# Patient Record
Sex: FEMALE | Race: BLACK OR AFRICAN AMERICAN | NOT HISPANIC OR LATINO
[De-identification: names, ages, dates, MRNs, and addresses within clinical notes are randomized per-mention and may not be internally consistent; named-entity substitution may affect disease eponyms.]

---

## 2014-12-29 RX ORDER — FUROSEMIDE 40 MG
1 TABLET ORAL
Qty: 0 | Refills: 0 | DISCHARGE
Start: 2014-12-29

## 2014-12-29 RX ORDER — FUROSEMIDE 40 MG
1 TABLET ORAL
Qty: 0 | Refills: 0 | COMMUNITY
Start: 2014-12-29

## 2017-06-09 ENCOUNTER — APPOINTMENT (OUTPATIENT)
Dept: GERIATRICS | Facility: CLINIC | Age: 82
End: 2017-06-09

## 2017-06-09 VITALS
WEIGHT: 157.13 LBS | DIASTOLIC BLOOD PRESSURE: 80 MMHG | BODY MASS INDEX: 26.18 KG/M2 | HEART RATE: 85 BPM | TEMPERATURE: 97.8 F | RESPIRATION RATE: 15 BRPM | SYSTOLIC BLOOD PRESSURE: 150 MMHG | HEIGHT: 65 IN | OXYGEN SATURATION: 98 %

## 2017-06-09 DIAGNOSIS — K08.109 PRESENCE OF DENTAL PROSTHETIC DEVICE (COMPLETE) (PARTIAL): ICD-10-CM

## 2017-06-09 DIAGNOSIS — Z63.4 DISAPPEARANCE AND DEATH OF FAMILY MEMBER: ICD-10-CM

## 2017-06-09 DIAGNOSIS — Z97.2 PRESENCE OF DENTAL PROSTHETIC DEVICE (COMPLETE) (PARTIAL): ICD-10-CM

## 2017-06-09 SDOH — SOCIAL STABILITY - SOCIAL INSECURITY: DISSAPEARANCE AND DEATH OF FAMILY MEMBER: Z63.4

## 2017-06-12 LAB
24R-OH-CALCIDIOL SERPL-MCNC: 104 PG/ML
ALBUMIN SERPL ELPH-MCNC: 3.9 G/DL
ALP BLD-CCNC: 91 U/L
ALT SERPL-CCNC: 34 U/L
ANION GAP SERPL CALC-SCNC: 18 MMOL/L
AST SERPL-CCNC: 43 U/L
BASOPHILS # BLD AUTO: 0.06 K/UL
BASOPHILS NFR BLD AUTO: 0.9 %
BILIRUB SERPL-MCNC: 0.2 MG/DL
BUN SERPL-MCNC: 16 MG/DL
CALCIUM SERPL-MCNC: 9.8 MG/DL
CHLORIDE SERPL-SCNC: 96 MMOL/L
CHOLEST SERPL-MCNC: 246 MG/DL
CHOLEST/HDLC SERPL: 2.4 RATIO
CO2 SERPL-SCNC: 23 MMOL/L
CREAT SERPL-MCNC: 0.77 MG/DL
EOSINOPHIL # BLD AUTO: 0.2 K/UL
EOSINOPHIL NFR BLD AUTO: 3.1 %
FOLATE SERPL-MCNC: 13.5 NG/ML
GLUCOSE SERPL-MCNC: 95 MG/DL
HBA1C MFR BLD HPLC: 5.6 %
HCT VFR BLD CALC: 40.2 %
HDLC SERPL-MCNC: 103 MG/DL
HGB BLD-MCNC: 13.4 G/DL
IMM GRANULOCYTES NFR BLD AUTO: 0.3 %
LDLC SERPL CALC-MCNC: 129 MG/DL
LYMPHOCYTES # BLD AUTO: 1.7 K/UL
LYMPHOCYTES NFR BLD AUTO: 26.6 %
MAN DIFF?: NORMAL
MCHC RBC-ENTMCNC: 30.9 PG
MCHC RBC-ENTMCNC: 33.3 GM/DL
MCV RBC AUTO: 92.6 FL
MONOCYTES # BLD AUTO: 0.77 K/UL
MONOCYTES NFR BLD AUTO: 12 %
NEUTROPHILS # BLD AUTO: 3.65 K/UL
NEUTROPHILS NFR BLD AUTO: 57.1 %
PLATELET # BLD AUTO: 238 K/UL
POTASSIUM SERPL-SCNC: 4.9 MMOL/L
PROT SERPL-MCNC: 6.8 G/DL
RBC # BLD: 4.34 M/UL
RBC # FLD: 14.6 %
RPR SER-TITR: NORMAL
SODIUM SERPL-SCNC: 137 MMOL/L
TRIGL SERPL-MCNC: 68 MG/DL
TSH SERPL-ACNC: 0.86 UIU/ML
VIT B12 SERPL-MCNC: 681 PG/ML
WBC # FLD AUTO: 6.4 K/UL

## 2017-07-21 ENCOUNTER — APPOINTMENT (OUTPATIENT)
Dept: CARDIOLOGY | Facility: CLINIC | Age: 82
End: 2017-07-21

## 2017-07-21 ENCOUNTER — NON-APPOINTMENT (OUTPATIENT)
Age: 82
End: 2017-07-21

## 2017-07-21 VITALS
HEART RATE: 74 BPM | HEIGHT: 65 IN | DIASTOLIC BLOOD PRESSURE: 68 MMHG | BODY MASS INDEX: 25.99 KG/M2 | WEIGHT: 156 LBS | SYSTOLIC BLOOD PRESSURE: 148 MMHG

## 2017-07-21 VITALS — DIASTOLIC BLOOD PRESSURE: 65 MMHG | SYSTOLIC BLOOD PRESSURE: 128 MMHG

## 2017-07-21 DIAGNOSIS — Z78.9 OTHER SPECIFIED HEALTH STATUS: ICD-10-CM

## 2017-08-03 ENCOUNTER — APPOINTMENT (OUTPATIENT)
Dept: CARDIOLOGY | Facility: CLINIC | Age: 82
End: 2017-08-03
Payer: MEDICARE

## 2017-08-03 PROCEDURE — 93306 TTE W/DOPPLER COMPLETE: CPT

## 2017-09-15 ENCOUNTER — APPOINTMENT (OUTPATIENT)
Dept: GERIATRICS | Facility: CLINIC | Age: 82
End: 2017-09-15
Payer: MEDICARE

## 2017-09-15 VITALS
OXYGEN SATURATION: 96 % | RESPIRATION RATE: 15 BRPM | HEIGHT: 65 IN | BODY MASS INDEX: 25.83 KG/M2 | DIASTOLIC BLOOD PRESSURE: 88 MMHG | TEMPERATURE: 97.6 F | HEART RATE: 69 BPM | SYSTOLIC BLOOD PRESSURE: 150 MMHG | WEIGHT: 155.03 LBS

## 2017-09-15 PROCEDURE — 99214 OFFICE O/P EST MOD 30 MIN: CPT | Mod: GC

## 2017-09-15 RX ORDER — HYDROCORTISONE AND IODOCHLORHYDROXYQUIN 5; 30 MG/G; MG/G
3-0.5 CREAM TOPICAL
Qty: 80 | Refills: 0 | Status: DISCONTINUED | COMMUNITY
Start: 2017-05-02 | End: 2017-09-15

## 2017-09-22 ENCOUNTER — NON-APPOINTMENT (OUTPATIENT)
Age: 82
End: 2017-09-22

## 2017-09-22 ENCOUNTER — APPOINTMENT (OUTPATIENT)
Dept: CARDIOLOGY | Facility: CLINIC | Age: 82
End: 2017-09-22
Payer: MEDICARE

## 2017-09-22 VITALS
SYSTOLIC BLOOD PRESSURE: 184 MMHG | TEMPERATURE: 97.7 F | BODY MASS INDEX: 25.66 KG/M2 | HEIGHT: 65 IN | WEIGHT: 154 LBS | HEART RATE: 70 BPM | DIASTOLIC BLOOD PRESSURE: 99 MMHG | OXYGEN SATURATION: 99 %

## 2017-09-22 PROCEDURE — 93000 ELECTROCARDIOGRAM COMPLETE: CPT

## 2017-09-22 PROCEDURE — 99214 OFFICE O/P EST MOD 30 MIN: CPT

## 2017-09-26 ENCOUNTER — MESSAGE (OUTPATIENT)
Age: 82
End: 2017-09-26

## 2017-09-28 ENCOUNTER — INPATIENT (INPATIENT)
Facility: HOSPITAL | Age: 82
LOS: 3 days | Discharge: HOME | DRG: 305 | End: 2017-10-02
Attending: HOSPITALIST | Admitting: HOSPITALIST
Payer: MEDICARE

## 2017-09-28 VITALS
RESPIRATION RATE: 18 BRPM | TEMPERATURE: 97 F | SYSTOLIC BLOOD PRESSURE: 214 MMHG | HEART RATE: 78 BPM | DIASTOLIC BLOOD PRESSURE: 109 MMHG | OXYGEN SATURATION: 98 %

## 2017-09-28 DIAGNOSIS — M25.512 PAIN IN LEFT SHOULDER: ICD-10-CM

## 2017-09-28 DIAGNOSIS — I16.0 HYPERTENSIVE URGENCY: ICD-10-CM

## 2017-09-28 DIAGNOSIS — F03.90 UNSPECIFIED DEMENTIA, UNSPECIFIED SEVERITY, WITHOUT BEHAVIORAL DISTURBANCE, PSYCHOTIC DISTURBANCE, MOOD DISTURBANCE, AND ANXIETY: ICD-10-CM

## 2017-09-28 DIAGNOSIS — E03.9 HYPOTHYROIDISM, UNSPECIFIED: ICD-10-CM

## 2017-09-28 DIAGNOSIS — M19.90 UNSPECIFIED OSTEOARTHRITIS, UNSPECIFIED SITE: ICD-10-CM

## 2017-09-28 LAB
ALBUMIN SERPL ELPH-MCNC: 4.5 G/DL — SIGNIFICANT CHANGE UP (ref 3.3–5)
ALP SERPL-CCNC: 98 U/L — SIGNIFICANT CHANGE UP (ref 40–120)
ALT FLD-CCNC: 46 U/L RC — HIGH (ref 10–45)
ANION GAP SERPL CALC-SCNC: 20 MMOL/L — HIGH (ref 5–17)
AST SERPL-CCNC: 61 U/L — HIGH (ref 10–40)
BASE EXCESS BLDV CALC-SCNC: 1 MMOL/L — SIGNIFICANT CHANGE UP (ref -2–2)
BASOPHILS # BLD AUTO: 0 K/UL — SIGNIFICANT CHANGE UP (ref 0–0.2)
BASOPHILS NFR BLD AUTO: 0.1 % — SIGNIFICANT CHANGE UP (ref 0–2)
BILIRUB SERPL-MCNC: 0.4 MG/DL — SIGNIFICANT CHANGE UP (ref 0.2–1.2)
BUN SERPL-MCNC: 15 MG/DL — SIGNIFICANT CHANGE UP (ref 7–23)
CA-I SERPL-SCNC: 1.18 MMOL/L — SIGNIFICANT CHANGE UP (ref 1.12–1.3)
CALCIUM SERPL-MCNC: 10.1 MG/DL — SIGNIFICANT CHANGE UP (ref 8.4–10.5)
CHLORIDE BLDV-SCNC: 97 MMOL/L — SIGNIFICANT CHANGE UP (ref 96–108)
CHLORIDE SERPL-SCNC: 94 MMOL/L — LOW (ref 96–108)
CO2 BLDV-SCNC: 27 MMOL/L — SIGNIFICANT CHANGE UP (ref 22–30)
CO2 SERPL-SCNC: 23 MMOL/L — SIGNIFICANT CHANGE UP (ref 22–31)
CREAT SERPL-MCNC: 0.7 MG/DL — SIGNIFICANT CHANGE UP (ref 0.5–1.3)
EOSINOPHIL # BLD AUTO: 0 K/UL — SIGNIFICANT CHANGE UP (ref 0–0.5)
EOSINOPHIL NFR BLD AUTO: 0.4 % — SIGNIFICANT CHANGE UP (ref 0–6)
GAS PNL BLDV: 134 MMOL/L — LOW (ref 136–145)
GAS PNL BLDV: SIGNIFICANT CHANGE UP
GAS PNL BLDV: SIGNIFICANT CHANGE UP
GLUCOSE BLDV-MCNC: 102 MG/DL — HIGH (ref 70–99)
GLUCOSE SERPL-MCNC: 103 MG/DL — HIGH (ref 70–99)
HCO3 BLDV-SCNC: 26 MMOL/L — SIGNIFICANT CHANGE UP (ref 21–29)
HCT VFR BLD CALC: 45 % — SIGNIFICANT CHANGE UP (ref 34.5–45)
HCT VFR BLDA CALC: 47 % — SIGNIFICANT CHANGE UP (ref 39–50)
HGB BLD CALC-MCNC: 15.2 G/DL — SIGNIFICANT CHANGE UP (ref 11.5–15.5)
HGB BLD-MCNC: 15.3 G/DL — SIGNIFICANT CHANGE UP (ref 11.5–15.5)
LACTATE BLDV-MCNC: 3.5 MMOL/L — HIGH (ref 0.7–2)
LYMPHOCYTES # BLD AUTO: 1.5 K/UL — SIGNIFICANT CHANGE UP (ref 1–3.3)
LYMPHOCYTES # BLD AUTO: 19.6 % — SIGNIFICANT CHANGE UP (ref 13–44)
MCHC RBC-ENTMCNC: 32.9 PG — SIGNIFICANT CHANGE UP (ref 27–34)
MCHC RBC-ENTMCNC: 33.9 GM/DL — SIGNIFICANT CHANGE UP (ref 32–36)
MCV RBC AUTO: 97.1 FL — SIGNIFICANT CHANGE UP (ref 80–100)
MONOCYTES # BLD AUTO: 0.8 K/UL — SIGNIFICANT CHANGE UP (ref 0–0.9)
MONOCYTES NFR BLD AUTO: 11.1 % — SIGNIFICANT CHANGE UP (ref 2–14)
NEUTROPHILS # BLD AUTO: 5.1 K/UL — SIGNIFICANT CHANGE UP (ref 1.8–7.4)
NEUTROPHILS NFR BLD AUTO: 68.7 % — SIGNIFICANT CHANGE UP (ref 43–77)
PCO2 BLDV: 44 MMHG — SIGNIFICANT CHANGE UP (ref 35–50)
PH BLDV: 7.38 — SIGNIFICANT CHANGE UP (ref 7.35–7.45)
PLATELET # BLD AUTO: 181 K/UL — SIGNIFICANT CHANGE UP (ref 150–400)
PO2 BLDV: 25 MMHG — SIGNIFICANT CHANGE UP (ref 25–45)
POTASSIUM BLDV-SCNC: 5.2 MMOL/L — HIGH (ref 3.5–5)
POTASSIUM SERPL-MCNC: 4.1 MMOL/L — SIGNIFICANT CHANGE UP (ref 3.5–5.3)
POTASSIUM SERPL-SCNC: 4.1 MMOL/L — SIGNIFICANT CHANGE UP (ref 3.5–5.3)
PROT SERPL-MCNC: 8.4 G/DL — HIGH (ref 6–8.3)
RBC # BLD: 4.64 M/UL — SIGNIFICANT CHANGE UP (ref 3.8–5.2)
RBC # FLD: 13.3 % — SIGNIFICANT CHANGE UP (ref 10.3–14.5)
SAO2 % BLDV: 38 % — LOW (ref 67–88)
SODIUM SERPL-SCNC: 137 MMOL/L — SIGNIFICANT CHANGE UP (ref 135–145)
WBC # BLD: 7.4 K/UL — SIGNIFICANT CHANGE UP (ref 3.8–10.5)
WBC # FLD AUTO: 7.4 K/UL — SIGNIFICANT CHANGE UP (ref 3.8–10.5)

## 2017-09-28 PROCEDURE — 93010 ELECTROCARDIOGRAM REPORT: CPT

## 2017-09-28 PROCEDURE — 71010: CPT | Mod: 26

## 2017-09-28 PROCEDURE — 99223 1ST HOSP IP/OBS HIGH 75: CPT | Mod: AI

## 2017-09-28 PROCEDURE — 73030 X-RAY EXAM OF SHOULDER: CPT | Mod: 26,LT

## 2017-09-28 PROCEDURE — 99285 EMERGENCY DEPT VISIT HI MDM: CPT | Mod: 25,GC

## 2017-09-28 RX ORDER — ACETAMINOPHEN 500 MG
1000 TABLET ORAL ONCE
Qty: 0 | Refills: 0 | Status: COMPLETED | OUTPATIENT
Start: 2017-09-28 | End: 2017-09-28

## 2017-09-28 RX ORDER — ACETAMINOPHEN 500 MG
650 TABLET ORAL EVERY 6 HOURS
Qty: 0 | Refills: 0 | Status: DISCONTINUED | OUTPATIENT
Start: 2017-09-28 | End: 2017-10-02

## 2017-09-28 RX ORDER — LABETALOL HCL 100 MG
10 TABLET ORAL ONCE
Qty: 0 | Refills: 0 | Status: COMPLETED | OUTPATIENT
Start: 2017-09-28 | End: 2017-09-28

## 2017-09-28 RX ORDER — AMLODIPINE BESYLATE 2.5 MG/1
5 TABLET ORAL DAILY
Qty: 0 | Refills: 0 | Status: DISCONTINUED | OUTPATIENT
Start: 2017-09-28 | End: 2017-09-29

## 2017-09-28 RX ORDER — HYDRALAZINE HCL 50 MG
10 TABLET ORAL ONCE
Qty: 0 | Refills: 0 | Status: COMPLETED | OUTPATIENT
Start: 2017-09-28 | End: 2017-09-28

## 2017-09-28 RX ORDER — SODIUM CHLORIDE 9 MG/ML
1000 INJECTION INTRAMUSCULAR; INTRAVENOUS; SUBCUTANEOUS ONCE
Qty: 0 | Refills: 0 | Status: COMPLETED | OUTPATIENT
Start: 2017-09-28 | End: 2017-09-28

## 2017-09-28 RX ORDER — HEPARIN SODIUM 5000 [USP'U]/ML
5000 INJECTION INTRAVENOUS; SUBCUTANEOUS EVERY 12 HOURS
Qty: 0 | Refills: 0 | Status: DISCONTINUED | OUTPATIENT
Start: 2017-09-28 | End: 2017-10-02

## 2017-09-28 RX ORDER — FUROSEMIDE 40 MG
20 TABLET ORAL DAILY
Qty: 0 | Refills: 0 | Status: DISCONTINUED | OUTPATIENT
Start: 2017-09-28 | End: 2017-09-29

## 2017-09-28 RX ORDER — METOPROLOL TARTRATE 50 MG
25 TABLET ORAL ONCE
Qty: 0 | Refills: 0 | Status: COMPLETED | OUTPATIENT
Start: 2017-09-28 | End: 2017-09-28

## 2017-09-28 RX ORDER — LIDOCAINE 4 G/100G
1 CREAM TOPICAL DAILY
Qty: 0 | Refills: 0 | Status: DISCONTINUED | OUTPATIENT
Start: 2017-09-28 | End: 2017-10-02

## 2017-09-28 RX ORDER — METOPROLOL TARTRATE 50 MG
25 TABLET ORAL
Qty: 0 | Refills: 0 | Status: DISCONTINUED | OUTPATIENT
Start: 2017-09-28 | End: 2017-10-02

## 2017-09-28 RX ORDER — LIOTHYRONINE SODIUM 25 UG/1
5 TABLET ORAL DAILY
Qty: 0 | Refills: 0 | Status: DISCONTINUED | OUTPATIENT
Start: 2017-09-28 | End: 2017-10-02

## 2017-09-28 RX ADMIN — Medication 400 MILLIGRAM(S): at 18:10

## 2017-09-28 RX ADMIN — SODIUM CHLORIDE 1000 MILLILITER(S): 9 INJECTION INTRAMUSCULAR; INTRAVENOUS; SUBCUTANEOUS at 20:26

## 2017-09-28 RX ADMIN — Medication 10 MILLIGRAM(S): at 18:11

## 2017-09-28 RX ADMIN — Medication 10 MILLIGRAM(S): at 22:33

## 2017-09-28 RX ADMIN — AMLODIPINE BESYLATE 5 MILLIGRAM(S): 2.5 TABLET ORAL at 22:33

## 2017-09-28 RX ADMIN — Medication 25 MILLIGRAM(S): at 18:12

## 2017-09-28 RX ADMIN — Medication 10 MILLIGRAM(S): at 20:56

## 2017-09-28 RX ADMIN — Medication 1000 MILLIGRAM(S): at 20:26

## 2017-09-28 NOTE — ED PROVIDER NOTE - MEDICAL DECISION MAKING DETAILS
89F with pmh HTN presenting with cc of SOB a/w L shoulder pain, on arrival pt in hypertensive urgency will BP control, r/o L shoulder dislocation fx, ACS, CXR for PTX, PNA,, ekg, labs, trops, cxr, xr L shoulder, probable admit

## 2017-09-28 NOTE — CHART NOTE - NSCHARTNOTEFT_GEN_A_CORE
RRT called for hypotension and shoulder pain. Pt found to have BP of 120/80. She is admitted fro Hypertensive urgency with a goal SBP of 170-180. She received oral antihypertensives in the ED. EKG was performed which showed no change from previous EKG. Pt did not have any ectopy on monitor. lidocaine patch was placed on shoulder for pain as the pain was thought to be MSK related. Labs ordered to check cardiac enzymes and electrolytes. Will also repeat lactate. BP was repeated and SBP was in 190s which is closer to pts goal.     Pts primary team at bedside.      Plan:  - cardiac enzymes  - CXR  - pain control    Nishant LINARES  [id number]

## 2017-09-28 NOTE — H&P ADULT - PROBLEM SELECTOR PLAN 4
conducted a detailed discussion... I had a detailed discussion with the patient and/or guardian regarding the historical points, exam findings, and any diagnostic results supporting the discharge/admit diagnosis. ck TSH  cont liothyronine

## 2017-09-28 NOTE — H&P ADULT - PROBLEM SELECTOR PLAN 3
monitor mental status closely  daughter to stay at bedside  doubt any risk of withdrawal but pt daughter and RN educated about s/s withdrawal and must use caution as pt with dementia and may have hospital induced delirium

## 2017-09-28 NOTE — ED PROVIDER NOTE - ATTENDING CONTRIBUTION TO CARE
Patient with left chest pain/shoulder pain. Moderate. Persistent. Not better with time.  ncat, non-tachycardic, non-tachypneic, cooperative, mild distress secondary to pain  concern for anginal equivalent vs hypertensive urgency, will get iv, labs, CE, ekg, cxr and likely admit

## 2017-09-28 NOTE — H&P ADULT - PROBLEM SELECTOR PLAN 1
pt with chronically elevated bp and likely vasovagal episode (pt did not eat/drink all day)  restarted metoprolol at 25mg po bid and added back norvasc 5mg daily  cont lasix  will ck Franny x 3, monitor on tele  use extreme caution as pt with labile bp  repeat lactate on next labs, ? reason for elevation  ck UA to r/o uti pt with chronically elevated bp and likely vasovagal episode (pt did not eat/drink all day)  restarted metoprolol at 25mg po bid and added back norvasc 5mg daily  cont lasix  will ck Franny x 3, monitor on tele  use extreme caution as pt with labile bp  repeat lactate on next labs, ? reason for elevation  ck UA to r/o uti  obtain results of TTE from August

## 2017-09-28 NOTE — ED ADULT NURSE REASSESSMENT NOTE - NS ED NURSE REASSESS COMMENT FT1
pt still hypertensive, 205/104. pt denies dizziness, HA, numbness, tingling, blurred vision. pt baseline is 180/90 as per family. MD Joya made aware. MD ordered an additional 10mg of labetalol IV push. pt admitted, awaiting bed. plan of care discussed. safety and comfort measures maintained.

## 2017-09-28 NOTE — H&P ADULT - PROBLEM SELECTOR PLAN 5
pt with baseline OA  hold meloxicam which can increase BP in elderly  for now lidoderm patch and cold compresses to left shoulder and avoid systemic medications

## 2017-09-28 NOTE — H&P ADULT - PMH
Arthritis    Dementia without behavioral disturbance, unspecified dementia type    Hepatitis C    Hypertension    Hypothyroidism

## 2017-09-28 NOTE — ED PROVIDER NOTE - OBJECTIVE STATEMENT
Pt is a 89F with a PMH of HTN, dementia presenting with a cc of L shoulder pain and SOB beginning at approx. 1200 today. Per pts daughter, discovered pt c/o L shoulder pain to point of inability to move, unclear if pt fell, pt also c/o SOB a/w deep inspiration, denies cough. Per daughter pt at normal mental status. Denies n/v/f/c/cp Denies headache, syncope, lightheadedness, dizziness. Denies chest palpitations, abdominal pain. Denies dysuria, hematuria, hematochezia, BRBPR, tarry stools, diarrhea, constipation.

## 2017-09-28 NOTE — H&P ADULT - HISTORY OF PRESENT ILLNESS
89 f htn, dementia, hcv (s/p rx in remission), hypothyroid a/w left shoulder pain.  Daughter states she left for an apointment at 11am and came back at 2pm with pt c/o severe left shoulder pain.  Pt has chronic sob.  No cp/n/v.  Pt saw cardiology 1 wk ago and was told to increase toprol from 25 to 50.  Pt had a TTE in August, results unknown.  Of note is pt drinks 1/2-1 cup of gin a day, no h/o withdrawal.    In /109, hr 78  Given labetolol 10mg iv x 2 and metoprolol 25mg po x 1.  Initially seen on floor with sbp 215/98, hr 69.  Given hydralazine 10mg iv x 1 and resumed norvasc 5mg (was on in past and daughter does not remember why it was stopped).  Pt went to bathroom twice and on returning the second time she was sitting on end of bed and felt dizzy, lay down and initial bp 120/80.  After laying for a few minutes sbp back to 190s.  Pt with increasing shoulder pain and given lidoderm patch and cold compress with good effect

## 2017-09-28 NOTE — ED ADULT NURSE NOTE - OBJECTIVE STATEMENT
89 yo presents to the ED from home by EMS. A&Ox4 c/o left shoulder pain. pt denies trauma to site. pt shoulder is slightly swollen, tender to palpation. pt reports severe pain upon movement of extremity. pt has history of dementia, pt was alone at home, daughter reports when she got home at 1400, she was complaining of pain. pt denies CP, SOB, radiating to back pain, dizziness. pt is ambulating with assistance of cane. pt BP is elevated, pt is normally 180/90. according to daughter, pt was advised to increase dose of metoprolol on Friday but did not follow orders. EKG done at bedside. pt placed on cardiac monitor. NSR 74. daughter at bedside. plan of care discussed. safety and comfort measures maintained.

## 2017-09-28 NOTE — H&P ADULT - RS GEN PE MLT RESP DETAILS PC
respirations non-labored/good air movement/breath sounds equal/airway patent/clear to auscultation bilaterally

## 2017-09-28 NOTE — PATIENT PROFILE ADULT. - NS TRANSFER PATIENT BELONGINGS
Jewelry only: 3 bracelets, earrings, necklace./Cell Phone/PDA (specify)/Clothing/Jewelry/Money (specify)

## 2017-09-28 NOTE — ED ADULT NURSE NOTE - CHPI ED SYMPTOMS NEG
no chills/no vomiting/no decreased eating/drinking/no nausea/no numbness/no weakness/no fever/no tingling/no dizziness

## 2017-09-29 DIAGNOSIS — M75.82 OTHER SHOULDER LESIONS, LEFT SHOULDER: ICD-10-CM

## 2017-09-29 DIAGNOSIS — E87.1 HYPO-OSMOLALITY AND HYPONATREMIA: ICD-10-CM

## 2017-09-29 DIAGNOSIS — I50.32 CHRONIC DIASTOLIC (CONGESTIVE) HEART FAILURE: ICD-10-CM

## 2017-09-29 DIAGNOSIS — I16.9 HYPERTENSIVE CRISIS, UNSPECIFIED: ICD-10-CM

## 2017-09-29 DIAGNOSIS — Z29.9 ENCOUNTER FOR PROPHYLACTIC MEASURES, UNSPECIFIED: ICD-10-CM

## 2017-09-29 DIAGNOSIS — B18.2 CHRONIC VIRAL HEPATITIS C: ICD-10-CM

## 2017-09-29 LAB
ANION GAP SERPL CALC-SCNC: 18 MMOL/L — HIGH (ref 5–17)
ANION GAP SERPL CALC-SCNC: 22 MMOL/L — HIGH (ref 5–17)
APPEARANCE UR: CLEAR — SIGNIFICANT CHANGE UP
APPEARANCE UR: CLEAR — SIGNIFICANT CHANGE UP
BASE EXCESS BLDV CALC-SCNC: 2.1 MMOL/L — HIGH (ref -2–2)
BILIRUB UR-MCNC: NEGATIVE — SIGNIFICANT CHANGE UP
BILIRUB UR-MCNC: NEGATIVE — SIGNIFICANT CHANGE UP
BUN SERPL-MCNC: 11 MG/DL — SIGNIFICANT CHANGE UP (ref 7–23)
BUN SERPL-MCNC: 8 MG/DL — SIGNIFICANT CHANGE UP (ref 7–23)
CA-I SERPL-SCNC: 1.15 MMOL/L — SIGNIFICANT CHANGE UP (ref 1.12–1.3)
CALCIUM SERPL-MCNC: 9.7 MG/DL — SIGNIFICANT CHANGE UP (ref 8.4–10.5)
CALCIUM SERPL-MCNC: 9.8 MG/DL — SIGNIFICANT CHANGE UP (ref 8.4–10.5)
CHLORIDE BLDV-SCNC: 96 MMOL/L — SIGNIFICANT CHANGE UP (ref 96–108)
CHLORIDE SERPL-SCNC: 92 MMOL/L — LOW (ref 96–108)
CHLORIDE SERPL-SCNC: 94 MMOL/L — LOW (ref 96–108)
CK MB BLD-MCNC: 2 % — SIGNIFICANT CHANGE UP (ref 0–3.5)
CK MB BLD-MCNC: 2.4 % — SIGNIFICANT CHANGE UP (ref 0–3.5)
CK MB CFR SERPL CALC: 4.9 NG/ML — HIGH (ref 0–3.8)
CK MB CFR SERPL CALC: 5.8 NG/ML — HIGH (ref 0–3.8)
CK SERPL-CCNC: 208 U/L — HIGH (ref 25–170)
CK SERPL-CCNC: 283 U/L — HIGH (ref 25–170)
CO2 BLDV-SCNC: 25 MMOL/L — SIGNIFICANT CHANGE UP (ref 22–30)
CO2 SERPL-SCNC: 19 MMOL/L — LOW (ref 22–31)
CO2 SERPL-SCNC: 22 MMOL/L — SIGNIFICANT CHANGE UP (ref 22–31)
COLOR SPEC: COLORLESS — SIGNIFICANT CHANGE UP
COLOR SPEC: COLORLESS — SIGNIFICANT CHANGE UP
CREAT SERPL-MCNC: 0.6 MG/DL — SIGNIFICANT CHANGE UP (ref 0.5–1.3)
CREAT SERPL-MCNC: 0.66 MG/DL — SIGNIFICANT CHANGE UP (ref 0.5–1.3)
DIFF PNL FLD: NEGATIVE — SIGNIFICANT CHANGE UP
DIFF PNL FLD: NEGATIVE — SIGNIFICANT CHANGE UP
EPI CELLS # UR: SIGNIFICANT CHANGE UP /HPF
EPI CELLS # UR: SIGNIFICANT CHANGE UP /HPF
GAS PNL BLDV: 134 MMOL/L — LOW (ref 136–145)
GAS PNL BLDV: SIGNIFICANT CHANGE UP
GLUCOSE BLDV-MCNC: 135 MG/DL — HIGH (ref 70–99)
GLUCOSE SERPL-MCNC: 117 MG/DL — HIGH (ref 70–99)
GLUCOSE SERPL-MCNC: 146 MG/DL — HIGH (ref 70–99)
GLUCOSE UR QL: NEGATIVE — SIGNIFICANT CHANGE UP
GLUCOSE UR QL: NEGATIVE — SIGNIFICANT CHANGE UP
HCO3 BLDV-SCNC: 24 MMOL/L — SIGNIFICANT CHANGE UP (ref 21–29)
HCT VFR BLD CALC: 44.9 % — SIGNIFICANT CHANGE UP (ref 34.5–45)
HCT VFR BLDA CALC: 48 % — SIGNIFICANT CHANGE UP (ref 39–50)
HGB BLD CALC-MCNC: 15.6 G/DL — HIGH (ref 11.5–15.5)
HGB BLD-MCNC: 15.3 G/DL — SIGNIFICANT CHANGE UP (ref 11.5–15.5)
KETONES UR-MCNC: ABNORMAL
KETONES UR-MCNC: ABNORMAL
LACTATE BLDV-MCNC: 3.3 MMOL/L — HIGH (ref 0.7–2)
LACTATE SERPL-SCNC: 2.2 MMOL/L — HIGH (ref 0.7–2)
LACTATE SERPL-SCNC: 3.3 MMOL/L — HIGH (ref 0.7–2)
LEUKOCYTE ESTERASE UR-ACNC: ABNORMAL
LEUKOCYTE ESTERASE UR-ACNC: NEGATIVE — SIGNIFICANT CHANGE UP
MAGNESIUM SERPL-MCNC: 1.8 MG/DL — SIGNIFICANT CHANGE UP (ref 1.6–2.6)
MAGNESIUM SERPL-MCNC: 2.2 MG/DL — SIGNIFICANT CHANGE UP (ref 1.6–2.6)
MCHC RBC-ENTMCNC: 32.7 PG — SIGNIFICANT CHANGE UP (ref 27–34)
MCHC RBC-ENTMCNC: 34.1 GM/DL — SIGNIFICANT CHANGE UP (ref 32–36)
MCV RBC AUTO: 95.9 FL — SIGNIFICANT CHANGE UP (ref 80–100)
NITRITE UR-MCNC: NEGATIVE — SIGNIFICANT CHANGE UP
NITRITE UR-MCNC: NEGATIVE — SIGNIFICANT CHANGE UP
OSMOLALITY SERPL: 278 MOS/KG — SIGNIFICANT CHANGE UP (ref 275–300)
OSMOLALITY UR: 332 MOS/KG — SIGNIFICANT CHANGE UP (ref 300–900)
OTHER CELLS CSF MANUAL: 10 ML/DL — LOW (ref 18–22)
PCO2 BLDV: 32 MMHG — LOW (ref 35–50)
PH BLDV: 7.49 — HIGH (ref 7.35–7.45)
PH UR: 7 — SIGNIFICANT CHANGE UP (ref 5–8)
PH UR: 7.5 — SIGNIFICANT CHANGE UP (ref 5–8)
PHOSPHATE SERPL-MCNC: 1.9 MG/DL — LOW (ref 2.5–4.5)
PHOSPHATE SERPL-MCNC: 3.1 MG/DL — SIGNIFICANT CHANGE UP (ref 2.5–4.5)
PLATELET # BLD AUTO: 188 K/UL — SIGNIFICANT CHANGE UP (ref 150–400)
PO2 BLDV: 25 MMHG — SIGNIFICANT CHANGE UP (ref 25–45)
POTASSIUM BLDV-SCNC: 3.2 MMOL/L — LOW (ref 3.5–5)
POTASSIUM SERPL-MCNC: 3.5 MMOL/L — SIGNIFICANT CHANGE UP (ref 3.5–5.3)
POTASSIUM SERPL-MCNC: 3.5 MMOL/L — SIGNIFICANT CHANGE UP (ref 3.5–5.3)
POTASSIUM SERPL-SCNC: 3.5 MMOL/L — SIGNIFICANT CHANGE UP (ref 3.5–5.3)
POTASSIUM SERPL-SCNC: 3.5 MMOL/L — SIGNIFICANT CHANGE UP (ref 3.5–5.3)
PROT UR-MCNC: NEGATIVE — SIGNIFICANT CHANGE UP
PROT UR-MCNC: SIGNIFICANT CHANGE UP
RBC # BLD: 4.68 M/UL — SIGNIFICANT CHANGE UP (ref 3.8–5.2)
RBC # FLD: 13.2 % — SIGNIFICANT CHANGE UP (ref 10.3–14.5)
RBC CASTS # UR COMP ASSIST: SIGNIFICANT CHANGE UP /HPF (ref 0–2)
RBC CASTS # UR COMP ASSIST: SIGNIFICANT CHANGE UP /HPF (ref 0–2)
SAO2 % BLDV: 46 % — LOW (ref 67–88)
SODIUM SERPL-SCNC: 133 MMOL/L — LOW (ref 135–145)
SODIUM SERPL-SCNC: 134 MMOL/L — LOW (ref 135–145)
SP GR SPEC: 1.01 — LOW (ref 1.01–1.02)
SP GR SPEC: 1.01 — LOW (ref 1.01–1.02)
TROPONIN T SERPL-MCNC: <0.01 NG/ML — SIGNIFICANT CHANGE UP (ref 0–0.06)
TROPONIN T SERPL-MCNC: <0.01 NG/ML — SIGNIFICANT CHANGE UP (ref 0–0.06)
TSH SERPL-MCNC: 3.03 UIU/ML — SIGNIFICANT CHANGE UP (ref 0.27–4.2)
UROBILINOGEN FLD QL: NEGATIVE — SIGNIFICANT CHANGE UP
UROBILINOGEN FLD QL: NEGATIVE — SIGNIFICANT CHANGE UP
WBC # BLD: 7.5 K/UL — SIGNIFICANT CHANGE UP (ref 3.8–10.5)
WBC # FLD AUTO: 7.5 K/UL — SIGNIFICANT CHANGE UP (ref 3.8–10.5)
WBC UR QL: ABNORMAL /HPF (ref 0–5)
WBC UR QL: SIGNIFICANT CHANGE UP /HPF (ref 0–5)

## 2017-09-29 PROCEDURE — 99233 SBSQ HOSP IP/OBS HIGH 50: CPT

## 2017-09-29 RX ORDER — HYDRALAZINE HCL 50 MG
10 TABLET ORAL EVERY 8 HOURS
Qty: 0 | Refills: 0 | Status: DISCONTINUED | OUTPATIENT
Start: 2017-09-29 | End: 2017-10-01

## 2017-09-29 RX ORDER — AMLODIPINE BESYLATE 2.5 MG/1
10 TABLET ORAL DAILY
Qty: 0 | Refills: 0 | Status: DISCONTINUED | OUTPATIENT
Start: 2017-09-29 | End: 2017-10-02

## 2017-09-29 RX ORDER — LISINOPRIL 2.5 MG/1
10 TABLET ORAL DAILY
Qty: 0 | Refills: 0 | Status: DISCONTINUED | OUTPATIENT
Start: 2017-09-29 | End: 2017-10-02

## 2017-09-29 RX ORDER — MAGNESIUM SULFATE 500 MG/ML
1 VIAL (ML) INJECTION ONCE
Qty: 0 | Refills: 0 | Status: COMPLETED | OUTPATIENT
Start: 2017-09-29 | End: 2017-09-29

## 2017-09-29 RX ORDER — POTASSIUM PHOSPHATE, MONOBASIC POTASSIUM PHOSPHATE, DIBASIC 236; 224 MG/ML; MG/ML
15 INJECTION, SOLUTION INTRAVENOUS ONCE
Qty: 0 | Refills: 0 | Status: COMPLETED | OUTPATIENT
Start: 2017-09-29 | End: 2017-09-29

## 2017-09-29 RX ORDER — FUROSEMIDE 40 MG
20 TABLET ORAL DAILY
Qty: 0 | Refills: 0 | Status: DISCONTINUED | OUTPATIENT
Start: 2017-09-29 | End: 2017-10-02

## 2017-09-29 RX ORDER — POTASSIUM CHLORIDE 20 MEQ
20 PACKET (EA) ORAL ONCE
Qty: 0 | Refills: 0 | Status: COMPLETED | OUTPATIENT
Start: 2017-09-29 | End: 2017-09-29

## 2017-09-29 RX ADMIN — POTASSIUM PHOSPHATE, MONOBASIC POTASSIUM PHOSPHATE, DIBASIC 62.5 MILLIMOLE(S): 236; 224 INJECTION, SOLUTION INTRAVENOUS at 02:32

## 2017-09-29 RX ADMIN — Medication 20 MILLIEQUIVALENT(S): at 01:16

## 2017-09-29 RX ADMIN — AMLODIPINE BESYLATE 10 MILLIGRAM(S): 2.5 TABLET ORAL at 12:39

## 2017-09-29 RX ADMIN — Medication 650 MILLIGRAM(S): at 01:15

## 2017-09-29 RX ADMIN — Medication 25 MILLIGRAM(S): at 05:50

## 2017-09-29 RX ADMIN — HEPARIN SODIUM 5000 UNIT(S): 5000 INJECTION INTRAVENOUS; SUBCUTANEOUS at 05:50

## 2017-09-29 RX ADMIN — LIDOCAINE 1 PATCH: 4 CREAM TOPICAL at 09:33

## 2017-09-29 RX ADMIN — LISINOPRIL 10 MILLIGRAM(S): 2.5 TABLET ORAL at 14:54

## 2017-09-29 RX ADMIN — Medication 20 MILLIGRAM(S): at 13:53

## 2017-09-29 RX ADMIN — HEPARIN SODIUM 5000 UNIT(S): 5000 INJECTION INTRAVENOUS; SUBCUTANEOUS at 17:20

## 2017-09-29 RX ADMIN — Medication 650 MILLIGRAM(S): at 00:19

## 2017-09-29 RX ADMIN — LIOTHYRONINE SODIUM 5 MICROGRAM(S): 25 TABLET ORAL at 05:50

## 2017-09-29 RX ADMIN — Medication 0.25 MILLIGRAM(S): at 23:49

## 2017-09-29 RX ADMIN — Medication 25 MILLIGRAM(S): at 17:20

## 2017-09-29 RX ADMIN — Medication 0.25 MILLIGRAM(S): at 16:54

## 2017-09-29 RX ADMIN — Medication 100 GRAM(S): at 01:16

## 2017-09-29 RX ADMIN — LIDOCAINE 1 PATCH: 4 CREAM TOPICAL at 21:32

## 2017-09-29 NOTE — PROGRESS NOTE ADULT - SUBJECTIVE AND OBJECTIVE BOX
Notify by RN patient complaint of     Patient is a 89y old  Female who presents with a chief complaint of shoulder pain (28 Sep 2017 23:38)      SUBJECTIVE / OVERNIGHT EVENTS:    MEDICATIONS  (STANDING):  heparin  Injectable 5000 Unit(s) SubCutaneous every 12 hours  metoprolol 25 milliGRAM(s) Oral two times a day  liothyronine 5 MICROGram(s) Oral daily  lidocaine   Patch 1 Patch Transdermal daily  amLODIPine   Tablet 10 milliGRAM(s) Oral daily  furosemide    Tablet 20 milliGRAM(s) Oral daily  lisinopril 10 milliGRAM(s) Oral daily    MEDICATIONS  (PRN):  acetaminophen   Tablet. 650 milliGRAM(s) Oral every 6 hours PRN Moderate Pain (4 - 6)  hydrALAZINE Injectable 10 milliGRAM(s) IV Push every 8 hours PRN SBP>190        CAPILLARY BLOOD GLUCOSE        I&O's Summary    28 Sep 2017 07:  -  29 Sep 2017 07:00  --------------------------------------------------------  IN: 590 mL / OUT: 0 mL / NET: 590 mL    29 Sep 2017 07:  -  29 Sep 2017 17:48  --------------------------------------------------------  IN: 240 mL / OUT: 0 mL / NET: 240 mL        LABS:                        15.3   7.5   )-----------( 188      ( 29 Sep 2017 06:35 )             44.9         134<L>  |  94<L>  |  8   ----------------------------<  117<H>  3.5   |  22  |  0.60    Ca    9.8      29 Sep 2017 06:35  Phos  3.1       Mg     2.2         TPro  8.4<H>  /  Alb  4.5  /  TBili  0.4  /  DBili  x   /  AST  61<H>  /  ALT  46<H>  /  AlkPhos  98  09-28      CARDIAC MARKERS ( 29 Sep 2017 06:34 )  x     / <0.01 ng/mL / 283 U/L / x     / 5.8 ng/mL  CARDIAC MARKERS ( 29 Sep 2017 00:15 )  x     / <0.01 ng/mL / 208 U/L / x     / 4.9 ng/mL  CARDIAC MARKERS ( 28 Sep 2017 18:06 )  x     / <0.01 ng/mL / 120 U/L / x     / 3.6 ng/mL      Urinalysis Basic - ( 29 Sep 2017 08:19 )    Color: x / Appearance: Clear / S.009 / pH: x  Gluc: x / Ketone: Trace  / Bili: Negative / Urobili: Negative   Blood: x / Protein: Trace / Nitrite: Negative   Leuk Esterase: Negative / RBC: 3-5 /HPF / WBC 0-2 /HPF   Sq Epi: x / Non Sq Epi: OCC /HPF / Bacteria: x          RADIOLOGY & ADDITIONAL TESTS:    PHYSICAL EXAM:  GENERAL: NAD, well-developed  HEAD:  Atraumatic, Normocephalic  EYES: EOMI, PERRLA, conjunctiva and sclera clear  NECK: Supple, No JVD  CHEST/LUNG: Clear to auscultation bilaterally; No wheeze  HEART: Regular rate and rhythm; No murmurs, rubs, or gallops  ABDOMEN: Soft, Nontender, Nondistended; Bowel sounds present  EXTREMITIES:  2+ Peripheral Pulses, No clubbing, cyanosis, or edema  PSYCH: AAOx3  NEUROLOGY: non-focal  SKIN: No rashes or lesions        A/P:  PAST MEDICAL & SURGICAL HISTORY:  Dementia without behavioral disturbance, unspecified dementia type  Hepatitis C  Hypertension  Arthritis  Hypothyroidism  No significant past surgical history Notify by RN patient  with systolic blood pressure 200's electronic  Manual  b/p 190's seen and examined patient asymptomatic   Denies  cp, sob. headaches, lightheadedness dizziness, n/v/D      Patient is a 89y old  Female who presents with a chief complaint of shoulder pain (28 Sep 2017 23:38)      SUBJECTIVE / OVERNIGHT EVENTS:    MEDICATIONS  (STANDING):  heparin  Injectable 5000 Unit(s) SubCutaneous every 12 hours  metoprolol 25 milliGRAM(s) Oral two times a day  liothyronine 5 MICROGram(s) Oral daily  lidocaine   Patch 1 Patch Transdermal daily  amLODIPine   Tablet 10 milliGRAM(s) Oral daily  furosemide    Tablet 20 milliGRAM(s) Oral daily  lisinopril 10 milliGRAM(s) Oral daily    MEDICATIONS  (PRN):  acetaminophen   Tablet. 650 milliGRAM(s) Oral every 6 hours PRN Moderate Pain (4 - 6)  hydrALAZINE Injectable 10 milliGRAM(s) IV Push every 8 hours PRN SBP>190        CAPILLARY BLOOD GLUCOSE        I&O's Summary    28 Sep 2017 07:  -  29 Sep 2017 07:00  --------------------------------------------------------  IN: 590 mL / OUT: 0 mL / NET: 590 mL    29 Sep 2017 07:01  -  29 Sep 2017 17:48  --------------------------------------------------------  IN: 240 mL / OUT: 0 mL / NET: 240 mL        LABS:                        15.3   7.5   )-----------( 188      ( 29 Sep 2017 06:35 )             44.9         134<L>  |  94<L>  |  8   ----------------------------<  117<H>  3.5   |  22  |  0.60    Ca    9.8      29 Sep 2017 06:35  Phos  3.1       Mg     2.2         TPro  8.4<H>  /  Alb  4.5  /  TBili  0.4  /  DBili  x   /  AST  61<H>  /  ALT  46<H>  /  AlkPhos  98  09-28      CARDIAC MARKERS ( 29 Sep 2017 06:34 )  x     / <0.01 ng/mL / 283 U/L / x     / 5.8 ng/mL  CARDIAC MARKERS ( 29 Sep 2017 00:15 )  x     / <0.01 ng/mL / 208 U/L / x     / 4.9 ng/mL  CARDIAC MARKERS ( 28 Sep 2017 18:06 )  x     / <0.01 ng/mL / 120 U/L / x     / 3.6 ng/mL      Urinalysis Basic - ( 29 Sep 2017 08:19 )    Color: x / Appearance: Clear / S.009 / pH: x  Gluc: x / Ketone: Trace  / Bili: Negative / Urobili: Negative   Blood: x / Protein: Trace / Nitrite: Negative   Leuk Esterase: Negative / RBC: 3-5 /HPF / WBC 0-2 /HPF   Sq Epi: x / Non Sq Epi: OCC /HPF / Bacteria: x    RADIOLOGY & ADDITIONAL TESTS:    PHYSICAL EXAM:  GENERAL: NAD, well-developed  HEAD:  Atraumatic, Normocephalic  EYES: EOMI, PERRLA, conjunctiva and sclera clear  NECK: Supple, No JVD  CHEST/LUNG: Clear to auscultation bilaterally; No wheeze  HEART: Regular rate and rhythm; No murmurs, rubs, or gallops  ABDOMEN: Soft, Nontender, Nondistended; Bowel sounds present  EXTREMITIES:  2+ Peripheral Pulses, No clubbing, cyanosis, or edema  PSYCH: AAOx3  NEUROLOGY: non-focal  SKIN: No rashes or lesions    PAST MEDICAL & SURGICAL HISTORY:  Dementia without behavioral disturbance, unspecified dementia type  Hepatitis C  Hypertension  Arthritis  Hypothyroidism  No significant past surgical history

## 2017-09-29 NOTE — PROGRESS NOTE ADULT - SUBJECTIVE AND OBJECTIVE BOX
Patient is a 89y old  Female who presents with a chief complaint of shoulder pain (28 Sep 2017 23:38)        SUBJECTIVE / OVERNIGHT EVENTS:  Overnight, pt noted to have hypertension - requiring IV labetalol and hydralazine. Pt seen at bedside with daughter. Pt reports some left shoulder pain, but much improved from yesterday. At baseline she has limited mobility of left shoulder, and follow with an orthopedist outside for "tendonitis." She denies any headache, visual change, chest pain, abd pain, new sob (has baseline SOB), diarrhea, constipation.   Daughter at bedside feel pt is at ba    MEDICATIONS  (STANDING):  heparin  Injectable 5000 Unit(s) SubCutaneous every 12 hours  metoprolol 25 milliGRAM(s) Oral two times a day  liothyronine 5 MICROGram(s) Oral daily  lidocaine   Patch 1 Patch Transdermal daily  amLODIPine   Tablet 10 milliGRAM(s) Oral daily    MEDICATIONS  (PRN):  acetaminophen   Tablet. 650 milliGRAM(s) Oral every 6 hours PRN Moderate Pain (4 - 6)      Vital Signs Last 24 Hrs  T(C): 36.5 (29 Sep 2017 04:23), Max: 36.6 (28 Sep 2017 22:10)  T(F): 97.7 (29 Sep 2017 04:23), Max: 97.9 (28 Sep 2017 22:10)  HR: 88 (29 Sep 2017 04:23) (68 - 88)  BP: 177/99 (29 Sep 2017 04:23) (177/99 - 215/105)  BP(mean): 137 (28 Sep 2017 23:38) (137 - 137)  RR: 18 (29 Sep 2017 04:23) (16 - 18)  SpO2: 100% (29 Sep 2017 04:23) (98% - 100%)  CAPILLARY BLOOD GLUCOSE        I&O's Summary    28 Sep 2017 07:01  -  29 Sep 2017 07:00  --------------------------------------------------------  IN: 590 mL / OUT: 0 mL / NET: 590 mL          PHYSICAL EXAM  GENERAL: NAD, well-developed  HEAD:  Atraumatic, Normocephalic  EYES: EOMI, PERRLA, conjunctiva and sclera clear  NECK: Supple, No JVD  CHEST/LUNG: Clear to auscultation bilaterally; No wheeze  HEART: Regular rate and rhythm; No murmurs, rubs, or gallops  ABDOMEN: Soft, Nontender, Nondistended; Bowel sounds present  EXTREMITIES:  2+ Peripheral Pulses, No clubbing, cyanosis, or edema  PSYCH: AAOx3  SKIN: No rashes or lesions    LABS:                        15.3   7.5   )-----------( 188      ( 29 Sep 2017 06:35 )             44.9         134<L>  |  94<L>  |  8   ----------------------------<  117<H>  3.5   |  22  |  0.60    Ca    9.8      29 Sep 2017 06:35  Phos  3.1       Mg     2.2         TPro  8.4<H>  /  Alb  4.5  /  TBili  0.4  /  DBili  x   /  AST  61<H>  /  ALT  46<H>  /  AlkPhos  98        CARDIAC MARKERS ( 29 Sep 2017 06:34 )  x     / <0.01 ng/mL / 283 U/L / x     / 5.8 ng/mL  CARDIAC MARKERS ( 29 Sep 2017 00:15 )  x     / <0.01 ng/mL / 208 U/L / x     / 4.9 ng/mL  CARDIAC MARKERS ( 28 Sep 2017 18:06 )  x     / <0.01 ng/mL / 120 U/L / x     / 3.6 ng/mL      Urinalysis Basic - ( 29 Sep 2017 08:19 )    Color: x / Appearance: Clear / S.009 / pH: x  Gluc: x / Ketone: Trace  / Bili: Negative / Urobili: Negative   Blood: x / Protein: Trace / Nitrite: Negative   Leuk Esterase: Negative / RBC: 3-5 /HPF / WBC 0-2 /HPF   Sq Epi: x / Non Sq Epi: OCC /HPF / Bacteria: x        RADIOLOGY & ADDITIONAL TESTS:    Imaging Personally Reviewed:  Consultant(s) Notes Reviewed:    Care Discussed with Consultants/Other Providers: Patient is a 89y old  Female who presents with a chief complaint of shoulder pain (28 Sep 2017 23:38)        SUBJECTIVE / OVERNIGHT EVENTS:  Overnight, pt noted to have hypertension - requiring IV labetalol and hydralazine. Pt seen at bedside with daughter. Pt reports some left shoulder pain, but much improved from yesterday. Her shoulder pain is back to her baseline. At baseline she has limited mobility of left shoulder, and follow with an orthopedist outside for "tendonitis." She denies any headache, visual change, chest pain, abd pain, new sob (has baseline SOB), diarrhea, constipation.     TELE- sinus 70s-90s, 1st deg AV block    MEDICATIONS  (STANDING):  heparin  Injectable 5000 Unit(s) SubCutaneous every 12 hours  metoprolol 25 milliGRAM(s) Oral two times a day  liothyronine 5 MICROGram(s) Oral daily  lidocaine   Patch 1 Patch Transdermal daily  amLODIPine   Tablet 10 milliGRAM(s) Oral daily    MEDICATIONS  (PRN):  acetaminophen   Tablet. 650 milliGRAM(s) Oral every 6 hours PRN Moderate Pain (4 - 6)      Vital Signs Last 24 Hrs  T(C): 36.5 (29 Sep 2017 04:23), Max: 36.6 (28 Sep 2017 22:10)  T(F): 97.7 (29 Sep 2017 04:23), Max: 97.9 (28 Sep 2017 22:10)  HR: 88 (29 Sep 2017 04:23) (68 - 88)  BP: 177/99 (29 Sep 2017 04:23) (177/99 - 215/105)  BP(mean): 137 (28 Sep 2017 23:38) (137 - 137)  RR: 18 (29 Sep 2017 04:23) (16 - 18)  SpO2: 100% (29 Sep 2017 04:23) (98% - 100%)  CAPILLARY BLOOD GLUCOSE        I&O's Summary    28 Sep 2017 07:01  -  29 Sep 2017 07:00  --------------------------------------------------------  IN: 590 mL / OUT: 0 mL / NET: 590 mL      PHYSICAL EXAM  GENERAL: NAD, well-developed, elderly F, appears younger than stated age  HEAD:  Atraumatic, Normocephalic  EYES: PERRLA, conjunctiva and sclera clear  NECK: Supple, No JVD  CHEST/LUNG: Clear to auscultation bilaterally; No wheeze  HEART: Regular rate and rhythm; No murmurs, rubs, or gallops  ABDOMEN: Soft, Nontender, Nondistended; Bowel sounds present  EXTREMITIES: Trace b/l LE edema. 2+ Peripheral Pulses, No clubbing, cyanosis.  PSYCH: AAOx1 (self) at baseline per daughter  SKIN: No rashes or lesions  NEURO: CN III-XII grossly intact, strength RUE and b/l LE 4/5.   MSK: LUE with mild anterior TTP of glenohumeral jt, no significant crepitus or edema. ROM decr 2/2 pain. Lidocaine patch in place.     LABS:                        15.3   7.5   )-----------( 188      ( 29 Sep 2017 06:35 )             44.9     -    134<L>  |  94<L>  |  8   ----------------------------<  117<H>  3.5   |  22  |  0.60    Ca    9.8      29 Sep 2017 06:35  Phos  3.1       Mg     2.2         TPro  8.4<H>  /  Alb  4.5  /  TBili  0.4  /  DBili  x   /  AST  61<H>  /  ALT  46<H>  /  AlkPhos  98  -      CARDIAC MARKERS ( 29 Sep 2017 06:34 )  x     / <0.01 ng/mL / 283 U/L / x     / 5.8 ng/mL  CARDIAC MARKERS ( 29 Sep 2017 00:15 )  x     / <0.01 ng/mL / 208 U/L / x     / 4.9 ng/mL  CARDIAC MARKERS ( 28 Sep 2017 18:06 )  x     / <0.01 ng/mL / 120 U/L / x     / 3.6 ng/mL      Urinalysis Basic - ( 29 Sep 2017 08:19 )    Color: x / Appearance: Clear / S.009 / pH: x  Gluc: x / Ketone: Trace  / Bili: Negative / Urobili: Negative   Blood: x / Protein: Trace / Nitrite: Negative   Leuk Esterase: Negative / RBC: 3-5 /HPF / WBC 0-2 /HPF   Sq Epi: x / Non Sq Epi: OCC /HPF / Bacteria: x    LABS REVIEWED - Na low 134 from 133. Lactate trending down. AST and ALT mildly elevated. UA negative. Trops neg x 3.     RADIOLOGY & ADDITIONAL TESTS:  Imaging Reviewed: XRAY of right shoulder negative fracture, +severe OA.  Care Discussed with Consultants/Other Providers: floor NP Patient is a 89y old  Female who presents with a chief complaint of shoulder pain (28 Sep 2017 23:38)        SUBJECTIVE / OVERNIGHT EVENTS:  Overnight, pt noted to have hypertension - requiring IV labetalol and hydralazine. Pt seen at bedside with daughter. Pt reports some left shoulder pain, but much improved from yesterday. Her shoulder pain is back to her baseline. At baseline she has limited mobility of left shoulder, and follow with an orthopedist outside for "tendonitis." She denies any headache, visual change, chest pain, abd pain, new sob (has baseline SOB), diarrhea, constipation.     TELE- sinus 70s-90s, 1st deg AV block    MEDICATIONS  (STANDING):  heparin  Injectable 5000 Unit(s) SubCutaneous every 12 hours  metoprolol 25 milliGRAM(s) Oral two times a day  liothyronine 5 MICROGram(s) Oral daily  lidocaine   Patch 1 Patch Transdermal daily  amLODIPine   Tablet 10 milliGRAM(s) Oral daily    MEDICATIONS  (PRN):  acetaminophen   Tablet. 650 milliGRAM(s) Oral every 6 hours PRN Moderate Pain (4 - 6)      Vital Signs Last 24 Hrs  T(C): 36.5 (29 Sep 2017 04:23), Max: 36.6 (28 Sep 2017 22:10)  T(F): 97.7 (29 Sep 2017 04:23), Max: 97.9 (28 Sep 2017 22:10)  HR: 88 (29 Sep 2017 04:23) (68 - 88)  BP: 177/99 (29 Sep 2017 04:23) (177/99 - 215/105)  BP(mean): 137 (28 Sep 2017 23:38) (137 - 137)  RR: 18 (29 Sep 2017 04:23) (16 - 18)  SpO2: 100% (29 Sep 2017 04:23) (98% - 100%)  CAPILLARY BLOOD GLUCOSE        I&O's Summary    28 Sep 2017 07:01  -  29 Sep 2017 07:00  --------------------------------------------------------  IN: 590 mL / OUT: 0 mL / NET: 590 mL      PHYSICAL EXAM  GENERAL: NAD, well-developed, elderly F, appears younger than stated age  HEAD:  Atraumatic, Normocephalic  EYES: PERRLA, conjunctiva and sclera clear  NECK: Supple, No JVD  CHEST/LUNG: Clear to auscultation bilaterally; No wheeze  HEART: Regular rate and rhythm; No murmurs, rubs, or gallops  ABDOMEN: Soft, Nontender, Nondistended; Bowel sounds present  EXTREMITIES: Trace b/l LE edema. 2+ Peripheral Pulses, No clubbing, cyanosis.  PSYCH: AAOx1 (self) at baseline per daughter  SKIN: No rashes or lesions  NEURO: CN III-XII grossly intact, strength RUE and b/l LE 4/5.   MSK: LUE with mild anterior TTP of glenohumeral jt, no significant crepitus or edema. ROM decr 2/2 pain. Lidocaine patch in place.     LABS:                        15.3   7.5   )-----------( 188      ( 29 Sep 2017 06:35 )             44.9     -    134<L>  |  94<L>  |  8   ----------------------------<  117<H>  3.5   |  22  |  0.60    Ca    9.8      29 Sep 2017 06:35  Phos  3.1       Mg     2.2         TPro  8.4<H>  /  Alb  4.5  /  TBili  0.4  /  DBili  x   /  AST  61<H>  /  ALT  46<H>  /  AlkPhos  98  -      CARDIAC MARKERS ( 29 Sep 2017 06:34 )  x     / <0.01 ng/mL / 283 U/L / x     / 5.8 ng/mL  CARDIAC MARKERS ( 29 Sep 2017 00:15 )  x     / <0.01 ng/mL / 208 U/L / x     / 4.9 ng/mL  CARDIAC MARKERS ( 28 Sep 2017 18:06 )  x     / <0.01 ng/mL / 120 U/L / x     / 3.6 ng/mL      Urinalysis Basic - ( 29 Sep 2017 08:19 )    Color: x / Appearance: Clear / S.009 / pH: x  Gluc: x / Ketone: Trace  / Bili: Negative / Urobili: Negative   Blood: x / Protein: Trace / Nitrite: Negative   Leuk Esterase: Negative / RBC: 3-5 /HPF / WBC 0-2 /HPF   Sq Epi: x / Non Sq Epi: OCC /HPF / Bacteria: x    LABS REVIEWED - Na low 134 from 133. Lactate trending down. AST and ALT mildly elevated. UA negative. Trops neg x 3.     RADIOLOGY & ADDITIONAL TESTS:  Imaging Reviewed: XRAY of shoulder negative fracture, +severe OA.  Care Discussed with Consultants/Other Providers: floor NP

## 2017-09-29 NOTE — PROVIDER CONTACT NOTE (CHANGE IN STATUS NOTIFICATION) - ASSESSMENT
Patient Alert and Oriented times Four. Patient denies chest pain. Patient states that she feels like she will faint. Patient complaining of Left Shoulder Pain rating the pain a 10/10. Patient's Heart Rhythm is Normal Sinus Rhythm and First Degree AV Block on the cardiac monitor. Patient's Vital Signs: Temperature 98.1 F Oral, Heart Rate 77, Blood Pressure 117/68, Respiratory Rate 32 and O2 Saturation 95% Room Air. Patient's Blood Glucose Fingertip results is 131.

## 2017-09-29 NOTE — PROVIDER CONTACT NOTE (OTHER) - ACTION/TREATMENT ORDERED:
PA aware and reviewed all of patient's lab results and orders. PA ordered Magnesium Sulfate IVPB, Potassium Phosphate IVPB and Potassium Chloride Tablet ER.

## 2017-09-29 NOTE — CHART NOTE - NSCHARTNOTEFT_GEN_A_CORE
Pt seen and examined with daughter at bedside, post RRT for near syncopal episode after receiving blood pressure medications this evening. Pt in no acute distress still c/o left shoulder pain from admission. Labs reviewed and Mg / K / Phosphorus supplementation ordered at this time. Lactate trending down, will send repeat with AM labs. Trops negative x 2, final set ordered for 0600. c/w tylenol / lidocaine patch for pain management. Will discuss with AM primary team.     Addy Timmons PA-C  Department of Medicine  [id number]

## 2017-09-29 NOTE — PROVIDER CONTACT NOTE (OTHER) - ACTION/TREATMENT ORDERED:
PA aware and reviewed patient's urinalysis lab result, all other lab results and orders. Lab Urine Culture ordered.

## 2017-09-29 NOTE — PROVIDER CONTACT NOTE (CHANGE IN STATUS NOTIFICATION) - ACTION/TREATMENT ORDERED:
Rapid Responds Team & PA aware & at patient's bedside. Labs, 12-Lead ECG, O2 2 Liters Nasal Cannula and Lidocaine patch ordered.

## 2017-09-29 NOTE — PROVIDER CONTACT NOTE (OTHER) - ACTION/TREATMENT ORDERED:
MD aware & at patient's bedside assessing patient. MD conversing with patient, patient's daughter and son. MD ordered Amlodipine PO and Hydralazine IV Push. MD aware & at patient's bedside assessing patient. MD ordered Amlodipine PO & Hydralazine IV Push. MD ordered okay to maintain a Systolic Blood Pressure of 170's - 190's. MD conversing with family.

## 2017-09-29 NOTE — PROVIDER CONTACT NOTE (CHANGE IN STATUS NOTIFICATION) - SITUATION
Near Syncope. Decrease in Blood Pressure. Increase in Respiratory Rate. Patient sitting in bed at this time and patient post utilizing the bathroom with assistance.

## 2017-09-29 NOTE — PROVIDER CONTACT NOTE (CHANGE IN STATUS NOTIFICATION) - BACKGROUND
Patient admitted for Hypertensive Urgency, Arthritis, Hypothyroidism, Dementia, Left Shoulder Pain. History of Hepatitis C.

## 2017-09-29 NOTE — PROVIDER CONTACT NOTE (OTHER) - ACTION/TREATMENT ORDERED:
PA aware and reviewed all of patient's orders and lab results. PA states no new orders at this time.

## 2017-09-30 LAB
ANION GAP SERPL CALC-SCNC: 18 MMOL/L — HIGH (ref 5–17)
BUN SERPL-MCNC: 10 MG/DL — SIGNIFICANT CHANGE UP (ref 7–23)
CALCIUM SERPL-MCNC: 10.1 MG/DL — SIGNIFICANT CHANGE UP (ref 8.4–10.5)
CHLORIDE SERPL-SCNC: 91 MMOL/L — LOW (ref 96–108)
CO2 SERPL-SCNC: 23 MMOL/L — SIGNIFICANT CHANGE UP (ref 22–31)
CREAT SERPL-MCNC: 0.62 MG/DL — SIGNIFICANT CHANGE UP (ref 0.5–1.3)
GLUCOSE SERPL-MCNC: 106 MG/DL — HIGH (ref 70–99)
LACTATE SERPL-SCNC: 2 MMOL/L — SIGNIFICANT CHANGE UP (ref 0.7–2)
POTASSIUM SERPL-MCNC: 3.3 MMOL/L — LOW (ref 3.5–5.3)
POTASSIUM SERPL-SCNC: 3.3 MMOL/L — LOW (ref 3.5–5.3)
SODIUM SERPL-SCNC: 132 MMOL/L — LOW (ref 135–145)

## 2017-09-30 PROCEDURE — 99233 SBSQ HOSP IP/OBS HIGH 50: CPT

## 2017-09-30 RX ORDER — POTASSIUM CHLORIDE 20 MEQ
20 PACKET (EA) ORAL
Qty: 0 | Refills: 0 | Status: COMPLETED | OUTPATIENT
Start: 2017-09-30 | End: 2017-09-30

## 2017-09-30 RX ADMIN — Medication 20 MILLIEQUIVALENT(S): at 11:52

## 2017-09-30 RX ADMIN — AMLODIPINE BESYLATE 10 MILLIGRAM(S): 2.5 TABLET ORAL at 11:52

## 2017-09-30 RX ADMIN — Medication 650 MILLIGRAM(S): at 22:30

## 2017-09-30 RX ADMIN — Medication 650 MILLIGRAM(S): at 23:00

## 2017-09-30 RX ADMIN — HEPARIN SODIUM 5000 UNIT(S): 5000 INJECTION INTRAVENOUS; SUBCUTANEOUS at 16:54

## 2017-09-30 RX ADMIN — HEPARIN SODIUM 5000 UNIT(S): 5000 INJECTION INTRAVENOUS; SUBCUTANEOUS at 05:37

## 2017-09-30 RX ADMIN — LIDOCAINE 1 PATCH: 4 CREAM TOPICAL at 23:45

## 2017-09-30 RX ADMIN — Medication 20 MILLIEQUIVALENT(S): at 13:37

## 2017-09-30 RX ADMIN — LISINOPRIL 10 MILLIGRAM(S): 2.5 TABLET ORAL at 13:37

## 2017-09-30 RX ADMIN — Medication 650 MILLIGRAM(S): at 16:53

## 2017-09-30 RX ADMIN — Medication 650 MILLIGRAM(S): at 18:21

## 2017-09-30 RX ADMIN — Medication 20 MILLIGRAM(S): at 05:37

## 2017-09-30 RX ADMIN — LIOTHYRONINE SODIUM 5 MICROGRAM(S): 25 TABLET ORAL at 05:37

## 2017-09-30 RX ADMIN — Medication 25 MILLIGRAM(S): at 16:53

## 2017-09-30 RX ADMIN — LIDOCAINE 1 PATCH: 4 CREAM TOPICAL at 11:52

## 2017-09-30 RX ADMIN — Medication 25 MILLIGRAM(S): at 05:37

## 2017-09-30 NOTE — DIETITIAN INITIAL EVALUATION ADULT. - PT NOT SOURCE
Patient with PMH of dementia; per medical record, patient is confused at times. Patient seen sleeping at time of visit. Daughter at bedside able to provide subjective information

## 2017-09-30 NOTE — DIETITIAN INITIAL EVALUATION ADULT. - NS AS NUTRI INTERV ED CONTENT
As requested by daughter, educated on dietary recommendations for low sodium diet including food sources high in Na, label reading for sodium content, monitoring serving sizes on food labels, and alternatives seasoning methods (spices, lemon, herbs, etc.)

## 2017-09-30 NOTE — DIETITIAN INITIAL EVALUATION ADULT. - ORAL INTAKE PTA
Per discussion with daughter, patient had a good appetite & PO intake PTA. Patient lives with daughter who does the cooking/shopping. States patient consumes 3 meals daily at home/good

## 2017-09-30 NOTE — DIETITIAN INITIAL EVALUATION ADULT. - PROBLEM SELECTOR PLAN 1
pt with chronically elevated bp and likely vasovagal episode (pt did not eat/drink all day)  restarted metoprolol at 25mg po bid and added back norvasc 5mg daily  cont lasix  will ck Franny x 3, monitor on tele  use extreme caution as pt with labile bp  repeat lactate on next labs, ? reason for elevation  ck UA to r/o uti  obtain results of TTE from August

## 2017-09-30 NOTE — PROGRESS NOTE ADULT - SUBJECTIVE AND OBJECTIVE BOX
Patient is a 89y old  Female who presents with a chief complaint of shoulder pain (28 Sep 2017 23:38)      SUBJECTIVE / OVERNIGHT EVENTS:  Slept all night after receiving Ativan.  Awake now.  Still with shoulder pain.     MEDICATIONS  (STANDING):  heparin  Injectable 5000 Unit(s) SubCutaneous every 12 hours  metoprolol 25 milliGRAM(s) Oral two times a day  liothyronine 5 MICROGram(s) Oral daily  lidocaine   Patch 1 Patch Transdermal daily  amLODIPine   Tablet 10 milliGRAM(s) Oral daily  furosemide    Tablet 20 milliGRAM(s) Oral daily  lisinopril 10 milliGRAM(s) Oral daily  potassium chloride    Tablet ER 20 milliEquivalent(s) Oral every 2 hours    MEDICATIONS  (PRN):  acetaminophen   Tablet. 650 milliGRAM(s) Oral every 6 hours PRN Moderate Pain (4 - 6)  hydrALAZINE Injectable 10 milliGRAM(s) IV Push every 8 hours PRN SBP>190        CAPILLARY BLOOD GLUCOSE        I&O's Summary    29 Sep 2017 07:01  -  30 Sep 2017 07:00  --------------------------------------------------------  IN: 240 mL / OUT: 350 mL / NET: -110 mL        PHYSICAL EXAM:  GENERAL: NAD, well-developed  HEAD:  Atraumatic, Normocephalic  EYES: EOMI, PERRLA, conjunctiva and sclera clear  NECK: Supple, No JVD  CHEST/LUNG: Clear to auscultation bilaterally; No wheeze  HEART: Regular rate and rhythm; No murmurs, rubs, or gallops  ABDOMEN: Soft, Nontender, Nondistended; Bowel sounds present  EXTREMITIES:  2+ Peripheral Pulses, No clubbing, cyanosis, or edema  PSYCH: Confused. Not agitated  NEUROLOGY: non-focal  SKIN: No rashes or lesions    LABS:                        15.3   7.5   )-----------( 188      ( 29 Sep 2017 06:35 )             44.9     -    132<L>  |  91<L>  |  10  ----------------------------<  106<H>  3.3<L>   |  23  |  0.62    Ca    10.1      30 Sep 2017 07:09  Phos  3.1       Mg     2.2         TPro  8.4<H>  /  Alb  4.5  /  TBili  0.4  /  DBili  x   /  AST  61<H>  /  ALT  46<H>  /  AlkPhos  98        CARDIAC MARKERS ( 29 Sep 2017 06:34 )  x     / <0.01 ng/mL / 283 U/L / x     / 5.8 ng/mL  CARDIAC MARKERS ( 29 Sep 2017 00:15 )  x     / <0.01 ng/mL / 208 U/L / x     / 4.9 ng/mL  CARDIAC MARKERS ( 28 Sep 2017 18:06 )  x     / <0.01 ng/mL / 120 U/L / x     / 3.6 ng/mL      Urinalysis Basic - ( 29 Sep 2017 08:19 )    Color: x / Appearance: Clear / S.009 / pH: x  Gluc: x / Ketone: Trace  / Bili: Negative / Urobili: Negative   Blood: x / Protein: Trace / Nitrite: Negative   Leuk Esterase: Negative / RBC: 3-5 /HPF / WBC 0-2 /HPF   Sq Epi: x / Non Sq Epi: OCC /HPF / Bacteria: x        RADIOLOGY & ADDITIONAL TESTS:    Imaging Personally Reviewed:    Consultant(s) Notes Reviewed:      Care Discussed with Consultants/Other Providers:

## 2017-09-30 NOTE — DIETITIAN INITIAL EVALUATION ADULT. - ENERGY NEEDS
Ht 65 inches Wt 145.5 pounds BMI 24.2 Kg/m^2   pounds +/- 10%; 116% IBW  Edema: 1+ left shoulder edema Skin: no pressure ulcers  Other pertinent information: 89 yo female with PMH of HTN, dementia, hepatitis C, hypothyroidism admitted with left shoulder pain and hypertensive emergency. Post RRT for near syncopal episode

## 2017-10-01 DIAGNOSIS — N30.00 ACUTE CYSTITIS WITHOUT HEMATURIA: ICD-10-CM

## 2017-10-01 LAB
-  AMIKACIN: SIGNIFICANT CHANGE UP
-  AMPICILLIN/SULBACTAM: SIGNIFICANT CHANGE UP
-  AMPICILLIN: SIGNIFICANT CHANGE UP
-  AZTREONAM: SIGNIFICANT CHANGE UP
-  CEFAZOLIN: SIGNIFICANT CHANGE UP
-  CEFEPIME: SIGNIFICANT CHANGE UP
-  CEFOXITIN: SIGNIFICANT CHANGE UP
-  CEFTAZIDIME: SIGNIFICANT CHANGE UP
-  CEFTRIAXONE: SIGNIFICANT CHANGE UP
-  CIPROFLOXACIN: SIGNIFICANT CHANGE UP
-  ERTAPENEM: SIGNIFICANT CHANGE UP
-  GENTAMICIN: SIGNIFICANT CHANGE UP
-  IMIPENEM: SIGNIFICANT CHANGE UP
-  LEVOFLOXACIN: SIGNIFICANT CHANGE UP
-  MEROPENEM: SIGNIFICANT CHANGE UP
-  NITROFURANTOIN: SIGNIFICANT CHANGE UP
-  PIPERACILLIN/TAZOBACTAM: SIGNIFICANT CHANGE UP
-  TOBRAMYCIN: SIGNIFICANT CHANGE UP
-  TRIMETHOPRIM/SULFAMETHOXAZOLE: SIGNIFICANT CHANGE UP
ANION GAP SERPL CALC-SCNC: 17 MMOL/L — SIGNIFICANT CHANGE UP (ref 5–17)
BUN SERPL-MCNC: 16 MG/DL — SIGNIFICANT CHANGE UP (ref 7–23)
CALCIUM SERPL-MCNC: 9.8 MG/DL — SIGNIFICANT CHANGE UP (ref 8.4–10.5)
CHLORIDE SERPL-SCNC: 93 MMOL/L — LOW (ref 96–108)
CO2 SERPL-SCNC: 23 MMOL/L — SIGNIFICANT CHANGE UP (ref 22–31)
CREAT SERPL-MCNC: 0.76 MG/DL — SIGNIFICANT CHANGE UP (ref 0.5–1.3)
CULTURE RESULTS: SIGNIFICANT CHANGE UP
GLUCOSE SERPL-MCNC: 115 MG/DL — HIGH (ref 70–99)
HCT VFR BLD CALC: 46.5 % — HIGH (ref 34.5–45)
HGB BLD-MCNC: 15.5 G/DL — SIGNIFICANT CHANGE UP (ref 11.5–15.5)
MCHC RBC-ENTMCNC: 32.4 PG — SIGNIFICANT CHANGE UP (ref 27–34)
MCHC RBC-ENTMCNC: 33.2 GM/DL — SIGNIFICANT CHANGE UP (ref 32–36)
MCV RBC AUTO: 97.6 FL — SIGNIFICANT CHANGE UP (ref 80–100)
METHOD TYPE: SIGNIFICANT CHANGE UP
ORGANISM # SPEC MICROSCOPIC CNT: SIGNIFICANT CHANGE UP
ORGANISM # SPEC MICROSCOPIC CNT: SIGNIFICANT CHANGE UP
PLATELET # BLD AUTO: 196 K/UL — SIGNIFICANT CHANGE UP (ref 150–400)
POTASSIUM SERPL-MCNC: 4.3 MMOL/L — SIGNIFICANT CHANGE UP (ref 3.5–5.3)
POTASSIUM SERPL-SCNC: 4.3 MMOL/L — SIGNIFICANT CHANGE UP (ref 3.5–5.3)
RBC # BLD: 4.77 M/UL — SIGNIFICANT CHANGE UP (ref 3.8–5.2)
RBC # FLD: 13.5 % — SIGNIFICANT CHANGE UP (ref 10.3–14.5)
SODIUM SERPL-SCNC: 133 MMOL/L — LOW (ref 135–145)
SPECIMEN SOURCE: SIGNIFICANT CHANGE UP
WBC # BLD: 9.2 K/UL — SIGNIFICANT CHANGE UP (ref 3.8–10.5)
WBC # FLD AUTO: 9.2 K/UL — SIGNIFICANT CHANGE UP (ref 3.8–10.5)

## 2017-10-01 PROCEDURE — 99233 SBSQ HOSP IP/OBS HIGH 50: CPT

## 2017-10-01 RX ORDER — CEFTRIAXONE 500 MG/1
INJECTION, POWDER, FOR SOLUTION INTRAMUSCULAR; INTRAVENOUS
Qty: 0 | Refills: 0 | Status: DISCONTINUED | OUTPATIENT
Start: 2017-10-01 | End: 2017-10-02

## 2017-10-01 RX ORDER — CEFTRIAXONE 500 MG/1
1 INJECTION, POWDER, FOR SOLUTION INTRAMUSCULAR; INTRAVENOUS EVERY 24 HOURS
Qty: 0 | Refills: 0 | Status: DISCONTINUED | OUTPATIENT
Start: 2017-10-02 | End: 2017-10-02

## 2017-10-01 RX ORDER — CEFTRIAXONE 500 MG/1
1 INJECTION, POWDER, FOR SOLUTION INTRAMUSCULAR; INTRAVENOUS ONCE
Qty: 0 | Refills: 0 | Status: COMPLETED | OUTPATIENT
Start: 2017-10-01 | End: 2017-10-01

## 2017-10-01 RX ADMIN — AMLODIPINE BESYLATE 10 MILLIGRAM(S): 2.5 TABLET ORAL at 11:46

## 2017-10-01 RX ADMIN — LIDOCAINE 1 PATCH: 4 CREAM TOPICAL at 23:07

## 2017-10-01 RX ADMIN — HEPARIN SODIUM 5000 UNIT(S): 5000 INJECTION INTRAVENOUS; SUBCUTANEOUS at 17:13

## 2017-10-01 RX ADMIN — Medication 25 MILLIGRAM(S): at 05:22

## 2017-10-01 RX ADMIN — HEPARIN SODIUM 5000 UNIT(S): 5000 INJECTION INTRAVENOUS; SUBCUTANEOUS at 05:23

## 2017-10-01 RX ADMIN — Medication 650 MILLIGRAM(S): at 11:45

## 2017-10-01 RX ADMIN — CEFTRIAXONE 100 GRAM(S): 500 INJECTION, POWDER, FOR SOLUTION INTRAMUSCULAR; INTRAVENOUS at 11:47

## 2017-10-01 RX ADMIN — LISINOPRIL 10 MILLIGRAM(S): 2.5 TABLET ORAL at 13:42

## 2017-10-01 RX ADMIN — Medication 650 MILLIGRAM(S): at 06:00

## 2017-10-01 RX ADMIN — Medication 650 MILLIGRAM(S): at 12:15

## 2017-10-01 RX ADMIN — Medication 25 MILLIGRAM(S): at 17:14

## 2017-10-01 RX ADMIN — Medication 20 MILLIGRAM(S): at 05:22

## 2017-10-01 RX ADMIN — LIDOCAINE 1 PATCH: 4 CREAM TOPICAL at 11:46

## 2017-10-01 RX ADMIN — LIOTHYRONINE SODIUM 5 MICROGRAM(S): 25 TABLET ORAL at 05:22

## 2017-10-01 RX ADMIN — Medication 650 MILLIGRAM(S): at 05:22

## 2017-10-01 NOTE — PHYSICAL THERAPY INITIAL EVALUATION ADULT - PERTINENT HX OF CURRENT PROBLEM, REHAB EVAL
89 y.o. F h/o HTN, Chronic diastolic CHF, dementia who p/w shoulder pain and SOB. Daughter states she left for appointment at 11am and came back at 2pm with pt c/o severe left shoulder pain, SOB. Of note, pt told to increase toprol from 25 to 50 by cardiologist 1 week ago. In ED, /109, hr 78. Pt went to bathroom 2x and on returning the 2nd time she felt dizzy, lied down, initial bp 120/80. After laying for a few minutes sbp up to 190s. XRay L shoulder: neg acute fx/ doslocation. +arthritis

## 2017-10-01 NOTE — PHYSICAL THERAPY INITIAL EVALUATION ADULT - DISCHARGE DISPOSITION, PT EVAL
home w/ assist/home w/ home PT/Discussed d/c options with pt's daughter. Due to pt's dementia, pt's daughter agreeable to have pt own with assistance for all out of bed mobility

## 2017-10-01 NOTE — PHYSICAL THERAPY INITIAL EVALUATION ADULT - ACTIVE RANGE OF MOTION EXAMINATION, REHAB EVAL
Right UE Active ROM was WNL (within normal limits)/RLE Active ROM was WNL (within normal limits)/LLE Active ROM was WNL (within normal limits)

## 2017-10-01 NOTE — PROGRESS NOTE ADULT - SUBJECTIVE AND OBJECTIVE BOX
Patient is a 89y old  Female who presents with a chief complaint of shoulder pain (28 Sep 2017 23:38)      SUBJECTIVE / OVERNIGHT EVENTS:  Patient had a good night.  Now resting in the Krystle Chair.  Less confused. Shoulder pain improved.  Urine Culture from 9/29 shows E Coli    MEDICATIONS  (STANDING):  heparin  Injectable 5000 Unit(s) SubCutaneous every 12 hours  metoprolol 25 milliGRAM(s) Oral two times a day  liothyronine 5 MICROGram(s) Oral daily  lidocaine   Patch 1 Patch Transdermal daily  amLODIPine   Tablet 10 milliGRAM(s) Oral daily  furosemide    Tablet 20 milliGRAM(s) Oral daily  lisinopril 10 milliGRAM(s) Oral daily  cefTRIAXone   IVPB      cefTRIAXone   IVPB 1 Gram(s) IV Intermittent once    MEDICATIONS  (PRN):  acetaminophen   Tablet. 650 milliGRAM(s) Oral every 6 hours PRN Moderate Pain (4 - 6)  hydrALAZINE Injectable 10 milliGRAM(s) IV Push every 8 hours PRN SBP>190        CAPILLARY BLOOD GLUCOSE        I&O's Summary    30 Sep 2017 07:01  -  01 Oct 2017 07:00  --------------------------------------------------------  IN: 1485 mL / OUT: 900 mL / NET: 585 mL    01 Oct 2017 07:01  -  01 Oct 2017 10:56  --------------------------------------------------------  IN: 240 mL / OUT: 200 mL / NET: 40 mL        PHYSICAL EXAM:  GENERAL: NAD, well-developed  HEAD:  Atraumatic, Normocephalic  EYES: EOMI, PERRLA, conjunctiva and sclera clear  NECK: Supple, No JVD  CHEST/LUNG: Clear to auscultation bilaterally; No wheeze  HEART: Regular rate and rhythm; No murmurs, rubs, or gallops  ABDOMEN: Soft, Nontender, Nondistended; Bowel sounds present  EXTREMITIES:  2+ Peripheral Pulses, No clubbing, cyanosis, or edema  PSYCH: AAOx1-2, mildly confused  NEUROLOGY: non-focal  SKIN: No rashes or lesions    LABS:                        15.5   9.2   )-----------( 196      ( 01 Oct 2017 07:38 )             46.5     10-01    133<L>  |  93<L>  |  16  ----------------------------<  115<H>  4.3   |  23  |  0.76    Ca    9.8      01 Oct 2017 07:38                RADIOLOGY & ADDITIONAL TESTS:    Imaging Personally Reviewed:    Consultant(s) Notes Reviewed:      Care Discussed with Consultants/Other Providers:

## 2017-10-01 NOTE — PHYSICAL THERAPY INITIAL EVALUATION ADULT - ADDITIONAL COMMENTS
Pt lives with daughter in 4th floor elevated apartment, no steps to enter building. Pt uses a SAC to ambulate, just purchased a rollator, also owns a rolling walker. Pt's daughter states she has been planning to hire a HHA although has not done so yet. Pt requires assist with ADLs, pt able to perform bed mobility/ ambulate independently, pt's daughter states she leaves her home at times alone.

## 2017-10-02 ENCOUNTER — TRANSCRIPTION ENCOUNTER (OUTPATIENT)
Age: 82
End: 2017-10-02

## 2017-10-02 VITALS
HEART RATE: 74 BPM | OXYGEN SATURATION: 96 % | RESPIRATION RATE: 18 BRPM | SYSTOLIC BLOOD PRESSURE: 138 MMHG | DIASTOLIC BLOOD PRESSURE: 84 MMHG | TEMPERATURE: 98 F

## 2017-10-02 DIAGNOSIS — R41.0 DISORIENTATION, UNSPECIFIED: ICD-10-CM

## 2017-10-02 PROCEDURE — 84484 ASSAY OF TROPONIN QUANT: CPT

## 2017-10-02 PROCEDURE — 87086 URINE CULTURE/COLONY COUNT: CPT

## 2017-10-02 PROCEDURE — 84100 ASSAY OF PHOSPHORUS: CPT

## 2017-10-02 PROCEDURE — 99239 HOSP IP/OBS DSCHRG MGMT >30: CPT

## 2017-10-02 PROCEDURE — 82550 ASSAY OF CK (CPK): CPT

## 2017-10-02 PROCEDURE — 80053 COMPREHEN METABOLIC PANEL: CPT

## 2017-10-02 PROCEDURE — 83930 ASSAY OF BLOOD OSMOLALITY: CPT

## 2017-10-02 PROCEDURE — 71045 X-RAY EXAM CHEST 1 VIEW: CPT

## 2017-10-02 PROCEDURE — 87186 SC STD MICRODIL/AGAR DIL: CPT

## 2017-10-02 PROCEDURE — 82435 ASSAY OF BLOOD CHLORIDE: CPT

## 2017-10-02 PROCEDURE — 83935 ASSAY OF URINE OSMOLALITY: CPT

## 2017-10-02 PROCEDURE — 80048 BASIC METABOLIC PNL TOTAL CA: CPT

## 2017-10-02 PROCEDURE — 82803 BLOOD GASES ANY COMBINATION: CPT

## 2017-10-02 PROCEDURE — 83605 ASSAY OF LACTIC ACID: CPT

## 2017-10-02 PROCEDURE — 84132 ASSAY OF SERUM POTASSIUM: CPT

## 2017-10-02 PROCEDURE — 84295 ASSAY OF SERUM SODIUM: CPT

## 2017-10-02 PROCEDURE — 96374 THER/PROPH/DIAG INJ IV PUSH: CPT

## 2017-10-02 PROCEDURE — 83735 ASSAY OF MAGNESIUM: CPT

## 2017-10-02 PROCEDURE — 73030 X-RAY EXAM OF SHOULDER: CPT

## 2017-10-02 PROCEDURE — 99285 EMERGENCY DEPT VISIT HI MDM: CPT | Mod: 25

## 2017-10-02 PROCEDURE — 82553 CREATINE MB FRACTION: CPT

## 2017-10-02 PROCEDURE — 73020 X-RAY EXAM OF SHOULDER: CPT

## 2017-10-02 PROCEDURE — 81001 URINALYSIS AUTO W/SCOPE: CPT

## 2017-10-02 PROCEDURE — 93005 ELECTROCARDIOGRAM TRACING: CPT

## 2017-10-02 PROCEDURE — 84443 ASSAY THYROID STIM HORMONE: CPT

## 2017-10-02 PROCEDURE — 97162 PT EVAL MOD COMPLEX 30 MIN: CPT

## 2017-10-02 PROCEDURE — 85027 COMPLETE CBC AUTOMATED: CPT

## 2017-10-02 PROCEDURE — 82565 ASSAY OF CREATININE: CPT

## 2017-10-02 PROCEDURE — 82330 ASSAY OF CALCIUM: CPT

## 2017-10-02 PROCEDURE — 85014 HEMATOCRIT: CPT

## 2017-10-02 PROCEDURE — 82947 ASSAY GLUCOSE BLOOD QUANT: CPT

## 2017-10-02 PROCEDURE — 96375 TX/PRO/DX INJ NEW DRUG ADDON: CPT

## 2017-10-02 RX ORDER — NITROFURANTOIN MACROCRYSTAL 50 MG
1 CAPSULE ORAL
Qty: 6 | Refills: 0 | OUTPATIENT
Start: 2017-10-02 | End: 2017-10-05

## 2017-10-02 RX ORDER — METOPROLOL TARTRATE 50 MG
1 TABLET ORAL
Qty: 0 | Refills: 0 | COMMUNITY

## 2017-10-02 RX ORDER — LISINOPRIL 2.5 MG/1
1 TABLET ORAL
Qty: 30 | Refills: 0 | OUTPATIENT
Start: 2017-10-02 | End: 2017-11-01

## 2017-10-02 RX ORDER — AMLODIPINE BESYLATE 2.5 MG/1
1 TABLET ORAL
Qty: 30 | Refills: 0 | OUTPATIENT
Start: 2017-10-02 | End: 2017-11-01

## 2017-10-02 RX ORDER — METOPROLOL TARTRATE 50 MG
1 TABLET ORAL
Qty: 60 | Refills: 0 | OUTPATIENT
Start: 2017-10-02 | End: 2017-11-01

## 2017-10-02 RX ORDER — MELOXICAM 15 MG/1
1 TABLET ORAL
Qty: 0 | Refills: 0 | COMMUNITY

## 2017-10-02 RX ADMIN — HEPARIN SODIUM 5000 UNIT(S): 5000 INJECTION INTRAVENOUS; SUBCUTANEOUS at 05:25

## 2017-10-02 RX ADMIN — Medication 650 MILLIGRAM(S): at 06:25

## 2017-10-02 RX ADMIN — LIOTHYRONINE SODIUM 5 MICROGRAM(S): 25 TABLET ORAL at 05:25

## 2017-10-02 RX ADMIN — CEFTRIAXONE 100 GRAM(S): 500 INJECTION, POWDER, FOR SOLUTION INTRAMUSCULAR; INTRAVENOUS at 10:56

## 2017-10-02 RX ADMIN — AMLODIPINE BESYLATE 10 MILLIGRAM(S): 2.5 TABLET ORAL at 11:01

## 2017-10-02 RX ADMIN — LIDOCAINE 1 PATCH: 4 CREAM TOPICAL at 11:02

## 2017-10-02 RX ADMIN — Medication 20 MILLIGRAM(S): at 05:25

## 2017-10-02 RX ADMIN — Medication 25 MILLIGRAM(S): at 05:28

## 2017-10-02 RX ADMIN — LISINOPRIL 10 MILLIGRAM(S): 2.5 TABLET ORAL at 13:22

## 2017-10-02 RX ADMIN — Medication 650 MILLIGRAM(S): at 05:25

## 2017-10-02 NOTE — PROGRESS NOTE ADULT - PROBLEM SELECTOR PLAN 4
Likely in setting of dementia, chronic medical illnesses, infection (UTI). Neuro exam nonfocal.  - 1:1  - freq reorientation  - avoid QTC prolonging agents. Prefer frequent redirection.
Hx of dementia, baseline AAOx1 (self) and very forgetful. Not on medications, but followed by neurologist.  - continue monitoring  - fall precautions  - frequent re-orientations to decr delirium risk  - Patient is s/p Ativan yesterday for agitation.
Hx of dementia, baseline AAOx1 (self) and very forgetful. Not on medications, but followed by neurologist.  - continue monitoring  - fall precautions  - frequent re-orientations to decr delirium risk  - Patient is s/p Ativan yesterday for agitation.
Hx of dementia, baseline AAOx1 (self) and very forgetful. Not on medications, but followed by neurologist.  - continue monitoring  - fall precautions  - frequent re-orientations to decr delirium risk

## 2017-10-02 NOTE — PROGRESS NOTE ADULT - PROBLEM SELECTOR PLAN 3
Likely multifactorial - due to diastolic HF with mild overload and also possibly SIADH due to pain. Stable.  - monitor BMP
Likely multifactorial - due to diastolic HF with mild overload and also possibly SIADH due to pain  - Sodium is stable
Likely multifactorial - due to diastolic HF with mild overload and also possibly SIADH due to pain  - Sodium is stable
Likely multifactorial - due to diastolic HF with mild overload and also possibly SIADH due to pain  - monitor Na

## 2017-10-02 NOTE — DISCHARGE NOTE ADULT - CARE PLAN
Principal Discharge DX:	Hypertensive urgency  Goal:	Blood pressure will be well controlled.  Instructions for follow-up, activity and diet:	Low salt diet  Activity as tolerated.  Take all medication as prescribed.  Follow up with your medical doctor for routine blood pressure monitoring at your next visit.  Notify your doctor if you have any of the following symptoms:   Dizziness, Lightheadedness, Blurry vision, Headache, Chest pain, Shortness of breath  Secondary Diagnosis:	Hyponatremia  Goal:	Hyponatremia has resolved.  Instructions for follow-up, activity and diet:	Continue present medication regimen.   Follow up with Dr. Moralez next week.  Secondary Diagnosis:	Dementia without behavioral disturbance, unspecified dementia type  Goal:	Dementia will be stable.  Instructions for follow-up, activity and diet:	Continue present medication regimen.   Follow up PMD, in 1 week. Principal Discharge DX:	Hypertensive urgency  Goal:	Blood pressure will be well controlled.  Instructions for follow-up, activity and diet:	Low salt diet  Activity as tolerated.  Take all medication as prescribed.  Follow up with your medical doctor for routine blood pressure monitoring at your next visit.  Notify your doctor if you have any of the following symptoms:   Dizziness, Lightheadedness, Blurry vision, Headache, Chest pain, Shortness of breath  Secondary Diagnosis:	Hyponatremia  Goal:	Hyponatremia has resolved.  Instructions for follow-up, activity and diet:	Continue present medication regimen.   Follow up with Dr. Moralez next week.  Secondary Diagnosis:	Dementia without behavioral disturbance, unspecified dementia type  Goal:	Dementia will be stable.  Instructions for follow-up, activity and diet:	Continue present medication regimen.   Follow up PMD, in 1 week.  Secondary Diagnosis:	Urinary tract infection  Goal:	UTI will continue to resolve.  Instructions for follow-up, activity and diet:	HOME CARE INSTRUCTIONS  f you were prescribed antibiotics, take them exactly as your caregiver instructs you. Finish the medication even if you feel better after you have only taken some of the medication.  Drink enough water and fluids to keep your urine clear or pale yellow.  Avoid caffeine, tea, and carbonated beverages. They tend to irritate your bladder.  Empty your bladder often. Avoid holding urine for long periods of time.  Empty your bladder before and after sexual intercourse.  After a bowel movement, women should cleanse from front to back. Use each tissue only once.  SEEK MEDICAL CARE IF:  You have back pain.  You develop a fever.  Your symptoms do not begin to resolve within 3 days.  SEEK IMMEDIATE MEDICAL CARE IF:  You have severe back pain or lower abdominal pain.  You develop chills.  You have nausea or vomiting.  You have continued burning or discomfort with urination. Principal Discharge DX:	Hypertensive urgency  Goal:	Blood pressure will be well controlled.  Instructions for follow-up, activity and diet:	Low salt diet  Activity as tolerated.  Take all medication as prescribed.  Follow up with your medical doctor for routine blood pressure monitoring at your next visit.  Notify your doctor if you have any of the following symptoms:   Dizziness, Lightheadedness, Blurry vision, Headache, Chest pain, Shortness of breath  Secondary Diagnosis:	Hyponatremia  Goal:	Hyponatremia has resolved.  Instructions for follow-up, activity and diet:	Continue present medication regimen.   Follow up with Dr. Moralez next week.  Secondary Diagnosis:	Dementia without behavioral disturbance, unspecified dementia type  Goal:	Dementia will be stable.  Instructions for follow-up, activity and diet:	Continue present medication regimen.   Follow up PMD, in 1 week.  Secondary Diagnosis:	Urinary tract infection  Goal:	UTI will continue to resolve.  Instructions for follow-up, activity and diet:	HOME CARE INSTRUCTIONS  f you were prescribed antibiotics, take them exactly as your caregiver instructs you. Finish the medication even if you feel better after you have only taken some of the medication.  Drink enough water and fluids to keep your urine clear or pale yellow.  Avoid caffeine, tea, and carbonated beverages. They tend to irritate your bladder.  Empty your bladder often. Avoid holding urine for long periods of time.  Empty your bladder before and after sexual intercourse.  After a bowel movement, women should cleanse from front to back. Use each tissue only once.  SEEK MEDICAL CARE IF:  You have back pain.  You develop a fever.  Your symptoms do not begin to resolve within 3 days.  SEEK IMMEDIATE MEDICAL CARE IF:  You have severe back pain or lower abdominal pain.  You develop chills.  You have nausea or vomiting.  You have continued burning or discomfort with urination.  Secondary Diagnosis:	Chronic diastolic congestive heart failure  Goal:	Heart failure will continue to be stable.  Instructions for follow-up, activity and diet:	Weigh yourself daily.  If you gain 3lbs in 3 days, or 5lbs in a week call your Health Care Provider.  Do not eat or drink foods containing more than 2000mg of salt (sodium) in your diet every day.  Call your Health Care Provider if you have any swelling or increased swelling in your feet, ankles, and/or stomach.  Take all of your medication as directed.  If you become dizzy call your Health Care Provider. Principal Discharge DX:	Hypertensive urgency  Goal:	Blood pressure will be well controlled.  Instructions for follow-up, activity and diet:	You were noted to have very high blood pressure and was started on multiple medications with improvement. Make sure you eat a Low salt diet  Take all medication as prescribed.  Follow up with your medical doctor for routine blood pressure monitoring at your next visit.  Notify your doctor if you have any of the following symptoms:   Dizziness, Lightheadedness, Blurry vision, Headache, Chest pain, Shortness of breath  Secondary Diagnosis:	Hyponatremia  Goal:	Hyponatremia has resolved.  Instructions for follow-up, activity and diet:	Continue present medication regimen.   Follow up with Dr. Moralez next week and have sodium level repeated  Secondary Diagnosis:	Dementia without behavioral disturbance, unspecified dementia type  Goal:	Dementia will be stable.  Instructions for follow-up, activity and diet:	You have dementia and were confused during the hospital stay,  likely due to UTI and pain.   Continue present medication regimen.   Follow up PMD and neurologist, in 1 week.  Secondary Diagnosis:	Urinary tract infection  Goal:	UTI will continue to resolve.  Instructions for follow-up, activity and diet:	HOME CARE INSTRUCTIONS  f you were prescribed antibiotics, take them exactly as your caregiver instructs you. Finish the medication even if you feel better after you have only taken some of the medication.  Drink enough water and fluids to keep your urine clear or pale yellow.  Avoid caffeine, tea, and carbonated beverages. They tend to irritate your bladder.  Empty your bladder often. Avoid holding urine for long periods of time.  After a bowel movement, women should cleanse from front to back. Use each tissue only once.  SEEK MEDICAL CARE IF:  You have back pain.  You develop a fever.  Your symptoms do not begin to resolve within 3 days.  SEEK IMMEDIATE MEDICAL CARE IF:  You have severe back pain or lower abdominal pain.  You develop chills.  You have nausea or vomiting.  You have continued burning or discomfort with urination.  Secondary Diagnosis:	Chronic diastolic congestive heart failure  Goal:	Heart failure will continue to be stable.  Instructions for follow-up, activity and diet:	Weigh yourself daily. Continue to take your lasix.   If you gain 3lbs in 3 days, or 5lbs in a week call your Health Care Provider.  Do not eat or drink foods containing more than 2000mg of salt (sodium) in your diet every day.  Call your Health Care Provider if you have any swelling or increased swelling in your feet, ankles, and/or stomach.  Take all of your medication as directed.  If you become dizzy call your Health Care Provider.  Secondary Diagnosis:	Tendonitis of shoulder, left  Goal:	resolution of pain  Instructions for follow-up, activity and diet:	You had pain due to tendonitis. You had imaging of your shoulder which did not show any fracture.  - follow up with your orthopedist Dr Alex Sheriff  - get physical therapy for your shoulder  - place hot packs in the area for pain as needed

## 2017-10-02 NOTE — DISCHARGE NOTE ADULT - SECONDARY DIAGNOSIS.
Hyponatremia Dementia without behavioral disturbance, unspecified dementia type Urinary tract infection Chronic diastolic congestive heart failure Tendonitis of shoulder, left

## 2017-10-02 NOTE — DISCHARGE NOTE ADULT - MEDICATION SUMMARY - MEDICATIONS TO TAKE
I will START or STAY ON the medications listed below when I get home from the hospital:    lisinopril 10 mg oral tablet  -- 1 tab(s) by mouth once a day MDD:1  -- Indication: For Hypertensive crisis    metoprolol tartrate 25 mg oral tablet  -- 1 tab(s) by mouth 2 times a day MDD:2  -- Indication: For Hypertensive crisis    amLODIPine 10 mg oral tablet  -- 1 tab(s) by mouth once a day MDD:1  -- Indication: For Hypertensive crisis    furosemide 20 mg oral tablet  -- 1 tab(s) by mouth once a day  -- Indication: For Water pill     potassium chloride 20 mEq oral tablet, extended release  -- 1 tab(s) by mouth once a day  -- Indication: For Supplement    liothyronine 5 mcg oral tablet  -- 1 tab(s) by mouth once a day  -- Indication: For Acquired hypothyroidism    Macrobid 100 mg oral capsule  -- 1 cap(s) by mouth 2 times a day MDD:2  -- Finish all this medication unless otherwise directed by prescriber.  May discolor urine or feces.  Take with food or milk.    -- Indication: For Urinary Tract infection

## 2017-10-02 NOTE — DISCHARGE NOTE ADULT - CARE PROVIDERS DIRECT ADDRESSES
[email or web address]

## 2017-10-02 NOTE — PROGRESS NOTE ADULT - PROBLEM SELECTOR PROBLEM 5
Dementia without behavioral disturbance, unspecified dementia type
Acquired hypothyroidism

## 2017-10-02 NOTE — PROVIDER CONTACT NOTE (OTHER) - RECOMMENDATIONS
Assess patient. Review patient's orders and lab results.
Assess patient. Review patient's Lab Urinalysis result, all other lab results and orders.
Assess patient. Review patient's orders and lab results.
Assess patient. Review patient's orders and lab results.
meds?
will reattempt later in AM when patient is more calmer

## 2017-10-02 NOTE — PROVIDER CONTACT NOTE (OTHER) - SITUATION
Patient's Blood Pressure 215/98 electronic. Patient received from Emergency Department.
Patient's Lab Urinalysis resulted.
Patient's Labs resulted.
pt. extremely agitated and increasingly confused and uncooperative, getting OOB frequently.
pt. removed IVL, attempted multiple times to replace IVL, pt. getting agitated.
Patient's Blood Pressure 177/99 electronic.

## 2017-10-02 NOTE — PROGRESS NOTE ADULT - PROBLEM SELECTOR PROBLEM 1
Hypertensive crisis
Hypertensive urgency
Hypertensive crisis

## 2017-10-02 NOTE — PROGRESS NOTE ADULT - PROBLEM SELECTOR PROBLEM 2
Tendonitis of shoulder, left

## 2017-10-02 NOTE — PROGRESS NOTE ADULT - PROBLEM SELECTOR PLAN 6
- TSH WNL  - cont home liothyronine
Recent TTE 8/2017 w/ mild diastolic HF. On exam, pt euvolemic.  - monitor wts daily, i/o's  - c/w home dose lasix  - monitor electrolytes - keep K>4, mg>2.  Potassium repleted today.
Recent TTE 8/2017 w/ mild diastolic HF. On exam, pt euvolemic.  - monitor wts daily, i/o's  - c/w home dose lasix  - monitor electrolytes - keep K>4, mg>2.  Potassium repleted today.
Recent TTE 8/2017 w/ mild diastolic HF. On exam, pt euvolemic to slightly hypervolemic.  - monitor wts daily, i/o's  - c/w home dose lasix  - monitor electrolytes - keep K>4, mg>2

## 2017-10-02 NOTE — PROGRESS NOTE ADULT - PROBLEM SELECTOR PROBLEM 7
Chronic diastolic congestive heart failure
Chronic hepatitis C without hepatic coma

## 2017-10-02 NOTE — PROVIDER CONTACT NOTE (OTHER) - BACKGROUND
Patient admitted for Hypertensive Urgency, Arthritis, Hypothyroidism, Dementia, Left Shoulder Pain. History of Hepatitis C.
admitted with SOB and hypertensive urgency
admitted with hypertensive urgency, shoulder pain, UTI
Patient admitted for Hypertensive Urgency, Arthritis, Hypothyroidism, Dementia, Left Shoulder Pain. History of Hepatitis C.

## 2017-10-02 NOTE — PROGRESS NOTE ADULT - SUBJECTIVE AND OBJECTIVE BOX
Patient is a 89y old  Female who presents with a chief complaint of shoulder pain (02 Oct 2017 12:27)        SUBJECTIVE / OVERNIGHT EVENTS:  Pt seen at bedside with daughter. Pt reports feeling well - shoulder pain is now improved with lidocaine patch. Denies any headache, visual change, cp, sob, n/v, abd. pain, diarrhea/constipation, dizziness. Discussed with family Bp well controlled now.    MEDICATIONS  (STANDING):  heparin  Injectable 5000 Unit(s) SubCutaneous every 12 hours  metoprolol 25 milliGRAM(s) Oral two times a day  liothyronine 5 MICROGram(s) Oral daily  lidocaine   Patch 1 Patch Transdermal daily  amLODIPine   Tablet 10 milliGRAM(s) Oral daily  furosemide    Tablet 20 milliGRAM(s) Oral daily  lisinopril 10 milliGRAM(s) Oral daily  cefTRIAXone   IVPB      cefTRIAXone   IVPB 1 Gram(s) IV Intermittent every 24 hours    MEDICATIONS  (PRN):  acetaminophen   Tablet. 650 milliGRAM(s) Oral every 6 hours PRN Moderate Pain (4 - 6)      Vital Signs Last 24 Hrs  T(C): 36.4 (02 Oct 2017 11:04), Max: 36.6 (02 Oct 2017 04:20)  T(F): 97.6 (02 Oct 2017 11:04), Max: 97.8 (02 Oct 2017 04:20)  HR: 74 (02 Oct 2017 11:04) (74 - 94)  BP: 138/84 (02 Oct 2017 11:04) (118/75 - 157/90)  BP(mean): --  RR: 18 (02 Oct 2017 11:04) (18 - 19)  SpO2: 96% (02 Oct 2017 11:04) (96% - 98%)  CAPILLARY BLOOD GLUCOSE    PHYSICAL EXAM  GENERAL: NAD, well-developed, elderly F, appears younger than stated age  HEAD:  Atraumatic, Normocephalic  NECK: Supple, No JVD  CHEST/LUNG: Clear to auscultation bilaterally; No wheeze  HEART: Regular rate and rhythm; No murmurs, rubs, or gallops  ABDOMEN: Soft, Nontender, Nondistended; Bowel sounds present  EXTREMITIES: Trace b/l LE edema. 2+ Peripheral Pulses, No clubbing, cyanosis.  PSYCH: AAOx1-2 (self and hospital) at baseline per daughter  SKIN: No rashes or lesions  MSK: LUE with mild anterior TTP of glenohumeral jt, no significant crepitus or edema. ROM decr 2/2 pain.      I&O's Summary    01 Oct 2017 07:01  -  02 Oct 2017 07:00  --------------------------------------------------------  IN: 960 mL / OUT: 600 mL / NET: 360 mL    02 Oct 2017 07:01  -  02 Oct 2017 13:29  --------------------------------------------------------  IN: 120 mL / OUT: 250 mL / NET: -130 mL        LABS:                        15.5   9.2   )-----------( 196      ( 01 Oct 2017 07:38 )             46.5     10-01    133<L>  |  93<L>  |  16  ----------------------------<  115<H>  4.3   |  23  |  0.76    Ca    9.8      01 Oct 2017 07:38      LABS REVIEWED - hyponatremia.     RADIOLOGY & ADDITIONAL TESTS:    Care Discussed with Consultants/Other Providers: NP

## 2017-10-02 NOTE — PROGRESS NOTE ADULT - PROBLEM SELECTOR PLAN 5
Hx of dementia, baseline AAOx1 (self) and very forgetful. Not on medications, but followed by neurologist.  - continue monitoring  - fall precautions
- TSH WNL  - cont home liothyronine
- check TSH  - cont home liothyronine
- check TSH  - cont home liothyronine

## 2017-10-02 NOTE — PROGRESS NOTE ADULT - ASSESSMENT
89 F PMH of dementia (AAOx1), HTN, HCV s/p tx, hx of ETOH (0.5 pt gin daily) w/o hx of withdrawal, left shoulder tendonitis, HFpEF p/w severe left shoulder pain adm for hypertensive urgency. Hosp course c/b delerium.
89 F PMH of dementia (AAOx1), HTN, HCV s/p tx, hx of ETOH (0.5 pt gin daily) w/o hx of withdrawal, left shoulder tendonitis, HFpEF p/w severe left shoulder pain adm for hypertensive urgency.
89 F PMH of dementia (AAOx1), HTN, HCV s/p tx, hx of ETOH (0.5 pt gin daily) w/o hx of withdrawal, left shoulder tendonitis, HFpEF p/w severe left shoulder pain adm for hypertensive urgency.
HPI:  89 f htn, dementia, hcv (s/p rx in remission), hypothyroid a/w left shoulder pain.  Daughter states she left for an apointment at 11am and came back at 2pm with pt c/o severe left shoulder pain.  Pt has chronic sob.  No cp/n/v.  Pt saw cardiology 1 wk ago and was told to increase toprol from 25 to 50.  Pt had a TTE in August, results unknown.  Of note is pt drinks 1/2-1 cup of gin a day, no h/o withdrawal.  In /109, hr 78  Given labetolol 10mg iv x 2 and metoprolol 25mg po x 1.  Initially seen on floor with sbp 215/98, hr 69.  Given hydralazine 10mg iv x 1 and resumed norvasc 5mg (was on in past and daughter does not remember why it was stopped).  Pt went to bathroom twice and on returning the second time she was sitting on end of bed and felt dizzy, lay down and initial bp 120/80.  After laying for a few minutes sbp back to 190s.  Pt with increasing shoulder pain and given lidoderm patch and cold compress with good effect (28 Sep 2017 23:38)
89 F PMH of dementia (AAOx1), HTN, HCV s/p tx, hx of ETOH (0.5 pt gin daily) w/o hx of withdrawal, left shoulder tendonitis, HFpEF p/w severe left shoulder pain adm for hypertensive urgency.

## 2017-10-02 NOTE — DISCHARGE NOTE ADULT - MEDICATION SUMMARY - MEDICATIONS TO STOP TAKING
I will STOP taking the medications listed below when I get home from the hospital:    Toprol-XL 25 mg oral tablet, extended release  -- 1 tab(s) by mouth once a day    meloxicam 7.5 mg oral tablet  -- 1 tab(s) by mouth once a day

## 2017-10-02 NOTE — DISCHARGE NOTE ADULT - HOSPITAL COURSE
89 F PMH of dementia (AAOx1), HTN, HCV s/p tx, hx of ETOH (0.5 pint gin daily) w/o history  of withdrawal, left shoulder tendonitis, HFpEF p/w severe left shoulder pain and  hypertensive urgency.  BP in the ER was 214/109 mmhg.  Her medications were adjusted.  Pt was started and will continue on Norvasc 10 mg daily; Lisinopril 10 mg daily; and Metoprolol 25 mg bid.    At discharge patient’s blood is more controlled.  Serum sodium during admission was decreased now of which has corrected.  She will also continue on Lasix 20 mg daily for her heart failure.    Patient’s shoulder pain has been contributed to her tendonitis.  X-ray of her left shoulder was negative.   She will follow up outpatient with Orthopedics, Dr. Alex Charles.    Pt will continue to follow up with her PMD regarding her Dementia management.  She is presently not taking any medications.  Her family is very supportive and re-orients her frequently.  Pt is hemodynamically stable for discharge to home today.  She will continue on Macrobid x 3 days to treat her UTI.  Pt will also be referred for home care services.  She will follow up with her PMD, Dr. Wheeler and Dr. Johnny Samuel next week 89 F PMH of dementia (AAOx1), HTN, HCV s/p tx, hx of ETOH (0.5 pint gin daily) w/o history  of withdrawal, left shoulder tendonitis, HFpEF p/w severe left shoulder pain and  hypertensive urgency.  BP in the ER was 214/109 mmhg requiring IV labetalol and hydralazine. Pt was started and will continue on Norvasc 10 mg daily; Lisinopril 10 mg daily; and Metoprolol tartrate 25 mg bid. She was recommended to continue current regimen and to have a low salt DASH diet.      Patient’s shoulder pain improved with lidocaine patch on admission, thought likely due to history of tendonitis.  X-ray of her left shoulder was negative.   She will follow up outpatient with Orthopedics, Dr. Alex Charles to discuss physical therapy of the shoulder. Her hospital course was complicated by delerium, likely in setting of dementia, UTI (which she was started on ceftriaxone), and pain.   Pt will continue to follow up with her PMD/neurologist regarding her Dementia management. Her family is very supportive and re-orients her frequently.  Pt is hemodynamically stable for discharge to home today.  She will continue on Macrobid x 3 days to treat her UTI.  Pt will also be referred for home care services.  She will follow up with her PMD, Dr. Wheeler and Dr. Johnny Samuel, and orthopedic surgery next week

## 2017-10-02 NOTE — DISCHARGE NOTE ADULT - ADDITIONAL INSTRUCTIONS
Follow up with Dr. Johnny Leija, Cardiology in 1 week to follow up blood pressure.   Follow up with Dr. Beverly Wheeler in 1 week to check blood pressure. Follow up with Dr. Johnny Leija, Cardiology in 1 week to follow up blood pressure.   Follow up with Dr. Beverly Wheeler in 1 week to check blood pressure.  Follow up with your orthopedist Dr Alex Sheriff regarding your shoulder.

## 2017-10-02 NOTE — PROVIDER CONTACT NOTE (OTHER) - REASON
Blood Pressure.
Lab Results.
Labs.
pt. extremely agitated and increasingly confused and uncooperative, getting OOB frequently.
pt. removed IVL, attempted multiple times to replace IVL, pt. getting agitated.
Blood Pressure.

## 2017-10-02 NOTE — PROGRESS NOTE ADULT - PROBLEM SELECTOR PLAN 1
Hypertensive urgency with SBP 200s, now improved on PO regimen. No signs of EOD, no CP, new SOB, n/v/abd pain. Trops neg x3. At home on metoprolol and amlodipine.  - Continue with Metoprolol 25mg BID.  - Continue with incr dose of Norvasc 10mg daily.  - Continue with Lisinopril 10mg daily (added inpatient)  - F/U BP with PMD as outpt
Hypertensive urgency with SBP 200s, now improved with IV labetalol and hydralazine. No signs of EOD, no CP, new SOB, n/v/abd pain. Trops neg x3. At home on metoprolol and amlodipine.  - c/w metoprolol at 25mg po bid  - incr norvasc to 10 mg daily  - continue with Lisinopril 10mg daily  - If SBP >180 and PO medications not effective, can give hydralazine 10 mg IV, but goal is to titrate up PO medications.
Markedly improved.  Continue with Metorolol 25mg BID.  Continue with Norvasc 10mg daily.  Continue with Lisinopril 10mg daily.  Hydralazine IV prn discontinued.
Hypertensive urgency with SBP 200s, now improved with IV labetalol and hydralazine. No signs of EOD, no CP, new SOB, n/v/abd pain. Trops neg x3. At home on metoprolol and amlodipine.  - c/w metoprolol at 25mg po bid  - incr norvasc to 10 mg daily  - D/C telemetry - no events  - monitor BP and titrate BP meds accordingly. If BP remains elevated, can add ace-inh.   - If SBP >180 and PO medications not effective, can give hydralazine 10 mg IV, but goal is to titrate up PO medications.
continue with current medication   Norvasc 10 mg oral daily given   Lasix   Add Lisinopril  monitor Vital sign   Discussed with Dr Barron above plan agreed

## 2017-10-02 NOTE — DISCHARGE NOTE ADULT - HOME CARE AGENCY
[facility] for visiting nurse and physical therapy [phone], tomorrow if you do not hear from the agency/RN by 12 pm. Call them to find out when the nurse will be coming.

## 2017-10-02 NOTE — PROGRESS NOTE ADULT - PROBLEM SELECTOR PLAN 8
s/p tx as outpt. LFTs mildly elevated, near baseline.  - monitor for symptoms
Urine Culture with 100k E Coli.  Ceftriaxone started.
dvt ppx: HSQ  PT/OT - pending  DISPO: Pending improvement in BP
dvt ppx: HSQ  PT/OT - pending  DISPO: Pending improvement in BP

## 2017-10-02 NOTE — DISCHARGE NOTE ADULT - PLAN OF CARE
Low salt diet  Activity as tolerated.  Take all medication as prescribed.  Follow up with your medical doctor for routine blood pressure monitoring at your next visit.  Notify your doctor if you have any of the following symptoms:   Dizziness, Lightheadedness, Blurry vision, Headache, Chest pain, Shortness of breath Hyponatremia has resolved. Continue present medication regimen.   Follow up with Dr. Moralez next week. Dementia will be stable. Continue present medication regimen.   Follow up PMD, in 1 week. Blood pressure will be well controlled. UTI will continue to resolve. HOME CARE INSTRUCTIONS  f you were prescribed antibiotics, take them exactly as your caregiver instructs you. Finish the medication even if you feel better after you have only taken some of the medication.  Drink enough water and fluids to keep your urine clear or pale yellow.  Avoid caffeine, tea, and carbonated beverages. They tend to irritate your bladder.  Empty your bladder often. Avoid holding urine for long periods of time.  Empty your bladder before and after sexual intercourse.  After a bowel movement, women should cleanse from front to back. Use each tissue only once.  SEEK MEDICAL CARE IF:  You have back pain.  You develop a fever.  Your symptoms do not begin to resolve within 3 days.  SEEK IMMEDIATE MEDICAL CARE IF:  You have severe back pain or lower abdominal pain.  You develop chills.  You have nausea or vomiting.  You have continued burning or discomfort with urination. Heart failure will continue to be stable. Weigh yourself daily.  If you gain 3lbs in 3 days, or 5lbs in a week call your Health Care Provider.  Do not eat or drink foods containing more than 2000mg of salt (sodium) in your diet every day.  Call your Health Care Provider if you have any swelling or increased swelling in your feet, ankles, and/or stomach.  Take all of your medication as directed.  If you become dizzy call your Health Care Provider. You were noted to have very high blood pressure and was started on multiple medications with improvement. Make sure you eat a Low salt diet  Take all medication as prescribed.  Follow up with your medical doctor for routine blood pressure monitoring at your next visit.  Notify your doctor if you have any of the following symptoms:   Dizziness, Lightheadedness, Blurry vision, Headache, Chest pain, Shortness of breath Continue present medication regimen.   Follow up with Dr. Moralez next week and have sodium level repeated You have dementia and were confused during the hospital stay,  likely due to UTI and pain.   Continue present medication regimen.   Follow up PMD and neurologist, in 1 week. HOME CARE INSTRUCTIONS  f you were prescribed antibiotics, take them exactly as your caregiver instructs you. Finish the medication even if you feel better after you have only taken some of the medication.  Drink enough water and fluids to keep your urine clear or pale yellow.  Avoid caffeine, tea, and carbonated beverages. They tend to irritate your bladder.  Empty your bladder often. Avoid holding urine for long periods of time.  After a bowel movement, women should cleanse from front to back. Use each tissue only once.  SEEK MEDICAL CARE IF:  You have back pain.  You develop a fever.  Your symptoms do not begin to resolve within 3 days.  SEEK IMMEDIATE MEDICAL CARE IF:  You have severe back pain or lower abdominal pain.  You develop chills.  You have nausea or vomiting.  You have continued burning or discomfort with urination. Weigh yourself daily. Continue to take your lasix.   If you gain 3lbs in 3 days, or 5lbs in a week call your Health Care Provider.  Do not eat or drink foods containing more than 2000mg of salt (sodium) in your diet every day.  Call your Health Care Provider if you have any swelling or increased swelling in your feet, ankles, and/or stomach.  Take all of your medication as directed.  If you become dizzy call your Health Care Provider. resolution of pain You had pain due to tendonitis. You had imaging of your shoulder which did not show any fracture.  - follow up with your orthopedist Dr Alex Sheriff  - get physical therapy for your shoulder  - place hot packs in the area for pain as needed

## 2017-10-02 NOTE — PROGRESS NOTE ADULT - PROBLEM SELECTOR PROBLEM 4
Delirium
Dementia without behavioral disturbance, unspecified dementia type

## 2017-10-02 NOTE — CHART NOTE - NSCHARTNOTEFT_GEN_A_CORE
Patient is a 89y old  Female who presents with a chief complaint of shoulder pain (02 Oct 2017 12:27)  Seen by Attending and .  Pt is hemodynamically stable.  No complaints at present.     Vital Signs Last 24 Hrs  T(C): 36.4 (02 Oct 2017 11:04), Max: 36.6 (02 Oct 2017 04:20)  T(F): 97.6 (02 Oct 2017 11:04), Max: 97.8 (02 Oct 2017 04:20)  HR: 74 (02 Oct 2017 11:04) (74 - 86)  BP: 138/84 (02 Oct 2017 11:04) (128/77 - 157/90)  BP(mean): --  RR: 18 (02 Oct 2017 11:04) (18 - 19)  SpO2: 96% (02 Oct 2017 11:04) (96% - 98%)      Labs:                          15.5   9.2   )-----------( 196      ( 01 Oct 2017 07:38 )             46.5     10-01    133<L>  |  93<L>  |  16  ----------------------------<  115<H>  4.3   |  23  |  0.76    Ca    9.8      01 Oct 2017 07:38      Physical Exam:  General: WN/WD NAD  Neurology: A&Ox2, nonfocal, JUAREZ x 4  Head:  Normocephalic, atraumatic  Respiratory: CTA B/L  CV: RRR, S1S2, no murmur  Abdominal: Soft, NT, ND no palpable mass  MSK: No edema, + peripheral pulses, FROM all 4 extremity    Discharge Note and Plan:  >Follow up with PMD, Dr. Wheeler in 1 week to check blood pressure.   > Follow up with Cardiology, Dr. Johnny Samuel, next week.   > Home care to follow up with pt and her daughter re: home care services to assist with safety at home. Pt's daughter also plan      to private hire.   >Pt is hemodynamically stable for discharge to home today.  Discharge plan and medication reconcilliation discussed with pt's daughter of which is her caregiver.     Cami Rose [facility]  Spectralink #[id number]

## 2017-10-02 NOTE — PROGRESS NOTE ADULT - PROBLEM SELECTOR PLAN 7
Recent TTE 8/2017 w/ mild diastolic HF. On exam, pt euvolemic.  - monitor wts daily, i/o's  - c/w home dose lasix  - monitor electrolytes - keep K>4, mg>2.
s/p tx as outpt. LFTs mildly elevated.  - monitor for symptoms

## 2017-10-02 NOTE — PROGRESS NOTE ADULT - PROBLEM SELECTOR PLAN 2
Hx of left shoulder tendonitis, XRAYs negative for fracture, +severe OA. Exam benign without crepitus/masses. ROM is limited 2/2 pain, however now pain is at pt's baseline.  - can f/u outpt with her orthopedist Dr Alex Sheriff  - recommend PT/OT for ROM exercises of shoulder  - c/w lidocaine patch
Hx of left shoulder tendonitis, XRAYs negative for fracture, +severe OA. Exam benign without crepitus/masses. ROM is limited 2/2 pain, however now pain is at pt's baseline.  - can f/u outpt with her orthopedist Dr Alex Sheriff  - recommend PT/OT for ROM exercises of shoulder  - c/w lidocaine patch  - c/w tylenol prn - monitor LFTs

## 2017-10-02 NOTE — DISCHARGE NOTE ADULT - CARE PROVIDER_API CALL
Johnny Leija), Cardiology; Internal Medicine  [address]  [address]  [place] [address]  Phone: [phone]  Fax: [phone]    Beverly Moralez), Geriatric Medicine; Internal Medicine  [address]  [address]  [place] [address]  Phone: [phone]  Fax: [phone]

## 2017-10-02 NOTE — PROGRESS NOTE ADULT - PROBLEM SELECTOR PROBLEM 6
Acquired hypothyroidism
Chronic diastolic congestive heart failure

## 2017-10-02 NOTE — DISCHARGE NOTE ADULT - PATIENT PORTAL LINK FT
“You can access the Follow[facility] Patient Portal, offered by [facility], by registering with the following website: [email or web address]”

## 2017-10-02 NOTE — PROVIDER CONTACT NOTE (OTHER) - DATE AND TIME:
02-Oct-2017 06:35
28-Sep-2017 22:11
29-Sep-2017 00:44
29-Sep-2017 01:01
29-Sep-2017 23:35
29-Sep-2017 04:23

## 2017-10-02 NOTE — PROVIDER CONTACT NOTE (OTHER) - ASSESSMENT
Patient asymptomatic. Patient Alert and Oriented times Four. Patient denies chest pain, shortness of breath, headache, dizziness and lightheadedness. Patient's Vital Signs: Temperature 97.7 F Oral, Heart Rate 88, Blood Pressure 177/99 electronic, Respiratory Rate 18 and O2 Saturation 100% Room Air.
Patient Alert and Oriented times Four but Forgetful at times. Patient asymptomatic. Patient denies chest pain, shortness of breath, headache, dizziness and lightheadedness. Patient's Heart Rhythm is Normal Sinus Rhythm and First Degree AV Block on the cardiac monitor. Patient's Vital Signs: Temperature 97.9 F Oral, Heart Rate 69, Blood Pressure 215/98 electronic, Blood Pressure 198/86 manual, Respiratory Rate 18 and O2 Saturation 100% Room Air.
Patient's Lab Results: Lactate, Blood 3.3. Sodium, Serum 133. Potassium, Serum 3.5 (Mild hemolysis. Results may be falsely elevated). Chloride, Serum 92. Carbon Dioxide, Serum 19. Anion Gap, Serum 22. Phosphorus Level, Serum 1.9. Creatine Kinase, Serum 208. CKMB Units 4.9. pH, Venous 7.49. Base Excess, Venous 2.1. Blood Gas Venous - Potassium 3.2. Blood Gas Venous - Sodium 134. Blood Gas Venous - Lactate 3.3. Total Hemoglobin, Calculated 15.6. Oxygen Content - Blood Gas Venous 10. pCO2, Venous 32. Oxygen Saturation, Venous 46. Magnesium, Serum 1.8 (Mild hemolysis. Results may be falsely elevated.)
Patient's Lab Urinalysis Result: Ketone - Urine Trace. Specific Gravity 1.006. Leukocyte Esterase Concentration Small. White Blood Cell - Urine 10-25.
pt. stable.
uncooperative, getting OOB frequently., family at bedside

## 2017-10-02 NOTE — PROGRESS NOTE ADULT - PROBLEM SELECTOR PLAN 10
dvt ppx: HSQ  PT/OT - [facility] recommended. However, patients daughter is requesting HHA and home PT instead. For D/C home today.    D/C TIME 60 minutes.

## 2017-10-18 ENCOUNTER — APPOINTMENT (OUTPATIENT)
Dept: GERIATRICS | Facility: CLINIC | Age: 82
End: 2017-10-18
Payer: MEDICARE

## 2017-10-18 VITALS
HEART RATE: 90 BPM | BODY MASS INDEX: 24.5 KG/M2 | TEMPERATURE: 98.3 F | WEIGHT: 147.04 LBS | OXYGEN SATURATION: 96 % | DIASTOLIC BLOOD PRESSURE: 78 MMHG | RESPIRATION RATE: 15 BRPM | HEIGHT: 65 IN | SYSTOLIC BLOOD PRESSURE: 122 MMHG

## 2017-10-18 PROCEDURE — 99214 OFFICE O/P EST MOD 30 MIN: CPT | Mod: GC

## 2017-10-18 RX ORDER — DICLOFENAC SODIUM 10 MG/G
1 GEL TOPICAL
Qty: 1 | Refills: 3 | Status: DISCONTINUED | COMMUNITY
Start: 2016-12-16 | End: 2017-10-18

## 2017-10-18 RX ORDER — METOPROLOL SUCCINATE 25 MG/1
25 TABLET, EXTENDED RELEASE ORAL TWICE DAILY
Qty: 60 | Refills: 0 | Status: COMPLETED | COMMUNITY
Start: 2016-11-01 | End: 2017-10-18

## 2017-10-18 RX ORDER — MELOXICAM 7.5 MG/1
7.5 TABLET ORAL
Refills: 0 | Status: COMPLETED | COMMUNITY
Start: 2017-06-15 | End: 2017-10-18

## 2017-10-18 RX ORDER — METHYL SALICYLATE/MENTH/CAMPH 30%-10%-4%
CREAM (GRAM) TOPICAL
Refills: 0 | Status: COMPLETED | COMMUNITY
Start: 2017-09-15 | End: 2017-10-18

## 2017-10-23 ENCOUNTER — INPATIENT (INPATIENT)
Facility: HOSPITAL | Age: 82
LOS: 1 days | Discharge: HOME | DRG: 261 | End: 2017-10-25
Attending: HOSPITALIST | Admitting: HOSPITALIST
Payer: MEDICARE

## 2017-10-23 VITALS — HEART RATE: 70 BPM | SYSTOLIC BLOOD PRESSURE: 132 MMHG | DIASTOLIC BLOOD PRESSURE: 85 MMHG | RESPIRATION RATE: 18 BRPM

## 2017-10-23 DIAGNOSIS — B19.20 UNSPECIFIED VIRAL HEPATITIS C WITHOUT HEPATIC COMA: ICD-10-CM

## 2017-10-23 DIAGNOSIS — E03.9 HYPOTHYROIDISM, UNSPECIFIED: ICD-10-CM

## 2017-10-23 DIAGNOSIS — R55 SYNCOPE AND COLLAPSE: ICD-10-CM

## 2017-10-23 DIAGNOSIS — M15.9 POLYOSTEOARTHRITIS, UNSPECIFIED: ICD-10-CM

## 2017-10-23 DIAGNOSIS — I10 ESSENTIAL (PRIMARY) HYPERTENSION: ICD-10-CM

## 2017-10-23 DIAGNOSIS — F03.90 UNSPECIFIED DEMENTIA, UNSPECIFIED SEVERITY, WITHOUT BEHAVIORAL DISTURBANCE, PSYCHOTIC DISTURBANCE, MOOD DISTURBANCE, AND ANXIETY: ICD-10-CM

## 2017-10-23 DIAGNOSIS — Z29.9 ENCOUNTER FOR PROPHYLACTIC MEASURES, UNSPECIFIED: ICD-10-CM

## 2017-10-23 LAB
ALBUMIN SERPL ELPH-MCNC: 4.1 G/DL — SIGNIFICANT CHANGE UP (ref 3.3–5)
ALP SERPL-CCNC: 84 U/L — SIGNIFICANT CHANGE UP (ref 40–120)
ALT FLD-CCNC: 22 U/L RC — SIGNIFICANT CHANGE UP (ref 10–45)
ANION GAP SERPL CALC-SCNC: 15 MMOL/L — SIGNIFICANT CHANGE UP (ref 5–17)
APPEARANCE UR: CLEAR — SIGNIFICANT CHANGE UP
AST SERPL-CCNC: 26 U/L — SIGNIFICANT CHANGE UP (ref 10–40)
BASOPHILS # BLD AUTO: 0 K/UL — SIGNIFICANT CHANGE UP (ref 0–0.2)
BASOPHILS NFR BLD AUTO: 0.4 % — SIGNIFICANT CHANGE UP (ref 0–2)
BILIRUB SERPL-MCNC: 0.3 MG/DL — SIGNIFICANT CHANGE UP (ref 0.2–1.2)
BILIRUB UR-MCNC: NEGATIVE — SIGNIFICANT CHANGE UP
BUN SERPL-MCNC: 20 MG/DL — SIGNIFICANT CHANGE UP (ref 7–23)
CALCIUM SERPL-MCNC: 10.1 MG/DL — SIGNIFICANT CHANGE UP (ref 8.4–10.5)
CHLORIDE SERPL-SCNC: 94 MMOL/L — LOW (ref 96–108)
CK MB BLD-MCNC: 3.8 % — HIGH (ref 0–3.5)
CK MB BLD-MCNC: 4.4 % — HIGH (ref 0–3.5)
CK MB CFR SERPL CALC: 2.4 NG/ML — SIGNIFICANT CHANGE UP (ref 0–3.8)
CK MB CFR SERPL CALC: 2.7 NG/ML — SIGNIFICANT CHANGE UP (ref 0–3.8)
CK SERPL-CCNC: 62 U/L — SIGNIFICANT CHANGE UP (ref 25–170)
CK SERPL-CCNC: 63 U/L — SIGNIFICANT CHANGE UP (ref 25–170)
CO2 SERPL-SCNC: 23 MMOL/L — SIGNIFICANT CHANGE UP (ref 22–31)
COLOR SPEC: COLORLESS — SIGNIFICANT CHANGE UP
CREAT SERPL-MCNC: 0.8 MG/DL — SIGNIFICANT CHANGE UP (ref 0.5–1.3)
D DIMER BLD IA.RAPID-MCNC: 713 NG/ML DDU — HIGH
DIFF PNL FLD: NEGATIVE — SIGNIFICANT CHANGE UP
EOSINOPHIL # BLD AUTO: 0.1 K/UL — SIGNIFICANT CHANGE UP (ref 0–0.5)
EOSINOPHIL NFR BLD AUTO: 2.2 % — SIGNIFICANT CHANGE UP (ref 0–6)
GLUCOSE SERPL-MCNC: 103 MG/DL — HIGH (ref 70–99)
GLUCOSE UR QL: NEGATIVE — SIGNIFICANT CHANGE UP
HCT VFR BLD CALC: 41.9 % — SIGNIFICANT CHANGE UP (ref 34.5–45)
HGB BLD-MCNC: 14.5 G/DL — SIGNIFICANT CHANGE UP (ref 11.5–15.5)
KETONES UR-MCNC: NEGATIVE — SIGNIFICANT CHANGE UP
LEUKOCYTE ESTERASE UR-ACNC: NEGATIVE — SIGNIFICANT CHANGE UP
LYMPHOCYTES # BLD AUTO: 1.3 K/UL — SIGNIFICANT CHANGE UP (ref 1–3.3)
LYMPHOCYTES # BLD AUTO: 23.2 % — SIGNIFICANT CHANGE UP (ref 13–44)
MAGNESIUM SERPL-MCNC: 2.2 MG/DL — SIGNIFICANT CHANGE UP (ref 1.6–2.6)
MCHC RBC-ENTMCNC: 33.5 PG — SIGNIFICANT CHANGE UP (ref 27–34)
MCHC RBC-ENTMCNC: 34.6 GM/DL — SIGNIFICANT CHANGE UP (ref 32–36)
MCV RBC AUTO: 96.9 FL — SIGNIFICANT CHANGE UP (ref 80–100)
MONOCYTES # BLD AUTO: 0.8 K/UL — SIGNIFICANT CHANGE UP (ref 0–0.9)
MONOCYTES NFR BLD AUTO: 13.3 % — SIGNIFICANT CHANGE UP (ref 2–14)
NEUTROPHILS # BLD AUTO: 3.5 K/UL — SIGNIFICANT CHANGE UP (ref 1.8–7.4)
NEUTROPHILS NFR BLD AUTO: 61 % — SIGNIFICANT CHANGE UP (ref 43–77)
NITRITE UR-MCNC: NEGATIVE — SIGNIFICANT CHANGE UP
NT-PROBNP SERPL-SCNC: 74 PG/ML — SIGNIFICANT CHANGE UP (ref 0–300)
PH UR: 7.5 — SIGNIFICANT CHANGE UP (ref 5–8)
PHOSPHATE SERPL-MCNC: 2.9 MG/DL — SIGNIFICANT CHANGE UP (ref 2.5–4.5)
PLATELET # BLD AUTO: 292 K/UL — SIGNIFICANT CHANGE UP (ref 150–400)
POTASSIUM SERPL-MCNC: 4.5 MMOL/L — SIGNIFICANT CHANGE UP (ref 3.5–5.3)
POTASSIUM SERPL-SCNC: 4.5 MMOL/L — SIGNIFICANT CHANGE UP (ref 3.5–5.3)
PROT SERPL-MCNC: 7.5 G/DL — SIGNIFICANT CHANGE UP (ref 6–8.3)
PROT UR-MCNC: NEGATIVE — SIGNIFICANT CHANGE UP
RBC # BLD: 4.32 M/UL — SIGNIFICANT CHANGE UP (ref 3.8–5.2)
RBC # FLD: 12.9 % — SIGNIFICANT CHANGE UP (ref 10.3–14.5)
RBC CASTS # UR COMP ASSIST: SIGNIFICANT CHANGE UP /HPF (ref 0–2)
SODIUM SERPL-SCNC: 132 MMOL/L — LOW (ref 135–145)
SP GR SPEC: 1.02 — SIGNIFICANT CHANGE UP (ref 1.01–1.02)
TROPONIN T SERPL-MCNC: <0.01 NG/ML — SIGNIFICANT CHANGE UP (ref 0–0.06)
TROPONIN T SERPL-MCNC: <0.01 NG/ML — SIGNIFICANT CHANGE UP (ref 0–0.06)
UROBILINOGEN FLD QL: NEGATIVE — SIGNIFICANT CHANGE UP
WBC # BLD: 5.8 K/UL — SIGNIFICANT CHANGE UP (ref 3.8–10.5)
WBC # FLD AUTO: 5.8 K/UL — SIGNIFICANT CHANGE UP (ref 3.8–10.5)
WBC UR QL: SIGNIFICANT CHANGE UP /HPF (ref 0–5)

## 2017-10-23 PROCEDURE — 99223 1ST HOSP IP/OBS HIGH 75: CPT | Mod: GC

## 2017-10-23 PROCEDURE — 70450 CT HEAD/BRAIN W/O DYE: CPT | Mod: 26

## 2017-10-23 PROCEDURE — 99497 ADVNCD CARE PLAN 30 MIN: CPT | Mod: 25

## 2017-10-23 PROCEDURE — 71275 CT ANGIOGRAPHY CHEST: CPT | Mod: 26

## 2017-10-23 PROCEDURE — 99285 EMERGENCY DEPT VISIT HI MDM: CPT | Mod: 25,GC

## 2017-10-23 PROCEDURE — 93010 ELECTROCARDIOGRAM REPORT: CPT | Mod: GC

## 2017-10-23 PROCEDURE — 71010: CPT | Mod: 26

## 2017-10-23 RX ORDER — AMLODIPINE BESYLATE 2.5 MG/1
10 TABLET ORAL DAILY
Qty: 0 | Refills: 0 | Status: DISCONTINUED | OUTPATIENT
Start: 2017-10-23 | End: 2017-10-25

## 2017-10-23 RX ORDER — HEPARIN SODIUM 5000 [USP'U]/ML
5000 INJECTION INTRAVENOUS; SUBCUTANEOUS EVERY 12 HOURS
Qty: 0 | Refills: 0 | Status: DISCONTINUED | OUTPATIENT
Start: 2017-10-23 | End: 2017-10-25

## 2017-10-23 RX ORDER — FUROSEMIDE 40 MG
20 TABLET ORAL DAILY
Qty: 0 | Refills: 0 | Status: DISCONTINUED | OUTPATIENT
Start: 2017-10-23 | End: 2017-10-23

## 2017-10-23 RX ORDER — LISINOPRIL 2.5 MG/1
10 TABLET ORAL DAILY
Qty: 0 | Refills: 0 | Status: DISCONTINUED | OUTPATIENT
Start: 2017-10-23 | End: 2017-10-25

## 2017-10-23 RX ORDER — SODIUM CHLORIDE 9 MG/ML
1000 INJECTION INTRAMUSCULAR; INTRAVENOUS; SUBCUTANEOUS
Qty: 0 | Refills: 0 | Status: DISCONTINUED | OUTPATIENT
Start: 2017-10-23 | End: 2017-10-24

## 2017-10-23 RX ORDER — FUROSEMIDE 40 MG
20 TABLET ORAL DAILY
Qty: 0 | Refills: 0 | Status: DISCONTINUED | OUTPATIENT
Start: 2017-10-24 | End: 2017-10-25

## 2017-10-23 RX ORDER — METOPROLOL TARTRATE 50 MG
50 TABLET ORAL
Qty: 0 | Refills: 0 | Status: DISCONTINUED | OUTPATIENT
Start: 2017-10-23 | End: 2017-10-24

## 2017-10-23 RX ORDER — METOPROLOL TARTRATE 50 MG
50 TABLET ORAL
Qty: 0 | Refills: 0 | Status: DISCONTINUED | OUTPATIENT
Start: 2017-10-23 | End: 2017-10-23

## 2017-10-23 RX ORDER — LIOTHYRONINE SODIUM 25 UG/1
5 TABLET ORAL DAILY
Qty: 0 | Refills: 0 | Status: DISCONTINUED | OUTPATIENT
Start: 2017-10-23 | End: 2017-10-25

## 2017-10-23 RX ADMIN — SODIUM CHLORIDE 60 MILLILITER(S): 9 INJECTION INTRAMUSCULAR; INTRAVENOUS; SUBCUTANEOUS at 22:15

## 2017-10-23 NOTE — ED PROVIDER NOTE - PHYSICAL EXAMINATION
On exam, NAD, on O2 via EMS, removed while in room, maintaned sat 100%, head NCAT, CN 2-12 grossly intact, PERRL, FROM at neck, no tenderness to palpation or stepoffs along length of spine, lungs CTAB with good inspiratory effort, +S1S2, no m/r/g, abdomen soft with +BS, NT, ND, no CVAT, moving all extremities with 5/5 strength bilateral upper and lower extremities, good and equal  strength bilaterally, trace pitting edema bilateral lower extremities, limited ROM at LUE, baseline

## 2017-10-23 NOTE — H&P ADULT - NSHPREVIEWOFSYSTEMS_GEN_ALL_CORE
REVIEW OF SYSTEMS:    CONSTITUTIONAL: No weakness, fevers or chills  EYES/ENT: No visual changes;  No vertigo or throat pain   NECK: No pain or stiffness  RESPIRATORY: No cough, wheezing, hemoptysis; No shortness of breath  CARDIOVASCULAR: No chest pain or palpitations  GASTROINTESTINAL: No abdominal or epigastric pain. No nausea, vomiting, or hematemesis; No diarrhea or constipation. No melena or hematochezia.  GENITOURINARY: No dysuria, frequency or hematuria  NEUROLOGICAL: No numbness or weakness  SKIN: No itching, burning, rashes, or lesions   All other review of systems is negative unless indicated above. REVIEW OF SYSTEMS:    CONSTITUTIONAL: No weakness, fevers or chills  EYES:  No visual changes; no lacrimation  ENT: No vertigo or throat pain   NECK: No pain or stiffness  RESPIRATORY: No cough, wheezing, hemoptysis; No shortness of breath  CARDIOVASCULAR: No chest pain or palpitations  GASTROINTESTINAL: No abdominal or epigastric pain. No nausea, vomiting, or hematemesis; No diarrhea or constipation. No melena or hematochezia.  GENITOURINARY: No dysuria, frequency or hematuria  NEUROLOGICAL: No numbness or weakness  SKIN: No itching, burning, rashes, or lesions   ALLERGIC: no rash or hives  All other review of systems is negative unless indicated above.

## 2017-10-23 NOTE — ED PROVIDER NOTE - OBJECTIVE STATEMENT
89F with PMH including arthritis, dementia, Hep C, HTN, hypothyroidism presenting for syncope. Reports sitting at table eating breakfast, suddenly felt dizzy and went limp at 1045am, witnessed by HHA. Daughter, Sondra, bedside states her mother was doing arm exercise and stated she felt dizzy, when she arrived in the room where her mother was, she was being held by the HHA and limp.  Reports mother was flaccid and had lost bowel continence. Patient and daughter report chronic SOB for which she has been worked up, but reports given Sat >90%, no home O2, no intervention.  Not acutely worse this morning.   Dr. Johnny Leija Cards

## 2017-10-23 NOTE — ED PROVIDER NOTE - PROGRESS NOTE DETAILS
D dimer elevated to 713. Given recent syncopal episode and worsening SOB, will order CTA chest. Renal function in acceptable range. Attending MD Cedillo: CTA results reviewed.  No PE.  Called Dr. Beverly Wheeler's office, being covered by Dr. Soria (?spelling), since needs Telemetry requested admission to Hospitalist.  Discussed with Hospitalist will admit to his service.  Repeat enzymes now, hospitalist aware.

## 2017-10-23 NOTE — ED ADULT NURSE REASSESSMENT NOTE - NS ED NURSE REASSESS COMMENT FT1
received report from Abbie PRATHER. Patient resting in bed with no complaints at this time. Repeat troponin to be drawn. Safety and comfort maintained.

## 2017-10-23 NOTE — H&P ADULT - NSHPPHYSICALEXAM_GEN_ALL_CORE
PHYSICAL EXAM:  GENERAL: NAD, well-groomed, well-developed  HEAD:  Atraumatic, Normocephalic  EYES: EOMI, PERRLA, conjunctiva and sclera clear  ENMT: No tonsillar erythema, exudates, or enlargement; Moist mucous membranes.  NECK: Supple.  CHEST/LUNG: Clear to auscultation bilaterally. No crackles or wheezes.   HEART: Regular rate and rhythm; No murmurs, rubs, or gallops  ABDOMEN: Soft, Nontender, Nondistended; Bowel sounds present  VASCULAR:  2+ Peripheral Pulses, trace pitting edema bilaterally   LYMPH: No lymphadenopathy noted  SKIN: No rashes or lesions  NERVOUS SYSTEM: Alert and oriented to person and place. Limited short term memory. Motor Strength 5/5 B/L upper and lower extremities. Sensation intact.

## 2017-10-23 NOTE — H&P ADULT - PROBLEM SELECTOR PLAN 4
Pt's daughter states patient was treated in the past, unknown when pt was diagnosed. Currently without detectable viral load.

## 2017-10-23 NOTE — H&P ADULT - ATTENDING COMMENTS
Pt seen and examined at bedside.  I have precepted this case with house staff and agree with the excellent resident note above with changes made where appropriate.

## 2017-10-23 NOTE — ED ADULT NURSE NOTE - OBJECTIVE STATEMENT
Female 89 years old with history of HTN and Hypothyroidism was brought in by EMS for Syncope. As per daughter pt was sitting in the chair doing some arm exercise and suddenly saying she's dizzy and passed out for couple of minutes. Denies falling. Denies chest pain and stated she's intermittently short of breathe and got worse for the past days. Not on home oxygen. O2 sat %. Denies fever, chills, cough, nausea, vomiting and urinary symptoms. EKG completed. Labs obtained. Will monitor. Made comfortable. Daughter at bedside.

## 2017-10-23 NOTE — H&P ADULT - PROBLEM SELECTOR PLAN 6
Patient follows with geriatrics clinic, unspecified type of dementia. Daughter planning to stay at bedside. Pt has history of delirium and disorientation in hospital environment. Will monitor mental status closely.

## 2017-10-23 NOTE — H&P ADULT - PROBLEM SELECTOR PLAN 1
Patient s/p 1 episode of witnessed syncope at home with aura of dizziness a/w fecal incontinence. Differential diagnosis includes cardiogenic (valvular vs. congestive heart failure vs. MI) vs. dysrhythmias (afib vs SVT) and bradydysrhythmia (AV block, sick sinus syndrome), vs. vasovagal vs orthostatic hypotension 2/2 dehydration vs. Seizure vs metabolic causes.   - patient asymptomatic at this time. Last echo in 8/2017 showing normal LV function with mild diastolic dysfunction. EKG unchanged from prior EKG in September 2017.   - Will admit to telemetry and observe on monitor for arrhythmias.   - Patient was in sitting position during collapse, less likely orthostatic hypotension, however, can check orthostatics.   - unlikely CHF given low pro-BNP, no orthopnea or PND  - Less likely seizure given lack of history, can consider EEG   - less likely metabolic in nature given electrolytes WNL   - Fall precautions Patient s/p 1 episode of witnessed syncope at home with aura of dizziness a/w fecal incontinence. Differential diagnosis includes cardiogenic (valvular vs. congestive heart failure vs. MI) vs. dysrhythmias (afib vs SVT) and bradydysrhythmia (AV block, sick sinus syndrome), vs. vasovagal vs orthostatic hypotension 2/2 dehydration vs. Seizure vs metabolic causes.   - patient asymptomatic at this time. Last echo in 8/2017 showing normal LV function with mild diastolic dysfunction. EKG unchanged from prior EKG in September 2017.  - Two sets of cardiac enzymes negative.   - Will admit to telemetry and observe on monitor for arrhythmias.   - Patient was in sitting position during collapse, less likely orthostatic hypotension, however, can check orthostatics.   - unlikely CHF given low pro-BNP, no orthopnea or PND  - CTA of chest negative for pulmonary embolism.   - Less likely seizure given lack of history, can consider EEG   - less likely metabolic in nature given electrolytes WNL   - Fall precautions Patient s/p 1 episode of witnessed syncope at home with aura of dizziness a/w fecal incontinence. Differential diagnosis includes cardiogenic (valvular vs. congestive heart failure vs. MI) vs. dysrhythmias (afib vs SVT) and bradydysrhythmia (AV block, sick sinus syndrome), vs. vasovagal vs orthostatic hypotension 2/2 dehydration vs. Seizure vs metabolic causes.   - patient asymptomatic at this time. Last echo in 8/2017 showing normal LV function with mild diastolic dysfunction. EKG unchanged from prior EKG in September 2017.  - Two sets of cardiac enzymes negative.   - Will admit to telemetry and observe on monitor for arrhythmias.   - Patient was in sitting position during collapse, less likely orthostatic hypotension, however, can check orthostatics.   - unlikely CHF given low pro-BNP, no orthopnea or PND  - CTA of chest negative for pulmonary embolism.   - Less likely seizure given lack of history, can consider EEG   - less likely metabolic in nature given electrolytes WNL   - Unlikely due to infection, UA negative, no fevers, chills, no leukocytosis   - Fall precautions Patient s/p 1 episode of witnessed syncope at home with aura of dizziness a/w fecal incontinence. Differential diagnosis includes cardiogenic (valvular vs. congestive heart failure vs. MI) vs. dysrhythmias (afib vs SVT) and bradydysrhythmia (AV block, sick sinus syndrome), vs. vasovagal vs orthostatic hypotension vs. Seizure vs metabolic causes.   - patient asymptomatic at this time. Last echo in 8/2017 showing normal LV function with mild diastolic dysfunction. EKG unchanged from prior EKG in September 2017.  - Two sets of cardiac enzymes negative.   - Will admit to telemetry and observe on monitor for arrhythmias.   - Patient was in sitting position during collapse, less likely orthostatic hypotension, however, can check orthostatics.   - unlikely CHF given low pro-BNP, no orthopnea or PND  - CTA of chest negative for pulmonary embolism.   - Less likely seizure given lack of history, can consider EEG   - less likely metabolic in nature given electrolytes WNL   - Unlikely due to infection, UA negative, no fevers, chills, no leukocytosis   - Fall precautions Patient s/p 1 episode of witnessed syncope at home with aura of dizziness a/w fecal incontinence. Differential diagnosis includes cardiogenic (valvular vs. CHF vs. MI) vs. dysrhythmias (afib vs SVT) and bradydysrhythmia (AV block, sick sinus syndrome), vs. vasovagal vs orthostatic hypotension vs. Seizure vs metabolic causes.   - patient asymptomatic at this time. Last echo in 8/2017 showing normal LV function with mild diastolic dysfunction. EKG unchanged from prior EKG in September 2017.  - Two sets of cardiac enzymes negative.   - Will admit to telemetry and observe on monitor for arrhythmias.   - Patient was in sitting position during collapse, less likely orthostatic hypotension, however, can check orthostatics.   - unlikely CHF exacerbation given low pro-BNP, not in fluid overloaded state   - CTA of chest negative for pulmonary embolism.   - Less likely seizure given lack of history, can consider EEG   - less likely metabolic in nature given electrolytes WNL   - Unlikely due to infection, UA negative, no fevers, chills, no leukocytosis   - Fall precautions Patient s/p 1 episode of witnessed syncope at home with aura of dizziness a/w fecal incontinence. Differential diagnosis includes subclavian steal, cardiogenic (valvular vs. CHF vs. MI) vs. dysrhythmias (afib vs SVT) and bradydysrhythmia (AV block, sick sinus syndrome), vs. vasovagal vs orthostatic hypotension vs. Seizure vs metabolic causes.   - patient asymptomatic at this time. Last echo in 8/2017 showing normal LV function with mild diastolic dysfunction. EKG unchanged from prior EKG in September 2017.  - Two sets of cardiac enzymes negative.   - Will admit to telemetry and observe on monitor for arrhythmias.   - Will consult neurology for possible subclavian steal, and recs regarding further carotid imaging given patient was performing arm exercises during episode.   - Patient was in sitting position during collapse, less likely orthostatic hypotension, however, can check orthostatics.   - unlikely CHF exacerbation given low pro-BNP, not in fluid overloaded state   - CTA of chest negative for pulmonary embolism.   - Less likely seizure given lack of history, however will order spot EEG  - less likely metabolic in nature given electrolytes WNL   - Unlikely due to infection, UA negative, no fevers, chills, no leukocytosis   - Will order for fluids at 60cc/hour x 12 hours   - Fall precautions Patient s/p 1 episode of witnessed syncope at home with aura of dizziness a/w fecal incontinence. Differential diagnosis includes subclavian steal, cardiogenic (valvular vs. CHF vs. MI) vs. dysrhythmias (afib vs SVT) and bradydysrhythmia (AV block, sick sinus syndrome), vs. vasovagal vs orthostatic hypotension vs. Seizure vs metabolic causes.   - patient asymptomatic at this time. Last echo in 8/2017 showing normal LV function with mild diastolic dysfunction. 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   - Will admit to telemetry and observe on monitor for arrhythmias.   - Will consult neurology for possible subclavian steal, and recs regarding further carotid imaging given patient was performing arm exercises during episode.   - Patient was in sitting position during collapse, less likely orthostatic hypotension, however, can check orthostatics.   - unlikely CHF exacerbation given low pro-BNP, not in fluid overloaded state   - CTA of chest negative for pulmonary embolism.   - Less likely seizure given lack of history, however will order spot EEG  - less likely metabolic in nature given electrolytes WNL   - Unlikely due to infection, UA negative, no fevers, chills, no leukocytosis   - Will order for fluids at 60cc/hour x 12 hours   - Fall precautions Patient s/p 1 episode of witnessed syncope at home with aura of dizziness a/w fecal incontinence. Differential diagnosis includes subclavian steal, cardiogenic (valvular vs. CHF vs. MI) vs. dysrhythmias (afib vs SVT) and bradydysrhythmia (AV block, sick sinus syndrome), vs. vasovagal vs orthostatic hypotension vs. Seizure vs metabolic causes.   - patient asymptomatic at this time. Last echo in 8/2017 showing normal LV function with mild diastolic dysfunction. 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 Johnny Leija in AM)  - Two sets of cardiac enzymes negative.   - Will admit to telemetry and observe on monitor for arrhythmias.   - Will consult neurology for possible subclavian steal, and recs regarding further carotid imaging given patient was performing arm exercises during episode.   - Patient was in sitting position during collapse, less likely orthostatic hypotension, however, can check orthostatics.   - unlikely CHF exacerbation given low pro-BNP, not in fluid overloaded state   - CTA of chest negative for pulmonary embolism.   - Less likely seizure given lack of history, however will order spot EEG  - less likely metabolic in nature given electrolytes WNL   - Unlikely due to infection, UA negative, no fevers, chills, no leukocytosis   - Will order for fluids at 60cc/hour x 12 hours   - Fall precautions Patient s/p 1 episode of witnessed syncope at home with aura of dizziness a/w fecal incontinence. Differential diagnosis includes subclavian steal, cardiogenic (valvular vs. CHF vs. MI) vs. dysrhythmias (afib vs SVT) and bradydysrhythmia (AV block, sick sinus syndrome), vs. vasovagal vs orthostatic hypotension vs. Seizure vs metabolic causes.   - patient asymptomatic at this time. However, given evidence of trifascicular block on EKG and unexplained syncope, pt has Class 2 indication for PPM.  As asymptomatic without findings on tele, will defer to am team to call patient's cardiologist for c/s (Dr. Leija) and will likely need EP eval in am.   - Last echo in 8/2017 showing normal LV function with mild diastolic dysfunction.   - Two sets of cardiac enzymes negative.   - Will admit to telemetry and observe on monitor for arrhythmias.   - Will consult neurology for possible subclavian steal, and recs regarding further carotid imaging given patient was performing arm exercises during episode.   - Patient was in sitting position during collapse, less likely orthostatic hypotension, however, can check orthostatics.   - unlikely CHF exacerbation given low pro-BNP, not in fluid overloaded state   - CTA of chest negative for pulmonary embolism.   - Less likely seizure given lack of history, however will order spot EEG  - less likely metabolic in nature given electrolytes WNL   - Unlikely due to infection, UA negative, no fevers, chills, no leukocytosis   - Will order for fluids at 60cc/hour x 12 hours   - Fall precautions

## 2017-10-23 NOTE — H&P ADULT - NSHPLABSRESULTS_GEN_ALL_CORE
Labs and imaging personally reviewed. CBC WNL, mildly hyponatremic to 132, LFTs WNL, Mg and Phos WNL, BNP 74, First set cardiac enzymes negative, D dimer elevated at 713. CTA negative for PE, CT head negative for acute pathology, with extensive white matter microvascular disease, CXR showing clear lungs. Echo from 8/3/2017 in [facility] showing thickened mitral valve, minimal mitral regurg, and nl LV function. EKG unchanged from prior EKG in the system. NSR at 74bpm, 1st degree AV block, right bundle branch block prominently seen in V2 and V3, bifascicular block.      CARDIAC MARKERS ( 23 Oct 2017 12:47 )  x     / <0.01 ng/mL / 62 U/L / x     / 2.7 ng/mL                       14.5   5.8   )-----------( 292      ( 23 Oct 2017 12:47 )             41.9     10-23    132<L>  |  94<L>  |  20  ----------------------------<  103<H>  4.5   |  23  |  0.80    Ca    10.1      23 Oct 2017 12:47  Phos  2.9     10-  Mg     2.2     10-    TPro  7.5  /  Alb  4.1  /  TBili  0.3  /  DBili  x   /  AST  26  /  ALT  22  /  AlkPhos  84  10-23    CAPILLARY BLOOD GLUCOSE      POCT Blood Glucose.: 105 mg/dL (23 Oct 2017 11:52)      Urinalysis Basic - ( 23 Oct 2017 19:26 )    Color: Colorless / Appearance: Clear / S.024 / pH: x  Gluc: x / Ketone: Negative  / Bili: Negative / Urobili: Negative   Blood: x / Protein: Negative / Nitrite: Negative   Leuk Esterase: Negative / RBC: 0-2 /HPF / WBC 0-2 /HPF   Sq Epi: x / Non Sq Epi: x / Bacteria: x    < from: Xray Chest 1 View AP- PORTABLE-Urgent (10.23.17 @ 13:29) >    EXAM:  CHEST-PORTABLE URGENT                            PROCEDURE DATE:  10/23/2017      INTERPRETATION:  A single chest x-ray was obtained on 2017.    Indication: Shortness of breath.    Impression:    The heart is slightly enlarged. The lungs are clear. The aorta is   uncoiled. Degenerative changes of the thoracic spine and both shoulders.    < end of copied text >    < from: CT Angio Chest w/ IV Cont (10.23.17 @ 17:18) >    EXAM:  CT ANGIO CHEST (W)AW IC                          PROCEDURE DATE:  10/23/2017      INTERPRETATION:  CLINICAL INFORMATION: Shortness of breath    PROCEDURE:   CT Pulmonary Angiogram was performed with intravenous contrast.     Intravenous contrast: 90 ml Omnipaque 350. 10 ml discarded.    Reconstructions were performed in axial thin, axial maximum intensity   projection, sagittal and coronal planes.    COMPARISON: CT chest dated 2014.    IMPRESSION:    No pulmonary artery embolism.    Stable hepatic and left renal hypodensities in the visualized upper   abdomen.    < end of copied text >    < from: CT Head No Cont (10.23.17 @ 13:10) >    EXAM:  CT BRAIN                          INTERPRETATION:  Noncontrast CT of the brain.    CLINICAL INDICATION:  Syncopal episode    TECHNIQUE : Axial CT scanning of the brain was obtained from the skull   base to the vertex without the administration of intravenous contrast.      COMPARISON: CT brain 2/15/2016      IMPRESSION: No acute intracranial hemorrhage, mass effect, or evidence of   acute territorial infarct.    Extensive white matter microvascular ischemic disease. Labs and imaging personally reviewed. CBC WNL, mildly hyponatremic to 132, LFTs WNL, Mg and Phos WNL, BNP 74, First set cardiac enzymes negative, D dimer elevated at 713. CTA negative for PE, CT head negative for acute pathology, with extensive white matter microvascular disease, CXR showing clear lungs. Echo from 8/3/2017 in [facility] showing thickened mitral valve, minimal mitral regurg, and nl LV function. EKG unchanged from prior EKG in the system. NSR at 74bpm, 1st degree AV block, right bundle branch block prominently seen in V2 and V3, bifascicular block.  TX interval 242ms, QRS duration 148ms, QT//472    CARDIAC MARKERS ( 23 Oct 2017 12:47 )  x     / <0.01 ng/mL / 62 U/L / x     / 2.7 ng/mL                       14.5   5.8   )-----------( 292      ( 23 Oct 2017 12:47 )             41.9     10-    132<L>  |  94<L>  |  20  ----------------------------<  103<H>  4.5   |  23  |  0.80    Ca    10.1      23 Oct 2017 12:47  Phos  2.9     10-  Mg     2.2     10-    TPro  7.5  /  Alb  4.1  /  TBili  0.3  /  DBili  x   /  AST  26  /  ALT  22  /  AlkPhos  84  10-    CAPILLARY BLOOD GLUCOSE      POCT Blood Glucose.: 105 mg/dL (23 Oct 2017 11:52)      Urinalysis Basic - ( 23 Oct 2017 19:26 )    Color: Colorless / Appearance: Clear / S.024 / pH: x  Gluc: x / Ketone: Negative  / Bili: Negative / Urobili: Negative   Blood: x / Protein: Negative / Nitrite: Negative   Leuk Esterase: Negative / RBC: 0-2 /HPF / WBC 0-2 /HPF   Sq Epi: x / Non Sq Epi: x / Bacteria: x    < from: Xray Chest 1 View AP- PORTABLE-Urgent (10.23.17 @ 13:29) >    EXAM:  CHEST-PORTABLE URGENT                            PROCEDURE DATE:  10/23/2017      INTERPRETATION:  A single chest x-ray was obtained on 2017.    Indication: Shortness of breath.    Impression:    The heart is slightly enlarged. The lungs are clear. The aorta is   uncoiled. Degenerative changes of the thoracic spine and both shoulders.    < end of copied text >    < from: CT Angio Chest w/ IV Cont (10.23.17 @ 17:18) >    EXAM:  CT ANGIO CHEST (W)AW IC                          PROCEDURE DATE:  10/23/2017      INTERPRETATION:  CLINICAL INFORMATION: Shortness of breath    PROCEDURE:   CT Pulmonary Angiogram was performed with intravenous contrast.     Intravenous contrast: 90 ml Omnipaque 350. 10 ml discarded.    Reconstructions were performed in axial thin, axial maximum intensity   projection, sagittal and coronal planes.    COMPARISON: CT chest dated 2014.    IMPRESSION:    No pulmonary artery embolism.    Stable hepatic and left renal hypodensities in the visualized upper   abdomen.    < end of copied text >    < from: CT Head No Cont (10.23.17 @ 13:10) >    EXAM:  CT BRAIN                          INTERPRETATION:  Noncontrast CT of the brain.    CLINICAL INDICATION:  Syncopal episode    TECHNIQUE : Axial CT scanning of the brain was obtained from the skull   base to the vertex without the administration of intravenous contrast.      COMPARISON: CT brain 2/15/2016      IMPRESSION: No acute intracranial hemorrhage, mass effect, or evidence of   acute territorial infarct.    Extensive white matter microvascular ischemic disease. Labs and imaging personally reviewed. CBC WNL, mildly hyponatremic to 132, LFTs WNL, Mg and Phos WNL, BNP 74, First set cardiac enzymes negative, D dimer elevated at 713. UA negative. CTA negative for PE, CT head negative for acute pathology, with extensive white matter microvascular disease, CXR showing clear lungs. Echo from 8/3/2017 in [facility] showing thickened mitral valve, minimal mitral regurg, and nl LV function. EKG unchanged from prior EKG in the system. NSR at 74bpm, 1st degree AV block, right bundle branch block prominently seen in V2 and V3, bifascicular block.  [place] interval 242ms, QRS duration 148ms, QT//472    CARDIAC MARKERS ( 23 Oct 2017 12:47 )  x     / <0.01 ng/mL / 62 U/L / x     / 2.7 ng/mL                       14.5   5.8   )-----------( 292      ( 23 Oct 2017 12:47 )             41.9     10-    132<L>  |  94<L>  |  20  ----------------------------<  103<H>  4.5   |  23  |  0.80    Ca    10.1      23 Oct 2017 12:47  Phos  2.9     10-23  Mg     2.2     10-23    TPro  7.5  /  Alb  4.1  /  TBili  0.3  /  DBili  x   /  AST  26  /  ALT  22  /  AlkPhos  84  10-    CAPILLARY BLOOD GLUCOSE      POCT Blood Glucose.: 105 mg/dL (23 Oct 2017 11:52)      Urinalysis Basic - ( 23 Oct 2017 19:26 )    Color: Colorless / Appearance: Clear / S.024 / pH: x  Gluc: x / Ketone: Negative  / Bili: Negative / Urobili: Negative   Blood: x / Protein: Negative / Nitrite: Negative   Leuk Esterase: Negative / RBC: 0-2 /HPF / WBC 0-2 /HPF   Sq Epi: x / Non Sq Epi: x / Bacteria: x    < from: Xray Chest 1 View AP- PORTABLE-Urgent (10.23.17 @ 13:29) >    EXAM:  CHEST-PORTABLE URGENT                            PROCEDURE DATE:  10/23/2017      INTERPRETATION:  A single chest x-ray was obtained on 2017.    Indication: Shortness of breath.    Impression:    The heart is slightly enlarged. The lungs are clear. The aorta is   uncoiled. Degenerative changes of the thoracic spine and both shoulders.    < end of copied text >    < from: CT Angio Chest w/ IV Cont (10.23.17 @ 17:18) >    EXAM:  CT ANGIO CHEST (W)AW IC                          PROCEDURE DATE:  10/23/2017      INTERPRETATION:  CLINICAL INFORMATION: Shortness of breath    PROCEDURE:   CT Pulmonary Angiogram was performed with intravenous contrast.     Intravenous contrast: 90 ml Omnipaque 350. 10 ml discarded.    Reconstructions were performed in axial thin, axial maximum intensity   projection, sagittal and coronal planes.    COMPARISON: CT chest dated 2014.    IMPRESSION:    No pulmonary artery embolism.    Stable hepatic and left renal hypodensities in the visualized upper   abdomen.    < end of copied text >    < from: CT Head No Cont (10.23.17 @ 13:10) >    EXAM:  CT BRAIN                          INTERPRETATION:  Noncontrast CT of the brain.    CLINICAL INDICATION:  Syncopal episode    TECHNIQUE : Axial CT scanning of the brain was obtained from the skull   base to the vertex without the administration of intravenous contrast.      COMPARISON: CT brain 2/15/2016      IMPRESSION: No acute intracranial hemorrhage, mass effect, or evidence of   acute territorial infarct.    Extensive white matter microvascular ischemic disease. Labs and imaging personally reviewed. CBC WNL, mildly hyponatremic to 132, LFTs WNL, Mg and Phos WNL, BNP 74, Two sets of cardiac enzymes negative, D dimer elevated at 713. UA negative. CTA negative for PE, CT head negative for acute pathology, with extensive white matter microvascular disease, CXR showing clear lungs. Echo from 8/3/2017 in [facility] showing thickened mitral valve, minimal mitral regurg, and nl LV function. EKG unchanged from prior EKG in the system. NSR at 74bpm, 1st degree AV block, right bundle branch block prominently seen in V2 and V3, bifascicular block.  SD interval 242ms, QRS duration 148ms, QT//472    CARDIAC MARKERS ( 23 Oct 2017 12:47 )  x     / <0.01 ng/mL / 62 U/L / x     / 2.7 ng/mL                       14.5   5.8   )-----------( 292      ( 23 Oct 2017 12:47 )             41.9     10-    132<L>  |  94<L>  |  20  ----------------------------<  103<H>  4.5   |  23  |  0.80    Ca    10.1      23 Oct 2017 12:47  Phos  2.9     10-23  Mg     2.2     10-23    TPro  7.5  /  Alb  4.1  /  TBili  0.3  /  DBili  x   /  AST  26  /  ALT  22  /  AlkPhos  84  10-    CAPILLARY BLOOD GLUCOSE      POCT Blood Glucose.: 105 mg/dL (23 Oct 2017 11:52)      Urinalysis Basic - ( 23 Oct 2017 19:26 )    Color: Colorless / Appearance: Clear / S.024 / pH: x  Gluc: x / Ketone: Negative  / Bili: Negative / Urobili: Negative   Blood: x / Protein: Negative / Nitrite: Negative   Leuk Esterase: Negative / RBC: 0-2 /HPF / WBC 0-2 /HPF   Sq Epi: x / Non Sq Epi: x / Bacteria: x    < from: Xray Chest 1 View AP- PORTABLE-Urgent (10.23.17 @ 13:29) >    EXAM:  CHEST-PORTABLE URGENT                            PROCEDURE DATE:  10/23/2017      INTERPRETATION:  A single chest x-ray was obtained on 2017.    Indication: Shortness of breath.    Impression:    The heart is slightly enlarged. The lungs are clear. The aorta is   uncoiled. Degenerative changes of the thoracic spine and both shoulders.    < end of copied text >    < from: CT Angio Chest w/ IV Cont (10.23.17 @ 17:18) >    EXAM:  CT ANGIO CHEST (W)AW IC                          PROCEDURE DATE:  10/23/2017      INTERPRETATION:  CLINICAL INFORMATION: Shortness of breath    PROCEDURE:   CT Pulmonary Angiogram was performed with intravenous contrast.     Intravenous contrast: 90 ml Omnipaque 350. 10 ml discarded.    Reconstructions were performed in axial thin, axial maximum intensity   projection, sagittal and coronal planes.    COMPARISON: CT chest dated 2014.    IMPRESSION:    No pulmonary artery embolism.    Stable hepatic and left renal hypodensities in the visualized upper   abdomen.    < end of copied text >    < from: CT Head No Cont (10.23.17 @ 13:10) >    EXAM:  CT BRAIN                          INTERPRETATION:  Noncontrast CT of the brain.    CLINICAL INDICATION:  Syncopal episode    TECHNIQUE : Axial CT scanning of the brain was obtained from the skull   base to the vertex without the administration of intravenous contrast.      COMPARISON: CT brain 2/15/2016      IMPRESSION: No acute intracranial hemorrhage, mass effect, or evidence of   acute territorial infarct.    Extensive white matter microvascular ischemic disease.

## 2017-10-23 NOTE — ED PROVIDER NOTE - NS ED ROS FT
Attending MD Cedillo: Denies chest pain, palpitations. Denies abdominal pain, nausea, vomiting, diarrhea, blood in stools. Denies dysuria, hematuria, change in urinary habits including frequency, urgency. Denies fevers, chills, change in vision.

## 2017-10-23 NOTE — ED PROVIDER NOTE - ATTENDING CONTRIBUTION TO CARE
Attending MD Cedillo: I personally have seen and examined this patient.  Resident note reviewed and agree on plan of care and except where noted.  See HPI for details.

## 2017-10-23 NOTE — H&P ADULT - HISTORY OF PRESENT ILLNESS
89F with PMH including arthritis, dementia, Hep C, HTN, hypothyroidism presenting for syncope. Reports sitting at table eating breakfast, suddenly felt dizzy and went limp at 1045am, witnessed by HHA. Daughter, Sondra, bedside states her mother was doing arm exercise and stated she felt dizzy, when she arrived in the room where her mother was, she was being held by the HHA and limp.  Reports mother was flaccid and had lost bowel continence. Patient and daughter report chronic SOB for which she has been worked up, but reports given Sat >90%, no home O2, no intervention.  Not acutely worse this morning. 89 y/o F with PMH including arthritis, dementia (unknown type), Hep C (s/p treatment in remission), HTN, hypothyroidism presenting for witnessed collapse at home today. Patient's daughter is at bedside. The daughter states the patient was eating breakfast around 10:45AM and was doing arm exercises with her right arm, when she started complaining of dizziness. Pt was at a sitting position, with her HHA sitting next to her, when pt suddenly slumped over and appeared to lose consciousness for about 2 minutes. Pt did not fall to the ground or sustain any head trauma. Pt was unresponsive and had lost bowel incontinence, however, did not have any diaphoresis, tonic clonic movements, chest pain, headache, or aura prior to the collapse. Pt states that she remembers very little of the episode. Pt is also complaining of shortness of breath for the past year, however was told by her cardiologist that she only had diastolic dysfunction and did not qualify for home O2 as her saturation has always remained above 90%. Pt states that she usually takes her time rising from sitting position to standing due to her arthritis. This type of episode has happened once before about 3-4 years ago, where patient was hospitalized, however nothing significant was found on labs or imaging.  Patient states she gets short of breath just from walking around the kitchen and complains of a "tired heart." Pt's daughter states that pt usually eats around 3 meals a day, however does not drink as much fluids as she should. Patient otherwise denies any recent fevers, chills, nausea, vomiting, palpitations, chest pain, abdominal pain, dysuria, or leg swelling. Patient denies any sick contacts or recent travel.     In the ER, patient's vitals were temp 97.7, HR 70-75, 132-149/66-85, RR 18, satting 100% on room air  Patient did not receive any medications or fluids in the ER. 89 y/o F with PMH including arthritis, dementia (unknown type), Hep C (s/p treatment in remission), HTN, hypothyroidism presenting for witnessed collapse at home today. Patient's daughter is at bedside. The daughter states the patient was eating breakfast around 10:45AM and was doing arm exercises with her right arm, when she started complaining of dizziness. Pt was at a sitting position, with her HHA sitting next to her, when pt suddenly slumped over and appeared to lose consciousness for about 2 minutes. Pt did not fall to the ground or sustain any head trauma. Pt was unresponsive and had lost bowel incontinence, however, did not have any diaphoresis, tonic clonic movements, chest pain, headache, or aura prior to the collapse. Pt states that she remembers very little of the episode. Pt is also complaining of shortness of breath for the past year, however was told by her cardiologist that she only had diastolic dysfunction and did not qualify for home O2 as her saturation has always remained above 90%. Pt states that she usually takes her time rising from sitting position to standing due to her arthritis. This type of episode has happened once before about 3-4 years ago, where patient was hospitalized, however nothing significant was found on labs or imaging.  Patient states she gets short of breath just from walking around the kitchen and complains of a "tired heart." Pt's daughter states that pt usually eats around 3 meals a day, however does not drink as much fluids as she should. Patient otherwise denies any recent fevers, chills, nausea, vomiting, palpitations, chest pain, abdominal pain, dysuria, or leg swelling. Patient denies any sick contacts or recent travel.     Briefly discussed patient's code status with patient's daughter. Patient remains full code and patient's son is health care proxy.     In the ER, patient's vitals were temp 97.7, HR 70-75, 132-149/66-85, RR 18, satting 100% on room air  Patient did not receive any medications or fluids in the ER. 89 y/o F with PMH including arthritis, dementia (unknown type), Hep C (s/p treatment in remission), HTN, HFpEF, hypothyroidism presenting for witnessed collapse at home today. Patient's daughter is at bedside. The daughter states the patient was eating breakfast around 10:45AM and was doing arm exercises with her right arm, when she started complaining of dizziness. Pt was at a sitting position, with her HHA sitting next to her, when pt suddenly slumped over and appeared to lose consciousness for about 2 minutes. Pt did not fall to the ground or sustain any head trauma. Pt was unresponsive and had lost bowel incontinence, however, did not have any diaphoresis, tonic clonic movements, chest pain, headache, or aura prior to the collapse. Pt states that she remembers very little of the episode. Pt is also complaining of shortness of breath for the past year, however was told by her cardiologist that she only had diastolic dysfunction and did not qualify for home O2 as her saturation has always remained above 90%. Pt states that she usually takes her time rising from sitting position to standing due to her arthritis. This type of episode has happened once before about 3-4 years ago, where patient was hospitalized, however nothing significant was found on labs or imaging.  Patient states she gets short of breath just from walking around the kitchen and complains of a "tired heart." Pt's daughter states that pt usually eats around 3 meals a day, however does not drink as much fluids as she should. Patient otherwise denies any recent fevers, chills, nausea, vomiting, palpitations, chest pain, abdominal pain, dysuria, or leg swelling. Patient denies any sick contacts or recent travel.     Briefly discussed patient's code status with patient's daughter. Patient remains full code and patient's son is health care proxy.     In the ER, patient's vitals were temp 97.7, HR 70-75, 132-149/66-85, RR 18, satting 100% on room air  Patient did not receive any medications or fluids in the ER.

## 2017-10-23 NOTE — H&P ADULT - ASSESSMENT
89 y/o F with PMH including arthritis, dementia (unknown type), Hep C (s/p treatment in remission), HTN, hypothyroidism presenting for one episode of witnessed syncope today found to have no obvious metabolic derangements, negative CT head, negative CT angiogram of the chest, and negative CXR.

## 2017-10-23 NOTE — H&P ADULT - PROBLEM SELECTOR PLAN 8
-20 min spent at bedside clarifying advanced directives.  Daughter at bedside; she is not the proxy; she states that discussion between her brother (proxy) in past has illustrated that patient is FULL CODE.  Daughter to confer with her brother again.

## 2017-10-23 NOTE — H&P ADULT - PROBLEM SELECTOR PLAN 2
Patient's BP currently at goal for patient's age. Currently takes Metoprolol, Lisinopril , and Lasix at home. Will continue with metoprolol and lasix for now. Can restart home meds if patient's BP rises. Patient's BP currently at goal for patient's age. Currently takes Metoprolol, Lisinopril, Amlodipine and Lasix at home. Will continue with metoprolol and Lasix for now. Can restart home meds if patient's BP rises. Patient's BP currently at goal for patient's age. Currently takes Metoprolol, Lisinopril, Amlodipine and Lasix at home. Will continue with metoprolol, Amlodipine and lisinopril and hold Lasix for now.   - will restart Lasix tomorrow

## 2017-10-23 NOTE — H&P ADULT - NSHPSOCIALHISTORY_GEN_ALL_CORE
Patient lives at home with her daughter and a HHA. She denies any history of cigarette smoking, and drinks "a sip" of gin every night. Her last drink was last night. No illicit drug use. She walks with a cane and a walker at home.

## 2017-10-24 ENCOUNTER — TRANSCRIPTION ENCOUNTER (OUTPATIENT)
Age: 82
End: 2017-10-24

## 2017-10-24 DIAGNOSIS — I50.32 CHRONIC DIASTOLIC (CONGESTIVE) HEART FAILURE: ICD-10-CM

## 2017-10-24 DIAGNOSIS — Z71.89 OTHER SPECIFIED COUNSELING: ICD-10-CM

## 2017-10-24 LAB
ALBUMIN SERPL ELPH-MCNC: 3.9 G/DL — SIGNIFICANT CHANGE UP (ref 3.3–5)
ALP SERPL-CCNC: 83 U/L — SIGNIFICANT CHANGE UP (ref 40–120)
ALT FLD-CCNC: 19 U/L RC — SIGNIFICANT CHANGE UP (ref 10–45)
ANION GAP SERPL CALC-SCNC: 15 MMOL/L — SIGNIFICANT CHANGE UP (ref 5–17)
AST SERPL-CCNC: 27 U/L — SIGNIFICANT CHANGE UP (ref 10–40)
BASOPHILS # BLD AUTO: 0 K/UL — SIGNIFICANT CHANGE UP (ref 0–0.2)
BASOPHILS NFR BLD AUTO: 0.4 % — SIGNIFICANT CHANGE UP (ref 0–2)
BILIRUB SERPL-MCNC: 0.4 MG/DL — SIGNIFICANT CHANGE UP (ref 0.2–1.2)
BUN SERPL-MCNC: 14 MG/DL — SIGNIFICANT CHANGE UP (ref 7–23)
CALCIUM SERPL-MCNC: 10.3 MG/DL — SIGNIFICANT CHANGE UP (ref 8.4–10.5)
CHLORIDE SERPL-SCNC: 96 MMOL/L — SIGNIFICANT CHANGE UP (ref 96–108)
CO2 SERPL-SCNC: 21 MMOL/L — LOW (ref 22–31)
CREAT SERPL-MCNC: 0.6 MG/DL — SIGNIFICANT CHANGE UP (ref 0.5–1.3)
EOSINOPHIL # BLD AUTO: 0.2 K/UL — SIGNIFICANT CHANGE UP (ref 0–0.5)
EOSINOPHIL NFR BLD AUTO: 2.2 % — SIGNIFICANT CHANGE UP (ref 0–6)
GLUCOSE SERPL-MCNC: 93 MG/DL — SIGNIFICANT CHANGE UP (ref 70–99)
HCT VFR BLD CALC: 43.4 % — SIGNIFICANT CHANGE UP (ref 34.5–45)
HGB BLD-MCNC: 15 G/DL — SIGNIFICANT CHANGE UP (ref 11.5–15.5)
LYMPHOCYTES # BLD AUTO: 1.1 K/UL — SIGNIFICANT CHANGE UP (ref 1–3.3)
LYMPHOCYTES # BLD AUTO: 15 % — SIGNIFICANT CHANGE UP (ref 13–44)
MAGNESIUM SERPL-MCNC: 2.1 MG/DL — SIGNIFICANT CHANGE UP (ref 1.6–2.6)
MCHC RBC-ENTMCNC: 33.5 PG — SIGNIFICANT CHANGE UP (ref 27–34)
MCHC RBC-ENTMCNC: 34.5 GM/DL — SIGNIFICANT CHANGE UP (ref 32–36)
MCV RBC AUTO: 97 FL — SIGNIFICANT CHANGE UP (ref 80–100)
MONOCYTES # BLD AUTO: 0.8 K/UL — SIGNIFICANT CHANGE UP (ref 0–0.9)
MONOCYTES NFR BLD AUTO: 11.7 % — SIGNIFICANT CHANGE UP (ref 2–14)
NEUTROPHILS # BLD AUTO: 5 K/UL — SIGNIFICANT CHANGE UP (ref 1.8–7.4)
NEUTROPHILS NFR BLD AUTO: 70.7 % — SIGNIFICANT CHANGE UP (ref 43–77)
PHOSPHATE SERPL-MCNC: 4.1 MG/DL — SIGNIFICANT CHANGE UP (ref 2.5–4.5)
PLATELET # BLD AUTO: 267 K/UL — SIGNIFICANT CHANGE UP (ref 150–400)
POTASSIUM SERPL-MCNC: 4.5 MMOL/L — SIGNIFICANT CHANGE UP (ref 3.5–5.3)
POTASSIUM SERPL-SCNC: 4.5 MMOL/L — SIGNIFICANT CHANGE UP (ref 3.5–5.3)
PROT SERPL-MCNC: 7.2 G/DL — SIGNIFICANT CHANGE UP (ref 6–8.3)
RBC # BLD: 4.47 M/UL — SIGNIFICANT CHANGE UP (ref 3.8–5.2)
RBC # FLD: 12.9 % — SIGNIFICANT CHANGE UP (ref 10.3–14.5)
SODIUM SERPL-SCNC: 132 MMOL/L — LOW (ref 135–145)
TSH SERPL-MCNC: 1.12 UIU/ML — SIGNIFICANT CHANGE UP (ref 0.27–4.2)
TSH SERPL-MCNC: 1.51 UIU/ML — SIGNIFICANT CHANGE UP (ref 0.27–4.2)
WBC # BLD: 7.1 K/UL — SIGNIFICANT CHANGE UP (ref 3.8–10.5)
WBC # FLD AUTO: 7.1 K/UL — SIGNIFICANT CHANGE UP (ref 3.8–10.5)

## 2017-10-24 PROCEDURE — 99223 1ST HOSP IP/OBS HIGH 75: CPT | Mod: GC

## 2017-10-24 PROCEDURE — 95957 EEG DIGITAL ANALYSIS: CPT | Mod: 26

## 2017-10-24 PROCEDURE — 70544 MR ANGIOGRAPHY HEAD W/O DYE: CPT | Mod: 26

## 2017-10-24 PROCEDURE — 99223 1ST HOSP IP/OBS HIGH 75: CPT

## 2017-10-24 PROCEDURE — 99233 SBSQ HOSP IP/OBS HIGH 50: CPT

## 2017-10-24 PROCEDURE — 95819 EEG AWAKE AND ASLEEP: CPT | Mod: 26

## 2017-10-24 RX ADMIN — LISINOPRIL 10 MILLIGRAM(S): 2.5 TABLET ORAL at 05:10

## 2017-10-24 RX ADMIN — Medication 20 MILLIGRAM(S): at 05:10

## 2017-10-24 RX ADMIN — LIOTHYRONINE SODIUM 5 MICROGRAM(S): 25 TABLET ORAL at 05:10

## 2017-10-24 RX ADMIN — Medication 50 MILLIGRAM(S): at 05:10

## 2017-10-24 RX ADMIN — HEPARIN SODIUM 5000 UNIT(S): 5000 INJECTION INTRAVENOUS; SUBCUTANEOUS at 05:10

## 2017-10-24 RX ADMIN — HEPARIN SODIUM 5000 UNIT(S): 5000 INJECTION INTRAVENOUS; SUBCUTANEOUS at 17:45

## 2017-10-24 RX ADMIN — AMLODIPINE BESYLATE 10 MILLIGRAM(S): 2.5 TABLET ORAL at 05:10

## 2017-10-24 NOTE — DISCHARGE NOTE ADULT - HOSPITAL COURSE
89 y/o F with PMH including arthritis, dementia (unknown type), Hep C (s/p treatment in remission), HTN, hypothyroidism presenting for one episode of witnessed syncope today found to have no obvious metabolic derangements, negative CT head, negative CT angiogram of the chest, and negative CXR.      Problem/Plan - 1:  ·  Problem: Syncope and collapse.  Plan: -probably due to trifasicular block, spoke with EP, considering ILR vs PPM   - Last echo in 8/2017 showing normal LV function with mild diastolic dysfunction  - cardiac enzymes negative   - telemetry monitoring   - CT head negative, did not tolerate MRA head/neck   - orthostatics negative    - Loop recorder inserted 10/25 to monitor for any heart block requiring pacemaker insertion  - CTA of chest negative for pulmonary embolism  - EEG negative.      Problem/Plan - 2:  ·  Problem: Essential hypertension.  Plan: Patient's BP currently at goal for patient's age.   Continue home meds, hold Metoprolol d/t trifasicular block.      Problem/Plan - 3:  ·  Problem: Acquired hypothyroidism.  Plan: Will continue with liothyronine.      Problem/Plan - 4:  ·  Problem: Chronic diastolic heart failure.  Plan: Currently euvolemic, continue home Lasix.      Problem/Plan - 5:  ·  Problem: Hepatitis C virus infection, unspecified chronicity.  Plan: Pt's daughter states patient was treated in the past, unknown when pt was diagnosed. Currently without detectable viral load.      Problem/Plan - 6:  Problem: Osteoarthritis involving multiple joints on both sides of body. Plan: Patient has OA at baseline affecting the knees, currently without complaints.     Problem/Plan - 7:  ·  Problem: Dementia without behavioral disturbance, unspecified dementia type.  Plan: Patient follows with geriatrics clinic, unspecified type of dementia. 89 y/o F with PMH including arthritis, dementia (unknown type), Hep C (s/p treatment in remission), HTN, hypothyroidism, presenting for one episode of witnessed syncope. No obvious metabolic derangements, negative CT head, negative CT angiogram of the chest, and negative CXR. Pt had no significant event son tele, cardiac enzymes were negative. Pt did not tolerate MRA head and neck, can be repeated as outpatient. EEG was negative for seizures. EKG with trifascicular block, syncope likely d/t conduction abnormality. EP was consulted, metoprolol was discontinued. Pt and daughter were not interested on a PPM at this time. Pt had internal loop recorder placed and will f/up w/ EP as outpatient.

## 2017-10-24 NOTE — DISCHARGE NOTE ADULT - CARE PLAN
Principal Discharge DX:	Syncope and collapse  Goal:	no reoccurrence of syncope  Instructions for follow-up, activity and diet:	Fainting usually is caused by fear, stress, pain, standing too long, over tired, overheated, going to bathroom, or coughing  Blood pressure can drop if you do not drink enough, blood pressure medication, alcohol, bleeding,  Consult with your doctor about driving  Avoid activity or condition that causes your syncope  Lay down with your feet up when you feel like you might faint  follow up with EP Dr. Vito Millan with loop recorder status and reading  Secondary Diagnosis:	Acquired hypothyroidism  Instructions for follow-up, activity and diet:	you do not make enough thyroid hormone  signs & symptoms of low levels of thyroid hormone - tired, getting cold easily, coarse or thin hair, constipation, shortness of breath, swelling, irregular periods  your doctor will do thyroid hormone blood tests at least once a year to monitor if medication dose is adequate  take your thyroid medicine as directed by your doctor & on empty stomach  Your TSH level is 1.12 this admission  Secondary Diagnosis:	Chronic diastolic heart failure  Instructions for follow-up, activity and diet:	Weigh yourself daily.  If you gain 3lbs in 3 days, or 5lbs in a week call your Health Care Provider.  Do not eat or drink foods containing more than 2000mg of salt (sodium) in your diet every day.  Call your Health Care Provider if you have any swelling or increased swelling in your feet, ankles, and/or stomach.  Take all of your medication as directed.  If you become dizzy call your Health Care Provider.

## 2017-10-24 NOTE — DISCHARGE NOTE ADULT - MEDICATION SUMMARY - MEDICATIONS TO STOP TAKING
I will STOP taking the medications listed below when I get home from the hospital:    potassium chloride 10 mEq oral tablet, extended release  -- 2 tab(s) by mouth once a day    metoprolol tartrate 25 mg oral tablet  -- 2 tab(s) by mouth 2 times a day

## 2017-10-24 NOTE — CONSULT NOTE ADULT - SUBJECTIVE AND OBJECTIVE BOX
Patient seen and evaluated @ 12pm on 2 DSU  Chief Complaint: Syncope    HPI:  89 y/o F with PMH including arthritis, dementia (unknown type), Hep C (s/p treatment in remission), HTN, HFpEF, hypothyroidism presenting for witnessed collapse at home today. Patient's daughter is at bedside. The daughter states the patient was eating breakfast around 10:45AM and was doing arm exercises with her right arm, when she started complaining of dizziness. Pt was at a sitting position, with her HHA sitting next to her, when pt suddenly slumped over and appeared to lose consciousness for about 2 minutes. Pt did not fall to the ground or sustain any head trauma. Pt was unresponsive and had lost bowel incontinence, however, did not have any diaphoresis, tonic clonic movements, chest pain, headache, or aura prior to the collapse. Pt states that she remembers very little of the episode. Pt is also complaining of shortness of breath for the past year, however was told by her cardiologist that she only had diastolic dysfunction and did not qualify for home O2 as her saturation has always remained above 90%. Pt states that she usually takes her time rising from sitting position to standing due to her arthritis. This type of episode has happened once before about 3-4 years ago, where patient was hospitalized, however nothing significant was found on labs or imaging.  Patient states she gets short of breath just from walking around the kitchen and complains of a "tired heart." Pt's daughter states that pt usually eats around 3 meals a day, however does not drink as much fluids as she should. Patient otherwise denies any recent fevers, chills, nausea, vomiting, palpitations, chest pain, abdominal pain, dysuria, or leg swelling. Patient denies any sick contacts or recent travel.     Briefly discussed patient's code status with patient's daughter. Patient remains full code and patient's son is health care proxy.     In the ER, patient's vitals were temp 97.7, HR 70-75, 132-149/66-85, RR 18, satting 100% on room air  Patient did not receive any medications or fluids in the ER. (23 Oct 2017 19:12)    PMH:   Dementia without behavioral disturbance, unspecified dementia type  Hepatitis C  Hypertension  Arthritis  Hypothyroidism  Hepatitis C  Hypertension    PSH:   No significant past surgical history    Medications:   amLODIPine   Tablet 10 milliGRAM(s) Oral daily  furosemide    Tablet 20 milliGRAM(s) Oral daily  heparin  Injectable 5000 Unit(s) SubCutaneous every 12 hours  liothyronine 5 MICROGram(s) Oral daily  lisinopril 10 milliGRAM(s) Oral daily    Allergies:  No Known Allergies    FAMILY HISTORY:  No pertinent family history in first degree relatives    Social History:    Smoking: never a smoker  Alcohol: drinks hard liquor    Review of Systems:    Constitutional: No fever, chills, fatigue, or changes in weight  HEENT: No blurry vision, eye pain, headache, runny nose, or sore throat  Respiratory: No shortness of breath, cough, or wheezing  Cardiovascular: No chest pain or palpitations  Gastrointestinal: No nausea, vomiting, diarrhea, or abdominal pain  Genitourinary: No dysuria or incontinence  Extremities: No lower extremity swelling  Neurologic: No focal findings  Lymphatic: No lymphadenopathy   Skin: No rash  Psychiatry: No anxiety or depression  10 point review of systems is otherwise negative except as mentioned above            Physical Exam:  T(C): 36.9 (10-24-17 @ 04:50), Max: 36.9 (10-24-17 @ 04:50)  HR: 78 (10-24-17 @ 04:50) (71 - 78)  BP: 146/84 (10-24-17 @ 04:50) (137/83 - 151/89)  RR: 18 (10-24-17 @ 04:50) (18 - 18)  SpO2: 97% (10-24-17 @ 04:50) (97% - 99%)  Wt(kg): --    10-23 @ 07:01  -  10-24 @ 07:00  --------------------------------------------------------  IN: 1120 mL / OUT: 0 mL / NET: 1120 mL    10-24 @ 07:01  -  10-24 @ 12:46  --------------------------------------------------------  IN: 360 mL / OUT: 200 mL / NET: 160 mL      Daily Height in cm: 165.1 (23 Oct 2017 21:01)    Daily Weight in k.9 (24 Oct 2017 09:33)    Appearance: Normal, well groomed, NAD  Eyes: PERRLA, EOMI, pink conjunctiva, no scleral icterus   HENT: Normal oral mucosa  Cardiovascular: RRR, S1, S2, no murmur, rub, or gallop; no edema; no JVD  Respiratory: Clear to auscultation bilaterally  Gastrointestinal: Soft, non-tender, non-distended, BS+  Musculoskeletal: No clubbing or joint deformity   Neurologic: No focal weakness  Lymphatic: No lymphadenopathy  Psychiatry: AAOx3 with appropriate mood and affect  Skin: No rashes, ecchymoses, or cyanosis    Cardiovascular Diagnostic Testing:    ECG: sinus with trifascicular block    Echo: 8/3/2017, normal LV function, no pulmonary hypertension, normal LA size, mild mitral regurgitation LVEF 68%. NywopGgvzssc90Lxc YjqilYxxgazt75Xqpfo XylkbXckzfcr15Ufq LljggUtvjdrv98Wjyjd WolwkThhuuli22Tdp BrfwkVknofuf05Zzkxv TspxxSllcvqg85Pvj UwfyfItmsnhv17Oaeou LbpheXguticq77Oxm BaxgjNyronib9Sbjbo TcgosOkdqwvz0Bvr SxvslJyjqvhg09Vakhn XfjadMgmrdkv03Cim VovugFbwwprk49Fukcx RvkhrQjqkurn97Gia YwkeyMytzyyq30Yswmd MavugOybyzll85Gls VaajlEytxcbl87Bnfoi YhuwlOtjscix17Yqp FcskkCvvusoe86Rilcp TwkatYsknhfm97Fsu WkaseHsblwba82Qyzqb CfsneCshlfrl61Kvp FryxtEjkrczs37Arkne HcwbySaxzevr48Erq AuwadUbfhvpv26Lyawd FgywpJjvhueb00Inc ObwdeFfswbes30Edyhf LgqdtDrtgwtc41Eop ZyrdoJdnadrg00Zenxv VivypUxeebtd44Lpm DhnttIhckyoh85Ycryl MapasRbylspa94Hoh WjtmbvwlefVeqxsfzt966g0nr-ck91-638y-w848-l5k693s64373ZokhFuh      Interpretation of Telemetry: sinus with trifascicular block    Labs:                        15.0   7.1   )-----------( 267      ( 24 Oct 2017 10:39 )             43.4     10-24    132<L>  |  96  |  14  ----------------------------<  93  4.5   |  21<L>  |  0.60    Ca    10.3      24 Oct 2017 10:39  Phos  4.1     10-24  Mg     2.1     10-24    TPro  7.2  /  Alb  3.9  /  TBili  0.4  /  DBili  x   /  AST  27  /  ALT  19  /  AlkPhos  83  10-24      CARDIAC MARKERS ( 23 Oct 2017 20:14 )  x     / <0.01 ng/mL / 63 U/L / x     / 2.4 ng/mL  CARDIAC MARKERS ( 23 Oct 2017 12:47 )  x     / <0.01 ng/mL / 62 U/L / x     / 2.7 ng/mL      Serum Pro-Brain Natriuretic Peptide: 74 pg/mL (10-23 @ 12:47)        Thyroid Stimulating Hormone, Serum: 1.51 uIU/mL (10-24 @ 07:32)

## 2017-10-24 NOTE — DISCHARGE NOTE ADULT - CARE PROVIDERS DIRECT ADDRESSES
,[email or web address]

## 2017-10-24 NOTE — CONSULT NOTE ADULT - ASSESSMENT
89F with PMH of arthritis, dementia, Hep C (s/p treatment in remission), HTN, HFpEF (EF 65%), hypothyroidism presenting for syncope at home on 10/23 with trifascicular block.     -- will discuss PPM with EP  -- continue tele  -- replete K>4, Mg>2    Kana Puga MD 89F with PMH of arthritis, dementia, Hep C (s/p treatment in remission), HTN, HFpEF (EF 65%), hypothyroidism presenting for syncope at home on 10/23 with trifascicular block.     -- will assess for carotid hypersensitivty  -- continue tele  -- replete K>4, Mg>2  -- will consider ILR vs EP study vs PPM    Kana Puga MD

## 2017-10-24 NOTE — CONSULT NOTE ADULT - ASSESSMENT
89 year old female with PMH of arthritis, dementia, Hep C (s/p treatment in remission), HTN, HFpEF, hypothyroidism currently admitted for syncope. As per daughter at bedside, patient was eating breakfast around 10:45 am and started doing exercises with her left arm when all of a sudden patient stated she felt dizzy and slumped over onto the HHA. Patient lost consciousness for about 2 minutes. Denies head trauma, seizure like activity, urinary incontinence. Patient has baseline dementia and does not recall what happened. At baseline, she ambulates with walker and gets assistance with activities of daily living.  Neuro exam nonfocal and CT head negative for acute pathology.    Rule out subclavian steal as cause of syncope    Recommend:  MRA head without contrast  MRA neck with contrast  Continue medical work up for syncope

## 2017-10-24 NOTE — EEG REPORT - NS EEG TEXT BOX
History:  h/o dementia, Hep C,  HTN    p/w dizziness LOC    Medication	  No Data.	    Study Interpretation:    FINDINGS:  The background was continuous, spontaneously variable and reactive.  During wakefulness, the posteriorly dominant rhythm consisted of 10 Hz activity, with an amplitude to 30 uV, that attenuated to eye opening.  Low amplitude central beta was noted in wakefulness.    Background Slowing:  Generalized slowing: none was present.  Focal slowing: none was present.    Sleep Background:  Drowsiness was characterized by fragmentation, attenuation, and slowing of the background activity.    Sleep was characterized by the presence of vertex waves, symmetric spindles, and K-complexes.    Epileptiform Activity:   No epileptiform discharges were present.    Events:  No clinical events were recorded.  No seizures were recorded.    Activation Procedures:   -Hyperventilation was not performed.    -Photic stimulation was not performed.    Artifacts:  Intermittent myogenic and movement artifacts were noted.    ECG:  The heart rate on single channel ECG was predominantly between 75-85 BPM.      EEG Classification / Summary:  Normal EEG study     Clinical Impression:  There were no epileptiform abnormalities recorded.      Barbara Alarcon MD    ________________________________________  Fellow in Neurophysiology/Epilepsy, [facility]      Endy Awan M.D.			    Attending Physician, [facility] [place]

## 2017-10-24 NOTE — PROGRESS NOTE ADULT - PROBLEM SELECTOR PLAN 1
-spoke w/ Dr Leija regarding trifasicular block, will call EP as patient might benefit from a PPM   - Last echo in 8/2017 showing normal LV function with mild diastolic dysfunction  - cardiac enzymes negative   - telemetry and monitor for arrhythmias  - CT head negative, ordered MRA head and neck   - orthostatics negative  - CTA of chest negative for pulmonary embolism  - outpatient EEG given episode of fecal incontinence during syncope -spoke w/ Dr Leija regarding trifasicular block, will call EP as patient might benefit from a PPM, hold beta-blocker  - Last echo in 8/2017 showing normal LV function with mild diastolic dysfunction  - cardiac enzymes negative   - telemetry and monitor for arrhythmias  - CT head negative, ordered MRA head and neck   - orthostatics negative  - CTA of chest negative for pulmonary embolism  - outpatient EEG given episode of fecal incontinence during syncope

## 2017-10-24 NOTE — DISCHARGE NOTE ADULT - REASON FOR ADMISSION
Syncope Syncope - diagnosed trifascicular block requiring loop recorder - no pacemaker at this time as per daughter's decision

## 2017-10-24 NOTE — PROGRESS NOTE ADULT - ASSESSMENT
89 y/o F with PMH including arthritis, dementia (unknown type), Hep C (s/p treatment in remission), HTN, hypothyroidism presenting for one episode of witnessed syncope today found to have no obvious metabolic derangements, negative CT head, negative CT angiogram of the chest, and negative CXR.

## 2017-10-24 NOTE — PROGRESS NOTE ADULT - PROBLEM SELECTOR PLAN 2
Patient's BP currently at goal for patient's age.   Continue home meds, restart Lasix Patient's BP currently at goal for patient's age.   Continue home meds, hold Metoprolol

## 2017-10-24 NOTE — DISCHARGE NOTE ADULT - PLAN OF CARE
no reoccurrence of syncope Fainting usually is caused by fear, stress, pain, standing too long, over tired, overheated, going to bathroom, or coughing  Blood pressure can drop if you do not drink enough, blood pressure medication, alcohol, bleeding,  Consult with your doctor about driving  Avoid activity or condition that causes your syncope  Lay down with your feet up when you feel like you might faint  follow up with EP Dr. Vito Millan with loop recorder status and reading you do not make enough thyroid hormone  signs & symptoms of low levels of thyroid hormone - tired, getting cold easily, coarse or thin hair, constipation, shortness of breath, swelling, irregular periods  your doctor will do thyroid hormone blood tests at least once a year to monitor if medication dose is adequate  take your thyroid medicine as directed by your doctor & on empty stomach  Your TSH level is 1.12 this admission Weigh yourself daily.  If you gain 3lbs in 3 days, or 5lbs in a week call your Health Care Provider.  Do not eat or drink foods containing more than 2000mg of salt (sodium) in your diet every day.  Call your Health Care Provider if you have any swelling or increased swelling in your feet, ankles, and/or stomach.  Take all of your medication as directed.  If you become dizzy call your Health Care Provider.

## 2017-10-24 NOTE — CONSULT NOTE ADULT - SUBJECTIVE AND OBJECTIVE BOX
Patient seen and evaluated @ 10:40 A  Chief Complaint: syncope    HPI:  89F with PMH of arthritis, dementia, Hep C (s/p treatment in remission), HTN, HFpEF (EF 65%), hypothyroidism presenting for syncope at home on 10/23. Patient does not remember the event. The daughter states the patient was eating breakfast around 10:45AM and was doing arm exercises with her right arm, when she started complaining of dizziness. Pt was at a sitting position, with her HHA sitting next to her, when pt suddenly slumped over and appeared to lose consciousness for about 2 minutes. Pt did not fall to the ground or sustain any head trauma. Pt was unresponsive for 2 minutes and had bowel incontinence, however, did not have any diaphoresis, tonic clonic movements, chest pain, headache, or aura prior to the collapse. Pt states that she remembers very little of the episode. Pt is also complaining of shortness of breath for the past year, however was told by her cardiologist that she only had diastolic dysfunction and did not qualify for home O2 as her saturation has always remained above 90%. Pt states that she usually takes her time rising from sitting position to standing due to her arthritis. This type of episode has happened once before about 3-4 years ago, where patient was hospitalized, however nothing significant was found on labs or imaging.  Patient states she gets short of breath just from walking around the kitchen and complains of a "tired heart." Pt's daughter states that pt usually eats around 3 meals a day, however does not drink as much fluids as she should. Patient otherwise denies any recent fevers, chills, nausea, vomiting, palpitations, chest pain, abdominal pain, dysuria, or leg swelling. Patient denies any sick contacts or recent travel.     Briefly discussed patient's code status with patient's daughter. Patient remains full code and patient's son is health care proxy.     Upon my evaluation, patient ambulating slowly. Does not recall events.    PMH:   Dementia without behavioral disturbance, unspecified dementia type  Hepatitis C  Hypertension  Arthritis  Hypothyroidism  Hepatitis C  Hypertension    PSH:   No significant past surgical history    Medications:   amLODIPine   Tablet 10 milliGRAM(s) Oral daily  furosemide    Tablet 20 milliGRAM(s) Oral daily  heparin  Injectable 5000 Unit(s) SubCutaneous every 12 hours  liothyronine 5 MICROGram(s) Oral daily  lisinopril 10 milliGRAM(s) Oral daily  metoprolol 50 milliGRAM(s) Oral two times a day    Allergies:  No Known Allergies    FAMILY HISTORY:  No pertinent family history in first degree relatives    Social History:  No tobacco use, rarely EtOH    Review of Systems:  Constitutional: [ ] Fever [ ] Chills [ ] Fatigue [ ] Weight change   HEENT: [ ] Blurred vision [ ] Eye Pain [ ] Headache [ ] Runny nose [ ] Sore Throat   Respiratory: [ ] Cough [ ] Wheezing [ ] Shortness of breath  Cardiovascular: [ ] Chest Pain [ ] Palpitations [ ] GARRIDO [ ] PND [ ] Orthopnea  Gastrointestinal: [ ] Abdominal Pain [ ] Diarrhea [ ] Constipation [ ] Hemorrhoids [ ] Nausea [ ] Vomiting  Genitourinary: [ ] Nocturia [ ] Dysuria [ ] Incontinence  Extremities: [ ] Swelling [ ] Joint Pain  Neurologic: [ ] Focal deficit [ ] Paresthesias [ ] Syncope  Lymphatic: [ ] Swelling [ ] Lymphadenopathy   Skin: [ ] Rash [ ] Ecchymoses [ ] Wounds [ ] Lesions  Psychiatry: [ ] Depression [ ] Suicidal/Homicidal Ideation [ ] Anxiety [ ] Sleep Disturbances  [ ] 10 point review of systems is otherwise negative except as mentioned above            [ ]Unable to obtain    Physical Exam:  T(C): 36.9 (10-24-17 @ 04:50), Max: 36.9 (10-24-17 @ 04:50)  HR: 78 (10-24-17 @ 04:50) (70 - 78)  BP: 146/84 (10-24-17 @ 04:50) (132/85 - 151/89)  RR: 18 (10-24-17 @ 04:50) (18 - 18)  SpO2: 97% (10-24-17 @ 04:50) (97% - 100%)  Wt(kg): --    10-23 @ 07:01  -  10-24 @ 07:00  --------------------------------------------------------  IN: 1120 mL / OUT: 0 mL / NET: 1120 mL      Daily Height in cm: 165.1 (23 Oct 2017 21:01)    Daily Weight in k.9 (24 Oct 2017 09:33)    Appearance: NAD  Eyes: PERRL, EOMI  HENT: Normal oral [place], NC/AT  Cardiovascular: normal S1 and S2, RRR, no m/r/g, no edema, normal JVP  Procedural Access Site:  No hematoma, non-tender to palpation, 2+ pulses distally, no bruit, no ecchymosis  Respiratory: Clear to auscultation bilaterally  Gastrointestinal: Soft, non-tender, non-distended, BS+  Musculoskeletal: No clubbing, no joint deformity   Neurologic: Non-focal  Lymphatic: No lymphadenopathy  Psychiatry: AAOx3, mood & affect appropriate  Skin: No rashes, no ecchymoses, no cyanosis    Cardiovascular Diagnostic Testing:  ECG: sinus 74 bpm, 1st degree AVB, RBBB, LAFB    Echo 2014  EF 65%  Conclusions:  1. Normal mitral valve. Mild mitral regurgitation.  2. Normal trileaflet aortic valve. Mild aortic  regurgitation.  3. Mildly dilated left atrium.  LA volume index = 29 cc/m2.  4. Normal left ventricular systolic function. No segmental  wall motion abnormalities. Mild diastolic dysfunction  (Stage I).  5. Estimated right ventricular systolic pressure equals 28  mm Hg, assuming right atrial pressure equals 10 mm Hg,  consistent with normal pulmonary pressures.  6. Normal tricuspid valve. Minimal tricuspid regurgitation.  7. Echo-free spaces are noted in the liver consisitent with  possible cysts, however, dedicated imaging recommended for  further evaluation if clinically indicated.    Stress Testing:  IMPRESSIONS:Normal Study  * Chest Pain: No chest pain with administration of  Regadenoson.  * Symptom: dizziness, palpitations.  * HR Response: Appropriate.  * BP Response: Hypotensive Response, excessive decrease in  systolic/diastolic BP.  * Heart Rhythm: Sinus Rhythm - 61 BPM.  * Conduction defects: RBBB + LAFB, first degree AV block.  * Baseline ECG: Nonspecific ST-T wave abnormality.  * ECG Changes: No ischemic ST segment changes.  * Arrhythmia: Patient developed bradycardia and  high-degree AV block following regadenoson infusion.  Conduction returned to baseline following therapy with  aminiphylline and atropine 1 mg IV.  * Review of raw data shows: The study is of good technical  quality.  * The left ventricle was normal in size. There  meddium-sized, moderate defect in the periapical wall that  is fixed, corrects with prone imaging and demonstrates  normal wall motion suggestive of breast/soft tissue  attenuation artifact.  * Post-stress gated wall motion analysis was performed  (LVEF > 70%;LVEDV = 52 ml.), revealing normal LV function.    Cath:  none    Interpretation of Telemetry:  sinus    Imaging:  CT Chest no PE    CT Head with microvascular disease    Labs:                        14.5   5.8   )-----------( 292      ( 23 Oct 2017 12:47 )             41.9     10-23    132<L>  |  94<L>  |  20  ----------------------------<  103<H>  4.5   |  23  |  0.80    Ca    10.1      23 Oct 2017 12:47  Phos  2.9     10-23  Mg     2.2     10-23    TPro  7.5  /  Alb  4.1  /  TBili  0.3  /  DBili  x   /  AST  26  /  ALT  22  /  AlkPhos  84  10-23      CARDIAC MARKERS ( 23 Oct 2017 20:14 )  x     / <0.01 ng/mL / 63 U/L / x     / 2.4 ng/mL  CARDIAC MARKERS ( 23 Oct 2017 12:47 )  x     / <0.01 ng/mL / 62 U/L / x     / 2.7 ng/mL      Serum Pro-Brain Natriuretic Peptide: 74 pg/mL (10-23 @ 12:47)        Thyroid Stimulating Hormone, Serum: 1.51 uIU/mL (10-24 @ 07:32)

## 2017-10-24 NOTE — CONSULT NOTE ADULT - SUBJECTIVE AND OBJECTIVE BOX
Neurology Consult    Reason for consult: Syncope, rule out subclavian steal    HPI: Patient is a 89 year old female currently admitted for syncope. Patient has PMH of arthritis, dementia, Hep C (s/p treatment in remission), HTN, HFpEF, hypothyroidism. As per daughter at bedside, patient was eating breakfast around 10:45 am and started doing exercises with her left arm when all of a sudden patient stated she felt dizzy and slumped over onto the HHA. Patient lost consciousness for about 2 minutes. Denies head trauma, seizure like activity, urinary incontinence. Patient has baseline dementia and does not recall what happened. At baseline, she ambulates with walker and gets assistance with activities of daily living.    REVIEW OF SYSTEMS:  Constitutional: No fever, chills, fatigue, weakness.  Eyes: No eye pain, visual disturbances, or discharge.  ENT:  No difficulty hearing, tinnitus, vertigo. No sinus or throat pain.  Neck: No pain or stiffness.  Respiratory: No cough, dyspnea, wheezing.  Cardiovascular: No chest pain, palpitations.  Gastrointestinal: No abdominal pain. No nausea, vomiting, diarrhea, or constipation.   Genitourinary: No dysuria, frequency, hematuria or incontinence.  Neurological: No headaches, lightheadedness, vertigo, numbness or tremors.  Psychiatric: No depression, anxiety, mood swings, or difficulty sleeping.  Musculoskeletal: No joint pain or swelling. No muscle, back, or extremity pain.  Skin: No itching, burning, rashes or lesions.   Lymph Nodes: No enlarged glands  Endocrine: No heat or cold intolerance, no hair loss.  Allergy and Immunologic: No hives or eczema.    MEDICATIONS  amLODIPine   Tablet 10 milliGRAM(s) Oral daily  furosemide    Tablet 20 milliGRAM(s) Oral daily  heparin  Injectable 5000 Unit(s) SubCutaneous every 12 hours  liothyronine 5 MICROGram(s) Oral daily  lisinopril 10 milliGRAM(s) Oral daily  metoprolol 50 milliGRAM(s) Oral two times a day  sodium chloride 0.9%. 1000 milliLiter(s) IV Continuous <Continuous>    PMH: Dementia without behavioral disturbance, unspecified dementia type  Hepatitis C  Hypertension  Arthritis  Hypothyroidism     PSH: No significant past surgical history    FAMILY HISTORY:  No pertinent family history in first degree relatives  No history of dementia, strokes, or seizures     SOCIAL HISTORY:  No history of tobacco or alcohol use     Allergies  No Known Allergies    Vital Signs Last 24 Hrs  T(C): 36.4 (23 Oct 2017 21:01), Max: 36.5 (23 Oct 2017 12:21)  T(F): 97.5 (23 Oct 2017 21:01), Max: 97.7 (23 Oct 2017 12:21)  HR: 75 (23 Oct 2017 21:01) (70 - 76)  BP: 151/89 (23 Oct 2017 21:01) (132/85 - 151/89)  RR: 18 (23 Oct 2017 21:01) (18 - 18)  SpO2: 99% (23 Oct 2017 21:01) (97% - 100%)    Neurological Examination:    Mental Status: Patient is alert, awake, oriented only to self. Does not know the year or where she is at. Knows date of birth. Patient is fluent, no dysarthria, no aphasia. Follows commands well and able to answer questions appropriately. Mood and affect normal. Able to name objects.    Cranial Nerves: PERRL, EOMI, visual field intact, V1-V3 intact, no gross facial asymmetry, tongue/uvula midline    Motor Exam: No drift  Right upper extremity: 5/5  Left upper extremity: 5/5  Right lower extremity: 5/5  Left lower extremity: 5/5    Normal bulk/tone    Sensory: Intact to light touch bilaterally    Coordination: Finger to nose intact bilaterally     Reflexes: 1+ Biceps, 1+ Brachial, 1+ Patellar, 0 Ankle    GENERAL: No acute distress  HEENT:  Normocephalic, atraumatic  EXTREMITIES: No edema, clubbing, cyanosis  MUSCULOSKELETAL: Normal range of motion  SKIN: No rashes    LABS:  CBC Full  -  ( 23 Oct 2017 12:47 )  WBC Count : 5.8 K/uL  Hemoglobin : 14.5 g/dL  Hematocrit : 41.9 %  Platelet Count - Automated : 292 K/uL  Mean Cell Volume : 96.9 fl  Mean Cell Hemoglobin : 33.5 pg  Mean Cell Hemoglobin Concentration : 34.6 gm/dL  Auto Neutrophil # : 3.5 K/uL  Auto Lymphocyte # : 1.3 K/uL  Auto Monocyte # : 0.8 K/uL  Auto Eosinophil # : 0.1 K/uL  Auto Basophil # : 0.0 K/uL  Auto Neutrophil % : 61.0 %  Auto Lymphocyte % : 23.2 %  Auto Monocyte % : 13.3 %  Auto Eosinophil % : 2.2 %  Auto Basophil % : 0.4 %    Urinalysis Basic - ( 23 Oct 2017 19:26 )    Color: Colorless / Appearance: Clear / S.024 / pH: x  Gluc: x / Ketone: Negative  / Bili: Negative / Urobili: Negative   Blood: x / Protein: Negative / Nitrite: Negative   Leuk Esterase: Negative / RBC: 0-2 /HPF / WBC 0-2 /HPF   Sq Epi: x / Non Sq Epi: x / Bacteria: x    10-23    132<L>  |  94<L>  |  20  ----------------------------<  103<H>  4.5   |  23  |  0.80    Ca    10.1      23 Oct 2017 12:47  Phos  2.9     10-23  Mg     2.2     10-23    TPro  7.5  /  Alb  4.1  /  TBili  0.3  /  DBili  x   /  AST  26  /  ALT  22  /  AlkPhos  84  10    LIVER FUNCTIONS - ( 23 Oct 2017 12:47 )  Alb: 4.1 g/dL / Pro: 7.5 g/dL / ALK PHOS: 84 U/L / ALT: 22 U/L RC / AST: 26 U/L / GGT: x           RADIOLOGY:  CT head: No acute intracranial hemorrhage, mass effect, or evidence of acute territorial infarct. Extensive white matter microvascular ischemic disease.

## 2017-10-24 NOTE — DISCHARGE NOTE ADULT - HOME CARE AGENCY
[facility]- visiting nurse, PT, A. [phone]. They will call you to resume services, requested for 10/26.

## 2017-10-24 NOTE — DISCHARGE NOTE ADULT - ADDITIONAL INSTRUCTIONS
follow up with primary care physician within one week after discharge  follow up with EP Dr. Vito Millan as directed  follow up with neurology dr. Nissenbaum as directed

## 2017-10-24 NOTE — DISCHARGE NOTE ADULT - MEDICATION SUMMARY - MEDICATIONS TO TAKE
I will START or STAY ON the medications listed below when I get home from the hospital:    Tylenol Arthritis Caplet 650 mg oral tablet, extended release  -- 2 tab(s) by mouth , As Needed pain  -- Indication: For pain management    lisinopril 10 mg oral tablet  -- 1 tab(s) by mouth once a day  -- Indication: For Hypertension    amLODIPine 10 mg oral tablet  -- 1 tab(s) by mouth once a day  -- Indication: For Hypertension    furosemide 20 mg oral tablet  -- 1 tab(s) by mouth once a day  -- Indication: For Diuretic    liothyronine 5 mcg oral tablet  -- 1 tab(s) by mouth once a day  -- Indication: For Hypothyroid

## 2017-10-24 NOTE — DISCHARGE NOTE ADULT - CARE PROVIDER_API CALL
Nissenbaum, Michael A (MD), Neurology  [address] [facility] [address]  [place] [address]  Phone: [phone]  Fax: [phone]    Vito Millan), Cardiac Electrophysiology; Cardiovascular Disease  [address], [place] [id number]  Phone: [phone]  Fax: [phone]

## 2017-10-24 NOTE — CONSULT NOTE ADULT - ATTENDING COMMENTS
Seen and examined on rounds.  She suffered from an episode of syncope while cleaning a table in her kitchen without a clear aura, no convulsions.  This was associated with fecal incontinence and possible staring after the episode.  Exam is at baseline at this time.  In the setting of an underlying dementia, will check an outpatient EEG to rule out a focal seizure.    Can f/u [phone].

## 2017-10-24 NOTE — CONSULT NOTE ADULT - ASSESSMENT
89 year-old woman with mild cognitive impairment presents with witness syncopal episode. Patient felt prodrome of dizziness prior to syncope.  She takes a beta-blocker as outpatient and has preexisting trifascicular block.    Follow-up EP evaluation for ILR vs. EPS vs. ppm.    Recommend holding beta-blocker.    Case discussed with Hospitalist.  Case discussed with daughter at bedside.

## 2017-10-25 VITALS
TEMPERATURE: 98 F | OXYGEN SATURATION: 96 % | RESPIRATION RATE: 18 BRPM | SYSTOLIC BLOOD PRESSURE: 151 MMHG | HEART RATE: 69 BPM | DIASTOLIC BLOOD PRESSURE: 79 MMHG

## 2017-10-25 LAB
ANION GAP SERPL CALC-SCNC: 14 MMOL/L — SIGNIFICANT CHANGE UP (ref 5–17)
BUN SERPL-MCNC: 19 MG/DL — SIGNIFICANT CHANGE UP (ref 7–23)
CALCIUM SERPL-MCNC: 9.4 MG/DL — SIGNIFICANT CHANGE UP (ref 8.4–10.5)
CHLORIDE SERPL-SCNC: 95 MMOL/L — LOW (ref 96–108)
CO2 SERPL-SCNC: 23 MMOL/L — SIGNIFICANT CHANGE UP (ref 22–31)
CREAT SERPL-MCNC: 0.64 MG/DL — SIGNIFICANT CHANGE UP (ref 0.5–1.3)
GLUCOSE SERPL-MCNC: 94 MG/DL — SIGNIFICANT CHANGE UP (ref 70–99)
HCT VFR BLD CALC: 38.3 % — SIGNIFICANT CHANGE UP (ref 34.5–45)
HGB BLD-MCNC: 13.1 G/DL — SIGNIFICANT CHANGE UP (ref 11.5–15.5)
MCHC RBC-ENTMCNC: 31.3 PG — SIGNIFICANT CHANGE UP (ref 27–34)
MCHC RBC-ENTMCNC: 34.2 GM/DL — SIGNIFICANT CHANGE UP (ref 32–36)
MCV RBC AUTO: 91.4 FL — SIGNIFICANT CHANGE UP (ref 80–100)
PLATELET # BLD AUTO: 265 K/UL — SIGNIFICANT CHANGE UP (ref 150–400)
POTASSIUM SERPL-MCNC: 4.1 MMOL/L — SIGNIFICANT CHANGE UP (ref 3.5–5.3)
POTASSIUM SERPL-SCNC: 4.1 MMOL/L — SIGNIFICANT CHANGE UP (ref 3.5–5.3)
RBC # BLD: 4.19 M/UL — SIGNIFICANT CHANGE UP (ref 3.8–5.2)
RBC # FLD: 14.4 % — SIGNIFICANT CHANGE UP (ref 10.3–14.5)
SODIUM SERPL-SCNC: 132 MMOL/L — LOW (ref 135–145)
WBC # BLD: 4.8 K/UL — SIGNIFICANT CHANGE UP (ref 3.8–10.5)
WBC # FLD AUTO: 4.8 K/UL — SIGNIFICANT CHANGE UP (ref 3.8–10.5)

## 2017-10-25 PROCEDURE — 33282: CPT

## 2017-10-25 PROCEDURE — 99239 HOSP IP/OBS DSCHRG MGMT >30: CPT

## 2017-10-25 PROCEDURE — 99232 SBSQ HOSP IP/OBS MODERATE 35: CPT | Mod: GC

## 2017-10-25 RX ORDER — ACETAMINOPHEN 500 MG
2 TABLET ORAL
Qty: 0 | Refills: 0 | COMMUNITY

## 2017-10-25 RX ORDER — METOPROLOL TARTRATE 50 MG
2 TABLET ORAL
Qty: 0 | Refills: 0 | COMMUNITY

## 2017-10-25 RX ORDER — POTASSIUM CHLORIDE 20 MEQ
2 PACKET (EA) ORAL
Qty: 0 | Refills: 0 | COMMUNITY

## 2017-10-25 RX ADMIN — HEPARIN SODIUM 5000 UNIT(S): 5000 INJECTION INTRAVENOUS; SUBCUTANEOUS at 06:13

## 2017-10-25 RX ADMIN — HEPARIN SODIUM 5000 UNIT(S): 5000 INJECTION INTRAVENOUS; SUBCUTANEOUS at 17:11

## 2017-10-25 RX ADMIN — LIOTHYRONINE SODIUM 5 MICROGRAM(S): 25 TABLET ORAL at 06:13

## 2017-10-25 RX ADMIN — LISINOPRIL 10 MILLIGRAM(S): 2.5 TABLET ORAL at 06:13

## 2017-10-25 RX ADMIN — Medication 20 MILLIGRAM(S): at 06:13

## 2017-10-25 RX ADMIN — AMLODIPINE BESYLATE 10 MILLIGRAM(S): 2.5 TABLET ORAL at 06:13

## 2017-10-25 NOTE — PROGRESS NOTE ADULT - PROBLEM SELECTOR PLAN 2
Patient's BP currently at goal for patient's age.   Continue home meds, hold Metoprolol d/t trifasicular block

## 2017-10-25 NOTE — PROGRESS NOTE ADULT - ASSESSMENT
89F with PMH of arthritis, dementia, Hep C (s/p treatment in remission), HTN, HFpEF (EF 65%), hypothyroidism presenting for syncope at home on 10/23 with trifascicular block.     -- continue tele  -- replete K>4, Mg>2  -- will consider ILR vs EP study vs PPM    Kana Puga MD 89F with PMH of arthritis, dementia, Hep C (s/p treatment in remission), HTN, HFpEF (EF 65%), hypothyroidism presenting for syncope at home on 10/23 with trifascicular block.     -- continue tele  -- replete K>4, Mg>2  -- For ILR today    Kana Puga MD

## 2017-10-25 NOTE — PROGRESS NOTE ADULT - PROBLEM SELECTOR PLAN 1
-probably due to trifasicular block, spoke with EP, considering ILR vs PPM   - Last echo in 8/2017 showing normal LV function with mild diastolic dysfunction  - cardiac enzymes negative   - telemetry monitoring   - CT head negative, did not tolerate MRA head/neck   - orthostatics negative  - CTA of chest negative for pulmonary embolism  - EEG negative

## 2017-10-25 NOTE — PROGRESS NOTE ADULT - PROBLEM SELECTOR PLAN 6
Patient has OA at baseline affecting the knees, currently without complaints.
Patient has OA at baseline affecting the knees, currently without complaints.

## 2017-10-25 NOTE — PROGRESS NOTE ADULT - PROBLEM SELECTOR PLAN 5
Pt's daughter states patient was treated in the past, unknown when pt was diagnosed. Currently without detectable viral load.
Pt's daughter states patient was treated in the past, unknown when pt was diagnosed. Currently without detectable viral load.

## 2017-10-25 NOTE — PROGRESS NOTE ADULT - PROBLEM SELECTOR PLAN 7
Patient follows with geriatrics clinic, unspecified type of dementia. Will monitor mental status closely.
Patient follows with geriatrics clinic, unspecified type of dementia. Will monitor mental status closely.

## 2017-10-25 NOTE — PROGRESS NOTE ADULT - SUBJECTIVE AND OBJECTIVE BOX
Interval events:  Limited MRI due to patient unable to tolerate.  No clear diagnosis for syncope.    Review Of Systems:  Constitutional: [ ] Fever [ ] Chills [ ] Fatigue [ ] Weight change   HEENT: [ ] Blurred vision [ ] Eye Pain [ ] Headache [ ] Runny nose [ ] Sore Throat   Respiratory: [ ] Cough [ ] Wheezing [ ] Shortness of breath  Cardiovascular: [ ] Chest Pain [ ] Palpitations [ ] GARRIDO [ ] PND [ ] Orthopnea  Gastrointestinal: [ ] Abdominal Pain [ ] Diarrhea [ ] Constipation [ ] Hemorrhoids [ ] Nausea [ ] Vomiting  Genitourinary: [ ] Nocturia [ ] Dysuria [ ] Incontinence  Extremities: [ ] Swelling [ ] Joint Pain  Neurologic: [ ] Focal deficit [ ] Paresthesias [ ] Syncope  Lymphatic: [ ] Swelling [ ] Lymphadenopathy   Skin: [ ] Rash [ ] Ecchymoses [ ] Wounds [ ] Lesions  Psychiatry: [ ] Depression [ ] Suicidal/Homicidal Ideation [ ] Anxiety [ ] Sleep Disturbances  [x] 10 point review of systems is otherwise negative except as mentioned above            [ ]Unable to obtain    Medications:  amLODIPine   Tablet 10 milliGRAM(s) Oral daily  furosemide    Tablet 20 milliGRAM(s) Oral daily  heparin  Injectable 5000 Unit(s) SubCutaneous every 12 hours  liothyronine 5 MICROGram(s) Oral daily  lisinopril 10 milliGRAM(s) Oral daily    PMH/PSH/FH/SH: [ ] Unchanged  Vitals:  T(C): 36.8 (10-25-17 @ 04:34), Max: 36.9 (10-24-17 @ 13:18)  HR: 70 (10-25-17 @ 04:34) (70 - 724)  BP: 144/85 (10-25-17 @ 04:34) (123/77 - 144/85)  BP(mean): --  RR: 18 (10-25-17 @ 04:34) (18 - 18)  SpO2: 98% (10-25-17 @ 04:34) (94% - 99%)  Wt(kg): --  Daily     Daily Weight in k (25 Oct 2017 08:34)  I&O's Summary    24 Oct 2017 07:01  -  25 Oct 2017 07:00  --------------------------------------------------------  IN: 960 mL / OUT: 400 mL / NET: 560 mL    Physical Exam:  Appearance:  Normal, NAD  Eyes: PERRL, EOMI  HENT: Normal oral [place] NC/AT  Cardiovascular: S1, S2, RRR, No m/r/g appreciated, No edema, no elevation in JVP  Procedural Access Site: No hematoma, Non-tender to palpation, 2+ pulse , No bruit, No Ecchymosis  Respiratory: Clear to auscultation bilaterally  Gastrointestinal: Soft, Non-tender, Non-distended, BS+  Musculoskeletal:  No clubbing, No joint deformity   Neurologic: Non-focal  Lymphatic: No lymphadenopathy  Psychiatry: AAOx3, Mood & affect appropriate  Skin: No rashes, No ecchymoses, No cyanosis    Labs:                        13.1   4.80  )-----------( 265      ( 25 Oct 2017 07:52 )             38.3     10-    132<L>  |  95<L>  |  19  ----------------------------<  94  4.1   |  23  |  0.64    Ca    9.4      25 Oct 2017 07:26  Phos  4.1     10-  Mg     2.1     10-    TPro  7.2  /  Alb  3.9  /  TBili  0.4  /  DBili  x   /  AST  27  /  ALT  19  /  AlkPhos  83  10-    CARDIAC MARKERS ( 23 Oct 2017 20:14 )  x     / <0.01 ng/mL / 63 U/L / x     / 2.4 ng/mL  CARDIAC MARKERS ( 23 Oct 2017 12:47 )  x     / <0.01 ng/mL / 62 U/L / x     / 2.7 ng/mL    Serum Pro-Brain Natriuretic Peptide: 74 pg/mL (10-23 @ 12:47)    Cardiovascular Diagnostic Testing:  ECG: sinus 74 bpm, 1st degree AVB, RBBB, LAFB    Echo   EF 65%  Conclusions:  1. Normal mitral valve. Mild mitral regurgitation.  2. Normal trileaflet aortic valve. Mild aortic  regurgitation.  3. Mildly dilated left atrium.  LA volume index = 29 cc/m2.  4. Normal left ventricular systolic function. No segmental  wall motion abnormalities. Mild diastolic dysfunction  (Stage I).  5. Estimated right ventricular systolic pressure equals 28  mm Hg, assuming right atrial pressure equals 10 mm Hg,  consistent with normal pulmonary pressures.  6. Normal tricuspid valve. Minimal tricuspid regurgitation.  7. Echo-free spaces are noted in the liver consisitent with  possible cysts, however, dedicated imaging recommended for  further evaluation if clinically indicated.    Stress Testing:  IMPRESSIONS:Normal Study  * Chest Pain: No chest pain with administration of  Regadenoson.  * Symptom: dizziness, palpitations.  * HR Response: Appropriate.  * BP Response: Hypotensive Response, excessive decrease in  systolic/diastolic BP.  * Heart Rhythm: Sinus Rhythm - 61 BPM.  * Conduction defects: RBBB + LAFB, first degree AV block.  * Baseline ECG: Nonspecific ST-T wave abnormality.  * ECG Changes: No ischemic ST segment changes.  * Arrhythmia: Patient developed bradycardia and  high-degree AV block following regadenoson infusion.  Conduction returned to baseline following therapy with  aminiphylline and atropine 1 mg IV.  * Review of raw data shows: The study is of good technical  quality.  * The left ventricle was normal in size. There  meddium-sized, moderate defect in the periapical wall that  is fixed, corrects with prone imaging and demonstrates  normal wall motion suggestive of breast/soft tissue  attenuation artifact.  * Post-stress gated wall motion analysis was performed  (LVEF > 70%;LVEDV = 52 ml.), revealing normal LV function.    Cath:  none    Interpretation of Telemetry:  sinus    Imaging:  CT Chest no PE    CT Head with microvascular disease
Patient is a 89y old  Female who presents with a chief complaint of Syncope (24 Oct 2017 09:33)      SUBJECTIVE / OVERNIGHT EVENTS: No new complaints.     MEDICATIONS  (STANDING):  amLODIPine   Tablet 10 milliGRAM(s) Oral daily  furosemide    Tablet 20 milliGRAM(s) Oral daily  heparin  Injectable 5000 Unit(s) SubCutaneous every 12 hours  liothyronine 5 MICROGram(s) Oral daily  lisinopril 10 milliGRAM(s) Oral daily    MEDICATIONS  (PRN):      Vital Signs Last 24 Hrs  T(C): 36.8 (25 Oct 2017 04:34), Max: 36.9 (24 Oct 2017 13:18)  T(F): 98.3 (25 Oct 2017 04:34), Max: 98.5 (24 Oct 2017 13:18)  HR: 70 (25 Oct 2017 04:34) (70 - 724)  BP: 144/85 (25 Oct 2017 04:34) (123/77 - 144/85)  BP(mean): --  RR: 18 (25 Oct 2017 04:34) (18 - 18)  SpO2: 98% (25 Oct 2017 04:34) (94% - 99%)  CAPILLARY BLOOD GLUCOSE        I&O's Summary    24 Oct 2017 07:01  -  25 Oct 2017 07:00  --------------------------------------------------------  IN: 960 mL / OUT: 400 mL / NET: 560 mL    25 Oct 2017 07:01  -  25 Oct 2017 12:27  --------------------------------------------------------  IN: 240 mL / OUT: 0 mL / NET: 240 mL        PHYSICAL EXAM:  GENERAL: NAD, well-developed  HEAD:  Atraumatic, Normocephalic  EYES: EOMI, PERRLA, conjunctiva and sclera clear  NECK: Supple, No JVD  CHEST/LUNG: Clear to auscultation bilaterally; No wheeze  HEART: Regular rate and rhythm; No murmurs, rubs, or gallops  ABDOMEN: Soft, Nontender, Nondistended; Bowel sounds present  EXTREMITIES:  2+ Peripheral Pulses, No clubbing, cyanosis, or edema  PSYCH: AAOx2-3  NEUROLOGY: non-focal  SKIN: No rashes or lesions    LABS:                        13.1   4.80  )-----------( 265      ( 25 Oct 2017 07:52 )             38.3     10-25    132<L>  |  95<L>  |  19  ----------------------------<  94  4.1   |  23  |  0.64    Ca    9.4      25 Oct 2017 07:26  Phos  4.1     10-24  Mg     2.1     10-24    TPro  7.2  /  Alb  3.9  /  TBili  0.4  /  DBili  x   /  AST  27  /  ALT  19  /  AlkPhos  83  10-24      CARDIAC MARKERS ( 23 Oct 2017 20:14 )  x     / <0.01 ng/mL / 63 U/L / x     / 2.4 ng/mL  CARDIAC MARKERS ( 23 Oct 2017 12:47 )  x     / <0.01 ng/mL / 62 U/L / x     / 2.7 ng/mL      Urinalysis Basic - ( 23 Oct 2017 19:26 )    Color: Colorless / Appearance: Clear / S.024 / pH: x  Gluc: x / Ketone: Negative  / Bili: Negative / Urobili: Negative   Blood: x / Protein: Negative / Nitrite: Negative   Leuk Esterase: Negative / RBC: 0-2 /HPF / WBC 0-2 /HPF   Sq Epi: x / Non Sq Epi: x / Bacteria: x        RADIOLOGY & ADDITIONAL TESTS:    Imaging Personally Reviewed:  Tele reviewed:  1st degree AVB     Consultant(s) Notes Reviewed:      Care Discussed with Consultants/Other Providers: Spoke with EP, consider ILR
Patient is a 89y old  Female who presents with a chief complaint of Syncope (24 Oct 2017 09:33)      SUBJECTIVE / OVERNIGHT EVENTS: No new complaints.     MEDICATIONS  (STANDING):  amLODIPine   Tablet 10 milliGRAM(s) Oral daily  furosemide    Tablet 20 milliGRAM(s) Oral daily  heparin  Injectable 5000 Unit(s) SubCutaneous every 12 hours  liothyronine 5 MICROGram(s) Oral daily  lisinopril 10 milliGRAM(s) Oral daily  metoprolol 50 milliGRAM(s) Oral two times a day  sodium chloride 0.9%. 1000 milliLiter(s) (60 mL/Hr) IV Continuous <Continuous>    MEDICATIONS  (PRN):      Vital Signs Last 24 Hrs  T(C): 36.9 (24 Oct 2017 04:50), Max: 36.9 (24 Oct 2017 04:50)  T(F): 98.4 (24 Oct 2017 04:50), Max: 98.4 (24 Oct 2017 04:50)  HR: 78 (24 Oct 2017 04:50) (70 - 78)  BP: 146/84 (24 Oct 2017 04:50) (132/85 - 151/89)  BP(mean): --  RR: 18 (24 Oct 2017 04:50) (18 - 18)  SpO2: 97% (24 Oct 2017 04:50) (97% - 100%)  CAPILLARY BLOOD GLUCOSE      POCT Blood Glucose.: 105 mg/dL (23 Oct 2017 11:52)    I&O's Summary    23 Oct 2017 07:01  -  24 Oct 2017 07:00  --------------------------------------------------------  IN: 1120 mL / OUT: 0 mL / NET: 1120 mL        PHYSICAL EXAM:  GENERAL: NAD, well-developed  HEAD:  Atraumatic, Normocephalic  EYES: EOMI, PERRLA, conjunctiva and sclera clear  NECK: Supple, No JVD  CHEST/LUNG: Clear to auscultation bilaterally; No wheeze  HEART: Regular rate and rhythm; No murmurs, rubs, or gallops  ABDOMEN: Soft, Nontender, Nondistended; Bowel sounds present  EXTREMITIES:  2+ Peripheral Pulses, No clubbing, cyanosis. Trace edema  PSYCH: AAOx2-3   NEUROLOGY: non-focal  SKIN: No rashes or lesions    LABS:                        14.5   5.8   )-----------( 292      ( 23 Oct 2017 12:47 )             41.9     10-23    132<L>  |  94<L>  |  20  ----------------------------<  103<H>  4.5   |  23  |  0.80    Ca    10.1      23 Oct 2017 12:47  Phos  2.9     10-23  Mg     2.2     10-23    TPro  7.5  /  Alb  4.1  /  TBili  0.3  /  DBili  x   /  AST  26  /  ALT  22  /  AlkPhos  84  10-23      CARDIAC MARKERS ( 23 Oct 2017 20:14 )  x     / <0.01 ng/mL / 63 U/L / x     / 2.4 ng/mL  CARDIAC MARKERS ( 23 Oct 2017 12:47 )  x     / <0.01 ng/mL / 62 U/L / x     / 2.7 ng/mL      Urinalysis Basic - ( 23 Oct 2017 19:26 )    Color: Colorless / Appearance: Clear / S.024 / pH: x  Gluc: x / Ketone: Negative  / Bili: Negative / Urobili: Negative   Blood: x / Protein: Negative / Nitrite: Negative   Leuk Esterase: Negative / RBC: 0-2 /HPF / WBC 0-2 /HPF   Sq Epi: x / Non Sq Epi: x / Bacteria: x        RADIOLOGY & ADDITIONAL TESTS:    Imaging Personally Reviewed:  Tele reviewed: NSR    Consultant(s) Notes Reviewed:      Care Discussed with Consultants/Other Providers: Spoke with Dr Leija, agrees with QUITA greene

## 2017-10-25 NOTE — PROGRESS NOTE ADULT - PROBLEM SELECTOR PROBLEM 5
Hepatitis C virus infection, unspecified chronicity
Hepatitis C virus infection, unspecified chronicity

## 2017-11-02 ENCOUNTER — APPOINTMENT (OUTPATIENT)
Dept: ELECTROPHYSIOLOGY | Facility: CLINIC | Age: 82
End: 2017-11-02
Payer: MEDICARE

## 2017-11-02 VITALS
OXYGEN SATURATION: 97 % | DIASTOLIC BLOOD PRESSURE: 80 MMHG | WEIGHT: 146.6 LBS | HEIGHT: 65 IN | BODY MASS INDEX: 24.43 KG/M2 | SYSTOLIC BLOOD PRESSURE: 128 MMHG | HEART RATE: 76 BPM

## 2017-11-02 PROCEDURE — 99024 POSTOP FOLLOW-UP VISIT: CPT

## 2017-11-03 RX ORDER — LISINOPRIL 2.5 MG/1
1 TABLET ORAL
Qty: 0 | Refills: 0 | COMMUNITY

## 2017-11-03 RX ORDER — AMLODIPINE BESYLATE 2.5 MG/1
1 TABLET ORAL
Qty: 0 | Refills: 0 | COMMUNITY

## 2017-11-27 ENCOUNTER — MEDICATION RENEWAL (OUTPATIENT)
Age: 82
End: 2017-11-27

## 2017-12-04 ENCOUNTER — APPOINTMENT (OUTPATIENT)
Dept: ELECTROPHYSIOLOGY | Facility: CLINIC | Age: 82
End: 2017-12-04
Payer: MEDICARE

## 2017-12-04 PROCEDURE — 93298 REM INTERROG DEV EVAL SCRMS: CPT

## 2017-12-04 PROCEDURE — 93299: CPT

## 2017-12-19 PROCEDURE — 93005 ELECTROCARDIOGRAM TRACING: CPT

## 2017-12-19 PROCEDURE — 80048 BASIC METABOLIC PNL TOTAL CA: CPT

## 2017-12-19 PROCEDURE — 85379 FIBRIN DEGRADATION QUANT: CPT

## 2017-12-19 PROCEDURE — 95957 EEG DIGITAL ANALYSIS: CPT

## 2017-12-19 PROCEDURE — 95819 EEG AWAKE AND ASLEEP: CPT

## 2017-12-19 PROCEDURE — 85027 COMPLETE CBC AUTOMATED: CPT

## 2017-12-19 PROCEDURE — 84100 ASSAY OF PHOSPHORUS: CPT

## 2017-12-19 PROCEDURE — C1764: CPT

## 2017-12-19 PROCEDURE — 82550 ASSAY OF CK (CPK): CPT

## 2017-12-19 PROCEDURE — 83880 ASSAY OF NATRIURETIC PEPTIDE: CPT

## 2017-12-19 PROCEDURE — 70544 MR ANGIOGRAPHY HEAD W/O DYE: CPT

## 2017-12-19 PROCEDURE — 84484 ASSAY OF TROPONIN QUANT: CPT

## 2017-12-19 PROCEDURE — 84443 ASSAY THYROID STIM HORMONE: CPT

## 2017-12-19 PROCEDURE — 82553 CREATINE MB FRACTION: CPT

## 2017-12-19 PROCEDURE — 71275 CT ANGIOGRAPHY CHEST: CPT

## 2017-12-19 PROCEDURE — 71045 X-RAY EXAM CHEST 1 VIEW: CPT

## 2017-12-19 PROCEDURE — 80053 COMPREHEN METABOLIC PANEL: CPT

## 2017-12-19 PROCEDURE — 81001 URINALYSIS AUTO W/SCOPE: CPT

## 2017-12-19 PROCEDURE — 33282: CPT

## 2017-12-19 PROCEDURE — 99285 EMERGENCY DEPT VISIT HI MDM: CPT | Mod: 25

## 2017-12-19 PROCEDURE — 83735 ASSAY OF MAGNESIUM: CPT

## 2017-12-19 PROCEDURE — 82962 GLUCOSE BLOOD TEST: CPT

## 2017-12-19 PROCEDURE — 70450 CT HEAD/BRAIN W/O DYE: CPT

## 2018-01-08 ENCOUNTER — APPOINTMENT (OUTPATIENT)
Dept: ELECTROPHYSIOLOGY | Facility: CLINIC | Age: 83
End: 2018-01-08
Payer: MEDICARE

## 2018-01-08 PROCEDURE — 93299: CPT

## 2018-01-08 PROCEDURE — 93298 REM INTERROG DEV EVAL SCRMS: CPT

## 2018-01-11 ENCOUNTER — NON-APPOINTMENT (OUTPATIENT)
Age: 83
End: 2018-01-11

## 2018-01-11 ENCOUNTER — APPOINTMENT (OUTPATIENT)
Dept: CARDIOLOGY | Facility: CLINIC | Age: 83
End: 2018-01-11
Payer: MEDICARE

## 2018-01-11 VITALS
HEIGHT: 65 IN | SYSTOLIC BLOOD PRESSURE: 138 MMHG | BODY MASS INDEX: 24.99 KG/M2 | DIASTOLIC BLOOD PRESSURE: 80 MMHG | WEIGHT: 150 LBS | HEART RATE: 82 BPM

## 2018-01-11 PROCEDURE — 99214 OFFICE O/P EST MOD 30 MIN: CPT

## 2018-01-11 PROCEDURE — 93000 ELECTROCARDIOGRAM COMPLETE: CPT

## 2018-02-08 ENCOUNTER — APPOINTMENT (OUTPATIENT)
Dept: PULMONOLOGY | Facility: CLINIC | Age: 83
End: 2018-02-08
Payer: MEDICARE

## 2018-02-08 VITALS
BODY MASS INDEX: 24.99 KG/M2 | SYSTOLIC BLOOD PRESSURE: 120 MMHG | WEIGHT: 150 LBS | HEART RATE: 82 BPM | DIASTOLIC BLOOD PRESSURE: 70 MMHG | HEIGHT: 65 IN | OXYGEN SATURATION: 97 %

## 2018-02-08 DIAGNOSIS — Z81.8 FAMILY HISTORY OF OTHER MENTAL AND BEHAVIORAL DISORDERS: ICD-10-CM

## 2018-02-08 DIAGNOSIS — E66.3 OVERWEIGHT: ICD-10-CM

## 2018-02-08 DIAGNOSIS — R26.89 OTHER ABNORMALITIES OF GAIT AND MOBILITY: ICD-10-CM

## 2018-02-08 DIAGNOSIS — Z72.820 SLEEP DEPRIVATION: ICD-10-CM

## 2018-02-08 PROCEDURE — 71046 X-RAY EXAM CHEST 2 VIEWS: CPT

## 2018-02-08 PROCEDURE — 99204 OFFICE O/P NEW MOD 45 MIN: CPT | Mod: 25

## 2018-02-08 PROCEDURE — 94727 GAS DIL/WSHOT DETER LNG VOL: CPT

## 2018-02-08 PROCEDURE — 94010 BREATHING CAPACITY TEST: CPT | Mod: 59

## 2018-02-08 PROCEDURE — 94729 DIFFUSING CAPACITY: CPT

## 2018-02-08 PROCEDURE — 94618 PULMONARY STRESS TESTING: CPT

## 2018-02-08 RX ORDER — METOPROLOL TARTRATE 25 MG/1
25 TABLET, FILM COATED ORAL
Refills: 0 | Status: DISCONTINUED | COMMUNITY
Start: 2017-10-18 | End: 2018-02-08

## 2018-02-08 RX ORDER — POTASSIUM CHLORIDE 750 MG/1
10 TABLET, FILM COATED, EXTENDED RELEASE ORAL
Qty: 30 | Refills: 0 | Status: DISCONTINUED | COMMUNITY
Start: 2016-10-17 | End: 2018-02-08

## 2018-02-15 ENCOUNTER — APPOINTMENT (OUTPATIENT)
Dept: ELECTROPHYSIOLOGY | Facility: CLINIC | Age: 83
End: 2018-02-15
Payer: MEDICARE

## 2018-02-15 VITALS
DIASTOLIC BLOOD PRESSURE: 87 MMHG | HEART RATE: 67 BPM | WEIGHT: 151 LBS | OXYGEN SATURATION: 98 % | SYSTOLIC BLOOD PRESSURE: 138 MMHG | BODY MASS INDEX: 25.13 KG/M2

## 2018-02-15 PROCEDURE — 93291 INTERROG DEV EVAL SCRMS IP: CPT

## 2018-02-23 ENCOUNTER — APPOINTMENT (OUTPATIENT)
Dept: GERIATRICS | Facility: CLINIC | Age: 83
End: 2018-02-23
Payer: MEDICARE

## 2018-02-23 VITALS
OXYGEN SATURATION: 99 % | WEIGHT: 154.31 LBS | RESPIRATION RATE: 15 BRPM | TEMPERATURE: 97.4 F | HEIGHT: 65 IN | BODY MASS INDEX: 25.71 KG/M2 | HEART RATE: 72 BPM | DIASTOLIC BLOOD PRESSURE: 70 MMHG | SYSTOLIC BLOOD PRESSURE: 130 MMHG

## 2018-02-23 PROCEDURE — 99214 OFFICE O/P EST MOD 30 MIN: CPT | Mod: GC

## 2018-02-25 ENCOUNTER — INPATIENT (INPATIENT)
Facility: HOSPITAL | Age: 83
LOS: 1 days | Discharge: HOME | DRG: 641 | End: 2018-02-27
Attending: INTERNAL MEDICINE | Admitting: HOSPITALIST
Payer: MEDICARE

## 2018-02-25 VITALS — SYSTOLIC BLOOD PRESSURE: 148 MMHG | HEART RATE: 60 BPM | DIASTOLIC BLOOD PRESSURE: 68 MMHG | RESPIRATION RATE: 20 BRPM

## 2018-02-25 DIAGNOSIS — R55 SYNCOPE AND COLLAPSE: ICD-10-CM

## 2018-02-25 LAB
ALBUMIN SERPL ELPH-MCNC: 4.1 G/DL — SIGNIFICANT CHANGE UP (ref 3.3–5)
ALBUMIN SERPL ELPH-MCNC: 4.3 G/DL — SIGNIFICANT CHANGE UP (ref 3.3–5)
ALP SERPL-CCNC: 88 U/L — SIGNIFICANT CHANGE UP (ref 40–120)
ALP SERPL-CCNC: 94 U/L — SIGNIFICANT CHANGE UP (ref 40–120)
ALT FLD-CCNC: 37 U/L RC — SIGNIFICANT CHANGE UP (ref 10–45)
ALT FLD-CCNC: 40 U/L RC — SIGNIFICANT CHANGE UP (ref 10–45)
ANION GAP SERPL CALC-SCNC: 14 MMOL/L — SIGNIFICANT CHANGE UP (ref 5–17)
ANION GAP SERPL CALC-SCNC: 15 MMOL/L — SIGNIFICANT CHANGE UP (ref 5–17)
APPEARANCE UR: CLEAR — SIGNIFICANT CHANGE UP
APTT BLD: 33 SEC — SIGNIFICANT CHANGE UP (ref 27.5–37.4)
AST SERPL-CCNC: 47 U/L — HIGH (ref 10–40)
AST SERPL-CCNC: 67 U/L — HIGH (ref 10–40)
BASE EXCESS BLDV CALC-SCNC: 2 MMOL/L — SIGNIFICANT CHANGE UP (ref -2–2)
BASOPHILS # BLD AUTO: 0 K/UL — SIGNIFICANT CHANGE UP (ref 0–0.2)
BASOPHILS NFR BLD AUTO: 0.4 % — SIGNIFICANT CHANGE UP (ref 0–2)
BILIRUB SERPL-MCNC: 0.3 MG/DL — SIGNIFICANT CHANGE UP (ref 0.2–1.2)
BILIRUB SERPL-MCNC: 0.4 MG/DL — SIGNIFICANT CHANGE UP (ref 0.2–1.2)
BILIRUB UR-MCNC: NEGATIVE — SIGNIFICANT CHANGE UP
BUN SERPL-MCNC: 18 MG/DL — SIGNIFICANT CHANGE UP (ref 7–23)
BUN SERPL-MCNC: 18 MG/DL — SIGNIFICANT CHANGE UP (ref 7–23)
CA-I SERPL-SCNC: 1.18 MMOL/L — SIGNIFICANT CHANGE UP (ref 1.12–1.3)
CALCIUM SERPL-MCNC: 9.4 MG/DL — SIGNIFICANT CHANGE UP (ref 8.4–10.5)
CALCIUM SERPL-MCNC: 9.6 MG/DL — SIGNIFICANT CHANGE UP (ref 8.4–10.5)
CHLORIDE BLDV-SCNC: 98 MMOL/L — SIGNIFICANT CHANGE UP (ref 96–108)
CHLORIDE SERPL-SCNC: 93 MMOL/L — LOW (ref 96–108)
CHLORIDE SERPL-SCNC: 95 MMOL/L — LOW (ref 96–108)
CK MB BLD-MCNC: 2.1 % — SIGNIFICANT CHANGE UP (ref 0–3.5)
CK MB BLD-MCNC: 3.4 % — SIGNIFICANT CHANGE UP (ref 0–3.5)
CK MB CFR SERPL CALC: 4.3 NG/ML — HIGH (ref 0–3.8)
CK MB CFR SERPL CALC: 4.4 NG/ML — HIGH (ref 0–3.8)
CK SERPL-CCNC: 129 U/L — SIGNIFICANT CHANGE UP (ref 25–170)
CK SERPL-CCNC: 206 U/L — HIGH (ref 25–170)
CO2 BLDV-SCNC: 29 MMOL/L — SIGNIFICANT CHANGE UP (ref 22–30)
CO2 SERPL-SCNC: 23 MMOL/L — SIGNIFICANT CHANGE UP (ref 22–31)
CO2 SERPL-SCNC: 25 MMOL/L — SIGNIFICANT CHANGE UP (ref 22–31)
COLOR SPEC: YELLOW — SIGNIFICANT CHANGE UP
CREAT SERPL-MCNC: 0.77 MG/DL — SIGNIFICANT CHANGE UP (ref 0.5–1.3)
CREAT SERPL-MCNC: 0.78 MG/DL — SIGNIFICANT CHANGE UP (ref 0.5–1.3)
DIFF PNL FLD: NEGATIVE — SIGNIFICANT CHANGE UP
EOSINOPHIL # BLD AUTO: 0.1 K/UL — SIGNIFICANT CHANGE UP (ref 0–0.5)
EOSINOPHIL NFR BLD AUTO: 1.5 % — SIGNIFICANT CHANGE UP (ref 0–6)
EPI CELLS # UR: SIGNIFICANT CHANGE UP /HPF
GAS PNL BLDV: 131 MMOL/L — LOW (ref 136–145)
GAS PNL BLDV: SIGNIFICANT CHANGE UP
GLUCOSE BLDV-MCNC: 92 MG/DL — SIGNIFICANT CHANGE UP (ref 70–99)
GLUCOSE SERPL-MCNC: 91 MG/DL — SIGNIFICANT CHANGE UP (ref 70–99)
GLUCOSE SERPL-MCNC: 93 MG/DL — SIGNIFICANT CHANGE UP (ref 70–99)
GLUCOSE UR QL: NEGATIVE — SIGNIFICANT CHANGE UP
HCO3 BLDV-SCNC: 27 MMOL/L — SIGNIFICANT CHANGE UP (ref 21–29)
HCT VFR BLD CALC: 45 % — SIGNIFICANT CHANGE UP (ref 34.5–45)
HCT VFR BLDA CALC: 48 % — SIGNIFICANT CHANGE UP (ref 39–50)
HGB BLD CALC-MCNC: 15.8 G/DL — HIGH (ref 11.5–15.5)
HGB BLD-MCNC: 15.2 G/DL — SIGNIFICANT CHANGE UP (ref 11.5–15.5)
INR BLD: 0.95 RATIO — SIGNIFICANT CHANGE UP (ref 0.88–1.16)
KETONES UR-MCNC: ABNORMAL
LACTATE BLDV-MCNC: 2 MMOL/L — SIGNIFICANT CHANGE UP (ref 0.7–2)
LEUKOCYTE ESTERASE UR-ACNC: NEGATIVE — SIGNIFICANT CHANGE UP
LYMPHOCYTES # BLD AUTO: 1.1 K/UL — SIGNIFICANT CHANGE UP (ref 1–3.3)
LYMPHOCYTES # BLD AUTO: 21 % — SIGNIFICANT CHANGE UP (ref 13–44)
MCHC RBC-ENTMCNC: 32.2 PG — SIGNIFICANT CHANGE UP (ref 27–34)
MCHC RBC-ENTMCNC: 33.7 GM/DL — SIGNIFICANT CHANGE UP (ref 32–36)
MCV RBC AUTO: 95.6 FL — SIGNIFICANT CHANGE UP (ref 80–100)
MONOCYTES # BLD AUTO: 0.7 K/UL — SIGNIFICANT CHANGE UP (ref 0–0.9)
MONOCYTES NFR BLD AUTO: 12.6 % — SIGNIFICANT CHANGE UP (ref 2–14)
NEUTROPHILS # BLD AUTO: 3.5 K/UL — SIGNIFICANT CHANGE UP (ref 1.8–7.4)
NEUTROPHILS NFR BLD AUTO: 64.4 % — SIGNIFICANT CHANGE UP (ref 43–77)
NITRITE UR-MCNC: NEGATIVE — SIGNIFICANT CHANGE UP
NT-PROBNP SERPL-SCNC: 59 PG/ML — SIGNIFICANT CHANGE UP (ref 0–300)
PCO2 BLDV: 47 MMHG — SIGNIFICANT CHANGE UP (ref 35–50)
PH BLDV: 7.38 — SIGNIFICANT CHANGE UP (ref 7.35–7.45)
PH UR: 6.5 — SIGNIFICANT CHANGE UP (ref 5–8)
PLATELET # BLD AUTO: 201 K/UL — SIGNIFICANT CHANGE UP (ref 150–400)
PO2 BLDV: 30 MMHG — SIGNIFICANT CHANGE UP (ref 25–45)
POTASSIUM BLDV-SCNC: 5.2 MMOL/L — HIGH (ref 3.5–5)
POTASSIUM SERPL-MCNC: 4.1 MMOL/L — SIGNIFICANT CHANGE UP (ref 3.5–5.3)
POTASSIUM SERPL-MCNC: 7 MMOL/L — CRITICAL HIGH (ref 3.5–5.3)
POTASSIUM SERPL-SCNC: 4.1 MMOL/L — SIGNIFICANT CHANGE UP (ref 3.5–5.3)
POTASSIUM SERPL-SCNC: 7 MMOL/L — CRITICAL HIGH (ref 3.5–5.3)
PROT SERPL-MCNC: 7.4 G/DL — SIGNIFICANT CHANGE UP (ref 6–8.3)
PROT SERPL-MCNC: 8.4 G/DL — HIGH (ref 6–8.3)
PROT UR-MCNC: NEGATIVE — SIGNIFICANT CHANGE UP
PROTHROM AB SERPL-ACNC: 10.3 SEC — SIGNIFICANT CHANGE UP (ref 9.8–12.7)
RBC # BLD: 4.71 M/UL — SIGNIFICANT CHANGE UP (ref 3.8–5.2)
RBC # FLD: 13 % — SIGNIFICANT CHANGE UP (ref 10.3–14.5)
RBC CASTS # UR COMP ASSIST: SIGNIFICANT CHANGE UP /HPF (ref 0–2)
SAO2 % BLDV: 53 % — LOW (ref 67–88)
SODIUM SERPL-SCNC: 131 MMOL/L — LOW (ref 135–145)
SODIUM SERPL-SCNC: 134 MMOL/L — LOW (ref 135–145)
SP GR SPEC: 1.01 — SIGNIFICANT CHANGE UP (ref 1.01–1.02)
TROPONIN T SERPL-MCNC: <0.01 NG/ML — SIGNIFICANT CHANGE UP (ref 0–0.06)
TROPONIN T SERPL-MCNC: <0.01 NG/ML — SIGNIFICANT CHANGE UP (ref 0–0.06)
UROBILINOGEN FLD QL: NEGATIVE — SIGNIFICANT CHANGE UP
WBC # BLD: 5.4 K/UL — SIGNIFICANT CHANGE UP (ref 3.8–10.5)
WBC # FLD AUTO: 5.4 K/UL — SIGNIFICANT CHANGE UP (ref 3.8–10.5)
WBC UR QL: SIGNIFICANT CHANGE UP /HPF (ref 0–5)

## 2018-02-25 PROCEDURE — 71275 CT ANGIOGRAPHY CHEST: CPT | Mod: 26

## 2018-02-25 PROCEDURE — 70450 CT HEAD/BRAIN W/O DYE: CPT | Mod: 26

## 2018-02-25 PROCEDURE — 71045 X-RAY EXAM CHEST 1 VIEW: CPT | Mod: 26

## 2018-02-25 PROCEDURE — 93010 ELECTROCARDIOGRAM REPORT: CPT

## 2018-02-25 PROCEDURE — 99284 EMERGENCY DEPT VISIT MOD MDM: CPT | Mod: 25

## 2018-02-25 RX ORDER — FAMOTIDINE 10 MG/ML
20 INJECTION INTRAVENOUS ONCE
Qty: 0 | Refills: 0 | Status: COMPLETED | OUTPATIENT
Start: 2018-02-25 | End: 2018-02-25

## 2018-02-25 RX ORDER — ONDANSETRON 8 MG/1
4 TABLET, FILM COATED ORAL ONCE
Qty: 0 | Refills: 0 | Status: COMPLETED | OUTPATIENT
Start: 2018-02-25 | End: 2018-02-25

## 2018-02-25 RX ORDER — SODIUM CHLORIDE 9 MG/ML
500 INJECTION INTRAMUSCULAR; INTRAVENOUS; SUBCUTANEOUS ONCE
Qty: 0 | Refills: 0 | Status: COMPLETED | OUTPATIENT
Start: 2018-02-25 | End: 2018-02-25

## 2018-02-25 RX ADMIN — SODIUM CHLORIDE 500 MILLILITER(S): 9 INJECTION INTRAMUSCULAR; INTRAVENOUS; SUBCUTANEOUS at 22:54

## 2018-02-25 RX ADMIN — ONDANSETRON 4 MILLIGRAM(S): 8 TABLET, FILM COATED ORAL at 21:52

## 2018-02-25 RX ADMIN — FAMOTIDINE 20 MILLIGRAM(S): 10 INJECTION INTRAVENOUS at 22:53

## 2018-02-25 NOTE — ED PROVIDER NOTE - ATTENDING CONTRIBUTION TO CARE
89 yo F brought in by family for what sounds like a near syncopal episode. she was sitting at the kitchen table and said to her daughter that she felt dizzy and then put her head down on the kitchen table, no loc. she subsequently had an episode of fecal incontinence. she then walked to the bathroom and was observed staring into space, which lasted few seconds. never full loc. no weakness/numbness/gait abnormalities. patient had exact same episode in october, thought to be a syncopal episode, with a negative workup. family reports same symptoms today, including the incontinence. at this time patient has no complaints. intermittently patient has sob when he chf flares up but she denies any sob at this time. she denies HA, visual symptoms, cp, sob, abd pain, N/V/D, urinary complaints. 89 yo F brought in by family for what sounds like a near syncopal episode. she was sitting at the kitchen table and said to her daughter that she felt dizzy and then put her head down on the kitchen table, she denies full loc. she subsequently had an episode of fecal incontinence. she then walked to the bathroom and was observed staring into space, which lasted few seconds. never full loc. no weakness/numbness/gait abnormalities. patient had exact same episode in october, thought to be a syncopal episode, with a negative workup. family reports same symptoms today, including the incontinence. at this time patient has no complaints. intermittently patient has sob when her chf flares up but she denies any sob at this time. she denies HA, visual symptoms, cp, sob, abd pain, N/V/D, urinary complaints.  PE Baseline mental status, lungs clear to auscultation, abdomen soft and nontender, neuro- intact   ED workup shows   chest x-ray with no acute disease.  EKG with no ST elevations. Troponin negative. Labs are unremarkable. Urinalysis with no  infection.    patient signed out to Dr. Sue at this time CTA chest and CTH and final  disposition is pending at time of signout

## 2018-02-25 NOTE — ED PROVIDER NOTE - PLAN OF CARE
stay hydrated  Follow up with your Primary Care Physician within the next 2-3 days  Bring a copy of your test results with you to your appointment  Continue your current medication regimen  Return to the Emergency Room if you experience new or worsening symptoms  IF YOU HAVE ANOTER EVENT PRIOR TO SEEING YOUR DOCTOR IN THE NEXT 48 HOURS YOU NEED TO RETURN TO THE ED

## 2018-02-25 NOTE — ED PROVIDER NOTE - CONSTITUTIONAL, MLM
normal... Well appearing, well nourished, awake, alert, oriented to person and place but not time (baseline per family) and in no apparent distress.

## 2018-02-25 NOTE — ED PROVIDER NOTE - OBJECTIVE STATEMENT
89 y/o F with PMH including arthritis, dementia (unknown type), Hep C (s/p treatment in remission), HTN, HFpEF, hypothyroidism presenting for 89 y/o F with PMH including arthritis, dementia (unknown type), Hep C (s/p treatment in remission), HTN, HFpEF, hypothyroidism presenting for weakness this morning. was eating breakfast with duaghter and stated she 89 y/o F with PMH including arthritis, dementia (unknown type), Hep C (s/p treatment in remission), HTN, HFpEF, hypothyroidism presenting for weakness this morning. was eating breakfast with daughter and stated she "felt dizzy and had to lay down" daughter attempted to help her and she  put her head on the table. patient then stated she needed to get to the bathroom and had a BM on the way to the bathroom. when seated daughter describes she was staring off into space. she is unable to state if she was unresponsive at this time, did not ask her questions. they called ems and by the time they arrived she was feeling improved. no time where she seems more confused than usual. patient endorses some worsening of her SOB  over 2 days, worse in supine position. no weight gain or LE edema. jim chest pain or sob at rest. no fever/chills or recent illness

## 2018-02-25 NOTE — ED PROVIDER NOTE - CARE PLAN
Principal Discharge DX:	Near syncope  Assessment and plan of treatment:	stay hydrated  Follow up with your Primary Care Physician within the next 2-3 days  Bring a copy of your test results with you to your appointment  Continue your current medication regimen  Return to the Emergency Room if you experience new or worsening symptoms  IF YOU HAVE ANOTER EVENT PRIOR TO SEEING YOUR DOCTOR IN THE NEXT 48 HOURS YOU NEED TO RETURN TO THE ED

## 2018-02-25 NOTE — ED PROVIDER NOTE - PROGRESS NOTE DETAILS
spoke with Dr. Sanches who statesunable to say whether patient should stay as she does not follow her if decide to dc will follow closely. extensive discussion with family regarding admission vs discharge. family prefers to take patient home. agree with delta trop and cta to rule out PE. patient with loop and apt within the last week that had no events. echo reportedly in the last few months by cardiology as well. family understands close return precautions and would prefer to follow up outpatient for syncope workup. - Rowena Cardenas PA-C CTs within normal limits. patient still feeling well and wishing to go home, family wishing to take patient home at this time. will d/c. -Joslyn Kuhn PA-C while getting patient prepared for discharge, upon standing patient becomes lightheaded feeling nauseated and has an episode of vomiting. Reports that she feels uncomfortable going home now as she feels very weak when she gets up. will provide patient with ODT zofran, replace IV line and obtain orthostatic vital signs. if patient is not orthostatic will admit to medicine for continued work-up, if orthostatic will provide fluid hydration and observe for improvement. -Joslyn Kuhn PA-C

## 2018-02-25 NOTE — ED ADULT NURSE REASSESSMENT NOTE - NS ED NURSE REASSESS COMMENT FT1
pt was ready to go iv dcd then stated not feeling well and vomited, ekg being done along with orthostatics

## 2018-02-25 NOTE — ED PROVIDER NOTE - CONDUCTED A DETAILED DISCUSSION WITH PATIENT AND/OR GUARDIAN REGARDING, MDM
radiology results/return to ED if symptoms worsen, persist or questions arise/lab results/need for outpatient follow-up lab results/radiology results/need to admit

## 2018-02-25 NOTE — ED ADULT NURSE NOTE - OBJECTIVE STATEMENT
pt to room 22 via ambulance stretcher from home s/p episode of weakness  starring. pt had been sitting at table at epiode of incontinence and was starring  syptoms lasted under 2 minutes. pt now awake alert color good skin warm dry lungs cl denies cp or sob  cm nsr abd soft nt  no edema noted

## 2018-02-26 DIAGNOSIS — I10 ESSENTIAL (PRIMARY) HYPERTENSION: ICD-10-CM

## 2018-02-26 DIAGNOSIS — F03.90 UNSPECIFIED DEMENTIA WITHOUT BEHAVIORAL DISTURBANCE: ICD-10-CM

## 2018-02-26 DIAGNOSIS — R55 SYNCOPE AND COLLAPSE: ICD-10-CM

## 2018-02-26 DIAGNOSIS — Z29.9 ENCOUNTER FOR PROPHYLACTIC MEASURES, UNSPECIFIED: ICD-10-CM

## 2018-02-26 DIAGNOSIS — E03.9 HYPOTHYROIDISM, UNSPECIFIED: ICD-10-CM

## 2018-02-26 DIAGNOSIS — E87.1 HYPO-OSMOLALITY AND HYPONATREMIA: ICD-10-CM

## 2018-02-26 LAB
ANION GAP SERPL CALC-SCNC: 14 MMOL/L — SIGNIFICANT CHANGE UP (ref 5–17)
BASOPHILS # BLD AUTO: 0 K/UL — SIGNIFICANT CHANGE UP (ref 0–0.2)
BASOPHILS NFR BLD AUTO: 0.4 % — SIGNIFICANT CHANGE UP (ref 0–2)
BUN SERPL-MCNC: 10 MG/DL — SIGNIFICANT CHANGE UP (ref 7–23)
CALCIUM SERPL-MCNC: 9.6 MG/DL — SIGNIFICANT CHANGE UP (ref 8.4–10.5)
CHLORIDE SERPL-SCNC: 94 MMOL/L — LOW (ref 96–108)
CK MB BLD-MCNC: 3.3 % — SIGNIFICANT CHANGE UP (ref 0–3.5)
CK MB CFR SERPL CALC: 5.4 NG/ML — HIGH (ref 0–3.8)
CK SERPL-CCNC: 162 U/L — SIGNIFICANT CHANGE UP (ref 25–170)
CO2 SERPL-SCNC: 24 MMOL/L — SIGNIFICANT CHANGE UP (ref 22–31)
CREAT SERPL-MCNC: 0.61 MG/DL — SIGNIFICANT CHANGE UP (ref 0.5–1.3)
EOSINOPHIL # BLD AUTO: 0 K/UL — SIGNIFICANT CHANGE UP (ref 0–0.5)
EOSINOPHIL NFR BLD AUTO: 0.5 % — SIGNIFICANT CHANGE UP (ref 0–6)
GLUCOSE SERPL-MCNC: 113 MG/DL — HIGH (ref 70–99)
HCT VFR BLD CALC: 45.1 % — HIGH (ref 34.5–45)
HGB BLD-MCNC: 15.4 G/DL — SIGNIFICANT CHANGE UP (ref 11.5–15.5)
LACTATE SERPL-SCNC: 1 MMOL/L — SIGNIFICANT CHANGE UP (ref 0.7–2)
LYMPHOCYTES # BLD AUTO: 1 K/UL — SIGNIFICANT CHANGE UP (ref 1–3.3)
LYMPHOCYTES # BLD AUTO: 19.1 % — SIGNIFICANT CHANGE UP (ref 13–44)
MCHC RBC-ENTMCNC: 32.4 PG — SIGNIFICANT CHANGE UP (ref 27–34)
MCHC RBC-ENTMCNC: 34.2 GM/DL — SIGNIFICANT CHANGE UP (ref 32–36)
MCV RBC AUTO: 94.8 FL — SIGNIFICANT CHANGE UP (ref 80–100)
MONOCYTES # BLD AUTO: 0.5 K/UL — SIGNIFICANT CHANGE UP (ref 0–0.9)
MONOCYTES NFR BLD AUTO: 9.6 % — SIGNIFICANT CHANGE UP (ref 2–14)
NEUTROPHILS # BLD AUTO: 3.7 K/UL — SIGNIFICANT CHANGE UP (ref 1.8–7.4)
NEUTROPHILS NFR BLD AUTO: 70.4 % — SIGNIFICANT CHANGE UP (ref 43–77)
PLATELET # BLD AUTO: 206 K/UL — SIGNIFICANT CHANGE UP (ref 150–400)
POTASSIUM SERPL-MCNC: 3.9 MMOL/L — SIGNIFICANT CHANGE UP (ref 3.5–5.3)
POTASSIUM SERPL-SCNC: 3.9 MMOL/L — SIGNIFICANT CHANGE UP (ref 3.5–5.3)
RBC # BLD: 4.76 M/UL — SIGNIFICANT CHANGE UP (ref 3.8–5.2)
RBC # FLD: 12.7 % — SIGNIFICANT CHANGE UP (ref 10.3–14.5)
SODIUM SERPL-SCNC: 132 MMOL/L — LOW (ref 135–145)
TROPONIN T SERPL-MCNC: <0.01 NG/ML — SIGNIFICANT CHANGE UP (ref 0–0.06)
TSH SERPL-MCNC: 0.39 UIU/ML — SIGNIFICANT CHANGE UP (ref 0.27–4.2)
WBC # BLD: 5.2 K/UL — SIGNIFICANT CHANGE UP (ref 3.8–10.5)
WBC # FLD AUTO: 5.2 K/UL — SIGNIFICANT CHANGE UP (ref 3.8–10.5)

## 2018-02-26 PROCEDURE — 99223 1ST HOSP IP/OBS HIGH 75: CPT

## 2018-02-26 PROCEDURE — 99233 SBSQ HOSP IP/OBS HIGH 50: CPT

## 2018-02-26 RX ORDER — LISINOPRIL 2.5 MG/1
10 TABLET ORAL DAILY
Qty: 0 | Refills: 0 | Status: DISCONTINUED | OUTPATIENT
Start: 2018-02-26 | End: 2018-02-27

## 2018-02-26 RX ORDER — LIOTHYRONINE SODIUM 25 UG/1
5 TABLET ORAL DAILY
Qty: 0 | Refills: 0 | Status: DISCONTINUED | OUTPATIENT
Start: 2018-02-26 | End: 2018-02-27

## 2018-02-26 RX ORDER — ONDANSETRON 8 MG/1
4 TABLET, FILM COATED ORAL ONCE
Qty: 0 | Refills: 0 | Status: COMPLETED | OUTPATIENT
Start: 2018-02-25 | End: 2018-02-26

## 2018-02-26 RX ORDER — LANOLIN ALCOHOL/MO/W.PET/CERES
3 CREAM (GRAM) TOPICAL AT BEDTIME
Qty: 0 | Refills: 0 | Status: COMPLETED | OUTPATIENT
Start: 2018-02-26 | End: 2018-02-26

## 2018-02-26 RX ORDER — ONDANSETRON 8 MG/1
4 TABLET, FILM COATED ORAL EVERY 6 HOURS
Qty: 0 | Refills: 0 | Status: DISCONTINUED | OUTPATIENT
Start: 2018-02-26 | End: 2018-02-27

## 2018-02-26 RX ORDER — ACETAMINOPHEN 500 MG
2 TABLET ORAL
Qty: 0 | Refills: 0 | COMMUNITY

## 2018-02-26 RX ORDER — HEPARIN SODIUM 5000 [USP'U]/ML
5000 INJECTION INTRAVENOUS; SUBCUTANEOUS EVERY 12 HOURS
Qty: 0 | Refills: 0 | Status: DISCONTINUED | OUTPATIENT
Start: 2018-02-26 | End: 2018-02-27

## 2018-02-26 RX ORDER — FUROSEMIDE 40 MG
20 TABLET ORAL DAILY
Qty: 0 | Refills: 0 | Status: DISCONTINUED | OUTPATIENT
Start: 2018-02-26 | End: 2018-02-27

## 2018-02-26 RX ADMIN — Medication 20 MILLIGRAM(S): at 06:07

## 2018-02-26 RX ADMIN — LIOTHYRONINE SODIUM 5 MICROGRAM(S): 25 TABLET ORAL at 06:07

## 2018-02-26 RX ADMIN — ONDANSETRON 4 MILLIGRAM(S): 8 TABLET, FILM COATED ORAL at 00:02

## 2018-02-26 RX ADMIN — HEPARIN SODIUM 5000 UNIT(S): 5000 INJECTION INTRAVENOUS; SUBCUTANEOUS at 06:07

## 2018-02-26 RX ADMIN — HEPARIN SODIUM 5000 UNIT(S): 5000 INJECTION INTRAVENOUS; SUBCUTANEOUS at 17:17

## 2018-02-26 RX ADMIN — LISINOPRIL 10 MILLIGRAM(S): 2.5 TABLET ORAL at 06:07

## 2018-02-26 NOTE — H&P ADULT - ASSESSMENT
89 y/o F with PMH including arthritis, dementia (unknown type), Hep C (s/p treatment in remission), HTN, HFpEF, hypothyroidism, admission here in october 2017 for syncopal episode and s/p ILR now presenting for similar presyncopal episode

## 2018-02-26 NOTE — H&P ADULT - NSHPPHYSICALEXAM_GEN_ALL_CORE
Vital Signs Last 24 Hrs  T(C): 36.4 (26 Feb 2018 00:50), Max: 36.5 (25 Feb 2018 23:18)  T(F): 97.6 (26 Feb 2018 00:50), Max: 97.7 (25 Feb 2018 23:18)  HR: 76 (26 Feb 2018 00:50) (60 - 87)  BP: 159/93 (26 Feb 2018 00:50) (138/87 - 159/93)  BP(mean): --  RR: 18 (26 Feb 2018 00:50) (16 - 20)  SpO2: 99% (26 Feb 2018 00:50) (96% - 100%)    GENERAL: No acute distress, well-developed  HEAD:  Atraumatic, Normocephalic  EYES: EOMI, PERRLA, conjunctiva and sclera clear  ENT: Oral mucosa moist  NECK: Neck supple  CHEST/LUNG: Clear to auscultation bilaterally; No wheeze, no rales, no rhonchi.    HEART: Regular rate and rhythm; No murmurs, rubs, or gallops  ABDOMEN: Soft, Nontender, Nondistended; Bowel sounds present  EXTREMITIES:  No clubbing, cyanosis. 1+ pitting edema on RLE, trace on LLE  VASCULAR: Posterior tibialis pulses intact bilaterally  PSYCH: Normal behavior, normal affect  NEUROLOGY: AAOx2 (baseline)  SKIN: grossly warm and dry

## 2018-02-26 NOTE — PROGRESS NOTE ADULT - SUBJECTIVE AND OBJECTIVE BOX
Patient is a 89y old  Female who presents with a chief complaint of Near syncope/nausea/vomiting/diarrhea (2018 04:55)        SUBJECTIVE / OVERNIGHT EVENTS:  Overnight, no acute events. Pt seen at bedside with daughter, HCP Sondra.  Pt denies any complaints. Reports feeling improved - no n/v and was able to eat breakfast this AM.  Denies dizziness, abd pain, cp, sob, f/chills, sore throat, dysuria, diarrhea.  Reports on day of admission ate clam chowder felt some stomach upset, n/v and one ep of loose stool - now resolved.    CAPILLARY BLOOD GLUCOSE    POCT Blood Glucose.: 100 mg/dL (2018 14:21)    I&O's Summary    Vital Signs Last 24 Hrs  T(C): 36.7 (2018 07:28), Max: 36.7 (2018 07:28)  T(F): 98 (2018 07:28), Max: 98 (2018 07:28)  HR: 81 (2018 07:28) (60 - 87)  BP: 135/82 (2018 07:28) (135/82 - 162/82)  BP(mean): --  RR: 18 (2018 07:28) (16 - 20)  SpO2: 99% (2018 07:28) (96% - 100%)    PHYSICAL EXAM:  GENERAL:  Well appearing, elderly F, appears younger than stated age, in NAD  HEAD:  NCAT  EYES: PERRLA, conjunctiva clear  NECK: Supple, No JVD  CHEST/LUNG: CTA B/L. No w/r/r.  HEART: RRR. Normal S1, S2. No m/r/g.   ABDOMEN: SNTND. Bowel sounds present  EXTREMITIES:  2+ Peripheral Pulses, No clubbing, cyanosis, edema.  PSYCH: appropriate affect, pleasant  SKIN: No rashes or lesions    LABS:                        15.4   5.2   )-----------( 206      ( 2018 05:45 )             45.1         132<L>  |  94<L>  |  10  ----------------------------<  113<H>  3.9   |  24  |  0.61    Ca    9.6      2018 05:45    TPro  7.4  /  Alb  4.1  /  TBili  0.3  /  DBili  x   /  AST  47<H>  /  ALT  37  /  AlkPhos  94  02-25    PT/INR - ( 2018 14:56 )   PT: 10.3 sec;   INR: 0.95 ratio         PTT - ( 2018 14:56 )  PTT:33.0 sec  CARDIAC MARKERS ( 2018 05:45 )  x     / <0.01 ng/mL / 162 U/L / x     / 5.4 ng/mL  CARDIAC MARKERS ( 2018 18:35 )  x     / <0.01 ng/mL / 129 U/L / x     / 4.4 ng/mL  CARDIAC MARKERS ( 2018 14:56 )  x     / <0.01 ng/mL / 206 U/L / x     / 4.3 ng/mL    Urinalysis Basic - ( 2018 17:50 )    Color: Yellow / Appearance: Clear / S.012 / pH: x  Gluc: x / Ketone: Trace  / Bili: Negative / Urobili: Negative   Blood: x / Protein: Negative / Nitrite: Negative   Leuk Esterase: Negative / RBC: 0-2 /HPF / WBC 0-2 /HPF   Sq Epi: x / Non Sq Epi: OCC /HPF / Bacteria: x    RADIOLOGY & ADDITIONAL TESTS:  Telemetry reviewed: SINUS HR 70S  Consultant(s) Notes Reviewed:  EP  Care Discussed with Consultants/Other Providers: Floor NP, EP NP, Dr Romero office    MEDICATIONS  (STANDING):  furosemide    Tablet 20 milliGRAM(s) Oral daily  heparin  Injectable 5000 Unit(s) SubCutaneous every 12 hours  liothyronine 5 MICROGram(s) Oral daily  lisinopril 10 milliGRAM(s) Oral daily    MEDICATIONS  (PRN):  ondansetron Injectable 4 milliGRAM(s) IV Push every 6 hours PRN Nausea

## 2018-02-26 NOTE — H&P ADULT - HISTORY OF PRESENT ILLNESS
89 y/o F with PMH including arthritis, dementia (unknown type), Hep C (s/p treatment in remission), HTN, HFpEF, hypothyroidism, admission here in october 2017 for syncopal episode and s/p ILR now presenting for similar presyncopal episode at home. Daughter reports that today patient had been relaxing in her chair after eating when she attempted to get up. After attempting to get up she became lightheaded and family held onto the patient. She then suddenly felt an urge to have a bowel movement but due to lightheadedness needed multiple family members to get her onto the toilet. There she had a somewhat loose and watery bowel movement. EMS was then called by her family. In October she had syncopized at home and per records during that episode she had just eaten breakfast and was doing arm exercises during that episode and had an episode of bowel incontinence during that episode. No fevers or chills, no recent illnesses. No chest pain. Patient has chronically some intermittent shortness of breath felt 2/2 to her diastolic CHF, she does also report chronic mild LE swelling.

## 2018-02-26 NOTE — CONSULT NOTE ADULT - SUBJECTIVE AND OBJECTIVE BOX
[facility] - Division of Pulmonary, Critical Care and Sleep Medicine   Please call [phone] between 8am-pm weekdays, [phone] after hours and weekends      CHIEF COMPLAINT: Near syncope, weakness    HPI:    PAST MEDICAL & SURGICAL HISTORY:  Dementia without behavioral disturbance, unspecified dementia type  Hepatitis C  Hypertension  Arthritis  Hypothyroidism  No significant past surgical history      FAMILY HISTORY:  No pertinent family history in first degree relatives      SOCIAL HISTORY:  Smoking: [x ] Never Smoked [ ] Former Smoker (__ packs x ___ years) [ ] Current Smoker  (__ packs x ___ years)  Substance Use: [x ] Never Used [ ] Used ____  EtOH Use: denies  Marital Status: [ ] Single [ ]  [ ]  [ x]   Occupation:  Recent Travel:  Country of Birth:  Advance Directives:    Allergies    No Known Allergies    Intolerances        HOME MEDICATIONS: reviewed in H&P    REVIEW OF SYSTEMS:  Constitutional: [ ] fevers [ ] chills [ ] weight loss [ ] weight gain  HEENT: [ ] dry eyes [ ] eye irritation [ ] postnasal drip [ ] nasal congestion  CV: [ ] chest pain [ ] orthopnea [ ] palpitations [ ] murmur  Resp: [ ] cough [ ] shortness of breath [ ] wheezing [ ] sputum [ ] hemoptysis  GI: [ ] nausea [ ] vomiting [ ] diarrhea [ ] constipation [ ] abd pain [ ] dysphagia   : [ ] dysuria [ ] nocturia [ ] hematuria [ ] increased urinary frequency  Musculoskeletal: [ ] myalgias [ ] arthralgias   Skin: [ ] rash [ ] itch  Neurological: [ ] headache [ ] dizziness [ ] syncope [ ] weakness [ ] numbness  Psychiatric: [ ] anxiety [ ] depression  Endocrine: [ ] diabetes [ ] thyroid problem  Hematologic/Lymphatic: [ ] anemia [ ] bleeding problem  Allergic/Immunologic: [ ] itchy eyes [ ] nasal discharge [ ] hives [ ] angioedema  [ ] All other systems negative  [ ] Unable to assess ROS because ________    OBJECTIVE:  ICU Vital Signs Last 24 Hrs  T(C): 36.7 (2018 07:28), Max: 36.7 (2018 07:28)  T(F): 98 (2018 07:28), Max: 98 (2018 07:28)  HR: 81 (2018 07:28) (60 - 87)  BP: 135/82 (2018 07:28) (135/82 - 162/82)  BP(mean): --  ABP: --  ABP(mean): --  RR: 18 (2018 07:28) (16 - 20)  SpO2: 99% (2018 07:28) (96% - 100%)        CAPILLARY BLOOD GLUCOSE      POCT Blood Glucose.: 100 mg/dL (2018 14:21)      PHYSICAL EXAM:  General:  NAD  HEENT:  NC/AT  Lymph Nodes: No cervical or supraclavicular lymphadenopathy   Neck: Supple  Respiratory:  CTA B/L, no wheezes, crackles or rhonchi  Cardiovascular:  RRR, no m/r/g  Abdomen: soft, NT/ND, +BS  Extremities: no clubbing, cyanosis or edema, warm  Skin: no rash  Neurological: AAOx3, no focal deficits  Psychiatry: not anxious, normal affect    HOSPITAL MEDICATIONS:  MEDICATIONS  (STANDING):  furosemide    Tablet 20 milliGRAM(s) Oral daily  heparin  Injectable 5000 Unit(s) SubCutaneous every 12 hours  liothyronine 5 MICROGram(s) Oral daily  lisinopril 10 milliGRAM(s) Oral daily    MEDICATIONS  (PRN):  ondansetron Injectable 4 milliGRAM(s) IV Push every 6 hours PRN Nausea      LABS:                        15.4   5.2   )-----------( 206      ( 2018 05:45 )             45.1     Hgb Trend: 15.4<--, 15.2<--  02-26    132<L>  |  94<L>  |  10  ----------------------------<  113<H>  3.9   |  24  |  0.61    Ca    9.6      2018 05:45    TPro  7.4  /  Alb  4.1  /  TBili  0.3  /  DBili  x   /  AST  47<H>  /  ALT  37  /  AlkPhos  94      Creatinine Trend: 0.61<--, 0.78<--, 0.77<--  PT/INR - ( 2018 14:56 )   PT: 10.3 sec;   INR: 0.95 ratio         PTT - ( 2018 14:56 )  PTT:33.0 sec  Urinalysis Basic - ( 2018 17:50 )    Color: Yellow / Appearance: Clear / S.012 / pH: x  Gluc: x / Ketone: Trace  / Bili: Negative / Urobili: Negative   Blood: x / Protein: Negative / Nitrite: Negative   Leuk Esterase: Negative / RBC: 0-2 /HPF / WBC 0-2 /HPF   Sq Epi: x / Non Sq Epi: OCC /HPF / Bacteria: x        Venous Blood Gas:   @ 14:56  7.38/47/30/27/53  VBG Lactate: 2.0      MICROBIOLOGY:     RADIOLOGY:  [x ] Reviewed and interpreted by me  < from: CT Angio Chest w/ IV Cont (18 @ 19:29) >  EXAM:  CT ANGIO CHEST (W)AW IC                            PROCEDURE DATE:  2018            INTERPRETATION:  CLINICAL INFORMATION: Syncope, shortness of breath.    COMPARISON: CTA of the chest from 10/23/2017    PROCEDURE:   CTA of the Chest was performed with intravenous contrast.  90 ml of Omnipaque 350 was injected intravenously. 10 ml were discarded.  Sagittal and coronal reformats were performed as well as MIP   reconstructions.      FINDINGS:    LUNGS, PLEURA, AND LARGE AIRWAYS: No consolidation, effusion, or   pneumothorax. Dependent atelectasis. Patent central airways.  VESSELS: Normal size thoracic aorta. Main pulmonary artery measures up to   3.6 cm, slightly enlarged, suggestive of pulmonary hypertension, without   interval change. No pulmonary embolism.  HEART: Cardiomegaly, predominately right-sided. No pericardial effusion.  MEDIASTINUM AND KLAUDIA: No lymphadenopathy.  CHEST WALL AND LOWER NECK: Within normal limits.  VISUALIZED UPPER ABDOMEN: Hepatic cysts. Left renal cysts, partially   imaged, including a septated cyst with thin septal calcifications.   Subcentimeter hypodense lesions which are too small to characterize.   Subcutaneous cardiac monitoring device.  BONES: Degenerative changes and thoracic dextrokyphoscoliosis.   Generalized osteopenia. Old left rib fracture.    IMPRESSION:   No pulmonary embolism. Cardiomegaly and slight enlargement of the main   pulmonary artery, which may suggest pulmonary hypertension.    < end of copied text >    TTE 8/3/2017: minimal MR, normal LV systolic function, diastolic dysfunction. Est PASP of 31 mmhHg.     PULMONARY FUNCTION TESTS: 18 normal spirometery, lung volumes and DLCO     EKG: [facility] - Division of Pulmonary, Critical Care and Sleep Medicine   Please call [phone] between 8am-pm weekdays, [phone] after hours and weekends    History obtained from daughter as patient with dementia    CHIEF COMPLAINT: Near syncope, weakness    HPI: 89 F with h/o dementia, Hep C s/p treatment, HTN, HFpEF, hypothyroidism, recent admission 2017 for syncopal episode and s/p ILR presented on  for presyncopal episode at home. Daughter reports that patient stood up after eating, complained of lightheadedness and suddenly urge to have a bowel movement.  In October she had syncopized at home and per records similarly occurred post prandially.   Denies SOB (other than baseline on exertion attributed to her CHF), chest pain, fevers or chills, no recent illnesses. Currently sitting up on the stretcher, NAD.  Was seen by Dr. Pérez 2 weeks ago for symptoms of SOB - PFTs were WNL as well as 6min walk test.     PAST MEDICAL & SURGICAL HISTORY:  Dementia without behavioral disturbance, unspecified dementia type  Hepatitis C  Hypertension  Arthritis  Hypothyroidism  No significant past surgical history      FAMILY HISTORY:  No pertinent family history in first degree relatives      SOCIAL HISTORY:  Smoking: [x ] Never Smoked [ ] Former Smoker (__ packs x ___ years) [ ] Current Smoker  (__ packs x ___ years)  Substance Use: [x ] Never Used [ ] Used ____  EtOH Use: denies  Marital Status: [ ] Single [ ]  [ ]  [ x]   Occupation:  Recent Travel:  Country of Birth:  Advance Directives:    Allergies    No Known Allergies    Intolerances        HOME MEDICATIONS: reviewed in H&P    REVIEW OF SYSTEMS:  Constitutional: [ ] fevers [ ] chills [ ] weight loss [ ] weight gain  HEENT: [ ] dry eyes [ ] eye irritation [ ] postnasal drip [ ] nasal congestion  CV: [ ] chest pain [ ] orthopnea [ ] palpitations [ ] murmur  Resp: [ ] cough [ ] shortness of breath [ ] wheezing [ ] sputum [ ] hemoptysis  GI: [ ] nausea [ ] vomiting [ ] diarrhea [ ] constipation [ ] abd pain [ ] dysphagia   : [ ] dysuria [ ] nocturia [ ] hematuria [ ] increased urinary frequency  Musculoskeletal: [ ] myalgias [ ] arthralgias   Skin: [ ] rash [ ] itch  Neurological: [ ] headache [ ] dizziness [ ] syncope [ ] weakness [ ] numbness  Psychiatric: [ ] anxiety [ ] depression  Endocrine: [ ] diabetes [ ] thyroid problem  Hematologic/Lymphatic: [ ] anemia [ ] bleeding problem  Allergic/Immunologic: [ ] itchy eyes [ ] nasal discharge [ ] hives [ ] angioedema  [ ] All other systems negative  [ ] Unable to assess ROS because ________    OBJECTIVE:  ICU Vital Signs Last 24 Hrs  T(C): 36.7 (2018 07:28), Max: 36.7 (2018 07:28)  T(F): 98 (2018 07:28), Max: 98 (2018 07:28)  HR: 81 (:) (60 - 87)  BP: 135/82 (2018 07:28) (135/82 - 162/82)  BP(mean): --  ABP: --  ABP(mean): --  RR: 18 (2018 07:28) (16 - 20)  SpO2: 99% (2018 07:28) (96% - 100%)        CAPILLARY BLOOD GLUCOSE      POCT Blood Glucose.: 100 mg/dL (2018 14:21)      PHYSICAL EXAM:  General:  NAD  HEENT:  NC/AT  Lymph Nodes: No cervical or supraclavicular lymphadenopathy   Neck: Supple  Respiratory:  CTA B/L, no wheezes, crackles or rhonchi  Cardiovascular:  RRR, no m/r/g  Abdomen: soft, NT/ND, +BS  Extremities: no clubbing, cyanosis or edema, warm  Skin: no rash  Neurological: AAOx3, no focal deficits  Psychiatry: not anxious, normal affect    HOSPITAL MEDICATIONS:  MEDICATIONS  (STANDING):  furosemide    Tablet 20 milliGRAM(s) Oral daily  heparin  Injectable 5000 Unit(s) SubCutaneous every 12 hours  liothyronine 5 MICROGram(s) Oral daily  lisinopril 10 milliGRAM(s) Oral daily    MEDICATIONS  (PRN):  ondansetron Injectable 4 milliGRAM(s) IV Push every 6 hours PRN Nausea      LABS:                        15.4   5.2   )-----------( 206      ( 2018 05:45 )             45.1     Hgb Trend: 15.4<--, 15.2<--      132<L>  |  94<L>  |  10  ----------------------------<  113<H>  3.9   |  24  |  0.61    Ca    9.6      2018 05:45    TPro  7.4  /  Alb  4.1  /  TBili  0.3  /  DBili  x   /  AST  47<H>  /  ALT  37  /  AlkPhos  94      Creatinine Trend: 0.61<--, 0.78<--, 0.77<--  PT/INR - ( 2018 14:56 )   PT: 10.3 sec;   INR: 0.95 ratio         PTT - ( 2018 14:56 )  PTT:33.0 sec  Urinalysis Basic - ( 2018 17:50 )    Color: Yellow / Appearance: Clear / S.012 / pH: x  Gluc: x / Ketone: Trace  / Bili: Negative / Urobili: Negative   Blood: x / Protein: Negative / Nitrite: Negative   Leuk Esterase: Negative / RBC: 0-2 /HPF / WBC 0-2 /HPF   Sq Epi: x / Non Sq Epi: OCC /HPF / Bacteria: x        Venous Blood Gas:   @ 14:56  7.38/47/30/27/53  VBG Lactate: 2.0      MICROBIOLOGY:     RADIOLOGY:  [x ] Reviewed and interpreted by me  < from: CT Angio Chest w/ IV Cont (18 @ 19:29) >  EXAM:  CT ANGIO CHEST (W)AW IC                            PROCEDURE DATE:  2018            INTERPRETATION:  CLINICAL INFORMATION: Syncope, shortness of breath.    COMPARISON: CTA of the chest from 10/23/2017    PROCEDURE:   CTA of the Chest was performed with intravenous contrast.  90 ml of Omnipaque 350 was injected intravenously. 10 ml were discarded.  Sagittal and coronal reformats were performed as well as MIP   reconstructions.      FINDINGS:    LUNGS, PLEURA, AND LARGE AIRWAYS: No consolidation, effusion, or   pneumothorax. Dependent atelectasis. Patent central airways.  VESSELS: Normal size thoracic aorta. Main pulmonary artery measures up to   3.6 cm, slightly enlarged, suggestive of pulmonary hypertension, without   interval change. No pulmonary embolism.  HEART: Cardiomegaly, predominately right-sided. No pericardial effusion.  MEDIASTINUM AND KLAUDIA: No lymphadenopathy.  CHEST WALL AND LOWER NECK: Within normal limits.  VISUALIZED UPPER ABDOMEN: Hepatic cysts. Left renal cysts, partially   imaged, including a septated cyst with thin septal calcifications.   Subcentimeter hypodense lesions which are too small to characterize.   Subcutaneous cardiac monitoring device.  BONES: Degenerative changes and thoracic dextrokyphoscoliosis.   Generalized osteopenia. Old left rib fracture.    IMPRESSION:   No pulmonary embolism. Cardiomegaly and slight enlargement of the main   pulmonary artery, which may suggest pulmonary hypertension.    < end of copied text >    TTE 8/3/2017: minimal MR, normal LV systolic function, diastolic dysfunction. Est PASP of 31 mmhHg.     PULMONARY FUNCTION TESTS: 18 normal spirometry, lung volumes and DLCO     EKG: NSR at 86, RBBB

## 2018-02-26 NOTE — PROGRESS NOTE ADULT - ASSESSMENT
89 y/o F with PMH OA, dementia, Hep C (s/p treatment in remission), HTN w/ hx of HTN urgency, HFpEF, hypothyroidism, hx of syncope 10/2017 d/t conduction abn/trifascicular block (not on BB) s/p ILR p/w lightheadedness/presyncope/N x 1 d, found +orthostasis.

## 2018-02-26 NOTE — PROGRESS NOTE ADULT - SUBJECTIVE AND OBJECTIVE BOX
1) Indication for loop recorder interrogation: pre-syncopal episode on 2/25/18 at 12:45 pm   2) Presenting rhythm: Sinus Rhythm at rate 64 bpm   3) Stored data revealed no events for review since last interrogation on 2/15/2018  4) AT/AF burden: 0%  5) Battery status: Good   6) Changes made: none    # [id number]

## 2018-02-26 NOTE — CONSULT NOTE ADULT - ASSESSMENT
Patient is a 89 year-old woman who presents with orthostatic presyncope at home.  ILR interrogated and there was no evidence of dysrhythmia.    Continue current rx including loop diuretic and ACEi for blood pressure control.    Encourage adequate PO intake of food and water.    Symptoms have resolved.  If sodium is acceptable on AM labs, no objection to discharge tomorrow.    Discharge planning per primary team.  Outpatient follow-up with geriatric medicine.
89 F with h/o dementia, Hep C s/p treatment, HTN, HFpEF, hypothyroidism, recent admission October 2017 for syncopal episode and s/p ILR presented on 2/25 for presyncopal episode at home. Daughter reports that patient stood up after eating, complained of lightheadedness and suddenly urge to have a bowel movement.  In October she had syncopized at home and per records similarly occurred post prandially.   Denies SOB (other than baseline on exertion attributed to her CHF), chest pain, fevers or chills, no recent illnesses. Currently sitting up on the stretcher, NAD.  Was seen by Dr. Pérez 2 weeks ago for symptoms of SOB - PFTs were WNL as well as 6min walk test.     A/P:  Presyncopal episode - postprandial. Labs only significant for mild hyponatremia/hypochloridemia - likely mild dehydration. Would encourage PO intake.  Currently without complaints, vitals stable. Not hypoxemic and Chest radiology clear for consolidations or effusions.  Cardiac work up negative thusfar with CE-x3, BNP WNL.0 Seen by EP - no events recorded since last interrogation 10 days ago, NSR  No indication for bronchodilators at this time.    Thank you for the consult  Dr. Pérez to follow up 2/27/17  D/w daughter, patient and Dr. Barron.

## 2018-02-26 NOTE — CONSULT NOTE ADULT - SUBJECTIVE AND OBJECTIVE BOX
Patient seen and evaluated @   Chief Complaint:     HPI:  89 y/o F with PMH including arthritis, dementia (unknown type), Hep C (s/p treatment in remission), HTN, HFpEF, hypothyroidism, admission here in october 2017 for syncopal episode and s/p ILR now presenting for similar presyncopal episode at home. Daughter reports that today patient had been relaxing in her chair after eating when she attempted to get up. After attempting to get up she became lightheaded and family held onto the patient. She then suddenly felt an urge to have a bowel movement but due to lightheadedness needed multiple family members to get her onto the toilet. There she had a somewhat loose and watery bowel movement. EMS was then called by her family. In October she had syncopized at home and per records during that episode she had just eaten breakfast and was doing arm exercises during that episode and had an episode of bowel incontinence during that episode. No fevers or chills, no recent illnesses. No chest pain. Patient has chronically some intermittent shortness of breath felt 2/2 to her diastolic CHF, she does also report chronic mild LE swelling. (26 Feb 2018 04:55)    PMH:   Dementia without behavioral disturbance, unspecified dementia type  Hepatitis C  Hypertension  Arthritis  Hypothyroidism  Hepatitis C  Hypertension    PSH:   No significant past surgical history    Medications:   furosemide    Tablet 20 milliGRAM(s) Oral daily  heparin  Injectable 5000 Unit(s) SubCutaneous every 12 hours  liothyronine 5 MICROGram(s) Oral daily  lisinopril 10 milliGRAM(s) Oral daily  ondansetron Injectable 4 milliGRAM(s) IV Push every 6 hours PRN    Allergies:  No Known Allergies    FAMILY HISTORY:  No pertinent family history in first degree relatives    Social History:   Smoking: nonsmoker  Alcohol: drinks hard liquor    Review of Systems:  [ ]Unable to obtain  Constitutional: No fever, chills, fatigue, or changes in weight  HEENT: No blurry vision, eye pain, headache, runny nose, or sore throat  Respiratory: No shortness of breath, cough, or wheezing  Cardiovascular: No chest pain or palpitations  Gastrointestinal: No nausea, vomiting, diarrhea, or abdominal pain  Genitourinary: No dysuria or incontinence  Extremities: No lower extremity swelling  Neurologic: No focal findings  Lymphatic: No lymphadenopathy   Skin: No rash  Psychiatry: No anxiety or depression  10 point review of systems is otherwise negative except as mentioned above            Physical Exam:  T(C): 36.8 (02-26-18 @ 15:26), Max: 36.8 (02-26-18 @ 15:26)  HR: 70 (02-26-18 @ 15:26) (69 - 84)  BP: 150/83 (02-26-18 @ 15:26) (135/82 - 162/82)  RR: 18 (02-26-18 @ 15:26) (16 - 18)  SpO2: 98% (02-26-18 @ 15:26) (96% - 100%)  Wt(kg): --    Daily Height in cm: 165.1 (26 Feb 2018 15:26)    Daily     Appearance: Normal, well groomed, NAD  Eyes: PERRLA, EOMI, pink conjunctiva, no scleral icterus   HENT: Normal oral mucosa  Cardiovascular: RRR, S1, S2, no murmur, rub, or gallop; no edema; no JVD  Respiratory: Clear to auscultation bilaterally  Gastrointestinal: Soft, non-tender, non-distended, BS+  Musculoskeletal: No clubbing or joint deformity   Neurologic: No focal weakness  Lymphatic: No lymphadenopathy  Psychiatry: awake and alert with appropriate mood and affect  Skin: No rashes, ecchymoses, or cyanosis    Cardiovascular Diagnostic Testing:    ECG: sinus 78 bpm with first degree AV delay and bifascicular block (RBBB, LAFB)    Echo: outpatient echo 8/3/2017 with normal LV systolic function, mild diastolic dysfunction, normal LA size, and normal pulmonary pressures    Labs:                        15.4   5.2   )-----------( 206      ( 26 Feb 2018 05:45 )             45.1     02-26    132<L>  |  94<L>  |  10  ----------------------------<  113<H>  3.9   |  24  |  0.61    Ca    9.6      26 Feb 2018 05:45    TPro  7.4  /  Alb  4.1  /  TBili  0.3  /  DBili  x   /  AST  47<H>  /  ALT  37  /  AlkPhos  94  02-25    PT/INR - ( 25 Feb 2018 14:56 )   PT: 10.3 sec;   INR: 0.95 ratio         PTT - ( 25 Feb 2018 14:56 )  PTT:33.0 sec  CARDIAC MARKERS ( 26 Feb 2018 05:45 )  x     / <0.01 ng/mL / 162 U/L / x     / 5.4 ng/mL  CARDIAC MARKERS ( 25 Feb 2018 18:35 )  x     / <0.01 ng/mL / 129 U/L / x     / 4.4 ng/mL  CARDIAC MARKERS ( 25 Feb 2018 14:56 )  x     / <0.01 ng/mL / 206 U/L / x     / 4.3 ng/mL      Serum Pro-Brain Natriuretic Peptide: 59 pg/mL (02-25 @ 14:56)        Thyroid Stimulating Hormone, Serum: 0.39 uIU/mL (02-26 @ 07:30)

## 2018-02-26 NOTE — H&P ADULT - PROBLEM SELECTOR PLAN 1
near syncopal episode, but very similar with similar associated symptoms as prior syncopal episodes  has ILR-will need EP eval in AM   cardiology (dr tanner) consult in AM  telemetry monitoring  recheck cardiac enzymes  fall precautions  Given that both episodes appear associated with meals, ? if this may be postprandial hypotension?  Had one episode of loose/watery bowel movement associated with near syncope but no further diarrhea at this time, monitor BMs  Had nausea/vomiting of food in ED, will monitor and give Zofran PRN. ? if patient may have some gastroenteritis but no leukocytosis or fevers or chills.

## 2018-02-26 NOTE — H&P ADULT - NSHPLABSRESULTS_GEN_ALL_CORE
Labs personally reviewed:                        15.2   5.4   )-----------( 201      ( 2018 14:56 )             45.0           134<L>  |  95<L>  |  18  ----------------------------<  91  4.1   |  25  |  0.78    Ca    9.4      2018 16:28    TPro  7.4  /  Alb  4.1  /  TBili  0.3  /  DBili  x   /  AST  47<H>  /  ALT  37  /  AlkPhos  94        CARDIAC MARKERS ( 2018 18:35 )  x     / <0.01 ng/mL / 129 U/L / x     / 4.4 ng/mL  CARDIAC MARKERS ( 2018 14:56 )  x     / <0.01 ng/mL / 206 U/L / x     / 4.3 ng/mL        LIVER FUNCTIONS - ( 2018 16:28 )  Alb: 4.1 g/dL / Pro: 7.4 g/dL / ALK PHOS: 94 U/L / ALT: 37 U/L RC / AST: 47 U/L / GGT: x             PT/INR - ( 2018 14:56 )   PT: 10.3 sec;   INR: 0.95 ratio         PTT - ( 2018 14:56 )  PTT:33.0 sec    Urinalysis Basic - ( 2018 17:50 )    Color: Yellow / Appearance: Clear / S.012 / pH: x  Gluc: x / Ketone: Trace  / Bili: Negative / Urobili: Negative   Blood: x / Protein: Negative / Nitrite: Negative   Leuk Esterase: Negative / RBC: 0-2 /HPF / WBC 0-2 /HPF   Sq Epi: x / Non Sq Epi: OCC /HPF / Bacteria: x      Imaging personally reviewed    EKG personally reviewed-trifascicular block

## 2018-02-27 ENCOUNTER — TRANSCRIPTION ENCOUNTER (OUTPATIENT)
Age: 83
End: 2018-02-27

## 2018-02-27 VITALS — WEIGHT: 149.47 LBS

## 2018-02-27 DIAGNOSIS — E03.9 HYPOTHYROIDISM, UNSPECIFIED: ICD-10-CM

## 2018-02-27 DIAGNOSIS — R06.02 SHORTNESS OF BREATH: ICD-10-CM

## 2018-02-27 DIAGNOSIS — G47.33 OBSTRUCTIVE SLEEP APNEA (ADULT) (PEDIATRIC): ICD-10-CM

## 2018-02-27 PROCEDURE — 99232 SBSQ HOSP IP/OBS MODERATE 35: CPT

## 2018-02-27 PROCEDURE — 84443 ASSAY THYROID STIM HORMONE: CPT

## 2018-02-27 PROCEDURE — 84484 ASSAY OF TROPONIN QUANT: CPT

## 2018-02-27 PROCEDURE — 85014 HEMATOCRIT: CPT

## 2018-02-27 PROCEDURE — 82550 ASSAY OF CK (CPK): CPT

## 2018-02-27 PROCEDURE — 85610 PROTHROMBIN TIME: CPT

## 2018-02-27 PROCEDURE — 70450 CT HEAD/BRAIN W/O DYE: CPT

## 2018-02-27 PROCEDURE — 80053 COMPREHEN METABOLIC PANEL: CPT

## 2018-02-27 PROCEDURE — 82330 ASSAY OF CALCIUM: CPT

## 2018-02-27 PROCEDURE — 99239 HOSP IP/OBS DSCHRG MGMT >30: CPT

## 2018-02-27 PROCEDURE — 82962 GLUCOSE BLOOD TEST: CPT

## 2018-02-27 PROCEDURE — 82435 ASSAY OF BLOOD CHLORIDE: CPT

## 2018-02-27 PROCEDURE — 80048 BASIC METABOLIC PNL TOTAL CA: CPT

## 2018-02-27 PROCEDURE — 71275 CT ANGIOGRAPHY CHEST: CPT

## 2018-02-27 PROCEDURE — 82553 CREATINE MB FRACTION: CPT

## 2018-02-27 PROCEDURE — 71045 X-RAY EXAM CHEST 1 VIEW: CPT

## 2018-02-27 PROCEDURE — 83605 ASSAY OF LACTIC ACID: CPT

## 2018-02-27 PROCEDURE — 84295 ASSAY OF SERUM SODIUM: CPT

## 2018-02-27 PROCEDURE — 83880 ASSAY OF NATRIURETIC PEPTIDE: CPT

## 2018-02-27 PROCEDURE — 85027 COMPLETE CBC AUTOMATED: CPT

## 2018-02-27 PROCEDURE — 85730 THROMBOPLASTIN TIME PARTIAL: CPT

## 2018-02-27 PROCEDURE — 99285 EMERGENCY DEPT VISIT HI MDM: CPT | Mod: 25

## 2018-02-27 PROCEDURE — 93005 ELECTROCARDIOGRAM TRACING: CPT

## 2018-02-27 PROCEDURE — 81001 URINALYSIS AUTO W/SCOPE: CPT

## 2018-02-27 PROCEDURE — 82803 BLOOD GASES ANY COMBINATION: CPT

## 2018-02-27 PROCEDURE — 82947 ASSAY GLUCOSE BLOOD QUANT: CPT

## 2018-02-27 PROCEDURE — 84132 ASSAY OF SERUM POTASSIUM: CPT

## 2018-02-27 RX ORDER — AMLODIPINE BESYLATE 2.5 MG/1
1 TABLET ORAL
Qty: 30 | Refills: 0 | OUTPATIENT

## 2018-02-27 RX ORDER — LISINOPRIL 2.5 MG/1
1 TABLET ORAL
Qty: 30 | Refills: 0 | OUTPATIENT
Start: 2018-02-27 | End: 2018-03-28

## 2018-02-27 RX ADMIN — Medication 20 MILLIGRAM(S): at 06:07

## 2018-02-27 RX ADMIN — LIOTHYRONINE SODIUM 5 MICROGRAM(S): 25 TABLET ORAL at 06:07

## 2018-02-27 RX ADMIN — LISINOPRIL 10 MILLIGRAM(S): 2.5 TABLET ORAL at 06:07

## 2018-02-27 RX ADMIN — HEPARIN SODIUM 5000 UNIT(S): 5000 INJECTION INTRAVENOUS; SUBCUTANEOUS at 06:07

## 2018-02-27 NOTE — DISCHARGE NOTE ADULT - PLAN OF CARE
resolution HOME CARE INSTRUCTIONS  Have someone stay with you until you feel stable.  Do not drive, operate machinery, or play sports until your caregiver says it is okay.  Keep all follow-up appointments as directed by your caregiver.   Lie down right away if you start feeling like you might faint. Breathe deeply and steadily. Wait until all the symptoms have passed.Drink enough fluids to keep your urine clear or pale yellow.  If you are taking blood pressure or heart medicine, get up slowly, taking several minutes to sit and then stand. This can reduce dizziness.  SEEK IMMEDIATE MEDICAL CARE IF:  You have a severe headache.  You have unusual pain in the chest, abdomen, or back.  You are bleeding from the mouth or rectum, or you have black or tarry stool.  You have an irregular or very fast heartbeat.  You have pain with breathing.  You have repeated fainting or seizure-like jerking during an episode.  You faint when sitting or lying down.  You have confusion.  You have difficulty walking.  You have severe weakness.  You have vision problems.  If you fainted, call your local emergency services (_____________________). Do not drive yourself to the hospital Low salt diet  Activity as tolerated.  Take all medication as prescribed.  Follow up with your medical doctor for routine blood pressure monitoring at your next visit.  Notify your doctor if you have any of the following symptoms:   Dizziness, Lightheadedness, Blurry vision, Headache, Chest pain, Shortness of breath Encourage PO intake   Follow-up with your primary care doctor The following suggestions are intended for the person living with, or caring for, the person with dementia. Create a safe environment. Use a monitoring device to alert you to falls or other needs for help. Reduce confusion. SEEK MEDICAL CARE IF:  New behavioral problems start such as moodiness, aggressiveness, or seeing things that are not there (hallucinations).Any new problem with brain function happens. This includes problems with balance, speech, or falling a lot. Problems with swallowing develop.  Any symptoms of other illness happen.  Small changes or worsening in any aspect of brain function can be a sign that the illness is getting worse. It can also be a sign of another medical illness such as infection. Seeing a caregiver right away is important.  SEEK IMMEDIATE MEDICAL CARE IF:  A fever develops.  New or worsened confusion develops. Take medication as directed  Follow-up with your primary care doctor or Endocrinologist Follow-up with your Pulmonologist

## 2018-02-27 NOTE — DISCHARGE NOTE ADULT - SECONDARY DIAGNOSIS.
Essential hypertension Hyponatremia Dementia without behavioral disturbance, unspecified dementia type Hypothyroidism МАРИНА (obstructive sleep apnea)

## 2018-02-27 NOTE — CHART NOTE - NSCHARTNOTEFT_GEN_A_CORE
Pt's daughter Sondra Garcia (HCP), refused PT evaluation for home physical therapy, stating patient has outpatient physical therapy in place. Pt ambulating without difficulty.     Urszula Jenkins  [employer] [id number]

## 2018-02-27 NOTE — PROGRESS NOTE ADULT - PROBLEM SELECTOR PLAN 6
DVT PPX: HSQ  PT/OT consult  d/w Daughter Sondra HCP at bedside. All ques answered.  DISPO: likely d/c tomorrow pending clinicaly improvement
as per cards

## 2018-02-27 NOTE — DISCHARGE NOTE ADULT - CARE PROVIDER_API CALL
Johnny Leija), Cardiology; Internal Medicine  [address]  [address]  [place] [address]  Phone: [phone]  Fax: [phone]    Stalin Pérez), Internal Medicine; Pulmonary Disease  [address]  [place] [address]  Phone: [phone]  Fax: [phone]

## 2018-02-27 NOTE — DISCHARGE NOTE ADULT - MEDICATION SUMMARY - MEDICATIONS TO TAKE
I will START or STAY ON the medications listed below when I get home from the hospital:    lisinopril 10 mg oral tablet  -- 1 tab(s) by mouth once a day  -- Indication: For High blood pressure    furosemide 20 mg oral tablet  -- 1 tab(s) by mouth once a day  -- Indication: For Diuretic    liothyronine 5 mcg oral tablet  -- 1 tab(s) by mouth once a day  -- Indication: For Hypothyroidism

## 2018-02-27 NOTE — DISCHARGE NOTE ADULT - PATIENT PORTAL LINK FT
You can access the [facility] Portal, offered by [facility], by registering with the following website: [email or web address]

## 2018-02-27 NOTE — PROGRESS NOTE ADULT - PROBLEM SELECTOR PLAN 7
GI prophylaxis:  PPI pre breakfast  aspiration precautions-all meals are to OOB as able  DVT prophlaxis:  subcutaneous lovenox or heparin as per primary team  sequential teds stockings  early ambulation

## 2018-02-27 NOTE — PROGRESS NOTE ADULT - ASSESSMENT
89 F with h/o dementia, Hep C s/p treatment, HTN, HFpEF, hypothyroidism, recent admission October 2017 for syncopal episode and s/p ILR presented on 2/25 for presyncopal episode at home. Daughter reports that patient stood up after eating, complained of lightheadedness and suddenly urge to have a bowel movement.  In October she had syncopized at home and per records similarly occurred post prandially.   Denies SOB (other than baseline on exertion attributed to her CHF), chest pain, fevers or chills, no recent illnesses. Currently sitting up on the stretcher, NAD.  Was seen by Dr. Pérez 2 weeks ago for symptoms of SOB - PFTs were WNL as well as 6min walk test.     A/P:  Presyncopal episode - postprandial. Labs only significant for mild hyponatremia/hypochloridemia - likely mild dehydration. Would encourage PO intake.  Currently without complaints, vitals stable. Not hypoxemic and Chest radiology clear for consolidations or effusions.  Cardiac work up negative thusfar with CE-x3, BNP WNL.0 Seen by EP - no events recorded since last interrogation 10 days ago, NSR  No indication for bronchodilators at this time.    Thank you for the consult  Dr. Pérez to follow up 2/27/17  D/w daughter, patient and Dr. Barron. 89 F with h/o dementia, Hep C s/p treatment, HTN, HFpEF, hypothyroidism, recent admission October 2017 for syncopal episode and s/p ILR presented on 2/25 for presyncopal episode at home. Daughter reports that patient stood up after eating, complained of lightheadedness and suddenly urge to have a bowel movement.  In October she had syncopized at home and per records similarly occurred post prandially.   Denies SOB (other than baseline on exertion attributed to her CHF), chest pain, fevers or chills, no recent illnesses. Currently sitting up on the stretcher, NAD.  Was seen by Dr. Pérez 2 weeks ago for symptoms of SOB - PFTs were WNL as well as 6min walk test.     A/P:  Presyncopal episode - postprandial. Labs only significant for mild hyponatremia/hypochloridemia - likely mild dehydration. Would encourage PO intake.  Currently without complaints, vitals stable. Not hypoxemic and Chest radiology clear for consolidations or effusions.  Cardiac work up negative thusfar with CE-x3, BNP WNL.0 Seen by EP - no events recorded since last interrogation 10 days ago, NSR  No indication for bronchodilators at this time.

## 2018-02-27 NOTE — DISCHARGE NOTE ADULT - HOSPITAL COURSE
89 y/o F with PMH OA, dementia, Hep C (s/p treatment in remission), HTN w/ hx of HTN urgency, HFpEF, hypothyroidism, hx of syncope 10/2017 d/t conduction abn/trifascicular block (not on BB) s/p ILR p/w lightheadedness/presyncope/N x 1 d, found +orthostasis.     Problem/Plan - 1:  ·  Problem: Near syncope.  Plan: near syncopal episode, w/ hx of syncope 10/2017 w/ ILR placed. Likely due to orthostasis from dehydration in setting of food poisoning/viral gastroenteritis. ACS r/o'ed neg tropx3. CTH neg. CTA neg for PE.   - consulted cardiology Dr Leija - f/u: encourage PO intake, c/w medication, f/u as outpt  - d/w EP NP - ILR interrogated no events  - telemetry monitoring - NSR since overnight  - fall precautions  - S/P IVF in ED. encourage PO intake  - monitor symp - c/w zofran iv prn  - compression stockings for orthostasis likely due to deconditioning/dehydration.      Problem/Plan - 2:  ·  Problem: Essential hypertension.  Plan: BP controlled. Would be cautious to not drop BP too suddenly given orthostasis. hx of HTN urgency in past.  - c/w home lisinopril 10 mg qd  - holding amlodipine - restart if SBP >160 consistently  - monitor BP  - c/w lasix home dose w/ hold parameters.      Problem/Plan - 3:  ·  Problem: Hypothyroidism, unspecified type.  Plan: TSH wnl.  - Continue liothyronine.      Problem/Plan - 4:  ·  Problem: Hyponatremia.  Plan: asymptomatic, mild. Na 130s  - monitor  - if worsens, will check urine lytes, etc.      Problem/Plan - 5:  ·  Problem: Dementia without behavioral disturbance, unspecified dementia type.  Plan: aaox1 baseline, at baseline.  - monitor.     F/u as outpatient, Daughter refusing PT for home pt eval at this time as pt as outpatient PT scheduled 89 y/o F with PMH OA, dementia, Hep C (s/p treatment in remission), HTN w/ hx of HTN urgency, HFpEF, hypothyroidism, hx of syncope 10/2017 d/t conduction abn/trifascicular block (not on BB) s/p ILR p/w lightheadedness/presyncope/N x 1 d, found +orthostasis.     Admitted patient for presyncope likely from dehydration, received fluid bolus in the ED. Patient was seen by EP, ILR interrogated no events, no tele events.   Patient ambulating with assistance. Continue with home medications, follow up with cards Dr. Leija out patient. Problem/Plan - 5:  F/u as outpatient, Daughter refusing PT for home pt eval at this time as pt has outpatient PT scheduled     Discharge Diagnosis  Near Syncope with orthostatic Hypotension  Deconditioning  Hep C   HTN   OA   Hypothyroidism

## 2018-02-27 NOTE — DISCHARGE NOTE ADULT - CARE PLAN
Principal Discharge DX:	Near syncope  Goal:	resolution  Assessment and plan of treatment:	HOME CARE INSTRUCTIONS  Have someone stay with you until you feel stable.  Do not drive, operate machinery, or play sports until your caregiver says it is okay.  Keep all follow-up appointments as directed by your caregiver.   Lie down right away if you start feeling like you might faint. Breathe deeply and steadily. Wait until all the symptoms have passed.Drink enough fluids to keep your urine clear or pale yellow.  If you are taking blood pressure or heart medicine, get up slowly, taking several minutes to sit and then stand. This can reduce dizziness.  SEEK IMMEDIATE MEDICAL CARE IF:  You have a severe headache.  You have unusual pain in the chest, abdomen, or back.  You are bleeding from the mouth or rectum, or you have black or tarry stool.  You have an irregular or very fast heartbeat.  You have pain with breathing.  You have repeated fainting or seizure-like jerking during an episode.  You faint when sitting or lying down.  You have confusion.  You have difficulty walking.  You have severe weakness.  You have vision problems.  If you fainted, call your local emergency services (_____________________). Do not drive yourself to the hospital  Secondary Diagnosis:	Essential hypertension  Assessment and plan of treatment:	Low salt diet  Activity as tolerated.  Take all medication as prescribed.  Follow up with your medical doctor for routine blood pressure monitoring at your next visit.  Notify your doctor if you have any of the following symptoms:   Dizziness, Lightheadedness, Blurry vision, Headache, Chest pain, Shortness of breath  Secondary Diagnosis:	Hyponatremia  Assessment and plan of treatment:	Encourage PO intake   Follow-up with your primary care doctor  Secondary Diagnosis:	Dementia without behavioral disturbance, unspecified dementia type  Assessment and plan of treatment:	The following suggestions are intended for the person living with, or caring for, the person with dementia. Create a safe environment. Use a monitoring device to alert you to falls or other needs for help. Reduce confusion. SEEK MEDICAL CARE IF:  New behavioral problems start such as moodiness, aggressiveness, or seeing things that are not there (hallucinations).Any new problem with brain function happens. This includes problems with balance, speech, or falling a lot. Problems with swallowing develop.  Any symptoms of other illness happen.  Small changes or worsening in any aspect of brain function can be a sign that the illness is getting worse. It can also be a sign of another medical illness such as infection. Seeing a caregiver right away is important.  SEEK IMMEDIATE MEDICAL CARE IF:  A fever develops.  New or worsened confusion develops.  Secondary Diagnosis:	Hypothyroidism  Assessment and plan of treatment:	Take medication as directed  Follow-up with your primary care doctor or Endocrinologist  Secondary Diagnosis:	МАРИНА (obstructive sleep apnea)  Assessment and plan of treatment:	Follow-up with your Pulmonologist

## 2018-02-27 NOTE — PROGRESS NOTE ADULT - SUBJECTIVE AND OBJECTIVE BOX
CHIEF COMPLAINT:    Interval Events:    REVIEW OF SYSTEMS:  Constitutional: No fevers or chills. No weight loss. No fatigue or generalized malaise.  Eyes: No itching or discharge from the eyes  ENT: No ear pain. No ear discharge. No nasal congestion. No post nasal drip. No epistaxis. No throat pain. No sore throat. No difficulty swallowing.   CV: No chest pain. No palpitations. No lightheadedness or dizziness.   Resp: No dyspnea at rest. No dyspnea on exertion. No orthopnea. No wheezing. No cough. No stridor. No sputum production. No chest pain with respiration.  GI: No nausea. No vomiting. No diarrhea.  MSK: No joint pain or pain in any extremities  Integumentary: No skin lesions. No pedal edema.  Neurological: No gross motor weakness. No sensory changes.  [ ] All other systems negative  [ ] Unable to assess ROS because ________    OBJECTIVE:  ICU Vital Signs Last 24 Hrs  T(C): 36.4 (2018 05:35), Max: 36.8 (2018 15:26)  T(F): 97.5 (2018 05:35), Max: 98.2 (2018 15:26)  HR: 66 (2018 05:35) (65 - 81)  BP: 168/91 (2018 05:35) (135/82 - 168/91)  BP(mean): --  ABP: --  ABP(mean): --  RR: 18 (2018 05:35) (18 - 18)  SpO2: 97% (2018 05:35) (97% - 99%)         @ 07:01  -   @ 06:12  --------------------------------------------------------  IN: 480 mL / OUT: 0 mL / NET: 480 mL      CAPILLARY BLOOD GLUCOSE      POCT Blood Glucose.: 100 mg/dL (2018 14:21)      PHYSICAL EXAM:  General: Awake, alert, oriented X 3.   HEENT: Atraumatic, normocephalic.                 Mallampatti Grade                 No nasal congestion.                No tonsillar or pharyngeal exudates.  Lymph Nodes: No palpable lymphadenopathy  Neck: No JVD. No carotid bruit.   Respiratory: Normal chest expansion                         Normal percussion                         Normal and equal air entry                         No wheeze, rhonchi or rales.  Cardiovascular: S1 S2 normal. No murmurs, rubs or gallops.   Abdomen: Soft, non-tender, non-distended. No organomegaly.  Extremities: Warm to touch. Peripheral pulse palpable. No pedal edema.   Skin: No rashes or skin lesions  Neurological: Motor and sensory examination equal and normal in all four extremities.  Psychiatry: Appropriate mood and affect.    HOSPITAL MEDICATIONS:  MEDICATIONS  (STANDING):  furosemide    Tablet 20 milliGRAM(s) Oral daily  heparin  Injectable 5000 Unit(s) SubCutaneous every 12 hours  liothyronine 5 MICROGram(s) Oral daily  lisinopril 10 milliGRAM(s) Oral daily    MEDICATIONS  (PRN):      LABS:                        15.4   5.2   )-----------( 206      ( 2018 05:45 )             45.1         132<L>  |  94<L>  |  10  ----------------------------<  113<H>  3.9   |  24  |  0.61    Ca    9.6      2018 05:45    TPro  7.4  /  Alb  4.1  /  TBili  0.3  /  DBili  x   /  AST  47<H>  /  ALT  37  /  AlkPhos  94      PT/INR - ( 2018 14:56 )   PT: 10.3 sec;   INR: 0.95 ratio         PTT - ( 2018 14:56 )  PTT:33.0 sec  Urinalysis Basic - ( 2018 17:50 )    Color: Yellow / Appearance: Clear / S.012 / pH: x  Gluc: x / Ketone: Trace  / Bili: Negative / Urobili: Negative   Blood: x / Protein: Negative / Nitrite: Negative   Leuk Esterase: Negative / RBC: 0-2 /HPF / WBC 0-2 /HPF   Sq Epi: x / Non Sq Epi: OCC /HPF / Bacteria: x        Venous Blood Gas:   @ 14:56  7.38/47/30/27/53  VBG Lactate: 2.0      MICROBIOLOGY:     RADIOLOGY:  [ ] Reviewed and interpreted by me    Point of Care Ultrasound Findings:    PFT:    EKG: CHIEF COMPLAINT: better over all-orientation off--some sob--no cough or wheeze--good sleep    Interval Events: cards f/up    REVIEW OF SYSTEMS:  Constitutional: No fevers or chills. No weight loss. No fatigue or generalized malaise.  Eyes: No itching or discharge from the eyes  ENT: No ear pain. No ear discharge. No nasal congestion. No post nasal drip. No epistaxis. No throat pain. No sore throat. No difficulty swallowing.   CV: No chest pain. No palpitations. No lightheadedness or dizziness.   Resp: No dyspnea at rest. No dyspnea on exertion. No orthopnea. No wheezing. No cough. No stridor. No sputum production. No chest pain with respiration.  GI: No nausea. No vomiting. No diarrhea.  MSK: No joint pain or pain in any extremities  Integumentary: No skin lesions. No pedal edema.  Neurological: No gross motor weakness. No sensory changes.  [+ ] All other systems negative  [ ] Unable to assess ROS because ________    OBJECTIVE:  ICU Vital Signs Last 24 Hrs  T(C): 36.4 (2018 05:35), Max: 36.8 (2018 15:26)  T(F): 97.5 (2018 05:35), Max: 98.2 (2018 15:26)  HR: 66 (2018 05:35) (65 - 81)  BP: 168/91 (2018 05:35) (135/82 - 168/91)  BP(mean): --  ABP: --  ABP(mean): --  RR: 18 (2018 05:35) (18 - 18)  SpO2: 97% (2018 05:35) (97% - 99%)         @ 07:01  -   @ 06:12  --------------------------------------------------------  IN: 480 mL / OUT: 0 mL / NET: 480 mL      CAPILLARY BLOOD GLUCOSE      POCT Blood Glucose.: 100 mg/dL (2018 14:21)      PHYSICAL EXAM: NAD in bed  General: Awake, alert, oriented X 3.   HEENT: Atraumatic, normocephalic.                 Mallampatti Grade 2                No nasal congestion.                No tonsillar or pharyngeal exudates.  Lymph Nodes: No palpable lymphadenopathy  Neck: No JVD. No carotid bruit.   Respiratory: Normal chest expansion                         Normal percussion                         Normal and equal air entry                         No wheeze, rhonchi or rales.  Cardiovascular: S1 S2 normal. No murmurs, rubs or gallops.   Abdomen: Soft, non-tender, non-distended. No organomegaly.  Extremities: Warm to touch. Peripheral pulse palpable. No pedal edema.   Skin: No rashes or skin lesions  Neurological: Motor and sensory examination equal and normal in all four extremities.  Psychiatry: Appropriate mood and affect.    HOSPITAL MEDICATIONS:  MEDICATIONS  (STANDING):  furosemide    Tablet 20 milliGRAM(s) Oral daily  heparin  Injectable 5000 Unit(s) SubCutaneous every 12 hours  liothyronine 5 MICROGram(s) Oral daily  lisinopril 10 milliGRAM(s) Oral daily    MEDICATIONS  (PRN):      LABS:                        15.4   5.2   )-----------( 206      ( 2018 05:45 )             45.1     02-26    132<L>  |  94<L>  |  10  ----------------------------<  113<H>  3.9   |  24  |  0.61    Ca    9.6      2018 05:45    TPro  7.4  /  Alb  4.1  /  TBili  0.3  /  DBili  x   /  AST  47<H>  /  ALT  37  /  AlkPhos  94  02-25    PT/INR - ( 2018 14:56 )   PT: 10.3 sec;   INR: 0.95 ratio         PTT - ( 2018 14:56 )  PTT:33.0 sec  Urinalysis Basic - ( 2018 17:50 )    Color: Yellow / Appearance: Clear / S.012 / pH: x  Gluc: x / Ketone: Trace  / Bili: Negative / Urobili: Negative   Blood: x / Protein: Negative / Nitrite: Negative   Leuk Esterase: Negative / RBC: 0-2 /HPF / WBC 0-2 /HPF   Sq Epi: x / Non Sq Epi: OCC /HPF / Bacteria: x        Venous Blood Gas:  02-25 @ 14:56  7.38/47/30/27/53  VBG Lactate: 2.0      MICROBIOLOGY:     RADIOLOGY:  [ ] Reviewed and interpreted by me    Point of Care Ultrasound Findings:    PFT:    EKG:

## 2018-02-27 NOTE — CHART NOTE - NSCHARTNOTEFT_GEN_A_CORE
Patient seen and examined. Patient was admitted with near syncope likely from orthostatic hypotension, received fluid bolus. Patient was seen by EP, interrogated ILR, no events.   Reviewed labs, patient walking with assistance. Patient stable for discharge.   Patient agreed to have follow up with clyde Leija as out patient. Continue with home medications.

## 2018-02-27 NOTE — DISCHARGE NOTE ADULT - ADDITIONAL INSTRUCTIONS
Follow-up with your Primary Care Doctor in one week  Follow-up with your Pulmonologist in one to two weeks  Follow-up with your Cardiologist in one to two weeks

## 2018-02-27 NOTE — DISCHARGE NOTE ADULT - INSTRUCTIONS
Lactose restricted soft diet Follow discharge instructions as written by the doctor. Take all medications as prescribed by the doctor.

## 2018-02-27 NOTE — PROGRESS NOTE ADULT - PROBLEM SELECTOR PLAN 1
near syncopal episode, w/ hx of syncope 10/2017 w/ ILR placed. Likely due to orthostasis from dehydration in setting of food poisoning/viral gastroenteritis. ACS r/o'ed neg tropx3. CTH neg. CTA neg for PE.   - consulted cardiology Dr Leija - f/u  - d/w EP NP - ILR interrogated no events  - telemetry monitoring - NSR since overnight  - fall precautions  - S/P IVF in ED. encourage PO intake  - monitor symp - c/w zofran iv prn  - compression stockings for orthostasis likely due to deconditioning/dehydration
?etiology--electrolytes etc; ? dehydration--?cards

## 2018-02-27 NOTE — PROGRESS NOTE ADULT - ATTENDING COMMENTS
Mariella Barron MD  HOSPITALIST  PAGER #[phone]
as above  near syncope work up in progress-cards/neuro, electrolyte adjustments  No additional BD rx at present    Stalin Pérez MD-Pulmonary   [phone]

## 2018-02-27 NOTE — PROGRESS NOTE ADULT - PROBLEM SELECTOR PLAN 2
BP controlled. Would be cautious to not drop BP too suddenly given orthostasis. hx of HTN urgency in past.  - c/w home lisinopril 10 mg qd  - holding amlodipine - restart if SBP >160 consistently  - monitor BP  - c/w lasix home dose w/ hold parameters
adjusting meds

## 2018-02-27 NOTE — DISCHARGE NOTE ADULT - CARE PROVIDERS DIRECT ADDRESSES
,[email or web address]

## 2018-03-01 ENCOUNTER — MEDICATION RENEWAL (OUTPATIENT)
Age: 83
End: 2018-03-01

## 2018-03-13 ENCOUNTER — APPOINTMENT (OUTPATIENT)
Dept: ELECTROPHYSIOLOGY | Facility: CLINIC | Age: 83
End: 2018-03-13

## 2018-03-20 ENCOUNTER — APPOINTMENT (OUTPATIENT)
Dept: ELECTROPHYSIOLOGY | Facility: CLINIC | Age: 83
End: 2018-03-20

## 2018-03-20 ENCOUNTER — APPOINTMENT (OUTPATIENT)
Dept: PULMONOLOGY | Facility: CLINIC | Age: 83
End: 2018-03-20

## 2018-04-12 ENCOUNTER — APPOINTMENT (OUTPATIENT)
Dept: CARDIOLOGY | Facility: CLINIC | Age: 83
End: 2018-04-12
Payer: MEDICARE

## 2018-04-12 ENCOUNTER — NON-APPOINTMENT (OUTPATIENT)
Age: 83
End: 2018-04-12

## 2018-04-12 VITALS
HEART RATE: 76 BPM | OXYGEN SATURATION: 98 % | HEIGHT: 65 IN | DIASTOLIC BLOOD PRESSURE: 80 MMHG | SYSTOLIC BLOOD PRESSURE: 146 MMHG

## 2018-04-12 PROCEDURE — 93000 ELECTROCARDIOGRAM COMPLETE: CPT

## 2018-04-12 PROCEDURE — 99214 OFFICE O/P EST MOD 30 MIN: CPT

## 2018-04-12 RX ORDER — NITROFURANTOIN (MONOHYDRATE/MACROCRYSTALS) 25; 75 MG/1; MG/1
100 CAPSULE ORAL
Qty: 6 | Refills: 0 | Status: DISCONTINUED | COMMUNITY
Start: 2017-10-02 | End: 2018-04-12

## 2018-04-19 ENCOUNTER — APPOINTMENT (OUTPATIENT)
Dept: PULMONOLOGY | Facility: CLINIC | Age: 83
End: 2018-04-19

## 2018-04-24 ENCOUNTER — APPOINTMENT (OUTPATIENT)
Dept: ELECTROPHYSIOLOGY | Facility: CLINIC | Age: 83
End: 2018-04-24

## 2018-05-01 ENCOUNTER — APPOINTMENT (OUTPATIENT)
Dept: ELECTROPHYSIOLOGY | Facility: CLINIC | Age: 83
End: 2018-05-01
Payer: MEDICARE

## 2018-05-01 PROCEDURE — 93298 REM INTERROG DEV EVAL SCRMS: CPT

## 2018-05-01 PROCEDURE — 93299: CPT

## 2018-05-24 ENCOUNTER — APPOINTMENT (OUTPATIENT)
Dept: ELECTROPHYSIOLOGY | Facility: CLINIC | Age: 83
End: 2018-05-24
Payer: MEDICARE

## 2018-05-24 VITALS — SYSTOLIC BLOOD PRESSURE: 136 MMHG | HEART RATE: 67 BPM | OXYGEN SATURATION: 98 % | DIASTOLIC BLOOD PRESSURE: 81 MMHG

## 2018-05-24 PROCEDURE — 93285 PRGRMG DEV EVAL SCRMS IP: CPT

## 2018-06-01 ENCOUNTER — RX RENEWAL (OUTPATIENT)
Age: 83
End: 2018-06-01

## 2018-06-06 ENCOUNTER — RX RENEWAL (OUTPATIENT)
Age: 83
End: 2018-06-06

## 2018-06-12 ENCOUNTER — APPOINTMENT (OUTPATIENT)
Dept: ELECTROPHYSIOLOGY | Facility: CLINIC | Age: 83
End: 2018-06-12
Payer: MEDICARE

## 2018-06-12 PROCEDURE — 93299: CPT

## 2018-06-12 PROCEDURE — 93298 REM INTERROG DEV EVAL SCRMS: CPT

## 2018-06-28 NOTE — H&P ADULT - PROBLEM SELECTOR PLAN 2
none Holding amlodipine given syncopal episodes and question of orthostasis  Recheck orthostatic vs in AM

## 2018-07-02 ENCOUNTER — RX RENEWAL (OUTPATIENT)
Age: 83
End: 2018-07-02

## 2018-07-20 ENCOUNTER — APPOINTMENT (OUTPATIENT)
Dept: CARDIOLOGY | Facility: CLINIC | Age: 83
End: 2018-07-20
Payer: MEDICARE

## 2018-07-20 ENCOUNTER — MEDICATION RENEWAL (OUTPATIENT)
Age: 83
End: 2018-07-20

## 2018-07-20 PROBLEM — F03.90 UNSPECIFIED DEMENTIA WITHOUT BEHAVIORAL DISTURBANCE: Chronic | Status: ACTIVE | Noted: 2017-09-28

## 2018-07-20 PROBLEM — F03.90 UNSPECIFIED DEMENTIA, UNSPECIFIED SEVERITY, WITHOUT BEHAVIORAL DISTURBANCE, PSYCHOTIC DISTURBANCE, MOOD DISTURBANCE, AND ANXIETY: Chronic | Status: ACTIVE | Noted: 2017-09-28

## 2018-07-20 PROCEDURE — 36415 COLL VENOUS BLD VENIPUNCTURE: CPT

## 2018-07-23 ENCOUNTER — APPOINTMENT (OUTPATIENT)
Dept: CARDIOLOGY | Facility: CLINIC | Age: 83
End: 2018-07-23
Payer: MEDICARE

## 2018-07-23 ENCOUNTER — NON-APPOINTMENT (OUTPATIENT)
Age: 83
End: 2018-07-23

## 2018-07-23 VITALS
HEART RATE: 74 BPM | OXYGEN SATURATION: 96 % | WEIGHT: 158 LBS | DIASTOLIC BLOOD PRESSURE: 79 MMHG | SYSTOLIC BLOOD PRESSURE: 138 MMHG | BODY MASS INDEX: 26.29 KG/M2

## 2018-07-23 LAB
ANION GAP SERPL CALC-SCNC: 13 MMOL/L
BUN SERPL-MCNC: 15 MG/DL
CALCIUM SERPL-MCNC: 9.4 MG/DL
CHLORIDE SERPL-SCNC: 97 MMOL/L
CO2 SERPL-SCNC: 26 MMOL/L
CREAT SERPL-MCNC: 0.65 MG/DL
GLUCOSE SERPL-MCNC: 94 MG/DL
POTASSIUM SERPL-SCNC: 4.2 MMOL/L
SODIUM SERPL-SCNC: 136 MMOL/L

## 2018-07-23 PROCEDURE — 99215 OFFICE O/P EST HI 40 MIN: CPT

## 2018-07-23 PROCEDURE — 93000 ELECTROCARDIOGRAM COMPLETE: CPT

## 2018-08-01 ENCOUNTER — RX RENEWAL (OUTPATIENT)
Age: 83
End: 2018-08-01

## 2018-08-09 ENCOUNTER — APPOINTMENT (OUTPATIENT)
Dept: ELECTROPHYSIOLOGY | Facility: CLINIC | Age: 83
End: 2018-08-09
Payer: MEDICARE

## 2018-08-09 PROCEDURE — 93298 REM INTERROG DEV EVAL SCRMS: CPT

## 2018-08-14 ENCOUNTER — NON-APPOINTMENT (OUTPATIENT)
Age: 83
End: 2018-08-14

## 2018-08-14 ENCOUNTER — APPOINTMENT (OUTPATIENT)
Dept: CARDIOLOGY | Facility: CLINIC | Age: 83
End: 2018-08-14
Payer: MEDICARE

## 2018-08-14 VITALS
BODY MASS INDEX: 25.99 KG/M2 | WEIGHT: 156 LBS | OXYGEN SATURATION: 98 % | SYSTOLIC BLOOD PRESSURE: 132 MMHG | HEART RATE: 85 BPM | HEIGHT: 65 IN | DIASTOLIC BLOOD PRESSURE: 78 MMHG

## 2018-08-14 PROCEDURE — 93000 ELECTROCARDIOGRAM COMPLETE: CPT

## 2018-08-14 PROCEDURE — 99215 OFFICE O/P EST HI 40 MIN: CPT

## 2018-08-14 RX ORDER — AMOXICILLIN AND CLAVULANATE POTASSIUM 875; 125 MG/1; MG/1
875-125 TABLET, COATED ORAL
Qty: 20 | Refills: 1 | Status: DISCONTINUED | COMMUNITY
Start: 2018-07-23 | End: 2018-08-14

## 2018-08-14 RX ORDER — RIVAROXABAN 15 MG/1
15 TABLET, FILM COATED ORAL TWICE DAILY
Qty: 42 | Refills: 0 | Status: DISCONTINUED | COMMUNITY
Start: 2018-07-23 | End: 2018-08-14

## 2018-08-23 ENCOUNTER — FORM ENCOUNTER (OUTPATIENT)
Age: 83
End: 2018-08-23

## 2018-08-24 ENCOUNTER — OUTPATIENT (OUTPATIENT)
Dept: OUTPATIENT SERVICES | Facility: HOSPITAL | Age: 83
LOS: 1 days | End: 2018-08-24
Payer: MEDICARE

## 2018-08-24 ENCOUNTER — APPOINTMENT (OUTPATIENT)
Dept: GERIATRICS | Facility: CLINIC | Age: 83
End: 2018-08-24
Payer: MEDICARE

## 2018-08-24 ENCOUNTER — APPOINTMENT (OUTPATIENT)
Dept: RADIOLOGY | Facility: CLINIC | Age: 83
End: 2018-08-24
Payer: MEDICARE

## 2018-08-24 VITALS
BODY MASS INDEX: 25.63 KG/M2 | WEIGHT: 154 LBS | OXYGEN SATURATION: 98 % | HEART RATE: 78 BPM | TEMPERATURE: 97.5 F | SYSTOLIC BLOOD PRESSURE: 120 MMHG | DIASTOLIC BLOOD PRESSURE: 62 MMHG

## 2018-08-24 DIAGNOSIS — L03.113 CELLULITIS OF RIGHT UPPER LIMB: ICD-10-CM

## 2018-08-24 PROCEDURE — 73140 X-RAY EXAM OF FINGER(S): CPT

## 2018-08-24 PROCEDURE — 73140 X-RAY EXAM OF FINGER(S): CPT | Mod: 26,RT

## 2018-08-24 PROCEDURE — 99214 OFFICE O/P EST MOD 30 MIN: CPT | Mod: GC

## 2018-09-04 ENCOUNTER — RX RENEWAL (OUTPATIENT)
Age: 83
End: 2018-09-04

## 2018-09-14 ENCOUNTER — MEDICATION RENEWAL (OUTPATIENT)
Age: 83
End: 2018-09-14

## 2018-09-25 ENCOUNTER — APPOINTMENT (OUTPATIENT)
Dept: ORTHOPEDIC SURGERY | Facility: CLINIC | Age: 83
End: 2018-09-25
Payer: MEDICARE

## 2018-09-25 VITALS
WEIGHT: 152 LBS | BODY MASS INDEX: 25.95 KG/M2 | SYSTOLIC BLOOD PRESSURE: 134 MMHG | HEART RATE: 82 BPM | DIASTOLIC BLOOD PRESSURE: 77 MMHG | HEIGHT: 64 IN

## 2018-09-25 DIAGNOSIS — Z78.9 OTHER SPECIFIED HEALTH STATUS: ICD-10-CM

## 2018-09-25 DIAGNOSIS — Z87.39 PERSONAL HISTORY OF OTHER DISEASES OF THE MUSCULOSKELETAL SYSTEM AND CONNECTIVE TISSUE: ICD-10-CM

## 2018-09-25 DIAGNOSIS — Z80.3 FAMILY HISTORY OF MALIGNANT NEOPLASM OF BREAST: ICD-10-CM

## 2018-09-25 DIAGNOSIS — Z86.79 PERSONAL HISTORY OF OTHER DISEASES OF THE CIRCULATORY SYSTEM: ICD-10-CM

## 2018-09-25 DIAGNOSIS — Z82.62 FAMILY HISTORY OF OSTEOPOROSIS: ICD-10-CM

## 2018-09-25 PROCEDURE — 99203 OFFICE O/P NEW LOW 30 MIN: CPT

## 2018-10-09 ENCOUNTER — APPOINTMENT (OUTPATIENT)
Dept: ELECTROPHYSIOLOGY | Facility: CLINIC | Age: 83
End: 2018-10-09
Payer: MEDICARE

## 2018-10-09 PROCEDURE — 93299: CPT

## 2018-10-09 PROCEDURE — 93298 REM INTERROG DEV EVAL SCRMS: CPT

## 2018-10-11 ENCOUNTER — MEDICATION RENEWAL (OUTPATIENT)
Age: 83
End: 2018-10-11

## 2018-11-21 ENCOUNTER — MEDICATION RENEWAL (OUTPATIENT)
Age: 83
End: 2018-11-21

## 2018-12-04 ENCOUNTER — NON-APPOINTMENT (OUTPATIENT)
Age: 83
End: 2018-12-04

## 2018-12-04 ENCOUNTER — APPOINTMENT (OUTPATIENT)
Dept: CARDIOLOGY | Facility: CLINIC | Age: 83
End: 2018-12-04
Payer: MEDICARE

## 2018-12-04 VITALS
DIASTOLIC BLOOD PRESSURE: 68 MMHG | BODY MASS INDEX: 27.46 KG/M2 | SYSTOLIC BLOOD PRESSURE: 147 MMHG | WEIGHT: 160 LBS | HEART RATE: 83 BPM | OXYGEN SATURATION: 98 %

## 2018-12-04 PROCEDURE — 93000 ELECTROCARDIOGRAM COMPLETE: CPT

## 2018-12-04 PROCEDURE — 99214 OFFICE O/P EST MOD 30 MIN: CPT

## 2018-12-04 NOTE — DISCUSSION/SUMMARY
[FreeTextEntry1] : The patient is a very pleasant 89 year-old woman who presents today for pedal edema,and  follow-up of her poor exercise capacity and shortness of breath with even minimal exertion. Patient has clear lungs, mild lower extremity edema, and a loud S2 which raises the possibility of pulmonary hypertension, although none was seen on a recent echocardiogram. Suspect symptom of dyspnea on minimal exertion is related to labile blood pressure.  Given normal oxygen saturation on room and and unchanged ECG, no evidence of significant PE at this time. Will defer CTPA in 89 year-old with DVT and baseline dyspnea on exertion.  \par \par She has completed 21 days of Xarelto 15mg BID, and will now transition to 20mg daily.  \par She has completed antibiotics for cellulitis as prescribed by orthopedic surgeon (Augmentin x10 days) and cellulitis appears to be improving. It is followed by a plastic surgeon. \par \par  Because of the pedal edema we will cut the amlodipine down from 10 mg daily to 5 mg daily. Because of the hypertension and pedal edema we will add spironolactone  25 mg daily.  Continue all other current medication regimen for now, including diuretic to address lower extremity edema, ACEi and beta-blocker.  \par \par Follow-up in office in 3 weeks or earlier as needed.

## 2018-12-04 NOTE — REVIEW OF SYSTEMS
[Negative] : Heme/Lymph [Lower Ext Edema] : lower extremity edema [see HPI] : see HPI [Joint Pain] : joint pain

## 2018-12-04 NOTE — HISTORY OF PRESENT ILLNESS
[FreeTextEntry1] : 89 year-old woman with history of hypertension, HCV, hypothyroidism comes today with her daughter for follow-up. 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 In July 2018, patient noted to have right leg swelling. Evaluation by orthopedic surgeon with Doppler revealed DVT and cellulitis. Patient was started on Augmentin and Xarelto. Patient has no pleuritic chest pain. \par \par Patient has also been sent to pulmonary who also check overnight saturation studies and have r/o hypoxia as a source of fatigue and SOB. \par \par OA of knees have been chronic and also promotes to patients sedentary lifestyle. She uses a cane on occasion. No recent falls. On NSAIDS PRN for pain.\par \par In February 2018, patient was admitted with presyncope. Her ILR was interrogated and was without corresponding event. She was discharged without changes in her medications.\par \par PMD/Geriatrics: Beverly Wheeler MD [phone]\par Orthopedist: Alex Hoff MD [phone]\par Plastic Surgeon: Miguel Soto MD [phone]\par Former Cardiologist: Jose Santoyo MD [phone]\par GI: Johnny Dickson MD [phone]

## 2018-12-04 NOTE — REASON FOR VISIT
[Follow-Up - Clinic] : a clinic follow-up of [Abnormal ECG] : an abnormal ECG [Anticoagulation] : anticoagulation [A-V Block] : A-V block [Syncope] : syncope [Other: _____] : [unfilled] [FreeTextEntry2] : DVT, cellulitis [FreeTextEntry1] : Patient presents today for follow-up. She used to follow-up with Jose Santoyo MD for many years.

## 2018-12-04 NOTE — PHYSICAL EXAM
[General Appearance - Well Developed] : well developed [Normal Appearance] : normal appearance [Well Groomed] : well groomed [General Appearance - Well Nourished] : well nourished [No Deformities] : no deformities [General Appearance - In No Acute Distress] : no acute distress [Normal Oral Mucosa] : normal oral mucosa [No Oral Pallor] : no oral pallor [No Oral Cyanosis] : no oral cyanosis [Normal Oropharynx] : normal oropharynx [Normal Jugular Venous A Waves Present] : normal jugular venous A waves present [Normal Jugular Venous V Waves Present] : normal jugular venous V waves present [No Jugular Venous Díaz A Waves] : no jugular venous díaz A waves [] : no respiratory distress [Respiration, Rhythm And Depth] : normal respiratory rhythm and effort [Exaggerated Use Of Accessory Muscles For Inspiration] : no accessory muscle use [Auscultation Breath Sounds / Voice Sounds] : lungs were clear to auscultation bilaterally [Bowel Sounds] : normal bowel sounds [Abdomen Soft] : soft [Abdomen Tenderness] : non-tender [Nail Clubbing] : no clubbing of the fingernails [Cyanosis, Localized] : no localized cyanosis [Skin Color & Pigmentation] : normal skin color and pigmentation [No Xanthoma] : no  xanthoma was observed [Oriented To Time, Place, And Person] : oriented to person, place, and time [Impaired Insight] : insight and judgment were intact [Affect] : the affect was normal [Mood] : the mood was normal [No Anxiety] : not feeling anxious [Conjunctiva] : the conjunctiva were normal in both eyes [PERRL] : pupils were equal in size, round, and reactive to light [EOM Intact] : extraocular movements were intact [5th Left ICS - MCL] : palpated at the 5th LICS in the midclavicular line [Normal] : normal [No Precordial Heave] : no precordial heave was noted [Normal Rate] : normal [Rhythm Regular] : regular [Normal S1] : normal S1 [Normal S2] : normal S2 [S2 Accentuated] : was accentuated [No Gallop] : no gallop heard [No Murmur] : no murmurs heard [2+] : left 2+ [No Pitting Edema] : no pitting edema present [Normal Conjunctiva] : the conjunctiva exhibited no abnormalities [Eyelids - No Xanthelasma] : the eyelids demonstrated no xanthelasmas [Heart Rate And Rhythm] : heart rate and rhythm were normal [Heart Sounds] : normal S1 and S2 [Murmurs] : no murmurs present [FreeTextEntry1] : evidence of chronic venous stasis [Yellow Sclera (Icteric)] : no scleral icterus was seen [Right Carotid Bruit] : no bruit heard over the right carotid [Left Carotid Bruit] : no bruit heard over the left carotid

## 2018-12-05 ENCOUNTER — MEDICATION RENEWAL (OUTPATIENT)
Age: 83
End: 2018-12-05

## 2018-12-19 ENCOUNTER — APPOINTMENT (OUTPATIENT)
Dept: CARDIOLOGY | Facility: CLINIC | Age: 83
End: 2018-12-19
Payer: MEDICARE

## 2018-12-19 ENCOUNTER — NON-APPOINTMENT (OUTPATIENT)
Age: 83
End: 2018-12-19

## 2018-12-19 VITALS
OXYGEN SATURATION: 100 % | SYSTOLIC BLOOD PRESSURE: 151 MMHG | HEART RATE: 74 BPM | WEIGHT: 152 LBS | DIASTOLIC BLOOD PRESSURE: 81 MMHG | BODY MASS INDEX: 25.95 KG/M2 | HEIGHT: 64 IN

## 2018-12-19 PROCEDURE — 93000 ELECTROCARDIOGRAM COMPLETE: CPT

## 2018-12-19 PROCEDURE — 99215 OFFICE O/P EST HI 40 MIN: CPT

## 2018-12-19 NOTE — HISTORY OF PRESENT ILLNESS
[FreeTextEntry1] : 89 year-old woman with history of hypertension, HCV, hypothyroidism comes today with her daughter for follow-up. 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 In July 2018, patient noted to have right leg swelling. Evaluation by orthopedic surgeon with Doppler revealed DVT and cellulitis. Patient was started on Augmentin and Xarelto. Patient has no pleuritic chest pain. \par \par Patient has also been sent to pulmonary who also check overnight saturation studies and have r/o hypoxia as a source of fatigue and SOB. \par \par OA of knees have been chronic and also promotes to patients sedentary lifestyle. She uses a cane on occasion. No recent falls. On NSAIDS PRN for pain.\par \par In February 2018, patient was admitted with presyncope. Her ILR was interrogated and was without corresponding event. She was discharged without changes in her medications.\par \par December 2018 - Patient's chief complaint today is right hip pain. She has been started on Vicodin for it. She is taking it once per day. She also got a cortisone injection last week that provided mild relief.\par Labs drawn earlier today by Dr. Dickson.\par \par PMD/Geriatrics: Beverly Wheeler MD [phone]\par Orthopedist: Alex Hoff MD [phone]\par Plastic Surgeon: Miguel Soto MD [phone]\par Former Cardiologist: Jose Santoyo MD [phone]\par GI: Johnny Dickson MD [phone]

## 2018-12-19 NOTE — DISCUSSION/SUMMARY
[FreeTextEntry1] : Patient is a very pleasant 89 year-old woman who presents today for follow-up of her poor exercise capacity, shortness of breath with even minimal exertion, and worsening of her right hip pain.  Patient has clear lungs, mild lower extremity edema, and a loud S2 which raises the possibility of pulmonary hypertension, although none was seen on a recent echocardiogram. Suspect symptom of dyspnea on minimal exertion is related to labile blood pressure.  Given normal oxygen saturation on room and and unchanged ECG, no evidence of significant PE at this time. \par \par In July 2018, she had a DVT in RLE. She completed 21 days of Xarelto 15mg BID, and is now tolerating 20mg daily.  \par \par   Continue all other current medication regimen for now, including loop diuretic to address lower extremity edema and ACEi. If renal function and potassium permits, would consider aldosterone antagonist as prescribed (for LE edema). Elevate the legs as tolerated. \par \par Cortisone shot provided only mild relief. She has been started on Vicodin and stool softener. There is concern for opioid induced constipation. Patient referred for pain management. Case discussed with Gricelda Edwards MD. If renal function permits, could try NSAID.\par \par Follow-up in office in 3 months or earlier as needed.

## 2018-12-19 NOTE — PHYSICAL EXAM
[General Appearance - Well Developed] : well developed [Normal Appearance] : normal appearance [Well Groomed] : well groomed [General Appearance - Well Nourished] : well nourished [No Deformities] : no deformities [General Appearance - In No Acute Distress] : no acute distress [Normal Oral Mucosa] : normal oral mucosa [No Oral Pallor] : no oral pallor [No Oral Cyanosis] : no oral cyanosis [Normal Oropharynx] : normal oropharynx [Normal Jugular Venous V Waves Present] : normal jugular venous V waves present [No Jugular Venous Díaz A Waves] : no jugular venous díaz A waves [] : no respiratory distress [Respiration, Rhythm And Depth] : normal respiratory rhythm and effort [Exaggerated Use Of Accessory Muscles For Inspiration] : no accessory muscle use [Auscultation Breath Sounds / Voice Sounds] : lungs were clear to auscultation bilaterally [Bowel Sounds] : normal bowel sounds [Abdomen Soft] : soft [Abdomen Tenderness] : non-tender [Nail Clubbing] : no clubbing of the fingernails [Cyanosis, Localized] : no localized cyanosis [Skin Color & Pigmentation] : normal skin color and pigmentation [No Xanthoma] : no  xanthoma was observed [FreeTextEntry1] : evidence of chronic venous stasis [Oriented To Time, Place, And Person] : oriented to person, place, and time [Impaired Insight] : insight and judgment were intact [Affect] : the affect was normal [Mood] : the mood was normal [No Anxiety] : not feeling anxious [Conjunctiva] : the conjunctiva were normal in both eyes [PERRL] : pupils were equal in size, round, and reactive to light [EOM Intact] : extraocular movements were intact [Yellow Sclera (Icteric)] : no scleral icterus was seen [5th Left ICS - MCL] : palpated at the 5th LICS in the midclavicular line [Normal] : normal [No Precordial Heave] : no precordial heave was noted [Normal Rate] : normal [Rhythm Regular] : regular [Normal S1] : normal S1 [Normal S2] : normal S2 [S2 Accentuated] : was accentuated [No Gallop] : no gallop heard [No Murmur] : no murmurs heard [Right Carotid Bruit] : no bruit heard over the right carotid [Left Carotid Bruit] : no bruit heard over the left carotid [2+] : left 2+ [___ +] : bilateral [unfilled]U+ pretibial pitting edema

## 2018-12-19 NOTE — REASON FOR VISIT
[Follow-Up - Clinic] : a clinic follow-up of [Abnormal ECG] : an abnormal ECG [Anticoagulation] : anticoagulation [A-V Block] : A-V block [Syncope] : syncope [FreeTextEntry1] : Patient presents today for follow-up. She used to follow-up with Jose Santoyo MD for many years. [Other: _____] : [unfilled]

## 2018-12-21 ENCOUNTER — INBOUND DOCUMENT (OUTPATIENT)
Age: 83
End: 2018-12-21

## 2018-12-25 ENCOUNTER — INPATIENT (INPATIENT)
Facility: HOSPITAL | Age: 83
LOS: 5 days | Discharge: NURSING, REHAB OR LONG-TERM CARE | DRG: 553 | End: 2018-12-31
Attending: INTERNAL MEDICINE | Admitting: STUDENT IN AN ORGANIZED HEALTH CARE EDUCATION/TRAINING PROGRAM
Payer: MEDICARE

## 2018-12-25 VITALS
OXYGEN SATURATION: 100 % | DIASTOLIC BLOOD PRESSURE: 87 MMHG | RESPIRATION RATE: 16 BRPM | TEMPERATURE: 100 F | SYSTOLIC BLOOD PRESSURE: 140 MMHG | HEART RATE: 100 BPM

## 2018-12-25 LAB
ALBUMIN SERPL ELPH-MCNC: 4.2 G/DL — SIGNIFICANT CHANGE UP (ref 3.3–5)
ALP SERPL-CCNC: 85 U/L — SIGNIFICANT CHANGE UP (ref 40–120)
ALT FLD-CCNC: 16 U/L — SIGNIFICANT CHANGE UP (ref 10–45)
ANION GAP SERPL CALC-SCNC: 16 MMOL/L — SIGNIFICANT CHANGE UP (ref 5–17)
APPEARANCE UR: CLEAR — SIGNIFICANT CHANGE UP
APTT BLD: 31 SEC — SIGNIFICANT CHANGE UP (ref 27.5–36.3)
AST SERPL-CCNC: 25 U/L — SIGNIFICANT CHANGE UP (ref 10–40)
BACTERIA # UR AUTO: NEGATIVE — SIGNIFICANT CHANGE UP
BASOPHILS # BLD AUTO: 0.1 K/UL — SIGNIFICANT CHANGE UP (ref 0–0.2)
BASOPHILS NFR BLD AUTO: 0.7 % — SIGNIFICANT CHANGE UP (ref 0–2)
BILIRUB SERPL-MCNC: 0.5 MG/DL — SIGNIFICANT CHANGE UP (ref 0.2–1.2)
BILIRUB UR-MCNC: NEGATIVE — SIGNIFICANT CHANGE UP
BUN SERPL-MCNC: 16 MG/DL — SIGNIFICANT CHANGE UP (ref 7–23)
CALCIUM SERPL-MCNC: 9.9 MG/DL — SIGNIFICANT CHANGE UP (ref 8.4–10.5)
CHLORIDE SERPL-SCNC: 88 MMOL/L — LOW (ref 96–108)
CO2 SERPL-SCNC: 24 MMOL/L — SIGNIFICANT CHANGE UP (ref 22–31)
COLOR SPEC: SIGNIFICANT CHANGE UP
CREAT SERPL-MCNC: 0.69 MG/DL — SIGNIFICANT CHANGE UP (ref 0.5–1.3)
DIFF PNL FLD: NEGATIVE — SIGNIFICANT CHANGE UP
EOSINOPHIL # BLD AUTO: 0 K/UL — SIGNIFICANT CHANGE UP (ref 0–0.5)
EOSINOPHIL NFR BLD AUTO: 0.3 % — SIGNIFICANT CHANGE UP (ref 0–6)
EPI CELLS # UR: 0 /HPF — SIGNIFICANT CHANGE UP
GLUCOSE SERPL-MCNC: 135 MG/DL — HIGH (ref 70–99)
GLUCOSE UR QL: NEGATIVE — SIGNIFICANT CHANGE UP
HCT VFR BLD CALC: 40.2 % — SIGNIFICANT CHANGE UP (ref 34.5–45)
HGB BLD-MCNC: 13.8 G/DL — SIGNIFICANT CHANGE UP (ref 11.5–15.5)
HYALINE CASTS # UR AUTO: 2 /LPF — SIGNIFICANT CHANGE UP (ref 0–2)
INR BLD: 1.33 RATIO — HIGH (ref 0.88–1.16)
KETONES UR-MCNC: SIGNIFICANT CHANGE UP
LEUKOCYTE ESTERASE UR-ACNC: NEGATIVE — SIGNIFICANT CHANGE UP
LYMPHOCYTES # BLD AUTO: 1 K/UL — SIGNIFICANT CHANGE UP (ref 1–3.3)
LYMPHOCYTES # BLD AUTO: 8.4 % — LOW (ref 13–44)
MCHC RBC-ENTMCNC: 31.2 PG — SIGNIFICANT CHANGE UP (ref 27–34)
MCHC RBC-ENTMCNC: 34.3 GM/DL — SIGNIFICANT CHANGE UP (ref 32–36)
MCV RBC AUTO: 90.9 FL — SIGNIFICANT CHANGE UP (ref 80–100)
MONOCYTES # BLD AUTO: 1.3 K/UL — HIGH (ref 0–0.9)
MONOCYTES NFR BLD AUTO: 10.2 % — SIGNIFICANT CHANGE UP (ref 2–14)
NEUTROPHILS # BLD AUTO: 9.9 K/UL — HIGH (ref 1.8–7.4)
NEUTROPHILS NFR BLD AUTO: 80.3 % — HIGH (ref 43–77)
NITRITE UR-MCNC: NEGATIVE — SIGNIFICANT CHANGE UP
PH UR: 6.5 — SIGNIFICANT CHANGE UP (ref 5–8)
PLATELET # BLD AUTO: 279 K/UL — SIGNIFICANT CHANGE UP (ref 150–400)
POTASSIUM SERPL-MCNC: 4.2 MMOL/L — SIGNIFICANT CHANGE UP (ref 3.5–5.3)
POTASSIUM SERPL-SCNC: 4.2 MMOL/L — SIGNIFICANT CHANGE UP (ref 3.5–5.3)
PROT SERPL-MCNC: 7.8 G/DL — SIGNIFICANT CHANGE UP (ref 6–8.3)
PROT UR-MCNC: SIGNIFICANT CHANGE UP
PROTHROM AB SERPL-ACNC: 15.3 SEC — HIGH (ref 10–12.9)
RAPID RVP RESULT: SIGNIFICANT CHANGE UP
RBC # BLD: 4.43 M/UL — SIGNIFICANT CHANGE UP (ref 3.8–5.2)
RBC # FLD: 13.1 % — SIGNIFICANT CHANGE UP (ref 10.3–14.5)
RBC CASTS # UR COMP ASSIST: 1 /HPF — SIGNIFICANT CHANGE UP (ref 0–4)
SODIUM SERPL-SCNC: 128 MMOL/L — LOW (ref 135–145)
SP GR SPEC: 1.01 — SIGNIFICANT CHANGE UP (ref 1.01–1.02)
UROBILINOGEN FLD QL: NEGATIVE — SIGNIFICANT CHANGE UP
WBC # BLD: 12.3 K/UL — HIGH (ref 3.8–10.5)
WBC # FLD AUTO: 12.3 K/UL — HIGH (ref 3.8–10.5)
WBC UR QL: 0 /HPF — SIGNIFICANT CHANGE UP (ref 0–5)

## 2018-12-25 PROCEDURE — 93010 ELECTROCARDIOGRAM REPORT: CPT

## 2018-12-25 PROCEDURE — 99285 EMERGENCY DEPT VISIT HI MDM: CPT | Mod: GC,25

## 2018-12-25 PROCEDURE — 71045 X-RAY EXAM CHEST 1 VIEW: CPT | Mod: 26

## 2018-12-25 PROCEDURE — 73502 X-RAY EXAM HIP UNI 2-3 VIEWS: CPT | Mod: 26,RT

## 2018-12-25 RX ORDER — PIPERACILLIN AND TAZOBACTAM 4; .5 G/20ML; G/20ML
3.38 INJECTION, POWDER, LYOPHILIZED, FOR SOLUTION INTRAVENOUS ONCE
Qty: 0 | Refills: 0 | Status: COMPLETED | OUTPATIENT
Start: 2018-12-25 | End: 2018-12-25

## 2018-12-25 RX ORDER — MORPHINE SULFATE 50 MG/1
4 CAPSULE, EXTENDED RELEASE ORAL ONCE
Qty: 0 | Refills: 0 | Status: DISCONTINUED | OUTPATIENT
Start: 2018-12-25 | End: 2018-12-25

## 2018-12-25 RX ORDER — ACETAMINOPHEN 500 MG
1000 TABLET ORAL ONCE
Qty: 0 | Refills: 0 | Status: COMPLETED | OUTPATIENT
Start: 2018-12-25 | End: 2018-12-25

## 2018-12-25 RX ORDER — VANCOMYCIN HCL 1 G
1000 VIAL (EA) INTRAVENOUS ONCE
Qty: 0 | Refills: 0 | Status: COMPLETED | OUTPATIENT
Start: 2018-12-25 | End: 2018-12-25

## 2018-12-25 RX ORDER — SODIUM CHLORIDE 9 MG/ML
1000 INJECTION INTRAMUSCULAR; INTRAVENOUS; SUBCUTANEOUS ONCE
Qty: 0 | Refills: 0 | Status: COMPLETED | OUTPATIENT
Start: 2018-12-25 | End: 2018-12-25

## 2018-12-25 RX ORDER — MORPHINE SULFATE 50 MG/1
2 CAPSULE, EXTENDED RELEASE ORAL ONCE
Qty: 0 | Refills: 0 | Status: DISCONTINUED | OUTPATIENT
Start: 2018-12-25 | End: 2018-12-25

## 2018-12-25 RX ADMIN — Medication 250 MILLIGRAM(S): at 23:00

## 2018-12-25 RX ADMIN — MORPHINE SULFATE 2 MILLIGRAM(S): 50 CAPSULE, EXTENDED RELEASE ORAL at 22:44

## 2018-12-25 RX ADMIN — PIPERACILLIN AND TAZOBACTAM 200 GRAM(S): 4; .5 INJECTION, POWDER, LYOPHILIZED, FOR SOLUTION INTRAVENOUS at 21:10

## 2018-12-25 RX ADMIN — SODIUM CHLORIDE 1000 MILLILITER(S): 9 INJECTION INTRAMUSCULAR; INTRAVENOUS; SUBCUTANEOUS at 23:19

## 2018-12-25 RX ADMIN — PIPERACILLIN AND TAZOBACTAM 3.38 GRAM(S): 4; .5 INJECTION, POWDER, LYOPHILIZED, FOR SOLUTION INTRAVENOUS at 22:44

## 2018-12-25 RX ADMIN — Medication 400 MILLIGRAM(S): at 20:46

## 2018-12-25 RX ADMIN — Medication 1000 MILLIGRAM(S): at 22:44

## 2018-12-25 RX ADMIN — MORPHINE SULFATE 2 MILLIGRAM(S): 50 CAPSULE, EXTENDED RELEASE ORAL at 21:42

## 2018-12-25 RX ADMIN — MORPHINE SULFATE 4 MILLIGRAM(S): 50 CAPSULE, EXTENDED RELEASE ORAL at 20:39

## 2018-12-25 RX ADMIN — MORPHINE SULFATE 4 MILLIGRAM(S): 50 CAPSULE, EXTENDED RELEASE ORAL at 22:44

## 2018-12-25 NOTE — ED PROVIDER NOTE - OBJECTIVE STATEMENT
89F with pmh of HTN, CHF, hepC, OA, hypothyroidism presenting with diffuse joint pain for several days. Patient states chronic pain due to OA, seeing pain management Dr. Conley who gave patient a cortisone shot to right hip 12/13. 89F with pmh of HTN, CHF, hepC, OA, hypothyroidism presenting with diffuse joint pain for several days. Patient states chronic pain due to OA, seeing pain management Dr. Conley who gave patient a cortisone shot to right hip 12/13. Patient has been taking vicodin thus far for pain. No chills, cp, sob, n/v/d, dizziness, numbness, falls, trauma

## 2018-12-25 NOTE — ED ADULT NURSE NOTE - OBJECTIVE STATEMENT
89 year old female presents to the ED via EMS with c/o fever and generalized pain all over. Patient states she has hx of osteoarthritis, takes vicodin 5/325 at home regularly and has had no relief of pain x 2 days. Pain is mostly in right hip and groin, but pt states she "feels it all over." Fever at home was 99F orally x 1 day. Denies c/o CP, SOB, N/V/D. Denies abdominal pain/tenderness or difficulty urinating. Pt has had increased difficulty walking over the last few days. Daughter at bedside for comfort, safety measures maintained.

## 2018-12-25 NOTE — ED PROVIDER NOTE - MEDICAL DECISION MAKING DETAILS
89F presenting with diffuse joint pain found to be septic at triage. Unclear etiology. Possible septic joint or OA exacerbation however, no other obvious source of fever. Will perform full sepsis workup, XR

## 2018-12-25 NOTE — ED ADULT NURSE NOTE - INTERVENTIONS DEFINITIONS
[place] to call system/Physically safe environment: no spills, clutter or unnecessary equipment/Provide visual cue, wrist band, yellow gown, etc./Call bell, personal items and telephone within reach/Room bathroom lighting operational

## 2018-12-25 NOTE — ED PROVIDER NOTE - MUSCULOSKELETAL MINIMAL EXAM
diffuse joint tenderness of all joints including b/l knees, ankle, wrists, hands. Greatest in right hip and knee

## 2018-12-25 NOTE — ED ADULT NURSE REASSESSMENT NOTE - NS ED NURSE REASSESS COMMENT FT1
Pt straight cathed via sterile technique with 2 RN at bedside. 500cc clear yellow urine drained. Comfort and safety measures maintained.

## 2018-12-25 NOTE — ED PROVIDER NOTE - ATTENDING CONTRIBUTION TO CARE
attending Chantal: 89yF h/o HTN, CHF, hepC, OA, hypothyroidism p/w worsening diffuse joint pain x several days. Follows with pain management for chronic pain 2/2 OA. S/p cortisone injection to R hip 12/13. No other focal symptoms. Initial vitals febrile, meeting sepsis criteria. Will obtain labs including vbg with lactate and blood cultures, UA, CXR, RVP, gentle IVF given h/o CHF, empiric broad spectrum abx and admit.

## 2018-12-26 DIAGNOSIS — I45.2 BIFASCICULAR BLOCK: ICD-10-CM

## 2018-12-26 DIAGNOSIS — M25.50 PAIN IN UNSPECIFIED JOINT: ICD-10-CM

## 2018-12-26 DIAGNOSIS — A41.9 SEPSIS, UNSPECIFIED ORGANISM: ICD-10-CM

## 2018-12-26 DIAGNOSIS — E87.1 HYPO-OSMOLALITY AND HYPONATREMIA: ICD-10-CM

## 2018-12-26 DIAGNOSIS — Z95.818 PRESENCE OF OTHER CARDIAC IMPLANTS AND GRAFTS: Chronic | ICD-10-CM

## 2018-12-26 DIAGNOSIS — M79.89 OTHER SPECIFIED SOFT TISSUE DISORDERS: ICD-10-CM

## 2018-12-26 DIAGNOSIS — Z29.9 ENCOUNTER FOR PROPHYLACTIC MEASURES, UNSPECIFIED: ICD-10-CM

## 2018-12-26 DIAGNOSIS — I82.409 ACUTE EMBOLISM AND THROMBOSIS OF UNSPECIFIED DEEP VEINS OF UNSPECIFIED LOWER EXTREMITY: ICD-10-CM

## 2018-12-26 DIAGNOSIS — R65.10 SYSTEMIC INFLAMMATORY RESPONSE SYNDROME (SIRS) OF NON-INFECTIOUS ORIGIN WITHOUT ACUTE ORGAN DYSFUNCTION: ICD-10-CM

## 2018-12-26 DIAGNOSIS — I10 ESSENTIAL (PRIMARY) HYPERTENSION: ICD-10-CM

## 2018-12-26 DIAGNOSIS — E03.9 HYPOTHYROIDISM, UNSPECIFIED: ICD-10-CM

## 2018-12-26 LAB
ANION GAP SERPL CALC-SCNC: 15 MMOL/L — SIGNIFICANT CHANGE UP (ref 5–17)
BUN SERPL-MCNC: 15 MG/DL — SIGNIFICANT CHANGE UP (ref 7–23)
CALCIUM SERPL-MCNC: 9.6 MG/DL — SIGNIFICANT CHANGE UP (ref 8.4–10.5)
CHLORIDE SERPL-SCNC: 90 MMOL/L — LOW (ref 96–108)
CO2 SERPL-SCNC: 24 MMOL/L — SIGNIFICANT CHANGE UP (ref 22–31)
CREAT SERPL-MCNC: 0.59 MG/DL — SIGNIFICANT CHANGE UP (ref 0.5–1.3)
CRP SERPL-MCNC: 5.91 MG/DL — HIGH (ref 0–0.4)
CULTURE RESULTS: NO GROWTH — SIGNIFICANT CHANGE UP
ERYTHROCYTE [SEDIMENTATION RATE] IN BLOOD: 61 MM/HR — HIGH (ref 0–20)
GLUCOSE SERPL-MCNC: 116 MG/DL — HIGH (ref 70–99)
HCT VFR BLD CALC: 36.3 % — SIGNIFICANT CHANGE UP (ref 34.5–45)
HGB BLD-MCNC: 12.3 G/DL — SIGNIFICANT CHANGE UP (ref 11.5–15.5)
INR BLD: 1.21 RATIO — HIGH (ref 0.88–1.16)
MAGNESIUM SERPL-MCNC: 2.1 MG/DL — SIGNIFICANT CHANGE UP (ref 1.6–2.6)
MCHC RBC-ENTMCNC: 29.7 PG — SIGNIFICANT CHANGE UP (ref 27–34)
MCHC RBC-ENTMCNC: 33.9 GM/DL — SIGNIFICANT CHANGE UP (ref 32–36)
MCV RBC AUTO: 87.7 FL — SIGNIFICANT CHANGE UP (ref 80–100)
PHOSPHATE SERPL-MCNC: 2.9 MG/DL — SIGNIFICANT CHANGE UP (ref 2.5–4.5)
PLATELET # BLD AUTO: 283 K/UL — SIGNIFICANT CHANGE UP (ref 150–400)
POTASSIUM SERPL-MCNC: 3.9 MMOL/L — SIGNIFICANT CHANGE UP (ref 3.5–5.3)
POTASSIUM SERPL-SCNC: 3.9 MMOL/L — SIGNIFICANT CHANGE UP (ref 3.5–5.3)
PROTHROM AB SERPL-ACNC: 13.9 SEC — HIGH (ref 10–13.1)
RBC # BLD: 4.14 M/UL — SIGNIFICANT CHANGE UP (ref 3.8–5.2)
RBC # FLD: 15 % — HIGH (ref 10.3–14.5)
SODIUM SERPL-SCNC: 129 MMOL/L — LOW (ref 135–145)
SPECIMEN SOURCE: SIGNIFICANT CHANGE UP
WBC # BLD: 11.3 K/UL — HIGH (ref 3.8–10.5)
WBC # FLD AUTO: 11.3 K/UL — HIGH (ref 3.8–10.5)

## 2018-12-26 PROCEDURE — 73560 X-RAY EXAM OF KNEE 1 OR 2: CPT | Mod: 26,50

## 2018-12-26 PROCEDURE — 99231 SBSQ HOSP IP/OBS SF/LOW 25: CPT

## 2018-12-26 PROCEDURE — 99223 1ST HOSP IP/OBS HIGH 75: CPT | Mod: GC

## 2018-12-26 PROCEDURE — 93970 EXTREMITY STUDY: CPT | Mod: 26

## 2018-12-26 PROCEDURE — 73110 X-RAY EXAM OF WRIST: CPT | Mod: 26,RT

## 2018-12-26 PROCEDURE — 72193 CT PELVIS W/DYE: CPT | Mod: 26

## 2018-12-26 PROCEDURE — 99223 1ST HOSP IP/OBS HIGH 75: CPT

## 2018-12-26 RX ORDER — RIVAROXABAN 15 MG-20MG
20 KIT ORAL EVERY 24 HOURS
Qty: 0 | Refills: 0 | Status: DISCONTINUED | OUTPATIENT
Start: 2018-12-26 | End: 2018-12-27

## 2018-12-26 RX ORDER — FUROSEMIDE 40 MG
20 TABLET ORAL ONCE
Qty: 0 | Refills: 0 | Status: DISCONTINUED | OUTPATIENT
Start: 2018-12-26 | End: 2018-12-26

## 2018-12-26 RX ORDER — PIPERACILLIN AND TAZOBACTAM 4; .5 G/20ML; G/20ML
3.38 INJECTION, POWDER, LYOPHILIZED, FOR SOLUTION INTRAVENOUS EVERY 8 HOURS
Qty: 0 | Refills: 0 | Status: DISCONTINUED | OUTPATIENT
Start: 2018-12-26 | End: 2018-12-28

## 2018-12-26 RX ORDER — VANCOMYCIN HCL 1 G
1000 VIAL (EA) INTRAVENOUS EVERY 24 HOURS
Qty: 0 | Refills: 0 | Status: DISCONTINUED | OUTPATIENT
Start: 2018-12-26 | End: 2018-12-27

## 2018-12-26 RX ORDER — LIDOCAINE 4 G/100G
1 CREAM TOPICAL DAILY
Qty: 0 | Refills: 0 | Status: DISCONTINUED | OUTPATIENT
Start: 2018-12-26 | End: 2018-12-31

## 2018-12-26 RX ORDER — FUROSEMIDE 40 MG
40 TABLET ORAL ONCE
Qty: 0 | Refills: 0 | Status: COMPLETED | OUTPATIENT
Start: 2018-12-26 | End: 2018-12-26

## 2018-12-26 RX ORDER — LISINOPRIL 2.5 MG/1
10 TABLET ORAL DAILY
Qty: 0 | Refills: 0 | Status: DISCONTINUED | OUTPATIENT
Start: 2018-12-26 | End: 2018-12-31

## 2018-12-26 RX ORDER — HYDROMORPHONE HYDROCHLORIDE 2 MG/ML
0.5 INJECTION INTRAMUSCULAR; INTRAVENOUS; SUBCUTANEOUS ONCE
Qty: 0 | Refills: 0 | Status: DISCONTINUED | OUTPATIENT
Start: 2018-12-26 | End: 2018-12-26

## 2018-12-26 RX ORDER — AMLODIPINE BESYLATE 2.5 MG/1
10 TABLET ORAL DAILY
Qty: 0 | Refills: 0 | Status: DISCONTINUED | OUTPATIENT
Start: 2018-12-26 | End: 2018-12-26

## 2018-12-26 RX ORDER — LIOTHYRONINE SODIUM 25 UG/1
5 TABLET ORAL DAILY
Qty: 0 | Refills: 0 | Status: DISCONTINUED | OUTPATIENT
Start: 2018-12-26 | End: 2018-12-31

## 2018-12-26 RX ORDER — FUROSEMIDE 40 MG
40 TABLET ORAL DAILY
Qty: 0 | Refills: 0 | Status: DISCONTINUED | OUTPATIENT
Start: 2018-12-26 | End: 2018-12-31

## 2018-12-26 RX ORDER — SENNA PLUS 8.6 MG/1
1 TABLET ORAL DAILY
Qty: 0 | Refills: 0 | Status: DISCONTINUED | OUTPATIENT
Start: 2018-12-26 | End: 2018-12-31

## 2018-12-26 RX ADMIN — LIDOCAINE 1 PATCH: 4 CREAM TOPICAL at 18:39

## 2018-12-26 RX ADMIN — AMLODIPINE BESYLATE 10 MILLIGRAM(S): 2.5 TABLET ORAL at 06:03

## 2018-12-26 RX ADMIN — RIVAROXABAN 20 MILLIGRAM(S): KIT at 22:09

## 2018-12-26 RX ADMIN — LIDOCAINE 1 PATCH: 4 CREAM TOPICAL at 20:06

## 2018-12-26 RX ADMIN — Medication 40 MILLIGRAM(S): at 12:22

## 2018-12-26 RX ADMIN — LIDOCAINE 1 PATCH: 4 CREAM TOPICAL at 18:38

## 2018-12-26 RX ADMIN — LIOTHYRONINE SODIUM 5 MICROGRAM(S): 25 TABLET ORAL at 12:22

## 2018-12-26 RX ADMIN — Medication 40 MILLIGRAM(S): at 06:03

## 2018-12-26 RX ADMIN — HYDROMORPHONE HYDROCHLORIDE 0.5 MILLIGRAM(S): 2 INJECTION INTRAMUSCULAR; INTRAVENOUS; SUBCUTANEOUS at 18:55

## 2018-12-26 RX ADMIN — Medication 250 MILLIGRAM(S): at 22:12

## 2018-12-26 RX ADMIN — HYDROMORPHONE HYDROCHLORIDE 0.5 MILLIGRAM(S): 2 INJECTION INTRAMUSCULAR; INTRAVENOUS; SUBCUTANEOUS at 18:38

## 2018-12-26 RX ADMIN — LISINOPRIL 10 MILLIGRAM(S): 2.5 TABLET ORAL at 06:03

## 2018-12-26 NOTE — CONSULT NOTE ADULT - ASSESSMENT
89F PMH dementia, HCV (in remission), HTN, CHF, hypothyroidism, Osteoarthritis, presenting with cc difficulty ambulating 2/2 pain. Vascular surgery consulted for coolness of LLE in comparison to RLE.    - DP pulses palpable bilaterally  - Compression stockings for BL pitting edema, which is likely 2/2 CHF.   - No indication for Vascular surgery intervention at this time  - Discussed with fellow Dr. Medardo Castaneda PGY2  Vascular Surgery  [id number]

## 2018-12-26 NOTE — CONSULT NOTE ADULT - ASSESSMENT
89F with dCHF, HTN, Hypothyroidism, OA, DVT on Xarelto, HCV with SVR?   pain for several months. Received a cortisone shot in R-hip on 12/13 with some improvement. vicodin as well however noticed the pain get significantly worse for the past several days 89F with long standing Osteoarthritis admitted 12/25/18 for worsening pain to various joints since early November.   Right hip steroid injection 12/13. Right knee large effusion, chronic, recurring. Right wrist XR suggesting pseudogout. Febrile to 102.5F but nontoxic and without systemic symptoms.   Differential includes septic joint, localized infection related to steroid injection, pseudogout can cause fevers itself but usually low grade. Had a DVT of the right leg earlier this year, none on dopplers here. Could be bacteremic but no other clear infection - UA, CXR, RVP negative.     Recommend   -continue Vancomycin 1GM IV q24h   -check trough   -continue Zosyn 3.375GM IV q8h   -f/u blood cultures   -XR knees   -CT right hip to rule out an abscess? is loop recorder MRI compatible?   -Orthopedics evaluation for joint aspiration 89F with long standing Osteoarthritis admitted 12/25/18 for worsening pain to various joints since early November.   Right hip steroid injection 12/13. Right knee large effusion, chronic, recurring. Right wrist XR suggesting pseudogout. Febrile to 102.5F but nontoxic and without systemic symptoms.   Differential includes septic joint, localized infection related to steroid injection, pseudogout can cause fevers itself but usually low grade. Had a DVT of the right leg earlier this year, none on dopplers here. Could be bacteremic but no other clear infection - UA, CXR, RVP negative.     Recommend   -continue Vancomycin 1GM IV q24h   -check trough   -continue Zosyn 3.375GM IV q8h   -f/u blood cultures   -XR knees   -CT IV contrast right hip to assess for abscess etc; not sure if her loop recorder is compatible with MRI   -Orthopedics evaluation for joint aspiration     Discussed with primary team

## 2018-12-26 NOTE — CONSULT NOTE ADULT - SUBJECTIVE AND OBJECTIVE BOX
JAQUAN EDDY  [id number]    HISTORY OF PRESENT ILLNESS:    89 yo F w/ hx of HTN, OA, CHF, DVT presenting for R hip and knee pain.    Patient stated that for several months she has been having worsening joint pains, particularly her hip and knees, along with her shoulders. She sees an orthopedic regularly and had her knees drained. She underwent R hip MRI which showed severe OA. She underwent R hip joint steroid injection. Since then, she has been having worsening R hip and knee pain along with knee swelling and pain, shoulder pain. She presented to the ER after her pain continued to worsen and she developed a fever.    PAST MEDICAL & SURGICAL HISTORY:  Bifascicular block  Dementia without behavioral disturbance, unspecified dementia type  Hepatitis C  Hypertension  Arthritis  Hypothyroidism  Status post placement of implantable loop recorder      Review of Systems:  Gen:  +fatigue  HEENT: No blurry vision, no difficulty swallowing, no oral or nasal ulcers  CVS: No chest pain/palpitations  Resp: No SOB/wheezing  GI: No N/V/C/D/abdominal pain  MSK: +R hip, b/l knee, R shoulder pain  Skin: No new rashes  Neuro: No headaches    MEDICATIONS  (STANDING):  furosemide    Tablet 40 milliGRAM(s) Oral daily  HYDROmorphone  Injectable 0.5 milliGRAM(s) IV Push once  lidocaine   Patch 1 Patch Transdermal daily  lidocaine   Patch 1 Patch Transdermal daily  liothyronine 5 MICROGram(s) Oral daily  lisinopril 10 milliGRAM(s) Oral daily  piperacillin/tazobactam IVPB. 3.375 Gram(s) IV Intermittent every 8 hours  rivaroxaban 20 milliGRAM(s) Oral every 24 hours  vancomycin  IVPB 1000 milliGRAM(s) IV Intermittent every 24 hours    MEDICATIONS  (PRN):      Allergies    No Known Allergies    Intolerances        PERTINENT MEDICATION HISTORY:    SOCIAL HISTORY:  OCCUPATION:  TRAVEL HISTORY:    FAMILY HISTORY:  No pertinent family history in first degree relatives      Vital Signs Last 24 Hrs  T(C): 36.7 (26 Dec 2018 17:27), Max: 39.2 (25 Dec 2018 21:08)  T(F): 98 (26 Dec 2018 17:27), Max: 102.5 (25 Dec 2018 21:08)  HR: 87 (26 Dec 2018 17:27) (80 - 102)  BP: 135/76 (26 Dec 2018 17:27) (120/71 - 151/86)  BP(mean): --  RR: 18 (26 Dec 2018 17:27) (16 - 18)  SpO2: 96% (26 Dec 2018 15:55) (96% - 100%)    Physical Exam:  General: No apparent distress  HEENT: EOMI, MMM  CVS: +S1/S2, RRR, no murmurs/rubs/gallops  Resp: CTA b/l. No crackles/wheezing  GI: Soft, NT/ND +BS  MSK: b/l knee effusions, knees are warm, limited ROM, pain on R hip rotation  Neuro: AAOx3  Skin: no visible rashes    LABS:                        12.3   11.30 )-----------( 283      ( 26 Dec 2018 08:11 )             36.3         129<L>  |  90<L>  |  15  ----------------------------<  116<H>  3.9   |  24  |  0.59    Ca    9.6      26 Dec 2018 06:50  Phos  2.9       Mg     2.1         TPro  7.8  /  Alb  4.2  /  TBili  0.5  /  DBili  x   /  AST  25  /  ALT  16  /  AlkPhos  85  12    PT/INR - ( 26 Dec 2018 08:15 )   PT: 13.9 sec;   INR: 1.21 ratio         PTT - ( 25 Dec 2018 20:55 )  PTT:31.0 sec  Urinalysis Basic - ( 25 Dec 2018 22:47 )    Color: Light Yellow / Appearance: Clear / S.014 / pH: x  Gluc: x / Ketone: Trace  / Bili: Negative / Urobili: Negative   Blood: x / Protein: Trace / Nitrite: Negative   Leuk Esterase: Negative / RBC: 1 /hpf / WBC 0 /hpf   Sq Epi: x / Non Sq Epi: 0 /hpf / Bacteria: Negative    Sedimentation Rate, Erythrocyte: 61 mm/hr (18 @ 08:11)    C-Reactive Protein, Serum: 5.91 mg/dL (18 @ 00:32)      RADIOLOGY & ADDITIONAL STUDIES:    < from: Xray Wrist 3 Views, Right (18 @ 00:35) >  EXAM:  WRIST COMPLETE RIGHT-MIN 3 VIEWS                            PROCEDURE DATE:  2018            INTERPRETATION:  CLINICAL INFORMATION: Wrist pain.     TECHNIQUE: 3 views of the right wrist.    COMPARISON: None.    FINDINGS:   Generalized bone demineralization limits osseous evaluation. There is   chondrocalcinosis at the triangular fibrocartilage. There is severe   radiocarpal joint space narrowing with remodeling of the distal radius   consistent with CPPD arthropathy. There is widening at the scapholunate   interval consistent with scapholunate ligament tearing. There is minimal   migration of the capitate proximally consistent with scapholunate   advanced collapse. There is mild first carpometacarpal osteoarthrosis.    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                KATERIN QUINN M.D., RADIOLOGY RESIDENT  This document has been electronically signed.  IRAJ MENDIOLA M.D., ATTENDING RADIOLOGIST  This document has been electronically signed. Dec 26 2018  9:58AM    < end of copied text >    < from: Xray Hip w/ Pelvis 2 or 3 Views, Right (12.25.18 @ 22:30) >  EXAM:  XR HIP WITH PELV 2-3V RT                            PROCEDURE DATE:  2018            INTERPRETATION:  CLINICAL INFORMATION: Right hip pain.     TECHNIQUE: Single frontal pelvic radiograph and 2 views of the right hip.    COMPARISON: None.    FINDINGS:   No acute fracture or dislocation. Mild spurring and severe axial joint   space narrowing at the right hip consistent with arthrosis. Calcified   fibroid. Lower lumbar spondylosis.    IMPRESSION:  No acute fracture or dislocation. Moderate to severe right hip arthrosis.                KATERIN QUINN M.D., RADIOLOGY RESIDENT  This document has been electronically signed.  IRAJ MENDIOLA M.D., ATTENDING RADIOLOGIST  This document has been electronically signed. Dec 26 2018 10:22AM    < end of copied text > JAQUAN EDDY  [id number]    HISTORY OF PRESENT ILLNESS:    89 yo F w/ hx of HTN, OA, CHF, DVT presenting for R hip and knee pain.    Patient stated that for several months she has been having worsening joint pains, particularly her hip and knees, along with her shoulders. She sees an orthopedic regularly and had her knees drained. She underwent R hip MRI which showed severe OA. She underwent R hip joint steroid injection. Since then, she has been having worsening R hip and knee pain along with knee swelling and pain, shoulder pain. She presented to the ER after her pain continued to worsen and she developed a fever.    PAST MEDICAL & SURGICAL HISTORY:  Bifascicular block  Dementia without behavioral disturbance, unspecified dementia type  Hepatitis C  Hypertension  Arthritis  Hypothyroidism  Status post placement of implantable loop recorder      Review of Systems:  Gen:  +fatigue  HEENT: No blurry vision, no difficulty swallowing, no oral or nasal ulcers  CVS: No chest pain/palpitations  Resp: No SOB/wheezing  GI: No N/V/C/D/abdominal pain  MSK: +R hip, b/l knee, R shoulder pain  Skin: No new rashes  Neuro: No headaches    MEDICATIONS  (STANDING):  furosemide    Tablet 40 milliGRAM(s) Oral daily  HYDROmorphone  Injectable 0.5 milliGRAM(s) IV Push once  lidocaine   Patch 1 Patch Transdermal daily  lidocaine   Patch 1 Patch Transdermal daily  liothyronine 5 MICROGram(s) Oral daily  lisinopril 10 milliGRAM(s) Oral daily  piperacillin/tazobactam IVPB. 3.375 Gram(s) IV Intermittent every 8 hours  rivaroxaban 20 milliGRAM(s) Oral every 24 hours  vancomycin  IVPB 1000 milliGRAM(s) IV Intermittent every 24 hours    MEDICATIONS  (PRN):      Allergies    No Known Allergies    Intolerances        PERTINENT MEDICATION HISTORY:    SOCIAL HISTORY: not currently working.   FAMILY HISTORY:  No pertinent family history in first degree relatives      Vital Signs Last 24 Hrs  T(C): 36.7 (26 Dec 2018 17:27), Max: 39.2 (25 Dec 2018 21:08)  T(F): 98 (26 Dec 2018 17:27), Max: 102.5 (25 Dec 2018 21:08)  HR: 87 (26 Dec 2018 17:27) (80 - 102)  BP: 135/76 (26 Dec 2018 17:27) (120/71 - 151/86)  BP(mean): --  RR: 18 (26 Dec 2018 17:27) (16 - 18)  SpO2: 96% (26 Dec 2018 15:55) (96% - 100%)    Physical Exam:  General: No apparent distress  HEENT: EOMI, MMM  CVS: +S1/S2, RRR, no murmurs/rubs/gallops  Resp: CTA b/l. No crackles/wheezing  GI: Soft, NT/ND +BS  MSK: b/l knee effusions, knees are warm, limited ROM, pain on R hip rotation  Neuro: AAOx3  Skin: no visible rashes    LABS:                        12.3   11.30 )-----------( 283      ( 26 Dec 2018 08:11 )             36.3         129<L>  |  90<L>  |  15  ----------------------------<  116<H>  3.9   |  24  |  0.59    Ca    9.6      26 Dec 2018 06:50  Phos  2.9       Mg     2.1         TPro  7.8  /  Alb  4.2  /  TBili  0.5  /  DBili  x   /  AST  25  /  ALT  16  /  AlkPhos  85  12    PT/INR - ( 26 Dec 2018 08:15 )   PT: 13.9 sec;   INR: 1.21 ratio         PTT - ( 25 Dec 2018 20:55 )  PTT:31.0 sec  Urinalysis Basic - ( 25 Dec 2018 22:47 )    Color: Light Yellow / Appearance: Clear / S.014 / pH: x  Gluc: x / Ketone: Trace  / Bili: Negative / Urobili: Negative   Blood: x / Protein: Trace / Nitrite: Negative   Leuk Esterase: Negative / RBC: 1 /hpf / WBC 0 /hpf   Sq Epi: x / Non Sq Epi: 0 /hpf / Bacteria: Negative    Sedimentation Rate, Erythrocyte: 61 mm/hr (18 @ 08:11)    C-Reactive Protein, Serum: 5.91 mg/dL (18 @ 00:32)      RADIOLOGY & ADDITIONAL STUDIES:    < from: Xray Wrist 3 Views, Right (18 @ 00:35) >  EXAM:  WRIST COMPLETE RIGHT-MIN 3 VIEWS                            PROCEDURE DATE:  2018            INTERPRETATION:  CLINICAL INFORMATION: Wrist pain.     TECHNIQUE: 3 views of the right wrist.    COMPARISON: None.    FINDINGS:   Generalized bone demineralization limits osseous evaluation. There is   chondrocalcinosis at the triangular fibrocartilage. There is severe   radiocarpal joint space narrowing with remodeling of the distal radius   consistent with CPPD arthropathy. There is widening at the scapholunate   interval consistent with scapholunate ligament tearing. There is minimal   migration of the capitate proximally consistent with scapholunate   advanced collapse. There is mild first carpometacarpal osteoarthrosis.    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                KATERIN QUINN M.D., RADIOLOGY RESIDENT  This document has been electronically signed.  IRAJ MENDIOLA M.D., ATTENDING RADIOLOGIST  This document has been electronically signed. Dec 26 2018  9:58AM    < end of copied text >    < from: Xray Hip w/ Pelvis 2 or 3 Views, Right (12.25.18 @ 22:30) >  EXAM:  XR HIP WITH PELV 2-3V RT                            PROCEDURE DATE:  2018            INTERPRETATION:  CLINICAL INFORMATION: Right hip pain.     TECHNIQUE: Single frontal pelvic radiograph and 2 views of the right hip.    COMPARISON: None.    FINDINGS:   No acute fracture or dislocation. Mild spurring and severe axial joint   space narrowing at the right hip consistent with arthrosis. Calcified   fibroid. Lower lumbar spondylosis.    IMPRESSION:  No acute fracture or dislocation. Moderate to severe right hip arthrosis.                KATERIN QUINN M.D., RADIOLOGY RESIDENT  This document has been electronically signed.  IRAJ MENDIOLA M.D., ATTENDING RADIOLOGIST  This document has been electronically signed. Dec 26 2018 10:22AM    < end of copied text >

## 2018-12-26 NOTE — PROGRESS NOTE ADULT - PROBLEM SELECTOR PLAN 5
- hx of RLE DVT In 07/2018, on Xarelto  - c/w Xarelto Hx of RLE DVT In 07/2018, on Xarelto  - c/w home Xarelto 20 mg qD

## 2018-12-26 NOTE — CONSULT NOTE ADULT - ASSESSMENT
89 yo F w/ hx of HTN, OA, CHF presenting for R hip and knee pain.    Patient with severe hip pain, knee pain with effusion, presented with fever and worsening joint pains. Recent hip injection with steroids. Concern here is for septic joint vs severe OA vs CPPD (as seen on wrist xray). Patient is on antibiotics currently.    - Will perform knee aspirations, will send for cell count, crystal, cultures, gram stain  - Orthopedics evaluation for concern for R hip septic joint  - Recommend US guided joint aspiration of R hip  - Discussed with ID and primary    Kentrell Millan  Rheumatology Fellow PGY IV 89 yo F w/ hx of HTN, OA, CHF presenting for R hip and knee pain.    Patient with severe hip pain, knee pain with effusion, presented with fever and worsening joint pains. Recent hip injection with steroids. Concern here is for septic joint vs severe OA vs CPPD (as seen on wrist xray). Patient is on antibiotics currently.    - Attempted to perform b/l knee arthrocentesis to rule out septic arthritis of knees, patient refusing procedure and becoming agitated, would recommend psych eval to determine if patient has capacity  - Orthopedics evaluation for concern for R hip septic joint  - Recommend US guided joint aspiration of R hip    Kentrell Millan  Rheumatology Fellow PGY IV 89 yo F w/ hx of HTN, OA, CHF presenting for R hip and knee pain.    Patient with severe hip pain, knee pain with warmth redness and effusion, presented with fever and worsening joint pains. Recent hip injection with steroids. Concern here is for septic joint vs severe OA vs CPPD (as seen on wrist xray). Patient is on antibiotics currently.    - Attempted to perform b/l knee arthrocentesis to rule out septic arthritis of knees, patient refusing procedure and becoming agitated, would recommend psych eval to determine if patient has capacity.    - Orthopedics evaluation for concern for R hip septic joint  - Recommend US guided joint aspiration of R hip    Kentrell Millan  Rheumatology Fellow PGY IV

## 2018-12-26 NOTE — H&P ADULT - NSHPPHYSICALEXAM_GEN_ALL_CORE
General: elderly female, NAD  HEENT: PERRL, EOMI, MMM  Neck: JVD on 45 degree incline  Cardiac: RRR, normal s1 and s2, no murmur  Lungs: crackles at the bases, CTAB   Abdomen: soft, nondistended abdomen. Bowel sounds present. No organomegaly   Extremities: marked edema to the knee b/l. Temperature discrepancy LLE cooler than RLE. Palpable pulses bilaterally  Neuro: AAO to person, place and time   Skin: no open wounds sores or lesions, note of erythema around the right wrist at IV site   Psych: Appropriate mood and affect  MSK: full range of motion around fingers, wrists, elbows, shoulders. Note of right wrist erythema and mild swelling, nontender to palpation with full ROM. No point tenderness around the hip. Pain with flexion around the knees b/l 2/2 swelling no warmth of erythema. No point tenderness on palpation of the spine General: elderly female, NAD  HEENT: PERRL, EOMI, MMM  Neck: JVD on 45 degree incline  Cardiac: RRR, normal s1 and s2, no murmur  Lungs: crackles at the bases, CTAB   Abdomen: soft, nondistended abdomen. Bowel sounds present. No organomegaly   Extremities: 2+ mildly pitting edema to the knee b/l. Temperature discrepancy LLE cooler than RLE. Palpable pulses bilaterally  Neuro: AAO to person, place and time   Skin: no open wounds sores or lesions, note of erythema around the right wrist at IV site   Psych: Appropriate mood and affect  MSK: full range of motion around fingers, wrists, elbows, shoulders. Note of right wrist erythema and mild swelling, nontender to palpation with full ROM. No point tenderness around the hip. Pain with flexion around the knees b/l 2/2 swelling no warmth of erythema. No point tenderness on palpation of the spine

## 2018-12-26 NOTE — PROGRESS NOTE ADULT - SUBJECTIVE AND OBJECTIVE BOX
***INCOMPLETE***    INTERVAL EVENTS / SUBJECTIVE:    ***    OBJECTIVE:  Vital Signs Last 24 Hrs  T(C): 36.4 (26 Dec 2018 05:58), Max: 39.2 (25 Dec 2018 21:08)  T(F): 97.6 (26 Dec 2018 05:58), Max: 102.5 (25 Dec 2018 21:08)  HR: 81 (26 Dec 2018 05:58) (81 - 102)  BP: 135/75 (26 Dec 2018 05:58) (120/71 - 151/86)  BP(mean): --  RR: 17 (26 Dec 2018 05:58) (16 - 18)  SpO2: 96% (26 Dec 2018 05:58) (96% - 100%)     @ 07:01  -   @ 07:00  --------------------------------------------------------  IN: 0 mL / OUT: 150 mL / NET: -150 mL      CAPILLARY BLOOD GLUCOSE          PHYSICAL EXAM:    ***      HOSPITAL MEDICATIONS:  furosemide    Tablet 40 milliGRAM(s) Oral daily  liothyronine 5 MICROGram(s) Oral daily  lisinopril 10 milliGRAM(s) Oral daily  rivaroxaban 20 milliGRAM(s) Oral every 24 hours      LABS:                        12.3   11.30 )-----------( 283      ( 26 Dec 2018 08:11 )             36.3     Hgb Trend: 12.3<--, 13.8<--      129<L>  |  90<L>  |  15  ----------------------------<  116<H>  3.9   |  24  |  0.59    Ca    9.6      26 Dec 2018 06:50  Phos  2.9       Mg     2.1         TPro  7.8  /  Alb  4.2  /  TBili  0.5  /  DBili  x   /  AST  25  /  ALT  16  /  AlkPhos  85      Creatinine Trend: 0.59<--, 0.69<--  PT/INR - ( 26 Dec 2018 08:15 )   PT: 13.9 sec;   INR: 1.21 ratio         PTT - ( 25 Dec 2018 20:55 )  PTT:31.0 sec  Urinalysis Basic - ( 25 Dec 2018 22:47 )    Color: Light Yellow / Appearance: Clear / S.014 / pH: x  Gluc: x / Ketone: Trace  / Bili: Negative / Urobili: Negative   Blood: x / Protein: Trace / Nitrite: Negative   Leuk Esterase: Negative / RBC: 1 /hpf / WBC 0 /hpf   Sq Epi: x / Non Sq Epi: 0 /hpf / Bacteria: Negative            MICROBIOLOGY:        RADIOLOGY:    EKG: INTERVAL EVENTS / SUBJECTIVE:    Pt presented w progressive multiarticular joint pain and leg swelling, found to have fever to 102.5. Treated w vanc, zosyn, tylenol, morphine. Ordered for radiographs and doppler ultrasound. This morning pt reports continued joint pain and limited ROM with R hip pain 7-8/10 in severity, R shoulder pain 5/10 in severity, and L knee pain 5/10 in severity. Reports at baseline she is able to walk w a walker. Reports she had a DVT in september or august. Denies chills, night sweats, nausea, vomiting, abd pain, diarrhea, chest pain, or palpitations.      OBJECTIVE:  Vital Signs Last 24 Hrs  T(C): 36.4 (26 Dec 2018 05:58), Max: 39.2 (25 Dec 2018 21:08)  T(F): 97.6 (26 Dec 2018 05:58), Max: 102.5 (25 Dec 2018 21:08)  HR: 81 (26 Dec 2018 05:58) (81 - 102)  BP: 135/75 (26 Dec 2018 05:58) (120/71 - 151/86)  BP(mean): --  RR: 17 (26 Dec 2018 05:58) (16 - 18)  SpO2: 96% (26 Dec 2018 05:58) (96% - 100%)     @ 07:01  -   @ 07:00  --------------------------------------------------------  IN: 0 mL / OUT: 150 mL / NET: -150 mL      PHYSICAL EXAM:    General: elderly female, NAD  	HEENT: PERRL, EOMI, MMM  	Neck: JVD on 45 degree incline  	Cardiac: RRR, normal s1 and s2, no murmur  	Lungs: crackles at the bases, CTAB   	Abdomen: soft, nondistended abdomen. Bowel sounds present. No organomegaly   	Extremities: 2+ mildly pitting edema to the knee b/l. Temperature discrepancy LLE cooler than RLE. Palpable pulses bilaterally  	Neuro: AAO to person, place and time   	Skin: no open wounds sores or lesions, note of erythema around the right wrist at IV site   	Psych: Appropriate mood and affect    MSK: full range of motion around fingers, wrists, elbows, shoulders. Note of right wrist erythema and mild swelling, nontender to palpation with full ROM. No point tenderness around the hip. Pain with flexion around the knees b/l 2/2 swelling no warmth of erythema. No point tenderness on palpation of the spine      HOSPITAL MEDICATIONS:  furosemide    Tablet 40 milliGRAM(s) Oral daily  liothyronine 5 MICROGram(s) Oral daily  lisinopril 10 milliGRAM(s) Oral daily  rivaroxaban 20 milliGRAM(s) Oral every 24 hours      LABS:                        12.3   11.30 )-----------( 283      ( 26 Dec 2018 08:11 )             36.3     Hgb Trend: 12.3<--, 13.8<--      129<L>  |  90<L>  |  15  ----------------------------<  116<H>  3.9   |  24  |  0.59    Ca    9.6      26 Dec 2018 06:50  Phos  2.9       Mg     2.1         TPro  7.8  /  Alb  4.2  /  TBili  0.5  /  DBili  x   /  AST  25  /  ALT  16  /  AlkPhos  85      Creatinine Trend: 0.59<--, 0.69<--  PT/INR - ( 26 Dec 2018 08:15 )   PT: 13.9 sec;   INR: 1.21 ratio      PTT - ( 25 Dec 2018 20:55 )  PTT:31.0 sec  Urinalysis Basic - ( 25 Dec 2018 22:47 )    Color: Light Yellow / Appearance: Clear / S.014 / pH: x  Gluc: x / Ketone: Trace  / Bili: Negative / Urobili: Negative   Blood: x / Protein: Trace / Nitrite: Negative   Leuk Esterase: Negative / RBC: 1 /hpf / WBC 0 /hpf   Sq Epi: x / Non Sq Epi: 0 /hpf / Bacteria: Negative      RADIOLOGY:    WRIST COMPLETE RIGHT-MIN 3 VIEWS    2018      Generalized bone demineralization limits osseous evaluation. There is   chondrocalcinosis at the triangular fibrocartilage. There is severe   radiocarpal joint space narrowing with remodeling of the distal radius   consistent with CPPD arthropathy. There is widening at the scapholunate   interval consistent with scapholunate ligament tearing. There is minimal   migration of the capitate proximally consistent with scapholunate   advanced collapse. There is mild first carpometacarpal osteoarthrosis.    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      XR HIP WITH PELV 2-3V RT    2018      No acute fracture or dislocation. Mild spurring and severe axial joint   space narrowing at the right hip consistent with arthrosis. Calcified   fibroid. Lower lumbar spondylosis.    IMPRESSION:  No acute fracture or dislocation. Moderate to severe right hip arthrosis.      XR CHEST PORTABLE URGENT 1V  2018      Loop recorder.  No focal consolidation.  There is no pneumothorax. There are no pleural effusions.   The cardiomediastinal silhouette cannot be adequately assessed on this   projection.    IMPRESSION:   Clear lungs. INTERVAL EVENTS / SUBJECTIVE:    Pt presented w progressive multiarticular joint pain and leg swelling, found to have fever to 102.5. Treated w vanc, zosyn, tylenol, morphine. Ordered for radiographs and doppler ultrasound. This morning pt reports continued joint pain and limited ROM with R hip pain 7-8/10 in severity, R shoulder pain 5/10 in severity, and L knee pain 5/10 in severity. Reports at baseline she is able to walk w a walker. Reports she had a DVT in september or august. Denies chills, night sweats, nausea, vomiting, abd pain, diarrhea, chest pain, or palpitations.      OBJECTIVE:  Vital Signs Last 24 Hrs  T(C): 36.4 (26 Dec 2018 05:58), Max: 39.2 (25 Dec 2018 21:08)  T(F): 97.6 (26 Dec 2018 05:58), Max: 102.5 (25 Dec 2018 21:08)  HR: 81 (26 Dec 2018 05:58) (81 - 102)  BP: 135/75 (26 Dec 2018 05:58) (120/71 - 151/86)  BP(mean): --  RR: 17 (26 Dec 2018 05:58) (16 - 18)  SpO2: 96% (26 Dec 2018 05:58) (96% - 100%)     @ 07:01  -   @ 07:00  --------------------------------------------------------  IN: 0 mL / OUT: 150 mL / NET: -150 mL      PHYSICAL EXAM:    General: elderly female, NAD  	HEENT: PERRL, EOMI, MMM  	Neck: JVD on 45 degree incline  	Cardiac: RRR, normal s1 and s2, no murmur  	Lungs: crackles at the bases, CTAB   	Abdomen: soft, nondistended abdomen. Bowel sounds present. No organomegaly   	Extremities: 2+ mildly pitting edema to the knee b/l. Temperature discrepancy LLE cooler than RLE. Palpable pulses bilaterally  	Neuro: AAOx3 to person, place and time, CN grossly intact, moving all extremities  	Skin: no open wounds sores or lesions, note of erythema around the right wrist at IV site   	Psych: Appropriate mood and affect    MSK: full range of motion around fingers, wrists, elbows, shoulders. Note of right wrist erythema and mild swelling, nontender to palpation with full ROM. No point tenderness around the hip. Pain with flexion around the knees b/l 2/2 swelling no warmth of erythema. No point tenderness on palpation of the spine      HOSPITAL MEDICATIONS:  furosemide    Tablet 40 milliGRAM(s) Oral daily  liothyronine 5 MICROGram(s) Oral daily  lisinopril 10 milliGRAM(s) Oral daily  rivaroxaban 20 milliGRAM(s) Oral every 24 hours      LABS:                        12.3   11.30 )-----------( 283      ( 26 Dec 2018 08:11 )             36.3     Hgb Trend: 12.3<--, 13.8<--      129<L>  |  90<L>  |  15  ----------------------------<  116<H>  3.9   |  24  |  0.59    Ca    9.6      26 Dec 2018 06:50  Phos  2.9       Mg     2.1         TPro  7.8  /  Alb  4.2  /  TBili  0.5  /  DBili  x   /  AST  25  /  ALT  16  /  AlkPhos  85      Creatinine Trend: 0.59<--, 0.69<--  PT/INR - ( 26 Dec 2018 08:15 )   PT: 13.9 sec;   INR: 1.21 ratio      PTT - ( 25 Dec 2018 20:55 )  PTT:31.0 sec  Urinalysis Basic - ( 25 Dec 2018 22:47 )    Color: Light Yellow / Appearance: Clear / S.014 / pH: x  Gluc: x / Ketone: Trace  / Bili: Negative / Urobili: Negative   Blood: x / Protein: Trace / Nitrite: Negative   Leuk Esterase: Negative / RBC: 1 /hpf / WBC 0 /hpf   Sq Epi: x / Non Sq Epi: 0 /hpf / Bacteria: Negative      RADIOLOGY:    WRIST COMPLETE RIGHT-MIN 3 VIEWS    2018      Generalized bone demineralization limits osseous evaluation. There is chondrocalcinosis at the triangular fibrocartilage. There is severe radiocarpal joint space narrowing with remodeling of the distal radius consistent with CPPD arthropathy. There is widening at the scapholunate interval consistent with scapholunate ligament tearing. There is minimal migration of the capitate proximally consistent with scapholunate advanced collapse. There is mild first carpometacarpal osteoarthrosis.    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      XR HIP WITH PELV 2-3V RT    2018      No acute fracture or dislocation. Mild spurring and severe axial joint space narrowing at the right hip consistent with arthrosis. Calcified fibroid. Lower lumbar spondylosis.    IMPRESSION:  No acute fracture or dislocation. Moderate to severe right hip arthrosis.      XR CHEST PORTABLE URGENT 1V  2018      Loop recorder.  No focal consolidation.  There is no pneumothorax. There are no pleural effusions.   The cardiomediastinal silhouette cannot be adequately assessed on this projection.    IMPRESSION: Clear lungs.

## 2018-12-26 NOTE — ED ADULT NURSE REASSESSMENT NOTE - NS ED NURSE REASSESS COMMENT FT1
Pt sitting up comfortable, talking, A&O3, no s/s pain or discomfort at this time. TBA. Family at bedside for comfort. Safety measures maintained.

## 2018-12-26 NOTE — PROGRESS NOTE ADULT - PROBLEM SELECTOR PLAN 3
- significant lower extremity edema likely exacerbated by the amlodipine vs Hfpef  - reported 5 lb weight gain as per daughter, s/p 1 dose of lasix 40 IV now and will c/w 40 lasix PO daily   - strict I/Os   - given hx of DVT in RLE, will order Duplex U/S  - will also get TTE for Hfpef HFrEF, p/w significant lower extremity edema likely exacerbated by the amlodipine vs HFpEF. Reported 5 lb weight gain as per daughter, s/p 1 dose of lasix 40 IV in ED. H/O DVT in RLE. Vascular was consulted for cool LLE, no concern for needing intervention likely 2/2 CHF per vascular.   - c/w home lasix 40 mg PO qD  - c/w home xarelto 20 mg qD  - f/u bilat dopplers --> update: negative for DVT  - compression stockings  - strict I/Os  - f/u TTE

## 2018-12-26 NOTE — PROGRESS NOTE ADULT - PROBLEM SELECTOR PLAN 7
- c/w lisinopril 10 QD, will hold amlodipine however likely contributing to LE edema  - will dose 40 IV lasix given extensive LE edema   - as per family stopped aldactone as they were concerned that she was become more drowsy from it As per family stopped aldactone as they were concerned that she was become more drowsy from it. BP well-controlled.  - c/w home lisinopril 10 qD  - c/w home lasix 40mg qD  - holding home amlodipine i/s/o LE edema

## 2018-12-26 NOTE — PROGRESS NOTE ADULT - PROBLEM SELECTOR PLAN 2
- meets criteria by fever, WBC, tachycardia of unclear origin at this time ?thrombophlebitis  - CXR: no focal consolidation, UA negative, RVP negative  - patient has erythema and some right wrist swelling which reportedly started in the ED with note of line in the arm: no limited ROM or pain with ROM however exam complicated by the fact that the she had morphine x 6 mg   - will call [facility], follows with Dr. Soto from plastics  - ESR, CRP daily  - blood and urine culture sent, s/p 1 dose of antibitoics in the ED, will monitor clinically off antibiotics Meets criteria by fever, WBC, tachycardia of unclear origin at this time but concerning for septic joint vs thrombophlebitis. CXR w/o focal consolidation, UA negative, RVP negative. Patient has erythema and some right wrist swelling which reportedly started in the ED with note of line in the arm: no limited ROM or pain with ROM however exam complicated by the fact that the she had morphine. ESR 66, CRP 5.9.  - ID consult  - c/w zosyn 3.375 g q8h  - c/w vancomycin 1g qD  - monitor vanc trough  - work-up for septic arthritis as above  - f/u BCx & UCx

## 2018-12-26 NOTE — H&P ADULT - PROBLEM SELECTOR PLAN 3
- possibly hypervolemic hyponatremia in setting of volume overload also compounded by pain   - will give 40 IV lasix at this time and monitor output, will likely need an additional dose in the afternoon pending urine output  - if Na does not improve, will obtain urine studies - diffuse joint pain not localized to one particular focus   - significant difficulty with LE knee flexion in setting of extensive edema in both lower extremities: probably 5 to 6 liters positive   - will need to diurese, will give 40 IV lasix dose at this time, and 40 PO daily as per home regimen - significant lower extremity edema likely exacerbated by the amlodipine vs Hfpef  - reported 5 lb weight gain as per daughter, s/p 1 dose of lasix 40 IV now and will c/w 40 lasix PO daily   - strict I/Os   - given hx of DVT in RLE, will order Duplex U/S  - will also get TTE for Hfpef

## 2018-12-26 NOTE — H&P ADULT - PROBLEM SELECTOR PLAN 5
- hx of syncope in 2017 with implantable loop recorder placed  - known RBBB with LAFB, follows with Dr. Leija from cardiology   - avoid AV isabel blocking agents - hx of RLE DVT In 07/2018, on Xarelto  - c/w Xarelto

## 2018-12-26 NOTE — CONSULT NOTE ADULT - CONSULT REASON
R septic hip
Difficulty walking in patient who is known to me as an outpatient
Eval perfusion to leg
Fever
Hip and Knee Pain

## 2018-12-26 NOTE — H&P ADULT - PROBLEM SELECTOR PLAN 6
- c/w lisinopril 10 QD, and amlodipine however likely contributing to LE edema  - will dose 40 IV lasix given extensive LE edema   - as per family stopped aldactone as they were concerned that she was become more drowsy from it - hx of syncope in 2017 with implantable loop recorder placed  - known RBBB with LAFB, follows with Dr. Leija from cardiology   - avoid AV isabel blocking agents  - cards consult for ILR interrogation

## 2018-12-26 NOTE — H&P ADULT - NSHPLABSRESULTS_GEN_ALL_CORE
EKG, Imaging and results personally reviewed by me     EKG: Sinus tach, with RBBB and LAFB TWI in V1-V3, AVF (compared to EKG in 2018) unchanges                          13.8   12.3  )-----------( 279      ( 25 Dec 2018 20:55 )             40.2         128<L>  |  88<L>  |  16  ----------------------------<  135<H>  4.2   |  24  |  0.69    Ca    9.9      25 Dec 2018 20:55    TPro  7.8  /  Alb  4.2  /  TBili  0.5  /  DBili  x   /  AST  25  /  ALT  16  /  AlkPhos  85      PT/INR - ( 25 Dec 2018 20:55 )   PT: 15.3 sec;   INR: 1.33 ratio         PTT - ( 25 Dec 2018 20:55 )  PTT:31.0 sec    ESR 66   CRP 5.91    Urinalysis Basic - ( 25 Dec 2018 22:47 )    Color: Light Yellow / Appearance: Clear / S.014 / pH: x  Gluc: x / Ketone: Trace  / Bili: Negative / Urobili: Negative   Blood: x / Protein: Trace / Nitrite: Negative   Leuk Esterase: Negative / RBC: 1 /hpf / WBC 0 /hpf   Sq Epi: x / Non Sq Epi: 0 /hpf / Bacteria: Negative    RVP (-)   Xray: reticular opacification on examination of the lungs, heart obscuring left lower lobe, no focal consolidation otherwise  Xray right wrist: no acute fracture, generalized osteopenia   B/L Hip Xray: No acute fracture or dislocations

## 2018-12-26 NOTE — H&P ADULT - PMH
Arthritis    Bifascicular block    Dementia without behavioral disturbance, unspecified dementia type    Hepatitis C    Hypertension    Hypothyroidism

## 2018-12-26 NOTE — PROGRESS NOTE ADULT - PROBLEM SELECTOR PLAN 1
- diffuse joint pain not localized to one particular focus, but per daughter, possibly in right hip  - significant difficulty with LE knee flexion in setting of extensive edema in both lower extremities.  - no obvious septic joint on exam.  - PT eval  - vascular was consulted for cool LLE. f/u recs. Diffuse joint pain not localized to one particular focus, particularly in R hip, also in R shoulder and L knee. Significant difficulty with LE knee flexion in setting of extensive edema in both lower extremities. Denies trauma. Concern for septic joint given fever w SIRS on admission. Hip Xray w mod to severe R hip arthrosis. 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  - ID & rheumatology consult for recs and possible arthrocentesis  --> Update: concern for septic arthritis, plan for CT hip and arthrocentesis  - c/w abx as below  - PT eval  - c/w lidocaine patch and tylenol prn for pain

## 2018-12-26 NOTE — H&P ADULT - PROBLEM SELECTOR PLAN 4
- hx of RLE DVT In 07/2018, on Xarelto  - will start heparin gtt in the interim pending further intervention and recommendations from plastics - possibly hypervolemic hyponatremia in setting of volume overload also compounded by pain   - will give 40 IV lasix at this time and monitor output, and 40 PO daily as part of home regimen   - if Na does not improve, will obtain urine studies

## 2018-12-26 NOTE — PROGRESS NOTE ADULT - ASSESSMENT
This is a 89 year old female with hx of OA, mild dementia (A&Ox3), HCV (in remission), HTN, HFpEF/grade 1 DHF, HTN, hypothyroidism, hx of syncope (trifascicular block RBBB/LAFB) s/p ILR, DVT (?July 2018) on xarelto, who presents with increased right hip pain, leg swelling, and SIRS of unclear etiology. This is a 89 year old female with hx of OA, mild dementia (A&Ox3), HCV (in remission), HTN, HFpEF/grade 1 DHF, HTN, hypothyroidism, hx of syncope (trifascicular block RBBB/LAFB) s/p ILR, DVT (?July 2018) on xarelto, who presents with polyarthritis w right hip, R shoulder, and L knee pain, also w leg swelling, orthopnea, and fever with SIRS of unclear etiology concerning for HF and septic arthritis.

## 2018-12-26 NOTE — CONSULT NOTE ADULT - SUBJECTIVE AND OBJECTIVE BOX
89yFemale c/o R hip and bilateral knee pain for several months. She has received a cortisone shot in R-hip on  with some improvement. Has been taking vicodin as well however noticed the pain getting significantly worse for the past several days. At baseline: able to walk with walker with assist (dating back to 2018), however has gotten to the point where she will not even stand up to go to the bathroom because of the pain. Daughter had called 911 because she was not sure what else she can do in terms of pain management. As per daughter, no recent trauma, falls or missteps. States that she goes intermittently to see Dr. Alex Hoff for joint fluid aspiration. Also of note, patient has been having worsening leg swelling.     PMH:  Bifascicular block  Dementia without behavioral disturbance, unspecified dementia type  Hepatitis C  Hypertension  Arthritis  Hypothyroidism  Hepatitis C  Hypertension    PSH:  Status post placement of implantable loop recorder  No significant past surgical history    AH:    Meds: See med rec    T(C): 36.7 (18 @ 17:27)  HR: 87 (18 @ 17:27)  BP: 135/76 (18 @ 17:27)  RR: 18 (18 @ 17:27)  SpO2: 96% (18 @ 15:55)  Wt(kg): --                        12.3   11.30 )-----------( 283      ( 26 Dec 2018 08:11 )             36.3         129<L>  |  90<L>  |  15  ----------------------------<  116<H>  3.9   |  24  |  0.59    Ca    9.6      26 Dec 2018 06:50  Phos  2.9       Mg     2.1         TPro  7.8  /  Alb  4.2  /  TBili  0.5  /  DBili  x   /  AST  25  /  ALT  16  /  AlkPhos  85  12    PT/INR - ( 26 Dec 2018 08:15 )   PT: 13.9 sec;   INR: 1.21 ratio         PTT - ( 25 Dec 2018 20:55 )  PTT:31.0 sec  Urinalysis Basic - ( 25 Dec 2018 22:47 )    Color: Light Yellow / Appearance: Clear / S.014 / pH: x  Gluc: x / Ketone: Trace  / Bili: Negative / Urobili: Negative   Blood: x / Protein: Trace / Nitrite: Negative   Leuk Esterase: Negative / RBC: 1 /hpf / WBC 0 /hpf   Sq Epi: x / Non Sq Epi: 0 /hpf / Bacteria: Negative    PE  Gen: Laying in bed, alert and oriented, NAD  Resp: Unlabored breathing  RLE: Skin intact over hip and knee, no ecchymosis, no appreciable swelling at hip  SILT DP/SP/ Tammie/Saph,   +EHL/FHL/TA/Gastroc,   ankle painless ROM  hip ROM limited 2/2 pain,   DP+,   soft compartments, no calf ttp,       Secondary:  No TTP over bony landmarks, SILT BL, ROM intact BL, distal pulses palpable.    Imaging:  XR show significant knee and hip OA    A/P: 89F with polyarthralgia consulted for R hip pain  - Patient has long history of knee and hip OA, with recent injection  - Patient presently not toxic and on antibiotics, however if primary team/ID continues to be concerned then obtain IR aspiration  - no further orthopedic intervention at this time  - discussed with attending 89yFemale c/o R hip and bilateral knee pain for several months. She has received a cortisone shot in R-hip on  with some improvement. Has been taking vicodin as well however noticed the pain getting significantly worse for the past several days. At baseline: able to walk with walker with assist (dating back to 2018), however has gotten to the point where she will not even stand up to go to the bathroom because of the pain. Daughter had called 911 because she was not sure what else she can do in terms of pain management. As per daughter, no recent trauma, falls or missteps. States that she goes intermittently to see Dr. Alex Hoff for joint fluid aspiration. Also of note, patient has been having worsening leg swelling.     PMH:  Bifascicular block  Dementia without behavioral disturbance, unspecified dementia type  Hepatitis C  Hypertension  Arthritis  Hypothyroidism  Hepatitis C  Hypertension    PSH:  Status post placement of implantable loop recorder  No significant past surgical history    AH:    Meds: See med rec    T(C): 36.7 (18 @ 17:27)  HR: 87 (18 @ 17:27)  BP: 135/76 (18 @ 17:27)  RR: 18 (18 @ 17:27)  SpO2: 96% (18 @ 15:55)  Wt(kg): --                        12.3   11.30 )-----------( 283      ( 26 Dec 2018 08:11 )             36.3         129<L>  |  90<L>  |  15  ----------------------------<  116<H>  3.9   |  24  |  0.59    Ca    9.6      26 Dec 2018 06:50  Phos  2.9       Mg     2.1         TPro  7.8  /  Alb  4.2  /  TBili  0.5  /  DBili  x   /  AST  25  /  ALT  16  /  AlkPhos  85  12    PT/INR - ( 26 Dec 2018 08:15 )   PT: 13.9 sec;   INR: 1.21 ratio         PTT - ( 25 Dec 2018 20:55 )  PTT:31.0 sec  Urinalysis Basic - ( 25 Dec 2018 22:47 )    Color: Light Yellow / Appearance: Clear / S.014 / pH: x  Gluc: x / Ketone: Trace  / Bili: Negative / Urobili: Negative   Blood: x / Protein: Trace / Nitrite: Negative   Leuk Esterase: Negative / RBC: 1 /hpf / WBC 0 /hpf   Sq Epi: x / Non Sq Epi: 0 /hpf / Bacteria: Negative    PE  Gen: Laying in bed, alert and oriented, NAD  Resp: Unlabored breathing  RLE: Skin intact over hip and knee, no ecchymosis, no appreciable swelling at hip, swollen right (and left) knee  SILT DP/SP/ Tammie/Saph,   +EHL/FHL/TA/Gastroc,   ankle painless ROM  hip ROM limited 2/2 pain, knee ROM limited to pain   DP+,   soft compartments, no calf ttp,       Imaging:  XR show significant knee and hip OA    A/P: 89F with polyarthralgia consulted for R hip pain  - Patient has long history of knee and hip OA, with recent injection  - Patient presently has polyarthralgia in multiple joint, unlikely to multiple septic joints  - Patient presently not toxic and on antibiotics, however if primary team/ID continues to be concerned then obtain IR aspiration  - no further orthopedic intervention at this time  - discussed with attending

## 2018-12-26 NOTE — H&P ADULT - NSHPREVIEWOFSYSTEMS_GEN_ALL_CORE
General: denies any fevers, chills, nausea, vomiting  HEENT: denies any vision changes, trouble or pain with swallowing  Cardiac: denies any chest pain, tightness, palpitations   Lungs: dry cough, denies any pleuritic chest pain  Abdomen: Constipated, denies any abdominal pain, or diarrhea  Extremities: no pain at this time but states she has pain in her right side: knees ankles and hip  Neuro: denies any dizziness, confusion, focal weakness or numbness   Skin: denies any open wounds, sores or lesions   Heme: denies any bleeding or bruising   Psych: denies any SI/HI

## 2018-12-26 NOTE — CONSULT NOTE ADULT - ATTENDING COMMENTS
Severe OA hip. This alone can cause the pain as well as effusion. If suspicious for infection, requires IR aspiration.
pt. with palpable pedal pulses bl  no signs of LE ischemia
89F with long standing Osteoarthritis admitted 12/25/18 for worsening pain to various joints since early November.   Right hip steroid injection 12/13. Right knee large effusion, chronic, recurring. Right wrist XR suggesting pseudogout. Febrile to 102.5F but nontoxic and without systemic symptoms.   Differential includes septic joint, localized infection related to steroid injection, pseudogout can cause fevers itself but usually low grade. Had a DVT of the right leg earlier this year, none on dopplers here. Could be bacteremic but no other clear infection - UA, CXR, RVP negative.   With severe right hip  pain, suspicious of hip infection    Recommend   -continue Vancomycin 1GM IV q24h   -check trough   -continue Zosyn 3.375GM IV q8h   -f/u blood cultures   -XR knees and AP and lateral right hip  -CT IV contrast right hip to assess for abscess etc; not sure if her loop recorder is compatible with MRI   -Orthopedics evaluation for joint aspiration

## 2018-12-26 NOTE — H&P ADULT - NSHPSOCIALHISTORY_GEN_ALL_CORE
Lives at home with daughter. Denies drinking, smoking or recreational drug use. 1 month prior: ambulating with walker with 1 person assist

## 2018-12-26 NOTE — H&P ADULT - ASSESSMENT
89 year old female with hx of OA, dementia, HCV (in remission), HTN, HFpEF, HTN, hypothyroidism, hx of syncope ( trifascicular block RBBB/LAFB) with ILR who presents with increased joint pain for the past several days likely 2/2 volume overload in the LEs This is a 89 year old female with hx of OA, mild dementia (A&Ox3), HCV (in remission), HTN, HFpEF/grade 1 DHF, HTN, hypothyroidism, hx of syncope (trifascicular block RBBB/LAFB) s/p ILR, DVT (?July 2018) on xarelto, who presents with increased right hip pain, leg swelling, and SIRS of unclear etiology.

## 2018-12-26 NOTE — CONSULT NOTE ADULT - REASON FOR ADMISSION
diffuse joint pain, inability to ambulate
diffuse joint pain
diffuse joint pain, inability to ambulate

## 2018-12-26 NOTE — H&P ADULT - PROBLEM SELECTOR PLAN 1
- meets criteria by fever, WBC, tachycardia of unclear origin at this time  - CXR: no focal consolidation, UA negative, RVP negative  - patient has erythema and some right wrist swelling which reportedly started in the ED with note of line in the arm: no limited ROM or pain with ROM however exam complicated by the fact that the she had morphine x 6 mg   - will call [facility], follows with Dr. Soto from plastics  - ESR, CRP daily  - blood and urine culture sent, continue with vanc and zosyn in the interim - meets criteria by fever, WBC, tachycardia of unclear origin at this time ?thrombophlebitis  - CXR: no focal consolidation, UA negative, RVP negative  - patient has erythema and some right wrist swelling which reportedly started in the ED with note of line in the arm: no limited ROM or pain with ROM however exam complicated by the fact that the she had morphine x 6 mg   - will call [facility], follows with Dr. Soto from plastics  - ESR, CRP daily  - blood and urine culture sent, s/p 1 dose of antibitoics in the ED, will monitor clinically - diffuse joint pain not localized to one particular focus, but per daughter, possibly in right hip  - significant difficulty with LE knee flexion in setting of extensive edema in both lower extremities.  - no obvious septic joint on exam.  - PT eval  - vascular was consulted for cool LLE. f/u recs.

## 2018-12-26 NOTE — H&P ADULT - PROBLEM SELECTOR PLAN 2
- diffuse joint pain not localized to one particular focus   - significant difficulty with LE knee flexion in setting of extensive edema in both lower extremities: probably 5 to 6 liters positive   - will need to diurese, will give 40 IV lasix dose at this time, and will likely need an evening dose depending on urine output - significant lower extremity edema likely exacerbated by the amlodipine  - reported 5 lb weight gain as per daughter, s/p 1 dose of lasix 40 IV now and will c/w 40 lasix PO daily   - strict I/Os   - given hx of DVT in RLE, will order Duplex U/S - meets criteria by fever, WBC, tachycardia of unclear origin at this time ?thrombophlebitis  - CXR: no focal consolidation, UA negative, RVP negative  - patient has erythema and some right wrist swelling which reportedly started in the ED with note of line in the arm: no limited ROM or pain with ROM however exam complicated by the fact that the she had morphine x 6 mg   - will call [facility], follows with Dr. Soto from plastics  - ESR, CRP daily  - blood and urine culture sent, s/p 1 dose of antibitoics in the ED, will monitor clinically off antibiotics

## 2018-12-26 NOTE — H&P ADULT - HISTORY OF PRESENT ILLNESS
This is a 89 year old female with hx of OA, dementia, HCV (in remission), HTN, HFpEF, HTN, hypothyroidism, hx of syncope ( trifascicular block RBBB/LAFB) with ILR who presents This is a 89 year old female with hx of OA, dementia, HCV (in remission), HTN, HFpEF, HTN, hypothyroidism, hx of syncope ( trifascicular block RBBB/LAFB) with ILR who presents with increased joint pain for the past several days. According to her daughter who is at bedside, patient has been complaining of pain for several months. Received a cortisone shot in R-hip on 12/13 with some improvement. Has been taking vicodin as well however noticed the pain get significantly worse for the past several days. At baseline: able to walk with walker with assist (dating back to 11/2018), however has gotten to the point where she will not even stand up to go to the bathroom because of the pain. Daughter had called 911 because she was not sure what else she can do in terms of pain management. As per daughter, no recent trauma, falls or missteps. States that she goes intermittently to see Dr. Alex Hoff for joint fluid aspiration. Also of note, patient has been having worsening leg swelling. Was supposed to be on aldactone however discontinued by daughter given concern that she was afraid the medication was making her drowsy.     In the ED;   Temp 102.5 -102 -151/75-86 RR 16-18 O2:   Given Tylenol 1g x1, Morphine 2mg x 1, 4mg x 1, Zosyn x 1, Vanc x 1 and NS 1L bolus This is a 89 year old female with hx of OA, mild dementia (A&Ox3), HCV (in remission), HTN, HFpEF/grade 1 DHF, HTN, hypothyroidism, hx of syncope (trifascicular block RBBB/LAFB) s/p ILR, DVT (?July 2018) on xarelto, who presents with increased joint pain for the past several days.     According to her daughter who is at bedside, patient has been complaining of pain for several months. Received a cortisone shot in R-hip on 12/13 with some improvement. Has been taking vicodin as well however noticed the pain get significantly worse for the past several days. At baseline: able to walk with walker with assist (dating back to 11/2018), however has gotten to the point where she will not even stand up to go to the bathroom because of the pain. Daughter had called 911 because she was not sure what else she can do in terms of pain management. As per daughter, no recent trauma, falls or missteps. States that she goes intermittently to see Dr. Alex Hoff for joint fluid aspiration. Also of note, patient has been having worsening leg swelling. Was supposed to be on aldactone however discontinued by daughter given concern that she was afraid the medication was making her drowsy.     In the ED;   Temp 102.5 -102 -151/75-86 RR 16-18 O2:   Given Tylenol 1g x1, Morphine 2mg x 1, 4mg x 1, Zosyn x 1, Vanc x 1 and NS 1L bolus

## 2018-12-26 NOTE — H&P ADULT - PROBLEM SELECTOR PLAN 7
- c/w liothyronine - c/w lisinopril 10 QD, will hold amlodipine however likely contributing to LE edema  - will dose 40 IV lasix given extensive LE edema   - as per family stopped aldactone as they were concerned that she was become more drowsy from it

## 2018-12-26 NOTE — PROGRESS NOTE ADULT - PROBLEM SELECTOR PLAN 6
- hx of syncope in 2017 with implantable loop recorder placed  - known RBBB with LAFB, follows with Dr. Leija from cardiology   - avoid AV isabel blocking agents  - cards consult for ILR interrogation Hx of syncope in 2017 with implantable loop recorder placed. Known RBBB with LAFB, follows with Dr. Leija from cardiology. EKG stable since prior.  - avoid AV isabel blocking agents  - cards consult for ILR interrogation

## 2018-12-26 NOTE — CONSULT NOTE ADULT - SUBJECTIVE AND OBJECTIVE BOX
HPI:  89F with dCHF, HTN, Hypothyroidism, OA, DVT on Xarelto, HCV with SVR?   pain for several months. Re[place] on  with some improvement. vicodin as well however noticed the pain get significantly worse for the past several days.     PAST MEDICAL & SURGICAL HISTORY:  Bifascicular block  Dementia without behavioral disturbance, unspecified dementia type  Hepatitis C  Hypertension  Arthritis  Hypothyroidism  Status post placement of implantable loop recorder      Allergies    No Known Allergies    Intolerances        ANTIMICROBIALS:      OTHER MEDS:  furosemide    Tablet 40 milliGRAM(s) Oral daily  liothyronine 5 MICROGram(s) Oral daily  lisinopril 10 milliGRAM(s) Oral daily  rivaroxaban 20 milliGRAM(s) Oral every 24 hours      SOCIAL HISTORY:    FAMILY HISTORY:  No pertinent family history in first degree relatives    No pertinent family history in relation to chief complaint     ROS:  Unobtainable because:   All other systems negative   Constitutional: no fever, no chills, no weight loss, no night sweats  HEENT:  no vision changes, no sore throat, no rhinorrhea  Cardiovascular:  no chest pain, no palpitation  Respiratory:  no SOB, no cough  GI:  no abd pain, no vomiting, no diarrhea  urinary: no dysuria, no hematuria, no flank pain  musculoskeletal:  no joint pain, no joint swelling  skin:  no rash  neurology:  no headache, no seizure, no change in mental status  heme: no bleeding    Physical Exam:  General:    NAD, non toxic, A&O x3  HEENT:   no oropharyngeal lesions, no LAD, neck supple  Cardiovascular:    regular rate and rhythm   Respiratory: nonlabored on room air, clear bilaterally, no wheezing  abd:   soft, bowel sounds present, not tender, no hepatosplenomegaly  :     no CVAT, no spurapubic tenderness, no joseph  Musculoskeletal : no joint swelling  Skin:    no rash  Neurologic:     no focal deficits  Vascular: no edema, no phlebitis   Psych: normal affect    Drug Dosing Weight  Height (cm): 165.1 (2018 15:26)  Weight (kg): 68 (2018 14:36)  BMI (kg/m2): 24.9 (2018 15:26)  BSA (m2): 1.75 (2018 15:26)    Vital Signs Last 24 Hrs  T(F): 97.8 (18 @ 12:21), Max: 102.5 (18 @ 21:08)    Vital Signs Last 24 Hrs  HR: 80 (18 @ 12:21) (80 - 102)  BP: 147/70 (18 @ 12:21) (120/71 - 151/86)  RR: 18 (18 @ 12:21)  SpO2: 99% (18 @ 12:21) (96% - 100%)  Wt(kg): --                          12.3   11.30 )-----------( 283      ( 26 Dec 2018 08:11 )             36.3           129<L>  |  90<L>  |  15  ----------------------------<  116<H>  3.9   |  24  |  0.59    Ca    9.6      26 Dec 2018 06:50  Phos  2.9       Mg     2.1         TPro  7.8  /  Alb  4.2  /  TBili  0.5  /  DBili  x   /  AST  25  /  ALT  16  /  AlkPhos  85        Urinalysis Basic - ( 25 Dec 2018 22:47 )    Color: Light Yellow / Appearance: Clear / S.014 / pH: x  Gluc: x / Ketone: Trace  / Bili: Negative / Urobili: Negative   Blood: x / Protein: Trace / Nitrite: Negative   Leuk Esterase: Negative / RBC: 1 /hpf / WBC 0 /hpf   Sq Epi: x / Non Sq Epi: 0 /hpf / Bacteria: Negative        MICROBIOLOGY:  v      Rapid RVP Result: NotDetec ( @ 21:19)          RADIOLOGY: HPI:  89F with long standing Osteoarthritis had worsening joint pain since early November. It was suddenly so bad that she went to an urgent care on . She sees an orthopedist and has joint aspirations to her knees to remove synovial fluid about once every 6-8 weeks.   dCHF, HTN, Hypothyroidism, OA, DVT on Xarelto, HCV with SVR?     pain for several months. Re[place] a [place]one shot [place] on  with some improvement. vicodin as well however noticed the pain get significantly worse for the past several days.     PAST MEDICAL & SURGICAL HISTORY:  Bifascicular block  Dementia without behavioral disturbance, unspecified dementia type  Hepatitis C  Hypertension  Arthritis  Hypothyroidism  Status post placement of implantable loop recorder      Allergies    No Known Allergies    Intolerances        ANTIMICROBIALS:      OTHER MEDS:  furosemide    Tablet 40 milliGRAM(s) Oral daily  liothyronine 5 MICROGram(s) Oral daily  lisinopril 10 milliGRAM(s) Oral daily  rivaroxaban 20 milliGRAM(s) Oral every 24 hours    SOCIAL HISTORY: Never smoker. Used to drink alcohol sometimes. Lives with her daughter. Retired, worked in clerical jobs, social security.     FAMILY HISTORY:  Father passed away age 54, unknown cause.   Mother was healthy   Two sisters had breast cancer     ROS:  Unobtainable because:   All other systems negative   Constitutional: no fever, no chills, no weight loss, no night sweats  HEENT:  no vision changes, no sore throat, no rhinorrhea  Cardiovascular:  no chest pain, no palpitation  Respiratory:  no SOB, no cough  GI:  no abd pain, no vomiting, no diarrhea  urinary: no dysuria, no hematuria, no flank pain  musculoskeletal:  no joint pain, no joint swelling  skin:  no rash  neurology:  no headache, no seizure, no change in mental status  heme: no bleeding    Physical Exam:  General:    NAD, non toxic, A&O x3  HEENT:   no oropharyngeal lesions, no LAD, neck supple  Cardiovascular:    regular rate and rhythm   Respiratory: nonlabored on room air, clear bilaterally, no wheezing  abd:   soft, bowel sounds present, not tender, no hepatosplenomegaly  :     no CVAT, no spurapubic tenderness, no joseph  Musculoskeletal : no joint swelling  Skin:    no rash  Neurologic:     no focal deficits  Vascular: no edema, no phlebitis   Psych: normal affect    Drug Dosing Weight  Height (cm): 165.1 (2018 15:26)  Weight (kg): 68 (2018 14:36)  BMI (kg/m2): 24.9 (2018 15:26)  BSA (m2): 1.75 (2018 15:26)    Vital Signs Last 24 Hrs  T(F): 97.8 (18 @ 12:21), Max: 102.5 (18 @ 21:08)    Vital Signs Last 24 Hrs  HR: 80 (18 @ 12:21) (80 - 102)  BP: 147/70 (18 @ 12:21) (120/71 - 151/86)  RR: 18 (18 @ 12:21)  SpO2: 99% (18 @ 12:21) (96% - 100%)  Wt(kg): --                          12.3   11.30 )-----------( 283      ( 26 Dec 2018 08:11 )             36.3           129<L>  |  90<L>  |  15  ----------------------------<  116<H>  3.9   |  24  |  0.59    Ca    9.6      26 Dec 2018 06:50  Phos  2.9       Mg     2.1         TPro  7.8  /  Alb  4.2  /  TBili  0.5  /  DBili  x   /  AST  25  /  ALT  16  /  AlkPhos  85        Urinalysis Basic - ( 25 Dec 2018 22:47 )    Color: Light Yellow / Appearance: Clear / S.014 / pH: x  Gluc: x / Ketone: Trace  / Bili: Negative / Urobili: Negative   Blood: x / Protein: Trace / Nitrite: Negative   Leuk Esterase: Negative / RBC: 1 /hpf / WBC 0 /hpf   Sq Epi: x / Non Sq Epi: 0 /hpf / Bacteria: Negative        MICROBIOLOGY:  v      Rapid RVP Result: NotDetec ( @ 21:19)          RADIOLOGY: HPI:  89F with long standing Osteoarthritis had worsening joint pain since early November.   She has seen an orthopedist for the past 15-20 years and has joint aspirations to her knees to remove synovial fluid approximately once every 6-8 weeks; right knee more often than left. No history of joint replacements or other surgeries. Her pain is mostly to the right hip and down her leg. It suddenly became so bad that she went to an urgent care on . Her pain continued and she's lost mobility- was walking before, then with a cane, then a walker, now in a wheelchair. She had a cortisone injection to the right hip on 18 which has not shown much improvement. More recently has right wrist pain and to both shoulders limiting her range of motion.   Here was febrile to 102.5F but she was not aware of it- no chills, sweating or malaise and none in the preceding months.   Nothing else really bothering her. Has chronic dyspnea from dCHF. Not coughing. No rhinorrhea. No abdominal pain, diarrhea or dysuria.   Had a DVT of the right leg earlier this year, on Xarelto.     PAST MEDICAL & SURGICAL HISTORY:  Bifascicular block  Dementia without behavioral disturbance, unspecified dementia type  Hepatitis C  Hypertension  Arthritis  Hypothyroidism  Status post placement of implantable loop recorder    Allergies    No Known Allergies    Intolerances    ANTIMICROBIALS:      OTHER MEDS:  furosemide    Tablet 40 milliGRAM(s) Oral daily  liothyronine 5 MICROGram(s) Oral daily  lisinopril 10 milliGRAM(s) Oral daily  rivaroxaban 20 milliGRAM(s) Oral every 24 hours    SOCIAL HISTORY: Never smoker. Used to drink alcohol sometimes. Lives with her daughter. Retired, worked in clerical jobs, social security.     FAMILY HISTORY:  Father passed away age 54, unknown cause.   Mother was healthy   Two sisters had breast cancer     ROS:   All other systems negative   Constitutional: no subjective fevers. no chills. no weight loss, no night sweats   HEENT:  no vision changes, no sore throat, no rhinorrhea   Cardiovascular:  no chest pain, no palpitation   Respiratory:  no SOB, no cough   GI:  no abd pain, no vomiting, no diarrhea   urinary: no dysuria, no hematuria   musculoskeletal: joint pain mostly right hip and down the leg with knee swelling. right wrist pain and swelling.   skin:  no rash. no wounds.   neurology:  no headache, no seizure   heme: no bleeding    Physical Exam:   General: NAD, non toxic, A&O   HEENT: no oropharyngeal lesions. dentures. no LAD, neck supple   Cardiovascular:  regular rate and rhythm   Respiratory: nonlabored on room air, clear bilaterally, no wheezing   abd:   soft, bowel sounds present, not tender, no hepatosplenomegaly   :  no suprapubic tenderness, no joseph   Musculoskeletal: shoulders bilaterally nontender, able to raise above head. elbows bilateral nontender, no erythema, normal range of motion. nontender to anterior and lateral palpation of hips, limited ROM due to pain. right knee with large effusion, ballotable patella, very limited ROM, slightly erythematous. left knee less swollen compared to right. no toe swelling.   Skin: no rash. palpable loop recorder anterior left chest, nontender, no erythema.   Neurologic:     no focal deficits  Vascular: no edema, no phlebitis   Psych: normal affect    Drug Dosing Weight  Height (cm): 165.1 (2018 15:26)  Weight (kg): 68 (2018 14:36)  BMI (kg/m2): 24.9 (2018 15:26)  BSA (m2): 1.75 (2018 15:26)    Vital Signs Last 24 Hrs  T(F): 97.8 (18 @ 12:21), Max: 102.5 (18 @ 21:08)    Vital Signs Last 24 Hrs  HR: 80 (18 @ 12:21) (80 - 102)  BP: 147/70 (18 @ 12:21) (120/71 - 151/86)  RR: 18 (18 @ 12:21)  SpO2: 99% (18 @ 12:21) (96% - 100%)  Wt(kg): --               12.3   11.30 )-----------( 283      ( 26 Dec 2018 08:11 )             36.3           129<L>  |  90<L>  |  15  ----------------------------<  116<H>  3.9   |  24  |  0.59    Ca    9.6      26 Dec 2018 06:50  Phos  2.9       Mg     2.1         TPro  7.8  /  Alb  4.2  /  TBili  0.5  /  DBili  x   /  AST  25  /  ALT  16  /  AlkPhos  85        Urinalysis Basic - ( 25 Dec 2018 22:47 )    Color: Light Yellow / Appearance: Clear / S.014 / pH: x  Gluc: x / Ketone: Trace  / Bili: Negative / Urobili: Negative   Blood: x / Protein: Trace / Nitrite: Negative   Leuk Esterase: Negative / RBC: 1 /hpf / WBC 0 /hpf   Sq Epi: x / Non Sq Epi: 0 /hpf / Bacteria: Negative    MICROBIOLOGY:  Rapid RVP Result: NotDetec ( @ 21:19)    Blood cultures in lab     RADIOLOGY:  VA Duplex Lower Ext Vein Scan, Bilat (18 @ 11:00)   No evidence of bilateral lower extremity deep venous thrombosis.  Left Baker's cyst.    Xray Wrist 3 Views, Right (18 @ 00:35)   Generalized bone demineralization limits osseous evaluation. There is   chondrocalcinosis at the triangular fibrocartilage. There is severe   radiocarpal joint space narrowing with remodeling of the distal radius   consistent with CPPD arthropathy. There is widening at the scapholunate   interval consistent with scapholunate ligament tearing. There is minimal   migration of the capitate proximally consistent with scapholunate   advanced collapse. There is mild first carpometacarpal osteoarthrosis.    Xray Hip w/ Pelvis 2 or 3 Views, Right (18 @ 22:30)   No acute fracture or dislocation. Moderate to severe right hip arthrosis.    Xray Chest 1 View- PORTABLE-Urgent (18 @ 22:30)   Clear lungs. HPI:  89F with long standing Osteoarthritis had worsening joint pain since early November.   She has seen an orthopedist for the past 15-20 years and has joint aspirations to her knees to remove synovial fluid approximately once every 6-8 weeks; right knee more often than left. No history of joint replacements or other surgeries. Her pain is mostly to the right hip and down her leg. It suddenly became so bad that she went to an urgent care on . Her pain continued and she's lost mobility- was walking before, then with a cane, then a walker, now in a wheelchair. She had a cortisone injection to the right hip on 18 which has not shown much improvement. More recently has right wrist pain and to both shoulders limiting her range of motion.   Here was febrile to 102.5F but she was not aware of it- no chills, sweating or malaise and none in the preceding months.   Nothing else really bothering her. Has chronic dyspnea from dCHF. Not coughing. No rhinorrhea. No abdominal pain, diarrhea or dysuria.   Had a DVT of the right leg earlier this year, on Xarelto.     PAST MEDICAL & SURGICAL HISTORY:  Bifascicular block  Dementia without behavioral disturbance, unspecified dementia type  Hepatitis C  Hypertension  Arthritis  Hypothyroidism  Status post placement of implantable loop recorder    Allergies    No Known Allergies    Intolerances    ANTIMICROBIALS:      OTHER MEDS:  furosemide    Tablet 40 milliGRAM(s) Oral daily  liothyronine 5 MICROGram(s) Oral daily  lisinopril 10 milliGRAM(s) Oral daily  rivaroxaban 20 milliGRAM(s) Oral every 24 hours    SOCIAL HISTORY: Never smoker. Used to drink alcohol sometimes. Lives with her daughter. Retired, worked in clerical jobs, social security.     FAMILY HISTORY:  Father passed away age 54, unknown cause.   Mother was healthy   Two sisters had breast cancer     ROS:   All other systems negative   Constitutional: no subjective fevers. no chills. no weight loss, no night sweats   HEENT:  no vision changes, no sore throat, no rhinorrhea   Cardiovascular:  no chest pain, no palpitation   Respiratory:  no SOB, no cough   GI:  no abd pain, no vomiting, no diarrhea   urinary: no dysuria, no hematuria   musculoskeletal: joint pain mostly right hip and down the leg with knee swelling. right wrist pain and swelling.   skin:  no rash. no wounds.   neurology:  no headache, no seizure   heme: no bleeding    Physical Exam:   General: NAD, non toxic, A&O   HEENT: no oropharyngeal lesions. dentures. no LAD, neck supple   Cardiovascular:  regular rate and rhythm   Respiratory: nonlabored on room air, clear bilaterally, no wheezing   abd:   soft, bowel sounds present, not tender, no hepatosplenomegaly   :  no suprapubic tenderness, no joseph   Musculoskeletal: shoulders bilaterally nontender, able to raise above head. elbows bilateral nontender, no erythema, normal range of motion. nontender to anterior and lateral palpation of hips, limited ROM due to pain. right knee with large effusion, ballotable patella, very limited ROM, slightly erythematous. left knee less swollen compared to right. no toe swelling.   Skin: no rash. palpable loop recorder anterior left chest, nontender, no erythema.   Neurologic:     no focal deficits  Vascular: edema to both legs, right slightly worse. no phlebitis   Psych: normal affect    Drug Dosing Weight  Height (cm): 165.1 (2018 15:26)  Weight (kg): 68 (2018 14:36)  BMI (kg/m2): 24.9 (2018 15:26)  BSA (m2): 1.75 (2018 15:26)    Vital Signs Last 24 Hrs  T(F): 97.8 (18 @ 12:21), Max: 102.5 (18 @ 21:08)    Vital Signs Last 24 Hrs  HR: 80 (18 @ 12:21) (80 - 102)  BP: 147/70 (18 @ 12:21) (120/71 - 151/86)  RR: 18 (18 @ 12:21)  SpO2: 99% (18 @ 12:21) (96% - 100%)  Wt(kg): --               12.3   11.30 )-----------( 283      ( 26 Dec 2018 08:11 )             36.3           129<L>  |  90<L>  |  15  ----------------------------<  116<H>  3.9   |  24  |  0.59    Ca    9.6      26 Dec 2018 06:50  Phos  2.9       Mg     2.1         TPro  7.8  /  Alb  4.2  /  TBili  0.5  /  DBili  x   /  AST  25  /  ALT  16  /  AlkPhos  85        Urinalysis Basic - ( 25 Dec 2018 22:47 )    Color: Light Yellow / Appearance: Clear / S.014 / pH: x  Gluc: x / Ketone: Trace  / Bili: Negative / Urobili: Negative   Blood: x / Protein: Trace / Nitrite: Negative   Leuk Esterase: Negative / RBC: 1 /hpf / WBC 0 /hpf   Sq Epi: x / Non Sq Epi: 0 /hpf / Bacteria: Negative    MICROBIOLOGY:  Rapid RVP Result: NotDetec ( @ 21:19)    Blood cultures in lab     RADIOLOGY:  VA Duplex Lower Ext Vein Scan, Bilat (18 @ 11:00)   No evidence of bilateral lower extremity deep venous thrombosis.  Left Baker's cyst.    Xray Wrist 3 Views, Right (18 @ 00:35)   Generalized bone demineralization limits osseous evaluation. There is   chondrocalcinosis at the triangular fibrocartilage. There is severe   radiocarpal joint space narrowing with remodeling of the distal radius   consistent with CPPD arthropathy. There is widening at the scapholunate   interval consistent with scapholunate ligament tearing. There is minimal   migration of the capitate proximally consistent with scapholunate   advanced collapse. There is mild first carpometacarpal osteoarthrosis.    Xray Hip w/ Pelvis 2 or 3 Views, Right (18 @ 22:30)   No acute fracture or dislocation. Moderate to severe right hip arthrosis.    Xray Chest 1 View- PORTABLE-Urgent (18 @ 22:30)   Clear lungs. HPI:  89F with long standing Osteoarthritis had worsening joint pain since early November.   She has seen an orthopedist for the past 15-20 years and has joint aspirations to her knees to remove synovial fluid approximately once every 6-8 weeks; right knee more often than left. No history of joint replacements or other surgeries. Her pain is mostly to the right hip and down her leg. It suddenly became so bad that she went to an urgent care on . Her pain continued and she's lost mobility- was walking before, then with a cane, then a walker, now in a wheelchair. She had a cortisone injection to the right hip on 18 which has not shown much improvement. More recently has right wrist pain and to both shoulders limiting her range of motion.   Here was febrile to 102.5F but she was not aware of it- no chills, sweating or malaise and none in the preceding months.   Nothing else really bothering her. Has chronic dyspnea from dCHF. Not coughing. No rhinorrhea. No abdominal pain, diarrhea or dysuria.   Had a DVT of the right leg earlier this year, on Xarelto.     PAST MEDICAL & SURGICAL HISTORY:  Bifascicular block  Dementia without behavioral disturbance, unspecified dementia type  Hepatitis C  Hypertension  Arthritis  Hypothyroidism  Status post placement of implantable loop recorder    Allergies    No Known Allergies    Intolerances    ANTIMICROBIALS:      OTHER MEDS:  furosemide    Tablet 40 milliGRAM(s) Oral daily  liothyronine 5 MICROGram(s) Oral daily  lisinopril 10 milliGRAM(s) Oral daily  rivaroxaban 20 milliGRAM(s) Oral every 24 hours    SOCIAL HISTORY: Never smoker. Used to drink alcohol sometimes. Lives with her daughter. Retired, worked in clerical jobs, social security.     FAMILY HISTORY:  Father passed away age 54, unknown cause.   Mother was healthy   Two sisters had breast cancer     ROS:   All other systems negative   Constitutional: no subjective fevers. no chills. no weight loss, no night sweats   HEENT:  no vision changes, no sore throat, no rhinorrhea   Cardiovascular:  no chest pain, no palpitation   Respiratory:  no SOB, no cough   GI:  no abd pain, no vomiting, no diarrhea   urinary: no dysuria, no hematuria   musculoskeletal: joint pain mostly right hip and down the leg with knee swelling. right wrist pain and swelling.   skin:  no rash. no wounds.   neurology:  no headache, no seizure   heme: no bleeding    Physical Exam:   General: NAD, non toxic, A&O   HEENT: no oropharyngeal lesions. dentures. no LAD, neck supple   Cardiovascular:  regular rate and rhythm   Respiratory: nonlabored on room air, clear bilaterally, no wheezing   abd:   soft, bowel sounds present, not tender, no hepatosplenomegaly   :  no suprapubic tenderness, no joseph   Musculoskeletal: shoulders bilaterally nontender, able to raise above head. elbows bilateral nontender, no erythema, normal range of motion. nontender to anterior and lateral palpation of hips but  limited ROM due to pain especially right. right knee with large effusion, ballotable patella, very limited ROM, slightly erythematous. left knee less swollen compared to right. no toe swelling.   Skin: no rash. palpable loop recorder anterior left chest, nontender, no erythema.   Neurologic:     no focal deficits  Vascular: edema to both legs, right slightly worse. no phlebitis   Psych: normal affect    Drug Dosing Weight  Height (cm): 165.1 (2018 15:26)  Weight (kg): 68 (2018 14:36)  BMI (kg/m2): 24.9 (2018 15:26)  BSA (m2): 1.75 (2018 15:26)    Vital Signs Last 24 Hrs  T(F): 97.8 (18 @ 12:21), Max: 102.5 (18 @ 21:08)    Vital Signs Last 24 Hrs  HR: 80 (18 @ 12:21) (80 - 102)  BP: 147/70 (18 @ 12:21) (120/71 - 151/86)  RR: 18 (18 @ 12:21)  SpO2: 99% (18 @ 12:21) (96% - 100%)  Wt(kg): --               12.3   11.30 )-----------( 283      ( 26 Dec 2018 08:11 )             36.3           129<L>  |  90<L>  |  15  ----------------------------<  116<H>  3.9   |  24  |  0.59    Ca    9.6      26 Dec 2018 06:50  Phos  2.9       Mg     2.1         TPro  7.8  /  Alb  4.2  /  TBili  0.5  /  DBili  x   /  AST  25  /  ALT  16  /  AlkPhos  85        Urinalysis Basic - ( 25 Dec 2018 22:47 )    Color: Light Yellow / Appearance: Clear / S.014 / pH: x  Gluc: x / Ketone: Trace  / Bili: Negative / Urobili: Negative   Blood: x / Protein: Trace / Nitrite: Negative   Leuk Esterase: Negative / RBC: 1 /hpf / WBC 0 /hpf   Sq Epi: x / Non Sq Epi: 0 /hpf / Bacteria: Negative    MICROBIOLOGY:  Rapid RVP Result: NotDetec ( @ 21:19)    Blood cultures in lab     RADIOLOGY:  VA Duplex Lower Ext Vein Scan, Bilat (18 @ 11:00)   No evidence of bilateral lower extremity deep venous thrombosis.  Left Baker's cyst.    Xray Wrist 3 Views, Right (18 @ 00:35)   Generalized bone demineralization limits osseous evaluation. There is   chondrocalcinosis at the triangular fibrocartilage. There is severe   radiocarpal joint space narrowing with remodeling of the distal radius   consistent with CPPD arthropathy. There is widening at the scapholunate   interval consistent with scapholunate ligament tearing. There is minimal   migration of the capitate proximally consistent with scapholunate   advanced collapse. There is mild first carpometacarpal osteoarthrosis.    Xray Hip w/ Pelvis 2 or 3 Views, Right (18 @ 22:30)   No acute fracture or dislocation. Moderate to severe right hip arthrosis.    Xray Chest 1 View- PORTABLE-Urgent (18 @ 22:30)   Clear lungs.

## 2018-12-26 NOTE — CONSULT NOTE ADULT - ASSESSMENT
89 year-old woman with bilateral leg pain and swelling that has now limited her ability to walk at all.  No evidence of DVT.  Arthritis work-up per rheumatology.    Continue anticoagulation, but okay to hold for diagnostic procedures as needed.  Continue diuresis as tolerated.  Monitor BUN/Cr. Renal function currently at baseline.    Recommend pain management. Sinus tachycardia is likely secondary to discomfort.

## 2018-12-26 NOTE — CONSULT NOTE ADULT - SUBJECTIVE AND OBJECTIVE BOX
Patient seen and evaluated @ 1600 in [facility] ED  Chief Complaint: Leg pain (and swelling)    HPI:  This is a 89 year old female with hx of OA, mild dementia (A&Ox3), HCV (in remission), HTN, HFpEF/grade 1 DHF, HTN, hypothyroidism, hx of syncope (trifascicular block RBBB/LAFB) s/p ILR, DVT (?July 2018) on xarelto, who presents with increased joint pain for the past several days.     According to her daughter who is at bedside, patient has been complaining of pain for several months. Received a cortisone shot in R[place] on 12/13 with some improvement. Has been taking vicodin as well however noticed the pain get significantly worse for the past several days. At baseline: able to walk with walker with assist (dating back to 11/2018), however has gotten to the point where she will not even stand up to go to the bathroom because of the pain. Daughter had called 911 because she was not sure what else she can do in terms of pain management. As per daughter, no recent trauma, falls or missteps. States that she goes intermittently to see Dr. Alex Hoff for joint fluid aspiration. Also of note, patient has been having worsening leg swelling. Was supposed to be on aldactone however discontinued by daughter given concern that she was afraid the medication was making her drowsy.     In the ED;   Temp 102.5 -102 -151/75-86 RR 16-18 O2:   Given Tylenol 1g x1, Morphine 2mg x 1, 4mg x 1, Zosyn x 1, Vanc x 1 and NS 1L bolus (26 Dec 2018 04:59)    PMH:   Bifascicular block  Dementia without behavioral disturbance, unspecified dementia type  Hepatitis C  Hypertension  Arthritis  Hypothyroidism  Hepatitis C  Hypertension    PSH:   Status post placement of implantable loop recorder  No significant past surgical history    Medications:   furosemide    Tablet 40 milliGRAM(s) Oral daily  lidocaine   Patch 1 Patch Transdermal daily  lidocaine   Patch 1 Patch Transdermal daily  liothyronine 5 MICROGram(s) Oral daily  lisinopril 10 milliGRAM(s) Oral daily  piperacillin/tazobactam IVPB. 3.375 Gram(s) IV Intermittent every 8 hours  rivaroxaban 20 milliGRAM(s) Oral every 24 hours  senna 1 Tablet(s) Oral daily  vancomycin  IVPB 1000 milliGRAM(s) IV Intermittent every 24 hours    Allergies:  No Known Allergies    FAMILY HISTORY:  No pertinent family history in first degree relatives    Social History: , lives with family (adult soto)  Smoking: nonsmoker  Alcohol: rare EtOH  Drugs: no illicit drug use    Review of Systems:  See HPI, otherwise:  Constitutional: No fever, chills, fatigue, or changes in weight  HEENT: No blurry vision, eye pain, headache, runny nose, or sore throat  Respiratory: No shortness of breath, cough, or wheezing  Cardiovascular: No chest pain or palpitations  Gastrointestinal: No nausea, vomiting, diarrhea, or abdominal pain  Genitourinary: No dysuria or incontinence  Extremities:+ lower extremity swelling  Neurologic: No focal findings  Lymphatic: No lymphadenopathy   Skin: No rash  Psychiatry: No anxiety or depression  10 point review of systems is otherwise negative except as mentioned above            Physical Exam:  T(C): 36.7 (12-26-18 @ 17:27), Max: 36.8 (12-26-18 @ 15:55)  HR: 87 (12-26-18 @ 17:27) (80 - 93)  BP: 135/76 (12-26-18 @ 17:27) (120/71 - 147/70)  RR: 18 (12-26-18 @ 17:27) (16 - 18)  SpO2: 96% (12-26-18 @ 15:55) (96% - 99%)  Wt(kg): --    12-25 @ 07:01  -  12-26 @ 07:00  --------------------------------------------------------  IN: 0 mL / OUT: 150 mL / NET: -150 mL      Daily Height in cm: 165.1 (26 Dec 2018 17:27)    Daily     Appearance: Normal, well groomed, NAD  Eyes: PERRLA, EOMI, pink conjunctiva, no scleral icterus   HENT: Normal oral mucosa  Cardiovascular: RRR, S1, S2, no murmur, rub, or gallop; + lower extremity edema; no JVD; LE pulses are palpable  Respiratory: Clear to auscultation bilaterally  Gastrointestinal: Soft, non-tender, non-distended, BS+  Musculoskeletal: No clubbing; +bilateral knee swelling noted   Neurologic: No focal weakness  Lymphatic: No lymphadenopathy  Psychiatry: AAOx3 with appropriate mood and affect  Skin: No rashes, ecchymoses, or cyanosis    Cardiovascular Diagnostic Testing:  ECG: sinus tachycardia with bifascicular block    Echo: 8/2017 - normal LVEF, normal LA size, no pulmonary HTN    Imaging: < from: [facility] Duplex Lower Ext Vein Scan, Bilat (12.26.18 @ 11:00) >  IMPRESSION:     No evidence of bilateral lower extremity deep venous thrombosis.    Left Salinas's cyst.    DAVID HENRY M.D., ATTENDING RADIOLOGIST  This document has been electronically signed. Dec 26 2018 11:24AM      < end of copied text >      Labs:                        12.3   11.30 )-----------( 283      ( 26 Dec 2018 08:11 )             36.3     12-26    129<L>  |  90<L>  |  15  ----------------------------<  116<H>  3.9   |  24  |  0.59    Ca    9.6      26 Dec 2018 06:50  Phos  2.9     12-26  Mg     2.1     12-26    TPro  7.8  /  Alb  4.2  /  TBili  0.5  /  DBili  x   /  AST  25  /  ALT  16  /  AlkPhos  85  12-25    PT/INR - ( 26 Dec 2018 08:15 )   PT: 13.9 sec;   INR: 1.21 ratio         PTT - ( 25 Dec 2018 20:55 )  PTT:31.0 sec

## 2018-12-26 NOTE — PROGRESS NOTE ADULT - PROBLEM SELECTOR PLAN 9
- DVT PPx: Melody Asher  PGY2  [phone] - DVT PPx: Xarelto  - Constipation: start senna monitor BMs  - Dispo: PT eval, home pending hospital course    Yamini Meehan PGY-1  Internal Medicine HS  Pager [phone]

## 2018-12-27 LAB
ALBUMIN SERPL ELPH-MCNC: 3.2 G/DL — LOW (ref 3.3–5)
ALP SERPL-CCNC: 65 U/L — SIGNIFICANT CHANGE UP (ref 40–120)
ALT FLD-CCNC: 12 U/L — SIGNIFICANT CHANGE UP (ref 10–45)
ANION GAP SERPL CALC-SCNC: 12 MMOL/L — SIGNIFICANT CHANGE UP (ref 5–17)
APTT BLD: 46.5 SEC — HIGH (ref 27.5–36.3)
AST SERPL-CCNC: 18 U/L — SIGNIFICANT CHANGE UP (ref 10–40)
B PERT IGG+IGM PNL SER: ABNORMAL
B PERT IGG+IGM PNL SER: ABNORMAL
BASOPHILS # BLD AUTO: 0 K/UL — SIGNIFICANT CHANGE UP (ref 0–0.2)
BASOPHILS NFR BLD AUTO: 0 % — SIGNIFICANT CHANGE UP (ref 0–2)
BILIRUB SERPL-MCNC: 0.7 MG/DL — SIGNIFICANT CHANGE UP (ref 0.2–1.2)
BUN SERPL-MCNC: 10 MG/DL — SIGNIFICANT CHANGE UP (ref 7–23)
CALCIUM SERPL-MCNC: 9.1 MG/DL — SIGNIFICANT CHANGE UP (ref 8.4–10.5)
CHLORIDE SERPL-SCNC: 90 MMOL/L — LOW (ref 96–108)
CO2 SERPL-SCNC: 25 MMOL/L — SIGNIFICANT CHANGE UP (ref 22–31)
COLOR FLD: YELLOW — SIGNIFICANT CHANGE UP
COLOR FLD: YELLOW — SIGNIFICANT CHANGE UP
CREAT SERPL-MCNC: 0.59 MG/DL — SIGNIFICANT CHANGE UP (ref 0.5–1.3)
EOSINOPHIL # BLD AUTO: 0.09 K/UL — SIGNIFICANT CHANGE UP (ref 0–0.5)
EOSINOPHIL NFR BLD AUTO: 0.9 % — SIGNIFICANT CHANGE UP (ref 0–6)
FLUID INTAKE SUBSTANCE CLASS: SIGNIFICANT CHANGE UP
FLUID INTAKE SUBSTANCE CLASS: SIGNIFICANT CHANGE UP
FLUID SEGMENTED GRANULOCYTES: 84 % — SIGNIFICANT CHANGE UP
FLUID SEGMENTED GRANULOCYTES: 87 % — SIGNIFICANT CHANGE UP
GLUCOSE SERPL-MCNC: 133 MG/DL — HIGH (ref 70–99)
HCT VFR BLD CALC: 33.8 % — LOW (ref 34.5–45)
HGB BLD-MCNC: 11.7 G/DL — SIGNIFICANT CHANGE UP (ref 11.5–15.5)
INR BLD: 2.23 RATIO — HIGH (ref 0.88–1.16)
LYMPHOCYTES # BLD AUTO: 0.73 K/UL — LOW (ref 1–3.3)
LYMPHOCYTES # BLD AUTO: 7 % — LOW (ref 13–44)
LYMPHOCYTES # FLD: 3 % — SIGNIFICANT CHANGE UP
LYMPHOCYTES # FLD: 4 % — SIGNIFICANT CHANGE UP
MAGNESIUM SERPL-MCNC: 1.9 MG/DL — SIGNIFICANT CHANGE UP (ref 1.6–2.6)
MCHC RBC-ENTMCNC: 30.3 PG — SIGNIFICANT CHANGE UP (ref 27–34)
MCHC RBC-ENTMCNC: 34.6 GM/DL — SIGNIFICANT CHANGE UP (ref 32–36)
MCV RBC AUTO: 87.6 FL — SIGNIFICANT CHANGE UP (ref 80–100)
MONOCYTES # BLD AUTO: 1.29 K/UL — HIGH (ref 0–0.9)
MONOCYTES NFR BLD AUTO: 12.3 % — SIGNIFICANT CHANGE UP (ref 2–14)
MONOS+MACROS # FLD: 13 % — SIGNIFICANT CHANGE UP
MONOS+MACROS # FLD: 9 % — SIGNIFICANT CHANGE UP
NEUTROPHILS # BLD AUTO: 8.36 K/UL — HIGH (ref 1.8–7.4)
NEUTROPHILS NFR BLD AUTO: 79.8 % — HIGH (ref 43–77)
PHOSPHATE SERPL-MCNC: 3.3 MG/DL — SIGNIFICANT CHANGE UP (ref 2.5–4.5)
PLATELET # BLD AUTO: 265 K/UL — SIGNIFICANT CHANGE UP (ref 150–400)
POTASSIUM SERPL-MCNC: 3.4 MMOL/L — LOW (ref 3.5–5.3)
POTASSIUM SERPL-SCNC: 3.4 MMOL/L — LOW (ref 3.5–5.3)
PROT SERPL-MCNC: 6.3 G/DL — SIGNIFICANT CHANGE UP (ref 6–8.3)
PROTHROM AB SERPL-ACNC: 26.1 SEC — HIGH (ref 10–13.1)
RBC # BLD: 3.86 M/UL — SIGNIFICANT CHANGE UP (ref 3.8–5.2)
RBC # FLD: 14.6 % — HIGH (ref 10.3–14.5)
RCV VOL RI: 122 /UL — HIGH (ref 0–5)
RCV VOL RI: 278 /UL — HIGH (ref 0–5)
SODIUM SERPL-SCNC: 127 MMOL/L — LOW (ref 135–145)
SYNOVIAL CRYSTALS CLARITY: ABNORMAL
SYNOVIAL CRYSTALS CLARITY: ABNORMAL
SYNOVIAL CRYSTALS COLOR: YELLOW
SYNOVIAL CRYSTALS COLOR: YELLOW
SYNOVIAL CRYSTALS ID: ABNORMAL
SYNOVIAL CRYSTALS ID: ABNORMAL
SYNOVIAL CRYSTALS TUBE: SIGNIFICANT CHANGE UP
SYNOVIAL CRYSTALS TUBE: SIGNIFICANT CHANGE UP
TOTAL NUCLEATED CELL COUNT, BODY FLUID: HIGH /UL (ref 0–5)
TOTAL NUCLEATED CELL COUNT, BODY FLUID: HIGH /UL (ref 0–5)
TUBE TYPE: SIGNIFICANT CHANGE UP
TUBE TYPE: SIGNIFICANT CHANGE UP
VANCOMYCIN TROUGH SERPL-MCNC: <4 UG/ML — LOW (ref 10–20)
WBC # BLD: 10.47 K/UL — SIGNIFICANT CHANGE UP (ref 3.8–10.5)
WBC # FLD AUTO: 10.47 K/UL — SIGNIFICANT CHANGE UP (ref 3.8–10.5)

## 2018-12-27 PROCEDURE — 20610 DRAIN/INJ JOINT/BURSA W/O US: CPT | Mod: 50,GC

## 2018-12-27 PROCEDURE — 99232 SBSQ HOSP IP/OBS MODERATE 35: CPT

## 2018-12-27 PROCEDURE — 99232 SBSQ HOSP IP/OBS MODERATE 35: CPT | Mod: GC,25

## 2018-12-27 RX ORDER — ACETAMINOPHEN 500 MG
1000 TABLET ORAL ONCE
Qty: 0 | Refills: 0 | Status: COMPLETED | OUTPATIENT
Start: 2018-12-27 | End: 2018-12-27

## 2018-12-27 RX ORDER — ACETAMINOPHEN 500 MG
650 TABLET ORAL EVERY 6 HOURS
Qty: 0 | Refills: 0 | Status: DISCONTINUED | OUTPATIENT
Start: 2018-12-27 | End: 2018-12-31

## 2018-12-27 RX ORDER — VANCOMYCIN HCL 1 G
1000 VIAL (EA) INTRAVENOUS EVERY 12 HOURS
Qty: 0 | Refills: 0 | Status: DISCONTINUED | OUTPATIENT
Start: 2018-12-27 | End: 2018-12-28

## 2018-12-27 RX ORDER — INFLUENZA VIRUS VACCINE 15; 15; 15; 15 UG/.5ML; UG/.5ML; UG/.5ML; UG/.5ML
0.5 SUSPENSION INTRAMUSCULAR ONCE
Qty: 0 | Refills: 0 | Status: DISCONTINUED | OUTPATIENT
Start: 2018-12-27 | End: 2018-12-28

## 2018-12-27 RX ADMIN — LIDOCAINE 1 PATCH: 4 CREAM TOPICAL at 23:18

## 2018-12-27 RX ADMIN — Medication 650 MILLIGRAM(S): at 22:09

## 2018-12-27 RX ADMIN — PIPERACILLIN AND TAZOBACTAM 25 GRAM(S): 4; .5 INJECTION, POWDER, LYOPHILIZED, FOR SOLUTION INTRAVENOUS at 08:24

## 2018-12-27 RX ADMIN — LISINOPRIL 10 MILLIGRAM(S): 2.5 TABLET ORAL at 06:10

## 2018-12-27 RX ADMIN — Medication 650 MILLIGRAM(S): at 21:39

## 2018-12-27 RX ADMIN — Medication 40 MILLIGRAM(S): at 06:10

## 2018-12-27 RX ADMIN — PIPERACILLIN AND TAZOBACTAM 25 GRAM(S): 4; .5 INJECTION, POWDER, LYOPHILIZED, FOR SOLUTION INTRAVENOUS at 00:52

## 2018-12-27 RX ADMIN — Medication 250 MILLIGRAM(S): at 22:49

## 2018-12-27 RX ADMIN — Medication 650 MILLIGRAM(S): at 11:10

## 2018-12-27 RX ADMIN — LIDOCAINE 1 PATCH: 4 CREAM TOPICAL at 11:11

## 2018-12-27 RX ADMIN — LIDOCAINE 1 PATCH: 4 CREAM TOPICAL at 18:35

## 2018-12-27 RX ADMIN — LIDOCAINE 1 PATCH: 4 CREAM TOPICAL at 06:12

## 2018-12-27 RX ADMIN — SENNA PLUS 1 TABLET(S): 8.6 TABLET ORAL at 21:40

## 2018-12-27 RX ADMIN — Medication 1000 MILLIGRAM(S): at 02:43

## 2018-12-27 RX ADMIN — LIOTHYRONINE SODIUM 5 MICROGRAM(S): 25 TABLET ORAL at 06:10

## 2018-12-27 RX ADMIN — Medication 400 MILLIGRAM(S): at 01:34

## 2018-12-27 RX ADMIN — Medication 650 MILLIGRAM(S): at 11:40

## 2018-12-27 RX ADMIN — LIDOCAINE 1 PATCH: 4 CREAM TOPICAL at 18:34

## 2018-12-27 RX ADMIN — PIPERACILLIN AND TAZOBACTAM 25 GRAM(S): 4; .5 INJECTION, POWDER, LYOPHILIZED, FOR SOLUTION INTRAVENOUS at 16:34

## 2018-12-27 NOTE — PHYSICAL THERAPY INITIAL EVALUATION ADULT - GAIT DEVIATIONS NOTED, PT EVAL
increased time in double stance/decreased velocity of limb motion/decreased step length/decreased mir

## 2018-12-27 NOTE — PROGRESS NOTE ADULT - ASSESSMENT
89F with OA, mild dementia (A&Ox2-3), HCV (in remission), HTN, HFpEF (Grade I diastolic dysfunction), HTN, hypothyroidism, hx of syncope (trifascicular block RBBB/LAFB) s/p ILR, DVT on Xarelto presenting with right hip and bilateral knee pain, swelling, and inability to ambulate meeting SIRS criteria on admission, admitted with concern for septic arthritis vs. OA flare.

## 2018-12-27 NOTE — PHYSICAL THERAPY INITIAL EVALUATION ADULT - PERTINENT HX OF CURRENT PROBLEM, REHAB EVAL
Pt is a 89 year old female with hx of OA, mild dementia (A&Ox3), HCV (in remission), HTN, HFpEF/grade 1 DHF, HTN, hypothyroidism, hx of syncope (trifascicular block RBBB/LAFB) s/p ILR, DVT (?July 2018) on xarelto, who presents with increased joint pain for the past several days.  Continued...

## 2018-12-27 NOTE — PROGRESS NOTE ADULT - PROBLEM SELECTOR PLAN 4
Possibly hypervolemic hyponatremia in setting of volume overload also compounded by pain. Currently at Na 127 from 129 yesterday.   - c/w home lasix 40 mg qD  - if Na does not improve, will send urine studies

## 2018-12-27 NOTE — PROGRESS NOTE ADULT - PROBLEM SELECTOR PLAN 2
Presenting with fever, leukocytosis and tachycardia presumably related to joint complaints as remaining infectious work-up negative at this time. Inflammatory markers elevated. Remains afebrile since presentation.   - ID consulted, appreciate recommendations  - Continue vanco/Zosyn empirically  - F/u BCx, UCx

## 2018-12-27 NOTE — PHYSICAL THERAPY INITIAL EVALUATION ADULT - DIAGNOSIS, PT EVAL
Impaired mobility/function secondary to generalized weakness, decreased AROM braulio LE's (R greater than L), R LE discomfort.

## 2018-12-27 NOTE — PHYSICAL THERAPY INITIAL EVALUATION ADULT - IMPAIRMENTS CONTRIBUTING IMPAIRED BED MOBILITY, REHAB EVAL
decreased ROM/pain/decreased strength/decreased flexibility/impaired postural control decreased strength

## 2018-12-27 NOTE — PHYSICAL THERAPY INITIAL EVALUATION ADULT - PRECAUTIONS/LIMITATIONS, REHAB EVAL
pertinent history continued... According to her daughter who is at bedside, patient has been complaining of pain for several months. Received a cortisone shot in R-hip on 12/13 with some improvement. Has been taking vicodin as well however noticed the pain get significantly worse for the past several days. At baseline: able to walk with walker with assist (dating back to 11/2018), however has gotten to the point where she will not even stand up to go to the bathroom because of the pain. pertinent history continued... According to her daughter who is at bedside, patient has been complaining of pain for several months. Received a cortisone shot in R[place] on 12/13 with some improvement. Has been taking vicodin as well however noticed the pain get significantly worse for the past several days. At baseline: able to walk with walker with assist (dating back to 11/2018), however has gotten to the point where she will not even stand up to go to the bathroom because of the pain.  During hospital stay,  b/l knee arthrocentesis was attempted  to rule out septic arthritis of knees, patient refused procedure and becoming agitated. pertinent history continued... According to her daughter who is at bedside, patient has been complaining of pain for several months. Received a cortisone shot in R-hip on 12/13 with some improvement. Has been taking vicodin as well however noticed the pain get significantly worse for the past several days. At baseline: able to walk with walker with assist (dating back to 11/2018), however has gotten to the point where she will not even stand up to go to the bathroom because of the pain.  During hospital stay,  b/l knee arthrocentesis was attempted  to rule out septic arthritis of knees, patient refused procedure and becoming agitated.  Pt was evaluated by orthopedics 12/26/18- polyarthralgia in multiple joints unlikely due to multiple septic joints per ortho, no orthopedic intervention at present. pertinent history continued... According to her daughter who is at bedside, patient has been complaining of pain for several months. Received a cortisone shot in R-hip on 12/13 with some improvement. Has been taking vicodin as well however noticed the pain get significantly worse for the past several days. At baseline: able to walk with walker with assist (dating back to 11/2018), however has gotten to the point where she will not even stand up to go to the bathroom because of the pain.  During hospital stay,  b/l knee arthrocentesis was attempted  to rule out septic arthritis of knees, patient refused procedure and becoming agitated.  Pt was evaluated by orthopedics 12/26/18- polyarthralgia in multiple joints unlikely due to multiple septic joints per ortho, no orthopedic intervention at present./fall precautions

## 2018-12-27 NOTE — PROGRESS NOTE ADULT - ASSESSMENT
89 yo F w/ hx of HTN, OA, CHF presenting for R hip and knee pain.    Patient with severe hip pain, knee pain with warmth redness and effusion, presented with fever and worsening joint pains. Recent hip injection with steroids. Concern here is for septic joint vs severe OA vs CPPD (as seen on wrist xray). Patient is on antibiotics currently.    - Will attempt to try knee arthrocentesis again today  - Orthopedics evaluation for concern for R hip septic joint  - Recommend US guided joint aspiration of R hip    Kentrell Millan  Rheumatology Fellow PGY IV 89 yo F w/ hx of HTN, OA, CHF presenting for R hip and knee pain.    Patient with severe hip pain, knee pain with warmth redness and effusion, presented with fever and worsening joint pains. Recent hip injection with steroids. Concern here is for septic joint vs severe OA vs CPPD (as seen on wrist xray). Patient is on antibiotics currently.    - Performed b/l knee arthrocentesis, see separate procedure notes  - Orthopedics evaluation for concern for R hip septic joint  - Recommend US guided joint aspiration of R hip    Kentrell Millan  Rheumatology Fellow PGY IV 89 yo F w/ hx of HTN, OA, CHF presenting for R hip and knee pain.    Patient with severe hip pain, knee pain with warmth redness and effusion, presented with fever and worsening joint pains. Recent hip injection with steroids. Concern here is for septic joint vs severe OA vs CPPD (as seen on wrist xray). Patient is on antibiotics currently.    - Performed b/l knee arthrocentesis, see separate procedure notes, send for cell count, crystal, culture, gram stain, L knee send 2 culture samples  - Orthopedics evaluation for concern for R hip septic joint  - Recommend US guided joint aspiration of R hip    Kentrell Millan  Rheumatology Fellow PGY IV 89 yo F w/ hx of HTN, OA, CHF presenting for R hip and knee pain.    Patient with severe hip pain, knee pain with warmth redness and effusion, presented with fever and worsening joint pains. Recent hip injection with steroids. Concern here is for septic joint vs severe OA vs CPPD (as seen on wrist xray). Patient is on antibiotics currently.    - Performed b/l knee arthrocentesis, see separate procedure notes, send for cell count, crystal, culture, gram stain, L knee send 2 culture samples  - consent for bilateral arthocentesis obtained from patient  - Orthopedics evaluation for concern for R hip septic joint  - Recommend US guided joint aspiration of R hip    Kentrell Millan  Rheumatology Fellow PGY IV

## 2018-12-27 NOTE — PHYSICAL THERAPY INITIAL EVALUATION ADULT - ADL SKILLS, REHAB EVAL
needed assist/Pt required set-up and assist with dressing/bathing/feeding. PT has HHA 3 days/wk x 4 hours/day needed assist/Pt required set-up and assist with dressing/bathing/feeding. Pt has HHA 3 days/wk x 4 hours/day

## 2018-12-27 NOTE — PROGRESS NOTE ADULT - PROBLEM SELECTOR PLAN 5
Hx of RLE DVT In 07/2018, on Xarelto.   - Holding Xarelto this evening pending joint aspiration tomorrow 12/28  - Resume AC after procedure

## 2018-12-27 NOTE — PHYSICAL THERAPY INITIAL EVALUATION ADULT - ACTIVE RANGE OF MOTION EXAMINATION, REHAB EVAL
R UE AROM WFL, R LE AROM difficult to assess secondary to pt with c/o severe pain, L shoulder AAROM 0~ 90 degrees, L elbow to digits WFL, LLE AAROM WFL STEVEE & MARILYNE AROM WFL, RLE MAYRA L

## 2018-12-27 NOTE — PHYSICAL THERAPY INITIAL EVALUATION ADULT - TRANSFER TRAINING, PT EVAL
GOAL: Patient will transfer from sit to stand at rolling walker with mod assist of 1 in 2 weeks GOAL: Patient will transfer from sit to stand at rolling walker with supervision in 2 weeks

## 2018-12-27 NOTE — PROGRESS NOTE ADULT - PROBLEM SELECTOR PLAN 3
HFrEF, p/w significant lower extremity edema likely exacerbated by the amlodipine vs HFpEF. Reported 5 lb weight gain as per daughter, s/p 1 dose of lasix 40 IV in ED. H/O DVT in RLE. Vascular was consulted for cool LLE, no concern for needing intervention likely 2/2 CHF per vascular.   - LE doppler U/S negative for DVT  - c/w home lasix 40 mg PO qD  - c/w home xarelto 20 mg qD  - compression stockings  - strict I/Os  - f/u TTE

## 2018-12-27 NOTE — PROGRESS NOTE ADULT - PROBLEM SELECTOR PLAN 1
Diffuse joint pain not localized to one particular focus, particularly in R hip, also in R shoulder and L knee. Significant difficulty with LE knee flexion in setting of extensive edema in both lower extremities. Denies trauma. Concern for septic joint given fever w SIRS on admission. Hip Xray w mod to severe R hip arthrosis. 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  - Awaiting B/L knee arthrocentesis and R hip arthrocentesis, will f/u synovial fluid studies once performed  - Empiric antibiotics  - PT eval in process  - Continue Lidocaine patch and Tylenol PRN for pain

## 2018-12-27 NOTE — PHYSICAL THERAPY INITIAL EVALUATION ADULT - BED MOBILITY LIMITATIONS, REHAB EVAL
impaired ability to control trunk for mobility/decreased ability to use legs for bridging/pushing decreased ability to use legs for bridging/pushing

## 2018-12-27 NOTE — PROGRESS NOTE ADULT - SUBJECTIVE AND OBJECTIVE BOX
JAQUAN KAM  [id number]    INTERVAL HPI/OVERNIGHT EVENTS:    Patient stated she received some pain medication and her symptoms are improved. She was able to tell me her full name and the year, told me we were at her house and could not remember the president.    MEDICATIONS  (STANDING):  furosemide    Tablet 40 milliGRAM(s) Oral daily  influenza   Vaccine 0.5 milliLiter(s) IntraMuscular once  lidocaine   Patch 1 Patch Transdermal daily  lidocaine   Patch 1 Patch Transdermal daily  liothyronine 5 MICROGram(s) Oral daily  lisinopril 10 milliGRAM(s) Oral daily  piperacillin/tazobactam IVPB. 3.375 Gram(s) IV Intermittent every 8 hours  rivaroxaban 20 milliGRAM(s) Oral every 24 hours  senna 1 Tablet(s) Oral daily  vancomycin  IVPB 1000 milliGRAM(s) IV Intermittent every 24 hours    MEDICATIONS  (PRN):  acetaminophen    Suspension .. 650 milliGRAM(s) Oral every 6 hours PRN Temp greater or equal to 38C (100.4F), Mild Pain (1 - 3), Moderate Pain (4 - 6), Severe Pain (7 - 10)      Allergies    No Known Allergies    Intolerances        Review of Systems:  Gen:  +fatigue  HEENT: No blurry vision, no difficulty swallowing, no oral or nasal ulcers  CVS: No chest pain/palpitations  Resp: No SOB/wheezing  GI: No N/V/C/D/abdominal pain  MSK: +R hip, b/l knee, R shoulder pain  Skin: No new rashes  Neuro: No headaches      Vital Signs Last 24 Hrs  T(C): 36.7 (27 Dec 2018 06:09), Max: 36.8 (26 Dec 2018 15:55)  T(F): 98 (27 Dec 2018 06:09), Max: 98.2 (26 Dec 2018 15:55)  HR: 81 (27 Dec 2018 06:09) (81 - 90)  BP: 120/62 (27 Dec 2018 06:09) (120/62 - 135/76)  BP(mean): --  RR: 17 (27 Dec 2018 06:09) (17 - 18)  SpO2: 98% (27 Dec 2018 06:09) (96% - 98%)    Physical Exam:  General: No apparent distress  HEENT: EOMI, MMM  CVS: +S1/S2, RRR, no murmurs/rubs/gallops  Resp: CTA b/l. No crackles/wheezing  GI: Soft, NT/ND +BS  MSK: b/l knee effusions, knees are warm, limited ROM and pain on R hip rotation  Neuro: AAOx3  Skin: no visible rashes    LABS:                        11.7   10.47 )-----------( 265      ( 27 Dec 2018 08:10 )             33.8         127<L>  |  90<L>  |  10  ----------------------------<  133<H>  3.4<L>   |  25  |  0.59    Ca    9.1      27 Dec 2018 06:30  Phos  3.3       Mg     1.9         TPro  6.3  /  Alb  3.2<L>  /  TBili  0.7  /  DBili  x   /  AST  18  /  ALT  12  /  AlkPhos  65      PT/INR - ( 27 Dec 2018 08:10 )   PT: 26.1 sec;   INR: 2.23 ratio         PTT - ( 27 Dec 2018 08:10 )  PTT:46.5 sec  Urinalysis Basic - ( 25 Dec 2018 22:47 )    Color: Light Yellow / Appearance: Clear / S.014 / pH: x  Gluc: x / Ketone: Trace  / Bili: Negative / Urobili: Negative   Blood: x / Protein: Trace / Nitrite: Negative   Leuk Esterase: Negative / RBC: 1 /hpf / WBC 0 /hpf   Sq Epi: x / Non Sq Epi: 0 /hpf / Bacteria: Negative      Sedimentation Rate, Erythrocyte: 61 mm/hr (18 @ 08:11)    C-Reactive Protein, Serum: 5.91 mg/dL (18 @ 00:32)      RADIOLOGY & ADDITIONAL STUDIES:    < from: Xray Wrist 3 Views, Right (18 @ 00:35) >  EXAM:  WRIST COMPLETE RIGHT-MIN 3 VIEWS                            PROCEDURE DATE:  2018            INTERPRETATION:  CLINICAL INFORMATION: Wrist pain.     TECHNIQUE: 3 views of the right wrist.    COMPARISON: None.    FINDINGS:   Generalized bone demineralization limits osseous evaluation. There is   chondrocalcinosis at the triangular fibrocartilage. There is severe   radiocarpal joint space narrowing with remodeling of the distal radius   consistent with CPPD arthropathy. There is widening at the scapholunate   interval consistent with scapholunate ligament tearing. There is minimal   migration of the capitate proximally consistent with scapholunate   advanced collapse. There is mild first carpometacarpal osteoarthrosis.    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                KATERIN QUINN M.D., RADIOLOGY RESIDENT  This document has been electronically signed.  IRAJ MENDIOLA M.D., ATTENDING RADIOLOGIST  This document has been electronically signed. Dec 26 2018  9:58AM    < end of copied text >    < from: Xray Hip w/ Pelvis 2 or 3 Views, Right (18 @ 22:30) >  EXAM:  XR HIP WITH PELV 2-3V RT                            PROCEDURE DATE:  2018            INTERPRETATION:  CLINICAL INFORMATION: Right hip pain.     TECHNIQUE: Single frontal pelvic radiograph and 2 views of the right hip.    COMPARISON: None.    FINDINGS:   No acute fracture or dislocation. Mild spurring and severe axial joint   space narrowing at the right hip consistent with arthrosis. Calcified   fibroid. Lower lumbar spondylosis.    IMPRESSION:  No acute fracture or dislocation. Moderate to severe right hip arthrosis.                KATERIN QUINN M.D., RADIOLOGY RESIDENT  This document has been electronically signed.  IRAJ MENDIOLA M.D., ATTENDING RADIOLOGIST  This document has been electronically signed. Dec 26 2018 10:22AM    < end of copied text >        < from: CT Pelvis Bony Only w/ IV Cont (18 @ 20:46) >  EXAM:  CT PELVIS BONY ONLY IC                            PROCEDURE DATE:  2018            INTERPRETATION:  CT PELVIS BONY ONLY IC dated 2018 8:46 PM     INDICATION: Right hip pain and swelling for a few months.    COMPARISON: Right hip radiographs dated 2018    TECHNIQUE: CT imaging of the pelvis was performed with contrast. The data   was reformatted in the axial, coronal, and sagittal planes.  Contrast: Omnipaque 350. Administered: 90 cc. Discarded: 10 cc    FINDINGS:    OSSEOUS STRUCTURES: There is severe right hip joint space narrowing with   subchondral sclerosis and cystic changes. There is mild left hip axial   joint space narrowing with mild marginal productive changes. Spurring and   productive change at the pubicsymphysis with mild chondrocalcinosis   present. There is productive changes of both sacroiliac joints, worse on   left. Degenerative changes of lower lumbar spine with moderate to severe   degenerative disc disease at L5-S1.  SYNOVIUM/ JOINT FLUID: There is a small right hip joint effusion. There   is moderate distention of the right iliopsoas bursa. There is mild   enhancement of the iliopsoas synovium with adjacent edema.  TENDONS: There is proximal enthesophyte change at the hamstring tendons  bilaterally. There is mild enthesophyte change at the bilateral abductor   insertions.  MUSCLES: There is no intramuscular hematoma. Normal muscle bulk.  NEUROVASCULAR STRUCTURES: Vascular calcifications are present bilaterally.  INTRAPELVIC SOFT TISSUES: There is a calcified uterine fibroid.   Left-sided renal cysts are noted measuring up to 3 cm.  SUBCUTANEOUS SOFT TISSUES: There is mild soft tissue swelling at the   pelvis.    IMPRESSION:    1.  Severe right hip osteoarthritis.  2.  Small right hip joint effusion and moderate distention of the right   iliopsoas bursa. Findings likely reflect inflammatory change secondary to   the severe right hip arthritis. Superimposed infection is not excluded.   If there is concern for septic arthritis, correlation with hip aspiration   is recommended.  3.  Additional degenerative changes as detailed above.    Findings were discussed with Dr. Milan  on 2018 10:38 AM  with   read back.                    YASSINE BERNAL M.D., ATTENDING RADIOLOGIST  This document has been electronically signed. Dec 27 2018 10:39AM    < end of copied text >

## 2018-12-27 NOTE — PHYSICAL THERAPY INITIAL EVALUATION ADULT - TRANSFER SKILLS, REHAB EVAL
PT required rolling walker and close supervision for all transfers PTA/needs device and assist Pt required rolling walker and close supervision for all transfers PTA/needs device and assist

## 2018-12-27 NOTE — PHYSICAL THERAPY INITIAL EVALUATION ADULT - MANUAL MUSCLE TESTING RESULTS, REHAB EVAL
R UE ~3/5, L shoulder ~3-/5, L elbow to digits ~3/5, unable to accurately assess RLE, LLE ~ 2-/5 BUE & LLE at least fair+;  RLE at least poor+

## 2018-12-27 NOTE — PROGRESS NOTE ADULT - ASSESSMENT
89F with long standing Osteoarthritis admitted 12/25/18 for worsening pain to various joints since early November.   Right hip steroid injection 12/13. Right knee large effusion, chronic, recurring. Right wrist XR suggesting pseudogout. Febrile to 102.5F but nontoxic and without systemic symptoms.   Differential includes septic joint, localized infection related to steroid injection, pseudogout can cause fevers itself but usually low grade. Had a DVT of the right leg earlier this year, none on dopplers here. Could be bacteremic but no other clear infection - UA, CXR, RVP negative.   With severe right hip  pain, suspicious of hip infection    Crescending severe RIght hip pain  Fever  Elevated ESR/CRP  Pseudogout  History of HCV (apparent spontaneous cure), HTN, HFpEF/grade 1 DHF, HTN, hypothyroidism, hx of syncope (trifascicular block RBBB/LAFB) s/p ILR, DVT (?July 2018) on xarelto,     Suggest   -continue Vancomycin 1GM IV q24h   12/26 -->  -check trough   -continue Zosyn 3.375GM IV q8h   12/26 -->  -f/u blood cultures       I favor IR guided right hip aspiration  I will be out until 1/2/19 - ID service will follow

## 2018-12-27 NOTE — PROGRESS NOTE ADULT - SUBJECTIVE AND OBJECTIVE BOX
Follow Up:  hip pain    Interval History/ROS:  pain with movement,  asleep at present, daughter at bedside    Allergies  No Known Allergies        ANTIMICROBIALS:  piperacillin/tazobactam IVPB. 3.375 every 8 hours  vancomycin  IVPB 1000 every 24 hours      OTHER MEDS:  MEDICATIONS  (STANDING):  acetaminophen    Suspension .. 650 every 6 hours PRN  furosemide    Tablet 40 daily  influenza   Vaccine 0.5 once  liothyronine 5 daily  lisinopril 10 daily  rivaroxaban 20 every 24 hours  senna 1 daily      Vital Signs Last 24 Hrs  T(C): 36.7 (27 Dec 2018 06:09), Max: 36.8 (26 Dec 2018 15:55)  T(F): 98 (27 Dec 2018 06:09), Max: 98.2 (26 Dec 2018 15:55)  HR: 81 (27 Dec 2018 06:09) (81 - 90)  BP: 120/62 (27 Dec 2018 06:09) (120/62 - 135/76)  BP(mean): --  RR: 17 (27 Dec 2018 06:09) (17 - 18)  SpO2: 98% (27 Dec 2018 06:09) (96% - 98%)    PHYSICAL EXAM:  General: WN/WD NAD, Non-toxic  Neurology: asleep  Respiratory: no distress  Skin: No rash                          11.7   10.47 )-----------( 265      ( 27 Dec 2018 08:10 )             33.8   WBC Count: 10.47 ( @ 08:10)  79.8 % Neut  WBC Count: 11.30 ( @ 08:11)  WBC Count: 12.3 ( @ 20:55)    Sedimentation Rate, Erythrocyte (..18 @ 08:11)    Sedimentation Rate, Erythrocyte: 61 mm/hr    C-Reactive Protein, Serum (12.26.18 @ 00:32)    C-Reactive Protein, Serum: 5.91 mg/dL          127<L>  |  90<L>  |  10  ----------------------------<  133<H>  3.4<L>   |  25  |  0.59    Ca    9.1      27 Dec 2018 06:30  Phos  3.3       Mg     1.9         TPro  6.3  /  Alb  3.2<L>  /  TBili  0.7  /  DBili  x   /  AST  18  /  ALT  12  /  AlkPhos  65        Urinalysis Basic - ( 25 Dec 2018 22:47 )    Color: Light Yellow / Appearance: Clear / S.014 / pH: x  Gluc: x / Ketone: Trace  / Bili: Negative / Urobili: Negative   Blood: x / Protein: Trace / Nitrite: Negative   Leuk Esterase: Negative / RBC: 1 /hpf / WBC 0 /hpf   Sq Epi: x / Non Sq Epi: 0 /hpf / Bacteria: Negative        MICROBIOLOGY:  .Urine Clean Catch (Midstream)  18   No growth  --  --      .Blood Blood-Peripheral Neg x 2  18   No growth to date.  --  --  Rapid RVP Result: NotDetec ( @ 21:19)        RADIOLOGY:  < from: CT Pelvis Bony Only w/ IV Cont (18 @ 20:46) >  1.  Severe right hip osteoarthritis.  2.  Small right hip joint effusion and moderate distention of the right   iliopsoas bursa. Findings likely reflect inflammatory change secondary to   the severe right hip arthritis. Superimposed infection is not excluded.   If there is concern for septic arthritis, correlation with hip aspiration   is recommended.  3.  Additional degenerative changes as detailed above.    < end of copied text >      Cruz Morales MD; Division of Infectious Disease; Pager: [phone]; nights and weekends: [phone]

## 2018-12-27 NOTE — PROGRESS NOTE ADULT - PROBLEM SELECTOR PLAN 7
BP well-controlled.   - c/w home lisinopril 10 qD  - c/w home lasix 40mg qD  - holding home amlodipine i/s/o LE edema

## 2018-12-27 NOTE — PROGRESS NOTE ADULT - PROBLEM SELECTOR PLAN 9
- DVT ppx: Xarelto  - Constipation: start senna monitor BMs  - Dispo: PT eval, home pending hospital course

## 2018-12-27 NOTE — PHYSICAL THERAPY INITIAL EVALUATION ADULT - GENERAL OBSERVATIONS, REHAB EVAL
Pt received supine in bed A & O X 3-4, pt forgetful. Pt with (+)PIV, (+)external female catheter, daughter at b/s.

## 2018-12-27 NOTE — PROGRESS NOTE ADULT - PROBLEM SELECTOR PLAN 6
Hx of syncope in 2017 with implantable loop recorder placed. Known RBBB with LAFB, follows with Dr. Leija from cardiology. EKG stable since prior.  - avoid AV isabel blocking agents  - Will call EP for interrogation of ILR in AM

## 2018-12-27 NOTE — PHYSICAL THERAPY INITIAL EVALUATION ADULT - CRITERIA FOR SKILLED THERAPEUTIC INTERVENTIONS
rehab potential/impairments found rehab potential/impairments found/anticipated discharge recommendation

## 2018-12-28 LAB
ANION GAP SERPL CALC-SCNC: 13 MMOL/L — SIGNIFICANT CHANGE UP (ref 5–17)
APTT BLD: 36.7 SEC — HIGH (ref 27.5–36.3)
BASOPHILS # BLD AUTO: 0.05 K/UL — SIGNIFICANT CHANGE UP (ref 0–0.2)
BASOPHILS NFR BLD AUTO: 0.5 % — SIGNIFICANT CHANGE UP (ref 0–2)
BUN SERPL-MCNC: 13 MG/DL — SIGNIFICANT CHANGE UP (ref 7–23)
CALCIUM SERPL-MCNC: 9.2 MG/DL — SIGNIFICANT CHANGE UP (ref 8.4–10.5)
CHLORIDE SERPL-SCNC: 89 MMOL/L — LOW (ref 96–108)
CO2 SERPL-SCNC: 26 MMOL/L — SIGNIFICANT CHANGE UP (ref 22–31)
CREAT SERPL-MCNC: 0.64 MG/DL — SIGNIFICANT CHANGE UP (ref 0.5–1.3)
EOSINOPHIL # BLD AUTO: 0.1 K/UL — SIGNIFICANT CHANGE UP (ref 0–0.5)
EOSINOPHIL NFR BLD AUTO: 1 % — SIGNIFICANT CHANGE UP (ref 0–6)
GLUCOSE SERPL-MCNC: 110 MG/DL — HIGH (ref 70–99)
GRAM STN FLD: SIGNIFICANT CHANGE UP
HCT VFR BLD CALC: 35.7 % — SIGNIFICANT CHANGE UP (ref 34.5–45)
HGB BLD-MCNC: 11.9 G/DL — SIGNIFICANT CHANGE UP (ref 11.5–15.5)
IMM GRANULOCYTES NFR BLD AUTO: 0.3 % — SIGNIFICANT CHANGE UP (ref 0–1.5)
INR BLD: 1.31 RATIO — HIGH (ref 0.88–1.16)
LYMPHOCYTES # BLD AUTO: 1.05 K/UL — SIGNIFICANT CHANGE UP (ref 1–3.3)
LYMPHOCYTES # BLD AUTO: 10.2 % — LOW (ref 13–44)
MAGNESIUM SERPL-MCNC: 2 MG/DL — SIGNIFICANT CHANGE UP (ref 1.6–2.6)
MCHC RBC-ENTMCNC: 30.1 PG — SIGNIFICANT CHANGE UP (ref 27–34)
MCHC RBC-ENTMCNC: 33.3 GM/DL — SIGNIFICANT CHANGE UP (ref 32–36)
MCV RBC AUTO: 90.4 FL — SIGNIFICANT CHANGE UP (ref 80–100)
MONOCYTES # BLD AUTO: 1.74 K/UL — HIGH (ref 0–0.9)
MONOCYTES NFR BLD AUTO: 16.9 % — HIGH (ref 2–14)
NEUTROPHILS # BLD AUTO: 7.33 K/UL — SIGNIFICANT CHANGE UP (ref 1.8–7.4)
NEUTROPHILS NFR BLD AUTO: 71.1 % — SIGNIFICANT CHANGE UP (ref 43–77)
PHOSPHATE SERPL-MCNC: 3 MG/DL — SIGNIFICANT CHANGE UP (ref 2.5–4.5)
PLATELET # BLD AUTO: 297 K/UL — SIGNIFICANT CHANGE UP (ref 150–400)
POTASSIUM SERPL-MCNC: 3.7 MMOL/L — SIGNIFICANT CHANGE UP (ref 3.5–5.3)
POTASSIUM SERPL-SCNC: 3.7 MMOL/L — SIGNIFICANT CHANGE UP (ref 3.5–5.3)
PROTHROM AB SERPL-ACNC: 15.1 SEC — HIGH (ref 10–13.1)
RBC # BLD: 3.95 M/UL — SIGNIFICANT CHANGE UP (ref 3.8–5.2)
RBC # FLD: 14.6 % — HIGH (ref 10.3–14.5)
SODIUM SERPL-SCNC: 128 MMOL/L — LOW (ref 135–145)
SPECIMEN SOURCE: SIGNIFICANT CHANGE UP
WBC # BLD: 10.3 K/UL — SIGNIFICANT CHANGE UP (ref 3.8–10.5)
WBC # FLD AUTO: 10.3 K/UL — SIGNIFICANT CHANGE UP (ref 3.8–10.5)

## 2018-12-28 PROCEDURE — 20610 DRAIN/INJ JOINT/BURSA W/O US: CPT | Mod: RT

## 2018-12-28 PROCEDURE — 77002 NEEDLE LOCALIZATION BY XRAY: CPT | Mod: 26,RT

## 2018-12-28 PROCEDURE — 99232 SBSQ HOSP IP/OBS MODERATE 35: CPT

## 2018-12-28 PROCEDURE — 99232 SBSQ HOSP IP/OBS MODERATE 35: CPT | Mod: GC

## 2018-12-28 RX ORDER — RIVAROXABAN 15 MG-20MG
20 KIT ORAL EVERY 24 HOURS
Qty: 0 | Refills: 0 | Status: DISCONTINUED | OUTPATIENT
Start: 2018-12-28 | End: 2018-12-31

## 2018-12-28 RX ORDER — CELECOXIB 200 MG/1
200 CAPSULE ORAL DAILY
Qty: 0 | Refills: 0 | Status: DISCONTINUED | OUTPATIENT
Start: 2018-12-28 | End: 2018-12-30

## 2018-12-28 RX ADMIN — LIDOCAINE 1 PATCH: 4 CREAM TOPICAL at 12:18

## 2018-12-28 RX ADMIN — RIVAROXABAN 20 MILLIGRAM(S): KIT at 17:46

## 2018-12-28 RX ADMIN — Medication 650 MILLIGRAM(S): at 21:38

## 2018-12-28 RX ADMIN — LISINOPRIL 10 MILLIGRAM(S): 2.5 TABLET ORAL at 05:06

## 2018-12-28 RX ADMIN — LIOTHYRONINE SODIUM 5 MICROGRAM(S): 25 TABLET ORAL at 05:06

## 2018-12-28 RX ADMIN — Medication 650 MILLIGRAM(S): at 13:19

## 2018-12-28 RX ADMIN — Medication 650 MILLIGRAM(S): at 21:08

## 2018-12-28 RX ADMIN — LIDOCAINE 1 PATCH: 4 CREAM TOPICAL at 19:05

## 2018-12-28 RX ADMIN — PIPERACILLIN AND TAZOBACTAM 25 GRAM(S): 4; .5 INJECTION, POWDER, LYOPHILIZED, FOR SOLUTION INTRAVENOUS at 00:45

## 2018-12-28 RX ADMIN — LIDOCAINE 1 PATCH: 4 CREAM TOPICAL at 12:19

## 2018-12-28 RX ADMIN — Medication 650 MILLIGRAM(S): at 12:19

## 2018-12-28 RX ADMIN — Medication 40 MILLIGRAM(S): at 05:06

## 2018-12-28 RX ADMIN — SENNA PLUS 1 TABLET(S): 8.6 TABLET ORAL at 12:19

## 2018-12-28 NOTE — PROGRESS NOTE ADULT - PROBLEM SELECTOR PLAN 8
TSH 0.39 in February 2018. Continue liothyronine 5 mcg qD. TSH 0.39 in February 2018.  - c/w liothyronine 5 mcg qD.

## 2018-12-28 NOTE — PROVIDER CONTACT NOTE (OTHER) - ACTION/TREATMENT ORDERED:
Team 3 Dyana Pelletier made aware Vancomycin 1000mg to be changed from every 24 hrs to every 12 hours . vanco. trough pre 3 rd dose

## 2018-12-28 NOTE — PROGRESS NOTE ADULT - ASSESSMENT
Imp/Rx:  Pseudogout.  Inflammatory arthritis.  Agree that R hip should be tapped---This is where most of the pain has been and recently had steroid injection.  No urgency for abx, will inc risk of c. diff and could mask infection.    suggest stopping vanco zosyn  tap R hip

## 2018-12-28 NOTE — PROGRESS NOTE ADULT - SUBJECTIVE AND OBJECTIVE BOX
INTERVAL EVENTS / SUBJECTIVE:    ***    OBJECTIVE:  Vital Signs Last 24 Hrs  T(C): 36.7 (28 Dec 2018 05:04), Max: 37 (27 Dec 2018 14:08)  T(F): 98.1 (28 Dec 2018 05:04), Max: 98.6 (27 Dec 2018 14:08)  HR: 67 (28 Dec 2018 05:04) (67 - 84)  BP: 129/71 (28 Dec 2018 05:04) (113/70 - 129/71)  BP(mean): --  RR: 18 (28 Dec 2018 05:04) (17 - 18)  SpO2: 96% (28 Dec 2018 05:04) (96% - 99%)    12-27 @ 07:01  -  12-28 @ 07:00  --------------------------------------------------------  IN: 1280 mL / OUT: 1200 mL / NET: 80 mL    12-28 @ 07:01  -  12-28 @ 10:36  --------------------------------------------------------  IN: 120 mL / OUT: 650 mL / NET: -530 mL      PHYSICAL EXAM:  General: Pleasant elderly woman, NAD  HEENT:  EOMI, PERRL, conjunctiva and sclera clear, normal oropharynx  Neck: Supple, no JVD, normal thyroid  Chest/Lungs: CTA B/L, no rales, rhonchi, rub or wheezing  Heart: RRR, normal S1, S2, no murmurs or gallops  Abdomen: Soft, nondistended, NTTP, normoactive bowel sounds  Extremities: Peripheral pulses 2+ B/L, non-pitting edema of B/L LE with decreased ROM at R hip join and B/L knees 2/2 pain  Skin: Warm, well-perfused, edema of R hip and legs B/L without appreciable erythema  Neurological: A&Ox2, no focal deficits      HOSPITAL MEDICATIONS:  acetaminophen    Suspension .. 650 milliGRAM(s) Oral every 6 hours PRN Temp greater or equal to 38C (100.4F), Mild Pain (1 - 3), Moderate Pain (4 - 6), Severe Pain (7 - 10)  furosemide    Tablet 40 milliGRAM(s) Oral daily  influenza   Vaccine 0.5 milliLiter(s) IntraMuscular once  lidocaine   Patch 1 Patch Transdermal daily  lidocaine   Patch 1 Patch Transdermal daily  liothyronine 5 MICROGram(s) Oral daily  lisinopril 10 milliGRAM(s) Oral daily  senna 1 Tablet(s) Oral daily      LABS:                        11.9   10.30 )-----------( 297      ( 28 Dec 2018 08:33 )             35.7     Hgb Trend: 11.9<--, 11.7<--, 12.3<--, 13.8<--  12-28    128<L>  |  89<L>  |  13  ----------------------------<  110<H>  3.7   |  26  |  0.64    Ca    9.2      28 Dec 2018 06:57  Phos  3.0     12-28  Mg     2.0     12-28    TPro  6.3  /  Alb  3.2<L>  /  TBili  0.7  /  DBili  x   /  AST  18  /  ALT  12  /  AlkPhos  65  12-27    Creatinine Trend: 0.64<--, 0.59<--, 0.59<--, 0.69<--  PT/INR - ( 28 Dec 2018 08:29 )   PT: 15.1 sec;   INR: 1.31 ratio      PTT - ( 28 Dec 2018 08:29 )  PTT:36.7 sec      MICROBIOLOGY:    Culture - Body Fluid with Gram Stain (collected 12-27-18 @ 20:08)  Source: .Body Fluid Joint Fluid  Preliminary Report (12-27-18 @ 23:02):    Few polymorphonuclear leukocytes seen per low power field    No organisms seen per oil power field    Culture - Body Fluid with Gram Stain (collected 12-27-18 @ 20:08)  Source: .Body Fluid Synovial Fluid  Preliminary Report (12-27-18 @ 23:03):    No polymorphonuclear cells seen per low power field    No organisms seen per oil power field    Culture - Body Fluid with Gram Stain (collected 12-27-18 @ 20:08)  Source: .Body Fluid Synovial Fluid  Preliminary Report (12-27-18 @ 23:03):    No polymorphonuclear cells seen per low power field    No organisms seen per oil power field    Culture - Body Fluid with Gram Stain (collected 12-27-18 @ 20:08)  Source: .Body Fluid Joint Fluid  Preliminary Report (12-27-18 @ 23:02):    Few polymorphonuclear leukocytes seen per low power field    No organisms seen per oil power field    Culture - Body Fluid with Gram Stain (collected 12-27-18 @ 20:08)  Source: .Body Fluid Synovial Fluid  Preliminary Report (12-27-18 @ 23:03):    No polymorphonuclear cells seen per low power field    No organisms seen per oil power field      Culture - Body Fluid with Gram Stain (collected 12-27-18 @ 20:08)  Source: .Body Fluid Synovial Fluid  Preliminary Report (12-27-18 @ 23:03):    No polymorphonuclear cells seen per low power field    No organisms seen per oil power field      IMAGING:     < from: CT Pelvis Bony Only w/ IV Cont (12.26.18 @ 20:46) >  IMPRESSION:  1.  Severe right hip osteoarthritis.  2.  Small right hip joint effusion and moderate distention of the right   iliopsoas bursa. Findings likely reflect inflammatory change secondary to   the severe right hip arthritis. Superimposed infection is not excluded.   If there is concern for septic arthritis, correlation with hip aspiration   is recommended.  3.  Additional degenerative changes as detailed above.    < from: Xray Knee 1 or 2 Views, Bilateral (12.26.18 @ 20:27) >  IMPRESSION:   Lateral tricompartmental arthrosis, most pronounced in the lateral   compartment where there is associated remodeling of the lateral tibial   plateau. Large joint effusion on the left. Small joint effusion on the   right. Please note that joint fluid analysis would be necessary to   further evaluate for septic joint. INTERVAL EVENTS / SUBJECTIVE:    Underwent bilateral knee arthrocentesis by rheumatology yesteray. Melody held for fluoroscopy-guided R hip aspiration with IR today. No acute events overnight. Pt seen and examined at bedside this am. Pt reports continued joint pain. Denies fever, chills, night sweats, chest pain, abd pain, dizziness.    OBJECTIVE:  Vital Signs Last 24 Hrs  T(C): 36.7 (28 Dec 2018 05:04), Max: 37 (27 Dec 2018 14:08)  T(F): 98.1 (28 Dec 2018 05:04), Max: 98.6 (27 Dec 2018 14:08)  HR: 67 (28 Dec 2018 05:04) (67 - 84)  BP: 129/71 (28 Dec 2018 05:04) (113/70 - 129/71)  BP(mean): --  RR: 18 (28 Dec 2018 05:04) (17 - 18)  SpO2: 96% (28 Dec 2018 05:04) (96% - 99%)    12-27 @ 07:01  -  12-28 @ 07:00  --------------------------------------------------------  IN: 1280 mL / OUT: 1200 mL / NET: 80 mL    12-28 @ 07:01  -  12-28 @ 10:36  --------------------------------------------------------  IN: 120 mL / OUT: 650 mL / NET: -530 mL      PHYSICAL EXAM:  General: Pleasant elderly woman, NAD  HEENT:  EOMI, PERRL, conjunctiva and sclera clear, normal oropharynx  Neck: Supple, no JVD, normal thyroid  Chest/Lungs: CTA B/L, no rales, rhonchi, rub or wheezing  Heart: RRR, normal S1, S2, no murmurs or gallops  Abdomen: Soft, nondistended, NTTP, normoactive bowel sounds  Extremities: Peripheral pulses 2+ B/L, non-pitting edema of B/L LE with decreased ROM at R hip join and B/L knees 2/2 pain  Skin: Warm, well-perfused, edema of R hip and legs B/L without appreciable erythema  Neurological: A&Ox2, no focal deficits      HOSPITAL MEDICATIONS:  acetaminophen    Suspension .. 650 milliGRAM(s) Oral every 6 hours PRN Temp greater or equal to 38C (100.4F), Mild Pain (1 - 3), Moderate Pain (4 - 6), Severe Pain (7 - 10)  furosemide    Tablet 40 milliGRAM(s) Oral daily  influenza   Vaccine 0.5 milliLiter(s) IntraMuscular once  lidocaine   Patch 1 Patch Transdermal daily  lidocaine   Patch 1 Patch Transdermal daily  liothyronine 5 MICROGram(s) Oral daily  lisinopril 10 milliGRAM(s) Oral daily  senna 1 Tablet(s) Oral daily      LABS:                        11.9   10.30 )-----------( 297      ( 28 Dec 2018 08:33 )             35.7     Hgb Trend: 11.9<--, 11.7<--, 12.3<--, 13.8<--  12-28    128<L>  |  89<L>  |  13  ----------------------------<  110<H>  3.7   |  26  |  0.64    Ca    9.2      28 Dec 2018 06:57  Phos  3.0     12-28  Mg     2.0     12-28    TPro  6.3  /  Alb  3.2<L>  /  TBili  0.7  /  DBili  x   /  AST  18  /  ALT  12  /  AlkPhos  65  12-27    Creatinine Trend: 0.64<--, 0.59<--, 0.59<--, 0.69<--  PT/INR - ( 28 Dec 2018 08:29 )   PT: 15.1 sec;   INR: 1.31 ratio      PTT - ( 28 Dec 2018 08:29 )  PTT:36.7 sec      MICROBIOLOGY:    Culture - Body Fluid with Gram Stain (collected 12-27-18 @ 20:08)  Source: .Body Fluid Joint Fluid  Preliminary Report (12-27-18 @ 23:02):    Few polymorphonuclear leukocytes seen per low power field    No organisms seen per oil power field    Culture - Body Fluid with Gram Stain (collected 12-27-18 @ 20:08)  Source: .Body Fluid Synovial Fluid  Preliminary Report (12-27-18 @ 23:03):    No polymorphonuclear cells seen per low power field    No organisms seen per oil power field    Culture - Body Fluid with Gram Stain (collected 12-27-18 @ 20:08)  Source: .Body Fluid Synovial Fluid  Preliminary Report (12-27-18 @ 23:03):    No polymorphonuclear cells seen per low power field    No organisms seen per oil power field    Culture - Body Fluid with Gram Stain (collected 12-27-18 @ 20:08)  Source: .Body Fluid Joint Fluid  Preliminary Report (12-27-18 @ 23:02):    Few polymorphonuclear leukocytes seen per low power field    No organisms seen per oil power field    Culture - Body Fluid with Gram Stain (collected 12-27-18 @ 20:08)  Source: .Body Fluid Synovial Fluid  Preliminary Report (12-27-18 @ 23:03):    No polymorphonuclear cells seen per low power field    No organisms seen per oil power field    Culture - Body Fluid with Gram Stain (collected 12-27-18 @ 20:08)  Source: .Body Fluid Synovial Fluid  Preliminary Report (12-27-18 @ 23:03):    No polymorphonuclear cells seen per low power field    No organisms seen per oil power field      IMAGING:     CT Pelvis Bony Only w/ IV Cont (12.26.18 @ 20:46)  1.  Severe right hip osteoarthritis.  2.  Small right hip joint effusion and moderate distention of the right   iliopsoas bursa. Findings likely reflect inflammatory change secondary to   the severe right hip arthritis. Superimposed infection is not excluded.   If there is concern for septic arthritis, correlation with hip aspiration   is recommended.  3.  Additional degenerative changes as detailed above.    Xray Knee 1 or 2 Views, Bilateral (12.26.18 @ 20:27)  Lateral tricompartmental arthrosis, most pronounced in the lateral   compartment where there is associated remodeling of the lateral tibial   plateau. Large joint effusion on the left. Small joint effusion on the   right. Please note that joint fluid analysis would be necessary to   further evaluate for septic joint.    WRIST COMPLETE RIGHT-MIN 3 VIEWS    12/26/2018      Generalized bone demineralization limits osseous evaluation. There is chondrocalcinosis at the triangular fibrocartilage. There is severe radiocarpal joint space narrowing with remodeling of the distal radius consistent with CPPD arthropathy. There is widening at the scapholunate interval consistent with scapholunate ligament tearing. There is minimal migration of the capitate proximally consistent with scapholunate advanced collapse. There is mild first carpometacarpal osteoarthrosis.    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      XR HIP WITH PELV 2-3V RT    12/25/2018    No acute fracture or dislocation. Mild spurring and severe axial joint space narrowing at the right hip consistent with arthrosis. Calcified fibroid. Lower lumbar spondylosis.  IMPRESSION: No acute fracture or dislocation. Moderate to severe right hip arthrosis.      XR CHEST PORTABLE URGENT 1V  12/25/2018    Loop recorder.  No focal consolidation.  There is no pneumothorax. There are no pleural effusions.   The cardiomediastinal silhouette cannot be adequately assessed on this projection.  IMPRESSION: Clear lungs.

## 2018-12-28 NOTE — PROGRESS NOTE ADULT - PROBLEM SELECTOR PLAN 4
Possibly hypervolemic hyponatremia in setting of volume overload also compounded by pain. Currently at Na 127 from 129 yesterday.   - c/w home lasix 40 mg qD  - if Na does not improve, will send urine studies Possibly hypervolemic hyponatremia in setting of volume overload also compounded by pain. Currently at Na 128 from 127 yesterday.   - c/w home lasix 40 mg qD  - if Na does not improve, will send urine studies

## 2018-12-28 NOTE — PROGRESS NOTE ADULT - PROBLEM SELECTOR PLAN 5
Hx of RLE DVT In 07/2018, on Xarelto.   - Holding Xarelto this evening pending joint aspiration tomorrow 12/28  - Resume AC after procedure Hx of RLE DVT In 07/2018, on Xarelto.   - Resume home Xarelto after hip joint aspiration today

## 2018-12-28 NOTE — PROGRESS NOTE ADULT - PROBLEM SELECTOR PLAN 1
Diffuse joint pain not localized to one particular focus, particularly in R hip, also in R shoulder and L knee. Significant difficulty with LE knee flexion in setting of extensive edema in both lower extremities. Denies trauma. Concern for septic joint given fever w SIRS on admission. Hip Xray w mod to severe R hip arthrosis. 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  - Awaiting B/L knee arthrocentesis and R hip arthrocentesis, will f/u synovial fluid studies once performed  - Empiric antibiotics  - PT eval in process  - Continue Lidocaine patch and Tylenol PRN for pain Diffuse joint pain not localized to one particular focus, particularly in R hip, also in R shoulder and L knee. Significant difficulty with LE knee flexion in setting of extensive edema in both lower extremities. Denies trauma. Concern for septic joint given fever w SIRS on admission. Hip Xray w mod to severe R hip arthrosis. 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 Knee x-ray w lat tricompartmental arthrosis. CT pelvis w severe R hip OA, small R hip effusion, & mod distension of R iliopsoas bursa. Knee arthrocentesis consistent w pseudogout. R hip concerning for possible septic arthritis per ID, plan for hip arthrocentesis today w IR.  - R hip arthrocentesis today w IR  - start mobic 7.5 mg qD per rheum recs   - c/w empiric antibiotics w vanc/zosyn --> update: will D/C abx per ID recs  - f/u synovial fluid studies  - c/w Lidocaine patch and tylenol 650 mg q6h prn for pain  - PT eval in progress, initial recs for likely [facility]  - appreciate ID & rheum recs

## 2018-12-28 NOTE — PROGRESS NOTE ADULT - PROBLEM SELECTOR PLAN 3
HFrEF, p/w significant lower extremity edema likely exacerbated by the amlodipine vs HFpEF. Reported 5 lb weight gain as per daughter, s/p 1 dose of lasix 40 IV in ED. H/O DVT in RLE. Vascular was consulted for cool LLE, no concern for needing intervention likely 2/2 CHF per vascular.   - LE doppler U/S negative for DVT  - c/w home lasix 40 mg PO qD  - c/w home xarelto 20 mg qD  - compression stockings  - strict I/Os  - f/u TTE HFrEF, p/w significant lower extremity edema likely exacerbated by the amlodipine vs HFpEF. Reported 5 lb weight gain as per daughter, s/p 1 dose of lasix 40 IV in ED. H/O DVT in RLE. Vascular was consulted for cool LLE, no concern for needing intervention likely 2/2 CHF per vascular. LE doppler U/S negative for DVT  - c/w home lasix 40 mg PO qD  - c/w home xarelto 20 mg qD  - compression stockings  - strict I/Os  - f/u TTE

## 2018-12-28 NOTE — PROGRESS NOTE ADULT - PROBLEM SELECTOR PLAN 2
Presenting with fever, leukocytosis and tachycardia presumably related to joint complaints as remaining infectious work-up negative at this time. Inflammatory markers elevated. Remains afebrile since presentation.   - ID consulted, appreciate recommendations  - Continue vanco/Zosyn empirically  - F/u BCx, UCx Presenting with fever, leukocytosis and tachycardia presumably related to joint complaints as remaining infectious work-up negative at this time. Inflammatory markers elevated. Remains afebrile since presentation.   - c/w empiric antibiotics w vanc/zosyn --> update: will D/C abx per ID recs  - F/u BCx, UCx  - appreciate ID recs

## 2018-12-28 NOTE — PROGRESS NOTE ADULT - ASSESSMENT
89 yo F w/ hx of HTN, OA, CHF presenting for R hip and knee pain.    Patient with severe hip pain, knee pain with warmth redness and effusion, presented with fever and worsening joint pains. Recent hip injection with steroids. Concern here is for septic joint vs severe OA vs CPPD (as seen on wrist xray). Patient is on antibiotics currently.    - Performed b/l knee arthrocentesis, showing CPPD crystals, ~21-22K cells, gram stain and culture NGTD  - Orthopedics evaluation for concern for R hip septic joint  - Recommend US guided joint aspiration of R hip    Kentrell Millan  Rheumatology Fellow PGY IV 89 yo F w/ hx of HTN, OA, CHF presenting for R hip and knee pain.    Patient with severe hip pain, knee pain with warmth redness and effusion, presented with fever and worsening joint pains. Recent hip injection with steroids. Concern here is for septic joint vs severe OA vs CPPD (as seen on wrist xray). Patient is on antibiotics currently.    - Performed b/l knee arthrocentesis, showing CPPD crystals, ~21-22K cells, gram stain and culture NGTD  - Orthopedics evaluation for concern for R hip septic joint  - s/p US guided joint aspiration of R hip, will f/u studies, please send for cell count, crystals, culture and gram stain    Kentrell Millan  Rheumatology Fellow PGY IV 89 yo F w/ hx of HTN, OA, CHF presenting for R hip and knee pain.    Patient with severe hip pain, knee pain with warmth redness and effusion, presented with fever and worsening joint pains. Recent hip injection with steroids. Concern here is for septic joint vs severe OA vs CPPD (as seen on wrist xray). Patient is on antibiotics currently.    - Performed b/l knee arthrocentesis, showing CPPD crystals, ~21-22K cells, gram stain and culture NGTD  - Orthopedics evaluation for concern for R hip septic joint  - s/p US guided joint aspiration of R hip, will f/u studies, please send for cell count, crystals, culture and gram stain  - can give mobic 7.5mg PO daily for pseudogout of knees  - will follow with you    Kentrell Millan  Rheumatology Fellow PGY IV

## 2018-12-28 NOTE — PROGRESS NOTE ADULT - SUBJECTIVE AND OBJECTIVE BOX
INFECTIOUS DISEASES FOLLOW UP--Johnny Bolden MD  Pager [phone]    This is a follow up note for this  89y Female with  joint pains.  + crystals from knee tap.  R hip tap pending.  Pain R hip    Further ROS:  CONSTITUTIONAL:  No fever, good appetite  CARDIOVASCULAR:  No chest pain or palpitations  RESPIRATORY:  No dyspnea  GASTROINTESTINAL:  No nausea, vomiting, diarrhea, or abdominal pain  GENITOURINARY:  No dysuria  NEUROLOGIC:  No headache,     Allergies  No Known Allergies          ANTIBIOTICS/RELEVANT:  antimicrobials  piperacillin/tazobactam IVPB. 3.375 Gram(s) IV Intermittent every 8 hours  vancomycin  IVPB 1000 milliGRAM(s) IV Intermittent every 12 hours    immunologic:  influenza   Vaccine 0.5 milliLiter(s) IntraMuscular once    OTHER:  acetaminophen    Suspension .. 650 milliGRAM(s) Oral every 6 hours PRN  furosemide    Tablet 40 milliGRAM(s) Oral daily  lidocaine   Patch 1 Patch Transdermal daily  lidocaine   Patch 1 Patch Transdermal daily  liothyronine 5 MICROGram(s) Oral daily  lisinopril 10 milliGRAM(s) Oral daily  senna 1 Tablet(s) Oral daily      Objective:  Vital Signs Last 24 Hrs  T(C): 36.7 (28 Dec 2018 05:04), Max: 37 (27 Dec 2018 14:08)  T(F): 98.1 (28 Dec 2018 05:04), Max: 98.6 (27 Dec 2018 14:08)  HR: 67 (28 Dec 2018 05:04) (67 - 84)  BP: 129/71 (28 Dec 2018 05:04) (113/70 - 129/71)  BP(mean): --  RR: 18 (28 Dec 2018 05:04) (17 - 18)  SpO2: 96% (28 Dec 2018 05:04) (96% - 99%)    PHYSICAL EXAM:  Constitutional:no acute distress  Eyes:TANNER, EOMI  Ear/Nose/Throat: no oral lesions, 	  Respiratory: clear BL  Cardiovascular: S1S2  Gastrointestinal:soft, (+) BS, no tenderness  Extremities:no e/e/c  No Lymphadenopathy  IV sites not inflammed.    LABS:                        11.7   10.47 )-----------( 265      ( 27 Dec 2018 08:10 )             33.8     12-28    128<L>  |  89<L>  |  13  ----------------------------<  110<H>  3.7   |  26  |  0.64    Ca    9.2      28 Dec 2018 06:57  Phos  3.0     12-28  Mg     2.0     12-28    TPro  6.3  /  Alb  3.2<L>  /  TBili  0.7  /  DBili  x   /  AST  18  /  ALT  12  /  AlkPhos  65  12-27    PT/INR - ( 27 Dec 2018 08:10 )   PT: 26.1 sec;   INR: 2.23 ratio         PTT - ( 27 Dec 2018 08:10 )  PTT:46.5 sec      MICROBIOLOGY:  Culture Results:   No polymorphonuclear cells seen per low power field  No organisms seen per oil power field (12-27 @ 20:08)  Culture Results:   No polymorphonuclear cells seen per low power field  No organisms seen per oil power field (12-27 @ 20:08)  Culture Results:   Few polymorphonuclear leukocytes seen per low power field  No organisms seen per oil power field (12-27 @ 20:08)

## 2018-12-28 NOTE — PROGRESS NOTE ADULT - SUBJECTIVE AND OBJECTIVE BOX
JAQUAN KAM  [id number]    INTERVAL HPI/OVERNIGHT EVENTS:    Arthrocentesis done showing CPPD crystals, hip aspiration pending.    MEDICATIONS  (STANDING):  furosemide    Tablet 40 milliGRAM(s) Oral daily  influenza   Vaccine 0.5 milliLiter(s) IntraMuscular once  lidocaine   Patch 1 Patch Transdermal daily  lidocaine   Patch 1 Patch Transdermal daily  liothyronine 5 MICROGram(s) Oral daily  lisinopril 10 milliGRAM(s) Oral daily  senna 1 Tablet(s) Oral daily    MEDICATIONS  (PRN):  acetaminophen    Suspension .. 650 milliGRAM(s) Oral every 6 hours PRN Temp greater or equal to 38C (100.4F), Mild Pain (1 - 3), Moderate Pain (4 - 6), Severe Pain (7 - 10)      Allergies    No Known Allergies    Intolerances        Review of Systems:  Gen:  +fatigue  HEENT: No blurry vision, no difficulty swallowing, no oral or nasal ulcers  CVS: No chest pain/palpitations  Resp: No SOB/wheezing  GI: No N/V/C/D/abdominal pain  MSK: +R hip, b/l knee, R shoulder pain  Skin: No new rashes  Neuro: No headaches      Vital Signs Last 24 Hrs  T(C): 36.7 (28 Dec 2018 05:04), Max: 37 (27 Dec 2018 14:08)  T(F): 98.1 (28 Dec 2018 05:04), Max: 98.6 (27 Dec 2018 14:08)  HR: 67 (28 Dec 2018 05:04) (67 - 84)  BP: 129/71 (28 Dec 2018 05:04) (113/70 - 129/71)  BP(mean): --  RR: 18 (28 Dec 2018 05:04) (17 - 18)  SpO2: 96% (28 Dec 2018 05:04) (96% - 99%)    Physical Exam:  General: No apparent distress  HEENT: EOMI, MMM  CVS: +S1/S2, RRR, no murmurs/rubs/gallops  Resp: CTA b/l. No crackles/wheezing  GI: Soft, NT/ND +BS  MSK: b/l knee effusions, knees are warm, limited ROM and pain on R hip rotation  Neuro: AAOx3  Skin: no visible rashes      LABS:                        11.9   10.30 )-----------( 297      ( 28 Dec 2018 08:33 )             35.7     12-28    128<L>  |  89<L>  |  13  ----------------------------<  110<H>  3.7   |  26  |  0.64    Ca    9.2      28 Dec 2018 06:57  Phos  3.0     12-28  Mg     2.0     12-28    TPro  6.3  /  Alb  3.2<L>  /  TBili  0.7  /  DBili  x   /  AST  18  /  ALT  12  /  AlkPhos  65  12-27    PT/INR - ( 28 Dec 2018 08:29 )   PT: 15.1 sec;   INR: 1.31 ratio         PTT - ( 28 Dec 2018 08:29 )  PTT:36.7 sec    Sedimentation Rate, Erythrocyte: 61 mm/hr (12.26.18 @ 08:11)    C-Reactive Protein, Serum: 5.91 mg/dL (12.26.18 @ 00:32)      RADIOLOGY & ADDITIONAL STUDIES:    < from: Xray Wrist 3 Views, Right (12.26.18 @ 00:35) >  EXAM:  WRIST COMPLETE RIGHT-MIN 3 VIEWS                            PROCEDURE DATE:  12/26/2018            INTERPRETATION:  CLINICAL INFORMATION: Wrist pain.     TECHNIQUE: 3 views of the right wrist.    COMPARISON: None.    FINDINGS:   Generalized bone demineralization limits osseous evaluation. There is   chondrocalcinosis at the triangular fibrocartilage. There is severe   radiocarpal joint space narrowing with remodeling of the distal radius   consistent with CPPD arthropathy. There is widening at the scapholunate   interval consistent with scapholunate ligament tearing. There is minimal   migration of the capitate proximally consistent with scapholunate   advanced collapse. There is mild first carpometacarpal osteoarthrosis.    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                KATERIN QUINN M.D., RADIOLOGY RESIDENT  This document has been electronically signed.  IRAJ MENDIOLA M.D., ATTENDING RADIOLOGIST  This document has been electronically signed. Dec 26 2018  9:58AM    < end of copied text >    < from: Xray Hip w/ Pelvis 2 or 3 Views, Right (12.25.18 @ 22:30) >  EXAM:  XR HIP WITH PELV 2-3V RT                            PROCEDURE DATE:  12/25/2018            INTERPRETATION:  CLINICAL INFORMATION: Right hip pain.     TECHNIQUE: Single frontal pelvic radiograph and 2 views of the right hip.    COMPARISON: None.    FINDINGS:   No acute fracture or dislocation. Mild spurring and severe axial joint   space narrowing at the right hip consistent with arthrosis. Calcified   fibroid. Lower lumbar spondylosis.    IMPRESSION:  No acute fracture or dislocation. Moderate to severe right hip arthrosis.                KATERIN QUINN M.D., RADIOLOGY RESIDENT  This document has been electronically signed.  IRAJ MENDIOLA M.D., ATTENDING RADIOLOGIST  This document has been electronically signed. Dec 26 2018 10:22AM    < end of copied text >        < from: CT Pelvis Bony Only w/ IV Cont (12.26.18 @ 20:46) >  EXAM:  CT PELVIS BONY ONLY IC                            PROCEDURE DATE:  12/26/2018            INTERPRETATION:  CT PELVIS BONY ONLY IC dated 12/26/2018 8:46 PM     INDICATION: Right hip pain and swelling for a few months.    COMPARISON: Right hip radiographs dated 12/25/2018    TECHNIQUE: CT imaging of the pelvis was performed with contrast. The data   was reformatted in the axial, coronal, and sagittal planes.  Contrast: Omnipaque 350. Administered: 90 cc. Discarded: 10 cc    FINDINGS:    OSSEOUS STRUCTURES: There is severe right hip joint space narrowing with   subchondral sclerosis and cystic changes. There is mild left hip axial   joint space narrowing with mild marginal productive changes. Spurring and   productive change at the pubicsymphysis with mild chondrocalcinosis   present. There is productive changes of both sacroiliac joints, worse on   left. Degenerative changes of lower lumbar spine with moderate to severe   degenerative disc disease at L5-S1.  SYNOVIUM/ JOINT FLUID: There is a small right hip joint effusion. There   is moderate distention of the right iliopsoas bursa. There is mild   enhancement of the iliopsoas synovium with adjacent edema.  TENDONS: There is proximal enthesophyte change at the hamstring tendons  bilaterally. There is mild enthesophyte change at the bilateral abductor   insertions.  MUSCLES: There is no intramuscular hematoma. Normal muscle bulk.  NEUROVASCULAR STRUCTURES: Vascular calcifications are present bilaterally.  INTRAPELVIC SOFT TISSUES: There is a calcified uterine fibroid.   Left-sided renal cysts are noted measuring up to 3 cm.  SUBCUTANEOUS SOFT TISSUES: There is mild soft tissue swelling at the   pelvis.    IMPRESSION:    1.  Severe right hip osteoarthritis.  2.  Small right hip joint effusion and moderate distention of the right   iliopsoas bursa. Findings likely reflect inflammatory change secondary to   the severe right hip arthritis. Superimposed infection is not excluded.   If there is concern for septic arthritis, correlation with hip aspiration   is recommended.  3.  Additional degenerative changes as detailed above.    Findings were discussed with Dr. Milan  on 12/27/2018 10:38 AM  with   read back.                    YASSINE BERNAL M.D., ATTENDING RADIOLOGIST  This document has been electronically signed. Dec 27 2018 10:39AM    < end of copied text >    Crystals, Synovial Fluid (12.27.18 @ 18:25)    Synovial Crystals Clarity: Turbid    Synovial Crystals Tube: Red Top Tube    Synovial Crystals Color: Yellow    Synovial Crystals Id: Crystals Present Intracellular and Extracellular CPPD (calcium pyrophosphate) present.    Crystals, Synovial Fluid (12.27.18 @ 18:25)    Synovial Crystals Clarity: Turbid    Synovial Crystals Tube: Red Top Tube    Synovial Crystals Color: Yellow    Synovial Crystals Id: Crystals Present Intracellular and Extracellular CPPD (calcium pyrophosphate) present.    Culture - Body Fluid with Gram Stain (12.27.18 @ 20:08)    Specimen Source: .Body Fluid Joint Fluid    Culture Results:   Few polymorphonuclear leukocytes seen per low power field  No organisms seen per oil power field    Culture - Body Fluid with Gram Stain (12.27.18 @ 20:08)    Specimen Source: .Body Fluid Synovial Fluid    Culture Results:   No polymorphonuclear cells seen per low power field  No organisms seen per oil power field    Culture - Body Fluid with Gram Stain (12.27.18 @ 20:08)    Specimen Source: .Body Fluid Synovial Fluid    Culture Results:   No polymorphonuclear cells seen per low power field  No organisms seen per oil power field    Cell Count, Body Fluid (12.27.18 @ 16:50)    BF Lymphocytes: 4 %    Body Fluid Appearance: Cloudy    Fluid Type: Synovial fluid Right Knee    Fluid Segmented Granulocytes: 87: Differential is based on 100 cells counted after cytocentrifugation. %    Monocyte/Macrophage Count - Body Fluid: 9 %    Tube Type: Lavender    Color - Body Fluid: Yellow    Total Nucleated Cell Count, Body Fluid: 72385: Includes WBC and other nucleated cells if present. /uL    Total RBC Count: 122 /uL    Cell Count, Body Fluid (12.27.18 @ 16:50)    Monocyte/Macrophage Count - Body Fluid: 13 %    Fluid Segmented Granulocytes: 84: Differential is based on 100 cells counted after cytocentrifugation. %    Fluid Type: Synovial fluid Left Knee    Body Fluid Appearance: Cloudy    BF Lymphocytes: 3 %    Tube Type: Lavender    Color - Body Fluid: Yellow    Total Nucleated Cell Count, Body Fluid: 41483: Includes WBC and other nucleated cells if present. /uL    Total RBC Count: 278 /uL JAQUAN KAM  [id number]    INTERVAL HPI/OVERNIGHT EVENTS:    Arthrocentesis done showing CPPD crystals, hip aspiration done this morning. Patient is able to flex her knees up to 15 degrees this morning, L ankle is painful.    MEDICATIONS  (STANDING):  furosemide    Tablet 40 milliGRAM(s) Oral daily  influenza   Vaccine 0.5 milliLiter(s) IntraMuscular once  lidocaine   Patch 1 Patch Transdermal daily  lidocaine   Patch 1 Patch Transdermal daily  liothyronine 5 MICROGram(s) Oral daily  lisinopril 10 milliGRAM(s) Oral daily  senna 1 Tablet(s) Oral daily    MEDICATIONS  (PRN):  acetaminophen    Suspension .. 650 milliGRAM(s) Oral every 6 hours PRN Temp greater or equal to 38C (100.4F), Mild Pain (1 - 3), Moderate Pain (4 - 6), Severe Pain (7 - 10)      Allergies    No Known Allergies    Intolerances        Review of Systems:  Gen:  +fatigue  HEENT: No blurry vision, no difficulty swallowing, no oral or nasal ulcers  CVS: No chest pain/palpitations  Resp: No SOB/wheezing  GI: No N/V/C/D/abdominal pain  MSK: +R hip, b/l knee, R shoulder pain  Skin: No new rashes  Neuro: No headaches      Vital Signs Last 24 Hrs  T(C): 36.7 (28 Dec 2018 05:04), Max: 37 (27 Dec 2018 14:08)  T(F): 98.1 (28 Dec 2018 05:04), Max: 98.6 (27 Dec 2018 14:08)  HR: 67 (28 Dec 2018 05:04) (67 - 84)  BP: 129/71 (28 Dec 2018 05:04) (113/70 - 129/71)  BP(mean): --  RR: 18 (28 Dec 2018 05:04) (17 - 18)  SpO2: 96% (28 Dec 2018 05:04) (96% - 99%)    Physical Exam:  General: No apparent distress  HEENT: EOMI, MMM  CVS: +S1/S2, RRR, no murmurs/rubs/gallops  Resp: CTA b/l. No crackles/wheezing  GI: Soft, NT/ND +BS  MSK: b/l knee effusions improved from prior, knees are warm, 15 degrees ROM of knees, pain on R hip rotation  Neuro: AAOx3  Skin: no visible rashes      LABS:                        11.9   10.30 )-----------( 297      ( 28 Dec 2018 08:33 )             35.7     12-28    128<L>  |  89<L>  |  13  ----------------------------<  110<H>  3.7   |  26  |  0.64    Ca    9.2      28 Dec 2018 06:57  Phos  3.0     12-28  Mg     2.0     12-28    TPro  6.3  /  Alb  3.2<L>  /  TBili  0.7  /  DBili  x   /  AST  18  /  ALT  12  /  AlkPhos  65  12-27    PT/INR - ( 28 Dec 2018 08:29 )   PT: 15.1 sec;   INR: 1.31 ratio         PTT - ( 28 Dec 2018 08:29 )  PTT:36.7 sec    Sedimentation Rate, Erythrocyte: 61 mm/hr (12.26.18 @ 08:11)    C-Reactive Protein, Serum: 5.91 mg/dL (12.26.18 @ 00:32)      RADIOLOGY & ADDITIONAL STUDIES:    < from: Xray Wrist 3 Views, Right (12.26.18 @ 00:35) >  EXAM:  WRIST COMPLETE RIGHT-MIN 3 VIEWS                            PROCEDURE DATE:  12/26/2018            INTERPRETATION:  CLINICAL INFORMATION: Wrist pain.     TECHNIQUE: 3 views of the right wrist.    COMPARISON: None.    FINDINGS:   Generalized bone demineralization limits osseous evaluation. There is   chondrocalcinosis at the triangular fibrocartilage. There is severe   radiocarpal joint space narrowing with remodeling of the distal radius   consistent with CPPD arthropathy. There is widening at the scapholunate   interval consistent with scapholunate ligament tearing. There is minimal   migration of the capitate proximally consistent with scapholunate   advanced collapse. There is mild first carpometacarpal osteoarthrosis.    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                KATERIN QUINN M.D., RADIOLOGY RESIDENT  This document has been electronically signed.  IRAJ MENDIOLA M.D., ATTENDING RADIOLOGIST  This document has been electronically signed. Dec 26 2018  9:58AM    < end of copied text >    < from: Xray Hip w/ Pelvis 2 or 3 Views, Right (12.25.18 @ 22:30) >  EXAM:  XR HIP WITH PELV 2-3V RT                            PROCEDURE DATE:  12/25/2018            INTERPRETATION:  CLINICAL INFORMATION: Right hip pain.     TECHNIQUE: Single frontal pelvic radiograph and 2 views of the right hip.    COMPARISON: None.    FINDINGS:   No acute fracture or dislocation. Mild spurring and severe axial joint   space narrowing at the right hip consistent with arthrosis. Calcified   fibroid. Lower lumbar spondylosis.    IMPRESSION:  No acute fracture or dislocation. Moderate to severe right hip arthrosis.                KATERIN QUINN M.D., RADIOLOGY RESIDENT  This document has been electronically signed.  IRAJ MENDIOLA M.D., ATTENDING RADIOLOGIST  This document has been electronically signed. Dec 26 2018 10:22AM    < end of copied text >        < from: CT Pelvis Bony Only w/ IV Cont (12.26.18 @ 20:46) >  EXAM:  CT PELVIS BONY ONLY IC                            PROCEDURE DATE:  12/26/2018            INTERPRETATION:  CT PELVIS BONY ONLY IC dated 12/26/2018 8:46 PM     INDICATION: Right hip pain and swelling for a few months.    COMPARISON: Right hip radiographs dated 12/25/2018    TECHNIQUE: CT imaging of the pelvis was performed with contrast. The data   was reformatted in the axial, coronal, and sagittal planes.  Contrast: Omnipaque 350. Administered: 90 cc. Discarded: 10 cc    FINDINGS:    OSSEOUS STRUCTURES: There is severe right hip joint space narrowing with   subchondral sclerosis and cystic changes. There is mild left hip axial   joint space narrowing with mild marginal productive changes. Spurring and   productive change at the pubicsymphysis with mild chondrocalcinosis   present. There is productive changes of both sacroiliac joints, worse on   left. Degenerative changes of lower lumbar spine with moderate to severe   degenerative disc disease at L5-S1.  SYNOVIUM/ JOINT FLUID: There is a small right hip joint effusion. There   is moderate distention of the right iliopsoas bursa. There is mild   enhancement of the iliopsoas synovium with adjacent edema.  TENDONS: There is proximal enthesophyte change at the hamstring tendons  bilaterally. There is mild enthesophyte change at the bilateral abductor   insertions.  MUSCLES: There is no intramuscular hematoma. Normal muscle bulk.  NEUROVASCULAR STRUCTURES: Vascular calcifications are present bilaterally.  INTRAPELVIC SOFT TISSUES: There is a calcified uterine fibroid.   Left-sided renal cysts are noted measuring up to 3 cm.  SUBCUTANEOUS SOFT TISSUES: There is mild soft tissue swelling at the   pelvis.    IMPRESSION:    1.  Severe right hip osteoarthritis.  2.  Small right hip joint effusion and moderate distention of the right   iliopsoas bursa. Findings likely reflect inflammatory change secondary to   the severe right hip arthritis. Superimposed infection is not excluded.   If there is concern for septic arthritis, correlation with hip aspiration   is recommended.  3.  Additional degenerative changes as detailed above.    Findings were discussed with Dr. Milan  on 12/27/2018 10:38 AM  with   read back.                    YASSINE BERNAL M.D., ATTENDING RADIOLOGIST  This document has been electronically signed. Dec 27 2018 10:39AM    < end of copied text >    Crystals, Synovial Fluid (12.27.18 @ 18:25)    Synovial Crystals Clarity: Turbid    Synovial Crystals Tube: Red Top Tube    Synovial Crystals Color: Yellow    Synovial Crystals Id: Crystals Present Intracellular and Extracellular CPPD (calcium pyrophosphate) present.    Crystals, Synovial Fluid (12.27.18 @ 18:25)    Synovial Crystals Clarity: Turbid    Synovial Crystals Tube: Red Top Tube    Synovial Crystals Color: Yellow    Synovial Crystals Id: Crystals Present Intracellular and Extracellular CPPD (calcium pyrophosphate) present.    Culture - Body Fluid with Gram Stain (12.27.18 @ 20:08)    Specimen Source: .Body Fluid Joint Fluid    Culture Results:   Few polymorphonuclear leukocytes seen per low power field  No organisms seen per oil power field    Culture - Body Fluid with Gram Stain (12.27.18 @ 20:08)    Specimen Source: .Body Fluid Synovial Fluid    Culture Results:   No polymorphonuclear cells seen per low power field  No organisms seen per oil power field    Culture - Body Fluid with Gram Stain (12.27.18 @ 20:08)    Specimen Source: .Body Fluid Synovial Fluid    Culture Results:   No polymorphonuclear cells seen per low power field  No organisms seen per oil power field    Cell Count, Body Fluid (12.27.18 @ 16:50)    BF Lymphocytes: 4 %    Body Fluid Appearance: Cloudy    Fluid Type: Synovial fluid Right Knee    Fluid Segmented Granulocytes: 87: Differential is based on 100 cells counted after cytocentrifugation. %    Monocyte/Macrophage Count - Body Fluid: 9 %    Tube Type: Lavender    Color - Body Fluid: Yellow    Total Nucleated Cell Count, Body Fluid: 45771: Includes WBC and other nucleated cells if present. /uL    Total RBC Count: 122 /uL    Cell Count, Body Fluid (12.27.18 @ 16:50)    Monocyte/Macrophage Count - Body Fluid: 13 %    Fluid Segmented Granulocytes: 84: Differential is based on 100 cells counted after cytocentrifugation. %    Fluid Type: Synovial fluid Left Knee    Body Fluid Appearance: Cloudy    BF Lymphocytes: 3 %    Tube Type: Lavender    Color - Body Fluid: Yellow    Total Nucleated Cell Count, Body Fluid: 78864: Includes WBC and other nucleated cells if present. /uL    Total RBC Count: 278 /uL

## 2018-12-28 NOTE — PROGRESS NOTE ADULT - ASSESSMENT
89F with OA, mild dementia (A&Ox2-3), HCV (in remission), HTN, HFpEF (Grade I diastolic dysfunction), HTN, hypothyroidism, hx of syncope (trifascicular block RBBB/LAFB) s/p ILR, DVT on Xarelto presenting with right hip and bilateral knee pain, swelling, and inability to ambulate meeting SIRS criteria on admission, admitted with concern for septic arthritis vs. OA flare. 89F with OA, mild dementia (A&Ox2-3), HCV (in remission), HTN, HFpEF (Grade I diastolic dysfunction), HTN, hypothyroidism, hx of syncope (trifascicular block RBBB/LAFB) s/p ILR, DVT on Xarelto presenting with right hip and bilateral knee pain, swelling, and inability to ambulate meeting SIRS criteria on admission, admitted with concern for septic arthritis vs gout vs pseudogout vs OA flare. S/p bilat knee arthrocentesis consistent w pseudogout, planning for R hip arthrocentesis today w IR.

## 2018-12-28 NOTE — PROGRESS NOTE ADULT - PROBLEM SELECTOR PLAN 6
Hx of syncope in 2017 with implantable loop recorder placed. Known RBBB with LAFB, follows with Dr. Leija from cardiology. EKG stable since prior.  - avoid AV isabel blocking agents  - Will call EP for interrogation of ILR in AM Hx of syncope in 2017 with implantable loop recorder placed. Known RBBB with LAFB, follows with Dr. Leija from cardiology. EKG stable since prior.  - avoid AV isabel blocking agents  - call EP for interrogation of ILR

## 2018-12-29 LAB
ANION GAP SERPL CALC-SCNC: 16 MMOL/L — SIGNIFICANT CHANGE UP (ref 5–17)
BASOPHILS # BLD AUTO: 0.09 K/UL — SIGNIFICANT CHANGE UP (ref 0–0.2)
BASOPHILS NFR BLD AUTO: 1 % — SIGNIFICANT CHANGE UP (ref 0–2)
BUN SERPL-MCNC: 13 MG/DL — SIGNIFICANT CHANGE UP (ref 7–23)
CALCIUM SERPL-MCNC: 9.4 MG/DL — SIGNIFICANT CHANGE UP (ref 8.4–10.5)
CHLORIDE SERPL-SCNC: 89 MMOL/L — LOW (ref 96–108)
CO2 SERPL-SCNC: 27 MMOL/L — SIGNIFICANT CHANGE UP (ref 22–31)
CREAT SERPL-MCNC: 0.55 MG/DL — SIGNIFICANT CHANGE UP (ref 0.5–1.3)
EOSINOPHIL # BLD AUTO: 0.09 K/UL — SIGNIFICANT CHANGE UP (ref 0–0.5)
EOSINOPHIL NFR BLD AUTO: 1 % — SIGNIFICANT CHANGE UP (ref 0–6)
GLUCOSE SERPL-MCNC: 105 MG/DL — HIGH (ref 70–99)
HCT VFR BLD CALC: 37.3 % — SIGNIFICANT CHANGE UP (ref 34.5–45)
HGB BLD-MCNC: 12.7 G/DL — SIGNIFICANT CHANGE UP (ref 11.5–15.5)
LYMPHOCYTES # BLD AUTO: 1.19 K/UL — SIGNIFICANT CHANGE UP (ref 1–3.3)
LYMPHOCYTES # BLD AUTO: 13 % — SIGNIFICANT CHANGE UP (ref 13–44)
MAGNESIUM SERPL-MCNC: 2 MG/DL — SIGNIFICANT CHANGE UP (ref 1.6–2.6)
MCHC RBC-ENTMCNC: 30.2 PG — SIGNIFICANT CHANGE UP (ref 27–34)
MCHC RBC-ENTMCNC: 34 GM/DL — SIGNIFICANT CHANGE UP (ref 32–36)
MCV RBC AUTO: 88.6 FL — SIGNIFICANT CHANGE UP (ref 80–100)
MONOCYTES # BLD AUTO: 1.1 K/UL — HIGH (ref 0–0.9)
MONOCYTES NFR BLD AUTO: 12 % — SIGNIFICANT CHANGE UP (ref 2–14)
NEUTROPHILS # BLD AUTO: 6.67 K/UL — SIGNIFICANT CHANGE UP (ref 1.8–7.4)
NEUTROPHILS NFR BLD AUTO: 73 % — SIGNIFICANT CHANGE UP (ref 43–77)
PLATELET # BLD AUTO: 295 K/UL — SIGNIFICANT CHANGE UP (ref 150–400)
POTASSIUM SERPL-MCNC: 3.1 MMOL/L — LOW (ref 3.5–5.3)
POTASSIUM SERPL-SCNC: 3.1 MMOL/L — LOW (ref 3.5–5.3)
RBC # BLD: 4.21 M/UL — SIGNIFICANT CHANGE UP (ref 3.8–5.2)
RBC # FLD: 14.4 % — SIGNIFICANT CHANGE UP (ref 10.3–14.5)
SODIUM SERPL-SCNC: 132 MMOL/L — LOW (ref 135–145)
WBC # BLD: 9.14 K/UL — SIGNIFICANT CHANGE UP (ref 3.8–10.5)
WBC # FLD AUTO: 9.14 K/UL — SIGNIFICANT CHANGE UP (ref 3.8–10.5)

## 2018-12-29 PROCEDURE — 99232 SBSQ HOSP IP/OBS MODERATE 35: CPT

## 2018-12-29 PROCEDURE — 99232 SBSQ HOSP IP/OBS MODERATE 35: CPT | Mod: GC

## 2018-12-29 PROCEDURE — 99233 SBSQ HOSP IP/OBS HIGH 50: CPT

## 2018-12-29 RX ORDER — POTASSIUM CHLORIDE 20 MEQ
10 PACKET (EA) ORAL
Qty: 0 | Refills: 0 | Status: COMPLETED | OUTPATIENT
Start: 2018-12-29 | End: 2018-12-29

## 2018-12-29 RX ORDER — POTASSIUM CHLORIDE 20 MEQ
40 PACKET (EA) ORAL EVERY 4 HOURS
Qty: 0 | Refills: 0 | Status: COMPLETED | OUTPATIENT
Start: 2018-12-29 | End: 2018-12-29

## 2018-12-29 RX ADMIN — CELECOXIB 200 MILLIGRAM(S): 200 CAPSULE ORAL at 12:22

## 2018-12-29 RX ADMIN — Medication 40 MILLIEQUIVALENT(S): at 22:16

## 2018-12-29 RX ADMIN — LIDOCAINE 1 PATCH: 4 CREAM TOPICAL at 01:42

## 2018-12-29 RX ADMIN — Medication 40 MILLIGRAM(S): at 05:39

## 2018-12-29 RX ADMIN — LIDOCAINE 1 PATCH: 4 CREAM TOPICAL at 11:46

## 2018-12-29 RX ADMIN — LIDOCAINE 1 PATCH: 4 CREAM TOPICAL at 23:54

## 2018-12-29 RX ADMIN — Medication 100 MILLIEQUIVALENT(S): at 13:43

## 2018-12-29 RX ADMIN — LIDOCAINE 1 PATCH: 4 CREAM TOPICAL at 11:45

## 2018-12-29 RX ADMIN — Medication 100 MILLIEQUIVALENT(S): at 08:56

## 2018-12-29 RX ADMIN — RIVAROXABAN 20 MILLIGRAM(S): KIT at 17:44

## 2018-12-29 RX ADMIN — LIOTHYRONINE SODIUM 5 MICROGRAM(S): 25 TABLET ORAL at 05:39

## 2018-12-29 RX ADMIN — LIDOCAINE 1 PATCH: 4 CREAM TOPICAL at 23:55

## 2018-12-29 RX ADMIN — CELECOXIB 200 MILLIGRAM(S): 200 CAPSULE ORAL at 11:45

## 2018-12-29 RX ADMIN — LISINOPRIL 10 MILLIGRAM(S): 2.5 TABLET ORAL at 05:39

## 2018-12-29 RX ADMIN — Medication 30 MILLIGRAM(S): at 22:15

## 2018-12-29 RX ADMIN — LIDOCAINE 1 PATCH: 4 CREAM TOPICAL at 01:43

## 2018-12-29 RX ADMIN — SENNA PLUS 1 TABLET(S): 8.6 TABLET ORAL at 11:45

## 2018-12-29 RX ADMIN — Medication 100 MILLIEQUIVALENT(S): at 11:46

## 2018-12-29 RX ADMIN — Medication 40 MILLIEQUIVALENT(S): at 17:44

## 2018-12-29 NOTE — PROGRESS NOTE ADULT - PROBLEM SELECTOR PLAN 9
- DVT ppx: resume Xarelto after arthrocentesis today  - Constipation: c/w senna, monitor BMs  - GOC: full code (confirmed w pt)  - Dispo: Likely [facility] per PT - DVT ppx: resume Xarelto after arthrocentesis today  - Constipation: c/w senna, monitor BMs  - GOC: full code (confirmed w pt)  - Dispo: [facility] - DVT ppx: Xarelto  - Constipation: c/w senna, monitor BMs  - GOC: full code (confirmed w pt)  - Dispo: [facility]

## 2018-12-29 NOTE — PROGRESS NOTE ADULT - PROBLEM SELECTOR PLAN 1
Diffuse joint pain not localized to one particular focus, particularly in R hip, also in R shoulder and L knee. Significant difficulty with LE knee flexion in setting of extensive edema in both lower extremities. Denies trauma. Concern for septic joint given fever w SIRS on admission. Hip Xray w mod to severe R hip arthrosis. 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 Knee x-ray w lat tricompartmental arthrosis. CT pelvis w severe R hip OA, small R hip effusion, & mod distension of R iliopsoas bursa. Knee arthrocentesis consistent w pseudogout. R hip concerning for possible septic arthritis per ID, plan for hip arthrocentesis today w IR.  - R hip arthrocentesis today w IR  - start mobic 7.5 mg qD per rheum recs   - c/w empiric antibiotics w vanc/zosyn --> update: will D/C abx per ID recs  - f/u synovial fluid studies  - c/w Lidocaine patch and tylenol 650 mg q6h prn for pain  - PT eval in progress, initial recs for likely [facility]  - appreciate ID & rheum recs Diffuse joint pain not localized to one particular focus, particularly in R hip, also in R shoulder and L knee. Significant difficulty with LE knee flexion in setting of extensive edema in both lower extremities. Denies trauma. Concern for septic joint given fever w SIRS on admission, treated empirically with vanc/zosyn now D/C'd. Hip Xray w mod to severe R hip arthrosis. 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 Knee x-ray w lat tricompartmental arthrosis. CT pelvis w severe R hip OA, small R hip effusion, & mod distension of R iliopsoas bursa. Knee arthrocentesis consistent w pseudogout. R hip concerning for possible septic arthritis per ID, s/p hip arthrocentesis yesterday w IR.  - start methylprednisolone 30mg today then 15mg bid  - c/w mobic 7.5 mg qD  - f/u synovial fluid studies  - monitor off abx  - c/w Lidocaine patch and tylenol 650 mg q6h prn for pain  - PT eval in progress, initial recs for likely [facility]  - appreciate ID & rheum recs

## 2018-12-29 NOTE — PROGRESS NOTE ADULT - SUBJECTIVE AND OBJECTIVE BOX
INTERVAL EVENTS / SUBJECTIVE:    ***    OBJECTIVE:  Vital Signs Last 24 Hrs  T(C): 37 (29 Dec 2018 05:37), Max: 37 (29 Dec 2018 05:37)  T(F): 98.6 (29 Dec 2018 05:37), Max: 98.6 (29 Dec 2018 05:37)  HR: 66 (29 Dec 2018 05:37) (66 - 76)  BP: 150/77 (29 Dec 2018 05:37) (118/68 - 150/77)  BP(mean): --  RR: 18 (29 Dec 2018 05:37) (18 - 18)  SpO2: 96% (29 Dec 2018 05:37) (96% - 98%)    12-28 @ 07:01  -  12-29 @ 07:00  --------------------------------------------------------  IN: 960 mL / OUT: 2400 mL / NET: -1440 mL      PHYSICAL EXAM:  General: Pleasant elderly woman, NAD  HEENT:  EOMI, PERRL, conjunctiva and sclera clear, normal oropharynx  Neck: Supple, no JVD, normal thyroid  Chest/Lungs: CTA B/L, no rales, rhonchi, rub or wheezing  Heart: RRR, normal S1, S2, no murmurs or gallops  Abdomen: Soft, nondistended, NTTP, normoactive bowel sounds  Extremities: Peripheral pulses 2+ B/L, non-pitting edema of B/L LE with decreased ROM at R hip join and B/L knees 2/2 pain  Skin: Warm, well-perfused, edema of R hip and legs B/L without appreciable erythema  Neurological: A&Ox2, no focal deficits      HOSPITAL MEDICATIONS:  acetaminophen    Suspension .. 650 milliGRAM(s) Oral every 6 hours PRN Temp greater or equal to 38C (100.4F), Mild Pain (1 - 3), Moderate Pain (4 - 6), Severe Pain (7 - 10)  celecoxib 200 milliGRAM(s) Oral daily  furosemide    Tablet 40 milliGRAM(s) Oral daily  lidocaine   Patch 1 Patch Transdermal daily  lidocaine   Patch 1 Patch Transdermal daily  liothyronine 5 MICROGram(s) Oral daily  lisinopril 10 milliGRAM(s) Oral daily  potassium chloride  10 mEq/100 mL IVPB 10 milliEquivalent(s) IV Intermittent every 1 hour  rivaroxaban 20 milliGRAM(s) Oral every 24 hours  senna 1 Tablet(s) Oral daily      LABS:                        11.9   10.30 )-----------( 297      ( 28 Dec 2018 08:33 )             35.7     Hgb Trend: 11.9<--, 11.7<--, 12.3<--, 13.8<--  12-29    132<L>  |  89<L>  |  13  ----------------------------<  105<H>  3.1<L>   |  27  |  0.55    Ca    9.4      29 Dec 2018 06:48  Phos  3.0     12-28  Mg     2.0     12-29    Creatinine Trend: 0.55<--, 0.64<--, 0.59<--, 0.59<--, 0.69<--  PT/INR - ( 28 Dec 2018 08:29 )   PT: 15.1 sec;   INR: 1.31 ratio      PTT - ( 28 Dec 2018 08:29 )  PTT:36.7 sec      MICROBIOLOGY:    Culture - Body Fluid with Gram Stain (collected 12-28-18 @ 12:44)  Source: .Body Fluid Joint Fluid - rt hip  Gram Stain (12-28-18 @ 13:23):    Rare polymorphonuclear leukocytes seen per low power field    No organisms seen per oil power field        Culture - Body Fluid with Gram Stain (collected 12-28-18 @ 12:44)  Source: .Body Fluid Joint Fluid - rt hip  Gram Stain (12-28-18 @ 13:23):    Rare polymorphonuclear leukocytes seen per low power field    No organisms seen per oil power field        RADIOLOGY:    EKG:    MICROBIOLOGY:    Culture - Body Fluid with Gram Stain (collected 12-27-18 @ 20:08)  Source: .Body Fluid Joint Fluid  Preliminary Report (12-27-18 @ 23:02):    Few polymorphonuclear leukocytes seen per low power field    No organisms seen per oil power field    Culture - Body Fluid with Gram Stain (collected 12-27-18 @ 20:08)  Source: .Body Fluid Synovial Fluid  Preliminary Report (12-27-18 @ 23:03):    No polymorphonuclear cells seen per low power field    No organisms seen per oil power field    Culture - Body Fluid with Gram Stain (collected 12-27-18 @ 20:08)  Source: .Body Fluid Synovial Fluid  Preliminary Report (12-27-18 @ 23:03):    No polymorphonuclear cells seen per low power field    No organisms seen per oil power field    Culture - Body Fluid with Gram Stain (collected 12-27-18 @ 20:08)  Source: .Body Fluid Joint Fluid  Preliminary Report (12-27-18 @ 23:02):    Few polymorphonuclear leukocytes seen per low power field    No organisms seen per oil power field    Culture - Body Fluid with Gram Stain (collected 12-27-18 @ 20:08)  Source: .Body Fluid Synovial Fluid  Preliminary Report (12-27-18 @ 23:03):    No polymorphonuclear cells seen per low power field    No organisms seen per oil power field    Culture - Body Fluid with Gram Stain (collected 12-27-18 @ 20:08)  Source: .Body Fluid Synovial Fluid  Preliminary Report (12-27-18 @ 23:03):    No polymorphonuclear cells seen per low power field    No organisms seen per oil power field      IMAGING:     CT Pelvis Bony Only w/ IV Cont (12.26.18 @ 20:46)  1.  Severe right hip osteoarthritis.  2.  Small right hip joint effusion and moderate distention of the right   iliopsoas bursa. Findings likely reflect inflammatory change secondary to   the severe right hip arthritis. Superimposed infection is not excluded.   If there is concern for septic arthritis, correlation with hip aspiration   is recommended.  3.  Additional degenerative changes as detailed above.    Xray Knee 1 or 2 Views, Bilateral (12.26.18 @ 20:27)  Lateral tricompartmental arthrosis, most pronounced in the lateral   compartment where there is associated remodeling of the lateral tibial   plateau. Large joint effusion on the left. Small joint effusion on the   right. Please note that joint fluid analysis would be necessary to   further evaluate for septic joint.    WRIST COMPLETE RIGHT-MIN 3 VIEWS    12/26/2018      Generalized bone demineralization limits osseous evaluation. There is chondrocalcinosis at the triangular fibrocartilage. There is severe radiocarpal joint space narrowing with remodeling of the distal radius consistent with CPPD arthropathy. There is widening at the scapholunate interval consistent with scapholunate ligament tearing. There is minimal migration of the capitate proximally consistent with scapholunate advanced collapse. There is mild first carpometacarpal osteoarthrosis.    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      XR HIP WITH PELV 2-3V RT    12/25/2018    No acute fracture or dislocation. Mild spurring and severe axial joint space narrowing at the right hip consistent with arthrosis. Calcified fibroid. Lower lumbar spondylosis.  IMPRESSION: No acute fracture or dislocation. Moderate to severe right hip arthrosis.      XR CHEST PORTABLE URGENT 1V  12/25/2018    Loop recorder.  No focal consolidation.  There is no pneumothorax. There are no pleural effusions.   The cardiomediastinal silhouette cannot be adequately assessed on this projection.  IMPRESSION: Clear lungs. INTERVAL EVENTS / SUBJECTIVE:    12/28: Bilat knee arthrocentesis revealed CPPD crystals and 21K - 22K cells c/w pseudogout. Knee x-ray w lat tricompartmental arthrosis. CT pelvis w severe R hip OA, small R hip effusion, & mod distension of R iliopsoas bursa. Underwent R hip arthrocentesis w IR. Resumed xarelto. D/C'd vanc/zosyn per ID recs. Started mobic 7.5mg qD for pseudogout of knees. No acute events overnight. Pt seen and examined at bedside this am. Pt reports feeling better, reports R hip pain 3-4/10 in severity. Reports back ache. Denies knee pain. Denies fever or chills. States she wants to go to rehab.      OBJECTIVE:  Vital Signs Last 24 Hrs  T(C): 37 (29 Dec 2018 05:37), Max: 37 (29 Dec 2018 05:37)  T(F): 98.6 (29 Dec 2018 05:37), Max: 98.6 (29 Dec 2018 05:37)  HR: 66 (29 Dec 2018 05:37) (66 - 76)  BP: 150/77 (29 Dec 2018 05:37) (118/68 - 150/77)  BP(mean): --  RR: 18 (29 Dec 2018 05:37) (18 - 18)  SpO2: 96% (29 Dec 2018 05:37) (96% - 98%)    12-28 @ 07:01  -  12-29 @ 07:00  --------------------------------------------------------  IN: 960 mL / OUT: 2400 mL / NET: -1440 mL      PHYSICAL EXAM:  General: Pleasant elderly woman, NAD  HEENT:  EOMI, PERRL, conjunctiva and sclera clear, normal oropharynx  Neck: Supple, no JVD, normal thyroid  Chest/Lungs: CTA B/L, no rales, rhonchi, rub or wheezing  Heart: RRR, normal S1, S2, no murmurs or gallops  Abdomen: Soft, nondistended, NTTP, normoactive bowel sounds  Extremities: Peripheral pulses 2+ B/L, non-pitting edema of B/L LE with decreased ROM at R hip join and B/L knees 2/2 pain  Skin: Warm, well-perfused, edema of R hip and legs B/L without appreciable erythema  Neurological: A&Ox3 but some impaired memory, otherwise no focal deficits      HOSPITAL MEDICATIONS:  acetaminophen    Suspension .. 650 milliGRAM(s) Oral every 6 hours PRN Temp greater or equal to 38C (100.4F), Mild Pain (1 - 3), Moderate Pain (4 - 6), Severe Pain (7 - 10)  celecoxib 200 milliGRAM(s) Oral daily  furosemide    Tablet 40 milliGRAM(s) Oral daily  lidocaine   Patch 1 Patch Transdermal daily  lidocaine   Patch 1 Patch Transdermal daily  liothyronine 5 MICROGram(s) Oral daily  lisinopril 10 milliGRAM(s) Oral daily  potassium chloride  10 mEq/100 mL IVPB 10 milliEquivalent(s) IV Intermittent every 1 hour  rivaroxaban 20 milliGRAM(s) Oral every 24 hours  senna 1 Tablet(s) Oral daily      LABS:                        11.9   10.30 )-----------( 297      ( 28 Dec 2018 08:33 )             35.7     Hgb Trend: 11.9<--, 11.7<--, 12.3<--, 13.8<--  12-29    132<L>  |  89<L>  |  13  ----------------------------<  105<H>  3.1<L>   |  27  |  0.55    Ca    9.4      29 Dec 2018 06:48  Phos  3.0     12-28  Mg     2.0     12-29    Creatinine Trend: 0.55<--, 0.64<--, 0.59<--, 0.59<--, 0.69<--  PT/INR - ( 28 Dec 2018 08:29 )   PT: 15.1 sec;   INR: 1.31 ratio      PTT - ( 28 Dec 2018 08:29 )  PTT:36.7 sec      MICROBIOLOGY:    Culture - Body Fluid with Gram Stain (collected 12-28-18 @ 12:44)  Source: .Body Fluid Joint Fluid - rt hip  Gram Stain (12-28-18 @ 13:23):    Rare polymorphonuclear leukocytes seen per low power field    No organisms seen per oil power field    Culture - Body Fluid with Gram Stain (collected 12-28-18 @ 12:44)  Source: .Body Fluid Joint Fluid - rt hip  Gram Stain (12-28-18 @ 13:23):    Rare polymorphonuclear leukocytes seen per low power field    No organisms seen per oil power field      IMAGING:     CT Pelvis Bony Only w/ IV Cont (12.26.18 @ 20:46)  1.  Severe right hip osteoarthritis.  2.  Small right hip joint effusion and moderate distention of the right   iliopsoas bursa. Findings likely reflect inflammatory change secondary to   the severe right hip arthritis. Superimposed infection is not excluded.   If there is concern for septic arthritis, correlation with hip aspiration   is recommended.  3.  Additional degenerative changes as detailed above.    Xray Knee 1 or 2 Views, Bilateral (12.26.18 @ 20:27)  Lateral tricompartmental arthrosis, most pronounced in the lateral   compartment where there is associated remodeling of the lateral tibial   plateau. Large joint effusion on the left. Small joint effusion on the   right. Please note that joint fluid analysis would be necessary to   further evaluate for septic joint.    WRIST COMPLETE RIGHT-MIN 3 VIEWS    12/26/2018      Generalized bone demineralization limits osseous evaluation. There is chondrocalcinosis at the triangular fibrocartilage. There is severe radiocarpal joint space narrowing with remodeling of the distal radius consistent with CPPD arthropathy. There is widening at the scapholunate interval consistent with scapholunate ligament tearing. There is minimal migration of the capitate proximally consistent with scapholunate advanced collapse. There is mild first carpometacarpal osteoarthrosis.    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      XR HIP WITH PELV 2-3V RT    12/25/2018    No acute fracture or dislocation. Mild spurring and severe axial joint space narrowing at the right hip consistent with arthrosis. Calcified fibroid. Lower lumbar spondylosis.  IMPRESSION: No acute fracture or dislocation. Moderate to severe right hip arthrosis.      XR CHEST PORTABLE URGENT 1V  12/25/2018    Loop recorder.  No focal consolidation.  There is no pneumothorax. There are no pleural effusions.   The cardiomediastinal silhouette cannot be adequately assessed on this projection.  IMPRESSION: Clear lungs. INTERVAL EVENTS / SUBJECTIVE:    Bilat knee arthrocentesis revealed CPPD crystals and 21K - 22K cells c/w pseudogout. Knee x-ray w lat tricompartmental arthrosis. CT pelvis w severe R hip OA, small R hip effusion, & mod distension of R iliopsoas bursa. Underwent R hip arthrocentesis w IR. Resumed xarelto. D/C'd vanc/zosyn per ID recs. Started mobic 7.5mg qD for pseudogout of knees. No acute events overnight. Pt seen and examined at bedside this am. Pt reports feeling better, reports R hip pain 3-4/10 in severity. Reports back ache. Denies knee pain. Denies fever or chills. States she wants to go to rehab.      OBJECTIVE:  Vital Signs Last 24 Hrs  T(C): 37 (29 Dec 2018 05:37), Max: 37 (29 Dec 2018 05:37)  T(F): 98.6 (29 Dec 2018 05:37), Max: 98.6 (29 Dec 2018 05:37)  HR: 66 (29 Dec 2018 05:37) (66 - 76)  BP: 150/77 (29 Dec 2018 05:37) (118/68 - 150/77)  BP(mean): --  RR: 18 (29 Dec 2018 05:37) (18 - 18)  SpO2: 96% (29 Dec 2018 05:37) (96% - 98%)    12-28 @ 07:01  -  12-29 @ 07:00  --------------------------------------------------------  IN: 960 mL / OUT: 2400 mL / NET: -1440 mL      PHYSICAL EXAM:  General: Pleasant elderly woman, NAD  HEENT:  EOMI, PERRL, conjunctiva and sclera clear, normal oropharynx  Neck: Supple, no JVD, normal thyroid  Chest/Lungs: CTA B/L, no rales, rhonchi, rub or wheezing  Heart: RRR, normal S1, S2, no murmurs or gallops  Abdomen: Soft, nondistended, NTTP, normoactive bowel sounds  Extremities: Peripheral pulses 2+ B/L, non-pitting edema of B/L LE with decreased ROM at R hip join and B/L knees now improving  Skin: Warm, well-perfused, edema of R hip and legs B/L without appreciable erythema  Neurological: A&Ox3 but some impaired memory, otherwise no focal deficits      HOSPITAL MEDICATIONS:  acetaminophen    Suspension .. 650 milliGRAM(s) Oral every 6 hours PRN Temp greater or equal to 38C (100.4F), Mild Pain (1 - 3), Moderate Pain (4 - 6), Severe Pain (7 - 10)  celecoxib 200 milliGRAM(s) Oral daily  furosemide    Tablet 40 milliGRAM(s) Oral daily  lidocaine   Patch 1 Patch Transdermal daily  lidocaine   Patch 1 Patch Transdermal daily  liothyronine 5 MICROGram(s) Oral daily  lisinopril 10 milliGRAM(s) Oral daily  potassium chloride  10 mEq/100 mL IVPB 10 milliEquivalent(s) IV Intermittent every 1 hour  rivaroxaban 20 milliGRAM(s) Oral every 24 hours  senna 1 Tablet(s) Oral daily      LABS:                        11.9   10.30 )-----------( 297      ( 28 Dec 2018 08:33 )             35.7     Hgb Trend: 11.9<--, 11.7<--, 12.3<--, 13.8<--  12-29    132<L>  |  89<L>  |  13  ----------------------------<  105<H>  3.1<L>   |  27  |  0.55    Ca    9.4      29 Dec 2018 06:48  Phos  3.0     12-28  Mg     2.0     12-29    Creatinine Trend: 0.55<--, 0.64<--, 0.59<--, 0.59<--, 0.69<--  PT/INR - ( 28 Dec 2018 08:29 )   PT: 15.1 sec;   INR: 1.31 ratio      PTT - ( 28 Dec 2018 08:29 )  PTT:36.7 sec      MICROBIOLOGY:    Culture - Body Fluid with Gram Stain (collected 12-28-18 @ 12:44)  Source: .Body Fluid Joint Fluid - rt hip  Gram Stain (12-28-18 @ 13:23):    Rare polymorphonuclear leukocytes seen per low power field    No organisms seen per oil power field    Culture - Body Fluid with Gram Stain (collected 12-28-18 @ 12:44)  Source: .Body Fluid Joint Fluid - rt hip  Gram Stain (12-28-18 @ 13:23):    Rare polymorphonuclear leukocytes seen per low power field    No organisms seen per oil power field      IMAGING:     CT Pelvis Bony Only w/ IV Cont (12.26.18 @ 20:46)  1.  Severe right hip osteoarthritis.  2.  Small right hip joint effusion and moderate distention of the right   iliopsoas bursa. Findings likely reflect inflammatory change secondary to   the severe right hip arthritis. Superimposed infection is not excluded.   If there is concern for septic arthritis, correlation with hip aspiration   is recommended.  3.  Additional degenerative changes as detailed above.    Xray Knee 1 or 2 Views, Bilateral (12.26.18 @ 20:27)  Lateral tricompartmental arthrosis, most pronounced in the lateral   compartment where there is associated remodeling of the lateral tibial   plateau. Large joint effusion on the left. Small joint effusion on the   right. Please note that joint fluid analysis would be necessary to   further evaluate for septic joint.    WRIST COMPLETE RIGHT-MIN 3 VIEWS    12/26/2018      Generalized bone demineralization limits osseous evaluation. There is chondrocalcinosis at the triangular fibrocartilage. There is severe radiocarpal joint space narrowing with remodeling of the distal radius consistent with CPPD arthropathy. There is widening at the scapholunate interval consistent with scapholunate ligament tearing. There is minimal migration of the capitate proximally consistent with scapholunate advanced collapse. There is mild first carpometacarpal osteoarthrosis.    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      XR HIP WITH PELV 2-3V RT    12/25/2018    No acute fracture or dislocation. Mild spurring and severe axial joint space narrowing at the right hip consistent with arthrosis. Calcified fibroid. Lower lumbar spondylosis.  IMPRESSION: No acute fracture or dislocation. Moderate to severe right hip arthrosis.      XR CHEST PORTABLE URGENT 1V  12/25/2018    Loop recorder.  No focal consolidation.  There is no pneumothorax. There are no pleural effusions.   The cardiomediastinal silhouette cannot be adequately assessed on this projection.  IMPRESSION: Clear lungs.

## 2018-12-29 NOTE — PROGRESS NOTE ADULT - PROBLEM SELECTOR PLAN 8
TSH 0.39 in February 2018.  - c/w liothyronine 5 mcg qD. TSH 0.39 in February 2018.  - c/w liothyronine 5 mcg qD

## 2018-12-29 NOTE — PROGRESS NOTE ADULT - PROBLEM SELECTOR PLAN 6
Hx of syncope in 2017 with implantable loop recorder placed. Known RBBB with LAFB, follows with Dr. Leija from cardiology. EKG stable since prior.  - avoid AV isabel blocking agents  - call EP for interrogation of ILR

## 2018-12-29 NOTE — PROGRESS NOTE ADULT - PROBLEM SELECTOR PLAN 2
Presenting with fever, leukocytosis and tachycardia presumably related to joint complaints as remaining infectious work-up negative at this time. Inflammatory markers elevated. Remains afebrile since presentation.   - c/w empiric antibiotics w vanc/zosyn --> update: will D/C abx per ID recs  - F/u BCx, UCx  - appreciate ID recs Presenting with fever, leukocytosis and tachycardia presumably related to joint complaints as remaining infectious work-up negative at this time. Inflammatory markers elevated. Treated empirically with vanc/zosyn, now D/C'd. Remains afebrile since presentation.   - appreciate ID & rheum recs  - F/u BCx, UCx  - appreciate ID recs

## 2018-12-29 NOTE — PROGRESS NOTE ADULT - PROBLEM SELECTOR PLAN 5
Hx of RLE DVT In 07/2018, on Xarelto.   - Resume home Xarelto after hip joint aspiration today Hx of RLE DVT In 07/2018, on Xarelto.   - c/w home xarelto 20mg qD

## 2018-12-29 NOTE — PROGRESS NOTE ADULT - PROBLEM SELECTOR PLAN 3
HFrEF, p/w significant lower extremity edema likely exacerbated by the amlodipine vs HFpEF. Reported 5 lb weight gain as per daughter, s/p 1 dose of lasix 40 IV in ED. H/O DVT in RLE. Vascular was consulted for cool LLE, no concern for needing intervention likely 2/2 CHF per vascular. LE doppler U/S negative for DVT  - c/w home lasix 40 mg PO qD  - c/w home xarelto 20 mg qD  - compression stockings  - strict I/Os  - f/u TTE HFrEF, p/w significant lower extremity edema likely exacerbated by the amlodipine vs HFpEF. Reported 5 lb weight gain as per daughter, s/p 1 dose of lasix 40 IV in ED. H/O DVT in RLE. Vascular was consulted for cool LLE, no concern for needing intervention likely 2/2 CHF per vascular. LE doppler U/S negative for DVT  - c/w home lasix 40 mg PO qD  - c/w home xarelto 20 mg qD  - start fluid restriction 1L  - compression stockings  - strict I/Os  - f/u TTE

## 2018-12-29 NOTE — PROGRESS NOTE ADULT - ASSESSMENT
89F with OA, mild dementia (A&Ox2-3), HCV (in remission), HTN, HFpEF (Grade I diastolic dysfunction), HTN, hypothyroidism, hx of syncope (trifascicular block RBBB/LAFB) s/p ILR, DVT on Xarelto presenting with right hip and bilateral knee pain, swelling, and inability to ambulate meeting SIRS criteria on admission, admitted with concern for septic arthritis vs gout vs pseudogout vs OA flare. S/p bilat knee arthrocentesis consistent w pseudogout, planning for R hip arthrocentesis today w IR. 89F with OA, mild dementia (A&Ox2-3), HCV (in remission), HTN, HFpEF (Grade I diastolic dysfunction), HTN, hypothyroidism, hx of syncope (trifascicular block RBBB/LAFB) s/p ILR, DVT on Xarelto presenting with right hip and bilateral knee pain, swelling, and inability to ambulate meeting SIRS criteria on admission, admitted with concern for septic arthritis vs gout vs pseudogout vs OA flare. S/p R hip arthrocentesis as well as bilat knee arthrocentesis consistent w pseudogout. Planning for discharge to rehab.

## 2018-12-29 NOTE — PROGRESS NOTE ADULT - SUBJECTIVE AND OBJECTIVE BOX
INFECTIOUS DISEASES FOLLOW UP--Johnny Bolden MD  Pager [phone]    This is a follow up note for this  89y Female with  joint inflammation  crystal dz    Further ROS:  CONSTITUTIONAL:  No fever, good appetite  CARDIOVASCULAR:  No chest pain or palpitations  RESPIRATORY:  No dyspnea  GASTROINTESTINAL:  No nausea, vomiting, diarrhea, or abdominal pain  GENITOURINARY:  No dysuria  NEUROLOGIC:  No headache,     Allergies  No Known Allergies    ANTIBIOTICS/RELEVANT:  antimicrobials off    immunologic:    OTHER:  acetaminophen    Suspension .. 650 milliGRAM(s) Oral every 6 hours PRN  celecoxib 200 milliGRAM(s) Oral daily  furosemide    Tablet 40 milliGRAM(s) Oral daily  lidocaine   Patch 1 Patch Transdermal daily  lidocaine   Patch 1 Patch Transdermal daily  liothyronine 5 MICROGram(s) Oral daily  lisinopril 10 milliGRAM(s) Oral daily  potassium chloride  10 mEq/100 mL IVPB 10 milliEquivalent(s) IV Intermittent every 1 hour  rivaroxaban 20 milliGRAM(s) Oral every 24 hours  senna 1 Tablet(s) Oral daily    Objective:  Vital Signs Last 24 Hrs  T(C): 37 (29 Dec 2018 05:37), Max: 37 (29 Dec 2018 05:37)  T(F): 98.6 (29 Dec 2018 05:37), Max: 98.6 (29 Dec 2018 05:37)  HR: 66 (29 Dec 2018 05:37) (66 - 76)  BP: 150/77 (29 Dec 2018 05:37) (118/68 - 150/77)  BP(mean): --  RR: 18 (29 Dec 2018 05:37) (18 - 18)  SpO2: 96% (29 Dec 2018 05:37) (96% - 98%)    PHYSICAL EXAM:  Constitutional:no acute distress  Eyes:TANNER, EOMI  Ear/Nose/Throat: no oral lesions, 	  Respiratory: clear BL  Cardiovascular: S1S2  Gastrointestinal:soft, (+) BS, no tenderness  Extremities:no e/e/c  No Lymphadenopathy  IV sites not inflammed.    LABS:                        11.9   10.30 )-----------( 297      ( 28 Dec 2018 08:33 )             35.7     12-29    132<L>  |  89<L>  |  13  ----------------------------<  105<H>  3.1<L>   |  27  |  0.55    Ca    9.4      29 Dec 2018 06:48  Phos  3.0     12-28  Mg     2.0     12-29      PT/INR - ( 28 Dec 2018 08:29 )   PT: 15.1 sec;   INR: 1.31 ratio         PTT - ( 28 Dec 2018 08:29 )  PTT:36.7 sec      MICROBIOLOGY: no pos cx from joint taps      Imp/Rx:  rheum input appreciated.  no clear infection.  watching off of abx.  seems to have pseudogout.    call w questions

## 2018-12-29 NOTE — PROGRESS NOTE ADULT - SUBJECTIVE AND OBJECTIVE BOX
JAQUAN KAM  [id number]    INTERVAL HPI/OVERNIGHT EVENTS:    Patient and daughter at bedside states that she is feeling the same, no joint pain while in bed but had difficulty sitting in bed and wasn't able to work with physical therapy  she has no new symptoms  chart reviewed, afebrile       MEDICATIONS  (STANDING):  celecoxib 200 milliGRAM(s) Oral daily  furosemide    Tablet 40 milliGRAM(s) Oral daily  lidocaine   Patch 1 Patch Transdermal daily  lidocaine   Patch 1 Patch Transdermal daily  liothyronine 5 MICROGram(s) Oral daily  lisinopril 10 milliGRAM(s) Oral daily  potassium chloride  10 mEq/100 mL IVPB 10 milliEquivalent(s) IV Intermittent every 1 hour  rivaroxaban 20 milliGRAM(s) Oral every 24 hours  senna 1 Tablet(s) Oral daily    MEDICATIONS  (PRN):  acetaminophen    Suspension .. 650 milliGRAM(s) Oral every 6 hours PRN Temp greater or equal to 38C (100.4F), Mild Pain (1 - 3), Moderate Pain (4 - 6), Severe Pain (7 - 10)      Allergies    No Known Allergies    Intolerances        Review of Systems:   as above     Vital Signs Last 24 Hrs  T(C): 36.4 (29 Dec 2018 10:43), Max: 37 (29 Dec 2018 05:37)  T(F): 97.5 (29 Dec 2018 10:43), Max: 98.6 (29 Dec 2018 05:37)  HR: 73 (29 Dec 2018 10:43) (66 - 76)  BP: 120/77 (29 Dec 2018 10:43) (118/68 - 150/77)  BP(mean): --  RR: 18 (29 Dec 2018 10:43) (18 - 18)  SpO2: 96% (29 Dec 2018 10:43) (96% - 98%)    Physical Exam:  General: NAD  HEENT: EOMI, MMM  MSK:  right hip: normal ROM  right knee: mild warmth, large effusion, mild tenderness preserved but painful ROM  left knee: large effusion, tender, preserved ROM left hip : preserved ROM  bilateral ankle tenderness, no palpable effusion     Neuro: AAOx3    LABS:                        12.7   9.14  )-----------( 295      ( 29 Dec 2018 07:48 )             37.3     12-29    132<L>  |  89<L>  |  13  ----------------------------<  105<H>  3.1<L>   |  27  |  0.55    Ca    9.4      29 Dec 2018 06:48  Phos  3.0     12-28  Mg     2.0     12-29      PT/INR - ( 28 Dec 2018 08:29 )   PT: 15.1 sec;   INR: 1.31 ratio         PTT - ( 28 Dec 2018 08:29 )  PTT:36.7 sec    Culture - Body Fluid with Gram Stain (12.28.18 @ 12:44)    Gram Stain:   Rare polymorphonuclear leukocytes seen per low power field  No organisms seen per oil power field    Specimen Source: .Body Fluid Joint Fluid - rt hip    Culture Results:   No growth        RADIOLOGY & ADDITIONAL TESTS: JAQUAN KAM  [id number]    INTERVAL HPI/OVERNIGHT EVENTS:    Patient and daughter at bedside states that she is feeling the same, no joint pain while in bed but had difficulty sitting in bed and wasn't able to work with physical therapy  she has no new symptoms  chart reviewed, afebrile       MEDICATIONS  (STANDING):  celecoxib 200 milliGRAM(s) Oral daily  furosemide    Tablet 40 milliGRAM(s) Oral daily  lidocaine   Patch 1 Patch Transdermal daily  lidocaine   Patch 1 Patch Transdermal daily  liothyronine 5 MICROGram(s) Oral daily  lisinopril 10 milliGRAM(s) Oral daily  potassium chloride  10 mEq/100 mL IVPB 10 milliEquivalent(s) IV Intermittent every 1 hour  rivaroxaban 20 milliGRAM(s) Oral every 24 hours  senna 1 Tablet(s) Oral daily    MEDICATIONS  (PRN):  acetaminophen    Suspension .. 650 milliGRAM(s) Oral every 6 hours PRN Temp greater or equal to 38C (100.4F), Mild Pain (1 - 3), Moderate Pain (4 - 6), Severe Pain (7 - 10)      Allergies    No Known Allergies    Intolerances        Review of Systems:   as above     Vital Signs Last 24 Hrs  T(C): 36.4 (29 Dec 2018 10:43), Max: 37 (29 Dec 2018 05:37)  T(F): 97.5 (29 Dec 2018 10:43), Max: 98.6 (29 Dec 2018 05:37)  HR: 73 (29 Dec 2018 10:43) (66 - 76)  BP: 120/77 (29 Dec 2018 10:43) (118/68 - 150/77)  BP(mean): --  RR: 18 (29 Dec 2018 10:43) (18 - 18)  SpO2: 96% (29 Dec 2018 10:43) (96% - 98%)    Physical Exam:  General: NAD  HEENT: EOMI, MMM  MSK:  right hip: normal ROM  right knee: mild warmth, large effusion, mild tenderness preserved but painful ROM  left knee: large effusion, tender, preserved ROM  left hip : preserved ROM  bilateral ankle tenderness, no palpable effusion     Neuro: AAOx3    LABS:                        12.7   9.14  )-----------( 295      ( 29 Dec 2018 07:48 )             37.3       12-29    132<L>  |  89<L>  |  13  ----------------------------<  105<H>  3.1<L>   |  27  |  0.55    Ca    9.4      29 Dec 2018 06:48  Phos  3.0     12-28  Mg     2.0     12-29      PT/INR - ( 28 Dec 2018 08:29 )   PT: 15.1 sec;   INR: 1.31 ratio         PTT - ( 28 Dec 2018 08:29 )  PTT:36.7 sec    Culture - Body Fluid with Gram Stain (12.28.18 @ 12:44)    Gram Stain:   Rare polymorphonuclear leukocytes seen per low power field  No organisms seen per oil power field    Specimen Source: .Body Fluid Joint Fluid - rt hip    Culture Results:   No growth      RADIOLOGY & ADDITIONAL TESTS:

## 2018-12-29 NOTE — PROGRESS NOTE ADULT - ASSESSMENT
89 yo F w/ hx of HTN, OA, CHF presenting for R hip and knee pain.  s/p b/l knee arthrocentesis 12/17, showing CPPD crystals, ~21-22K cells, gram stain and culture NGTD  s/p US guided joint aspiration of R hip, stain negative so far, no cell count/crystal was ordered it was added on today     Clinically suggestive of oligoarticular CPPD arthropathy, doubt septic joint ( in view of preserved ROM and negative growth so far) 89 yo F w/ hx of HTN, OA, CHF presenting for R hip and knee pain.  s/p b/l knee arthrocentesis 12/17, showing CPPD crystals, ~21-22K cells, gram stain and culture NGTD  s/p US guided joint aspiration of R hip, stain negative so far, no cell count/crystal was ordered it was added on today     Clinically suggestive of oligoarticular CPPD arthropathy, unlikely septic joint ( in view of preserved ROM and negative growth so far)      - Start SOlumedrol 30 mg Iv once tonight , then 15 mg IV BID  - will reaccess for response in 48 hours to determine the switch to po steroids    Ferrer MD  rheumatology fellow  cell [phone]

## 2018-12-30 LAB
ANION GAP SERPL CALC-SCNC: 14 MMOL/L — SIGNIFICANT CHANGE UP (ref 5–17)
BASOPHILS # BLD AUTO: 0.01 K/UL — SIGNIFICANT CHANGE UP (ref 0–0.2)
BASOPHILS NFR BLD AUTO: 0.1 % — SIGNIFICANT CHANGE UP (ref 0–2)
BUN SERPL-MCNC: 19 MG/DL — SIGNIFICANT CHANGE UP (ref 7–23)
CALCIUM SERPL-MCNC: 10 MG/DL — SIGNIFICANT CHANGE UP (ref 8.4–10.5)
CHLORIDE SERPL-SCNC: 93 MMOL/L — LOW (ref 96–108)
CO2 SERPL-SCNC: 26 MMOL/L — SIGNIFICANT CHANGE UP (ref 22–31)
CREAT SERPL-MCNC: 0.58 MG/DL — SIGNIFICANT CHANGE UP (ref 0.5–1.3)
EOSINOPHIL # BLD AUTO: 0.01 K/UL — SIGNIFICANT CHANGE UP (ref 0–0.5)
EOSINOPHIL NFR BLD AUTO: 0.1 % — SIGNIFICANT CHANGE UP (ref 0–6)
GLUCOSE SERPL-MCNC: 143 MG/DL — HIGH (ref 70–99)
HCT VFR BLD CALC: 38.4 % — SIGNIFICANT CHANGE UP (ref 34.5–45)
HGB BLD-MCNC: 12.9 G/DL — SIGNIFICANT CHANGE UP (ref 11.5–15.5)
IMM GRANULOCYTES NFR BLD AUTO: 0.4 % — SIGNIFICANT CHANGE UP (ref 0–1.5)
LYMPHOCYTES # BLD AUTO: 0.74 K/UL — LOW (ref 1–3.3)
LYMPHOCYTES # BLD AUTO: 10.3 % — LOW (ref 13–44)
MAGNESIUM SERPL-MCNC: 2.2 MG/DL — SIGNIFICANT CHANGE UP (ref 1.6–2.6)
MCHC RBC-ENTMCNC: 30.4 PG — SIGNIFICANT CHANGE UP (ref 27–34)
MCHC RBC-ENTMCNC: 33.6 GM/DL — SIGNIFICANT CHANGE UP (ref 32–36)
MCV RBC AUTO: 90.4 FL — SIGNIFICANT CHANGE UP (ref 80–100)
MONOCYTES # BLD AUTO: 0.38 K/UL — SIGNIFICANT CHANGE UP (ref 0–0.9)
MONOCYTES NFR BLD AUTO: 5.3 % — SIGNIFICANT CHANGE UP (ref 2–14)
NEUTROPHILS # BLD AUTO: 6.01 K/UL — SIGNIFICANT CHANGE UP (ref 1.8–7.4)
NEUTROPHILS NFR BLD AUTO: 83.8 % — HIGH (ref 43–77)
PHOSPHATE SERPL-MCNC: 3.4 MG/DL — SIGNIFICANT CHANGE UP (ref 2.5–4.5)
PLATELET # BLD AUTO: 331 K/UL — SIGNIFICANT CHANGE UP (ref 150–400)
POTASSIUM SERPL-MCNC: 4.7 MMOL/L — SIGNIFICANT CHANGE UP (ref 3.5–5.3)
POTASSIUM SERPL-SCNC: 4.7 MMOL/L — SIGNIFICANT CHANGE UP (ref 3.5–5.3)
RBC # BLD: 4.25 M/UL — SIGNIFICANT CHANGE UP (ref 3.8–5.2)
RBC # FLD: 14.4 % — SIGNIFICANT CHANGE UP (ref 10.3–14.5)
SODIUM SERPL-SCNC: 133 MMOL/L — LOW (ref 135–145)
WBC # BLD: 7.18 K/UL — SIGNIFICANT CHANGE UP (ref 3.8–10.5)
WBC # FLD AUTO: 7.18 K/UL — SIGNIFICANT CHANGE UP (ref 3.8–10.5)

## 2018-12-30 PROCEDURE — 99233 SBSQ HOSP IP/OBS HIGH 50: CPT

## 2018-12-30 RX ADMIN — LIDOCAINE 1 PATCH: 4 CREAM TOPICAL at 13:18

## 2018-12-30 RX ADMIN — LIOTHYRONINE SODIUM 5 MICROGRAM(S): 25 TABLET ORAL at 05:53

## 2018-12-30 RX ADMIN — Medication 15 MILLIGRAM(S): at 05:52

## 2018-12-30 RX ADMIN — Medication 15 MILLIGRAM(S): at 17:51

## 2018-12-30 RX ADMIN — LIDOCAINE 1 PATCH: 4 CREAM TOPICAL at 23:16

## 2018-12-30 RX ADMIN — RIVAROXABAN 20 MILLIGRAM(S): KIT at 17:51

## 2018-12-30 RX ADMIN — Medication 40 MILLIGRAM(S): at 05:53

## 2018-12-30 RX ADMIN — LISINOPRIL 10 MILLIGRAM(S): 2.5 TABLET ORAL at 05:53

## 2018-12-30 RX ADMIN — LIDOCAINE 1 PATCH: 4 CREAM TOPICAL at 23:15

## 2018-12-30 RX ADMIN — LIDOCAINE 1 PATCH: 4 CREAM TOPICAL at 13:17

## 2018-12-30 NOTE — PROGRESS NOTE ADULT - SUBJECTIVE AND OBJECTIVE BOX
INTERVAL EVENTS / SUBJECTIVE:    ***    OBJECTIVE:  Vital Signs Last 24 Hrs  T(C): 36.3 (30 Dec 2018 09:06), Max: 36.7 (30 Dec 2018 04:09)  T(F): 97.4 (30 Dec 2018 09:06), Max: 98 (30 Dec 2018 04:09)  HR: 64 (30 Dec 2018 09:06) (64 - 73)  BP: 152/83 (30 Dec 2018 09:06) (120/77 - 152/83)  BP(mean): --  RR: 18 (30 Dec 2018 09:06) (18 - 18)  SpO2: 97% (30 Dec 2018 09:06) (96% - 98%)    12-29 @ 07:01  -  12-30 @ 07:00  --------------------------------------------------------  IN: 900 mL / OUT: 1100 mL / NET: -200 mL      PHYSICAL EXAM:  General: Pleasant elderly woman, NAD  HEENT:  EOMI, PERRL, conjunctiva and sclera clear, normal oropharynx  Neck: Supple, no JVD, normal thyroid  Chest/Lungs: CTA B/L, no rales, rhonchi, rub or wheezing  Heart: RRR, normal S1, S2, no murmurs or gallops  Abdomen: Soft, nondistended, NTTP, normoactive bowel sounds  Extremities: Peripheral pulses 2+ B/L, non-pitting edema of B/L LE with decreased ROM at R hip join and B/L knees now improving  Skin: Warm, well-perfused, edema of R hip and legs B/L without appreciable erythema  Neurological: A&Ox3 but some impaired memory, otherwise no focal deficits      HOSPITAL MEDICATIONS:  acetaminophen    Suspension .. 650 milliGRAM(s) Oral every 6 hours PRN Temp greater or equal to 38C (100.4F), Mild Pain (1 - 3), Moderate Pain (4 - 6), Severe Pain (7 - 10)  celecoxib 200 milliGRAM(s) Oral daily  furosemide    Tablet 40 milliGRAM(s) Oral daily  lidocaine   Patch 1 Patch Transdermal daily  lidocaine   Patch 1 Patch Transdermal daily  liothyronine 5 MICROGram(s) Oral daily  lisinopril 10 milliGRAM(s) Oral daily  methylPREDNISolone sodium succinate Injectable 15 milliGRAM(s) IV Push two times a day  rivaroxaban 20 milliGRAM(s) Oral every 24 hours  senna 1 Tablet(s) Oral daily      LABS:                        12.7   9.14  )-----------( 295      ( 29 Dec 2018 07:48 )             37.3     Hgb Trend: 12.7<--, 11.9<--, 11.7<--, 12.3<--, 13.8<--  12-30    133<L>  |  93<L>  |  19  ----------------------------<  143<H>  4.7   |  26  |  0.58    Ca    10.0      30 Dec 2018 07:21  Phos  3.4     12-30  Mg     2.2     12-30    Creatinine Trend: 0.58<--, 0.55<--, 0.64<--, 0.59<--, 0.59<--, 0.69<--      MICROBIOLOGY:    Culture - Body Fluid with Gram Stain (collected 12-28-18 @ 12:44)  Source: .Body Fluid Joint Fluid - rt hip  Gram Stain (12-28-18 @ 13:23):    Rare polymorphonuclear leukocytes seen per low power field    No organisms seen per oil power field    Culture - Body Fluid with Gram Stain (collected 12-28-18 @ 12:44)  Source: .Body Fluid Joint Fluid - rt hip  Gram Stain (12-28-18 @ 13:23):    Rare polymorphonuclear leukocytes seen per low power field    No organisms seen per oil power field      IMAGING:     CT Pelvis Bony Only w/ IV Cont (12.26.18 @ 20:46)  1.  Severe right hip osteoarthritis.  2.  Small right hip joint effusion and moderate distention of the right   iliopsoas bursa. Findings likely reflect inflammatory change secondary to   the severe right hip arthritis. Superimposed infection is not excluded.   If there is concern for septic arthritis, correlation with hip aspiration   is recommended.  3.  Additional degenerative changes as detailed above.    Xray Knee 1 or 2 Views, Bilateral (12.26.18 @ 20:27)  Lateral tricompartmental arthrosis, most pronounced in the lateral   compartment where there is associated remodeling of the lateral tibial   plateau. Large joint effusion on the left. Small joint effusion on the   right. Please note that joint fluid analysis would be necessary to   further evaluate for septic joint.    WRIST COMPLETE RIGHT-MIN 3 VIEWS    12/26/2018      Generalized bone demineralization limits osseous evaluation. There is chondrocalcinosis at the triangular fibrocartilage. There is severe radiocarpal joint space narrowing with remodeling of the distal radius consistent with CPPD arthropathy. There is widening at the scapholunate interval consistent with scapholunate ligament tearing. There is minimal migration of the capitate proximally consistent with scapholunate advanced collapse. There is mild first carpometacarpal osteoarthrosis.    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      XR HIP WITH PELV 2-3V RT    12/25/2018    No acute fracture or dislocation. Mild spurring and severe axial joint space narrowing at the right hip consistent with arthrosis. Calcified fibroid. Lower lumbar spondylosis.  IMPRESSION: No acute fracture or dislocation. Moderate to severe right hip arthrosis.      XR CHEST PORTABLE URGENT 1V  12/25/2018    Loop recorder.  No focal consolidation.  There is no pneumothorax. There are no pleural effusions.   The cardiomediastinal silhouette cannot be adequately assessed on this projection.  IMPRESSION: Clear lungs. INTERVAL EVENTS / SUBJECTIVE:    Started IV Solumedrol as per Rheum recs. No acute events overnight. Pt seen and examined at bedside this am. Pt reports pain near L calf. Denies any other joint pain including in hips and knees, however did report some pain to other providers this morning. Denies fever, chills, night sweats.    OBJECTIVE:  Vital Signs Last 24 Hrs  T(C): 36.3 (30 Dec 2018 09:06), Max: 36.7 (30 Dec 2018 04:09)  T(F): 97.4 (30 Dec 2018 09:06), Max: 98 (30 Dec 2018 04:09)  HR: 64 (30 Dec 2018 09:06) (64 - 73)  BP: 152/83 (30 Dec 2018 09:06) (120/77 - 152/83)  BP(mean): --  RR: 18 (30 Dec 2018 09:06) (18 - 18)  SpO2: 97% (30 Dec 2018 09:06) (96% - 98%)    12-29 @ 07:01  -  12-30 @ 07:00  --------------------------------------------------------  IN: 900 mL / OUT: 1100 mL / NET: -200 mL      PHYSICAL EXAM:  General: Pleasant elderly woman, NAD  HEENT:  EOMI, PERRL, conjunctiva and sclera clear, normal oropharynx  Neck: Supple, no JVD, normal thyroid  Chest/Lungs: CTA B/L, no rales, rhonchi, rub or wheezing  Heart: RRR, normal S1, S2, no murmurs or gallops  Abdomen: Soft, nondistended, NTTP, normoactive bowel sounds  Extremities: Peripheral pulses 2+ B/L, non-pitting edema of B/L LE with decreased ROM at R hip join and B/L knees now improving  Skin: Warm, well-perfused, edema of R hip and legs B/L without appreciable erythema  Neurological: A&Ox3 but some impaired memory, otherwise no focal deficits      HOSPITAL MEDICATIONS:  acetaminophen    Suspension .. 650 milliGRAM(s) Oral every 6 hours PRN Temp greater or equal to 38C (100.4F), Mild Pain (1 - 3), Moderate Pain (4 - 6), Severe Pain (7 - 10)  celecoxib 200 milliGRAM(s) Oral daily  furosemide    Tablet 40 milliGRAM(s) Oral daily  lidocaine   Patch 1 Patch Transdermal daily  lidocaine   Patch 1 Patch Transdermal daily  liothyronine 5 MICROGram(s) Oral daily  lisinopril 10 milliGRAM(s) Oral daily  methylPREDNISolone sodium succinate Injectable 15 milliGRAM(s) IV Push two times a day  rivaroxaban 20 milliGRAM(s) Oral every 24 hours  senna 1 Tablet(s) Oral daily      LABS:                        12.7   9.14  )-----------( 295      ( 29 Dec 2018 07:48 )             37.3     Hgb Trend: 12.7<--, 11.9<--, 11.7<--, 12.3<--, 13.8<--  12-30    133<L>  |  93<L>  |  19  ----------------------------<  143<H>  4.7   |  26  |  0.58    Ca    10.0      30 Dec 2018 07:21  Phos  3.4     12-30  Mg     2.2     12-30    Creatinine Trend: 0.58<--, 0.55<--, 0.64<--, 0.59<--, 0.59<--, 0.69<--      MICROBIOLOGY:    Culture - Body Fluid with Gram Stain (collected 12-28-18 @ 12:44)  Source: .Body Fluid Joint Fluid - rt hip  Gram Stain (12-28-18 @ 13:23):    Rare polymorphonuclear leukocytes seen per low power field    No organisms seen per oil power field    Culture - Body Fluid with Gram Stain (collected 12-28-18 @ 12:44)  Source: .Body Fluid Joint Fluid - rt hip  Gram Stain (12-28-18 @ 13:23):    Rare polymorphonuclear leukocytes seen per low power field    No organisms seen per oil power field      IMAGING:     CT Pelvis Bony Only w/ IV Cont (12.26.18 @ 20:46)  1.  Severe right hip osteoarthritis.  2.  Small right hip joint effusion and moderate distention of the right   iliopsoas bursa. Findings likely reflect inflammatory change secondary to   the severe right hip arthritis. Superimposed infection is not excluded.   If there is concern for septic arthritis, correlation with hip aspiration   is recommended.  3.  Additional degenerative changes as detailed above.    Xray Knee 1 or 2 Views, Bilateral (12.26.18 @ 20:27)  Lateral tricompartmental arthrosis, most pronounced in the lateral   compartment where there is associated remodeling of the lateral tibial   plateau. Large joint effusion on the left. Small joint effusion on the   right. Please note that joint fluid analysis would be necessary to   further evaluate for septic joint.    WRIST COMPLETE RIGHT-MIN 3 VIEWS    12/26/2018      Generalized bone demineralization limits osseous evaluation. There is chondrocalcinosis at the triangular fibrocartilage. There is severe radiocarpal joint space narrowing with remodeling of the distal radius consistent with CPPD arthropathy. There is widening at the scapholunate interval consistent with scapholunate ligament tearing. There is minimal migration of the capitate proximally consistent with scapholunate advanced collapse. There is mild first carpometacarpal osteoarthrosis.    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      XR HIP WITH PELV 2-3V RT    12/25/2018    No acute fracture or dislocation. Mild spurring and severe axial joint space narrowing at the right hip consistent with arthrosis. Calcified fibroid. Lower lumbar spondylosis.  IMPRESSION: No acute fracture or dislocation. Moderate to severe right hip arthrosis.      XR CHEST PORTABLE URGENT 1V  12/25/2018    Loop recorder.  No focal consolidation.  There is no pneumothorax. There are no pleural effusions.   The cardiomediastinal silhouette cannot be adequately assessed on this projection.  IMPRESSION: Clear lungs.

## 2018-12-30 NOTE — PROGRESS NOTE ADULT - ATTENDING COMMENTS
as above
1. Pseudogout  -Discussed with Rheum attending and Fellow at bedside: Started Solumedrol 30mg IV x 1 now and Solumedrol 15mg BID starting tomorrow and will titrate based on improvement.
Pseudogout improving on steroids  F/U Rheum for steroid taper.

## 2018-12-30 NOTE — PROGRESS NOTE ADULT - PROBLEM SELECTOR PLAN 1
Diffuse joint pain not localized to one particular focus, particularly in R hip, also in R shoulder and L knee. Significant difficulty with LE knee flexion in setting of extensive edema in both lower extremities. Denies trauma. Concern for septic joint given fever w SIRS on admission, treated empirically with vanc/zosyn now D/C'd. Hip Xray w mod to severe R hip arthrosis. 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 Knee x-ray w lat tricompartmental arthrosis. CT pelvis w severe R hip OA, small R hip effusion, & mod distension of R iliopsoas bursa. Knee arthrocentesis consistent w pseudogout. R hip concerning for possible septic arthritis per ID, s/p hip arthrocentesis yesterday w IR.  - start methylprednisolone 30mg today then 15mg bid  - c/w mobic 7.5 mg qD  - f/u synovial fluid studies  - monitor off abx  - c/w Lidocaine patch and tylenol 650 mg q6h prn for pain  - PT eval in progress, initial recs for likely [facility]  - appreciate ID & rheum recs Diffuse joint pain not localized to one particular focus, particularly in R hip, also in R shoulder and L knee. Significant difficulty with LE knee flexion in setting of extensive edema in both lower extremities. Denies trauma. Concern for septic joint given fever w SIRS on admission, treated empirically with vanc/zosyn now D/C'd. Hip Xray w mod to severe R hip arthrosis. 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 Knee x-ray w lat tricompartmental arthrosis. CT pelvis w severe R hip OA, small R hip effusion, & mod distension of R iliopsoas bursa. Knee arthrocentesis consistent w pseudogout. R hip possibly concerning for possible septic arthritis per ID, s/p hip arthrocentesis w IR. Low concern for septic arthritis, D/C'd abx.  - c/w methylprednisolone 15mg bid  - c/w celecoxib 200 mg qD  - f/u synovial fluid studies  - monitor off abx  - c/w Lidocaine patch and tylenol 650 mg q6h prn for pain  - PT  - appreciate ID & rheum recs

## 2018-12-30 NOTE — PROGRESS NOTE ADULT - PROBLEM SELECTOR PLAN 2
Presenting with fever, leukocytosis and tachycardia presumably related to joint complaints as remaining infectious work-up negative at this time. Inflammatory markers elevated. Treated empirically with vanc/zosyn, now D/C'd. Remains afebrile since presentation.   - appreciate ID & rheum recs  - F/u BCx, UCx  - appreciate ID recs

## 2018-12-30 NOTE — PROGRESS NOTE ADULT - PROBLEM SELECTOR PLAN 7
BP well-controlled.   - c/w home lisinopril 10 qD  - c/w home lasix 40mg qD  - holding home amlodipine i/s/o LE edema BP well-controlled.  - c/w home lisinopril 10 qD  - c/w home lasix 40mg qD  - holding home amlodipine i/s/o LE edema

## 2018-12-30 NOTE — PROGRESS NOTE ADULT - PROBLEM SELECTOR PLAN 4
Possibly hypervolemic hyponatremia in setting of volume overload also compounded by pain. Currently at Na 132 from 128 yesterday.   - c/w home lasix 40 mg qD  - if Na does not improve, will send urine studies Possibly hypervolemic hyponatremia in setting of volume overload also compounded by pain. Improved to Na 133.  - c/w home lasix 40 mg qD  - monitor BMP, no need for urine studies given improvement

## 2018-12-30 NOTE — PROGRESS NOTE ADULT - PROBLEM SELECTOR PLAN 3
HFrEF, p/w significant lower extremity edema likely exacerbated by the amlodipine vs HFpEF. Reported 5 lb weight gain as per daughter, s/p 1 dose of lasix 40 IV in ED. H/O DVT in RLE. Vascular was consulted for cool LLE, no concern for needing intervention likely 2/2 CHF per vascular. LE doppler U/S negative for DVT  - c/w home lasix 40 mg PO qD  - c/w home xarelto 20 mg qD  - start fluid restriction 1L  - compression stockings  - strict I/Os  - f/u TTE HFrEF, p/w significant lower extremity edema likely exacerbated by the amlodipine vs HFpEF. Reported 5 lb weight gain as per daughter, s/p 1 dose of lasix 40 IV in ED. H/O DVT in RLE. Vascular was consulted for cool LLE, no concern for needing intervention likely 2/2 CHF per vascular. LE doppler U/S negative for DVT  - c/w home lasix 40 mg PO qD  - c/w home xarelto 20 mg qD  - c/w fluid restriction 1L  - compression stockings  - strict I/Os

## 2018-12-30 NOTE — PROGRESS NOTE ADULT - ASSESSMENT
89F with OA, mild dementia (A&Ox2-3), HCV (in remission), HTN, HFpEF (Grade I diastolic dysfunction), HTN, hypothyroidism, hx of syncope (trifascicular block RBBB/LAFB) s/p ILR, DVT on Xarelto presenting with right hip and bilateral knee pain, swelling, and inability to ambulate meeting SIRS criteria on admission, admitted with concern for septic arthritis vs gout vs pseudogout vs OA flare. S/p R hip arthrocentesis as well as bilat knee arthrocentesis consistent w pseudogout. Planning for discharge to rehab.

## 2018-12-30 NOTE — PROGRESS NOTE ADULT - PROBLEM SELECTOR PLAN 6
Hx of syncope in 2017 with implantable loop recorder placed. Known RBBB with LAFB, follows with Dr. Leija from cardiology. EKG stable since prior.  - avoid AV isabel blocking agents  - call EP for interrogation of ILR Hx of syncope in 2017 with implantable loop recorder placed. Known RBBB with LAFB, follows with Dr. Leija from cardiology. EKG stable since prior.  - avoid AV isabel blocking agents  - f/u EP for interrogation of ILR

## 2018-12-30 NOTE — PROGRESS NOTE ADULT - PROBLEM SELECTOR PLAN 9
- DVT ppx: Xarelto  - Constipation: c/w senna, monitor BMs  - GOC: full code (confirmed w pt)  - Dispo: [facility]

## 2018-12-31 ENCOUNTER — TRANSCRIPTION ENCOUNTER (OUTPATIENT)
Age: 83
End: 2018-12-31

## 2018-12-31 VITALS
RESPIRATION RATE: 18 BRPM | DIASTOLIC BLOOD PRESSURE: 74 MMHG | TEMPERATURE: 98 F | OXYGEN SATURATION: 96 % | HEART RATE: 84 BPM | SYSTOLIC BLOOD PRESSURE: 127 MMHG

## 2018-12-31 LAB
CULTURE RESULTS: SIGNIFICANT CHANGE UP
CULTURE RESULTS: SIGNIFICANT CHANGE UP
SPECIMEN SOURCE: SIGNIFICANT CHANGE UP
SPECIMEN SOURCE: SIGNIFICANT CHANGE UP
T3FREE SERPL-MCNC: 1.83 PG/ML — SIGNIFICANT CHANGE UP (ref 1.8–4.6)
T4 FREE SERPL-MCNC: 1.4 NG/DL — SIGNIFICANT CHANGE UP (ref 0.9–1.8)
TSH SERPL-MCNC: 0.59 UIU/ML — SIGNIFICANT CHANGE UP (ref 0.27–4.2)

## 2018-12-31 PROCEDURE — 96368 THER/DIAG CONCURRENT INF: CPT

## 2018-12-31 PROCEDURE — 96375 TX/PRO/DX INJ NEW DRUG ADDON: CPT | Mod: XU

## 2018-12-31 PROCEDURE — 84443 ASSAY THYROID STIM HORMONE: CPT

## 2018-12-31 PROCEDURE — 85610 PROTHROMBIN TIME: CPT

## 2018-12-31 PROCEDURE — 93005 ELECTROCARDIOGRAM TRACING: CPT | Mod: XU

## 2018-12-31 PROCEDURE — 97162 PT EVAL MOD COMPLEX 30 MIN: CPT

## 2018-12-31 PROCEDURE — 97116 GAIT TRAINING THERAPY: CPT

## 2018-12-31 PROCEDURE — 87798 DETECT AGENT NOS DNA AMP: CPT

## 2018-12-31 PROCEDURE — 87581 M.PNEUMON DNA AMP PROBE: CPT

## 2018-12-31 PROCEDURE — 73502 X-RAY EXAM HIP UNI 2-3 VIEWS: CPT

## 2018-12-31 PROCEDURE — 84439 ASSAY OF FREE THYROXINE: CPT

## 2018-12-31 PROCEDURE — 87486 CHLMYD PNEUM DNA AMP PROBE: CPT

## 2018-12-31 PROCEDURE — 80048 BASIC METABOLIC PNL TOTAL CA: CPT

## 2018-12-31 PROCEDURE — 96366 THER/PROPH/DIAG IV INF ADDON: CPT

## 2018-12-31 PROCEDURE — 71045 X-RAY EXAM CHEST 1 VIEW: CPT

## 2018-12-31 PROCEDURE — 89051 BODY FLUID CELL COUNT: CPT

## 2018-12-31 PROCEDURE — 87040 BLOOD CULTURE FOR BACTERIA: CPT

## 2018-12-31 PROCEDURE — 85027 COMPLETE CBC AUTOMATED: CPT

## 2018-12-31 PROCEDURE — 87205 SMEAR GRAM STAIN: CPT

## 2018-12-31 PROCEDURE — 86140 C-REACTIVE PROTEIN: CPT

## 2018-12-31 PROCEDURE — 89060 EXAM SYNOVIAL FLUID CRYSTALS: CPT

## 2018-12-31 PROCEDURE — 51701 INSERT BLADDER CATHETER: CPT

## 2018-12-31 PROCEDURE — 20610 DRAIN/INJ JOINT/BURSA W/O US: CPT

## 2018-12-31 PROCEDURE — 99285 EMERGENCY DEPT VISIT HI MDM: CPT | Mod: 25

## 2018-12-31 PROCEDURE — 96365 THER/PROPH/DIAG IV INF INIT: CPT | Mod: XU

## 2018-12-31 PROCEDURE — 85652 RBC SED RATE AUTOMATED: CPT

## 2018-12-31 PROCEDURE — 80202 ASSAY OF VANCOMYCIN: CPT

## 2018-12-31 PROCEDURE — 87070 CULTURE OTHR SPECIMN AEROBIC: CPT

## 2018-12-31 PROCEDURE — 93970 EXTREMITY STUDY: CPT

## 2018-12-31 PROCEDURE — 96376 TX/PRO/DX INJ SAME DRUG ADON: CPT | Mod: XU

## 2018-12-31 PROCEDURE — 90662 IIV NO PRSV INCREASED AG IM: CPT

## 2018-12-31 PROCEDURE — 87075 CULTR BACTERIA EXCEPT BLOOD: CPT

## 2018-12-31 PROCEDURE — 73560 X-RAY EXAM OF KNEE 1 OR 2: CPT

## 2018-12-31 PROCEDURE — 85730 THROMBOPLASTIN TIME PARTIAL: CPT

## 2018-12-31 PROCEDURE — 80053 COMPREHEN METABOLIC PANEL: CPT

## 2018-12-31 PROCEDURE — 77002 NEEDLE LOCALIZATION BY XRAY: CPT

## 2018-12-31 PROCEDURE — 84100 ASSAY OF PHOSPHORUS: CPT

## 2018-12-31 PROCEDURE — 97530 THERAPEUTIC ACTIVITIES: CPT

## 2018-12-31 PROCEDURE — 72193 CT PELVIS W/DYE: CPT

## 2018-12-31 PROCEDURE — 81001 URINALYSIS AUTO W/SCOPE: CPT

## 2018-12-31 PROCEDURE — 83735 ASSAY OF MAGNESIUM: CPT

## 2018-12-31 PROCEDURE — 87633 RESP VIRUS 12-25 TARGETS: CPT

## 2018-12-31 PROCEDURE — 84481 FREE ASSAY (FT-3): CPT

## 2018-12-31 PROCEDURE — 87086 URINE CULTURE/COLONY COUNT: CPT

## 2018-12-31 PROCEDURE — 73110 X-RAY EXAM OF WRIST: CPT

## 2018-12-31 RX ORDER — LIDOCAINE 4 G/100G
1 CREAM TOPICAL
Qty: 0 | Refills: 0 | DISCHARGE
Start: 2018-12-31

## 2018-12-31 RX ORDER — INFLUENZA VIRUS VACCINE 15; 15; 15; 15 UG/.5ML; UG/.5ML; UG/.5ML; UG/.5ML
0.5 SUSPENSION INTRAMUSCULAR ONCE
Qty: 0 | Refills: 0 | Status: COMPLETED | OUTPATIENT
Start: 2018-12-31 | End: 2018-12-31

## 2018-12-31 RX ORDER — ACETAMINOPHEN 500 MG
20.31 TABLET ORAL
Qty: 0 | Refills: 0 | DISCHARGE
Start: 2018-12-31

## 2018-12-31 RX ORDER — SENNA PLUS 8.6 MG/1
1 TABLET ORAL
Qty: 0 | Refills: 0 | DISCHARGE
Start: 2018-12-31

## 2018-12-31 RX ADMIN — INFLUENZA VIRUS VACCINE 0.5 MILLILITER(S): 15; 15; 15; 15 SUSPENSION INTRAMUSCULAR at 16:24

## 2018-12-31 RX ADMIN — LIDOCAINE 1 PATCH: 4 CREAM TOPICAL at 12:38

## 2018-12-31 RX ADMIN — RIVAROXABAN 20 MILLIGRAM(S): KIT at 16:22

## 2018-12-31 RX ADMIN — Medication 40 MILLIGRAM(S): at 06:27

## 2018-12-31 RX ADMIN — Medication 15 MILLIGRAM(S): at 16:23

## 2018-12-31 RX ADMIN — LISINOPRIL 10 MILLIGRAM(S): 2.5 TABLET ORAL at 06:26

## 2018-12-31 RX ADMIN — Medication 650 MILLIGRAM(S): at 10:09

## 2018-12-31 RX ADMIN — LIDOCAINE 1 PATCH: 4 CREAM TOPICAL at 01:07

## 2018-12-31 RX ADMIN — LIOTHYRONINE SODIUM 5 MICROGRAM(S): 25 TABLET ORAL at 06:26

## 2018-12-31 RX ADMIN — Medication 15 MILLIGRAM(S): at 06:27

## 2018-12-31 NOTE — PROGRESS NOTE ADULT - PROBLEM SELECTOR PLAN 6
Hx of syncope in 2017 with implantable loop recorder placed. Known RBBB with LAFB, follows with Dr. Leija from cardiology. EKG stable since prior.  - avoid AV isabel blocking agents  - f/u EP for interrogation of ILR Hx of syncope in 2017 with implantable loop recorder placed. Known RBBB with LAFB, follows with Dr. Leija from cardiology. EKG stable since prior. No acute indication for ILR interrogation.  - avoid AV isabel blocking agents

## 2018-12-31 NOTE — PROGRESS NOTE ADULT - PROBLEM SELECTOR PLAN 3
HFrEF, p/w significant lower extremity edema likely exacerbated by the amlodipine vs HFpEF. Reported 5 lb weight gain as per daughter, s/p 1 dose of lasix 40 IV in ED. H/O DVT in RLE. Vascular was consulted for cool LLE, no concern for needing intervention likely 2/2 CHF per vascular. LE doppler U/S negative for DVT  - c/w home lasix 40 mg PO qD  - c/w home xarelto 20 mg qD  - c/w fluid restriction 1L  - compression stockings  - strict I/Os

## 2018-12-31 NOTE — PROGRESS NOTE ADULT - SUBJECTIVE AND OBJECTIVE BOX
INTERVAL EVENTS / SUBJECTIVE:    ***    OBJECTIVE:  Vital Signs Last 24 Hrs  T(C): 36.5 (31 Dec 2018 06:22), Max: 36.5 (31 Dec 2018 06:22)  T(F): 97.7 (31 Dec 2018 06:22), Max: 97.7 (31 Dec 2018 06:22)  HR: 55 (31 Dec 2018 06:22) (55 - 67)  BP: 140/71 (31 Dec 2018 06:22) (124/77 - 140/71)  BP(mean): --  RR: 18 (31 Dec 2018 06:22) (18 - 18)  SpO2: 99% (31 Dec 2018 06:22) (99% - 100%)    12-30 @ 07:01  -  12-31 @ 07:00  --------------------------------------------------------  IN: 1080 mL / OUT: 300 mL / NET: 780 mL      PHYSICAL EXAM:  General: Pleasant elderly woman, NAD  HEENT:  EOMI, PERRL, conjunctiva and sclera clear, normal oropharynx  Neck: Supple, no JVD, normal thyroid  Chest/Lungs: CTA B/L, no rales, rhonchi, rub or wheezing  Heart: RRR, normal S1, S2, no murmurs or gallops  Abdomen: Soft, nondistended, NTTP, normoactive bowel sounds  Extremities: Peripheral pulses 2+ B/L, non-pitting edema of B/L LE with decreased ROM at R hip join and B/L knees now improving  Skin: Warm, well-perfused, edema of R hip and legs B/L without appreciable erythema  Neurological: A&Ox3 but some impaired memory, otherwise no focal deficits      HOSPITAL MEDICATIONS:  acetaminophen    Suspension .. 650 milliGRAM(s) Oral every 6 hours PRN Temp greater or equal to 38C (100.4F), Mild Pain (1 - 3), Moderate Pain (4 - 6), Severe Pain (7 - 10)  furosemide    Tablet 40 milliGRAM(s) Oral daily  lidocaine   Patch 1 Patch Transdermal daily  lidocaine   Patch 1 Patch Transdermal daily  liothyronine 5 MICROGram(s) Oral daily  lisinopril 10 milliGRAM(s) Oral daily  methylPREDNISolone sodium succinate Injectable 15 milliGRAM(s) IV Push two times a day  rivaroxaban 20 milliGRAM(s) Oral every 24 hours  senna 1 Tablet(s) Oral daily      LABS:                        12.9   7.18  )-----------( 331      ( 30 Dec 2018 08:29 )             38.4     Hgb Trend: 12.9<--, 12.7<--, 11.9<--, 11.7<--, 12.3<--  12-30    133<L>  |  93<L>  |  19  ----------------------------<  143<H>  4.7   |  26  |  0.58    Ca    10.0      30 Dec 2018 07:21  Phos  3.4     12-30  Mg     2.2     12-30    Creatinine Trend: 0.58<--, 0.55<--, 0.64<--, 0.59<--, 0.59<--, 0.69<--      MICROBIOLOGY:    Culture - Body Fluid with Gram Stain (collected 12-28-18 @ 12:44)  Source: .Body Fluid Joint Fluid - rt hip  Gram Stain (12-28-18 @ 13:23):    Rare polymorphonuclear leukocytes seen per low power field    No organisms seen per oil power field    Culture - Body Fluid with Gram Stain (collected 12-28-18 @ 12:44)  Source: .Body Fluid Joint Fluid - rt hip  Gram Stain (12-28-18 @ 13:23):    Rare polymorphonuclear leukocytes seen per low power field    No organisms seen per oil power field      IMAGING:     CT Pelvis Bony Only w/ IV Cont (12.26.18 @ 20:46)  1.  Severe right hip osteoarthritis.  2.  Small right hip joint effusion and moderate distention of the right   iliopsoas bursa. Findings likely reflect inflammatory change secondary to   the severe right hip arthritis. Superimposed infection is not excluded.   If there is concern for septic arthritis, correlation with hip aspiration   is recommended.  3.  Additional degenerative changes as detailed above.    Xray Knee 1 or 2 Views, Bilateral (12.26.18 @ 20:27)  Lateral tricompartmental arthrosis, most pronounced in the lateral   compartment where there is associated remodeling of the lateral tibial   plateau. Large joint effusion on the left. Small joint effusion on the   right. Please note that joint fluid analysis would be necessary to   further evaluate for septic joint.    WRIST COMPLETE RIGHT-MIN 3 VIEWS    12/26/2018      Generalized bone demineralization limits osseous evaluation. There is chondrocalcinosis at the triangular fibrocartilage. There is severe radiocarpal joint space narrowing with remodeling of the distal radius consistent with CPPD arthropathy. There is widening at the scapholunate interval consistent with scapholunate ligament tearing. There is minimal migration of the capitate proximally consistent with scapholunate advanced collapse. There is mild first carpometacarpal osteoarthrosis.    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      XR HIP WITH PELV 2-3V RT    12/25/2018    No acute fracture or dislocation. Mild spurring and severe axial joint space narrowing at the right hip consistent with arthrosis. Calcified fibroid. Lower lumbar spondylosis.  IMPRESSION: No acute fracture or dislocation. Moderate to severe right hip arthrosis.      XR CHEST PORTABLE URGENT 1V  12/25/2018    Loop recorder.  No focal consolidation.  There is no pneumothorax. There are no pleural effusions.   The cardiomediastinal silhouette cannot be adequately assessed on this projection.  IMPRESSION: Clear lungs. INTERVAL EVENTS / SUBJECTIVE:    Continued with methylprednisolone 15mg bid. No acute events overnight. Pt seen and examined at bedside this am. Pt reports joint pain resolved, although still with R-sided weakness. Denies chest pain, SOB, fever, chills. Planning to transition to PO steroids pending rheum recs.    OBJECTIVE:  Vital Signs Last 24 Hrs  T(C): 36.5 (31 Dec 2018 06:22), Max: 36.5 (31 Dec 2018 06:22)  T(F): 97.7 (31 Dec 2018 06:22), Max: 97.7 (31 Dec 2018 06:22)  HR: 55 (31 Dec 2018 06:22) (55 - 67)  BP: 140/71 (31 Dec 2018 06:22) (124/77 - 140/71)  BP(mean): --  RR: 18 (31 Dec 2018 06:22) (18 - 18)  SpO2: 99% (31 Dec 2018 06:22) (99% - 100%)    12-30 @ 07:01  -  12-31 @ 07:00  --------------------------------------------------------  IN: 1080 mL / OUT: 300 mL / NET: 780 mL      PHYSICAL EXAM:  General: Pleasant elderly woman, NAD  HEENT:  EOMI, PERRL, conjunctiva and sclera clear, normal oropharynx  Neck: Supple, no JVD, normal thyroid  Chest/Lungs: CTA B/L, no rales, rhonchi, rub or wheezing  Heart: RRR, normal S1, S2, no murmurs or gallops  Abdomen: Soft, nondistended, NTTP, normoactive bowel sounds  Extremities: Peripheral pulses 2+ B/L, non-pitting edema of B/L LE with decreased ROM at R hip join and B/L knees now improving  Skin: Warm, well-perfused, edema of R hip and legs B/L without appreciable erythema  Neurological: A&Ox3 but some impaired memory, otherwise no focal deficits      HOSPITAL MEDICATIONS:  acetaminophen    Suspension .. 650 milliGRAM(s) Oral every 6 hours PRN Temp greater or equal to 38C (100.4F), Mild Pain (1 - 3), Moderate Pain (4 - 6), Severe Pain (7 - 10)  furosemide    Tablet 40 milliGRAM(s) Oral daily  lidocaine   Patch 1 Patch Transdermal daily  lidocaine   Patch 1 Patch Transdermal daily  liothyronine 5 MICROGram(s) Oral daily  lisinopril 10 milliGRAM(s) Oral daily  methylPREDNISolone sodium succinate Injectable 15 milliGRAM(s) IV Push two times a day  rivaroxaban 20 milliGRAM(s) Oral every 24 hours  senna 1 Tablet(s) Oral daily      LABS:                        12.9   7.18  )-----------( 331      ( 30 Dec 2018 08:29 )             38.4     Hgb Trend: 12.9<--, 12.7<--, 11.9<--, 11.7<--, 12.3<--  12-30    133<L>  |  93<L>  |  19  ----------------------------<  143<H>  4.7   |  26  |  0.58    Ca    10.0      30 Dec 2018 07:21  Phos  3.4     12-30  Mg     2.2     12-30    Creatinine Trend: 0.58<--, 0.55<--, 0.64<--, 0.59<--, 0.59<--, 0.69<--      MICROBIOLOGY:    Culture - Body Fluid with Gram Stain (collected 12-28-18 @ 12:44)  Source: .Body Fluid Joint Fluid - rt hip  Gram Stain (12-28-18 @ 13:23):    Rare polymorphonuclear leukocytes seen per low power field    No organisms seen per oil power field    Culture - Body Fluid with Gram Stain (collected 12-28-18 @ 12:44)  Source: .Body Fluid Joint Fluid - rt hip  Gram Stain (12-28-18 @ 13:23):    Rare polymorphonuclear leukocytes seen per low power field    No organisms seen per oil power field      IMAGING:     CT Pelvis Bony Only w/ IV Cont (12.26.18 @ 20:46)  1.  Severe right hip osteoarthritis.  2.  Small right hip joint effusion and moderate distention of the right   iliopsoas bursa. Findings likely reflect inflammatory change secondary to   the severe right hip arthritis. Superimposed infection is not excluded.   If there is concern for septic arthritis, correlation with hip aspiration   is recommended.  3.  Additional degenerative changes as detailed above.    Xray Knee 1 or 2 Views, Bilateral (12.26.18 @ 20:27)  Lateral tricompartmental arthrosis, most pronounced in the lateral   compartment where there is associated remodeling of the lateral tibial   plateau. Large joint effusion on the left. Small joint effusion on the   right. Please note that joint fluid analysis would be necessary to   further evaluate for septic joint.    WRIST COMPLETE RIGHT-MIN 3 VIEWS    12/26/2018      Generalized bone demineralization limits osseous evaluation. There is chondrocalcinosis at the triangular fibrocartilage. There is severe radiocarpal joint space narrowing with remodeling of the distal radius consistent with CPPD arthropathy. There is widening at the scapholunate interval consistent with scapholunate ligament tearing. There is minimal migration of the capitate proximally consistent with scapholunate advanced collapse. There is mild first carpometacarpal osteoarthrosis.    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      XR HIP WITH PELV 2-3V RT    12/25/2018    No acute fracture or dislocation. Mild spurring and severe axial joint space narrowing at the right hip consistent with arthrosis. Calcified fibroid. Lower lumbar spondylosis.  IMPRESSION: No acute fracture or dislocation. Moderate to severe right hip arthrosis.      XR CHEST PORTABLE URGENT 1V  12/25/2018    Loop recorder.  No focal consolidation.  There is no pneumothorax. There are no pleural effusions.   The cardiomediastinal silhouette cannot be adequately assessed on this projection.  IMPRESSION: Clear lungs.

## 2018-12-31 NOTE — DISCHARGE NOTE ADULT - CARE PROVIDERS DIRECT ADDRESSES
,[email or web address]

## 2018-12-31 NOTE — PROGRESS NOTE ADULT - SUBJECTIVE AND OBJECTIVE BOX
JAQUAN KAM  [id number]    INTERVAL HPI/OVERNIGHT EVENTS:  Since on prednisone feels dramatically better. Joint pain improved , decreased swelling of right knee. Able to ambulate few steps in the room      MEDICATIONS  (STANDING):  furosemide    Tablet 40 milliGRAM(s) Oral daily  lidocaine   Patch 1 Patch Transdermal daily  lidocaine   Patch 1 Patch Transdermal daily  liothyronine 5 MICROGram(s) Oral daily  lisinopril 10 milliGRAM(s) Oral daily  methylPREDNISolone sodium succinate Injectable 15 milliGRAM(s) IV Push two times a day  rivaroxaban 20 milliGRAM(s) Oral every 24 hours  senna 1 Tablet(s) Oral daily    MEDICATIONS  (PRN):  acetaminophen    Suspension .. 650 milliGRAM(s) Oral every 6 hours PRN Temp greater or equal to 38C (100.4F), Mild Pain (1 - 3), Moderate Pain (4 - 6), Severe Pain (7 - 10)      Allergies    No Known Allergies    Intolerances        Review of Systems:   General: No fevers/chills, no fatigue  HEENT: No blurry vision, dysphagia, or odynophagia  CVS: No CP/palpitations  Resp: No SOB/wheezing  GI: No N/V/C/D/abdominal pain  MSK:  joint pain improved  Skin: No new rashes  Neuro: No headaches      Vital Signs Last 24 Hrs  T(C): 36.5 (31 Dec 2018 14:09), Max: 36.5 (31 Dec 2018 06:22)  T(F): 97.7 (31 Dec 2018 14:09), Max: 97.7 (31 Dec 2018 06:22)  HR: 62 (31 Dec 2018 14:09) (55 - 83)  BP: 144/86 (31 Dec 2018 14:09) (118/72 - 147/69)  BP(mean): --  RR: 18 (31 Dec 2018 14:09) (18 - 18)  SpO2: 98% (31 Dec 2018 14:09) (92% - 100%)    Physical Exam:  General: NAD  HEENT: EOMI, MMM  Cardio: +S1/S2, RRR  Resp: CTA b/l  GI: +BS, soft, NT/ND  MSK:  mild right knee effusion, nontender, Good ROM in hip now    Neuro: AAOx3  Psych: wnl    LABS:                        12.9   7.18  )-----------( 331      ( 30 Dec 2018 08:29 )             38.4     12-30    133<L>  |  93<L>  |  19  ----------------------------<  143<H>  4.7   |  26  |  0.58    Ca    10.0      30 Dec 2018 07:21  Phos  3.4     12-30  Mg     2.2     12-30    Synovial Crystals Id: Crystals Present Intracellular and Extracellular CPPD (calcium pyrophosphate) present. (12.27.18 @ 18:25)        RADIOLOGY & ADDITIONAL TESTS:

## 2018-12-31 NOTE — PROGRESS NOTE ADULT - PROBLEM SELECTOR PLAN 4
Possibly hypervolemic hyponatremia in setting of volume overload also compounded by pain. Improved to Na 133.  - c/w home lasix 40 mg qD  - monitor BMP, no need for urine studies given improvement

## 2018-12-31 NOTE — PROGRESS NOTE ADULT - PROBLEM SELECTOR PLAN 1
Diffuse joint pain not localized to one particular focus, particularly in R hip, also in R shoulder and L knee. Significant difficulty with LE knee flexion in setting of extensive edema in both lower extremities. Denies trauma. Concern for septic joint given fever w SIRS on admission, treated empirically with vanc/zosyn now D/C'd. Hip Xray w mod to severe R hip arthrosis. 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 Knee x-ray w lat tricompartmental arthrosis. CT pelvis w severe R hip OA, small R hip effusion, & mod distension of R iliopsoas bursa. Knee arthrocentesis consistent w pseudogout. R hip possibly concerning for possible septic arthritis per ID, s/p hip arthrocentesis w IR. Low concern for septic arthritis, D/C'd abx.  - c/w methylprednisolone 15mg bid  - c/w celecoxib 200 mg qD  - f/u synovial fluid studies  - monitor off abx  - c/w Lidocaine patch and tylenol 650 mg q6h prn for pain  - PT  - appreciate ID & rheum recs Diffuse joint pain not localized to one particular focus, particularly in R hip, also in R shoulder and L knee. Significant difficulty with LE knee flexion in setting of extensive edema in both lower extremities. Denies trauma. Concern for septic joint given fever w SIRS on admission, treated empirically with vanc/zosyn now D/C'd. Hip Xray w mod to severe R hip arthrosis. 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 Knee x-ray w lat tricompartmental arthrosis. CT pelvis w severe R hip OA, small R hip effusion, & mod distension of R iliopsoas bursa. Knee arthrocentesis consistent w pseudogout. R hip possibly concerning for possible septic arthritis per ID, s/p hip arthrocentesis w IR. Low concern for septic arthritis, D/C'd abx.  - c/w methylprednisolone 15mg bid, transition to PO steroids pending rheum recs  - c/w celecoxib 200 mg qD  - f/u synovial fluid studies  - monitor off abx  - c/w Lidocaine patch and tylenol 650 mg q6h prn for pain  - PT  - appreciate ID & rheum recs

## 2018-12-31 NOTE — PROGRESS NOTE ADULT - PROBLEM SELECTOR PLAN 2
Presenting with fever, leukocytosis and tachycardia presumably related to joint complaints as remaining infectious work-up negative at this time. Inflammatory markers elevated. Treated empirically with vanc/zosyn, now D/C'd. Remains afebrile since presentation.   - appreciate ID & rheum recs  - F/u BCx, UCx  - appreciate ID recs Presenting with fever, leukocytosis and tachycardia presumably related to joint complaints as remaining infectious work-up negative at this time. Inflammatory markers elevated. Treated empirically with vanc/zosyn, now D/C'd. Remains afebrile since presentation.   - F/u BCx, UCx  - appreciate ID recs

## 2018-12-31 NOTE — DISCHARGE NOTE ADULT - ADDITIONAL INSTRUCTIONS
1. Please call [phone] to schedule an appointment with your rheumatologist within 1 week of leaving rehab.     2. Please call [phone] to schedule an appointment with your primary care doctor within 1 week of leaving rehab. Please discuss the results of your thyroid studies with your doctor so that your Levothyroxine dose may be adjusted if needed.     3. Please call [phone] to make an appointment with your cardiologist 1 to 2 weeks after discharge for follow-up. 1. Please call [phone] to schedule an appointment with your rheumatologist within 1 week of leaving rehab.     2. Please take the oral steroid, Prednisone, daily as prescribed. You will complete your course of steroids on January 20th, 2019.     3. Please call [phone] to schedule an appointment with your primary care doctor within 1 week of leaving rehab. Please discuss the results of your thyroid studies with your doctor so that your Levothyroxine dose may be adjusted if needed.     4. Please call [phone] to make an appointment with your cardiologist 1 to 2 weeks after discharge for follow-up.

## 2018-12-31 NOTE — PROGRESS NOTE ADULT - PROVIDER SPECIALTY LIST ADULT
Infectious Disease
Internal Medicine
Rheumatology
Internal Medicine

## 2018-12-31 NOTE — PROGRESS NOTE ADULT - PROBLEM SELECTOR PLAN 7
BP well-controlled.  - c/w home lisinopril 10 qD  - c/w home lasix 40mg qD  - holding home amlodipine i/s/o LE edema BP well-controlled  - c/w home lisinopril 10 qD  - c/w home lasix 40mg qD  - holding home amlodipine i/s/o LE edema

## 2018-12-31 NOTE — DISCHARGE NOTE ADULT - HOSPITAL COURSE
89 year old female with hx of OA, mild dementia (A&Ox3), HCV (in remission), HTN, HFpEF/grade 1 DHF, HTN, hypothyroidism, hx of syncope (trifascicular block RBBB/LAFB) s/p ILR, DVT (?July 2018) on xarelto, who presents with increased joint pain for the past several days.     According to her daughter who is at bedside, patient has been complaining of pain for several months. Received a cortisone shot in R-hip on 12/13 with some improvement. Has been taking vicodin as well however noticed the pain get significantly worse for the past several days. At baseline: able to walk with walker with assist (dating back to 11/2018), however has gotten to the point where she will not even stand up to go to the bathroom because of the pain. Daughter had called 911 because she was not sure what else she can do in terms of pain management. As per daughter, no recent trauma, falls or missteps. States that she goes intermittently to see Dr. Alex Hoff for joint fluid aspiration. Also of note, patient has been having worsening leg swelling. Was supposed to be on aldactone however discontinued by daughter given concern that she was afraid the medication was making her drowsy.     In the ED;   Temp 102.5 -102 -151/75-86 RR 16-18 O2:   Given Tylenol 1g x1, Morphine 2mg x 1, 4mg x 1, Zosyn x 1, Vanc x 1 and NS 1L bolus HPI:  89 year old female with hx of OA, mild dementia (A&Ox3), HCV (in remission), HTN, HFpEF/grade 1 DHF, HTN, hypothyroidism, hx of syncope (trifascicular block RBBB/LAFB) s/p ILR, DVT (?July 2018) on xarelto, who presents with increased joint pain for the past several days.     According to her daughter who is at bedside, patient has been complaining of pain for several months. Received a cortisone shot in R-hip on 12/13 with some improvement. Has been taking vicodin as well however noticed the pain get significantly worse for the past several days. At baseline: able to walk with walker with assist (dating back to 11/2018), however has gotten to the point where she will not even stand up to go to the bathroom because of the pain. Daughter had called 911 because she was not sure what else she can do in terms of pain management. As per daughter, no recent trauma, falls or missteps. States that she goes intermittently to see Dr. Alex Hoff for joint fluid aspiration. Also of note, patient has been having worsening leg swelling. Was supposed to be on aldactone however discontinued by daughter given concern that she was afraid the medication was making her drowsy.     In the ED;   Temp 102.5 -102 -151/75-86 RR 16-18 O2:   Given Tylenol 1g x1, Morphine 2mg x 1, 4mg x 1, Zosyn x 1, Vanc x 1 and NS 1L bolus     Hospital course: Underwent arthrocentesis of both knees and hip, found to have CPPD crystals in both knees consistent with pseudogout. Pt was initially treated empirically for possible septic joint with vanc/zosyn, remained afebrile with no leukocytosis and antibiotics were D/C'd. Pt's joint pain resolved with methylprednisolone, she remained hemodynamically stable, and she was discharged to rehab on 12/31/18 with plans to taper steroids further at rehab. Pt was continued on rivaroxaban for recent DVT, underwent repeat LE doppler for reported JOHNSON which was negative for DVT. Of note, thyroid testing was also sent out to evaluate as possible etiology of pseudogout, results still pending. Pt to follow up with outpt rheumatology, cardiology, and PCP. HPI:  89 year old female with hx of OA, mild dementia (A&Ox3), HCV (in remission), HTN, HFpEF/grade 1 DHF, HTN, hypothyroidism, hx of syncope (trifascicular block RBBB/LAFB) s/p ILR, DVT (?July 2018) on xarelto, who presents with increased joint pain for the past several days.     According to her daughter who is at bedside, patient has been complaining of pain for several months. Received a cortisone shot in R-hip on 12/13 with some improvement. Has been taking vicodin as well however noticed the pain get significantly worse for the past several days. At baseline: able to walk with walker with assist (dating back to 11/2018), however has gotten to the point where she will not even stand up to go to the bathroom because of the pain. Daughter had called 911 because she was not sure what else she can do in terms of pain management. As per daughter, no recent trauma, falls or missteps. States that she goes intermittently to see Dr. Alex Hoff for joint fluid aspiration. Also of note, patient has been having worsening leg swelling. Was supposed to be on aldactone however discontinued by daughter given concern that she was afraid the medication was making her drowsy.     In the ED;   Temp 102.5 -102 -151/75-86 RR 16-18 O2:   Given Tylenol 1g x1, Morphine 2mg x 1, 4mg x 1, Zosyn x 1, Vanc x 1 and NS 1L bolus     Hospital course:   Underwent arthrocentesis of both knees and hip, found to have CPPD crystals in both knees consistent with pseudogout. Pt was initially treated empirically for possible septic joint with vanc/zosyn, remained afebrile with no leukocytosis and antibiotics were D/C'd. Pt's joint pain resolved with methylprednisolone, she remained hemodynamically stable, and she was discharged to rehab on 12/31/18 with oral steroid taper in place. Pt was continued on rivaroxaban for recent DVT, underwent repeat LE doppler for reported JOHNSON which was negative for DVT. Of note, thyroid testing was also sent out to evaluate as possible etiology of pseudogout, results still pending. Pt to follow up with outpt rheumatology, cardiology, and PCP.

## 2018-12-31 NOTE — DISCHARGE NOTE ADULT - PLAN OF CARE
Treatment You were admitted for severe joint pain. You underwent arthrocentesis, meaning drainage of joint fluid, from your hip as well as both of your knees. You were initially treated with antibiotics for possible joint infection, however there was no evidence of infection on your blood cultures or joint fluid cultures. You remained without fever, and antibiotics were discontinued. The joint fluid drained from your knees revealed microscopic crystals consistent with Pseudogout. You were treated with steroids, and your pain improved. Please continue to take steroids as prescribed. Your steroids will be tapered at rehab. Please follow up with rheumatology for further management and to obtain test results that have not yet resulted. Please continue lisinopril, amlodipine, and lasix for your hypertension and heart failure and follow up with your cardiologist for further management. Please continue rivaroxaban for your recent deep vein thrombosis. You underwent repeat doppler ultrasound of your legs to evaluate your leg swelling which was negative for clot. Please follow up with your primary care physician for further management. You were admitted for severe joint pain. You underwent arthrocentesis, meaning drainage of joint fluid, from your hip as well as both of your knees. You were initially treated with antibiotics for possible joint infection, however there was no evidence of infection on your blood cultures or joint fluid cultures. You remained without fever, and antibiotics were discontinued. The joint fluid drained from your knees revealed microscopic crystals consistent with Pseudogout. You were treated with steroids, and your pain improved. Please continue to take steroids as prescribed. Your intravenous (IV) steroids will be tapered at rehab. Please follow up with rheumatology for further management and to obtain test results that have not yet resulted, including your thyroid hormone levels (TSH, free T3/T4) as thyroid abnormalities can contribute to Pseudogout. Please continue lisinopril, amlodipine, and lasix for your hypertension and heart failure and follow up with your cardiologist for further management when you return home from rehab. You were admitted for severe joint pain. You underwent arthrocentesis, meaning drainage of joint fluid, from your hip as well as both of your knees. You were initially treated with antibiotics for possible joint infection, however there was no evidence of infection on your blood cultures or joint fluid cultures. You remained without fever, and antibiotics were discontinued. The joint fluid drained from your knees revealed microscopic crystals consistent with Pseudogout. You were treated with steroids, and your pain improved. Please continue to take steroids as prescribed. You were given intravenous (IV) steroids in the hospital and will continue with oral steroids (Prednisone). You will be on decreasing doses of Prednisone every 5 days (see instructions on Prednisone bottles). Please follow up with rheumatology for further management and to obtain test results that have not yet resulted, including your thyroid hormone levels (TSH, free T3/T4) as thyroid abnormalities can contribute to Pseudogout.

## 2018-12-31 NOTE — DISCHARGE NOTE ADULT - OTHER SIGNIFICANT FINDINGS
< from: CT Pelvis Bony Only w/ IV Cont (12.26.18 @ 20:46) >  IMPRESSION:  1.  Severe right hip osteoarthritis.  2.  Small right hip joint effusion and moderate distention of the right   iliopsoas bursa. Findings likely reflect inflammatory change secondary to   the severe right hip arthritis. Superimposed infection is not excluded.   If there is concern for septic arthritis, correlation with hip aspiration   is recommended.  3.  Additional degenerative changes as detailed above.    < from: Xray Knee 1 or 2 Views, Bilateral (12.26.18 @ 20:27) >  IMPRESSION:   Lateral tricompartmental arthrosis, most pronounced in the lateral   compartment where there is associated remodeling of the lateral tibial   plateau. Large joint effusion on the left. Small joint effusion on the   right. Please note that joint fluid analysis would be necessary to   further evaluate for septic joint.    < from: [facility] Duplex Lower Ext Vein Scan, Bilat (12.26.18 @ 11:00) >  FINDINGS:  There is normal compressibility of the bilateral common femoral, femoral   and popliteal veins. No calf vein thrombosis is detected.  Doppler examination shows normal spontaneous and phasic flow.  There is a 3.7 cm x 1.7 cm x 3 cm mildly complex cyst within the left popliteal fossa.   IMPRESSION:   No evidence of bilateral lower extremity deep venous thrombosis.  Left Baker's cyst.    < from: Xray Wrist 3 Views, Right (12.26.18 @ 00:35) >  FINDINGS:   Generalized bone demineralization limits osseous evaluation. There is   chondrocalcinosis at the triangular fibrocartilage. There is severe   radiocarpal joint space narrowing with remodeling of the distal radius   consistent with CPPD arthropathy. There is widening at the scapholunate   interval consistent with scapholunate ligament tearing. There is minimal   migration of the capitate proximally consistent with scapholunate   advanced collapse. There is mild first carpometacarpal osteoarthrosis.  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    < from: Xray Hip w/ Pelvis 2 or 3 Views, Right (12.25.18 @ 22:30) >  FINDINGS:   No acute fracture or dislocation. Mild spurring and severe axial joint   space narrowing at the right hip consistent with arthrosis. Calcified   fibroid. Lower lumbar spondylosis.  IMPRESSION:  No acute fracture or dislocation. Moderate to severe right hip arthrosis.    < from: Xray Chest 1 View- PORTABLE-Urgent (12.25.18 @ 22:30) >  IMPRESSION:   Clear lungs.

## 2018-12-31 NOTE — DISCHARGE NOTE ADULT - CARE PLAN
Principal Discharge DX:	Pseudogout  Goal:	Treatment  Assessment and plan of treatment:	You were admitted for severe joint pain. You underwent arthrocentesis, meaning drainage of joint fluid, from your hip as well as both of your knees. You were initially treated with antibiotics for possible joint infection, however there was no evidence of infection on your blood cultures or joint fluid cultures. You remained without fever, and antibiotics were discontinued. The joint fluid drained from your knees revealed microscopic crystals consistent with Pseudogout. You were treated with steroids, and your pain improved. Please continue to take steroids as prescribed. Your steroids will be tapered at rehab. Please follow up with rheumatology for further management and to obtain test results that have not yet resulted.  Secondary Diagnosis:	Hypertension  Assessment and plan of treatment:	Please continue lisinopril, amlodipine, and lasix for your hypertension and heart failure and follow up with your cardiologist for further management.  Secondary Diagnosis:	DVT (deep venous thrombosis)  Assessment and plan of treatment:	Please continue rivaroxaban for your recent deep vein thrombosis. You underwent repeat doppler ultrasound of your legs to evaluate your leg swelling which was negative for clot. Please follow up with your primary care physician for further management. Principal Discharge DX:	Pseudogout  Goal:	Treatment  Assessment and plan of treatment:	You were admitted for severe joint pain. You underwent arthrocentesis, meaning drainage of joint fluid, from your hip as well as both of your knees. You were initially treated with antibiotics for possible joint infection, however there was no evidence of infection on your blood cultures or joint fluid cultures. You remained without fever, and antibiotics were discontinued. The joint fluid drained from your knees revealed microscopic crystals consistent with Pseudogout. You were treated with steroids, and your pain improved. Please continue to take steroids as prescribed. Your intravenous (IV) steroids will be tapered at rehab. Please follow up with rheumatology for further management and to obtain test results that have not yet resulted, including your thyroid hormone levels (TSH, free T3/T4) as thyroid abnormalities can contribute to Pseudogout.  Secondary Diagnosis:	Hypertension  Assessment and plan of treatment:	Please continue lisinopril, amlodipine, and lasix for your hypertension and heart failure and follow up with your cardiologist for further management when you return home from rehab.  Secondary Diagnosis:	DVT (deep venous thrombosis)  Assessment and plan of treatment:	Please continue rivaroxaban for your recent deep vein thrombosis. You underwent repeat doppler ultrasound of your legs to evaluate your leg swelling which was negative for clot. Please follow up with your primary care physician for further management. Principal Discharge DX:	Pseudogout  Goal:	Treatment  Assessment and plan of treatment:	You were admitted for severe joint pain. You underwent arthrocentesis, meaning drainage of joint fluid, from your hip as well as both of your knees. You were initially treated with antibiotics for possible joint infection, however there was no evidence of infection on your blood cultures or joint fluid cultures. You remained without fever, and antibiotics were discontinued. The joint fluid drained from your knees revealed microscopic crystals consistent with Pseudogout. You were treated with steroids, and your pain improved. Please continue to take steroids as prescribed. You were given intravenous (IV) steroids in the hospital and will continue with oral steroids (Prednisone). You will be on decreasing doses of Prednisone every 5 days (see instructions on Prednisone bottles). Please follow up with rheumatology for further management and to obtain test results that have not yet resulted, including your thyroid hormone levels (TSH, free T3/T4) as thyroid abnormalities can contribute to Pseudogout.  Secondary Diagnosis:	Hypertension  Assessment and plan of treatment:	Please continue lisinopril, amlodipine, and lasix for your hypertension and heart failure and follow up with your cardiologist for further management when you return home from rehab.  Secondary Diagnosis:	DVT (deep venous thrombosis)  Assessment and plan of treatment:	Please continue rivaroxaban for your recent deep vein thrombosis. You underwent repeat doppler ultrasound of your legs to evaluate your leg swelling which was negative for clot. Please follow up with your primary care physician for further management.

## 2018-12-31 NOTE — PROGRESS NOTE ADULT - ASSESSMENT
89 yo F w/ hx of HTN, OA, CHF presenting for R hip and knee pain,  CPPD crystal on synovial fluid analysis  s/p b/l knee arthrocentesis 12/17, showing CPPD crystals, ~21-22K cells, gram stain and culture NGTD  s/p US guided joint aspiration of R hip, stain negative so far, no cell count/crystal was ordered it was added on today   Joint pain improved with a trial of IV Solumedrol.       - switch from IV Solumedrol to prednisone PO 20 mg x 5 days, 15 mg a day x 5 days, 10 mg a day x 5 days, then 5 mg a day x 5 days  - ok with d/c to rehab

## 2018-12-31 NOTE — PROGRESS NOTE ADULT - PROBLEM SELECTOR PLAN 5
Hx of RLE DVT In 07/2018, on Xarelto.   - c/w home xarelto 20mg qD Hx of RLE DVT In 07/2018, on Xarelto.   - c/w home xarelto 20mg qD  - tx leg swelling as above

## 2018-12-31 NOTE — DISCHARGE NOTE ADULT - MEDICATION SUMMARY - MEDICATIONS TO TAKE
I will START or STAY ON the medications listed below when I get home from the hospital:    methylPREDNISolone  -- 15 milligram(s) intravenous 2 times a day  -- Indication: For Pseudogout    acetaminophen 160 mg/5 mL oral suspension  -- 20.31 milliliter(s) by mouth every 6 hours, As needed for pain  -- Indication: For Pain    lisinopril 10 mg oral tablet  -- 1 tab(s) by mouth once a day  -- Indication: For Hypertension    Xarelto 20 mg oral tablet  -- 1 tab(s) by mouth once a day (in the evening)  -- Indication: For DVT (deep venous thrombosis)    amLODIPine 10 mg oral tablet  -- 1 tab(s) by mouth once a day  -- Indication: For Hypertension    lidocaine 5% topical film  -- Apply on skin to affected area once a day  -- Indication: For Pain    furosemide 40 mg oral tablet  -- 1 tab(s) by mouth once a day  -- Indication: For Heart failure    senna oral tablet  -- 1 tab(s) by mouth once a day  -- Indication: For Constipation    liothyronine 5 mcg oral tablet  -- 1 tab(s) by mouth once a day  -- Indication: For Hypothyroidism I will START or STAY ON the medications listed below when I get home from the hospital:    predniSONE 20 mg oral tablet  -- 1 tab by mouth once a day   Start date: 1/1/19  End date: 1/5/19  -- It is very important that you take or use this exactly as directed.  Do not skip doses or discontinue unless directed by your doctor.  Obtain medical advice before taking any non-prescription drugs as some may affect the action of this medication.  Take with food or milk.    -- Indication: For Pseudogout    predniSONE 5 mg oral tablet  -- 3 tabs (15 mg) by mouth once a day  Start date: 1/6/19  End date: 1/10/19  -- It is very important that you take or use this exactly as directed.  Do not skip doses or discontinue unless directed by your doctor.  Obtain medical advice before taking any non-prescription drugs as some may affect the action of this medication.  Take with food or milk.    -- Indication: For Pseudogout    predniSONE 10 mg oral tablet  -- 1 tab by mouth once a day   Start date: 1/11/19  End date: 1/15/19  -- It is very important that you take or use this exactly as directed.  Do not skip doses or discontinue unless directed by your doctor.  Obtain medical advice before taking any non-prescription drugs as some may affect the action of this medication.  Take with food or milk.    -- Indication: For Pseudogout    predniSONE 5 mg oral tablet  -- 1 tab by mouth once a day   Start date: 1/16/19  End date: 1/20/19  -- It is very important that you take or use this exactly as directed.  Do not skip doses or discontinue unless directed by your doctor.  Obtain medical advice before taking any non-prescription drugs as some may affect the action of this medication.  Take with food or milk.    -- Indication: For Pseudogout    acetaminophen 160 mg/5 mL oral suspension  -- 20.31 milliliter(s) by mouth every 6 hours, As needed for pain  -- Indication: For Pain    lisinopril 10 mg oral tablet  -- 1 tab(s) by mouth once a day  -- Indication: For Hypertension    Xarelto 20 mg oral tablet  -- 1 tab(s) by mouth once a day (in the evening)  -- Indication: For DVT (deep venous thrombosis)    amLODIPine 10 mg oral tablet  -- 1 tab(s) by mouth once a day  -- Indication: For Hypertension    lidocaine 5% topical film  -- Apply on skin to affected area once a day  -- Indication: For Pain    furosemide 40 mg oral tablet  -- 1 tab(s) by mouth once a day  -- Indication: For Heart failure    senna oral tablet  -- 1 tab(s) by mouth once a day  -- Indication: For Constipation    liothyronine 5 mcg oral tablet  -- 1 tab(s) by mouth once a day  -- Indication: For Hypothyroidism

## 2018-12-31 NOTE — DISCHARGE NOTE ADULT - CARE PROVIDER_API CALL
Jodee Webster), Internal Medicine; Rheumatology  [address] [address]  Phone: [phone]  Fax: [phone] Jodee Webster), Internal Medicine; Rheumatology  [address] [facility] [address]  [place] [address]  Phone: [phone]  Fax: [phone]    Beverly Moralez), Geriatric Medicine; Internal Medicine  [address] [facility] [address]  [place] [address]  Phone: [phone]  Fax: [phone]    Johnny Leija), Cardiology; Internal Medicine  [address] [facility]  [address]  [place] [address]  Phone: [phone]  Fax: [phone]

## 2018-12-31 NOTE — PROGRESS NOTE ADULT - REASON FOR ADMISSION
diffuse joint pain, inability to ambulate

## 2018-12-31 NOTE — DISCHARGE NOTE ADULT - PATIENT PORTAL LINK FT
You can access the [facility], offered by [facility], by registering with the following website: [email or web address]

## 2018-12-31 NOTE — PROGRESS NOTE ADULT - PROBLEM SELECTOR PROBLEM 6
Bifascicular block

## 2018-12-31 NOTE — DISCHARGE NOTE ADULT - MEDICATION SUMMARY - MEDICATIONS TO STOP TAKING
I will STOP taking the medications listed below when I get home from the hospital:    Vicodin 5 mg-300 mg oral tablet  -- 1 tab(s) by mouth every 6 hours, As Needed

## 2019-01-01 PROBLEM — I45.2 BIFASCICULAR BLOCK: Chronic | Status: ACTIVE | Noted: 2018-12-26

## 2019-01-02 ENCOUNTER — APPOINTMENT (OUTPATIENT)
Dept: GERIATRICS | Facility: CLINIC | Age: 84
End: 2019-01-02

## 2019-01-04 ENCOUNTER — INBOUND DOCUMENT (OUTPATIENT)
Age: 84
End: 2019-01-04

## 2019-01-10 LAB
CULTURE RESULTS: SIGNIFICANT CHANGE UP
SPECIMEN SOURCE: SIGNIFICANT CHANGE UP

## 2019-01-11 LAB
CULTURE RESULTS: NO GROWTH — SIGNIFICANT CHANGE UP
SPECIMEN SOURCE: SIGNIFICANT CHANGE UP

## 2019-01-29 ENCOUNTER — APPOINTMENT (OUTPATIENT)
Dept: RHEUMATOLOGY | Facility: CLINIC | Age: 84
End: 2019-01-29
Payer: MEDICARE

## 2019-01-29 VITALS
OXYGEN SATURATION: 97 % | DIASTOLIC BLOOD PRESSURE: 68 MMHG | HEIGHT: 64 IN | SYSTOLIC BLOOD PRESSURE: 113 MMHG | RESPIRATION RATE: 14 BRPM | TEMPERATURE: 97.8 F | HEART RATE: 56 BPM

## 2019-01-29 DIAGNOSIS — Z86.19 PERSONAL HISTORY OF OTHER INFECTIOUS AND PARASITIC DISEASES: ICD-10-CM

## 2019-01-29 DIAGNOSIS — Z86.79 PERSONAL HISTORY OF OTHER DISEASES OF THE CIRCULATORY SYSTEM: ICD-10-CM

## 2019-01-29 PROCEDURE — 99213 OFFICE O/P EST LOW 20 MIN: CPT

## 2019-01-29 NOTE — REVIEW OF SYSTEMS
[Joint Pain] : joint pain [Negative] : Heme/Lymph [As Noted in HPI] : as noted in HPI [FreeTextEntry9] : joint pain

## 2019-01-29 NOTE — ASSESSMENT
[FreeTextEntry1] : 89 yo F w/ hx of pseudogout, OA here for followup\par \par OA\par - R groin pain\par - Referral to IR for steroid injection\par - Also follows with ortho\par \par Pseudogout\par - Monitor for now\par \par RTC 6 months or sooner for joint pain

## 2019-01-29 NOTE — REASON FOR VISIT
[Post Hospitalization] : a post hospitalization visit [Follow-Up: _____] : a [unfilled] follow-up visit [Family Member] : family member

## 2019-01-29 NOTE — HISTORY OF PRESENT ILLNESS
[FreeTextEntry1] : Seen for post-hospital f/u for CPPD arthropathy, s/p b/l arthrocentesis of knees showing CPPD crystals. Was d/c'ed on prendisone taper.\par \par Was in rehab for 30 days, effect of PT minimal, walking slow. Had groin pain on R, tylenol, percocet, took some time to work. Knees better, no more swelling, baseline low pain. Currently on tylenol, pain improves but slowly improves. Is able to walk around with a walker.

## 2019-01-29 NOTE — PHYSICAL EXAM
[General Appearance - Alert] : alert [Extraocular Movements] : extraocular movements were intact [Hearing Threshold Finger Rub Not [place]] : hearing was normal [Neck Appearance] : the appearance of the neck was normal [Auscultation Breath Sounds / Voice Sounds] : lungs were clear to auscultation bilaterally [Heart Sounds] : normal S1 and S2 [Murmurs] : no murmurs [Abdomen Tenderness] : non-tender [No Spinal Tenderness] : no spinal tenderness [] : no rash [No Focal Deficits] : no focal deficits [Oriented To Time, Place, And Person] : oriented to person, place, and time [FreeTextEntry1] : R groin pain

## 2019-01-29 NOTE — PHYSICAL EXAM
[General Appearance - Alert] : alert [Extraocular Movements] : extraocular movements were intact [Hearing Threshold Finger Rub Not [place]] : hearing was normal [Neck Appearance] : the appearance of the neck was normal [Auscultation Breath Sounds / Voice Sounds] : lungs were clear to auscultation bilaterally [Heart Sounds] : normal S1 and S2 [Murmurs] : no murmurs [Abdomen Tenderness] : non-tender [No Spinal Tenderness] : no spinal tenderness [] : no rash [No Focal Deficits] : no focal deficits [Oriented To Time, Place, And Person] : oriented to person, place, and time [FreeTextEntry1] : R groin pain

## 2019-02-02 ENCOUNTER — EMERGENCY (EMERGENCY)
Facility: HOSPITAL | Age: 84
LOS: 1 days | Discharge: HOME | End: 2019-02-02
Attending: EMERGENCY MEDICINE
Payer: MEDICARE

## 2019-02-02 VITALS
HEIGHT: 65 IN | WEIGHT: 149.03 LBS | SYSTOLIC BLOOD PRESSURE: 128 MMHG | DIASTOLIC BLOOD PRESSURE: 78 MMHG | RESPIRATION RATE: 16 BRPM | HEART RATE: 80 BPM | OXYGEN SATURATION: 100 %

## 2019-02-02 VITALS
RESPIRATION RATE: 20 BRPM | OXYGEN SATURATION: 100 % | SYSTOLIC BLOOD PRESSURE: 120 MMHG | HEART RATE: 84 BPM | DIASTOLIC BLOOD PRESSURE: 67 MMHG

## 2019-02-02 DIAGNOSIS — Z95.818 PRESENCE OF OTHER CARDIAC IMPLANTS AND GRAFTS: Chronic | ICD-10-CM

## 2019-02-02 LAB
ALBUMIN SERPL ELPH-MCNC: 4.4 G/DL — SIGNIFICANT CHANGE UP (ref 3.3–5)
ALP SERPL-CCNC: 78 U/L — SIGNIFICANT CHANGE UP (ref 40–120)
ALT FLD-CCNC: 18 U/L — SIGNIFICANT CHANGE UP (ref 10–45)
ANION GAP SERPL CALC-SCNC: 14 MMOL/L — SIGNIFICANT CHANGE UP (ref 5–17)
APPEARANCE UR: CLEAR — SIGNIFICANT CHANGE UP
APTT BLD: 25.9 SEC — LOW (ref 27.5–36.3)
AST SERPL-CCNC: 32 U/L — SIGNIFICANT CHANGE UP (ref 10–40)
BACTERIA # UR AUTO: NEGATIVE — SIGNIFICANT CHANGE UP
BILIRUB SERPL-MCNC: 0.4 MG/DL — SIGNIFICANT CHANGE UP (ref 0.2–1.2)
BILIRUB UR-MCNC: NEGATIVE — SIGNIFICANT CHANGE UP
BUN SERPL-MCNC: 13 MG/DL — SIGNIFICANT CHANGE UP (ref 7–23)
CALCIUM SERPL-MCNC: 10 MG/DL — SIGNIFICANT CHANGE UP (ref 8.4–10.5)
CHLORIDE SERPL-SCNC: 94 MMOL/L — LOW (ref 96–108)
CO2 SERPL-SCNC: 24 MMOL/L — SIGNIFICANT CHANGE UP (ref 22–31)
COLOR SPEC: YELLOW — SIGNIFICANT CHANGE UP
CREAT SERPL-MCNC: 0.77 MG/DL — SIGNIFICANT CHANGE UP (ref 0.5–1.3)
DIFF PNL FLD: NEGATIVE — SIGNIFICANT CHANGE UP
EPI CELLS # UR: 3 /HPF — SIGNIFICANT CHANGE UP
GLUCOSE SERPL-MCNC: 100 MG/DL — HIGH (ref 70–99)
GLUCOSE UR QL: NEGATIVE — SIGNIFICANT CHANGE UP
GRAN CASTS # UR COMP ASSIST: ABNORMAL /LPF
HCT VFR BLD CALC: 39.3 % — SIGNIFICANT CHANGE UP (ref 34.5–45)
HGB BLD-MCNC: 13.6 G/DL — SIGNIFICANT CHANGE UP (ref 11.5–15.5)
HYALINE CASTS # UR AUTO: 16 /LPF — HIGH (ref 0–2)
INR BLD: 1.09 RATIO — SIGNIFICANT CHANGE UP (ref 0.88–1.16)
KETONES UR-MCNC: SIGNIFICANT CHANGE UP
LEUKOCYTE ESTERASE UR-ACNC: NEGATIVE — SIGNIFICANT CHANGE UP
MCHC RBC-ENTMCNC: 32.2 PG — SIGNIFICANT CHANGE UP (ref 27–34)
MCHC RBC-ENTMCNC: 34.6 GM/DL — SIGNIFICANT CHANGE UP (ref 32–36)
MCV RBC AUTO: 92.9 FL — SIGNIFICANT CHANGE UP (ref 80–100)
NITRITE UR-MCNC: NEGATIVE — SIGNIFICANT CHANGE UP
PH UR: 5.5 — SIGNIFICANT CHANGE UP (ref 5–8)
PLATELET # BLD AUTO: 254 K/UL — SIGNIFICANT CHANGE UP (ref 150–400)
POTASSIUM SERPL-MCNC: 3.8 MMOL/L — SIGNIFICANT CHANGE UP (ref 3.5–5.3)
POTASSIUM SERPL-SCNC: 3.8 MMOL/L — SIGNIFICANT CHANGE UP (ref 3.5–5.3)
PROT SERPL-MCNC: 7.3 G/DL — SIGNIFICANT CHANGE UP (ref 6–8.3)
PROT UR-MCNC: ABNORMAL
PROTHROM AB SERPL-ACNC: 12.6 SEC — SIGNIFICANT CHANGE UP (ref 10–12.9)
RBC # BLD: 4.23 M/UL — SIGNIFICANT CHANGE UP (ref 3.8–5.2)
RBC # FLD: 14.8 % — HIGH (ref 10.3–14.5)
RBC CASTS # UR COMP ASSIST: 3 /HPF — SIGNIFICANT CHANGE UP (ref 0–4)
SODIUM SERPL-SCNC: 132 MMOL/L — LOW (ref 135–145)
SP GR SPEC: 1.02 — SIGNIFICANT CHANGE UP (ref 1.01–1.02)
TROPONIN T, HIGH SENSITIVITY RESULT: 18 NG/L — SIGNIFICANT CHANGE UP (ref 0–51)
TROPONIN T, HIGH SENSITIVITY RESULT: 22 NG/L — SIGNIFICANT CHANGE UP (ref 0–51)
UROBILINOGEN FLD QL: NEGATIVE — SIGNIFICANT CHANGE UP
WBC # BLD: 6.6 K/UL — SIGNIFICANT CHANGE UP (ref 3.8–10.5)
WBC # FLD AUTO: 6.6 K/UL — SIGNIFICANT CHANGE UP (ref 3.8–10.5)
WBC UR QL: 3 /HPF — SIGNIFICANT CHANGE UP (ref 0–5)

## 2019-02-02 PROCEDURE — 93005 ELECTROCARDIOGRAM TRACING: CPT

## 2019-02-02 PROCEDURE — 80053 COMPREHEN METABOLIC PANEL: CPT

## 2019-02-02 PROCEDURE — 81001 URINALYSIS AUTO W/SCOPE: CPT

## 2019-02-02 PROCEDURE — 87086 URINE CULTURE/COLONY COUNT: CPT

## 2019-02-02 PROCEDURE — 99285 EMERGENCY DEPT VISIT HI MDM: CPT | Mod: GC,25

## 2019-02-02 PROCEDURE — 99284 EMERGENCY DEPT VISIT MOD MDM: CPT | Mod: 25

## 2019-02-02 PROCEDURE — 71045 X-RAY EXAM CHEST 1 VIEW: CPT

## 2019-02-02 PROCEDURE — 85730 THROMBOPLASTIN TIME PARTIAL: CPT

## 2019-02-02 PROCEDURE — 82962 GLUCOSE BLOOD TEST: CPT

## 2019-02-02 PROCEDURE — 93010 ELECTROCARDIOGRAM REPORT: CPT

## 2019-02-02 PROCEDURE — 71045 X-RAY EXAM CHEST 1 VIEW: CPT | Mod: 26

## 2019-02-02 PROCEDURE — 84484 ASSAY OF TROPONIN QUANT: CPT

## 2019-02-02 PROCEDURE — 85610 PROTHROMBIN TIME: CPT

## 2019-02-02 PROCEDURE — 85027 COMPLETE CBC AUTOMATED: CPT

## 2019-02-02 NOTE — ED PROVIDER NOTE - NSFOLLOWUPINSTRUCTIONS_ED_ALL_ED_FT
Please follow up with Dr. Johnny Leija and your primary care doctor. Please make sure you hydrate well. Please return to the ED if you feel dizzy or lightheaded or have another syncope episode.

## 2019-02-02 NOTE — ED PROVIDER NOTE - OBJECTIVE STATEMENT
89 year old female with hx of OA, mild dementia (A&Ox3), HCV (in remission), HTN, HFpEF/grade 1 DHF, HTN, hypothyroidism, hx of syncope (trifascicular block RBBB/LAFB) s/p ILR, DVT (July 2018) on xarelto who presents after syncopal episode around 11 AM this morning. 89 year old female with hx of OA, mild dementia (A&Ox3), HCV (in remission), HTN, HFpEF/grade 1 DHF, HTN, hypothyroidism, hx of syncope (trifascicular block RBBB/LAFB) s/p ILR, DVT (July 2018) on xarelto who presents after syncopal episode around 11 AM this morning. Per the patient's daughter, she found her mother sitting at the kitchen table with her head bent down. She had a "blank stare and drooled" and was not responsive for about 10 minutes. No tongue biting/fall/head trauma/urinary incontinence. The patient then became responsive and was lethargic. In the ED, she is AOx3 but reports she feels weak and tired. Denies dizziness/fevers/chills/URI symptoms. Reports mild chest discomfort, but not pain. Shortness of breath at baseline.

## 2019-02-02 NOTE — ED PROVIDER NOTE - MEDICAL DECISION MAKING DETAILS
5 year old female with hx of OA, mild dementia (A&Ox3), HCV (in remission), HTN, HFpEF/grade 1 DHF, HTN, hypothyroidism, hx of syncope (trifascicular block RBBB/LAFB) s/p ILR, DVT (July 2018) on xarelto who presents after syncopal episode around 11 AM this morning. Cardiac loop recorder interrogation, Basic labs, UA, EKG

## 2019-02-02 NOTE — ED PROVIDER NOTE - ATTENDING CONTRIBUTION TO CARE
Patient presenting after syncopal episode at home.  History provided by daughter.  Patient was complaining of feeling unwell earlier this morning without specific complaints when asked by daughter which resolved.  Then while eating breakfast daughter noted she was slumped forwards with glassy eyes.  Daughter believes she was unconscious for approx 10-15 mins.  No noted seizure/shaking.  On awakening was back to baseline.  Denying chest pains/palpitations.  Has implanted loop recorder.  History of heart failure, recently admitted for fluid overload, since discharge has not had weight gain or recurrent peripheral edema, no shortness of breath.    A 14 point review of systems is negative except as in HPI or otherwise documented.    Exam:  General: Patient well appearing, vital signs within normal limits  HEENT: airway patent with moist mucous membranes  Cardiac: RRR S1/S2 with strong peripheral pulses  Respiratory: lungs clear without respiratory distress  GI: abdomen soft, non tender, non distended  Neuro: no gross neurologic deficits  Skin: warm, well perfused  Psych: normal mood and affect    Patient with syncopal episode at home, now back to baseline.  Clinically no evidence of fluid overload.  No trauma associated with episode.  Now offering no complaint.  Plan to obtain screening labs, EKG, troponin, interrogate loop recorder for cardiac etiology of symptoms.  If workup unremarkable in Emergency Department, patient has cardiologist for outpatient follow up if remains at baseline.

## 2019-02-02 NOTE — ED ADULT NURSE NOTE - NSIMPLEMENTINTERV_GEN_ALL_ED
Implemented All Fall with Harm Risk Interventions:  [place] to call system. Call bell, personal items and telephone within reach. Instruct patient to call for assistance. Room bathroom lighting operational. Non-slip footwear when patient is off stretcher. Physically safe environment: no spills, clutter or unnecessary equipment. Stretcher in lowest position, wheels locked, appropriate side rails in place. Provide visual cue, wrist band, yellow gown, etc. Monitor gait and stability. Monitor for mental status changes and reorient to person, place, and time. Review medications for side effects contributing to fall risk. Reinforce activity limits and safety measures with patient and family. Provide visual clues: red socks.

## 2019-02-02 NOTE — ED ADULT NURSE NOTE - OBJECTIVE STATEMENT
Detail Level: Detailed
89 y/o F pt with pmhx of OA, mild dementia, HCV, HTN, CHF, hypothyroidism, syncope, DVT on Xarelto, present to ED for syncope this morning s/p eating breakfast, as per daughter who is at bedside states they just finish having breakfast, was sitting at the table, thought she was read and when she looked over pt head was on the table, pt had blank stare, arms was limp, pt was unresponsive to painful stimuli, last about 10 minutes, pt report feel fatigue this morning, report her chest is tired, on exam pt A&Ox3, breathing spontaneous, unlabored w/o distress on room air, lungs b/l CTA, CM NSR, peripheral pulse strong and present, pt has chronic SOB due to CHF, denies chest pain, dizziness, numbness, tingling, seen and eval by MD, labs drawn and sent

## 2019-02-02 NOTE — CHART NOTE - NSCHARTNOTEFT_GEN_A_CORE
Division of Electrophysiology  Device Interrogation Report    Interrogation of: Loop Recorder    Indication for Interrogation: syncope    Report:  : Activaided Orthotics    Model: Reveal Linq [id number]    Battery Status: good    Events: No events corresponding to patient's syncopal episode on 2/2/19    Plan: No further work up necessary from EP standpoint      Jay Ocampo MD  Cardiology Fellow

## 2019-02-03 ENCOUNTER — FORM ENCOUNTER (OUTPATIENT)
Age: 84
End: 2019-02-03

## 2019-02-03 LAB
CULTURE RESULTS: SIGNIFICANT CHANGE UP
SPECIMEN SOURCE: SIGNIFICANT CHANGE UP

## 2019-02-04 ENCOUNTER — APPOINTMENT (OUTPATIENT)
Dept: RADIOLOGY | Facility: CLINIC | Age: 84
End: 2019-02-04
Payer: MEDICARE

## 2019-02-04 ENCOUNTER — OUTPATIENT (OUTPATIENT)
Dept: OUTPATIENT SERVICES | Facility: HOSPITAL | Age: 84
LOS: 1 days | End: 2019-02-04
Payer: MEDICARE

## 2019-02-04 DIAGNOSIS — Z95.818 PRESENCE OF OTHER CARDIAC IMPLANTS AND GRAFTS: Chronic | ICD-10-CM

## 2019-02-04 DIAGNOSIS — M19.90 UNSPECIFIED OSTEOARTHRITIS, UNSPECIFIED SITE: ICD-10-CM

## 2019-02-04 PROCEDURE — 73525 CONTRAST X-RAY OF HIP: CPT | Mod: 26,RT

## 2019-02-04 PROCEDURE — 73525 CONTRAST X-RAY OF HIP: CPT

## 2019-02-04 PROCEDURE — 27093 INJECTION FOR HIP X-RAY: CPT | Mod: RT

## 2019-02-04 PROCEDURE — 27093 INJECTION FOR HIP X-RAY: CPT

## 2019-02-21 ENCOUNTER — APPOINTMENT (OUTPATIENT)
Dept: HEMATOLOGY ONCOLOGY | Facility: CLINIC | Age: 84
End: 2019-02-21

## 2019-02-21 ENCOUNTER — APPOINTMENT (OUTPATIENT)
Dept: GERIATRICS | Facility: CLINIC | Age: 84
End: 2019-02-21

## 2019-02-28 ENCOUNTER — APPOINTMENT (OUTPATIENT)
Dept: ORTHOPEDIC SURGERY | Facility: CLINIC | Age: 84
End: 2019-02-28
Payer: MEDICARE

## 2019-02-28 VITALS
SYSTOLIC BLOOD PRESSURE: 105 MMHG | WEIGHT: 150 LBS | DIASTOLIC BLOOD PRESSURE: 71 MMHG | HEIGHT: 64 IN | HEART RATE: 94 BPM | BODY MASS INDEX: 25.61 KG/M2

## 2019-02-28 DIAGNOSIS — Z80.3 FAMILY HISTORY OF MALIGNANT NEOPLASM OF BREAST: ICD-10-CM

## 2019-02-28 DIAGNOSIS — Z82.62 FAMILY HISTORY OF OSTEOPOROSIS: ICD-10-CM

## 2019-02-28 DIAGNOSIS — Z86.79 PERSONAL HISTORY OF OTHER DISEASES OF THE CIRCULATORY SYSTEM: ICD-10-CM

## 2019-02-28 PROCEDURE — 99203 OFFICE O/P NEW LOW 30 MIN: CPT

## 2019-03-01 PROBLEM — Z80.3 FAMILY HISTORY OF MALIGNANT NEOPLASM OF BREAST: Status: ACTIVE | Noted: 2019-02-28

## 2019-03-01 PROBLEM — Z82.62 FAMILY HISTORY OF OSTEOPOROSIS: Status: ACTIVE | Noted: 2019-02-28

## 2019-03-01 NOTE — CONSULT LETTER
[Dear  ___] : Dear  [unfilled], [Consult Letter:] : I had the pleasure of evaluating your patient, [unfilled]. [Please see my note below.] : Please see my note below. [Consult Closing:] : Thank you very much for allowing me to participate in the care of this patient.  If you have any questions, please do not hesitate to contact me. [Sincerely,] : Sincerely, [FreeTextEntry2] : FILEMON GUERRERO\par  [FreeTextEntry3] : Lucio Julian MD\par \par ______________________________________________\par [facility]: Hip/Knee Arthroplasty\par [address], [address], [place] [address]\par (t) [phone]\par (f) [phone]

## 2019-03-01 NOTE — HISTORY OF PRESENT ILLNESS
[9] : an average pain level of 9/10 [de-identified] : Ms. JAQUAN KAM is a 89 year old female, with PMX of CHF on Elloquis, presents with longstanding right hip pain, now worsening.  She localizes the pain to the groin and lateral aspect of the right hip.  The patient describes the pain as sharp. She states the pain is present when weightbearing, as well as any motion of the right hip.  Patient is minimally weightbearing, she presents in a wheel chair.  She admits to worsening stiffness of the hip, which is interfering with ADLs such as dressing and toe nail care. Patient also has pain of the bilateral knees. She saw a Rheumatologist who diagnosed her with pseudogout and is treating her conservatively. She cannot take NSAIDs due to daily Elloquis use, she uses tylenol arthritis without lasting relief. She admits to prior conservative management inclusive of physical therapy which became more painful than beneficial, as well as a recent xray guided intra articular injection to the right hip with only temporary relief on 2/4/19. She admits to lower back pain. The patient admits to limitations in her  quality of life, and is present to discuss options for treatment. Patient wishes to know what her options for treatment are shy of hip replacement surgery, as she is 89 years old and Daughter does not wish to put her through surgery. \par

## 2019-03-01 NOTE — DISCUSSION/SUMMARY
[de-identified] : Right Hip advanced degenerative joint disease, Bilateral knee DJD with valgus deformity bilaterally.\par The natural history and treatment of degenerative arthritis was discussed with the patient at length today. The spectrum of treatment including nonoperative modalities to surgical intervention was elucidated. Noninvasive and nonoperative treatment modalities include weight reduction, activity modification with low impact exercise,  as needed use of acetaminophen or anti-inflammatory medications if tolerated, glucosamine/chondroitin supplements, and physical therapy. Further treatments can include corticosteroid injection and the use of viscosupplementation with hyaluronic acid injections. Definitive surgical treatment can certainly include total joint arthroplasty also. The risks and benefits of each treatment options was discussed and all questions were answered.\par I have discussed that the best option for treatment would be total hip replacement, but given comorbidities of CHF and given age of patient there are increased risks of surgery to be considered as well. \par Daughter does not wish to consider THR as an option. \par The patient was informed of the findings and recommended conservative management in the form of a home exercise program, activity modifications, stationary bicycling, swimming and weight loss program. A trial of Glucosamine and Chondroiten Sulphate was recommended.\par A prescription for a course of physical therapy was provided.\par Patient will continue to use Tylenol Arthritis as she cannot take NSAIDs. \par I have offered a prescription for tramadol but patient refuses due to side effects when she has used it in the past. \par Patient given referral to Dr Shayy Mathew for pain management. \par Follow-up appointment was recommended as needed.

## 2019-03-01 NOTE — PHYSICAL EXAM
[de-identified] : On general examination the patient is adequately groomed and nourished. The vital parameters are as recorded. \par There is no lymphedema or diffuse swelling, no varicose veins, no skin warmth/erythema/scars/swelling, no ulcers and no palpable lymph nodes or masses in both lower extremities. Bilateral pedal pulses are well palpable.\par Upper Extremity:\par Both right and left upper extremities are unremarkable in terms of skin rash, lesions, pigmentation, redness, tenderness, swelling, joint instability, abnormal deformity or crepitus. The overall range of motion, sensation, motor tone and strength testing are normal.\par \par Hip Exam:\par The gait is right stiff hip coxalgic.\par The patient has unequal leg lengths - right lower limb shortening of 0.5 cm and no pelvic tilt. \par Mikhail/Naren test is 12 inches on the right and 6 inches on the left. \par Active SLR is 10 degrees on the right and 60 degrees on the left. Both hips demonstrate no scars and the skin has no signs of inflammation or tenderness. \par Both Hips have a range of motion that is symmetrical in flexion and extension of:\par Hip flexion:             Right 60 degrees                Left 100 degrees\par Hip abduction:      Right 20 degrees                  Left 40 degrees\par Hip adduction:      Right 0 degrees                    Left 20 degrees\par Internal rotation:      Right 0 degrees                   Left 20 degrees\par External rotation:    Right 20 degrees                  Left 40 degrees\par There is no flexion contracture, deformity or instability. \par Labral impingement tests are negative.\par Right hip flexor, abductor and extensor power is grade 4+.\par Left hip flexor, abductor and extensor power is grade 5.\par \par Bilateral Knee Exam: \par The gait is bilateral stiff knee antalgic.\par Knee alignment:            Right 10 degrees valgus with no flexion deformity. \par Left 10 degrees valgus with no flexion deformity.\par Both knees demonstrate no scars and the skin has no warmth, erythema, swelling or tenderness. \par Both knees have a range of motion of\par Extension:                    Right 0 degrees     Left 0 degrees\par Flexion:                                   Right 110 degrees      Left 100 degrees\par There is bilateral knee medial joint line tenderness. There is bilateral knee mild effusion. \par Lul's test is positive. Salvatore test is positive.\par Lachman's test, Anterior/Posterior Drawer test and Pivot Shift Tests are negative. \par There is bilateral knee grade 1 MCL mediolateral laxity and no anteroposterior instability. \par Patella compression test is positive and patellofemoral tracking is normal with no lateral subluxation, apprehension or instability. \par Right knee quadriceps and hamstrings power is 4+.\par Left knee quadriceps and hamstrings power is 4+.\par   [de-identified] : The following radiographs were taken at [facility] 12/26/18 and read by me during this patients visit. I reviewed each radiograph in detail with the patient and discussed the findings as highlighted below. \par AP, lateral and skyline views of the bilateral knees confirm advanced degenerative joint disease with medial joint space narrowing and osteophyte formation. Valgus deformity bilaterally. \par \par EXAM: CT PELVIS BONY ONLY IC \par \par PROCEDURE DATE: 12/26/2018 \par \par INTERPRETATION: CT PELVIS BONY ONLY IC dated 12/26/2018 8:46 PM \par \par INDICATION: Right hip pain and swelling for a few months. \par \par COMPARISON: Right hip radiographs dated 12/25/2018 \par \par TECHNIQUE: CT imaging of the pelvis was performed with contrast. The data \par was reformatted in the axial, coronal, and sagittal planes. \par Contrast: Omnipaque 350. Administered: 90 cc. Discarded: 10 cc \par \par FINDINGS: \par \par OSSEOUS STRUCTURES: There is severe right hip joint space narrowing with \par subchondral sclerosis and cystic changes. There is mild left hip axial joint \par space narrowing with mild marginal productive changes. Spurring and \par productive change at the pubic symphysis with mild chondrocalcinosis \par present. There is productive changes of both sacroiliac joints, worse on \par left. Degenerative changes of lower lumbar spine with moderate to severe \par degenerative disc disease at L5-S1. \par SYNOVIUM/ JOINT FLUID: There is a small right hip joint effusion. There is \par moderate distention of the right iliopsoas bursa. There is mild enhancement \par of the iliopsoas synovium with adjacent edema. \par TENDONS: There is proximal enthesophyte change at the hamstring tendons \par bilaterally. There is mild enthesophyte change at the bilateral abductor \par insertions. \par MUSCLES: There is no intramuscular hematoma. Normal muscle bulk. \par NEUROVASCULAR STRUCTURES: Vascular calcifications are present bilaterally. \par INTRAPELVIC SOFT TISSUES: There is a calcified uterine fibroid. Left-sided \par renal cysts are noted measuring up to 3 cm. \par SUBCUTANEOUS SOFT TISSUES: There is mild soft tissue swelling at the pelvis. \par \par IMPRESSION: \par 1. Severe right hip osteoarthritis. \par 2. Small right hip joint effusion and moderate distention of the right \par iliopsoas bursa. Findings likely reflect inflammatory change secondary to \par the severe right hip arthritis. Superimposed infection is not excluded. If \par there is concern for septic arthritis, correlation with hip aspiration is \par recommended. \par 3. Additional degenerative changes as detailed above. \par \par Findings were discussed with Dr. Milan on 12/27/2018 10:38 AM with read \par back.

## 2019-03-07 ENCOUNTER — APPOINTMENT (OUTPATIENT)
Dept: HEMATOLOGY ONCOLOGY | Facility: CLINIC | Age: 84
End: 2019-03-07

## 2019-03-07 ENCOUNTER — OUTPATIENT (OUTPATIENT)
Dept: OUTPATIENT SERVICES | Facility: HOSPITAL | Age: 84
LOS: 1 days | Discharge: HOME | End: 2019-03-07

## 2019-03-07 ENCOUNTER — APPOINTMENT (OUTPATIENT)
Dept: HEMATOLOGY ONCOLOGY | Facility: CLINIC | Age: 84
End: 2019-03-07
Payer: MEDICARE

## 2019-03-07 VITALS
RESPIRATION RATE: 16 BRPM | HEART RATE: 81 BPM | OXYGEN SATURATION: 99 % | TEMPERATURE: 98.5 F | BODY MASS INDEX: 25.51 KG/M2 | WEIGHT: 148.59 LBS | DIASTOLIC BLOOD PRESSURE: 77 MMHG | SYSTOLIC BLOOD PRESSURE: 145 MMHG

## 2019-03-07 DIAGNOSIS — Z95.818 PRESENCE OF OTHER CARDIAC IMPLANTS AND GRAFTS: Chronic | ICD-10-CM

## 2019-03-07 DIAGNOSIS — G89.3 NEOPLASM RELATED PAIN (ACUTE) (CHRONIC): ICD-10-CM

## 2019-03-07 PROCEDURE — 99203 OFFICE O/P NEW LOW 30 MIN: CPT

## 2019-03-12 NOTE — HISTORY OF PRESENT ILLNESS
[FreeTextEntry1] : 89yoF with chronic pain from osteoarthritis and pseudogout presents for initial palliative care visit, referred by Dr. Johnny Leija.\par \par Patient has had multiple recent hospital admission for pain \par \par She is confined to a wheelchair lately due to pain with ambulation.  Ambulates slowly with a walker around the house. Pain is localized to the R hip with radiation down the R leg to the foot. She also has R knee pain from pseudogout. \par Has been taking Tylenol ES for years, this recently has been ineffective for the pain. More recently she has been prescribed Ultram, Vicodin. These have not been helpful. She has two hip injections which didn't seem to help either.  Due to limitations on analgesics, pt is interested in medical cannabis. \par \par Patient lives with her daughter.  \par \par I-Stop Ref#: [id number]

## 2019-03-12 NOTE — DATA REVIEWED
[FreeTextEntry1] : CT Pelvis (12/27/18) - IMPRESSION: \par \par 1. Severe right hip osteoarthritis. \par 2. Small right hip joint effusion and moderate distention of the right iliopsoas bursa. Findings likely reflect inflammatory change secondary to the severe right hip arthritis. Superimposed infection is not excluded. If \par there is concern for septic arthritis, correlation with hip aspiration is recommended. \par 3. Additional degenerative changes as detailed above.

## 2019-03-12 NOTE — ASSESSMENT
[FreeTextEntry1] : 89yoF with:\par \par 1. Chronic pain 2/2 osteoarthritis - Medical cannabis certification completed today.  Provided cannabis education, overview of state program, and discussed adverse effects in detail. Recommend starting with Low THC: High CBD formulation at low dose of THC (~1mg/dose).\par \par RTO 1 month or as needed, call with questions re: medical cannabis dosing.

## 2019-03-13 ENCOUNTER — APPOINTMENT (OUTPATIENT)
Dept: ELECTROPHYSIOLOGY | Facility: HOSPITAL | Age: 84
End: 2019-03-13
Payer: MEDICARE

## 2019-03-13 PROCEDURE — 93299: CPT

## 2019-03-13 PROCEDURE — 93298 REM INTERROG DEV EVAL SCRMS: CPT

## 2019-03-20 ENCOUNTER — APPOINTMENT (OUTPATIENT)
Dept: CARDIOLOGY | Facility: CLINIC | Age: 84
End: 2019-03-20
Payer: MEDICARE

## 2019-03-20 ENCOUNTER — NON-APPOINTMENT (OUTPATIENT)
Age: 84
End: 2019-03-20

## 2019-03-20 VITALS
SYSTOLIC BLOOD PRESSURE: 147 MMHG | HEIGHT: 64 IN | OXYGEN SATURATION: 99 % | WEIGHT: 152 LBS | HEART RATE: 93 BPM | DIASTOLIC BLOOD PRESSURE: 88 MMHG | BODY MASS INDEX: 25.95 KG/M2

## 2019-03-20 PROCEDURE — 99215 OFFICE O/P EST HI 40 MIN: CPT

## 2019-03-20 PROCEDURE — 93000 ELECTROCARDIOGRAM COMPLETE: CPT

## 2019-03-20 PROCEDURE — 36415 COLL VENOUS BLD VENIPUNCTURE: CPT

## 2019-03-20 NOTE — DISCUSSION/SUMMARY
[FreeTextEntry1] : Patient is a very pleasant 89 year-old woman who presents today for follow-up of her poor exercise capacity, shortness of breath with even minimal exertion, and worsening of her right hip pain.  Patient has clear lungs, mild lower extremity edema, and a loud S2 which raises the possibility of pulmonary hypertension, although none was seen on a recent echocardiogram. Suspect symptom of dyspnea on minimal exertion is related to labile blood pressure.  Given normal oxygen saturation on room and and unchanged ECG, no evidence of significant PE at this time. \par \par In July 2018, she had a DVT in RLE. She completed 21 days of Xarelto 15mg BID, and is now tolerating 20mg daily.  \par \par  Cortisone shot provided only mild relief. She has been started on Vicodin and stool softener. There is concern for opioid induced constipation. Patient referred for pain management. Case discussed with Gricelda Edwards MD. Patient now started on high CBD, low THC medicinal marijuana therapy. She has been on medication for past one week. \par \par For hypertension, HFpEF, and LE edema:\par Stop amlodipine as it may be worsening LE edema.\par Continue ACEi, loop diuretic, and spironolactone - check metabolic panel to monitor renal function and potassium. Adjust medications as needed.\par \par  Follow-up in office in 3 months or earlier as needed.

## 2019-03-20 NOTE — HISTORY OF PRESENT ILLNESS
[FreeTextEntry1] : 89 year-old woman with history of hypertension, HCV, hypothyroidism comes today with her daughter for follow-up. 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 In July 2018, patient noted to have right leg swelling. Evaluation by orthopedic surgeon with Doppler revealed DVT and cellulitis. Patient was started on Augmentin and Xarelto. Patient has no pleuritic chest pain. \par \par Patient has also been sent to pulmonary who also check overnight saturation studies and have r/o hypoxia as a source of fatigue and SOB. \par \par OA of knees have been chronic and also promotes to patients sedentary lifestyle. She uses a cane on occasion. No recent falls. On NSAIDS PRN for pain.\par \par In February 2018, patient was admitted with presyncope. Her ILR was interrogated and was without corresponding event. She was discharged without changes in her medications.\par \par December 2018 - Patient's chief complaint today is right hip pain. She has been started on Vicodin for it. She is taking it once per day. She also got a cortisone injection last week that provided mild relief.\par Labs drawn earlier today by Dr. Dickson.\par \par March 2019 - Patient has just started CBD/THC for pain relief of chronic arthritis. It is well tolerated so far, but effect may not be seen at this early time.\par \par PMD/Geriatrics: Beverly Wheeler MD [phone]\par Orthopedist: Alex Hoff MD [phone]\par Plastic Surgeon: Miguel Soto MD [phone]\par Former Cardiologist: Jose Santoyo MD [phone]\par GI: Johnny Dickson MD [phone]

## 2019-03-25 LAB
25(OH)D3 SERPL-MCNC: 32.9 NG/ML
ALBUMIN SERPL ELPH-MCNC: 4.1 G/DL
ALP BLD-CCNC: 94 U/L
ALT SERPL-CCNC: 23 U/L
ANION GAP SERPL CALC-SCNC: 16 MMOL/L
AST SERPL-CCNC: 42 U/L
BASOPHILS # BLD AUTO: 0.07 K/UL
BASOPHILS NFR BLD AUTO: 1 %
BILIRUB SERPL-MCNC: 0.2 MG/DL
BUN SERPL-MCNC: 14 MG/DL
CALCIUM SERPL-MCNC: 9.7 MG/DL
CHLORIDE SERPL-SCNC: 95 MMOL/L
CHOLEST SERPL-MCNC: 244 MG/DL
CHOLEST/HDLC SERPL: 2.8 RATIO
CO2 SERPL-SCNC: 22 MMOL/L
CREAT SERPL-MCNC: 0.64 MG/DL
EOSINOPHIL # BLD AUTO: 0.11 K/UL
EOSINOPHIL NFR BLD AUTO: 1.6 %
GLUCOSE SERPL-MCNC: 74 MG/DL
HBA1C MFR BLD HPLC: 5.4 %
HCT VFR BLD CALC: 39.4 %
HDLC SERPL-MCNC: 87 MG/DL
HGB BLD-MCNC: 12.3 G/DL
IMM GRANULOCYTES NFR BLD AUTO: 0.4 %
LDLC SERPL CALC-MCNC: 143 MG/DL
LYMPHOCYTES # BLD AUTO: 1.5 K/UL
LYMPHOCYTES NFR BLD AUTO: 21.8 %
MAN DIFF?: NORMAL
MCHC RBC-ENTMCNC: 30.5 PG
MCHC RBC-ENTMCNC: 31.2 GM/DL
MCV RBC AUTO: 97.8 FL
MONOCYTES # BLD AUTO: 1.03 K/UL
MONOCYTES NFR BLD AUTO: 14.9 %
NEUTROPHILS # BLD AUTO: 4.15 K/UL
NEUTROPHILS NFR BLD AUTO: 60.3 %
PLATELET # BLD AUTO: 283 K/UL
POTASSIUM SERPL-SCNC: 4.6 MMOL/L
PROT SERPL-MCNC: 6.7 G/DL
RBC # BLD: 4.03 M/UL
RBC # FLD: 15.8 %
SODIUM SERPL-SCNC: 133 MMOL/L
TRIGL SERPL-MCNC: 72 MG/DL
TSH SERPL-ACNC: 0.87 UIU/ML
WBC # FLD AUTO: 6.89 K/UL

## 2019-03-25 NOTE — PATIENT PROFILE ADULT. - FUNCTIONAL SCREEN CURRENT LEVEL: BATHING, MLM
PT C/O PAIN AND NAUSEA

PT WAS GIVEN MORPHINE FOR PAIN AND ZOFRAN, IV, FOR NAUSEA. TOLERATED WELL. NO DISTRESS 
NOTED.  BEDSIDE COMMODE IS PLACED AT BEDSIDE. (2) assistive person

## 2019-04-02 ENCOUNTER — APPOINTMENT (OUTPATIENT)
Dept: RHEUMATOLOGY | Facility: CLINIC | Age: 84
End: 2019-04-02

## 2019-04-02 ENCOUNTER — APPOINTMENT (OUTPATIENT)
Dept: RHEUMATOLOGY | Facility: CLINIC | Age: 84
End: 2019-04-02
Payer: MEDICARE

## 2019-04-02 VITALS
TEMPERATURE: 98 F | BODY MASS INDEX: 25.61 KG/M2 | HEIGHT: 64 IN | OXYGEN SATURATION: 99 % | SYSTOLIC BLOOD PRESSURE: 131 MMHG | WEIGHT: 150 LBS | RESPIRATION RATE: 16 BRPM | HEART RATE: 83 BPM | DIASTOLIC BLOOD PRESSURE: 82 MMHG

## 2019-04-02 PROCEDURE — 99213 OFFICE O/P EST LOW 20 MIN: CPT

## 2019-04-02 RX ORDER — AMLODIPINE BESYLATE 10 MG/1
10 TABLET ORAL DAILY
Qty: 15 | Refills: 3 | Status: DISCONTINUED | COMMUNITY
Start: 2017-10-02 | End: 2019-04-02

## 2019-04-15 ENCOUNTER — APPOINTMENT (OUTPATIENT)
Dept: GERIATRICS | Facility: CLINIC | Age: 84
End: 2019-04-15
Payer: MEDICARE

## 2019-04-15 ENCOUNTER — APPOINTMENT (OUTPATIENT)
Dept: ELECTROPHYSIOLOGY | Facility: HOSPITAL | Age: 84
End: 2019-04-15
Payer: MEDICARE

## 2019-04-15 VITALS
TEMPERATURE: 97.5 F | DIASTOLIC BLOOD PRESSURE: 70 MMHG | SYSTOLIC BLOOD PRESSURE: 110 MMHG | OXYGEN SATURATION: 99 % | WEIGHT: 151.8 LBS | HEART RATE: 75 BPM | BODY MASS INDEX: 26.06 KG/M2

## 2019-04-15 PROCEDURE — 99215 OFFICE O/P EST HI 40 MIN: CPT | Mod: GC

## 2019-04-15 PROCEDURE — 93298 REM INTERROG DEV EVAL SCRMS: CPT

## 2019-04-15 PROCEDURE — 93299: CPT

## 2019-04-15 NOTE — SOCIAL HISTORY
[One fall no injury in past year] : Patient reported one fall in the past year without injury [Smoke Detector] : smoke detector [Carbon Monoxide Detector] : carbon monoxide detector [Safety elements used in home] : safety elements used in home [Shower Chair] : shower chair [Grab Bars] : grab bars [Anti-Slip Measures] : anti-slip measures [Seat Belt] :  uses seat belt [Sunscreen] : uses sunscreen [FreeTextEntry2] : excellent [FreeTextEntry1] : sae Garcia 12 hours/week (3 days a week for 4 hours) [FreeTextEntry3] : None [FreeTextEntry4] : None [Night Light] : no night light [Guns at Home] : no guns at home [Travel to Developing Areas] : does not  travel to developing areas [TB Exposure] : no exposure to tuberculosis [Driving] : not driving [de-identified] : Needs help in all activities

## 2019-04-15 NOTE — END OF VISIT
[FreeTextEntry3] : 89 yo woman brought in by her daughter, Sondra for a routine checkup. Patient has not had complaint besides her chronic Rt knee OA- pain.  Pain has been successfully managed with medical marijuana since March 14, 2019 and doing well. "She has not had any flare-ups since-" Sleeps well. Her cognitive losses are progressing:Very forgetful, repetitive, no longer following television shows, always recognizes daughter. No agitation. Daughter denies stress, states she can cope because her mother sleeps very well at night\par Her cardiac management has been Dr Johnny Leija, on Xarelto 20mg qdand Lisinopril 10mg qd, Furosemide 40mg qd and spironolactone 25mg qd. patient is also on Cytomel 5mg qd and is followed by Dr Barlow, GI, for her HepC\par PE unremarkable, patient looks well, lungs clear , heart SR, no pedal edema, able to stand unassisted , but gait unsteady. She has no pain upon standing and is able to take 2-3 steps with minimal assist, no dizziness.\par Plan: Patient needs 24/7, which is currently provided by her daughter. Patient is very pleasant, rarely agitated, an docile. Continue MM as well as meds, as per cardiology.\par I will recheck CMP and TSH
normal...

## 2019-04-15 NOTE — HISTORY OF PRESENT ILLNESS
[Moderate] : Stage: Moderate [Stable] : Status: Stable [Memory Lapses Or Loss] : stable memory impairment [Patient Observed To Be Agitated] : denies agitation [Hostility Toward Caregivers] : stable aggression [Sleep Disturbances] : denies sleep disturbances [] : denies wandering [Fixed Beliefs Contradicted By Reality (Delusions)] : denies delusions [Difficulty Finding Desired Words] : denies difficulty finding desired words [FreeTextEntry1] : 89 yo woman who lives with her daughter, Sondra (Shares HCP with her brother Addy, tel [phone]; and [phone]). Patient has been quite well, no falls, very sedentary, uses a walker, needs help/supervision with getting in and out of shower. daughter does all shopping and finances. Does not attend day program, stays home. PMH: HTN, Hep C, Superficial Venous Thrombosis Right LE (Xarelto started July 20), dementia and osteoarthritis presents for follow up.\par  Main complaint is Rt knee OA- followed by rheumatology, Dr Webster, no injections. has been managed with medical marijuana since March 14, 2019 and doing well. "She has not had any flareups since-" Sleeps well.\par Very forgetful, repetitive, no longer following television shows, always recognizes daughter. No agitation\par Delia states she is doing OK because " she sleeps so well at night"

## 2019-04-15 NOTE — PHYSICAL EXAM
[General Appearance - Alert] : alert [General Appearance - In No Acute Distress] : in no acute distress [Sclera] : the sclera and conjunctiva were normal [PERRL With Normal Accommodation] : pupils were equal in size, round, and reactive to light [Extraocular Movements] : extraocular movements were intact [Normal Oral Mucosa] : normal oral mucosa [No Oral Cyanosis] : no oral cyanosis [No Oral Pallor] : no oral pallor [Outer Ear] : the ears and nose were normal in appearance [Neck Appearance] : the appearance of the neck was normal [Oropharynx] : The oropharynx was normal [Neck Cervical Mass (___cm)] : no neck mass was observed [Jugular Venous Distention Increased] : there was no jugular-venous distention [Thyroid Diffuse Enlargement] : the thyroid was not enlarged [Thyroid Nodule] : there were no palpable thyroid nodules [Auscultation Breath Sounds / Voice Sounds] : lungs were clear to auscultation bilaterally [Heart Sounds] : normal S1 and S2 [Heart Sounds Gallop] : no gallops [Heart Rate And Rhythm] : heart rate was normal and rhythm regular [Heart Sounds Pericardial Friction Rub] : no pericardial rub [Murmurs] : no murmurs [Breast Appearance] : normal in appearance [Breast Palpation Mass] : no palpable masses [Bowel Sounds] : normal bowel sounds [Abdomen Soft] : soft [Abdomen Tenderness] : non-tender [Abdomen Mass (___ Cm)] : no abdominal mass palpated [No CVA Tenderness] : no ~M costovertebral angle tenderness [No Spinal Tenderness] : no spinal tenderness [Nail Clubbing] : no clubbing  or cyanosis of the fingernails [Abnormal Walk] : normal gait [Musculoskeletal - Swelling] : no joint swelling seen [Motor Tone] : muscle strength and tone were normal [Skin Color & Pigmentation] : normal skin color and pigmentation [Skin Turgor] : normal skin turgor [] : no rash [Deep Tendon Reflexes (DTR)] : deep tendon reflexes were 2+ and symmetric [Sensation] : the sensory exam was normal to light touch and pinprick [No Focal Deficits] : no focal deficits

## 2019-04-16 LAB
ALBUMIN SERPL ELPH-MCNC: 4 G/DL
ALP BLD-CCNC: 88 U/L
ALT SERPL-CCNC: 34 U/L
ANION GAP SERPL CALC-SCNC: 14 MMOL/L
AST SERPL-CCNC: 57 U/L
BILIRUB SERPL-MCNC: 0.2 MG/DL
BUN SERPL-MCNC: 18 MG/DL
CALCIUM SERPL-MCNC: 9.5 MG/DL
CHLORIDE SERPL-SCNC: 97 MMOL/L
CO2 SERPL-SCNC: 24 MMOL/L
CREAT SERPL-MCNC: 0.8 MG/DL
GLUCOSE SERPL-MCNC: 88 MG/DL
POTASSIUM SERPL-SCNC: 5 MMOL/L
PROT SERPL-MCNC: 6.8 G/DL
SODIUM SERPL-SCNC: 135 MMOL/L
TSH SERPL-ACNC: 0.85 UIU/ML

## 2019-05-02 ENCOUNTER — MOBILE ON CALL (OUTPATIENT)
Age: 84
End: 2019-05-02

## 2019-05-05 ENCOUNTER — FORM ENCOUNTER (OUTPATIENT)
Age: 84
End: 2019-05-05

## 2019-05-06 ENCOUNTER — APPOINTMENT (OUTPATIENT)
Dept: RHEUMATOLOGY | Facility: CLINIC | Age: 84
End: 2019-05-06
Payer: MEDICARE

## 2019-05-06 VITALS
BODY MASS INDEX: 25.75 KG/M2 | HEART RATE: 69 BPM | DIASTOLIC BLOOD PRESSURE: 69 MMHG | WEIGHT: 150 LBS | RESPIRATION RATE: 16 BRPM | OXYGEN SATURATION: 96 % | SYSTOLIC BLOOD PRESSURE: 121 MMHG | TEMPERATURE: 97.8 F

## 2019-05-06 PROCEDURE — 20610 DRAIN/INJ JOINT/BURSA W/O US: CPT | Mod: LT

## 2019-05-06 PROCEDURE — 99213 OFFICE O/P EST LOW 20 MIN: CPT | Mod: 25

## 2019-05-06 PROCEDURE — 73560 X-RAY EXAM OF KNEE 1 OR 2: CPT | Mod: RT

## 2019-05-06 RX ORDER — METHYLPRED ACET/NACL,ISO-OS/PF 40 MG/ML
40 VIAL (ML) INJECTION
Qty: 1 | Refills: 0 | Status: COMPLETED | OUTPATIENT
Start: 2019-05-06

## 2019-05-06 RX ORDER — LIDOCAINE HYDROCHLORIDE 10 MG/ML
1 INJECTION, SOLUTION INFILTRATION; PERINEURAL
Refills: 0 | Status: COMPLETED | OUTPATIENT
Start: 2019-05-06

## 2019-05-06 RX ADMIN — LIDOCAINE HYDROCHLORIDE %: 10 INJECTION, SOLUTION INFILTRATION; PERINEURAL at 00:00

## 2019-05-06 RX ADMIN — METHYLPREDNISOLONE ACETATE 1 MG/ML: 40 INJECTION, SUSPENSION INTRA-ARTICULAR; INTRALESIONAL; INTRAMUSCULAR; SOFT TISSUE at 00:00

## 2019-05-16 ENCOUNTER — APPOINTMENT (OUTPATIENT)
Dept: ELECTROPHYSIOLOGY | Facility: HOSPITAL | Age: 84
End: 2019-05-16
Payer: MEDICARE

## 2019-05-16 PROCEDURE — 93298 REM INTERROG DEV EVAL SCRMS: CPT

## 2019-05-16 PROCEDURE — 93299: CPT

## 2019-05-18 ENCOUNTER — INPATIENT (INPATIENT)
Facility: HOSPITAL | Age: 84
LOS: 3 days | Discharge: HOME | DRG: 643 | End: 2019-05-22
Attending: HOSPITALIST | Admitting: STUDENT IN AN ORGANIZED HEALTH CARE EDUCATION/TRAINING PROGRAM
Payer: MEDICARE

## 2019-05-18 VITALS
TEMPERATURE: 98 F | HEART RATE: 67 BPM | WEIGHT: 149.91 LBS | DIASTOLIC BLOOD PRESSURE: 85 MMHG | SYSTOLIC BLOOD PRESSURE: 144 MMHG | HEIGHT: 65 IN | OXYGEN SATURATION: 100 % | RESPIRATION RATE: 16 BRPM

## 2019-05-18 DIAGNOSIS — R55 SYNCOPE AND COLLAPSE: ICD-10-CM

## 2019-05-18 DIAGNOSIS — I10 ESSENTIAL (PRIMARY) HYPERTENSION: ICD-10-CM

## 2019-05-18 DIAGNOSIS — E03.9 HYPOTHYROIDISM, UNSPECIFIED: ICD-10-CM

## 2019-05-18 DIAGNOSIS — G93.40 ENCEPHALOPATHY, UNSPECIFIED: ICD-10-CM

## 2019-05-18 DIAGNOSIS — I82.409 ACUTE EMBOLISM AND THROMBOSIS OF UNSPECIFIED DEEP VEINS OF UNSPECIFIED LOWER EXTREMITY: ICD-10-CM

## 2019-05-18 DIAGNOSIS — M19.90 UNSPECIFIED OSTEOARTHRITIS, UNSPECIFIED SITE: ICD-10-CM

## 2019-05-18 DIAGNOSIS — F03.90 UNSPECIFIED DEMENTIA WITHOUT BEHAVIORAL DISTURBANCE: ICD-10-CM

## 2019-05-18 DIAGNOSIS — Z95.818 PRESENCE OF OTHER CARDIAC IMPLANTS AND GRAFTS: Chronic | ICD-10-CM

## 2019-05-18 DIAGNOSIS — Z29.9 ENCOUNTER FOR PROPHYLACTIC MEASURES, UNSPECIFIED: ICD-10-CM

## 2019-05-18 DIAGNOSIS — I50.30 UNSPECIFIED DIASTOLIC (CONGESTIVE) HEART FAILURE: ICD-10-CM

## 2019-05-18 DIAGNOSIS — E87.1 HYPO-OSMOLALITY AND HYPONATREMIA: ICD-10-CM

## 2019-05-18 DIAGNOSIS — B19.20 UNSPECIFIED VIRAL HEPATITIS C WITHOUT HEPATIC COMA: ICD-10-CM

## 2019-05-18 LAB
ALBUMIN SERPL ELPH-MCNC: 3.7 G/DL — SIGNIFICANT CHANGE UP (ref 3.3–5)
ALP SERPL-CCNC: 78 U/L — SIGNIFICANT CHANGE UP (ref 40–120)
ALT FLD-CCNC: 28 U/L — SIGNIFICANT CHANGE UP (ref 10–45)
ANION GAP SERPL CALC-SCNC: 11 MMOL/L — SIGNIFICANT CHANGE UP (ref 5–17)
ANION GAP SERPL CALC-SCNC: 12 MMOL/L — SIGNIFICANT CHANGE UP (ref 5–17)
APPEARANCE UR: CLEAR — SIGNIFICANT CHANGE UP
APTT BLD: 34.3 SEC — SIGNIFICANT CHANGE UP (ref 27.5–36.3)
AST SERPL-CCNC: 40 U/L — SIGNIFICANT CHANGE UP (ref 10–40)
BACTERIA # UR AUTO: NEGATIVE — SIGNIFICANT CHANGE UP
BASOPHILS # BLD AUTO: 0 K/UL — SIGNIFICANT CHANGE UP (ref 0–0.2)
BASOPHILS NFR BLD AUTO: 0.1 % — SIGNIFICANT CHANGE UP (ref 0–2)
BILIRUB SERPL-MCNC: 0.2 MG/DL — SIGNIFICANT CHANGE UP (ref 0.2–1.2)
BILIRUB UR-MCNC: NEGATIVE — SIGNIFICANT CHANGE UP
BUN SERPL-MCNC: 16 MG/DL — SIGNIFICANT CHANGE UP (ref 7–23)
BUN SERPL-MCNC: 17 MG/DL — SIGNIFICANT CHANGE UP (ref 7–23)
CALCIUM SERPL-MCNC: 9.2 MG/DL — SIGNIFICANT CHANGE UP (ref 8.4–10.5)
CALCIUM SERPL-MCNC: 9.4 MG/DL — SIGNIFICANT CHANGE UP (ref 8.4–10.5)
CHLORIDE SERPL-SCNC: 91 MMOL/L — LOW (ref 96–108)
CHLORIDE SERPL-SCNC: 99 MMOL/L — SIGNIFICANT CHANGE UP (ref 96–108)
CO2 SERPL-SCNC: 21 MMOL/L — LOW (ref 22–31)
CO2 SERPL-SCNC: 23 MMOL/L — SIGNIFICANT CHANGE UP (ref 22–31)
COLOR SPEC: YELLOW — SIGNIFICANT CHANGE UP
CREAT SERPL-MCNC: 0.67 MG/DL — SIGNIFICANT CHANGE UP (ref 0.5–1.3)
CREAT SERPL-MCNC: 0.78 MG/DL — SIGNIFICANT CHANGE UP (ref 0.5–1.3)
DIFF PNL FLD: NEGATIVE — SIGNIFICANT CHANGE UP
EOSINOPHIL # BLD AUTO: 0.1 K/UL — SIGNIFICANT CHANGE UP (ref 0–0.5)
EOSINOPHIL NFR BLD AUTO: 0.9 % — SIGNIFICANT CHANGE UP (ref 0–6)
EPI CELLS # UR: 3 /HPF — SIGNIFICANT CHANGE UP
GLUCOSE SERPL-MCNC: 108 MG/DL — HIGH (ref 70–99)
GLUCOSE SERPL-MCNC: 181 MG/DL — HIGH (ref 70–99)
GLUCOSE UR QL: NEGATIVE — SIGNIFICANT CHANGE UP
HCT VFR BLD CALC: 37.2 % — SIGNIFICANT CHANGE UP (ref 34.5–45)
HGB BLD-MCNC: 12.9 G/DL — SIGNIFICANT CHANGE UP (ref 11.5–15.5)
HYALINE CASTS # UR AUTO: 5 /LPF — HIGH (ref 0–2)
INR BLD: 1.21 RATIO — HIGH (ref 0.88–1.16)
KETONES UR-MCNC: NEGATIVE — SIGNIFICANT CHANGE UP
LEUKOCYTE ESTERASE UR-ACNC: ABNORMAL
LYMPHOCYTES # BLD AUTO: 1.5 K/UL — SIGNIFICANT CHANGE UP (ref 1–3.3)
LYMPHOCYTES # BLD AUTO: 14.1 % — SIGNIFICANT CHANGE UP (ref 13–44)
MCHC RBC-ENTMCNC: 32.1 PG — SIGNIFICANT CHANGE UP (ref 27–34)
MCHC RBC-ENTMCNC: 34.6 GM/DL — SIGNIFICANT CHANGE UP (ref 32–36)
MCV RBC AUTO: 92.7 FL — SIGNIFICANT CHANGE UP (ref 80–100)
MONOCYTES # BLD AUTO: 1.3 K/UL — HIGH (ref 0–0.9)
MONOCYTES NFR BLD AUTO: 12.6 % — SIGNIFICANT CHANGE UP (ref 2–14)
NEUTROPHILS # BLD AUTO: 7.5 K/UL — HIGH (ref 1.8–7.4)
NEUTROPHILS NFR BLD AUTO: 72.3 % — SIGNIFICANT CHANGE UP (ref 43–77)
NITRITE UR-MCNC: NEGATIVE — SIGNIFICANT CHANGE UP
NT-PROBNP SERPL-SCNC: 36 PG/ML — SIGNIFICANT CHANGE UP (ref 0–300)
PH UR: 7 — SIGNIFICANT CHANGE UP (ref 5–8)
PLATELET # BLD AUTO: 246 K/UL — SIGNIFICANT CHANGE UP (ref 150–400)
POTASSIUM SERPL-MCNC: 4.4 MMOL/L — SIGNIFICANT CHANGE UP (ref 3.5–5.3)
POTASSIUM SERPL-MCNC: 5.5 MMOL/L — HIGH (ref 3.5–5.3)
POTASSIUM SERPL-SCNC: 4.4 MMOL/L — SIGNIFICANT CHANGE UP (ref 3.5–5.3)
POTASSIUM SERPL-SCNC: 5.5 MMOL/L — HIGH (ref 3.5–5.3)
PROT SERPL-MCNC: 6.6 G/DL — SIGNIFICANT CHANGE UP (ref 6–8.3)
PROT UR-MCNC: SIGNIFICANT CHANGE UP
PROTHROM AB SERPL-ACNC: 14 SEC — HIGH (ref 10–12.9)
RBC # BLD: 4.02 M/UL — SIGNIFICANT CHANGE UP (ref 3.8–5.2)
RBC # FLD: 12.6 % — SIGNIFICANT CHANGE UP (ref 10.3–14.5)
RBC CASTS # UR COMP ASSIST: 3 /HPF — SIGNIFICANT CHANGE UP (ref 0–4)
SODIUM SERPL-SCNC: 125 MMOL/L — LOW (ref 135–145)
SODIUM SERPL-SCNC: 132 MMOL/L — LOW (ref 135–145)
SP GR SPEC: 1.02 — SIGNIFICANT CHANGE UP (ref 1.01–1.02)
TROPONIN T, HIGH SENSITIVITY RESULT: 17 NG/L — SIGNIFICANT CHANGE UP (ref 0–51)
TROPONIN T, HIGH SENSITIVITY RESULT: 20 NG/L — SIGNIFICANT CHANGE UP (ref 0–51)
UROBILINOGEN FLD QL: NEGATIVE — SIGNIFICANT CHANGE UP
WBC # BLD: 10.4 K/UL — SIGNIFICANT CHANGE UP (ref 3.8–10.5)
WBC # FLD AUTO: 10.4 K/UL — SIGNIFICANT CHANGE UP (ref 3.8–10.5)
WBC UR QL: 10 /HPF — HIGH (ref 0–5)

## 2019-05-18 PROCEDURE — 99223 1ST HOSP IP/OBS HIGH 75: CPT | Mod: GC

## 2019-05-18 PROCEDURE — 93010 ELECTROCARDIOGRAM REPORT: CPT

## 2019-05-18 PROCEDURE — 99285 EMERGENCY DEPT VISIT HI MDM: CPT | Mod: 25

## 2019-05-18 PROCEDURE — 70450 CT HEAD/BRAIN W/O DYE: CPT | Mod: 26

## 2019-05-18 PROCEDURE — 71045 X-RAY EXAM CHEST 1 VIEW: CPT | Mod: 26

## 2019-05-18 RX ORDER — SPIRONOLACTONE 25 MG/1
1 TABLET, FILM COATED ORAL
Qty: 0 | Refills: 0 | DISCHARGE

## 2019-05-18 RX ORDER — LISINOPRIL 2.5 MG/1
10 TABLET ORAL DAILY
Refills: 0 | Status: DISCONTINUED | OUTPATIENT
Start: 2019-05-18 | End: 2019-05-22

## 2019-05-18 RX ORDER — AMLODIPINE BESYLATE 2.5 MG/1
1 TABLET ORAL
Qty: 0 | Refills: 0 | DISCHARGE

## 2019-05-18 RX ORDER — SODIUM POLYSTYRENE SULFONATE 4.1 MEQ/G
15 POWDER, FOR SUSPENSION ORAL ONCE
Refills: 0 | Status: COMPLETED | OUTPATIENT
Start: 2019-05-18 | End: 2019-05-18

## 2019-05-18 RX ORDER — SODIUM CHLORIDE 9 MG/ML
500 INJECTION INTRAMUSCULAR; INTRAVENOUS; SUBCUTANEOUS ONCE
Refills: 0 | Status: COMPLETED | OUTPATIENT
Start: 2019-05-18 | End: 2019-05-18

## 2019-05-18 RX ORDER — LIOTHYRONINE SODIUM 25 UG/1
5 TABLET ORAL DAILY
Refills: 0 | Status: DISCONTINUED | OUTPATIENT
Start: 2019-05-18 | End: 2019-05-22

## 2019-05-18 RX ORDER — SPIRONOLACTONE 25 MG/1
25 TABLET, FILM COATED ORAL DAILY
Refills: 0 | Status: DISCONTINUED | OUTPATIENT
Start: 2019-05-18 | End: 2019-05-22

## 2019-05-18 RX ADMIN — LISINOPRIL 10 MILLIGRAM(S): 2.5 TABLET ORAL at 21:06

## 2019-05-18 RX ADMIN — SODIUM CHLORIDE 1000 MILLILITER(S): 9 INJECTION INTRAMUSCULAR; INTRAVENOUS; SUBCUTANEOUS at 16:44

## 2019-05-18 NOTE — ED ADULT NURSE REASSESSMENT NOTE - NS ED NURSE REASSESS COMMENT FT1
Pt admitted to telemetry for syncope. Pt receive admitted bed assignment on 3DSU. Report called to Jeanette DUPONT VSS. Off-tele order in place for transport to admitted floor.

## 2019-05-18 NOTE — H&P ADULT - PROBLEM SELECTOR PLAN 2
- hyponatremia with hypochloremia, does not appear to be volume overloaded on exam and rather on the dryer side, more consistent with dehydration   - will repeat BMP given 500cc bolus and hold off on the lasix for now - hyponatremia with hypochloremia, does not appear to be volume overloaded on exam and rather on the dryer side, more consistent with dehydration   - will repeat BMP given 500cc bolus and hold off on the lasix for now  - goal correction 6 to 8 meq, will aim for Na of 131 for the next 24 hrs

## 2019-05-18 NOTE — H&P ADULT - PROBLEM SELECTOR PLAN 3
- blood pressures within appropriate range  - will continue with ACEi and spironolactone, will hold lasix at this time -- given that patient does not appear volume overloaded and hypochloremia with hyponatremia more suggestive of dehydration

## 2019-05-18 NOTE — PROCEDURE NOTE - ADDITIONAL PROCEDURE DETAILS
[employer] Reveal LINQ interrogation    Current rhythm: Regular with rate of 70    Tachy detection ON: Interval 440ms(136 bpm)  Leander detection ON: Interval 2000ms(30 bpm)  Pause detection ON: 3 seconds    One episode of tachycardia up to 140 on February 26th, 2019 lasting 51 seconds.    No arrhythmias noted correlating with patient's syncopal event.

## 2019-05-18 NOTE — ED PROVIDER NOTE - NS ED ROS FT
GENERAL: No fever or chills, EYES: no change in vision, HEENT: no trouble swallowing or speaking, CARDIAC: no chest pain, PULMONARY: no cough, +SOB, GI: no abdominal pain, no nausea, no vomiting, no diarrhea or constipation, : No changes in urination, SKIN: no rashes, NEURO: no headache,  MSK: No joint pain ~Leigha Ramos M.D. Resident

## 2019-05-18 NOTE — ED PROVIDER NOTE - PHYSICAL EXAMINATION
Gen: AAOx3, non-toxic  Head: NCAT  HEENT: EOMI, oral mucosa moist, normal conjunctiva  Lung: CTAB, no respiratory distress, no wheezes/rhonchi/rales B/L, speaking in full sentences  CV: RRR, no murmurs, rubs or gallops, trace BLLE edema  Abd: soft, NTND, no guarding  MSK: no visible deformities  Neuro: No focal sensory or motor deficits  Skin: Warm, well perfused, no rash  Psych: normal affect.   ~Leigha Ramos M.D. Resident

## 2019-05-18 NOTE — H&P ADULT - NSHPPHYSICALEXAM_GEN_ALL_CORE
General: elderly female, NAD  HEENT: 1mm pupils, reactive to light. EOMI, MMM  Neck: supple, no JVD  Cardiac: RRR, normal s1 and s2, no murmur  Lungs: CTAB, no wheezes, rales or rhonchi  Abdomen: soft, nondistended abdomen, bowel sounds present. non TTP  Extremities: no cyanosis, pitting or edema. Palpable pulses bilaterally.   Skin: no open lesions or sores   Neuro: Alert (place, does not know name) and year. Left sided tongue deviation to the left and facial asymmetry on smile, otherwise rest of Cr nerves intact, 5/5 strength in UE and LE Vital Signs Last 24 Hrs  T(F): 99.6 (18 May 2019 20:52), Max: 99.6 (18 May 2019 20:52)  HR: 64 (18 May 2019 20:52) (61 - 68)  BP: 169/78 (18 May 2019 20:52) (123/69 - 169/78)  RR: 18 (18 May 2019 20:52) (16 - 20)  SpO2: 99% (18 May 2019 20:52) (98% - 100%)    General: elderly female, NAD  HEENT: 1mm pupils, reactive to light. EOMI, MMM  Neck: supple, no JVD  Cardiac: RRR, normal s1 and s2, no murmur  Lungs: CTAB, no wheezes, rales or rhonchi  Abdomen: soft, nondistended abdomen, bowel sounds present. non TTP  Extremities: no cyanosis, pitting or edema. Palpable pulses bilaterally.   Skin: no open lesions or sores   Neuro: Alert (place, does not know name) and year. Left sided tongue deviation to the left and facial asymmetry on smile, otherwise rest of Cr nerves intact, 5/5 strength in UE and LE

## 2019-05-18 NOTE — ED ADULT NURSE NOTE - OBJECTIVE STATEMENT
89y female brought in by EMS from home c/o syncope. A&Ox3 and VSS with daughter at bedside. Hx of HTN, HLD, CHF, Hep C, hypothyroidism and metronix loop recorder in place. Daughter states pt was sitting at table eating breakfast around 11am when she stated she was feeling tired; pt then put head down on table and daughter reports she was unable to arouse pt for approx 3min. Daughter states pt was "foggy" after syncopal episodes but returned to baseline in a few minutes. Daughter reports similar episode in February. EMS place 20g IV in right forearm and administer approx 200mL normal saline, blood glucose 129 for EMS. Upon arrival to ED, code EKG called and cardiac monitor applied. EKG performed upon pt arrival to [facility] area. Pt denies chest pain, fever/chills, n/v/d. Denies abdominal pain and urinary symptoms. Endorses intermittent sob for "a while", pt unable to quantify. Upon exam, PERRLA 3mm, face symmetrical, extremities strong, sensation intact. Lungs clear. Abdomen soft, nontender.

## 2019-05-18 NOTE — H&P ADULT - ASSESSMENT
89 year old female with hx of osteoarthritis (R-knee hemiarthrosis now on medical marijuana for pain), dementia, HCV (in remission) HTN, HFpEF (stage I diastolic dysfunction), HTN, Hypothyroidism, DVT in 2017 on Xarelto and a recent admission in February for a syncopal event who presents for near syncope 89yoF w/ PMHx significant for osteoarthritis (R-knee hemiarthrosis now on medical marijuana for pain), dementia, HCV (in remission) HTN, HFpEF (stage I diastolic dysfunction), HTN, Hypothyroidism, DVT in 2017 on Xarelto and a recent admission in February for a syncopal event who presents for near syncope

## 2019-05-18 NOTE — H&P ADULT - PROBLEM SELECTOR PLAN 4
- baseline very forgetful and repetitive as per last Geriatrician note in April  - not on any dementia medications, will monitor at this time   - fall precautions

## 2019-05-18 NOTE — H&P ADULT - NSHPREVIEWOFSYSTEMS_GEN_ALL_CORE
General: denies any fevers, chills, nausea or vomiting   HEENT: denies any vision changes right now, trouble or pain when she swallows  Cardiac: denies any chest pain, pressure or palpitations  Lungs: denies cough, difficulty breathing or pain with inspiration  Abdomen: denies any abdominal pain or discomfort, diarrhea, nausea or vomiting   Extremities: denies any leg swelling, numbness or tingling   Skin: denies any open lesions or sores  Neuro: denies any dizziness right now, confusion that is out of the ordinary, any focal weakness or numbness   Psych: denies any SI/HI   Heme: no reported bleeding or bruising

## 2019-05-18 NOTE — ED ADULT NURSE REASSESSMENT NOTE - NS ED NURSE REASSESS COMMENT FT1
Troponin 20. Repeat troponin to be performed at 1600. Pt resting calmly. No acute distress noted. Daughter at bedside.

## 2019-05-18 NOTE — ED ADULT NURSE REASSESSMENT NOTE - NS ED NURSE REASSESS COMMENT FT1
Repeat troponin sent at 1600. Repeat vital signs performed. Brandon DOZIER states pt okay to eat, turkey sandwich given Repeat troponin sent at 1600. Repeat vital signs performed. Brandon DOZIER states pt okay to eat, daughter give pt food.

## 2019-05-18 NOTE — ED PROVIDER NOTE - OBJECTIVE STATEMENT
89 yo F PMHx OA, mild dementia (A&Ox3), HCV (in remission), HTN, HFpEF/grade 1 DHF, HTN, hypothyroidism, hx of syncope in February, p/w syncope. Pt was sitting at breakfast table and daughter witnessed her slump over and lose consciousness x 3 minutes. She did not hit her head. No shaking during episode. Pt reports intermittent SOB but nothing new today. Denies CP/palpitations, N/V, abd pain, fevers/chills.

## 2019-05-18 NOTE — H&P ADULT - NSHPLABSRESULTS_GEN_ALL_CORE
EKG, imaging and results personally reviewed by me     EKst degree AVB, RBBB pattern, TWI in V1, V2                          12.9   10.4  )-----------( 246      ( 18 May 2019 12:54 )             37.2        125<L>  |  91<L>  |  17  ----------------------------<  108<H>  4.4   |  23  |  0.67    Ca    9.4      18 May 2019 12:54    TPro  6.6  /  Alb  3.7  /  TBili  0.2  /  DBili  x   /  AST  40  /  ALT  28  /  AlkPhos  78      PT/INR - ( 18 May 2019 12:54 )   PT: 14.0 sec;   INR: 1.21 ratio         PTT - ( 18 May 2019 12:54 )  PTT:34.3 sec    Trop T 20-->17     < from: Xray Chest 1 View- PORTABLE-Urgent (19 @ 12:55) >    FINDINGS:   Loop recorder overlies left heart.  Thoracic aortic atheromatous changes and ectasia are stable.  Stable slight cardiomegaly.  No central congestive changes.  No focal consolidation. No pleural effusion or pneumothorax  No acute bony finding.    IMPRESSION:   Stable chest.  Negative for acute pulmonary process.    < end of copied text >

## 2019-05-18 NOTE — ED PROVIDER NOTE - ATTENDING CONTRIBUTION TO CARE
89 y/o f with pmhx arthritis, Hep C, HTN, Hypothyroidism, dementia, presents by EMS from home for eval of syncope episode. patient was sitting at table with her daughter and started to daze off and then had brief LOC. had this once before in Feb with same symptoms. no head injury. no vomiting. no incontinence of bowel or bladder. no tongue biting. no chest pain pre or post.   Gen.  no acute distress  HEENT:  perrl eomi   Lungs:  b/l bs  CVS: S1S2   Abd;  soft non tender   Ext: no pitting edema or erythema  Neuro: awake, alert, oriented to person and place but not time.   MSK:  strength 5/5 b/l upper and lower ext

## 2019-05-18 NOTE — ED PROVIDER NOTE - PR
216 first deg avb Heparin, Zosyn, D5 + 1/2 NS @ 75 ml/hr, Keppra, Digoxin, Cardizem, Morphine, Colace, Zofran

## 2019-05-18 NOTE — H&P ADULT - PROBLEM SELECTOR PLAN 5
- patient with HFpEF: last ECHO in 2017 with EF of 68% percent with mild diastolic dysfunction  - c/w ACEi and aldactone, will hold lasix at this time as above

## 2019-05-18 NOTE — H&P ADULT - PROBLEM SELECTOR PLAN 6
- hx of RLE DVT in July 2018 as an isolated event, unclear as to why Xarelto is still continued almost a year later   - will hold off on at this time, and if no clear indication (no prior hx of malignancy, inflammatory disease with repeat blood clots), inclined to hold going forward - hx of RLE DVT in July 2018 as an isolated event, unclear as to why Xarelto is still continued almost a year later   - will hold off on at this time while CTH is pending to rule out bleed however patient has no prior hx of malignancy, inflammatory disease or repeat blood clot. Would investigate role for NOAC further and start prophylactic AC if CTH is unremarkable - hx of RLE DVT in July 2018 as an isolated event, unclear as to why Xarelto is still continued almost a year later   - will hold off on at this time while CTH is pending to rule out bleed however patient has no prior hx of malignancy, inflammatory disease or repeat blood clot. Would investigate role for NOAC further and start prophylactic AC if CTH is unremarkable.   - need to weigh risks vs benefits given age and dementia of AC in setting of fall risk

## 2019-05-18 NOTE — H&P ADULT - PROBLEM SELECTOR PLAN 8
- reportedly in remission and as per notes follows with Dr. Barlow  - will need to obtain records from [facility] GI doctor (none noted in allscripts)

## 2019-05-18 NOTE — H&P ADULT - PROBLEM SELECTOR PLAN 1
- patient brought in for near syncopal event with reported blank like stare for a period of 3 minutes  - DDx: Structural vs cardiac vs infectious vs metabolic: 1) Structural: will get CTH, noted left tongue deviation and facial asymmetry (outside the window for tPN with reported event at 10:45 AM) 2) cardiac less likely : patient has an implantible loop recorder for trifasicular block which was interrogated today with no significant events noted to explain episode today, troponemia trended down 3) infectious: no reported infectious symptoms, CXR clear without consolidations, will get UA to assess for UTI - - otherwise monitor off of antibiotics 4) metabolic: patient hyponatremic to 125 and hypochloremic -- possibly suggestive of dehydration , will repeat BMP after 500cc bolus, will also check TSH, FT4 levels - patient brought in for near syncopal event with reported blank like stare for a period of 3 minutes  - DDx: Structural vs cardiac vs infectious vs metabolic: 1) Structural: will get CTH, noted left tongue deviation and facial asymmetry (outside the window for tPN with reported event at 10:45 AM) 2) cardiac less likely : patient has an implantible loop recorder for trifasicular block which was interrogated today with no significant events noted to explain episode today, troponemia trended down 3) infectious: no reported infectious symptoms, CXR clear without consolidations, will get UA to assess for UTI - - otherwise monitor off of antibiotics 4) metabolic: patient hyponatremic to 125 and hypochloremic -- possibly suggestive of dehydration , will repeat BMP after 500cc bolus, will also check TSH, FT4 levels  - if patient has another episode, will get spot EEG - patient brought in for near syncopal event with reported blank like stare for a period of 3 minutes  - DDx: Structural vs cardiac vs infectious vs metabolic: 1) Structural: will get CTH, noted left tongue deviation and facial asymmetry (outside the window for tPN with reported event at 10:45 AM) 2) cardiac less likely : patient has an implantible loop recorder for trifasicular block which was interrogated today with no significant events noted to explain episode today, troponemia trended down 3) infectious: no reported infectious symptoms, CXR clear without consolidations, will get UA to assess for UTI - - otherwise monitor off of antibiotics 4) metabolic: patient hyponatremic to 125 and hypochloremic -- possibly suggestive of dehydration , will repeat BMP after 500cc bolus, will also check TSH, FT4 levels  - if patient has another episode, will get spot EEG: had one done in 2017 (no evidence of epilepetiform actvity) - patient brought in for near syncopal event with reported blank like stare for a period of 3 minutes  - DDx: Structural vs cardiac vs infectious vs metabolic: 1) Structural: will get CTH, noted left tongue deviation and facial asymmetry (outside the window for tPN with reported event at 10:45 AM). If patient has an acute stroke: will need to start statin and aspirin for secondary prevention 2) cardiac less likely : patient has an implantible loop recorder for trifasicular block which was interrogated today with no significant events noted to explain episode today, troponemia trended down 3) infectious: no reported infectious symptoms, CXR clear without consolidations, will get UA to assess for UTI - - otherwise monitor off of antibiotics 4) metabolic: patient hyponatremic to 125 and hypochloremic -- possibly suggestive of dehydration , will repeat BMP after 500cc bolus, will also check TSH, FT4 levels  - if patient has another episode, will get spot EEG: had one done in 2017 (no evidence of epilepetiform actvity) - patient brought in for near syncopal event with reported blank like stare for a period of 3 minutes  - DDx: Structural vs neurogenic vs. cardiac vs infectious vs metabolic: 1) Structural: will get CTH, noted left tongue deviation and facial asymmetry (outside the window for tPN with reported event at 10:45 AM). If patient has an acute stroke: will need to start statin and aspirin for secondary prevention 2) cardiac less likely : patient has an implantible loop recorder for trifasicular block which was interrogated today with no significant events noted to explain episode today, troponemia trended down 3) infectious: no reported infectious symptoms, CXR clear without consolidations, will get UA to assess for UTI - - otherwise monitor off of antibiotics 4) metabolic: patient hyponatremic to 125 and hypochloremic -- possibly suggestive of dehydration , will repeat BMP after 500cc bolus, will also check TSH, FT4 levels  - if patient has another episode, will get spot EEG: had one done in 2017 (no evidence of epilepetiform actvity)  - could also be progression of underlying dementia

## 2019-05-18 NOTE — H&P ADULT - NSICDXPASTMEDICALHX_GEN_ALL_CORE_FT
PAST MEDICAL HISTORY:  Arthritis     Bifascicular block     Dementia without behavioral disturbance, unspecified dementia type     Hepatitis C     Hypertension     Hypothyroidism

## 2019-05-18 NOTE — H&P ADULT - HISTORY OF PRESENT ILLNESS
This is a 89 year old female with hx of osteoarthritis (R-knee hemiarthrosis now on medical marijuana for pain), dementia, HCV (in remission) HTN, HFpEF (stage I diastolic dysfunction), HTN, Hypothyroidism, DVT in 2017 on Xarelto and a recent admission in February for a syncopal event who presents for near syncope. As per daughter - - patient was sitting in the kitchen this morning and having breakfast with her and told her daughter that she does not feel so well and wanted to sit down on the couch. Daughter had then noted her to put her head down on the table to rest at which point she had asked her mother what was wrong. For a period of 3 minutes, daughter described her mom having a blank stare and when she finally came to - - patient had no recollection of the event but as per daughter, unclear whether this is due to dementia or something new. No reported tongue biting, repetitive motion, urinary or stool incontinence at the time. At 9 am prior to episode - - patient was reportedly fine. No reported fevers, chills, nausea, vomiting or diarrhea, dysuria. Patient reports no burning with urination or increased urinary frequency. Denies any prior hx of seizures.     In the ED:  Afebrile, HR 60s, -144/63-65 RR 16- 20 O2:100  Given 500cc bolus 89yoF w/ PMHx significant for osteoarthritis (R-knee hemiarthrosis now on medical marijuana for pain), dementia, HCV (in remission) HTN, HFpEF (stage I diastolic dysfunction), HTN, Hypothyroidism, DVT in 2017 on Xarelto and a recent admission in February for a syncopal event who presents for near syncope. As per daughter - - patient was sitting in the kitchen this morning and having breakfast with her and told her daughter that she does not feel so well and wanted to sit down on the couch. Daughter had then noted her to put her head down on the table to rest at which point she had asked her mother what was wrong. For a period of 3 minutes, daughter described her mom having a blank stare and when she finally came to - - patient had no recollection of the event but as per daughter, unclear whether this is due to dementia or something new. No reported tongue biting, repetitive motion, urinary or stool incontinence at the time. At 9 am prior to episode - - patient was reportedly fine. No reported fevers, chills, nausea, vomiting or diarrhea, dysuria. Patient reports no burning with urination or increased urinary frequency. Denies any prior hx of seizures.     In the ED:  Afebrile, HR 60s, -144/63-65 RR 16- 20 O2:100  Given 500cc bolus

## 2019-05-18 NOTE — H&P ADULT - PROBLEM SELECTOR PLAN 9
- patient with primary OA of both knees, previously received steroid injections on left knee and now on medical marijuana for right knee OA  - given dementia and reported near syncope, would hold off on narcotics and attempt to treat with tylenol for pain

## 2019-05-19 LAB
ANION GAP SERPL CALC-SCNC: 10 MMOL/L — SIGNIFICANT CHANGE UP (ref 5–17)
ANION GAP SERPL CALC-SCNC: 11 MMOL/L — SIGNIFICANT CHANGE UP (ref 5–17)
APTT BLD: 34.4 SEC — SIGNIFICANT CHANGE UP (ref 27.5–36.3)
BUN SERPL-MCNC: 14 MG/DL — SIGNIFICANT CHANGE UP (ref 7–23)
BUN SERPL-MCNC: 15 MG/DL — SIGNIFICANT CHANGE UP (ref 7–23)
CALCIUM SERPL-MCNC: 8.9 MG/DL — SIGNIFICANT CHANGE UP (ref 8.4–10.5)
CALCIUM SERPL-MCNC: 9.5 MG/DL — SIGNIFICANT CHANGE UP (ref 8.4–10.5)
CHLORIDE SERPL-SCNC: 93 MMOL/L — LOW (ref 96–108)
CHLORIDE SERPL-SCNC: 98 MMOL/L — SIGNIFICANT CHANGE UP (ref 96–108)
CO2 SERPL-SCNC: 21 MMOL/L — LOW (ref 22–31)
CO2 SERPL-SCNC: 26 MMOL/L — SIGNIFICANT CHANGE UP (ref 22–31)
CREAT SERPL-MCNC: 0.67 MG/DL — SIGNIFICANT CHANGE UP (ref 0.5–1.3)
CREAT SERPL-MCNC: 0.71 MG/DL — SIGNIFICANT CHANGE UP (ref 0.5–1.3)
GLUCOSE SERPL-MCNC: 95 MG/DL — SIGNIFICANT CHANGE UP (ref 70–99)
GLUCOSE SERPL-MCNC: 96 MG/DL — SIGNIFICANT CHANGE UP (ref 70–99)
HCT VFR BLD CALC: 34.3 % — LOW (ref 34.5–45)
HGB BLD-MCNC: 11.6 G/DL — SIGNIFICANT CHANGE UP (ref 11.5–15.5)
INR BLD: 1.24 RATIO — HIGH (ref 0.88–1.16)
MAGNESIUM SERPL-MCNC: 2.1 MG/DL — SIGNIFICANT CHANGE UP (ref 1.6–2.6)
MCHC RBC-ENTMCNC: 31.5 PG — SIGNIFICANT CHANGE UP (ref 27–34)
MCHC RBC-ENTMCNC: 33.7 GM/DL — SIGNIFICANT CHANGE UP (ref 32–36)
MCV RBC AUTO: 93.7 FL — SIGNIFICANT CHANGE UP (ref 80–100)
PHOSPHATE SERPL-MCNC: 2.6 MG/DL — SIGNIFICANT CHANGE UP (ref 2.5–4.5)
PLATELET # BLD AUTO: 211 K/UL — SIGNIFICANT CHANGE UP (ref 150–400)
POTASSIUM SERPL-MCNC: 4 MMOL/L — SIGNIFICANT CHANGE UP (ref 3.5–5.3)
POTASSIUM SERPL-MCNC: 4.2 MMOL/L — SIGNIFICANT CHANGE UP (ref 3.5–5.3)
POTASSIUM SERPL-SCNC: 4 MMOL/L — SIGNIFICANT CHANGE UP (ref 3.5–5.3)
POTASSIUM SERPL-SCNC: 4.2 MMOL/L — SIGNIFICANT CHANGE UP (ref 3.5–5.3)
PROTHROM AB SERPL-ACNC: 14.3 SEC — HIGH (ref 10–12.9)
RBC # BLD: 3.66 M/UL — LOW (ref 3.8–5.2)
RBC # FLD: 12.8 % — SIGNIFICANT CHANGE UP (ref 10.3–14.5)
SODIUM SERPL-SCNC: 129 MMOL/L — LOW (ref 135–145)
SODIUM SERPL-SCNC: 130 MMOL/L — LOW (ref 135–145)
T4 FREE SERPL-MCNC: 1.2 NG/DL — SIGNIFICANT CHANGE UP (ref 0.9–1.8)
TSH SERPL-MCNC: 0.99 UIU/ML — SIGNIFICANT CHANGE UP (ref 0.27–4.2)
WBC # BLD: 8.1 K/UL — SIGNIFICANT CHANGE UP (ref 3.8–10.5)
WBC # FLD AUTO: 8.1 K/UL — SIGNIFICANT CHANGE UP (ref 3.8–10.5)

## 2019-05-19 PROCEDURE — 99233 SBSQ HOSP IP/OBS HIGH 50: CPT

## 2019-05-19 RX ORDER — ENOXAPARIN SODIUM 100 MG/ML
40 INJECTION SUBCUTANEOUS DAILY
Refills: 0 | Status: DISCONTINUED | OUTPATIENT
Start: 2019-05-19 | End: 2019-05-22

## 2019-05-19 RX ADMIN — LISINOPRIL 10 MILLIGRAM(S): 2.5 TABLET ORAL at 13:10

## 2019-05-19 RX ADMIN — ENOXAPARIN SODIUM 40 MILLIGRAM(S): 100 INJECTION SUBCUTANEOUS at 13:10

## 2019-05-19 RX ADMIN — LIOTHYRONINE SODIUM 5 MICROGRAM(S): 25 TABLET ORAL at 05:34

## 2019-05-19 RX ADMIN — SODIUM POLYSTYRENE SULFONATE 15 GRAM(S): 4.1 POWDER, FOR SUSPENSION ORAL at 00:04

## 2019-05-19 RX ADMIN — SPIRONOLACTONE 25 MILLIGRAM(S): 25 TABLET, FILM COATED ORAL at 05:34

## 2019-05-19 NOTE — PROGRESS NOTE ADULT - SUBJECTIVE AND OBJECTIVE BOX
Patient is a 89y old  Female who presents with a chief complaint of change in mental status and near syncope (18 May 2019 18:48)    SUBJECTIVE / OVERNIGHT EVENTS: Patient seen and examined. No acute overnight events, no subjective complaints.    OBJECTIVE:  Vital Signs Last 24 Hrs  T(F): 97.3 (19 May 2019 12:31), Max: 99.6 (18 May 2019 20:52)  HR: 64 (19 May 2019 12:31) (60 - 69)  BP: 132/87 (19 May 2019 12:31) (125/80 - 169/78)  RR: 18 (19 May 2019 12:31) (18 - 18)  SpO2: 97% (19 May 2019 12:31) (97% - 100%)    I&O's Summary  18 May 2019 07:01  -  19 May 2019 07:00  --------------------------------------------------------  IN: 180 mL / OUT: 200 mL / NET: -20 mL    19 May 2019 07:01  -  19 May 2019 16:09  --------------------------------------------------------  IN: 240 mL / OUT: 0 mL / NET: 240 mL    Physical Examination:  GEN: elderly woman, sitting up in bed in NAD  PSYCH: A&Ox2, mood and affect appear appropriate   NEURO: mild facial asymmetry on smile but no other focal neurologic deficits appreciated  RESPI: no accessory muscle use, B/L air entry, CTAB   CARDIO: regular rate/rhythm, no LE edema B/L  ABD: soft, NT, ND, +BS  EXT: patient able to move all extremities spontaneously  VASC: peripheral pulses palpated    Labs:                     11.6   8.1   )-----------( 211      ( 19 May 2019 06:13 )             34.3     05-19  129<L>  |  98  |  15  ----------------------------<  96  4.0   |  21<L>  |  0.71    Ca    8.9      19 May 2019 06:13  Phos  2.6     05-  Mg     2.1     05-19    PT/INR - ( 19 May 2019 06:13 )   PT: 14.3 sec;   INR: 1.24 ratio    PTT - ( 19 May 2019 06:13 )  PTT:34.4 sec    Urinalysis Basic - ( 18 May 2019 20:47 )  Color: Yellow / Appearance: Clear / S.017 / pH: x  Gluc: x / Ketone: Negative  / Bili: Negative / Urobili: Negative   Blood: x / Protein: Trace / Nitrite: Negative   Leuk Esterase: Small / RBC: 3 /hpf / WBC 10 /HPF   Sq Epi: x / Non Sq Epi: 3 /hpf / Bacteria: Negative    Care Discussed with Consultants/Other Providers: DAREK Dumont    MEDICATIONS  (STANDING):  enoxaparin Injectable 40 milliGRAM(s) SubCutaneous daily  liothyronine 5 MICROGram(s) Oral daily  lisinopril 10 milliGRAM(s) Oral daily  spironolactone 25 milliGRAM(s) Oral daily

## 2019-05-20 ENCOUNTER — TRANSCRIPTION ENCOUNTER (OUTPATIENT)
Age: 84
End: 2019-05-20

## 2019-05-20 LAB
ANION GAP SERPL CALC-SCNC: 12 MMOL/L — SIGNIFICANT CHANGE UP (ref 5–17)
ANION GAP SERPL CALC-SCNC: 12 MMOL/L — SIGNIFICANT CHANGE UP (ref 5–17)
BACTERIA FLD CULT: NORMAL
BUN SERPL-MCNC: 10 MG/DL — SIGNIFICANT CHANGE UP (ref 7–23)
BUN SERPL-MCNC: 12 MG/DL — SIGNIFICANT CHANGE UP (ref 7–23)
CALCIUM SERPL-MCNC: 9.1 MG/DL — SIGNIFICANT CHANGE UP (ref 8.4–10.5)
CALCIUM SERPL-MCNC: 9.3 MG/DL — SIGNIFICANT CHANGE UP (ref 8.4–10.5)
CHLORIDE SERPL-SCNC: 93 MMOL/L — LOW (ref 96–108)
CHLORIDE SERPL-SCNC: 93 MMOL/L — LOW (ref 96–108)
CO2 SERPL-SCNC: 23 MMOL/L — SIGNIFICANT CHANGE UP (ref 22–31)
CO2 SERPL-SCNC: 24 MMOL/L — SIGNIFICANT CHANGE UP (ref 22–31)
CREAT SERPL-MCNC: 0.57 MG/DL — SIGNIFICANT CHANGE UP (ref 0.5–1.3)
CREAT SERPL-MCNC: 0.65 MG/DL — SIGNIFICANT CHANGE UP (ref 0.5–1.3)
GLUCOSE SERPL-MCNC: 110 MG/DL — HIGH (ref 70–99)
GLUCOSE SERPL-MCNC: 92 MG/DL — SIGNIFICANT CHANGE UP (ref 70–99)
HCT VFR BLD CALC: 37.4 % — SIGNIFICANT CHANGE UP (ref 34.5–45)
HGB BLD-MCNC: 12.5 G/DL — SIGNIFICANT CHANGE UP (ref 11.5–15.5)
MCHC RBC-ENTMCNC: 30.8 PG — SIGNIFICANT CHANGE UP (ref 27–34)
MCHC RBC-ENTMCNC: 33.3 GM/DL — SIGNIFICANT CHANGE UP (ref 32–36)
MCV RBC AUTO: 92.5 FL — SIGNIFICANT CHANGE UP (ref 80–100)
PLATELET # BLD AUTO: 231 K/UL — SIGNIFICANT CHANGE UP (ref 150–400)
POTASSIUM SERPL-MCNC: 3.8 MMOL/L — SIGNIFICANT CHANGE UP (ref 3.5–5.3)
POTASSIUM SERPL-MCNC: 4.4 MMOL/L — SIGNIFICANT CHANGE UP (ref 3.5–5.3)
POTASSIUM SERPL-SCNC: 3.8 MMOL/L — SIGNIFICANT CHANGE UP (ref 3.5–5.3)
POTASSIUM SERPL-SCNC: 4.4 MMOL/L — SIGNIFICANT CHANGE UP (ref 3.5–5.3)
RBC # BLD: 4.04 M/UL — SIGNIFICANT CHANGE UP (ref 3.8–5.2)
RBC # FLD: 12.6 % — SIGNIFICANT CHANGE UP (ref 10.3–14.5)
SODIUM SERPL-SCNC: 128 MMOL/L — LOW (ref 135–145)
SODIUM SERPL-SCNC: 129 MMOL/L — LOW (ref 135–145)
WBC # BLD: 7.6 K/UL — SIGNIFICANT CHANGE UP (ref 3.8–10.5)
WBC # FLD AUTO: 7.6 K/UL — SIGNIFICANT CHANGE UP (ref 3.8–10.5)

## 2019-05-20 PROCEDURE — 99233 SBSQ HOSP IP/OBS HIGH 50: CPT

## 2019-05-20 RX ORDER — SODIUM CHLORIDE 9 MG/ML
1000 INJECTION INTRAMUSCULAR; INTRAVENOUS; SUBCUTANEOUS
Refills: 0 | Status: DISCONTINUED | OUTPATIENT
Start: 2019-05-20 | End: 2019-05-20

## 2019-05-20 RX ORDER — ACETAMINOPHEN 500 MG
650 TABLET ORAL EVERY 6 HOURS
Refills: 0 | Status: DISCONTINUED | OUTPATIENT
Start: 2019-05-20 | End: 2019-05-22

## 2019-05-20 RX ORDER — IBUPROFEN 200 MG
400 TABLET ORAL ONCE
Refills: 0 | Status: COMPLETED | OUTPATIENT
Start: 2019-05-20 | End: 2019-05-21

## 2019-05-20 RX ORDER — SODIUM CHLORIDE 9 MG/ML
1000 INJECTION INTRAMUSCULAR; INTRAVENOUS; SUBCUTANEOUS
Refills: 0 | Status: DISCONTINUED | OUTPATIENT
Start: 2019-05-20 | End: 2019-05-21

## 2019-05-20 RX ADMIN — LIOTHYRONINE SODIUM 5 MICROGRAM(S): 25 TABLET ORAL at 05:13

## 2019-05-20 RX ADMIN — Medication 650 MILLIGRAM(S): at 18:30

## 2019-05-20 RX ADMIN — Medication 650 MILLIGRAM(S): at 17:26

## 2019-05-20 RX ADMIN — SODIUM CHLORIDE 50 MILLILITER(S): 9 INJECTION INTRAMUSCULAR; INTRAVENOUS; SUBCUTANEOUS at 19:35

## 2019-05-20 RX ADMIN — SODIUM CHLORIDE 100 MILLILITER(S): 9 INJECTION INTRAMUSCULAR; INTRAVENOUS; SUBCUTANEOUS at 12:35

## 2019-05-20 RX ADMIN — SPIRONOLACTONE 25 MILLIGRAM(S): 25 TABLET, FILM COATED ORAL at 05:13

## 2019-05-20 RX ADMIN — LISINOPRIL 10 MILLIGRAM(S): 2.5 TABLET ORAL at 12:53

## 2019-05-20 RX ADMIN — ENOXAPARIN SODIUM 40 MILLIGRAM(S): 100 INJECTION SUBCUTANEOUS at 12:53

## 2019-05-20 NOTE — PHYSICAL THERAPY INITIAL EVALUATION ADULT - TRANSFER SAFETY CONCERNS NOTED: SIT/STAND, REHAB EVAL
losing balance/decreased weight-shifting ability/decreased balance during turns/decreased step length/toilet transfer with grab bar close supervision at RW dtr present in Br w/ pt

## 2019-05-20 NOTE — PHYSICAL THERAPY INITIAL EVALUATION ADULT - IMPAIRED TRANSFERS: SIT/STAND, REHAB EVAL
impaired balance/impaired postural control/pain/decreased strength/cognition/decreased endurance , pt sit-stand cg of 1 vc to stand upright , mild unsteady but opt regain on own at RW and cg of 1

## 2019-05-20 NOTE — PROGRESS NOTE ADULT - SUBJECTIVE AND OBJECTIVE BOX
Patient is a 89y old  Female who presents with a chief complaint of change in mental status and near syncope (19 May 2019 16:07)      SUBJECTIVE / OVERNIGHT EVENTS:  no acute events overnight.  feels generally weak  no pain  tele reviewed: sinus jackson 1st degree, RBBB 50-80    MEDICATIONS  (STANDING):  enoxaparin Injectable 40 milliGRAM(s) SubCutaneous daily  liothyronine 5 MICROGram(s) Oral daily  lisinopril 10 milliGRAM(s) Oral daily  sodium chloride 0.9%. 1000 milliLiter(s) (100 mL/Hr) IV Continuous <Continuous>  spironolactone 25 milliGRAM(s) Oral daily    MEDICATIONS  (PRN):  acetaminophen   Tablet .. 650 milliGRAM(s) Oral every 6 hours PRN Mild Pain (1 - 3)      Vital Signs Last 24 Hrs  T(C): 36.8 (20 May 2019 04:50), Max: 36.8 (20 May 2019 04:50)  T(F): 98.3 (20 May 2019 04:50), Max: 98.3 (20 May 2019 04:50)  HR: 68 (20 May 2019 10:07) (63 - 69)  BP: 155/82 (20 May 2019 10:07) (134/73 - 155/82)  BP(mean): --  RR: 18 (20 May 2019 10:07) (18 - 18)  SpO2: 96% (20 May 2019 10:07) (96% - 100%)  CAPILLARY BLOOD GLUCOSE        I&O's Summary    19 May 2019 07:  -  20 May 2019 07:00  --------------------------------------------------------  IN: 660 mL / OUT: 0 mL / NET: 660 mL    20 May 2019 07:01  -  20 May 2019 14:37  --------------------------------------------------------  IN: 180 mL / OUT: 0 mL / NET: 180 mL        PHYSICAL EXAM:  GEN: elderly woman, sitting up in chair in NAD  PSYCH: A&Ox2, mood and affect appear appropriate   NEURO: mild facial asymmetry on smile but no other focal neurologic deficits appreciated  RESPI: no accessory muscle use, B/L air entry, CTAB   CARDIO: regular rate/rhythm, no LE edema B/L  ABD: soft, NT, ND, +BS  EXT: patient able to move all extremities spontaneously  VASC: peripheral pulses palpated      LABS:                        12.5   7.6   )-----------( 231      ( 20 May 2019 07:13 )             37.4     05-20    129<L>  |  93<L>  |  10  ----------------------------<  92  3.8   |  24  |  0.57    Ca    9.3      20 May 2019 07:13  Phos  2.6       Mg     2.1           PT/INR - ( 19 May 2019 06:13 )   PT: 14.3 sec;   INR: 1.24 ratio         PTT - ( 19 May 2019 06:13 )  PTT:34.4 sec      Urinalysis Basic - ( 18 May 2019 20:47 )    Color: Yellow / Appearance: Clear / S.017 / pH: x  Gluc: x / Ketone: Negative  / Bili: Negative / Urobili: Negative   Blood: x / Protein: Trace / Nitrite: Negative   Leuk Esterase: Small / RBC: 3 /hpf / WBC 10 /HPF   Sq Epi: x / Non Sq Epi: 3 /hpf / Bacteria: Negative        RADIOLOGY & ADDITIONAL TESTS:    Imaging Personally Reviewed:    Consultant(s) Notes Reviewed:      Care Discussed with Consultants/Other Providers:

## 2019-05-20 NOTE — PHYSICAL THERAPY INITIAL EVALUATION ADULT - IMPAIRMENTS FOUND, PT EVAL
joint integrity and mobility/muscle strength/aerobic capacity/endurance/pain r knee arthritic 5/10 , forgetful at times/gait, locomotion, and balance

## 2019-05-20 NOTE — PHYSICAL THERAPY INITIAL EVALUATION ADULT - ASR EQUIP NEEDS DISCH PT EVAL
pt has a commode, w/c , std walker (borrowed from someone ) , shower chair , grab bars in shower , pt hs HHA 4 hrs on Monday Tuesday and Thursday

## 2019-05-20 NOTE — PHYSICAL THERAPY INITIAL EVALUATION ADULT - ADDITIONAL COMMENTS
pt lives with dtr Sondra [phone] in apt + elevator access ,pt uses cane or rolling walker PTA , decline ID caregiver pt lives with dtr Sondra [phone] ( present for session ) in apt + elevator access ,pt uses cane or rolling walker PTA , decline ID caregiver; pt has shower chair and grab bars in BR ; pt has HHA 4 hrs each day on Monday, Tuesday and Thursday rest of time dtr Sondra assists ,someone always there to help

## 2019-05-20 NOTE — PHYSICAL THERAPY INITIAL EVALUATION ADULT - IMPAIRMENTS CONTRIBUTING TO GAIT DEVIATIONS, PT EVAL
decreased strength/impaired balance/decreased endurance , R knee 5/10 pain (pt now requeest tylenol follow amb rn [place] informed 5/10 pain ; occasional min unsteady vc to stand upright with cg of 1 dtr observe session and understood all/pain/impaired postural control

## 2019-05-20 NOTE — PHYSICAL THERAPY INITIAL EVALUATION ADULT - REFERRING PHYSICIAN, REHAB EVAL
Vidhya Hui MD ORDER PT EVAL and FAIZA , FWB Ue's and WBAT le's Vidhya Hui MD ORDER PT EVAL and TREAT , FWB Ue's and WBAT le's

## 2019-05-20 NOTE — PHYSICAL THERAPY INITIAL EVALUATION ADULT - PRECAUTIONS/LIMITATIONS, REHAB EVAL
fall precautions/89yoF w/ PMHx significant for osteoarthritis (R-knee hemiarthrosis now on medical marijuana for pain), dementia, HCV (in remission) HTN, HFpEF (stage I diastolic dysfunction), HTN, Hypothyroidism, DVT in 2017 on Xarelto and a recent admission in February for a syncopal event who presents for near syncope. As per daughter - - patient was sitting in the kitchen this morning and having breakfast with her and told her daughter that she does not feel so well and wanted to sit down on the couch. Daughter had then noted her to put her head down on the table to rest at which point she had asked her mother what was wrong. For a period of 3 minutes, daughter described her mom having a blank stare and when she finally came to - - patient had no recollection of the event but as per daughter, unclear whether this is due to dementia or something new. No reported tongue biting, repetitive motion, urinary or stool incontinence at the time.

## 2019-05-20 NOTE — PHYSICAL THERAPY INITIAL EVALUATION ADULT - GAIT DEVIATIONS NOTED, PT EVAL
decreased step length/decreased mir/increased time in double stance/decreased stride length/decreased weight-shifting ability

## 2019-05-20 NOTE — CHART NOTE - NSCHARTNOTEFT_GEN_A_CORE
seen and examined for redness and swelling of R wrist.  IV infiltrate VS  Arthritis   No Lymph node swelling  appreciated , No s/s  neuro vascular compromise   No s/s of active infection   Monitor for fever . Warm  compress .   Will sign out Pt to night covering shift   Laura Reed NP-C [id number]

## 2019-05-20 NOTE — DISCHARGE NOTE NURSING/CASE MANAGEMENT/SOCIAL WORK - NSDCPEPT PROEDHF_GEN_ALL_CORE
Call primary care provider for follow up after discharge/Low salt diet/Activities as tolerated/Monitor weight daily/Report signs and symptoms to primary care provider

## 2019-05-20 NOTE — PHYSICAL THERAPY INITIAL EVALUATION ADULT - GENERAL OBSERVATIONS, REHAB EVAL
pt received in [place] on toilet with dtr Sondra present assist pt ; pt feels good and agreeable for PT ; pt report has arthritic knee declines need for pain meds; pt with valgas knees , R knee min edema (always like that per pt and dtr )

## 2019-05-20 NOTE — DISCHARGE NOTE NURSING/CASE MANAGEMENT/SOCIAL WORK - NSDCDPATPORTLINK_GEN_ALL_CORE
You can access the [facility] Patient Portal, offered by [facility], by registering with the following website: [email or web address]

## 2019-05-20 NOTE — PHYSICAL THERAPY INITIAL EVALUATION ADULT - IMPAIRMENTS CONTRIBUTING IMPAIRED BED MOBILITY, REHAB EVAL
impaired postural control/sat x 5 min close supervision steady/decreased flexibility/decreased strength

## 2019-05-20 NOTE — DISCHARGE NOTE NURSING/CASE MANAGEMENT/SOCIAL WORK - NSDCVIVACCINE_GEN_ALL_CORE_FT
Influenza , 2014/11/18 16:58 , Mami Alvarez (RN)  Influenza , 2018/12/31 16:24 , Wilson Sevilla (RN)

## 2019-05-20 NOTE — DISCHARGE NOTE NURSING/CASE MANAGEMENT/SOCIAL WORK - NSDCFUADDAPPT_GEN_ALL_CORE_FT
Referral forwarded to [facility] for visiting nurse and home physical therapy. A nurse will contact you the day after discharge to schedule your appointment.

## 2019-05-20 NOTE — PHYSICAL THERAPY INITIAL EVALUATION ADULT - PERTINENT HX OF CURRENT PROBLEM, REHAB EVAL
Na 129. HCT 5/18/19 no acute event , microvascular dz ; CXR (-) ; 5/18/19 EKG SR with primary AVB/LAD/RBBB

## 2019-05-20 NOTE — PHYSICAL THERAPY INITIAL EVALUATION ADULT - DISCHARGE DISPOSITION, PT EVAL
for increase functional mobility , strength, balance , endurance , fall prevntion education continued ; pt / dtr understood pt needs assist all functional activity and adl's ,pt has HHA and dtr assist rest of time ,someone always home to assist/home w/ assist/home w/ home PT

## 2019-05-20 NOTE — CHART NOTE - NSCHARTNOTEFT_GEN_A_CORE
Called by RN, pt with right hand pain and redness.   Pt seen and examined at bedside. Pt reporting pain #6-7/10.   Pt has hx of pseudogout per family at bedside.  Also walked with walker with physical therapy today but does not remember injuring hand.   Denies any fever, chills or old IV in that area of her hand, but did have blood draw.  Right hand with metacarpal erythema and hot to touch. Pt has full ROM of hand, not TTP, +pulses.    DDx r/o fracture vs pseudogout     XR Right hand prelim - no fracture; per radiology has arthritic changes  Ice pack to right hand  Motrin given - pain improved per pt   check uric acid       Cuca Jamee ANP-BC  Medicine  [id number]

## 2019-05-21 LAB
ANION GAP SERPL CALC-SCNC: 13 MMOL/L — SIGNIFICANT CHANGE UP (ref 5–17)
BUN SERPL-MCNC: 11 MG/DL — SIGNIFICANT CHANGE UP (ref 7–23)
CALCIUM SERPL-MCNC: 9.4 MG/DL — SIGNIFICANT CHANGE UP (ref 8.4–10.5)
CHLORIDE SERPL-SCNC: 94 MMOL/L — LOW (ref 96–108)
CO2 SERPL-SCNC: 23 MMOL/L — SIGNIFICANT CHANGE UP (ref 22–31)
CREAT SERPL-MCNC: 0.67 MG/DL — SIGNIFICANT CHANGE UP (ref 0.5–1.3)
GLUCOSE SERPL-MCNC: 96 MG/DL — SIGNIFICANT CHANGE UP (ref 70–99)
OSMOLALITY UR: 676 MOS/KG — SIGNIFICANT CHANGE UP (ref 300–900)
POTASSIUM SERPL-MCNC: 3.6 MMOL/L — SIGNIFICANT CHANGE UP (ref 3.5–5.3)
POTASSIUM SERPL-SCNC: 3.6 MMOL/L — SIGNIFICANT CHANGE UP (ref 3.5–5.3)
SODIUM SERPL-SCNC: 130 MMOL/L — LOW (ref 135–145)
SODIUM UR-SCNC: 101 MMOL/L — SIGNIFICANT CHANGE UP
URATE SERPL-MCNC: 5.5 MG/DL — SIGNIFICANT CHANGE UP (ref 2.5–7)

## 2019-05-21 PROCEDURE — 99223 1ST HOSP IP/OBS HIGH 75: CPT

## 2019-05-21 PROCEDURE — 99233 SBSQ HOSP IP/OBS HIGH 50: CPT

## 2019-05-21 PROCEDURE — 73130 X-RAY EXAM OF HAND: CPT | Mod: 26,RT

## 2019-05-21 PROCEDURE — 99222 1ST HOSP IP/OBS MODERATE 55: CPT

## 2019-05-21 RX ORDER — VANCOMYCIN HCL 1 G
1000 VIAL (EA) INTRAVENOUS EVERY 12 HOURS
Refills: 0 | Status: DISCONTINUED | OUTPATIENT
Start: 2019-05-21 | End: 2019-05-21

## 2019-05-21 RX ORDER — VANCOMYCIN HCL 1 G
VIAL (EA) INTRAVENOUS
Refills: 0 | Status: DISCONTINUED | OUTPATIENT
Start: 2019-05-21 | End: 2019-05-21

## 2019-05-21 RX ORDER — IBUPROFEN 200 MG
400 TABLET ORAL ONCE
Refills: 0 | Status: COMPLETED | OUTPATIENT
Start: 2019-05-21 | End: 2019-05-21

## 2019-05-21 RX ORDER — VANCOMYCIN HCL 1 G
1000 VIAL (EA) INTRAVENOUS ONCE
Refills: 0 | Status: COMPLETED | OUTPATIENT
Start: 2019-05-21 | End: 2019-05-21

## 2019-05-21 RX ADMIN — Medication 30 MILLIGRAM(S): at 17:10

## 2019-05-21 RX ADMIN — ENOXAPARIN SODIUM 40 MILLIGRAM(S): 100 INJECTION SUBCUTANEOUS at 07:49

## 2019-05-21 RX ADMIN — LISINOPRIL 10 MILLIGRAM(S): 2.5 TABLET ORAL at 07:53

## 2019-05-21 RX ADMIN — Medication 400 MILLIGRAM(S): at 00:05

## 2019-05-21 RX ADMIN — Medication 400 MILLIGRAM(S): at 07:49

## 2019-05-21 RX ADMIN — SPIRONOLACTONE 25 MILLIGRAM(S): 25 TABLET, FILM COATED ORAL at 05:49

## 2019-05-21 RX ADMIN — LIOTHYRONINE SODIUM 5 MICROGRAM(S): 25 TABLET ORAL at 05:49

## 2019-05-21 RX ADMIN — Medication 400 MILLIGRAM(S): at 00:35

## 2019-05-21 RX ADMIN — Medication 400 MILLIGRAM(S): at 08:40

## 2019-05-21 RX ADMIN — Medication 250 MILLIGRAM(S): at 07:50

## 2019-05-21 NOTE — CONSULT NOTE ADULT - SUBJECTIVE AND OBJECTIVE BOX
JAQUAN EDDY  [id number]    HISTORY OF PRESENT ILLNESS:  Pt is a 89yoF w/ pseudogout, Osteoarthritis, HTN presenting with syncope at home. Rheum called for eval of joint pains.  Pt's daughter at bedside.    Pt states that since yesterday, she is having R wrist pain and swelling along with R shoulder and R knee pain and swelling. Denies having a fall during her fainting episode. No recent trauma. No fevers/chills. No recent travel or sick contacts     Pt follows w/ Dr. Webster from rheumatology- last seen on 5/6/19. 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   Has had flares from CPPD before with admission in 12/2018 involving R hip and knee, improved with steroids     PAST MEDICAL & SURGICAL HISTORY:  Bifascicular block  Dementia without behavioral disturbance, unspecified dementia type  Hepatitis C  Hypertension  Arthritis  Hypothyroidism  Status post placement of implantable loop recorder      Review of Systems:  Gen:  No fevers/chills, weight loss  CVS: No chest pain/palpitations  Resp: No SOB/wheezing  GI: No N/V/C/D/abdominal pain  MSK: +joint pains as above   Skin: No new rashes  Neuro: No headaches    MEDICATIONS  (STANDING):  enoxaparin Injectable 40 milliGRAM(s) SubCutaneous daily  liothyronine 5 MICROGram(s) Oral daily  lisinopril 10 milliGRAM(s) Oral daily  spironolactone 25 milliGRAM(s) Oral daily    MEDICATIONS  (PRN):  acetaminophen   Tablet .. 650 milliGRAM(s) Oral every 6 hours PRN Mild Pain (1 - 3)      Allergies    No Known Allergies    Intolerances      FAMILY HISTORY:  No pertinent family history in first degree relatives      Vital Signs Last 24 Hrs  T(C): 36.3 (21 May 2019 13:33), Max: 36.9 (20 May 2019 20:54)  T(F): 97.3 (21 May 2019 13:33), Max: 98.5 (20 May 2019 20:54)  HR: 71 (21 May 2019 13:33) (71 - 99)  BP: 155/79 (21 May 2019 13:33) (124/72 - 155/79)  BP(mean): --  RR: 16 (21 May 2019 13:33) (16 - 18)  SpO2: 100% (21 May 2019 13:33) (94% - 100%)    Physical Exam:  General: No apparent distress  MSK:  Shoulders: R shoulder pain on abduction  Elbows: wnl  Wrists: R wrist warm, erythematous, swollen. Not tender to palpation but has pain on flexion/extension   MCPs:  OA changes   PIPs: OA changes   DIPs: OA changes   Knees: R knee mildly warm and swollen   Ankle: wnl  Neuro: AAOx3  Skin: no visible rashes    LABS:                        12.5   7.6   )-----------( 231      ( 20 May 2019 07:13 )             37.4     05-21    130<L>  |  94<L>  |  11  ----------------------------<  96  3.6   |  23  |  0.67    Ca    9.4      21 May 2019 11:26            RADIOLOGY & ADDITIONAL STUDIES:  < from: Xray Hand 3 Views, Right (05.21.19 @ 00:28) >  INTERPRETATION:  Clinical information: Worsening erythema and edema.    2 views of the right wrist without comparison.    Impression: Generalized bone demineralization is noted. There is   scapholunate interval widening consistent with scapholunate ligament   tearing. The capitate has migrated proximally as has the scaphoid with   adjacent chorionic remodeling at the distal radius. These findings are   consistent with scapholunate advance collapse. There is chondrocalcinosis   at the TFCC. There is moderate basilar joint osteoarthrosis.    < end of copied text > JAQUAN EDDY  [id number]    HISTORY OF PRESENT ILLNESS:  Pt is a 89yoF w/ pseudogout, Osteoarthritis, HTN presenting with syncope at home. Rheum called for eval of joint pains.  Pt's daughter at bedside.    Pt states that since yesterday, she is having R wrist pain and swelling along with R shoulder and R knee pain and swelling. Denies having a fall during her fainting episode. No recent trauma. No fevers/chills. No recent travel or sick contacts     Pt follows w/ Dr. Webster from rheumatology- last seen on 5/6/19. 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   Has had flares from CPPD before with admission in 12/2018 involving R hip and knee, improved with steroids     PAST MEDICAL & SURGICAL HISTORY:  Bifascicular block  Dementia without behavioral disturbance, unspecified dementia type  Hepatitis C  Hypertension  Arthritis  Hypothyroidism  Status post placement of implantable loop recorder      Review of Systems:  Gen:  No fevers/chills, weight loss  CVS: No chest pain/palpitations  Resp: No SOB/wheezing  GI: No N/V/C/D/abdominal pain  MSK: +joint pains as above   Skin: No new rashes  Neuro: No headaches    MEDICATIONS  (STANDING):  enoxaparin Injectable 40 milliGRAM(s) SubCutaneous daily  liothyronine 5 MICROGram(s) Oral daily  lisinopril 10 milliGRAM(s) Oral daily  spironolactone 25 milliGRAM(s) Oral daily    MEDICATIONS  (PRN):  acetaminophen   Tablet .. 650 milliGRAM(s) Oral every 6 hours PRN Mild Pain (1 - 3)      Allergies    No Known Allergies    Intolerances      FAMILY HISTORY:  No pertinent family history in first degree relatives      Vital Signs Last 24 Hrs  T(C): 36.3 (21 May 2019 13:33), Max: 36.9 (20 May 2019 20:54)  T(F): 97.3 (21 May 2019 13:33), Max: 98.5 (20 May 2019 20:54)  HR: 71 (21 May 2019 13:33) (71 - 99)  BP: 155/79 (21 May 2019 13:33) (124/72 - 155/79)  BP(mean): --  RR: 16 (21 May 2019 13:33) (16 - 18)  SpO2: 100% (21 May 2019 13:33) (94% - 100%)    Physical Exam:  General: No apparent distress  MSK:  Shoulders: R shoulder pain on abduction  Elbows: wnl  Wrists: R wrist warm, erythematous, swollen. Not tender to palpation but has pain on flexion/extension   MCPs:  OA changes   PIPs: OA changes   DIPs: OA changes   Knees: R knee mildly warm and swollen, with valgus deformity  Ankle: wnl  Neuro: AAOx3  Skin: no visible rashes    LABS:                        12.5   7.6   )-----------( 231      ( 20 May 2019 07:13 )             37.4     05-21    130<L>  |  94<L>  |  11  ----------------------------<  96  3.6   |  23  |  0.67    Ca    9.4      21 May 2019 11:26            RADIOLOGY & ADDITIONAL STUDIES:  < from: Xray Hand 3 Views, Right (05.21.19 @ 00:28) >  INTERPRETATION:  Clinical information: Worsening erythema and edema.    2 views of the right wrist without comparison.    Impression: Generalized bone demineralization is noted. There is   scapholunate interval widening consistent with scapholunate ligament   tearing. The capitate has migrated proximally as has the scaphoid with   adjacent chorionic remodeling at the distal radius. These findings are   consistent with scapholunate advance collapse. There is chondrocalcinosis   at the TFCC. There is moderate basilar joint osteoarthrosis.    < end of copied text >

## 2019-05-21 NOTE — PROVIDER CONTACT NOTE (OTHER) - ASSESSMENT
A&Ox3-4. Afebrile. Right thumb is swollen, painful, and red. Swelling has gotten worse since beginning of shift.

## 2019-05-21 NOTE — CONSULT NOTE ADULT - ASSESSMENT
89yoF w/ pseudogout, Osteoarthritis, HTN presenting with syncope at home- course c/b polyarticular arthritis involving R wrist, R shoulder, R knee.     Pt w/ hx of CPPD, recent arthrocentesis by outpt rheumatology Dr. Webster on 5/6 demonstrative of intracellular CPPD crystals. s/p steroid injection of L knee on 5/6.     Pt afebrile, no leukocytosis and multiple joint involvement- not likely to be septic joint.   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     - Would rec IV Solumedrol 30mg x 1 dose tonight  - Start PO Prednisone 20mg tomorrow    Will assess for response and follow up    Syeda Garza MD  Rheumatology Fellow PGY V  Pager [phone] 89yoF w/ pseudogout, Osteoarthritis, HTN presenting with syncope at home- course c/b polyarticular arthritis involving R wrist, R shoulder, R knee - acute  oligoarticular inflammatory arthritis    Pt w/ hx of CPPD, recent arthrocentesis by outpt rheumatology Dr. Webster on 5/6 demonstrative of intracellular CPPD crystals. s/p steroid injection of L knee on 5/6.     Pt afebrile, no leukocytosis and multiple joint involvement- not likely to be septic joint.   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     - Would rec IV Solumedrol 30mg x 1 dose tonight  - Start PO Prednisone 20mg tomorrow    Will assess for response and follow up    Syeda Garza MD  Rheumatology Fellow PGY V  Pager [phone]

## 2019-05-21 NOTE — CONSULT NOTE ADULT - ASSESSMENT
Patient with history of HFpEF, RLE DVT on anticoagulation, and chronic arthritic pain, now admitted with presyncope.    Pain management.    Okay with discontinuing anticoagulation at this time.    Discharge planning per primary team.

## 2019-05-21 NOTE — PROVIDER CONTACT NOTE (OTHER) - ACTION/TREATMENT ORDERED:
DAREK Mancuso assessed patient and ordered, Motrin, Xray, and serum uric acid. Will follow orders as prescribed and continue to monitor patient.

## 2019-05-21 NOTE — CONSULT NOTE ADULT - SUBJECTIVE AND OBJECTIVE BOX
Patient seen and evaluated @ 1300 on 2 DSU  Chief Complaint: presyncope    HPI:  89yoF w/ PMHx significant for osteoarthritis (R-knee hemiarthrosis now on medical marijuana for pain), dementia, HCV (in remission) HTN, HFpEF (stage I diastolic dysfunction), HTN, Hypothyroidism, DVT in 2017 on Xarelto and a recent admission in February for a syncopal event who presents for near syncope. As per daughter - - patient was sitting in the kitchen this morning and having breakfast with her and told her daughter that she does not feel so well and wanted to sit down on the couch. Daughter had then noted her to put her head down on the table to rest at which point she had asked her mother what was wrong. For a period of 3 minutes, daughter described her mom having a blank stare and when she finally came to - - patient had no recollection of the event but as per daughter, unclear whether this is due to dementia or something new. No reported tongue biting, repetitive motion, urinary or stool incontinence at the time. At 9 am prior to episode - - patient was reportedly fine. No reported fevers, chills, nausea, vomiting or diarrhea, dysuria. Patient reports no burning with urination or increased urinary frequency. Denies any prior hx of seizures.     In the ED:  Afebrile, HR 60s, -144/63-65 RR 16- 20 O2:100  Given 500cc bolus (18 May 2019 18:48)    PMH:   Bifascicular block  Dementia without behavioral disturbance, unspecified dementia type  Hepatitis C  Hypertension  Arthritis  Hypothyroidism  Hepatitis C  Hypertension    PSH:   Status post placement of implantable loop recorder  No significant past surgical history    Medications:   acetaminophen   Tablet .. 650 milliGRAM(s) Oral every 6 hours PRN  enoxaparin Injectable 40 milliGRAM(s) SubCutaneous daily  liothyronine 5 MICROGram(s) Oral daily  lisinopril 10 milliGRAM(s) Oral daily  spironolactone 25 milliGRAM(s) Oral daily    Allergies:  No Known Allergies    FAMILY HISTORY:  No pertinent family history in first degree relatives    Social History: , lives with family  Smoking: current, nonsmoker  Alcohol: rare  Drugs: currently on CBD/medical marijuana    Review of Systems:    Constitutional: No fever, chills, fatigue, or changes in weight  HEENT: No blurry vision, eye pain, headache, runny nose, or sore throat  Respiratory: No shortness of breath, cough, or wheezing  Cardiovascular: No chest pain or palpitations  Gastrointestinal: No nausea, vomiting, diarrhea, or abdominal pain  Genitourinary: No dysuria or incontinence  Extremities: No lower extremity swelling  Neurologic: No focal findings  Lymphatic: No lymphadenopathy   Skin: No rash  Psychiatry: No anxiety or depression  10 point review of systems is otherwise negative except as mentioned above            Physical Exam:  T(C): 36.3 (19 @ 13:33), Max: 36.9 (19 @ 20:54)  HR: 71 (19 @ 13:33) (71 - 99)  BP: 155/79 (19 @ 13:33) (124/72 - 155/79)  RR: 16 (19 @ 13:33) (16 - 18)  SpO2: 100% (19 @ 13:33) (94% - 100%)  Wt(kg): --     @ 07:01  -   @ 07:00  --------------------------------------------------------  IN: 1320.8 mL / OUT: 0 mL / NET: 1320.8 mL     @ 07:01  -   @ 15:32  --------------------------------------------------------  IN: 610 mL / OUT: 500 mL / NET: 110 mL      Daily     Daily Weight in k.3 (21 May 2019 03:54)    Appearance: Normal, well groomed, NAD  Eyes: PERRLA, EOMI, pink conjunctiva, no scleral icterus   HENT: Normal oral mucosa  Cardiovascular: RRR, S1, S2, no murmur, rub, or gallop; no edema; no JVD  Respiratory: Clear to auscultation bilaterally  Gastrointestinal: Soft, non-tender, non-distended, BS+  Musculoskeletal: No clubbing or joint deformity   Neurologic: No focal weakness  Lymphatic: No lymphadenopathy  Psychiatry: AAOx2 with appropriate mood and affect  Skin: No rashes, ecchymoses, or cyanosis    Cardiovascular Diagnostic Testing:    ECG: sinus 67 bpm with first degree AV delay, RBBB, LAFB    Echo: 8/3/2017 - normal LV, normal LA    Labs:                        12.5   7.6   )-----------( 231      ( 20 May 2019 07:13 )             37.4         130<L>  |  94<L>  |  11  ----------------------------<  96  3.6   |  23  |  0.67    Ca    9.4      21 May 2019 11:26        Serum Pro-Brain Natriuretic Peptide: 36 pg/mL ( @ 12:54)    Thyroid Stimulating Hormone, Serum: 0.99 uIU/mL ( @ 08:46)

## 2019-05-21 NOTE — PROGRESS NOTE ADULT - SUBJECTIVE AND OBJECTIVE BOX
Patient is a 89y old  Female who presents with a chief complaint of change in mental status and near syncope (20 May 2019 14:37)      SUBJECTIVE / OVERNIGHT EVENTS:  c/o right wrist pain starting yesterday with reduced range of motion  no fever  no cp/sob/n/v      MEDICATIONS  (STANDING):  enoxaparin Injectable 40 milliGRAM(s) SubCutaneous daily  liothyronine 5 MICROGram(s) Oral daily  lisinopril 10 milliGRAM(s) Oral daily  spironolactone 25 milliGRAM(s) Oral daily    MEDICATIONS  (PRN):  acetaminophen   Tablet .. 650 milliGRAM(s) Oral every 6 hours PRN Mild Pain (1 - 3)      Vital Signs Last 24 Hrs  T(C): 36.3 (21 May 2019 13:33), Max: 36.9 (20 May 2019 20:54)  T(F): 97.3 (21 May 2019 13:33), Max: 98.5 (20 May 2019 20:54)  HR: 71 (21 May 2019 13:33) (71 - 99)  BP: 155/79 (21 May 2019 13:33) (124/72 - 155/79)  BP(mean): --  RR: 16 (21 May 2019 13:33) (16 - 18)  SpO2: 100% (21 May 2019 13:33) (94% - 100%)  CAPILLARY BLOOD GLUCOSE        I&O's Summary    20 May 2019 07:01  -  21 May 2019 07:00  --------------------------------------------------------  IN: 1320.8 mL / OUT: 0 mL / NET: 1320.8 mL    21 May 2019 07:01  -  21 May 2019 14:41  --------------------------------------------------------  IN: 610 mL / OUT: 500 mL / NET: 110 mL        PHYSICAL EXAM:  GEN: elderly woman, sitting up in chair in NAD  PSYCH: A&Ox2, mood and affect appear appropriate   NEURO: mild facial asymmetry on smile but no other focal neurologic deficits appreciated  RESPI: no accessory muscle use, B/L air entry, CTAB   CARDIO: regular rate/rhythm, no LE edema B/L  ABD: soft, NT, ND, +BS  EXT: right wrist: minimal erythema-no warmth, +swelling, reduced range of motion  VASC: peripheral pulses palpated    LABS:                        12.5   7.6   )-----------( 231      ( 20 May 2019 07:13 )             37.4     05-21    130<L>  |  94<L>  |  11  ----------------------------<  96  3.6   |  23  |  0.67    Ca    9.4      21 May 2019 11:26                RADIOLOGY & ADDITIONAL TESTS:    Imaging Personally Reviewed:    Consultant(s) Notes Reviewed:      Care Discussed with Consultants/Other Providers: Dr Leija (cards)-agrees with stopping xarelto

## 2019-05-22 ENCOUNTER — TRANSCRIPTION ENCOUNTER (OUTPATIENT)
Age: 84
End: 2019-05-22

## 2019-05-22 VITALS
RESPIRATION RATE: 18 BRPM | SYSTOLIC BLOOD PRESSURE: 145 MMHG | TEMPERATURE: 98 F | HEART RATE: 64 BPM | DIASTOLIC BLOOD PRESSURE: 70 MMHG | OXYGEN SATURATION: 98 %

## 2019-05-22 LAB
ANION GAP SERPL CALC-SCNC: 11 MMOL/L — SIGNIFICANT CHANGE UP (ref 5–17)
BUN SERPL-MCNC: 14 MG/DL — SIGNIFICANT CHANGE UP (ref 7–23)
CALCIUM SERPL-MCNC: 9.2 MG/DL — SIGNIFICANT CHANGE UP (ref 8.4–10.5)
CHLORIDE SERPL-SCNC: 95 MMOL/L — LOW (ref 96–108)
CO2 SERPL-SCNC: 22 MMOL/L — SIGNIFICANT CHANGE UP (ref 22–31)
CREAT SERPL-MCNC: 0.55 MG/DL — SIGNIFICANT CHANGE UP (ref 0.5–1.3)
GLUCOSE SERPL-MCNC: 134 MG/DL — HIGH (ref 70–99)
OSMOLALITY SERPL: 279 MOS/KG — SIGNIFICANT CHANGE UP (ref 275–300)
POTASSIUM SERPL-MCNC: 4.1 MMOL/L — SIGNIFICANT CHANGE UP (ref 3.5–5.3)
POTASSIUM SERPL-SCNC: 4.1 MMOL/L — SIGNIFICANT CHANGE UP (ref 3.5–5.3)
SODIUM SERPL-SCNC: 128 MMOL/L — LOW (ref 135–145)

## 2019-05-22 PROCEDURE — 85610 PROTHROMBIN TIME: CPT

## 2019-05-22 PROCEDURE — 70450 CT HEAD/BRAIN W/O DYE: CPT

## 2019-05-22 PROCEDURE — 84484 ASSAY OF TROPONIN QUANT: CPT

## 2019-05-22 PROCEDURE — 71045 X-RAY EXAM CHEST 1 VIEW: CPT

## 2019-05-22 PROCEDURE — 85730 THROMBOPLASTIN TIME PARTIAL: CPT

## 2019-05-22 PROCEDURE — 83930 ASSAY OF BLOOD OSMOLALITY: CPT

## 2019-05-22 PROCEDURE — 80053 COMPREHEN METABOLIC PANEL: CPT

## 2019-05-22 PROCEDURE — 83735 ASSAY OF MAGNESIUM: CPT

## 2019-05-22 PROCEDURE — 84100 ASSAY OF PHOSPHORUS: CPT

## 2019-05-22 PROCEDURE — 73130 X-RAY EXAM OF HAND: CPT

## 2019-05-22 PROCEDURE — 83880 ASSAY OF NATRIURETIC PEPTIDE: CPT

## 2019-05-22 PROCEDURE — 84439 ASSAY OF FREE THYROXINE: CPT

## 2019-05-22 PROCEDURE — 99231 SBSQ HOSP IP/OBS SF/LOW 25: CPT

## 2019-05-22 PROCEDURE — 97162 PT EVAL MOD COMPLEX 30 MIN: CPT

## 2019-05-22 PROCEDURE — 83935 ASSAY OF URINE OSMOLALITY: CPT

## 2019-05-22 PROCEDURE — 84300 ASSAY OF URINE SODIUM: CPT

## 2019-05-22 PROCEDURE — 80048 BASIC METABOLIC PNL TOTAL CA: CPT

## 2019-05-22 PROCEDURE — 93005 ELECTROCARDIOGRAM TRACING: CPT

## 2019-05-22 PROCEDURE — 99239 HOSP IP/OBS DSCHRG MGMT >30: CPT

## 2019-05-22 PROCEDURE — 85027 COMPLETE CBC AUTOMATED: CPT

## 2019-05-22 PROCEDURE — 99285 EMERGENCY DEPT VISIT HI MDM: CPT

## 2019-05-22 PROCEDURE — 84443 ASSAY THYROID STIM HORMONE: CPT

## 2019-05-22 PROCEDURE — 84550 ASSAY OF BLOOD/URIC ACID: CPT

## 2019-05-22 PROCEDURE — 81001 URINALYSIS AUTO W/SCOPE: CPT

## 2019-05-22 RX ORDER — ACETAMINOPHEN 500 MG
2 TABLET ORAL
Qty: 0 | Refills: 0 | DISCHARGE
Start: 2019-05-22

## 2019-05-22 RX ORDER — LISINOPRIL 2.5 MG/1
1 TABLET ORAL
Qty: 0 | Refills: 0 | DISCHARGE
Start: 2019-05-22

## 2019-05-22 RX ORDER — LISINOPRIL 2.5 MG/1
1 TABLET ORAL
Qty: 30 | Refills: 0

## 2019-05-22 RX ORDER — RIVAROXABAN 15 MG-20MG
1 KIT ORAL
Qty: 0 | Refills: 0 | DISCHARGE

## 2019-05-22 RX ADMIN — SPIRONOLACTONE 25 MILLIGRAM(S): 25 TABLET, FILM COATED ORAL at 05:15

## 2019-05-22 RX ADMIN — Medication 20 MILLIGRAM(S): at 05:15

## 2019-05-22 RX ADMIN — LISINOPRIL 10 MILLIGRAM(S): 2.5 TABLET ORAL at 12:10

## 2019-05-22 RX ADMIN — LIOTHYRONINE SODIUM 5 MICROGRAM(S): 25 TABLET ORAL at 05:15

## 2019-05-22 NOTE — DISCHARGE NOTE PROVIDER - PROVIDER TOKENS
PROVIDER:[TOKEN:[[id number]],FOLLOWUP:[1 week]],PROVIDER:[TOKEN:[[id number]]] PROVIDER:[TOKEN:[[id number]],FOLLOWUP:[1 week]],PROVIDER:[TOKEN:[[id number]]],PROVIDER:[TOKEN:[[id number]]]

## 2019-05-22 NOTE — PROGRESS NOTE ADULT - SUBJECTIVE AND OBJECTIVE BOX
JAQUAN KAM  [id number]    INTERVAL HPI/OVERNIGHT EVENTS:  No acute events overnight. Pt's daughter at bedside. States that her R wrist is feeling much better today and is able to move it  complaining of some pain in R knee but it has improved from IV Solumedrol    MEDICATIONS  (STANDING):  enoxaparin Injectable 40 milliGRAM(s) SubCutaneous daily  liothyronine 5 MICROGram(s) Oral daily  lisinopril 10 milliGRAM(s) Oral daily  predniSONE   Tablet 20 milliGRAM(s) Oral daily  spironolactone 25 milliGRAM(s) Oral daily    MEDICATIONS  (PRN):  acetaminophen   Tablet .. 650 milliGRAM(s) Oral every 6 hours PRN Mild Pain (1 - 3)      Allergies    No Known Allergies    Intolerances        Review of Systems:   General: No fevers/chills  MSK: ambulating, improvement in joint pains   Skin: No new rashes  Neuro: No headaches      Vital Signs Last 24 Hrs  T(C): 36.7 (22 May 2019 12:09), Max: 36.7 (21 May 2019 20:52)  T(F): 98 (22 May 2019 12:09), Max: 98.1 (21 May 2019 20:52)  HR: 64 (22 May 2019 12:09) (64 - 96)  BP: 145/70 (22 May 2019 12:09) (137/81 - 145/74)  BP(mean): --  RR: 18 (22 May 2019 12:09) (17 - 18)  SpO2: 98% (22 May 2019 12:09) (96% - 98%)    Physical Exam:  General: NAD  MSK: R wrist less warm and swollen with improved ROM, decreased tenderness to palpation       LABS:    05-22    128<L>  |  95<L>  |  14  ----------------------------<  134<H>  4.1   |  22  |  0.55    Ca    9.2      22 May 2019 06:54              RADIOLOGY & ADDITIONAL TESTS: JAQUAN KAM  [id number]    INTERVAL HPI/OVERNIGHT EVENTS:  No acute events overnight. Pt's daughter at bedside. States that her R wrist is feeling much better today and is able to move it  complaining of some pain in R knee but it has improved from IV Solumedrol    MEDICATIONS  (STANDING):  enoxaparin Injectable 40 milliGRAM(s) SubCutaneous daily  liothyronine 5 MICROGram(s) Oral daily  lisinopril 10 milliGRAM(s) Oral daily  predniSONE   Tablet 20 milliGRAM(s) Oral daily  spironolactone 25 milliGRAM(s) Oral daily    MEDICATIONS  (PRN):  acetaminophen   Tablet .. 650 milliGRAM(s) Oral every 6 hours PRN Mild Pain (1 - 3)      Allergies    No Known Allergies    Intolerances        Review of Systems:   General: No fevers/chills  MSK: ambulating, improvement in joint pains   Skin: No new rashes  Neuro: No headaches      Vital Signs Last 24 Hrs  T(C): 36.7 (22 May 2019 12:09), Max: 36.7 (21 May 2019 20:52)  T(F): 98 (22 May 2019 12:09), Max: 98.1 (21 May 2019 20:52)  HR: 64 (22 May 2019 12:09) (64 - 96)  BP: 145/70 (22 May 2019 12:09) (137/81 - 145/74)  BP(mean): --  RR: 18 (22 May 2019 12:09) (17 - 18)  SpO2: 98% (22 May 2019 12:09) (96% - 98%)    Physical Exam:  General: NAD  MSK: R wrist less warm and swollen with improved ROM, decreased tenderness to palpation.  bilateral severe degenerative changes at the knees. fluid and warmth persistent at the right knee, but improved.       LABS:    05-22    128<L>  |  95<L>  |  14  ----------------------------<  134<H>  4.1   |  22  |  0.55    Ca    9.2      22 May 2019 06:54              RADIOLOGY & ADDITIONAL TESTS:

## 2019-05-22 NOTE — PROGRESS NOTE ADULT - PROBLEM SELECTOR PLAN 7
TSH wnl  continue current meds
- will check TSH and FT4 level  - c/w liothyronine 5mcg at this time
TSH wnl  continue current meds
TSH wnl  continue current meds

## 2019-05-22 NOTE — PROGRESS NOTE ADULT - REASON FOR ADMISSION
change in mental status and near syncope

## 2019-05-22 NOTE — DISCHARGE NOTE PROVIDER - NSDCFUADDAPPT_GEN_ALL_CORE_FT
Follow up with your Primary Care Doctor within 1 week.  Follow up with your Rheumatologist within 1-2 weeks.

## 2019-05-22 NOTE — PROGRESS NOTE ADULT - ASSESSMENT
89yoF w/ PMHx significant for osteoarthritis (R-knee hemiarthrosis now on medical marijuana for pain), dementia, HCV (in remission) HTN, HFpEF (stage I diastolic dysfunction), HTN, Hypothyroidism, DVT in 2017 on Xarelto and a recent admission in February for a syncopal event who presents for near syncope.
89yoF w/ PMHx significant for osteoarthritis (R-knee hemiarthrosis now on medical marijuana for pain), dementia, HCV (in remission) HTN, HFpEF (stage I diastolic dysfunction), HTN, Hypothyroidism, DVT in 2017 previously on Xarelto and a recent admission in February for a syncopal event who presents for near syncope found to have hyponatremia, suspect SIADH, also with possible pseudogout flare.
89yoF w/ PMHx significant for osteoarthritis (R-knee hemiarthrosis now on medical marijuana for pain), dementia, HCV (in remission) HTN, HFpEF (stage I diastolic dysfunction), HTN, Hypothyroidism, DVT in 2017 previously on Xarelto and a recent admission in February for a syncopal event who presents for near syncope found to have hyponatremia, suspect SIADH, also with possible pseudogout flare.
89yoF w/ pseudogout, Osteoarthritis, HTN presenting with syncope at home- course c/b polyarticular arthritis involving R wrist, R shoulder, R knee - acute  oligoarticular inflammatory arthritis    Pt w/ hx of CPPD, recent arthrocentesis by outpt rheumatology Dr. 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 s/p steroid injection of L knee on 5/6.   Has had improvement after Solumedrol 30mg IV x 1 dose and 20mg this AM    - Pt planned for d/c- would rec Pred 20mg x 2 days, 15mg x 2 days, 10mg x 2 days, 5mg x 2 days    To followup outpt w/ Dr. Eleanor Garza MD  Rheumatology Fellow PGY V  Pager [phone]
89yoF w/ PMHx significant for osteoarthritis (R-knee hemiarthrosis now on medical marijuana for pain), dementia, HCV (in remission) HTN, HFpEF (stage I diastolic dysfunction), HTN, Hypothyroidism, DVT in 2017 on Xarelto and a recent admission in February for a syncopal event who presents for near syncope found to have hyponatremia, suspect hypovolemic hyponatremia.

## 2019-05-22 NOTE — DISCHARGE NOTE PROVIDER - CARE PROVIDERS DIRECT ADDRESSES
,[email or web address]

## 2019-05-22 NOTE — PROGRESS NOTE ADULT - PROBLEM SELECTOR PLAN 3
- blood pressures within appropriate range  - will continue with ACEi and spironolactone, will hold Lasix at this time -- given that patient does not appear volume overloaded and hypochloremia with hyponatremia more suggestive of hypovolemia
- blood pressures within appropriate range  - will continue with ACEi and spironolactone, will hold Lasix at this time
- blood pressures within appropriate range  - will continue with ACEi and spironolactone, will hold Lasix at this time
- blood pressures within appropriate range  - will continue with ACEi and spironolactone, will hold Lasix at this time -- given that patient does not appear volume overloaded and hypochloremia with hyponatremia more suggestive of hypovolemia  - may allow for permissive hypertension as well given pt's age, and consider discontinuing an anti-hypertensive agent should diuretic need to be reinstated

## 2019-05-22 NOTE — PROGRESS NOTE ADULT - PROBLEM SELECTOR PLAN 1
- patient brought in for near syncopal event with reported blank like stare for a period of 3 minutes  - CT Head reviewed, no acute findings  - DDx likely related to neurogenic (EEG in past negative, however) vs. metabolic related to hyponatremia/dehydration vs. cardiac (though less likely given EP interrogation did not reveal any significant events to explain episode)  - could also be progression of underlying dementia vs. related to use of medical marijuana? as below  - PT/OT  - outpatient f/u w/ geriatrics

## 2019-05-22 NOTE — PROGRESS NOTE ADULT - PROBLEM SELECTOR PLAN 2
- hyponatremia with hypochloremia, does not appear to be volume overloaded on exam and rather on the dryer side, more consistent with hypovolemic hyponatremia especially given initial improvement of Na w/ IVF holding Lasix  -with downtrending Na today, give trial of 500 cc NS and repeat PM bmp.  If improving, give 500cc more overnight
- hyponatremia with hypochloremia, does not appear to be volume overloaded on exam and rather on the dryer side, more consistent with hypovolemic hyponatremia especially given improvement of Na w/ IVF  - holding Lasix  - monitor Na Q12H; PO hydration encouraged
on further review of prior labs, she has had chronic hyponatremia that fluctuates 128-133 since 2017, with prior labs suggesting SIADH. Did not improve with trial of ivf yesterday.  hold ivf  fluid restrict 1 L daily  trend daily bmp  check serum/urine osmolality, urine Na
on further review of prior labs, she has had chronic hyponatremia that fluctuates 128-133 since 2017, with prior labs suggesting SIADH. Repeat labs also suggesting SIADH.  hold ivf  continue fluid restrict 1 L daily  contacting PCP-this is a chronic issue-will discuss close outpatient monitoring vs inpatient renal eval.  Overall patient would benefit from early discharge given age and risk of delerium in hospital setting

## 2019-05-22 NOTE — PROGRESS NOTE ADULT - SUBJECTIVE AND OBJECTIVE BOX
Patient is a 89y old  Female who presents with a chief complaint of change in mental status and near syncope (21 May 2019 16:44)      SUBJECTIVE / OVERNIGHT EVENTS:  no acute events overnight  wrist pain resolved with steroids, in fact she forgot she ever had wrist pain yesterday  no cp, occasional sob  wants to go home asap    MEDICATIONS  (STANDING):  enoxaparin Injectable 40 milliGRAM(s) SubCutaneous daily  liothyronine 5 MICROGram(s) Oral daily  lisinopril 10 milliGRAM(s) Oral daily  predniSONE   Tablet 20 milliGRAM(s) Oral daily  spironolactone 25 milliGRAM(s) Oral daily    MEDICATIONS  (PRN):  acetaminophen   Tablet .. 650 milliGRAM(s) Oral every 6 hours PRN Mild Pain (1 - 3)      Vital Signs Last 24 Hrs  T(C): 36.7 (22 May 2019 04:29), Max: 36.7 (21 May 2019 20:52)  T(F): 98 (22 May 2019 04:29), Max: 98.1 (21 May 2019 20:52)  HR: 69 (22 May 2019 04:29) (69 - 96)  BP: 137/81 (22 May 2019 04:29) (137/81 - 155/79)  BP(mean): --  RR: 18 (22 May 2019 04:29) (16 - 18)  SpO2: 98% (22 May 2019 04:29) (96% - 100%)  CAPILLARY BLOOD GLUCOSE        I&O's Summary    21 May 2019 07:01  -  22 May 2019 07:00  --------------------------------------------------------  IN: 1090 mL / OUT: 500 mL / NET: 590 mL    22 May 2019 07:01  -  22 May 2019 11:37  --------------------------------------------------------  IN: 240 mL / OUT: 0 mL / NET: 240 mL        PHYSICAL EXAM:  GEN: elderly woman, sitting up in bed, NAD  PSYCH: A&Ox2, mood and affect appear appropriate   NEURO: mild facial asymmetry on smile but no other focal neurologic deficits appreciated  RESPI: no accessory muscle use, B/L air entry, CTAB   CARDIO: regular rate/rhythm, no LE edema B/L  ABD: soft, NT, ND, +BS  EXT: right wrist: erythema/edema resolved, FROM    LABS:    05-22    128<L>  |  95<L>  |  14  ----------------------------<  134<H>  4.1   |  22  |  0.55    Ca    9.2      22 May 2019 06:54                RADIOLOGY & ADDITIONAL TESTS:    Imaging Personally Reviewed:  rheumatology    Consultant(s) Notes Reviewed:      Care Discussed with Consultants/Other Providers:

## 2019-05-22 NOTE — PROGRESS NOTE ADULT - ATTENDING COMMENTS
Beeper: [phone]
pseudogout flare-improving on steroids, f/u with rheum for duration of po prednisone    Beeper: [phone]    discharge time: 40min.  discussed d/c f/u with patient and daughter at bedside.  d/c pending discussion with pcp
pseudogout-suspect flare in right wrist.  Afebrile, normal wbc, less likely cellulitis.  Hold vanc.  Rheum consult placed for possible intraarticular injection as this is a single joint flare up.    Beeper: [phone]    discussed above with patient and daughter at bedside

## 2019-05-22 NOTE — PROGRESS NOTE ADULT - PROBLEM SELECTOR PLAN 6
- hx of RLE DVT in July 2018 as an isolated event, unclear as to why Xarelto is still continued almost a year later   - on hold during hospitalization  - need to weigh risks vs benefits given age and dementia of continued AC in setting of fall risk  -called pcp Dr Calvin's office to discuss AC
- hx of RLE DVT in July 2018 as an isolated event, unclear as to why Xarelto is still continued almost a year later   - on hold during hospitalization  - need to weigh risks vs benefits given age and dementia of continued AC in setting of fall risk
- hx of RLE DVT in July 2018 as an isolated event.  Per discussion with Dr Calvin and Dr Leija, discontinue xarelto
- hx of RLE DVT in July 2018 as an isolated event.  Per discussion with Dr Calvin and Dr Leija, discontinue xarelto

## 2019-05-22 NOTE — PROGRESS NOTE ADULT - PROBLEM SELECTOR PLAN 8
- reportedly in remission and as per notes follows with Dr. Barlow  outpatient f/u
- reportedly in remission and as per notes follows with Dr. Barlow  - will need to obtain records from O/P GI doctor (none noted in allscripts)
- reportedly in remission and as per notes follows with Dr. Barlow  outpatient f/u
- reportedly in remission and as per notes follows with Dr. Barlow  outpatient f/u

## 2019-05-22 NOTE — DISCHARGE NOTE PROVIDER - CARE PROVIDER_API CALL
Beverly Moralez)  Geriatric Medicine; Internal Medicine  [address]  [place] [address]  Phone: [phone]  Fax: [phone]  Follow Up Time: 1 week    Jodee Webster)  Internal Medicine; Rheumatology  [address]  [place] [address]  Phone: [phone]  Fax: [phone]  Follow Up Time: Beverly Moralez)  Geriatric Medicine; Internal Medicine  [address], [address]  [place] [address]  Phone: [phone]  Fax: [phone]  Follow Up Time: 1 week    Jodee Webster)  Internal Medicine; Rheumatology  [address], [address]  [place] [address]  Phone: [phone]  Fax: [phone]  Follow Up Time:     Johnny Leija)  Cardiology; Internal Medicine  [address], [address]  [place] [address]  Phone: [phone]  Fax: [phone]  Follow Up Time:

## 2019-05-22 NOTE — DISCHARGE NOTE PROVIDER - NSDCFUADDINST_GEN_ALL_CORE_FT
Referral forwarded to [facility] for visiting nurse and home physical therapy. A nurse will contact you the day after discharge to schedule your appointment.

## 2019-05-22 NOTE — DISCHARGE NOTE PROVIDER - HOSPITAL COURSE
89 y/o female with PMH significant for osteoarthritis (R-knee hemiarthrosis now on medical marijuana for pain), dementia, HCV (in remission) HTN, HFpEF (stage I diastolic dysfunction), HTN, Hypothyroidism, DVT in 2017 previously on Xarelto, and a recent admission in February for a syncopal event, presented to the ED for near syncopal event with reported blank like stare for a period of 3 minutes    found to have hyponatremia, suspect SIADH, also with possible pseudogout flare.  CT Head reviewed, no acute findings.  Regarding her hyponatremia, on further review of prior labs, she has had chronic hyponatremia that fluctuates 128-133 since 2017, with prior labs suggesting SIADH - did not improve with trial of IVF, 1 liter fluid restriction in place.  Lasix has been held.  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  Patient is being discharged to home – to follow up with Primary Care and Rheumatology. 89 y/o female with PMH significant for osteoarthritis (R-knee hemiarthrosis now on medical marijuana for pain), dementia, HCV (in remission) HTN, HFpEF (stage I diastolic dysfunction), HTN, Hypothyroidism, DVT in 2017 previously on Xarelto, and a recent admission in February for a syncopal event, presented to the ED for near syncopal event with reported blank like stare for a period of 3 minutes    found to have hyponatremia, suspect SIADH, also with possible pseudogout flare.  CT Head reviewed, no acute findings.  Regarding her hyponatremia, on further review of prior labs, she has had chronic hyponatremia that fluctuates 128-133 since 2017, with prior labs suggesting SIADH, 1 liter fluid restriction in place, per her outpatient provider may d/c home with Na 128 as this is chronic.  Lasix has been held.  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  For her remote hx of DVT, she completed xarelto course and in discussion with her cardiologist and PCP further AC was held for risk greater than benefits.  Patient is being discharged to home – to follow up with Primary Care and Rheumatology.

## 2019-05-22 NOTE — PROGRESS NOTE ADULT - PROBLEM SELECTOR PLAN 5
- patient with HFpEF: last ECHO in 2017 with EF of 68% percent with mild diastolic dysfunction  - c/w ACEi and aldactone, will hold lasix at this time as above
- patient with HFpEF: last ECHO in 2017 with EF of 68% percent with mild diastolic dysfunction  - c/w ACEi and aldactone, will hold lasix at this time as above  -no acute exacerbation

## 2019-05-22 NOTE — DISCHARGE NOTE PROVIDER - NSDCCPCAREPLAN_GEN_ALL_CORE_FT
PRINCIPAL DISCHARGE DIAGNOSIS  Diagnosis: Near syncope  Assessment and Plan of Treatment: Avoid activity or condition that causes you to feel faint.  Lay down with your feet up if you feel like you might faint; breath deeply until symptoms pass.  Consult with your doctor about driving, playing sports, or operating machinery.  If you pass out, call 911 to be taken to the nearest emergency room.  Also call 911 to be taken to the nearest emergency room if you experience dizziness or lightheadedness associated with  severe headache, slurred speech, vision changes, facial asymetry, chest pain, abdominal pain, or back pain.      SECONDARY DISCHARGE DIAGNOSES  Diagnosis: Encephalopathy  Assessment and Plan of Treatment: Your family will create a safe environment and remind you of the date and time.  Ensure that you are eating and drinking to stay well nourished and well hydrated.  Take all medications as prescribed.    Diagnosis: Hyponatremia  Assessment and Plan of Treatment: Restrict fluid (water, juice, etc.) to 1 liter.  Follow up with your Primary Care Doctor within 1 week.    Diagnosis: Pseudogout  Assessment and Plan of Treatment: Take Prednisone as prescribed.  Follow up with your Rheumatologist within 1 week.    Diagnosis: Chronic diastolic heart failure  Assessment and Plan of Treatment: Your Lasix has been stopped - take remaining medications as prescribed.  If you gain 3lbs in 3 days, or 5lbs in a week call your Health Care Provider.  Eat a low sodium diet. Call your Health Care Provider if you have any swelling or increased swelling in your feet, ankles, and/or stomach.  If you experience dizziness, chest pain, or shortness of breath, seek immediate medical attention.    Diagnosis: HTN (hypertension)  Assessment and Plan of Treatment: Take all medications as prescribed.  Follow up with your medical doctor for routine blood pressure monitoring at your next visit.  Notify your doctor if you have any of the following symptoms:  Dizziness, lightheadedness, blurry vision, headache, chest pain, or shortness of breath. PRINCIPAL DISCHARGE DIAGNOSIS  Diagnosis: Near syncope  Assessment and Plan of Treatment: Avoid activity or condition that causes you to feel faint.  Lay down with your feet up if you feel like you might faint; breath deeply until symptoms pass.  Consult with your doctor about driving, playing sports, or operating machinery.  If you pass out, call 911 to be taken to the nearest emergency room.  Also call 911 to be taken to the nearest emergency room if you experience dizziness or lightheadedness associated with  severe headache, slurred speech, vision changes, facial asymetry, chest pain, abdominal pain, or back pain.      SECONDARY DISCHARGE DIAGNOSES  Diagnosis: Chronic diastolic heart failure  Assessment and Plan of Treatment: Your Lasix has been stopped - take remaining medications as prescribed.  If you gain 3lbs in 3 days, or 5lbs in a week call your Health Care Provider.  Eat a low sodium diet. Call your Health Care Provider if you have any swelling or increased swelling in your feet, ankles, and/or stomach.  If you experience dizziness, chest pain, or shortness of breath, seek immediate medical attention.    Diagnosis: Pseudogout  Assessment and Plan of Treatment: Take Prednisone taper as prescribed (20mg for 2 days, 15 mg for 2 days, 10mg for 2 days, 5mg for 2 days, then STOP).  Follow up with your Rheumatologist within 1 week.    Diagnosis: Hyponatremia  Assessment and Plan of Treatment: Restrict fluid (water, juice, etc.) to 1 liter.  Follow up with your Primary Care Doctor within 1 week for labwork.    Diagnosis: HTN (hypertension)  Assessment and Plan of Treatment: Take all medications as prescribed.  Follow up with your medical doctor for routine blood pressure monitoring at your next visit.  Notify your doctor if you have any of the following symptoms:  Dizziness, lightheadedness, blurry vision, headache, chest pain, or shortness of breath.    Diagnosis: Encephalopathy  Assessment and Plan of Treatment: Your family will create a safe environment and remind you of the date and time.  Ensure that you are eating and drinking to stay well nourished and well hydrated.  Take all medications as prescribed.

## 2019-05-23 ENCOUNTER — OTHER (OUTPATIENT)
Age: 84
End: 2019-05-23

## 2019-05-24 NOTE — CDI QUERY NOTE - NSCDIOTHERTXTBX_GEN_ALL_CORE_HH
Patient presented to ED with near syncope and altered mental status. Found to have Hyponatremia with repeat labs suggesting SIADH per progress note 5/22/19. Patient diagnosed with Encephalopathy, ED notes documenting "Daughter states pt was "foggy" after syncopal episodes but returned to baseline in a few minutes."  Please include more specific documentation, either known or suspected, of a corresponding diagnosis associated with the clinical information described above. Please document type of Encephalopathy if known.  1. Metabolic Encephalopathy  2. Toxic / Drug Induced Encephalopathy (Specify)  3. Other ( Specify)  4. Unknown

## 2019-06-14 ENCOUNTER — APPOINTMENT (OUTPATIENT)
Dept: CARDIOLOGY | Facility: CLINIC | Age: 84
End: 2019-06-14
Payer: MEDICARE

## 2019-06-14 ENCOUNTER — NON-APPOINTMENT (OUTPATIENT)
Age: 84
End: 2019-06-14

## 2019-06-14 VITALS
SYSTOLIC BLOOD PRESSURE: 130 MMHG | HEIGHT: 64 IN | BODY MASS INDEX: 25.61 KG/M2 | OXYGEN SATURATION: 100 % | WEIGHT: 150 LBS | HEART RATE: 76 BPM | DIASTOLIC BLOOD PRESSURE: 60 MMHG

## 2019-06-14 PROCEDURE — 36415 COLL VENOUS BLD VENIPUNCTURE: CPT

## 2019-06-14 PROCEDURE — 93000 ELECTROCARDIOGRAM COMPLETE: CPT

## 2019-06-14 PROCEDURE — 99214 OFFICE O/P EST MOD 30 MIN: CPT

## 2019-06-14 RX ORDER — RIVAROXABAN 20 MG/1
20 TABLET, FILM COATED ORAL
Qty: 90 | Refills: 3 | Status: DISCONTINUED | COMMUNITY
Start: 2018-08-14 | End: 2019-06-14

## 2019-06-14 RX ORDER — FUROSEMIDE 40 MG/1
40 TABLET ORAL DAILY
Qty: 90 | Refills: 2 | Status: DISCONTINUED | COMMUNITY
Start: 2017-01-16 | End: 2019-06-14

## 2019-06-14 NOTE — DISCUSSION/SUMMARY
[FreeTextEntry1] : Patient is a very pleasant 89 year-old woman who presents today for follow-up of her poor exercise capacity, shortness of breath with even minimal exertion, and worsening of her right hip pain.  Patient has clear lungs, mild lower extremity edema, and a loud S2 which raises the possibility of pulmonary hypertension, although none was seen on a recent echocardiogram. Suspect symptom of dyspnea on minimal exertion is related to labile blood pressure and overall deconditioning due to inactivity.  Given normal oxygen saturation on room and and unchanged ECG, no evidence of significant PE at this time. \par \par In July 2018, she had a DVT in RLE. She completed 21 days of Xarelto 15mg BID, then 20mg daily for more than a year afterwards.\par \par  Cortisone shot provided only mild relief. She has been started on Vicodin and stool softener. There is concern for opioid induced constipation. Patient referred for pain management. Case discussed with Gricelda Edwards MD. Patient now started on high CBD, low THC medicinal marijuana therapy. She has been on medication since 3/14/2019. \par \par For hypertension, HFpEF, and LE edema:\par Continue ACEi, spironolactone - periodically monitor metabolic panel for monitor renal function and potassium. Adjust medications as needed.\par \par  Follow-up in office in 3 months or earlier as needed.

## 2019-06-14 NOTE — HISTORY OF PRESENT ILLNESS
[FreeTextEntry1] : 89 year-old woman with history of hypertension, HCV, hypothyroidism comes today with her daughter for follow-up. 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 In July 2018, patient noted to have right leg swelling. Evaluation by orthopedic surgeon with Doppler revealed DVT and cellulitis. Patient was started on Augmentin and Xarelto. Patient has no pleuritic chest pain. \par \par Patient has also been sent to pulmonary who also check overnight saturation studies and have r/o hypoxia as a source of fatigue and SOB. \par \par OA of knees have been chronic and also promotes to patients sedentary lifestyle. She uses a cane on occasion. No recent falls. On NSAIDS PRN for pain.\par \par In February 2018, patient was admitted with presyncope. Her ILR was interrogated and was without corresponding event. She was discharged without changes in her medications.\par \par December 2018 - Patient's chief complaint today is right hip pain. She has been started on Vicodin for it. She is taking it once per day. She also got a cortisone injection last week that provided mild relief.\par Labs drawn earlier today by Dr. Dickson.\par \par March 2019 - Patient has just started CBD/THC for pain relief of chronic arthritis. It is well tolerated so far, but effect may not be seen at this early time.\par \par June 2019 - In May 2019, patient was treated for gout with steroids per rheumatology. Patient admitted to [facility] in May 2019 with syncope. At that time, her furosemide was stopped and her Xarelto was discontinued. She remains on CBD for pain. She has no signs or symptoms of volume overload while off loop diuretic.\par \par PMD/Geriatrics: Beverly Wheeler MD [phone]\par Orthopedist: Alex Hoff MD [phone]\par Plastic Surgeon: Miguel Soto MD [phone]\par Former Cardiologist: Jose Santoyo MD [phone]\par GI: Johnny Dickson MD [phone]

## 2019-06-14 NOTE — CARDIOLOGY SUMMARY
[___] : [unfilled] [LVEF ___%] : LVEF [unfilled]% [Normal] : normal LA size [None] : normal LV function [Mild] : mild mitral regurgitation

## 2019-06-14 NOTE — REASON FOR VISIT
[Follow-Up - Clinic] : a clinic follow-up of [Anticoagulation] : anticoagulation [A-V Block] : A-V block [Abnormal ECG] : an abnormal ECG [Syncope] : syncope [FreeTextEntry1] : Patient presents today for follow-up. She used to follow-up with Jose Santoyo MD for many years. [Other: _____] : [unfilled]

## 2019-06-14 NOTE — PHYSICAL EXAM
[General Appearance - Well Developed] : well developed [Well Groomed] : well groomed [General Appearance - Well Nourished] : well nourished [Normal Appearance] : normal appearance [No Deformities] : no deformities [General Appearance - In No Acute Distress] : no acute distress [Normal Oral Mucosa] : normal oral mucosa [Normal Oropharynx] : normal oropharynx [No Oral Cyanosis] : no oral cyanosis [No Oral Pallor] : no oral pallor [No Jugular Venous Díaz A Waves] : no jugular venous díaz A waves [Normal Jugular Venous V Waves Present] : normal jugular venous V waves present [Respiration, Rhythm And Depth] : normal respiratory rhythm and effort [Exaggerated Use Of Accessory Muscles For Inspiration] : no accessory muscle use [Auscultation Breath Sounds / Voice Sounds] : lungs were clear to auscultation bilaterally [] : no respiratory distress [Bowel Sounds] : normal bowel sounds [Abdomen Soft] : soft [Abdomen Tenderness] : non-tender [Nail Clubbing] : no clubbing of the fingernails [Cyanosis, Localized] : no localized cyanosis [Skin Color & Pigmentation] : normal skin color and pigmentation [No Xanthoma] : no  xanthoma was observed [Impaired Insight] : insight and judgment were intact [Oriented To Time, Place, And Person] : oriented to person, place, and time [FreeTextEntry1] : evidence of chronic venous stasis [Mood] : the mood was normal [No Anxiety] : not feeling anxious [Affect] : the affect was normal [PERRL] : pupils were equal in size, round, and reactive to light [Conjunctiva] : the conjunctiva were normal in both eyes [EOM Intact] : extraocular movements were intact [Yellow Sclera (Icteric)] : no scleral icterus was seen [5th Left ICS - MCL] : palpated at the 5th LICS in the midclavicular line [Normal] : normal [Normal Rate] : normal [No Precordial Heave] : no precordial heave was noted [Normal S1] : normal S1 [Rhythm Regular] : regular [S2 Accentuated] : was accentuated [Normal S2] : normal S2 [No Gallop] : no gallop heard [No Murmur] : no murmurs heard [Right Carotid Bruit] : no bruit heard over the right carotid [Left Carotid Bruit] : no bruit heard over the left carotid [2+] : left 2+ [___ +] : bilateral [unfilled]U+ pretibial pitting edema

## 2019-06-17 LAB
ALBUMIN SERPL ELPH-MCNC: 4.4 G/DL
ALP BLD-CCNC: 92 U/L
ALT SERPL-CCNC: 30 U/L
ANION GAP SERPL CALC-SCNC: 16 MMOL/L
AST SERPL-CCNC: 46 U/L
BASOPHILS # BLD AUTO: 0.06 K/UL
BASOPHILS NFR BLD AUTO: 1 %
BILIRUB SERPL-MCNC: 0.2 MG/DL
BUN SERPL-MCNC: 20 MG/DL
CALCIUM SERPL-MCNC: 10.2 MG/DL
CHLORIDE SERPL-SCNC: 95 MMOL/L
CO2 SERPL-SCNC: 23 MMOL/L
CREAT SERPL-MCNC: 0.84 MG/DL
EOSINOPHIL # BLD AUTO: 0.11 K/UL
EOSINOPHIL NFR BLD AUTO: 1.9 %
GLUCOSE SERPL-MCNC: 90 MG/DL
HCT VFR BLD CALC: 39.1 %
HGB BLD-MCNC: 12.5 G/DL
IMM GRANULOCYTES NFR BLD AUTO: 0.3 %
LYMPHOCYTES # BLD AUTO: 1.15 K/UL
LYMPHOCYTES NFR BLD AUTO: 19.7 %
MAN DIFF?: NORMAL
MCHC RBC-ENTMCNC: 29.5 PG
MCHC RBC-ENTMCNC: 32 GM/DL
MCV RBC AUTO: 92.2 FL
MONOCYTES # BLD AUTO: 0.77 K/UL
MONOCYTES NFR BLD AUTO: 13.2 %
NEUTROPHILS # BLD AUTO: 3.72 K/UL
NEUTROPHILS NFR BLD AUTO: 63.9 %
PLATELET # BLD AUTO: 234 K/UL
POTASSIUM SERPL-SCNC: 5.2 MMOL/L
PROT SERPL-MCNC: 7.2 G/DL
RBC # BLD: 4.24 M/UL
RBC # FLD: 14.9 %
SODIUM SERPL-SCNC: 134 MMOL/L
WBC # FLD AUTO: 5.83 K/UL

## 2019-06-17 NOTE — PROGRESS NOTE ADULT - PROBLEM SELECTOR PROBLEM 6
No
Osteoarthritis involving multiple joints on both sides of body
Osteoarthritis involving multiple joints on both sides of body

## 2019-06-18 ENCOUNTER — APPOINTMENT (OUTPATIENT)
Dept: ELECTROPHYSIOLOGY | Facility: CLINIC | Age: 84
End: 2019-06-18
Payer: MEDICARE

## 2019-06-18 PROCEDURE — 93298 REM INTERROG DEV EVAL SCRMS: CPT

## 2019-06-18 PROCEDURE — 93299: CPT

## 2019-06-21 ENCOUNTER — APPOINTMENT (OUTPATIENT)
Dept: RHEUMATOLOGY | Facility: CLINIC | Age: 84
End: 2019-06-21
Payer: MEDICARE

## 2019-06-21 VITALS
SYSTOLIC BLOOD PRESSURE: 131 MMHG | DIASTOLIC BLOOD PRESSURE: 68 MMHG | HEIGHT: 64 IN | OXYGEN SATURATION: 97 % | HEART RATE: 71 BPM | TEMPERATURE: 97.9 F | RESPIRATION RATE: 16 BRPM

## 2019-06-21 PROCEDURE — 99213 OFFICE O/P EST LOW 20 MIN: CPT

## 2019-06-21 RX ORDER — PREDNISONE 5 MG/1
5 TABLET ORAL
Qty: 30 | Refills: 0 | Status: DISCONTINUED | COMMUNITY
Start: 2019-05-07 | End: 2019-06-21

## 2019-07-05 ENCOUNTER — APPOINTMENT (OUTPATIENT)
Dept: RHEUMATOLOGY | Facility: CLINIC | Age: 84
End: 2019-07-05
Payer: MEDICARE

## 2019-07-05 VITALS — HEART RATE: 66 BPM | OXYGEN SATURATION: 96 % | SYSTOLIC BLOOD PRESSURE: 134 MMHG | DIASTOLIC BLOOD PRESSURE: 73 MMHG

## 2019-07-05 PROCEDURE — 20610 DRAIN/INJ JOINT/BURSA W/O US: CPT | Mod: 50

## 2019-07-05 RX ORDER — METHYLPRED ACET/NACL,ISO-OS/PF 40 MG/ML
40 VIAL (ML) INJECTION
Qty: 1 | Refills: 0 | Status: COMPLETED | OUTPATIENT
Start: 2019-07-05

## 2019-07-05 RX ORDER — LIDOCAINE HYDROCHLORIDE 10 MG/ML
1 INJECTION, SOLUTION INFILTRATION; PERINEURAL
Refills: 0 | Status: COMPLETED | OUTPATIENT
Start: 2019-07-05

## 2019-07-05 RX ADMIN — LIDOCAINE HYDROCHLORIDE %: 10 INJECTION, SOLUTION INFILTRATION; PERINEURAL at 00:00

## 2019-07-05 RX ADMIN — METHYLPREDNISOLONE ACETATE 1 MG/ML: 40 INJECTION, SUSPENSION INTRA-ARTICULAR; INTRALESIONAL; INTRAMUSCULAR; SOFT TISSUE at 00:00

## 2019-07-19 ENCOUNTER — APPOINTMENT (OUTPATIENT)
Dept: GERIATRICS | Facility: CLINIC | Age: 84
End: 2019-07-19
Payer: MEDICARE

## 2019-07-19 VITALS
BODY MASS INDEX: 26.66 KG/M2 | TEMPERATURE: 97.6 F | DIASTOLIC BLOOD PRESSURE: 80 MMHG | RESPIRATION RATE: 15 BRPM | SYSTOLIC BLOOD PRESSURE: 140 MMHG | HEART RATE: 74 BPM | OXYGEN SATURATION: 94 % | HEIGHT: 64 IN | WEIGHT: 156.13 LBS

## 2019-07-19 PROCEDURE — 99215 OFFICE O/P EST HI 40 MIN: CPT | Mod: GC

## 2019-07-19 NOTE — REVIEW OF SYSTEMS
[Fever] : no fever [Chills] : no chills [Eyesight Problems] : no eyesight problems [Nosebleeds] : no nosebleeds [Nasal Discharge] : no nasal discharge [Chest Pain] : no chest pain [Palpitations] : no palpitations [Cough] : no cough [SOB on Exertion] : no shortness of breath during exertion [Constipation] : no constipation [Diarrhea] : no diarrhea

## 2019-07-19 NOTE — END OF VISIT
[] : Resident [FreeTextEntry3] : 89yo woman brought by her daughter Katty, with whom she lives,  because she is concerned about mother cognitive decline over the last year. Patient has become more repetitive, forgets immediately information . She is no longer attending any  programs.  \par Patient herself reports feeling fine and denies any complaints at this time. No falls in the past year, uses a walker\par PE:  humming songs, unremarkable, MMSE 18/30, grossly abnormal clock\par Plan: Refer daughter to [facility] for counseling and community resources. Patient must be supervised at all times.  Labs from June 14, 2019 reviewed (Dr Johnny Leija) all wnl.

## 2019-07-19 NOTE — SOCIAL HISTORY
[No falls in past year] : Patient reported no falls in the past year [Walker] : walker [de-identified] : some assistance form aid

## 2019-07-19 NOTE — HISTORY OF PRESENT ILLNESS
[Moderate] : Stage: Moderate [0] : 2) Feeling down, depressed, or hopeless: Not at all [PHQ-2 Score ___] : PHQ-2 Score [unfilled] [FreeTextEntry1] : 89yo F presented to clinic for follow up visit with daughter. \par She reports feeling fine and denies any complaints at this time. \par \par Daugther reports concerns regarding her worsening memory - she recently more forgetful specially affecting her short term memory. \par \par No falls in last 1 year. Walks with walker in home uses wheelchair when out. Able to perform her ADLs with minimal assistance form aid. \par \par Did not have shingles or pneumonia vaccine.

## 2019-07-19 NOTE — PHYSICAL EXAM
[General Appearance - Alert] : alert [General Appearance - Well Nourished] : well nourished [Sclera] : the sclera and conjunctiva were normal [PERRL With Normal Accommodation] : pupils were equal in size, round, and reactive to light [Extraocular Movements] : extraocular movements were intact [Normal Oral Mucosa] : normal oral mucosa [No Oral Pallor] : no oral pallor [Neck Appearance] : the appearance of the neck was normal [Jugular Venous Distention Increased] : there was no jugular-venous distention [Respiration, Rhythm And Depth] : normal respiratory rhythm and effort [Auscultation Breath Sounds / Voice Sounds] : lungs were clear to auscultation bilaterally [Apical Impulse] : the apical impulse was normal [Heart Rate And Rhythm] : heart rate was normal and rhythm regular [Heart Sounds] : normal S1 and S2 [Bowel Sounds] : normal bowel sounds [Abdomen Soft] : soft [Abdomen Tenderness] : non-tender [No CVA Tenderness] : no ~M costovertebral angle tenderness [Nail Clubbing] : no clubbing  or cyanosis of the fingernails [] : no rash [Total Score ___ / 30] : the patient achieved a score of [unfilled] /30 [Date / Time ___ / 5] : date / time [unfilled] / 5 [Place ___ / 5] : place [unfilled] / 5 [Registration ___ / 3] : registration [unfilled] / 3 [Serial Sevens ___/5] : serial sevens [unfilled] / 5 [Naming 2 Objects ___ / 2] : naming two objects [unfilled] / 2 [Repeating a Sentence ___ / 1] : repeating a sentence [unfilled] / 1 [Writing a Sentence ___ / 1] : write sentence [unfilled] / 1 [3-stage Verbal Command ___ / 3] : three-stage verbal command [unfilled] / 3 [Written Command ___ / 1] : written command [unfilled] / 1 [Copy a Design ___ / 1] : copy a design [unfilled] / 1 [Recall ___ / 3] : recall [unfilled] / 3 [Affect] : the affect was normal [Mood] : the mood was normal [FreeTextEntry1] : 1+ pitting edema upto knees

## 2019-07-23 ENCOUNTER — APPOINTMENT (OUTPATIENT)
Dept: ELECTROPHYSIOLOGY | Facility: CLINIC | Age: 84
End: 2019-07-23
Payer: MEDICARE

## 2019-07-23 PROCEDURE — 93298 REM INTERROG DEV EVAL SCRMS: CPT

## 2019-07-23 PROCEDURE — 93299: CPT

## 2019-07-26 ENCOUNTER — APPOINTMENT (OUTPATIENT)
Dept: RHEUMATOLOGY | Facility: CLINIC | Age: 84
End: 2019-07-26

## 2019-08-02 ENCOUNTER — APPOINTMENT (OUTPATIENT)
Dept: ELECTROPHYSIOLOGY | Facility: CLINIC | Age: 84
End: 2019-08-02
Payer: MEDICARE

## 2019-08-02 VITALS
OXYGEN SATURATION: 94 % | HEART RATE: 69 BPM | SYSTOLIC BLOOD PRESSURE: 137 MMHG | WEIGHT: 150 LBS | BODY MASS INDEX: 25.61 KG/M2 | HEIGHT: 64 IN | DIASTOLIC BLOOD PRESSURE: 86 MMHG

## 2019-08-02 PROCEDURE — 93291 INTERROG DEV EVAL SCRMS IP: CPT

## 2019-08-22 ENCOUNTER — APPOINTMENT (OUTPATIENT)
Dept: RHEUMATOLOGY | Facility: CLINIC | Age: 84
End: 2019-08-22
Payer: MEDICARE

## 2019-08-22 VITALS
SYSTOLIC BLOOD PRESSURE: 140 MMHG | RESPIRATION RATE: 16 BRPM | HEIGHT: 64 IN | HEART RATE: 64 BPM | OXYGEN SATURATION: 95 % | BODY MASS INDEX: 25.61 KG/M2 | DIASTOLIC BLOOD PRESSURE: 80 MMHG | WEIGHT: 150 LBS | TEMPERATURE: 97.8 F

## 2019-08-22 PROCEDURE — 99213 OFFICE O/P EST LOW 20 MIN: CPT

## 2019-08-22 RX ORDER — HYDROCODONE BITARTRATE AND ACETAMINOPHEN 5; 300 MG/1; MG/1
5-300 TABLET ORAL
Qty: 30 | Refills: 0 | Status: DISCONTINUED | COMMUNITY
Start: 2018-12-19 | End: 2019-08-22

## 2019-08-28 ENCOUNTER — CLINICAL ADVICE (OUTPATIENT)
Age: 84
End: 2019-08-28

## 2019-08-30 ENCOUNTER — APPOINTMENT (OUTPATIENT)
Dept: GERIATRICS | Facility: CLINIC | Age: 84
End: 2019-08-30

## 2019-09-03 ENCOUNTER — APPOINTMENT (OUTPATIENT)
Dept: ELECTROPHYSIOLOGY | Facility: CLINIC | Age: 84
End: 2019-09-03
Payer: MEDICARE

## 2019-09-03 PROCEDURE — 93299: CPT

## 2019-09-03 PROCEDURE — 93298 REM INTERROG DEV EVAL SCRMS: CPT

## 2019-09-11 ENCOUNTER — APPOINTMENT (OUTPATIENT)
Dept: CARDIOLOGY | Facility: CLINIC | Age: 84
End: 2019-09-11
Payer: MEDICARE

## 2019-09-11 ENCOUNTER — NON-APPOINTMENT (OUTPATIENT)
Age: 84
End: 2019-09-11

## 2019-09-11 VITALS
HEIGHT: 64 IN | OXYGEN SATURATION: 99 % | WEIGHT: 148 LBS | BODY MASS INDEX: 25.27 KG/M2 | HEART RATE: 67 BPM | DIASTOLIC BLOOD PRESSURE: 83 MMHG | SYSTOLIC BLOOD PRESSURE: 135 MMHG

## 2019-09-11 DIAGNOSIS — I73.9 PERIPHERAL VASCULAR DISEASE, UNSPECIFIED: ICD-10-CM

## 2019-09-11 PROCEDURE — 99214 OFFICE O/P EST MOD 30 MIN: CPT

## 2019-09-11 PROCEDURE — 93000 ELECTROCARDIOGRAM COMPLETE: CPT

## 2019-09-14 ENCOUNTER — RX RENEWAL (OUTPATIENT)
Age: 84
End: 2019-09-14

## 2019-09-19 ENCOUNTER — FORM ENCOUNTER (OUTPATIENT)
Age: 84
End: 2019-09-19

## 2019-09-20 ENCOUNTER — APPOINTMENT (OUTPATIENT)
Dept: RHEUMATOLOGY | Facility: CLINIC | Age: 84
End: 2019-09-20
Payer: MEDICARE

## 2019-09-20 VITALS
DIASTOLIC BLOOD PRESSURE: 83 MMHG | HEART RATE: 68 BPM | OXYGEN SATURATION: 98 % | RESPIRATION RATE: 16 BRPM | TEMPERATURE: 97.9 F | SYSTOLIC BLOOD PRESSURE: 136 MMHG

## 2019-09-20 PROCEDURE — 73130 X-RAY EXAM OF HAND: CPT | Mod: RT

## 2019-09-20 PROCEDURE — 20610 DRAIN/INJ JOINT/BURSA W/O US: CPT | Mod: RT

## 2019-09-20 PROCEDURE — 99214 OFFICE O/P EST MOD 30 MIN: CPT | Mod: 25

## 2019-09-20 RX ORDER — METHYLPRED ACET/NACL,ISO-OS/PF 40 MG/ML
40 VIAL (ML) INJECTION
Qty: 1 | Refills: 0 | Status: COMPLETED | OUTPATIENT
Start: 2019-09-20

## 2019-09-20 RX ORDER — LIDOCAINE HYDROCHLORIDE 10 MG/ML
1 INJECTION, SOLUTION INFILTRATION; PERINEURAL
Refills: 0 | Status: COMPLETED | OUTPATIENT
Start: 2019-09-20

## 2019-09-20 RX ADMIN — LIDOCAINE HYDROCHLORIDE %: 10 INJECTION, SOLUTION INFILTRATION; PERINEURAL at 00:00

## 2019-09-20 RX ADMIN — METHYLPREDNISOLONE ACETATE 1 MG/ML: 40 INJECTION, SUSPENSION INTRA-ARTICULAR; INTRALESIONAL; INTRAMUSCULAR; SOFT TISSUE at 00:00

## 2019-09-21 NOTE — DISCHARGE NOTE PROVIDER - NSDCDCMDCOMP_GEN_ALL_CORE
[facility]    cc:         Jose Francis M.D.  Gorge Mora M.D.   Jose Francis M.D.    PREOPERATIVE DIAGNOSIS:  OSTEOPOROTIC PATHOLOGIC COMPRESSION FRACTURE OF L4.    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    OPERATION PERFORMED:  SpineJack kyphoplasty, L4, with implant of 5.8 placed bilaterally at the L4 level.  Advanced care plan discussed with the patient and documented in the electronic medical record.  900 mg of clindamycin provided IV within 30 to 60 minutes of the skin incision and to be discontinued within 24 hours.  DVT prophylaxis instituted.       Biopsy was obtained from L4 and furthermore imaging and monitoring were performed.    DESCRIPTION OF PROCEDURE:  The patient was taken to the operating room, time-out taken, back had been signed.  Preoperative antibiotics given.  The patient was placed in prone position on Urban table with care taken to relieve pressure off the axilla and ulnar nerves, abdomen, patella, genitalia, and other areas of potential pressure.  The patient is a 77-year-old male with history of low back pain subsequent to osteoporotic pathologic compression fracture, L4 verified  by MRI scan, unresponsive to operative treatment modalities including bed rest, analgesia, also has spinal stenosis at L4-5, as well.  Based on the findings, lack of response to conservative management, I decided to perform Kyphon balloon kyphoplasty at L4 level.     After informed consent, the patient was sterilely prepped and draped.  Placed in prone position on Urban table, care taken to relieve pressure off the axilla ulnar nerves, abdomen, patella, genitalia and other areas of potential pressure.  Utilizing fluoroscopic guidance, AP lateral images 11-gauge entry needles inserted the vertebral bodies utilizing bilateral transpedicular approach.  The needle stylet was then removed.  Guide wire was  placed into the  anterior 3rd of the vertebral body.  Following this, rotating reamer was placed in the anterior portion of the vertebral body within 3-5 mm of the anterior vertebral body wall.  Reamer was removed and replaced with template.  Two 5.7 mm SpineJack implants were then deployed bilaterally after appropriately positioning was confirmed on AP and lateral images and verified.  The plate was performed by rotating the handles in  clockwise direction, implant holders were then removed, and 2.7 mL of PMMA were injected without difficulty or complication.  There was no evidence of prominent extravasation or other difficulties.  The patient was kept prone position for approximately 5-10 minutes prior to turning to supine.  Thereafter being extubated being prepared to be returned to the ER for further recovery and then report.  The patient tolerated the procedure well.   Estimated blood loss for the procedure was less than 5 cc.  Imaging was taken at the end of the case.  Monitoring was unremarkable.  There were no complications, evaluation stable.      Jose Francis M.D.                   MRN#:  [id number]  ACCT#:  [id number]  DATE OF SURGERY:  0  9/20/2019    ROOM:  08  SVC:  OBD  DICTATED BY: Jose Francis M.D.  DD: 09/20/2019 DT: 09/21/2019 TD: 19:21 TT: 03:21 K#: [id number]    REPORT OF OPERATIONPÂûþszI  Patient seen and evaluated with wife  Post op BP. Preop back pain diminished. Moving in bed easily  s/m intact, calves soft  Plan; medical mgmt, precautions, PT/OT, d/c planning  ALL ?s answered, instructions given  Ortho f/u 10/4/19   This document is complete and the patient is ready for discharge.

## 2019-09-23 LAB
B PERT IGG+IGM PNL SER: ABNORMAL
COLOR FLD: NORMAL
EOSINOPHIL # FLD MANUAL: 0 %
FLUID INTAKE SUBSTANCE CLASS: NORMAL
LYMPHOCYTES # FLD MANUAL: 11 %
MESOTHL CELL NFR FLD: 0 %
MONOS+MACROS NFR FLD MANUAL: 72 %
NEUTS SEG # FLD MANUAL: 17 %
NRBC # FLD: 0
RBC # FLD MANUAL: ABNORMAL /UL
SYCRY CLARITY: ABNORMAL
SYCRY COLOR: ABNORMAL
SYCRY ID: ABNORMAL
SYCRY TUBE: NORMAL
TOTAL CELLS COUNTED FLD: 1325 /UL
TUBE TYPE: NORMAL
UNIDENT CELLS NFR FLD MANUAL: 0 %
VARIANT LYMPHS # FLD MANUAL: 0 %

## 2019-09-27 ENCOUNTER — RX RENEWAL (OUTPATIENT)
Age: 84
End: 2019-09-27

## 2019-09-27 ENCOUNTER — MEDICATION RENEWAL (OUTPATIENT)
Age: 84
End: 2019-09-27

## 2019-09-28 NOTE — DISCUSSION/SUMMARY
[FreeTextEntry1] : Patient is a very pleasant 89 year-old woman who presents today for follow-up of her poor exercise capacity, shortness of breath with even minimal exertion, and worsening of her right hip pain.  Patient has clear lungs, mild lower extremity edema, and a loud S2 which raises the possibility of pulmonary hypertension, although none was seen on a recent echocardiogram. Suspect symptom of dyspnea on minimal exertion is related to labile blood pressure and overall deconditioning due to inactivity.  Given normal oxygen saturation on room and and unchanged ECG, no evidence of significant PE at this time. \par \par In July 2018, she had a DVT in RLE. She completed 21 days of Xarelto 15mg BID, then 20mg daily for more than a year afterwards. She is now off anticoagulation. ILR without evidence of atrial fibrillation as of 7/23/2019.\par \par  Cortisone shot provided only mild relief. She has been started on Vicodin and stool softener. There is concern for opioid induced constipation. Patient referred for pain management. Case discussed with Gricelda Edwards MD. Patient now started on high CBD, low THC medicinal marijuana therapy. She has been on medication since 3/14/2019. \par \par For hypertension, HFpEF, and LE edema:\par Continue ACEi, spironolactone - periodically monitor metabolic panel for monitor renal function and potassium. Adjust medications as needed.\par \par  Follow-up in office in 3 months or earlier as needed.

## 2019-09-28 NOTE — HISTORY OF PRESENT ILLNESS
[FreeTextEntry1] : 89 year-old woman with history of hypertension, HCV, hypothyroidism comes today with her daughter for follow-up. 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 In July 2018, patient noted to have right leg swelling. Evaluation by orthopedic surgeon with Doppler revealed DVT and cellulitis. Patient was started on Augmentin and Xarelto. Patient has no pleuritic chest pain. \par \par Patient has also been sent to pulmonary who also check overnight saturation studies and have r/o hypoxia as a source of fatigue and SOB. \par \par OA of knees have been chronic and also promotes to patients sedentary lifestyle. She uses a cane on occasion. No recent falls. On NSAIDS PRN for pain.\par \par In February 2018, patient was admitted with presyncope. Her ILR was interrogated and was without corresponding event. She was discharged without changes in her medications.\par \par December 2018 - Patient's chief complaint today is right hip pain. She has been started on Vicodin for it. She is taking it once per day. She also got a cortisone injection last week that provided mild relief.\par Labs drawn earlier today by Dr. Dickson.\par \par March 2019 - Patient has just started CBD/THC for pain relief of chronic arthritis. It is well tolerated so far, but effect may not be seen at this early time.\par \par June 2019 - In May 2019, patient was treated for gout with steroids per rheumatology. Patient admitted to [facility] in May 2019 with syncope. At that time, her furosemide was stopped and her Xarelto was discontinued. She remains on CBD for pain. She has no signs or symptoms of volume overload while off loop diuretic.\par \par PMD/Geriatrics: Beverly Wheeler MD [phone]\par Orthopedist: Alex Hoff MD [phone]\par Plastic Surgeon: Miguel Soto MD [phone]\par Former Cardiologist: Jose Santoyo MD [phone]\par GI: Johnny Dickson MD [phone]

## 2019-09-28 NOTE — PHYSICAL EXAM
[General Appearance - Well Developed] : well developed [Well Groomed] : well groomed [Normal Appearance] : normal appearance [No Deformities] : no deformities [General Appearance - Well Nourished] : well nourished [General Appearance - In No Acute Distress] : no acute distress [Normal Oral Mucosa] : normal oral mucosa [No Oral Pallor] : no oral pallor [No Oral Cyanosis] : no oral cyanosis [Normal Oropharynx] : normal oropharynx [Normal Jugular Venous V Waves Present] : normal jugular venous V waves present [No Jugular Venous Díaz A Waves] : no jugular venous díaz A waves [] : no respiratory distress [Auscultation Breath Sounds / Voice Sounds] : lungs were clear to auscultation bilaterally [Exaggerated Use Of Accessory Muscles For Inspiration] : no accessory muscle use [Respiration, Rhythm And Depth] : normal respiratory rhythm and effort [Abdomen Soft] : soft [Bowel Sounds] : normal bowel sounds [Abdomen Tenderness] : non-tender [Nail Clubbing] : no clubbing of the fingernails [Cyanosis, Localized] : no localized cyanosis [Skin Color & Pigmentation] : normal skin color and pigmentation [No Xanthoma] : no  xanthoma was observed [Oriented To Time, Place, And Person] : oriented to person, place, and time [Mood] : the mood was normal [Affect] : the affect was normal [Impaired Insight] : insight and judgment were intact [No Anxiety] : not feeling anxious [Conjunctiva] : the conjunctiva were normal in both eyes [PERRL] : pupils were equal in size, round, and reactive to light [EOM Intact] : extraocular movements were intact [5th Left ICS - MCL] : palpated at the 5th LICS in the midclavicular line [Normal] : normal [No Precordial Heave] : no precordial heave was noted [Normal Rate] : normal [Rhythm Regular] : regular [Normal S2] : normal S2 [Normal S1] : normal S1 [S2 Accentuated] : was accentuated [No Gallop] : no gallop heard [No Murmur] : no murmurs heard [2+] : left 2+ [___ +] : bilateral [unfilled]U+ pretibial pitting edema [FreeTextEntry1] : evidence of chronic venous stasis [Yellow Sclera (Icteric)] : no scleral icterus was seen [Right Carotid Bruit] : no bruit heard over the right carotid [Left Carotid Bruit] : no bruit heard over the left carotid

## 2019-09-28 NOTE — REASON FOR VISIT
[Follow-Up - Clinic] : a clinic follow-up of [Anticoagulation] : anticoagulation [Abnormal ECG] : an abnormal ECG [Syncope] : syncope [A-V Block] : A-V block [Other: _____] : [unfilled] [FreeTextEntry1] : Patient presents today for follow-up. She used to follow-up with Jose Santoyo MD for many years.

## 2019-10-04 ENCOUNTER — APPOINTMENT (OUTPATIENT)
Dept: ELECTROPHYSIOLOGY | Facility: CLINIC | Age: 84
End: 2019-10-04
Payer: MEDICARE

## 2019-10-04 PROCEDURE — 93299: CPT

## 2019-10-04 PROCEDURE — 93298 REM INTERROG DEV EVAL SCRMS: CPT

## 2019-10-08 ENCOUNTER — APPOINTMENT (OUTPATIENT)
Dept: RHEUMATOLOGY | Facility: CLINIC | Age: 84
End: 2019-10-08

## 2019-11-05 ENCOUNTER — APPOINTMENT (OUTPATIENT)
Dept: ELECTROPHYSIOLOGY | Facility: CLINIC | Age: 84
End: 2019-11-05
Payer: MEDICARE

## 2019-11-05 PROCEDURE — 93298 REM INTERROG DEV EVAL SCRMS: CPT

## 2019-11-05 PROCEDURE — 93299: CPT

## 2019-12-05 ENCOUNTER — APPOINTMENT (OUTPATIENT)
Dept: ELECTROPHYSIOLOGY | Facility: CLINIC | Age: 84
End: 2019-12-05
Payer: MEDICARE

## 2019-12-05 PROCEDURE — 93298 REM INTERROG DEV EVAL SCRMS: CPT

## 2019-12-05 PROCEDURE — 93299: CPT

## 2019-12-17 ENCOUNTER — NON-APPOINTMENT (OUTPATIENT)
Age: 84
End: 2019-12-17

## 2019-12-17 ENCOUNTER — APPOINTMENT (OUTPATIENT)
Dept: CARDIOLOGY | Facility: CLINIC | Age: 84
End: 2019-12-17
Payer: MEDICARE

## 2019-12-17 VITALS
SYSTOLIC BLOOD PRESSURE: 157 MMHG | HEART RATE: 69 BPM | HEIGHT: 64 IN | BODY MASS INDEX: 25.27 KG/M2 | DIASTOLIC BLOOD PRESSURE: 87 MMHG | WEIGHT: 148 LBS | OXYGEN SATURATION: 98 %

## 2019-12-17 PROCEDURE — 93000 ELECTROCARDIOGRAM COMPLETE: CPT

## 2019-12-17 PROCEDURE — 99214 OFFICE O/P EST MOD 30 MIN: CPT

## 2019-12-17 NOTE — PHYSICAL EXAM
[General Appearance - Well Developed] : well developed [Normal Appearance] : normal appearance [Well Groomed] : well groomed [General Appearance - In No Acute Distress] : no acute distress [General Appearance - Well Nourished] : well nourished [No Deformities] : no deformities [No Oral Cyanosis] : no oral cyanosis [Normal Oral Mucosa] : normal oral mucosa [No Oral Pallor] : no oral pallor [Normal Jugular Venous V Waves Present] : normal jugular venous V waves present [Normal Oropharynx] : normal oropharynx [] : no respiratory distress [No Jugular Venous Díaz A Waves] : no jugular venous díaz A waves [Respiration, Rhythm And Depth] : normal respiratory rhythm and effort [Exaggerated Use Of Accessory Muscles For Inspiration] : no accessory muscle use [Auscultation Breath Sounds / Voice Sounds] : lungs were clear to auscultation bilaterally [Abdomen Tenderness] : non-tender [Bowel Sounds] : normal bowel sounds [Abdomen Soft] : soft [Nail Clubbing] : no clubbing of the fingernails [Cyanosis, Localized] : no localized cyanosis [Skin Color & Pigmentation] : normal skin color and pigmentation [No Xanthoma] : no  xanthoma was observed [Oriented To Time, Place, And Person] : oriented to person, place, and time [Impaired Insight] : insight and judgment were intact [No Anxiety] : not feeling anxious [Affect] : the affect was normal [Mood] : the mood was normal [Conjunctiva] : the conjunctiva were normal in both eyes [PERRL] : pupils were equal in size, round, and reactive to light [EOM Intact] : extraocular movements were intact [5th Left ICS - MCL] : palpated at the 5th LICS in the midclavicular line [Normal] : normal [Normal Rate] : normal [No Precordial Heave] : no precordial heave was noted [Rhythm Regular] : regular [S2 Accentuated] : was accentuated [Normal S2] : normal S2 [Normal S1] : normal S1 [No Murmur] : no murmurs heard [No Gallop] : no gallop heard [2+] : left 2+ [___ +] : bilateral [unfilled]U+ pretibial pitting edema [FreeTextEntry1] : evidence of chronic venous stasis [Yellow Sclera (Icteric)] : no scleral icterus was seen [Left Carotid Bruit] : no bruit heard over the left carotid [Right Carotid Bruit] : no bruit heard over the right carotid

## 2019-12-17 NOTE — DISCUSSION/SUMMARY
[FreeTextEntry1] : Patient is a very pleasant 89 year-old woman who presents today for follow-up of her poor exercise capacity, shortness of breath with even minimal exertion, and worsening of her right hip pain.  Patient has clear lungs, mild lower extremity edema, and a loud S2 which raises the possibility of pulmonary hypertension, although none was seen on a recent echocardiogram. Suspect symptom of dyspnea on minimal exertion is related to labile blood pressure and overall deconditioning due to inactivity.  Given normal oxygen saturation on room and and unchanged ECG, no evidence of significant PE at this time. \par \par In July 2018, she had a DVT in RLE. She completed 21 days of Xarelto 15mg BID, then 20mg daily for more than a year afterwards. She is now off anticoagulation. ILR without evidence of atrial fibrillation as of 7/23/2019.\par \par  Cortisone shot provided only mild relief. She has been started on Vicodin and stool softener. There is concern for opioid induced constipation. Patient referred for pain management. Case discussed with Gricelda Edwards MD. Patient now started on high CBD, low THC medicinal marijuana therapy. She has been on medication since 3/14/2019. \par \par For hypertension, HFpEF, and LE edema:\par Continue ACEi, spironolactone - periodically monitor metabolic panel for monitor renal function and potassium. Adjust medications as needed. I will add furosemide 20 mg twice a week to see if it improves her shortness of breath and pedal edema. I will try to order an echocardiogram for LV size and function.\par \par  Follow-up in office in 3 months or earlier as needed.

## 2019-12-17 NOTE — REVIEW OF SYSTEMS
[Negative] : Heme/Lymph [see HPI] : see HPI [Shortness Of Breath] : shortness of breath [Lower Ext Edema] : lower extremity edema

## 2019-12-17 NOTE — HISTORY OF PRESENT ILLNESS
[FreeTextEntry1] : 89 year-old woman with history of hypertension, HCV, hypothyroidism comes today with her daughter for follow-up. 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 In July 2018, patient noted to have right leg swelling. Evaluation by orthopedic surgeon with Doppler revealed DVT and cellulitis. Patient was started on Augmentin and Xarelto. Patient has no pleuritic chest pain. \par December 17, 2019: The patient complains of shortness of breath. The daughter admits that she may have had some salty soups or other foods. She denies any chest pains or palpitations.\par Patient has also been sent to pulmonary who also check overnight saturation studies and have r/o hypoxia as a source of fatigue and SOB. \par \par OA of knees have been chronic and also promotes to patients sedentary lifestyle. She uses a cane on occasion. No recent falls. On NSAIDS PRN for pain.\par \par In February 2018, patient was admitted with presyncope. Her ILR was interrogated and was without corresponding event. She was discharged without changes in her medications.\par \par December 2018 - Patient's chief complaint today is right hip pain. She has been started on Vicodin for it. She is taking it once per day. She also got a cortisone injection last week that provided mild relief.\par Labs drawn earlier today by Dr. Dickson.\par \par March 2019 - Patient has just started CBD/THC for pain relief of chronic arthritis. It is well tolerated so far, but effect may not be seen at this early time.\par \par June 2019 - In May 2019, patient was treated for gout with steroids per rheumatology. Patient admitted to [facility] in May 2019 with syncope. At that time, her furosemide was stopped and her Xarelto was discontinued. She remains on CBD for pain. She has no signs or symptoms of volume overload while off loop diuretic.\par \par PMD/Geriatrics: Beverly Wheeler MD [phone]\par Orthopedist: Alex Hoff MD [phone]\par Plastic Surgeon: Miguel Soto MD [phone]\par Former Cardiologist: Jose Santoyo MD [phone]\par GI: Johnny Dickson MD [phone]

## 2019-12-17 NOTE — REASON FOR VISIT
[Abnormal ECG] : an abnormal ECG [Follow-Up - Clinic] : a clinic follow-up of [A-V Block] : A-V block [Anticoagulation] : anticoagulation [Other: _____] : [unfilled] [Syncope] : syncope [FreeTextEntry1] : Patient presents today for follow-up. She used to follow-up with Jose Santoyo MD for many years.

## 2020-01-06 ENCOUNTER — APPOINTMENT (OUTPATIENT)
Dept: ELECTROPHYSIOLOGY | Facility: CLINIC | Age: 85
End: 2020-01-06
Payer: MEDICARE

## 2020-01-06 PROCEDURE — G2066: CPT

## 2020-01-06 PROCEDURE — 93298 REM INTERROG DEV EVAL SCRMS: CPT

## 2020-01-08 ENCOUNTER — APPOINTMENT (OUTPATIENT)
Dept: CARDIOLOGY | Facility: CLINIC | Age: 85
End: 2020-01-08

## 2020-01-08 ENCOUNTER — APPOINTMENT (OUTPATIENT)
Dept: GERIATRICS | Facility: CLINIC | Age: 85
End: 2020-01-08
Payer: MEDICARE

## 2020-01-08 VITALS
SYSTOLIC BLOOD PRESSURE: 138 MMHG | BODY MASS INDEX: 25.78 KG/M2 | HEIGHT: 64 IN | DIASTOLIC BLOOD PRESSURE: 80 MMHG | OXYGEN SATURATION: 98 % | TEMPERATURE: 97.5 F | RESPIRATION RATE: 16 BRPM | HEART RATE: 75 BPM | WEIGHT: 151 LBS

## 2020-01-08 PROCEDURE — 99215 OFFICE O/P EST HI 40 MIN: CPT

## 2020-01-08 NOTE — HISTORY OF PRESENT ILLNESS
[Severe] : Stage: Severe [Worse] : Status: Worse [Patient Observed To Be Agitated] : stable agitation [Memory Lapses Or Loss] : worsened memory impairment [Hostility Toward Caregivers] : denies aggression [Sleep Disturbances] : denies sleep disturbances [] : denies wandering [Difficulty Finding Desired Words] : denies difficulty finding desired words [Fixed Beliefs Contradicted By Reality (Delusions)] : worsened delusions [FreeTextEntry1] : Delightful 89yo woman who is brought back today by her daughter Jodee, with whom she lives. Jodee reports that mother is getting much more forgetful and repetitive, and Ojdee feels often overwhelmed and frustrated. Patient is never agitated. She has started seeing "people who are not there", never aggressive. Otherwise in no distress, gait is steady, no falls. requires assist in all ADLs., Has HHA.\par \par Patient is very repetitive, picks on her skin, difficulty chewing food normal consistency, and will spit out her food [de-identified] : Patient is very repetiitve, picks on her skin, difficulty chewing food normal consistency, and will spit out

## 2020-01-08 NOTE — REVIEW OF SYSTEMS
[As Noted in HPI] : as noted in HPI [Confused] : confusion [Negative] : Heme/Lymph [FreeTextEntry2] : Very repetitive [de-identified] : Very repetitve

## 2020-01-08 NOTE — SOCIAL HISTORY
[FreeTextEntry1] : Daughter Jodee [FreeTextEntry4] : None [FreeTextEntry3] : None [No falls in past year] : Patient reported no falls in the past year [Smoke Detector] : smoke detector [Carbon Monoxide Detector] : carbon monoxide detector [Guns at Home] : no guns at home [Safety elements used in home] : safety elements used in home [Grab Bars] : grab bars [Seat Belt] :  uses seat belt [Anti-Slip Measures] : anti-slip measures [Night Light] : night light [Sunscreen] : uses sunscreen [TB Exposure] : no exposure to tuberculosis [Travel to Developing Areas] : does not  travel to developing areas [Caregiver Concerns] : caregiver concerns [Driving] : not driving [de-identified] : Needs total supervision [de-identified] : None

## 2020-01-08 NOTE — PHYSICAL EXAM
[General Appearance - Alert] : alert [General Appearance - In No Acute Distress] : in no acute distress [General Appearance - Well Nourished] : well nourished [General Appearance - Well Developed] : well developed [General Appearance - Well-Appearing] : healthy appearing [Sclera] : the sclera and conjunctiva were normal [PERRL With Normal Accommodation] : pupils were equal in size, round, and reactive to light [Extraocular Movements] : extraocular movements were intact [Normal Oral Mucosa] : normal oral mucosa [No Oral Pallor] : no oral pallor [No Oral Cyanosis] : no oral cyanosis [Outer Ear] : the ears and nose were normal in appearance [Oropharynx] : The oropharynx was normal [Auscultation Breath Sounds / Voice Sounds] : lungs were clear to auscultation bilaterally [Heart Rate And Rhythm] : heart rate was normal and rhythm regular [Heart Sounds] : normal S1 and S2 [Heart Sounds Gallop] : no gallops [Murmurs] : no murmurs [Heart Sounds Pericardial Friction Rub] : no pericardial rub [Breast Appearance] : normal in appearance [Breast Palpation Mass] : no palpable masses [Bowel Sounds] : normal bowel sounds [Abdomen Soft] : soft [Abdomen Tenderness] : non-tender [Abdomen Mass (___ Cm)] : no abdominal mass palpated [] : no hepato-splenomegaly [No CVA Tenderness] : no ~M costovertebral angle tenderness [Abnormal Walk] : normal gait [No Spinal Tenderness] : no spinal tenderness [Nail Clubbing] : no clubbing  or cyanosis of the fingernails [Motor Tone] : muscle strength and tone were normal [Musculoskeletal - Swelling] : no joint swelling seen [Sensation] : the sensory exam was normal to light touch and pinprick [Deep Tendon Reflexes (DTR)] : deep tendon reflexes were 2+ and symmetric [Total Score ___ / 30] : the patient achieved a score of [unfilled] /30 [No Focal Deficits] : no focal deficits [FreeTextEntry1] : Abnormal clock

## 2020-01-08 NOTE — END OF VISIT
[FreeTextEntry3] : 89yo woman brought  by her daughter Jodee, with whom she lives. Accordint to daughter, mother is getting much more forgetful and repetitive, but never agitated. She does have visual hallucinations, not formed. No distress, gait is steady, no falls\par PE entirely normal. MMSE 19, very abnormal clock\par Plan[place] [facility] [place] with [place]community resources as the disease progresses.\par Explain how to adjust to puree consistency and increase caloric intake with ice-cream\par \par Patient is very repetitive, picks on her skin, difficulty chewing food normal consistency, and will spit out her food

## 2020-01-10 ENCOUNTER — APPOINTMENT (OUTPATIENT)
Dept: RHEUMATOLOGY | Facility: CLINIC | Age: 85
End: 2020-01-10
Payer: MEDICARE

## 2020-01-10 VITALS
WEIGHT: 151 LBS | OXYGEN SATURATION: 97 % | DIASTOLIC BLOOD PRESSURE: 88 MMHG | HEART RATE: 94 BPM | HEIGHT: 64 IN | SYSTOLIC BLOOD PRESSURE: 143 MMHG | TEMPERATURE: 98.5 F | BODY MASS INDEX: 25.78 KG/M2

## 2020-01-10 PROCEDURE — 99213 OFFICE O/P EST LOW 20 MIN: CPT

## 2020-02-06 ENCOUNTER — APPOINTMENT (OUTPATIENT)
Dept: ELECTROPHYSIOLOGY | Facility: CLINIC | Age: 85
End: 2020-02-06
Payer: MEDICARE

## 2020-02-06 PROCEDURE — 93298 REM INTERROG DEV EVAL SCRMS: CPT

## 2020-02-06 PROCEDURE — G2066: CPT

## 2020-02-11 ENCOUNTER — APPOINTMENT (OUTPATIENT)
Dept: RHEUMATOLOGY | Facility: CLINIC | Age: 85
End: 2020-02-11
Payer: MEDICARE

## 2020-02-11 VITALS
DIASTOLIC BLOOD PRESSURE: 77 MMHG | TEMPERATURE: 98 F | OXYGEN SATURATION: 97 % | HEART RATE: 87 BPM | WEIGHT: 154 LBS | SYSTOLIC BLOOD PRESSURE: 119 MMHG | BODY MASS INDEX: 26.29 KG/M2 | HEIGHT: 64 IN | RESPIRATION RATE: 16 BRPM

## 2020-02-11 PROCEDURE — 20610 DRAIN/INJ JOINT/BURSA W/O US: CPT | Mod: RT

## 2020-02-11 PROCEDURE — 99214 OFFICE O/P EST MOD 30 MIN: CPT | Mod: 25

## 2020-02-11 RX ORDER — METHYLPRED ACET/NACL,ISO-OS/PF 40 MG/ML
40 VIAL (ML) INJECTION
Qty: 1 | Refills: 0 | Status: COMPLETED | OUTPATIENT
Start: 2020-02-11

## 2020-02-11 RX ORDER — LIDOCAINE HYDROCHLORIDE 10 MG/ML
1 INJECTION, SOLUTION INFILTRATION; PERINEURAL
Refills: 0 | Status: COMPLETED | OUTPATIENT
Start: 2020-02-11

## 2020-02-11 RX ORDER — PREDNISONE 5 MG/1
5 TABLET ORAL
Qty: 20 | Refills: 1 | Status: DISCONTINUED | COMMUNITY
Start: 2019-08-22 | End: 2020-02-11

## 2020-02-11 RX ADMIN — LIDOCAINE HYDROCHLORIDE %: 10 INJECTION, SOLUTION INFILTRATION; PERINEURAL at 00:00

## 2020-02-11 RX ADMIN — METHYLPREDNISOLONE ACETATE 1 MG/ML: 40 INJECTION, SUSPENSION INTRA-ARTICULAR; INTRALESIONAL; INTRAMUSCULAR; SOFT TISSUE at 00:00

## 2020-03-11 ENCOUNTER — APPOINTMENT (OUTPATIENT)
Dept: HEMATOLOGY ONCOLOGY | Facility: CLINIC | Age: 85
End: 2020-03-11
Payer: MEDICARE

## 2020-03-11 ENCOUNTER — APPOINTMENT (OUTPATIENT)
Dept: HEMATOLOGY ONCOLOGY | Facility: CLINIC | Age: 85
End: 2020-03-11

## 2020-03-11 ENCOUNTER — OUTPATIENT (OUTPATIENT)
Dept: OUTPATIENT SERVICES | Facility: HOSPITAL | Age: 85
LOS: 1 days | Discharge: HOME | End: 2020-03-11

## 2020-03-11 VITALS
WEIGHT: 154.32 LBS | HEART RATE: 71 BPM | DIASTOLIC BLOOD PRESSURE: 74 MMHG | TEMPERATURE: 97.4 F | RESPIRATION RATE: 18 BRPM | SYSTOLIC BLOOD PRESSURE: 165 MMHG | OXYGEN SATURATION: 99 % | BODY MASS INDEX: 26.49 KG/M2

## 2020-03-11 DIAGNOSIS — G89.3 NEOPLASM RELATED PAIN (ACUTE) (CHRONIC): ICD-10-CM

## 2020-03-11 DIAGNOSIS — Z95.818 PRESENCE OF OTHER CARDIAC IMPLANTS AND GRAFTS: Chronic | ICD-10-CM

## 2020-03-11 PROCEDURE — 99212 OFFICE O/P EST SF 10 MIN: CPT

## 2020-03-11 NOTE — ASSESSMENT
[FreeTextEntry1] : 89yoF with:\par \par 1. Chronic pain 2/2 osteoarthritis - Medical cannabis re-certification completed today.  Provided cannabis education, overview of state program, and discussed adverse effects in detail. Recommend starting with Low THC: High CBD formulation at low dose of THC (~1mg/dose).\par \par RTO as needed, call with questions re: medical cannabis dosing.

## 2020-03-11 NOTE — PHYSICAL EXAM
[General Appearance - Alert] : alert [General Appearance - In No Acute Distress] : in no acute distress [Sclera] : the sclera and conjunctiva were normal [Normal Oral Mucosa] : normal oral mucosa [Neck Appearance] : the appearance of the neck was normal [] : no respiratory distress [Auscultation Breath Sounds / Voice Sounds] : lungs were clear to auscultation bilaterally [Heart Rate And Rhythm] : heart rate was normal and rhythm regular [Heart Sounds] : normal S1 and S2 [Skin Color & Pigmentation] : normal skin color and pigmentation [Oriented To Time, Place, And Person] : oriented to person, place, and time [Affect] : the affect was normal [FreeTextEntry1] : knee joint swelling b/l

## 2020-03-11 NOTE — HISTORY OF PRESENT ILLNESS
[FreeTextEntry1] : 89yoF with chronic pain from osteoarthritis and pseudogout presents for follow up visit for medical cannabis re-certification.  \par \par Has been using medical cannabis in a 1:1 THC:CBD oil formulation for her pain and per patient's daughter this has been very helpful for her joint pains. She finds that when patient doesn't use the cannabis oil she is less mobile than when on it. No AEs. \par \par ROS:\par appetite is good\par Denies n/v, constipation, diarrhea\par \par Patient lives with her daughter.  \par \par I-Stop Ref#: [id number]

## 2020-03-12 ENCOUNTER — APPOINTMENT (OUTPATIENT)
Dept: ELECTROPHYSIOLOGY | Facility: CLINIC | Age: 85
End: 2020-03-12
Payer: MEDICARE

## 2020-03-12 PROCEDURE — G2066: CPT

## 2020-03-12 PROCEDURE — 93298 REM INTERROG DEV EVAL SCRMS: CPT

## 2020-03-17 ENCOUNTER — APPOINTMENT (OUTPATIENT)
Dept: CARDIOLOGY | Facility: CLINIC | Age: 85
End: 2020-03-17

## 2020-03-28 ENCOUNTER — NON-APPOINTMENT (OUTPATIENT)
Age: 85
End: 2020-03-28

## 2020-04-13 ENCOUNTER — APPOINTMENT (OUTPATIENT)
Dept: ELECTROPHYSIOLOGY | Facility: CLINIC | Age: 85
End: 2020-04-13
Payer: MEDICARE

## 2020-04-13 PROCEDURE — 93298 REM INTERROG DEV EVAL SCRMS: CPT

## 2020-04-13 PROCEDURE — G2066: CPT

## 2020-05-13 ENCOUNTER — APPOINTMENT (OUTPATIENT)
Dept: ELECTROPHYSIOLOGY | Facility: CLINIC | Age: 85
End: 2020-05-13

## 2020-05-13 ENCOUNTER — APPOINTMENT (OUTPATIENT)
Dept: ELECTROPHYSIOLOGY | Facility: CLINIC | Age: 85
End: 2020-05-13
Payer: MEDICARE

## 2020-05-13 PROCEDURE — 93298 REM INTERROG DEV EVAL SCRMS: CPT

## 2020-05-13 PROCEDURE — G2066: CPT

## 2020-06-02 ENCOUNTER — APPOINTMENT (OUTPATIENT)
Dept: CARDIOLOGY | Facility: CLINIC | Age: 85
End: 2020-06-02
Payer: MEDICARE

## 2020-06-02 ENCOUNTER — NON-APPOINTMENT (OUTPATIENT)
Age: 85
End: 2020-06-02

## 2020-06-02 VITALS
DIASTOLIC BLOOD PRESSURE: 82 MMHG | WEIGHT: 154 LBS | HEART RATE: 71 BPM | TEMPERATURE: 97.3 F | SYSTOLIC BLOOD PRESSURE: 155 MMHG | BODY MASS INDEX: 26.43 KG/M2 | OXYGEN SATURATION: 100 %

## 2020-06-02 PROCEDURE — 93000 ELECTROCARDIOGRAM COMPLETE: CPT

## 2020-06-02 PROCEDURE — 99214 OFFICE O/P EST MOD 30 MIN: CPT

## 2020-06-02 RX ORDER — SPIRONOLACTONE 25 MG/1
25 TABLET ORAL DAILY
Qty: 30 | Refills: 5 | Status: DISCONTINUED | COMMUNITY
Start: 2018-12-04 | End: 2020-06-02

## 2020-06-02 NOTE — HISTORY OF PRESENT ILLNESS
[FreeTextEntry1] : 89 year-old woman with history of hypertension, HCV, hypothyroidism comes today with her daughter for follow-up. 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 In July 2018, patient noted to have right leg swelling. Evaluation by orthopedic surgeon with Doppler revealed DVT and cellulitis. Patient was started on Augmentin and Xarelto. Patient has no pleuritic chest pain. \par December 17, 2019: The patient complains of shortness of breath. The daughter admits that she may have had some salty soups or other foods. She denies any chest pains or palpitations.\par Patient has also been sent to pulmonary who also check overnight saturation studies and have r/o hypoxia as a source of fatigue and SOB. \par June 2, 2020: Patient gets more short of breath.  She gets dyspnea on exertion.  She is extremely inactive.  And maybe salt in her food.  She denies any chest pains or palpitations.  She had never gotten the echocardiogram that was ordered because of the COVID-19 pandemic.\par OA of knees have been chronic and also promotes to patients sedentary lifestyle. She uses a cane on occasion. No recent falls. On NSAIDS PRN for pain.\par \par In February 2018, patient was admitted with presyncope. Her ILR was interrogated and was without corresponding event. She was discharged without changes in her medications.\par \par December 2018 - Patient's chief complaint today is right hip pain. She has been started on Vicodin for it. She is taking it once per day. She also got a cortisone injection last week that provided mild relief.\par Labs drawn earlier today by Dr. Dickson.\par \par March 2019 - Patient has just started CBD/THC for pain relief of chronic arthritis. It is well tolerated so far, but effect may not be seen at this early time.\par \par June 2019 - In May 2019, patient was treated for gout with steroids per rheumatology. Patient admitted to [facility] in May 2019 with syncope. At that time, her furosemide was stopped and her Xarelto was discontinued. She remains on CBD for pain. She has no signs or symptoms of volume overload while off loop diuretic.\par \par PMD/Geriatrics: Beverly Wheeler MD [phone]\par Orthopedist: Alex Hoff MD [phone]\par Plastic Surgeon: Miguel Soto MD [phone]\par Former Cardiologist: Jose Santoyo MD [phone]\par GI: Johnny Dickson MD [phone]

## 2020-06-02 NOTE — REVIEW OF SYSTEMS
[see HPI] : see HPI [Shortness Of Breath] : shortness of breath [Lower Ext Edema] : lower extremity edema [Negative] : Heme/Lymph [Dyspnea on exertion] : dyspnea during exertion [Chest Pain] : no chest pain [Palpitations] : no palpitations

## 2020-06-02 NOTE — REASON FOR VISIT
[Follow-Up - Clinic] : a clinic follow-up of [Abnormal ECG] : an abnormal ECG [A-V Block] : A-V block [Anticoagulation] : anticoagulation [Syncope] : syncope [Other: _____] : [unfilled] [FreeTextEntry1] : Patient presents today for follow-up. She used to follow-up with Jose Santoyo MD for many years.

## 2020-06-02 NOTE — PHYSICAL EXAM
[General Appearance - Well Developed] : well developed [Normal Appearance] : normal appearance [Well Groomed] : well groomed [General Appearance - Well Nourished] : well nourished [No Deformities] : no deformities [General Appearance - In No Acute Distress] : no acute distress [Normal Oral Mucosa] : normal oral mucosa [No Oral Pallor] : no oral pallor [No Oral Cyanosis] : no oral cyanosis [Normal Oropharynx] : normal oropharynx [Normal Jugular Venous V Waves Present] : normal jugular venous V waves present [No Jugular Venous Díaz A Waves] : no jugular venous díaz A waves [] : no respiratory distress [Respiration, Rhythm And Depth] : normal respiratory rhythm and effort [Exaggerated Use Of Accessory Muscles For Inspiration] : no accessory muscle use [Auscultation Breath Sounds / Voice Sounds] : lungs were clear to auscultation bilaterally [Bowel Sounds] : normal bowel sounds [Abdomen Soft] : soft [Abdomen Tenderness] : non-tender [Nail Clubbing] : no clubbing of the fingernails [Cyanosis, Localized] : no localized cyanosis [No Xanthoma] : no  xanthoma was observed [Skin Color & Pigmentation] : normal skin color and pigmentation [Oriented To Time, Place, And Person] : oriented to person, place, and time [Impaired Insight] : insight and judgment were intact [Affect] : the affect was normal [Mood] : the mood was normal [No Anxiety] : not feeling anxious [Conjunctiva] : the conjunctiva were normal in both eyes [PERRL] : pupils were equal in size, round, and reactive to light [EOM Intact] : extraocular movements were intact [Normal] : normal [5th Left ICS - MCL] : palpated at the 5th LICS in the midclavicular line [No Precordial Heave] : no precordial heave was noted [Normal Rate] : normal [Rhythm Regular] : regular [Normal S1] : normal S1 [S2 Accentuated] : was accentuated [Normal S2] : normal S2 [No Gallop] : no gallop heard [No Murmur] : no murmurs heard [2+] : left 2+ [___ +] : bilateral [unfilled]U+ pretibial pitting edema [Heart Rate And Rhythm] : heart rate and rhythm were normal [Heart Sounds] : normal S1 and S2 [Murmurs] : no murmurs present [FreeTextEntry1] : evidence of chronic venous stasis [Yellow Sclera (Icteric)] : no scleral icterus was seen [Right Carotid Bruit] : no bruit heard over the right carotid [Left Carotid Bruit] : no bruit heard over the left carotid

## 2020-06-03 LAB
ALBUMIN SERPL ELPH-MCNC: 4.5 G/DL
ALP BLD-CCNC: 81 U/L
ALT SERPL-CCNC: 37 U/L
ANION GAP SERPL CALC-SCNC: 12 MMOL/L
AST SERPL-CCNC: 49 U/L
BASOPHILS # BLD AUTO: 0.06 K/UL
BASOPHILS NFR BLD AUTO: 1.1 %
BILIRUB SERPL-MCNC: 0.3 MG/DL
BUN SERPL-MCNC: 21 MG/DL
CALCIUM SERPL-MCNC: 10.1 MG/DL
CHLORIDE SERPL-SCNC: 95 MMOL/L
CHOLEST SERPL-MCNC: 231 MG/DL
CHOLEST/HDLC SERPL: 2.8 RATIO
CO2 SERPL-SCNC: 25 MMOL/L
CREAT SERPL-MCNC: 0.86 MG/DL
EOSINOPHIL # BLD AUTO: 0.13 K/UL
EOSINOPHIL NFR BLD AUTO: 2.5 %
ESTIMATED AVERAGE GLUCOSE: 111 MG/DL
FOLATE SERPL-MCNC: 9 NG/ML
GLUCOSE SERPL-MCNC: 89 MG/DL
HBA1C MFR BLD HPLC: 5.5 %
HCT VFR BLD CALC: 40.9 %
HDLC SERPL-MCNC: 83 MG/DL
HGB BLD-MCNC: 13.2 G/DL
IMM GRANULOCYTES NFR BLD AUTO: 0.2 %
IRON SATN MFR SERPL: 25 %
IRON SERPL-MCNC: 87 UG/DL
LDLC SERPL CALC-MCNC: 128 MG/DL
LYMPHOCYTES # BLD AUTO: 1.31 K/UL
LYMPHOCYTES NFR BLD AUTO: 25 %
MAN DIFF?: NORMAL
MCHC RBC-ENTMCNC: 30.8 PG
MCHC RBC-ENTMCNC: 32.3 GM/DL
MCV RBC AUTO: 95.3 FL
MONOCYTES # BLD AUTO: 0.7 K/UL
MONOCYTES NFR BLD AUTO: 13.4 %
NEUTROPHILS # BLD AUTO: 3.03 K/UL
NEUTROPHILS NFR BLD AUTO: 57.8 %
PLATELET # BLD AUTO: 194 K/UL
POTASSIUM SERPL-SCNC: 5.2 MMOL/L
PROT SERPL-MCNC: 7.1 G/DL
RBC # BLD: 4.29 M/UL
RBC # FLD: 14.8 %
SODIUM SERPL-SCNC: 131 MMOL/L
T4 SERPL-MCNC: 8.4 UG/DL
TIBC SERPL-MCNC: 355 UG/DL
TRIGL SERPL-MCNC: 101 MG/DL
TSH SERPL-ACNC: 0.97 UIU/ML
UIBC SERPL-MCNC: 268 UG/DL
VIT B12 SERPL-MCNC: 683 PG/ML
WBC # FLD AUTO: 5.24 K/UL

## 2020-06-05 ENCOUNTER — APPOINTMENT (OUTPATIENT)
Dept: CARDIOLOGY | Facility: CLINIC | Age: 85
End: 2020-06-05
Payer: MEDICARE

## 2020-06-05 PROCEDURE — 93306 TTE W/DOPPLER COMPLETE: CPT

## 2020-06-16 ENCOUNTER — APPOINTMENT (OUTPATIENT)
Dept: ELECTROPHYSIOLOGY | Facility: CLINIC | Age: 85
End: 2020-06-16
Payer: MEDICARE

## 2020-06-16 PROCEDURE — G2066: CPT

## 2020-06-16 PROCEDURE — 93298 REM INTERROG DEV EVAL SCRMS: CPT

## 2020-06-19 DIAGNOSIS — M16.11 UNILATERAL PRIMARY OSTEOARTHRITIS, RIGHT HIP: ICD-10-CM

## 2020-06-22 ENCOUNTER — RX RENEWAL (OUTPATIENT)
Age: 85
End: 2020-06-22

## 2020-07-01 LAB
B PERT IGG+IGM PNL SER: ABNORMAL
COLOR FLD: NORMAL
EOSINOPHIL # FLD MANUAL: 0 %
FLUID INTAKE SUBSTANCE CLASS: NORMAL
LYMPHOCYTES # FLD MANUAL: 3 %
MESOTHL CELL NFR FLD: 0 %
MONOS+MACROS NFR FLD MANUAL: 10 %
NEUTS SEG # FLD MANUAL: 87 %
NRBC # FLD: 0
RBC # FLD MANUAL: ABNORMAL /UL
SYCRY CLARITY: ABNORMAL
SYCRY COLOR: ABNORMAL
SYCRY ID: ABNORMAL
SYCRY TUBE: NORMAL
TOTAL CELLS COUNTED FLD: 8960 /UL
TUBE TYPE: NORMAL
UNIDENT CELLS NFR FLD MANUAL: 0 %
VARIANT LYMPHS # FLD MANUAL: 0 %

## 2020-07-10 ENCOUNTER — APPOINTMENT (OUTPATIENT)
Dept: RHEUMATOLOGY | Facility: CLINIC | Age: 85
End: 2020-07-10
Payer: MEDICARE

## 2020-07-10 VITALS
HEIGHT: 64 IN | WEIGHT: 153 LBS | DIASTOLIC BLOOD PRESSURE: 77 MMHG | BODY MASS INDEX: 26.12 KG/M2 | HEART RATE: 65 BPM | OXYGEN SATURATION: 96 % | SYSTOLIC BLOOD PRESSURE: 129 MMHG | TEMPERATURE: 97.7 F

## 2020-07-10 PROCEDURE — 99213 OFFICE O/P EST LOW 20 MIN: CPT | Mod: 25

## 2020-07-10 PROCEDURE — 20610 DRAIN/INJ JOINT/BURSA W/O US: CPT | Mod: RT

## 2020-07-10 RX ORDER — METHYLPRED ACET/NACL,ISO-OS/PF 40 MG/ML
40 VIAL (ML) INJECTION
Qty: 1 | Refills: 0 | Status: COMPLETED | OUTPATIENT
Start: 2020-07-10

## 2020-07-10 RX ORDER — LIDOCAINE HYDROCHLORIDE 10 MG/ML
1 INJECTION, SOLUTION INFILTRATION; PERINEURAL
Refills: 0 | Status: COMPLETED | OUTPATIENT
Start: 2020-07-10

## 2020-07-10 RX ADMIN — METHYLPREDNISOLONE ACETATE 1 MG/ML: 40 INJECTION, SUSPENSION INTRA-ARTICULAR; INTRALESIONAL; INTRAMUSCULAR; SOFT TISSUE at 00:00

## 2020-07-10 RX ADMIN — LIDOCAINE HYDROCHLORIDE %: 10 INJECTION, SOLUTION INFILTRATION; PERINEURAL at 00:00

## 2020-07-19 ENCOUNTER — RX RENEWAL (OUTPATIENT)
Age: 85
End: 2020-07-19

## 2020-07-20 ENCOUNTER — APPOINTMENT (OUTPATIENT)
Dept: ELECTROPHYSIOLOGY | Facility: CLINIC | Age: 85
End: 2020-07-20
Payer: MEDICARE

## 2020-07-20 PROCEDURE — G2066: CPT

## 2020-07-20 PROCEDURE — 93298 REM INTERROG DEV EVAL SCRMS: CPT

## 2020-08-17 ENCOUNTER — APPOINTMENT (OUTPATIENT)
Dept: GERIATRICS | Facility: CLINIC | Age: 85
End: 2020-08-17
Payer: MEDICARE

## 2020-08-17 VITALS
TEMPERATURE: 96.3 F | BODY MASS INDEX: 26.63 KG/M2 | OXYGEN SATURATION: 95 % | DIASTOLIC BLOOD PRESSURE: 80 MMHG | HEIGHT: 64 IN | SYSTOLIC BLOOD PRESSURE: 110 MMHG | RESPIRATION RATE: 16 BRPM | HEART RATE: 60 BPM | WEIGHT: 156 LBS

## 2020-08-17 DIAGNOSIS — L03.115 CELLULITIS OF RIGHT LOWER LIMB: ICD-10-CM

## 2020-08-17 DIAGNOSIS — L03.113 CELLULITIS OF RIGHT UPPER LIMB: ICD-10-CM

## 2020-08-17 DIAGNOSIS — R55 SYNCOPE AND COLLAPSE: ICD-10-CM

## 2020-08-17 DIAGNOSIS — T14.8XXA OTHER INJURY OF UNSPECIFIED BODY REGION, INITIAL ENCOUNTER: ICD-10-CM

## 2020-08-17 PROCEDURE — 99215 OFFICE O/P EST HI 40 MIN: CPT | Mod: GC

## 2020-08-19 PROBLEM — L03.113 CELLULITIS OF HAND, RIGHT: Status: RESOLVED | Noted: 2018-08-24 | Resolved: 2020-08-19

## 2020-08-19 PROBLEM — T14.8XXA SKIN EXCORIATION: Status: RESOLVED | Noted: 2018-09-25 | Resolved: 2020-08-19

## 2020-08-19 PROBLEM — L03.115 CELLULITIS OF RIGHT LOWER EXTREMITY: Status: RESOLVED | Noted: 2018-07-23 | Resolved: 2020-08-19

## 2020-08-19 PROBLEM — R55 SYNCOPE AND COLLAPSE: Status: RESOLVED | Noted: 2017-11-02 | Resolved: 2020-08-19

## 2020-08-25 ENCOUNTER — APPOINTMENT (OUTPATIENT)
Dept: ELECTROPHYSIOLOGY | Facility: CLINIC | Age: 85
End: 2020-08-25
Payer: MEDICARE

## 2020-08-25 PROCEDURE — G2066: CPT

## 2020-08-25 PROCEDURE — 93298 REM INTERROG DEV EVAL SCRMS: CPT

## 2020-08-30 NOTE — PHYSICAL THERAPY INITIAL EVALUATION ADULT - REHAB POTENTIAL, PT EVAL
Patient has ongoing symptoms of heavy menstrual.'s and the check on her CBC showed a hemoglobin of 7 indicating moderate to severe anemia.  She has a angiogram (could not man-hour to tell me gynecologist that she follows through with OGI and has been diagnosed with fibroids but plans on conceiving another child. Advised to take iron supplementation to help with anemia   good, to achieve stated therapy goals

## 2020-09-15 ENCOUNTER — NON-APPOINTMENT (OUTPATIENT)
Age: 85
End: 2020-09-15

## 2020-09-15 ENCOUNTER — APPOINTMENT (OUTPATIENT)
Dept: CARDIOLOGY | Facility: CLINIC | Age: 85
End: 2020-09-15
Payer: MEDICARE

## 2020-09-15 VITALS
DIASTOLIC BLOOD PRESSURE: 84 MMHG | BODY MASS INDEX: 26.29 KG/M2 | HEIGHT: 64 IN | RESPIRATION RATE: 17 BRPM | WEIGHT: 154 LBS | SYSTOLIC BLOOD PRESSURE: 139 MMHG | OXYGEN SATURATION: 100 % | HEART RATE: 83 BPM | TEMPERATURE: 97.8 F

## 2020-09-15 PROCEDURE — 93000 ELECTROCARDIOGRAM COMPLETE: CPT

## 2020-09-15 PROCEDURE — 99214 OFFICE O/P EST MOD 30 MIN: CPT

## 2020-09-15 NOTE — DISCUSSION/SUMMARY
[FreeTextEntry1] : The patient is a very pleasant 89 year-old woman who presents today for follow-up of her poor exercise capacity, shortness of breath with even minimal exertion, and worsening of her right hip pain.  Patient has clear lungs, mild lower extremity edema, and a loud S2 which raises the possibility of pulmonary hypertension, although none was seen on a recent echocardiogram. Suspect symptom of dyspnea on minimal exertion is related to labile blood pressure and overall deconditioning due to inactivity.  Given normal oxygen saturation on room and and unchanged ECG, no evidence of significant PE at this time. \par \par In July 2018, she had a DVT in RLE. She completed 21 days of Xarelto 15mg BID, then 20mg daily for more than a year afterwards. She is now off anticoagulation. ILR without evidence of atrial fibrillation as of 7/23/2019.\par \par  Cortisone shot provided only mild relief. She has been started on Vicodin and stool softener. There is concern for opioid induced constipation. Patient referred for pain management. Case discussed with Gricelda Edwards MD. Patient now started on high CBD, low THC medicinal marijuana therapy. She has been on medication since 3/14/2019. \par \par For hypertension, HFpEF, and LE edema:\par Continue ACEi, spironolactone - periodically monitor metabolic panel for monitor renal function and potassium. Adjust medications as needed. I will add furosemide 20 mg twice a week to see if it improves her shortness of breath and pedal edema. I will try to order an echocardiogram for LV size and function.\par \par  Follow-up in office in 3 months,  or earlier as needed.

## 2020-09-15 NOTE — REASON FOR VISIT
[Follow-Up - Clinic] : a clinic follow-up of [Abnormal ECG] : an abnormal ECG [Anticoagulation] : anticoagulation [A-V Block] : A-V block [Syncope] : syncope [Other: _____] : [unfilled] [FreeTextEntry1] : Patient presents today for follow-up. She used to follow-up with Jose Santoyo MD for many years.

## 2020-09-15 NOTE — PHYSICAL EXAM
[General Appearance - Well Developed] : well developed [Normal Appearance] : normal appearance [General Appearance - Well Nourished] : well nourished [Well Groomed] : well groomed [No Deformities] : no deformities [Normal Oral Mucosa] : normal oral mucosa [General Appearance - In No Acute Distress] : no acute distress [No Oral Pallor] : no oral pallor [No Oral Cyanosis] : no oral cyanosis [Normal Oropharynx] : normal oropharynx [Normal Jugular Venous V Waves Present] : normal jugular venous V waves present [] : no respiratory distress [No Jugular Venous Díaz A Waves] : no jugular venous díaz A waves [Respiration, Rhythm And Depth] : normal respiratory rhythm and effort [Auscultation Breath Sounds / Voice Sounds] : lungs were clear to auscultation bilaterally [Exaggerated Use Of Accessory Muscles For Inspiration] : no accessory muscle use [Heart Rate And Rhythm] : heart rate and rhythm were normal [Murmurs] : no murmurs present [Heart Sounds] : normal S1 and S2 [Bowel Sounds] : normal bowel sounds [Abdomen Soft] : soft [Abdomen Tenderness] : non-tender [Nail Clubbing] : no clubbing of the fingernails [Cyanosis, Localized] : no localized cyanosis [Skin Color & Pigmentation] : normal skin color and pigmentation [No Xanthoma] : no  xanthoma was observed [Impaired Insight] : insight and judgment were intact [Oriented To Time, Place, And Person] : oriented to person, place, and time [Affect] : the affect was normal [Mood] : the mood was normal [No Anxiety] : not feeling anxious [Conjunctiva] : the conjunctiva were normal in both eyes [PERRL] : pupils were equal in size, round, and reactive to light [EOM Intact] : extraocular movements were intact [5th Left ICS - MCL] : palpated at the 5th LICS in the midclavicular line [Normal] : normal [No Precordial Heave] : no precordial heave was noted [Normal Rate] : normal [Rhythm Regular] : regular [Normal S1] : normal S1 [Normal S2] : normal S2 [S2 Accentuated] : was accentuated [No Murmur] : no murmurs heard [No Gallop] : no gallop heard [2+] : right 2+ [___ +] : bilateral [unfilled]U+ pretibial pitting edema [Normal Conjunctiva] : the conjunctiva exhibited no abnormalities [Eyelids - No Xanthelasma] : the eyelids demonstrated no xanthelasmas [Yellow Sclera (Icteric)] : no scleral icterus was seen [FreeTextEntry1] : evidence of chronic venous stasis [Right Carotid Bruit] : no bruit heard over the right carotid [Left Carotid Bruit] : no bruit heard over the left carotid

## 2020-09-15 NOTE — HISTORY OF PRESENT ILLNESS
[FreeTextEntry1] : 89 year-old woman with history of hypertension, HCV, hypothyroidism comes today with her daughter for follow-up. 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 In July 2018, patient noted to have right leg swelling. Evaluation by orthopedic surgeon with Doppler revealed DVT and cellulitis. Patient was started on Augmentin and Xarelto. Patient has no pleuritic chest pain. \par December 17, 2019: The patient complains of shortness of breath. The daughter admits that she may have had some salty soups or other foods. She denies any chest pains or palpitations.\par Patient has also been sent to pulmonary who also check overnight saturation studies and have r/o hypoxia as a source of fatigue and SOB. \par June 2, 2020: Patient gets more short of breath.  She gets dyspnea on exertion.  She is extremely inactive.  And maybe salt in her food.  She denies any chest pains or palpitations.  She had never gotten the echocardiogram that was ordered because of the COVID-19 pandemic.\par OA of knees have been chronic and also promotes to patients sedentary lifestyle. She uses a cane on occasion. No recent falls. On NSAIDS PRN for pain.\par \par September 15, 2020: Patient denies any chest pains or palpitations.  She is does get dyspnea on exertion but she is very inactive.  Sometimes she sighs a lot but she does not have air hunger.\par \par In February 2018, patient was admitted with presyncope. Her ILR was interrogated and was without corresponding event. She was discharged without changes in her medications.\par \par December 2018 - Patient's chief complaint today is right hip pain. She has been started on Vicodin for it. She is taking it once per day. She also got a cortisone injection last week that provided mild relief.\par Labs drawn earlier today by Dr. Dickson.\par \par March 2019 - Patient has just started CBD/THC for pain relief of chronic arthritis. It is well tolerated so far, but effect may not be seen at this early time.\par \par June 2019 - In May 2019, patient was treated for gout with steroids per rheumatology. Patient admitted to [facility] in May 2019 with syncope. At that time, her furosemide was stopped and her Xarelto was discontinued. She remains on CBD for pain. She has no signs or symptoms of volume overload while off loop diuretic.\par \par PMD/Geriatrics: Beverly Wheeler MD [phone]\par Orthopedist: Alex Hoff MD [phone]\par Plastic Surgeon: Miguel Soto MD [phone]\par Former Cardiologist: Jose Santoyo MD [phone]\par GI: Johnny Dickson MD [phone]

## 2020-09-29 ENCOUNTER — APPOINTMENT (OUTPATIENT)
Dept: ELECTROPHYSIOLOGY | Facility: CLINIC | Age: 85
End: 2020-09-29
Payer: MEDICARE

## 2020-09-29 PROCEDURE — 93298 REM INTERROG DEV EVAL SCRMS: CPT

## 2020-09-29 PROCEDURE — G2066: CPT

## 2020-10-05 NOTE — DISCHARGE NOTE ADULT - NS MD DC PLAN IMMU FLU REF OTH
lmom to call back & reschedule this appointment.    New appointment should be scheduled next year with Dr. Herbert    Or can schedule with a different doctor   Not indicated for this patient

## 2020-10-16 ENCOUNTER — APPOINTMENT (OUTPATIENT)
Dept: RHEUMATOLOGY | Facility: CLINIC | Age: 85
End: 2020-10-16
Payer: MEDICARE

## 2020-10-16 VITALS
BODY MASS INDEX: 26.12 KG/M2 | SYSTOLIC BLOOD PRESSURE: 129 MMHG | DIASTOLIC BLOOD PRESSURE: 81 MMHG | OXYGEN SATURATION: 95 % | HEART RATE: 67 BPM | WEIGHT: 153 LBS | HEIGHT: 64 IN | TEMPERATURE: 97.1 F

## 2020-10-16 PROCEDURE — 99213 OFFICE O/P EST LOW 20 MIN: CPT

## 2020-10-19 ENCOUNTER — APPOINTMENT (OUTPATIENT)
Dept: GERIATRICS | Facility: CLINIC | Age: 85
End: 2020-10-19
Payer: MEDICARE

## 2020-10-19 VITALS
RESPIRATION RATE: 14 BRPM | HEIGHT: 64 IN | DIASTOLIC BLOOD PRESSURE: 80 MMHG | HEART RATE: 78 BPM | TEMPERATURE: 97.5 F | OXYGEN SATURATION: 99 % | SYSTOLIC BLOOD PRESSURE: 140 MMHG | BODY MASS INDEX: 26.98 KG/M2 | WEIGHT: 158 LBS

## 2020-10-19 DIAGNOSIS — R13.10 DYSPHAGIA, UNSPECIFIED: ICD-10-CM

## 2020-10-19 PROCEDURE — 99214 OFFICE O/P EST MOD 30 MIN: CPT | Mod: GC,25

## 2020-10-19 PROCEDURE — 90670 PCV13 VACCINE IM: CPT

## 2020-10-19 PROCEDURE — G0009: CPT

## 2020-10-19 RX ORDER — DONEPEZIL HYDROCHLORIDE 5 MG/1
5 TABLET ORAL
Qty: 30 | Refills: 3 | Status: COMPLETED | COMMUNITY
Start: 2020-08-19 | End: 2020-10-19

## 2020-10-19 NOTE — SOCIAL HISTORY
[No falls in past year] : Patient reported no falls in the past year [Walker] : walker [Smoke Detector] : smoke detector [Carbon Monoxide Detector] : carbon monoxide detector [Driving] : not driving [de-identified] : requires assistance [de-identified] : managed by vicente

## 2020-10-19 NOTE — REVIEW OF SYSTEMS
[Confused] : confusion [Fever] : no fever [Earache] : no earache [Chills] : no chills [Eye Pain] : no eye pain [Discharge From Eyes] : no purulent discharge from the eyes [Palpitations] : no palpitations [Loss Of Hearing] : no hearing loss [Chest Pain] : no chest pain [Constipation] : no constipation [Wheezing] : no wheezing [Shortness Of Breath] : no shortness of breath [Diarrhea] : no diarrhea [Incontinence] : no incontinence [Dysuria] : no dysuria [Skin Lesions] : no skin lesions [Joint Swelling] : no joint swelling [Joint Stiffness] : no joint stiffness [Convulsions] : no convulsions [Sleep Disturbances] : no sleep disturbances [Skin Wound] : no skin wound [Suicidal] : not suicidal [Feelings Of Weakness] : no feelings of weakness [Deepening Of The Voice] : no deepening of the voice [Easy Bleeding] : no tendency for easy bleeding [Easy Bruising] : no tendency for easy bruising

## 2020-10-19 NOTE — ASSESSMENT
[FreeTextEntry1] : - Family stopped aircept. Continue rest of Rx\par - received [id number] today\par - follow up in 3-4 months

## 2020-10-19 NOTE — HISTORY OF PRESENT ILLNESS
[Moderate] : Stage: Moderate [Stable] : Status: Stable [Patient Observed To Be Agitated] : denies agitation [Memory Lapses Or Loss] : stable memory impairment [Hostility Toward Caregivers] : denies aggression [Sleep Disturbances] : denies sleep disturbances [] : denies wandering [Fixed Beliefs Contradicted By Reality (Delusions)] : denies delusions [Difficulty Finding Desired Words] : denies difficulty finding desired words [0] : 2) Feeling down, depressed, or hopeless: Not at all [FreeTextEntry1] : Patient is a 89 y/o F w/ PMH dementia, HTN, Hep. C, HFpEF, who presented to the clinic for follow up on dementia. \par \par Patient's daughter did not start the patient on Aircept 2/2 to concerns of side-effect and age. Patient has been doing well. No falls at home, uses walker at home, feeling well otherwise. Has a HHA 12 hours a week, x3 weekly 4 hours per day. No concerns today. Has yet to start community medicaid\par \par

## 2020-10-19 NOTE — END OF VISIT
[] : Fellow [Time Spent: ___ minutes] : I have spent [unfilled] minutes of time on the encounter. [FreeTextEntry3] : Pt seen and examined by me with Dr. Huddleston.

## 2020-10-19 NOTE — ADDENDUM
[FreeTextEntry1] : 89 y/o woman here for F/U. Hx includes moderate dementia with prior visual hallucinations, HTN/HFpEF/LE edema, hypothyroidism, OA/chronic joint pain and hx pseudogout, prior compression fx, hep C hx s/p treatment, and prior DVT 2018 now off anticoag.\par Last seen by me with Dr. Will 8/17/2020.\par \par Dementia about the same per daughter since last visit--she has not noted any worsening.\par She decided not to start the aricept as she read the medication insert and was afraid of the side effects listed. \par Got community medicaid application but felt overwhelmed by the detail and hasn't pursued. Still had HHA 4 hrs a day for 3 days per week, private pay.\par Does have an elder  on questioning who can help.\par \par Sees cardio for LE edema/HFpEF, on 2x weekly lasix which is helping. Per daughter no dyspnea complaints from pt/no noticeable breathing struggles.\par \par No falls, no sleep issues, using ensure with meals. Has swallow study scheduled for this week.\par \par On exam, /80, HR 78, afebrile, BMI 27\par Pleasant woman, jovial, sitting in NAD, comfortable and nontoxic\par Heart regular\par Lungs with good air entry, clear\par Abd soft\par Mild bilat LE edema \par \par A+P:\par Moderate dementia- stable\par -last MMSE 15/30 in AUG\par -daughter declining aricept, is worried about adverse effects\par -did discuss again more help at home for her, she will reach out to elder  to discuss community medicaid and help her apply\par -after counseling last visit on natural decline of disease and what to expect, daughter Jodee notes she does not get as upset with the repetitiveness of her mothers questions/statements as she knows it is part of the disease\par -advised to keep pt occupied/busy--give laundry to fold as she always is playing with her hands per daughter, give her magazines/papers to flip through\par -f/u cognition in 3 months, call if any issues in between\par \par HCM/Need for pneumococcal vaccine--daughter unsure if got pneumonia vaccine before--> GAVE PREVNAR TODAY, Give PCV23 in 1 year\par ALREADY HAD FLU VACCINE in Sept 2020\par \par HFpEF/HTN/LE edema- sees cardio, on lasix 2x weekly which helps, continue to monitor and follow with cardio\par \par Dysphagia- could be part of her decline with her cognitive status\par -agree with getting speech and swallow eval to get formal rec for food consistency\par -daughter given advice on chopping food first/giving smaller pieces until eval, will f/u eval to see if needs any pureed or thickened liquids\par \par Rest as above.

## 2020-10-19 NOTE — PHYSICAL EXAM
[General Appearance - Alert] : alert [Sclera] : the sclera and conjunctiva were normal [PERRL With Normal Accommodation] : pupils were equal in size, round, and reactive to light [General Appearance - In No Acute Distress] : in no acute distress [Extraocular Movements] : extraocular movements were intact [Normal Oral Mucosa] : normal oral mucosa [No Oral Cyanosis] : no oral cyanosis [No Oral Pallor] : no oral pallor [Outer Ear] : the ears and nose were normal in appearance [Neck Appearance] : the appearance of the neck was normal [Hearing Threshold Finger Rub Not Lane] : hearing was normal [Jugular Venous Distention Increased] : there was no jugular-venous distention [Respiration, Rhythm And Depth] : normal respiratory rhythm and effort [Exaggerated Use Of Accessory Muscles For Inspiration] : no accessory muscle use [Auscultation Breath Sounds / Voice Sounds] : lungs were clear to auscultation bilaterally [Heart Rate And Rhythm] : heart rate was normal and rhythm regular [Apical Impulse] : the apical impulse was normal [Heart Sounds Gallop] : no gallops [Heart Sounds] : normal S1 and S2 [Murmurs] : no murmurs [Heart Sounds Pericardial Friction Rub] : no pericardial rub [Bowel Sounds] : normal bowel sounds [Abdomen Tenderness] : non-tender [Abdomen Soft] : soft [No CVA Tenderness] : no ~M costovertebral angle tenderness [Abdomen Mass (___ Cm)] : no abdominal mass palpated [No Spinal Tenderness] : no spinal tenderness [Nail Clubbing] : no clubbing  or cyanosis of the fingernails [Involuntary Movements] : no involuntary movements were seen [Motor Tone] : muscle strength and tone were normal [Musculoskeletal - Swelling] : no joint swelling seen [Skin Color & Pigmentation] : normal skin color and pigmentation [Skin Lesions] : no skin lesions [Skin Turgor] : normal skin turgor [] : no rash [No Focal Deficits] : no focal deficits [Motor Exam] : the motor exam was normal [Impaired Insight] : insight and judgment were intact [Affect] : the affect was normal [Mood] : the mood was normal [TWNoteComboBox1] : Not Required

## 2020-10-27 ENCOUNTER — APPOINTMENT (OUTPATIENT)
Dept: SPEECH THERAPY | Facility: CLINIC | Age: 85
End: 2020-10-27
Payer: MEDICARE

## 2020-10-27 PROCEDURE — 92610 EVALUATE SWALLOWING FUNCTION: CPT | Mod: GN,KX

## 2020-10-27 NOTE — ASSESSMENT
[FreeTextEntry1] : CLINICAL DYSPHAGIA EVALUATION\par  \par Date of Report:  10/27/2020\par Date of Evaluation:  10/27/2020\par Patient Name: Rosa Turcios \par Patient : 10/07/1923      \par CA: 89\par Primary Diagnosis:  Dysphagia (R13.10); Dementia (F03.90)\par Treatment Diagnosis: Oropharyngeal Dysphagia (R13.12)\par Referring Physician: Dr. Lola Francois\par Date of Onset: Approx. one year ago\par \par REASON FOR REFERRAL: Rosa Turcios is a 89 year-old female with history of dementia and heart failure who presented to the [facility] for a clinical dysphagia evaluation upon the referral of her primary physician, Dr. Lola Francois. This test was ordered to: _x_ clinically assess oral and pharyngeal stages of swallow function; _x_ assess for diet texture change as appropriate; and/or  _x_ determine patient candidacy for further SLP intervention.\par \par HISTORY OF PRESENTING ILLNESS: The patient was alert, pleasant and cooperative upon arrival to today’s evaluation. She was accompanied by her daughter who served as a reliable informant regarding the patient's case history information. According to her daughter, the patient has been demonstrating slowly progressing swallow difficulty over the last year, further characterized by intermittent spitting out food and coughing with water. It was also reported that the patient tends to pool saliva in her mouth and will ultimately spit it out rather than swallow it. Upon further clinician questioning, the patient and her daughter denied the following: recent history of pneumonia, need for Heimlich maneuver and unintentional weight loss. \par \par CURRENT NUTRITIONAL INTAKE:\par Solids:  Regular\par Liquids:  Thin\par  	\par MEDICAL HISTORY:\par Active Problems\par (HFpEF) heart failure with preserved ejection fraction (428.9) (I50.30)\par Advance care planning (V65.49) (Z71.89)\par Back pain (724.5) (M54.9)\par Bilateral edema of lower extremity (782.3) (R60.0)\par Carpal tunnel syndrome of right wrist (354.0) (G56.01)\par Chronic joint pain (719.40,338.29) (M25.50,G89.29)\par Chronic viral hepatitis C (070.54) (B18.2)\par Claudication of lower extremity (443.9) (I73.9)\par Compulsive skin picking (312.39) (F42.4)\par Dementia (294.20) (F03.90)\par Diastolic dysfunction (429.9) (I51.89)\par DVT (deep venous thrombosis) (453.40) (I82.409)\par Dysphagia (787.20) (R13.10)\par Encounter for immunization (V03.89) (Z23)\par Fatigue (780.79) (R53.83)\par Finger deformity (736.20) (M20.009)\par Hemarthrosis of right knee (719.16) (M25.061)\par HTN (hypertension) (401.9) (I10)\par Hypothyroidism, adult (244.9) (E03.9)\par Knee osteoarthritis (715.36) (M17.10)\par Knee pain, right (719.46) (M25.561)\par Moderate dementia (294.20) (F03.90)\par Osteoarthritis (715.90) (M19.90)\par Over weight (278.02) (E66.3)\par Poor balance (781.99) (R26.89)\par Poor sleep (V69.4) (Z72.820)\par Primary localized osteoarthritis of both knees (715.16) (M17.0)\par Primary localized osteoarthritis of right hip (715.15) (M16.11)\par Pseudogout (275.49,712.20) (M11.20)\par Shortness of breath (786.05) (R06.02)\par SOB (shortness of breath) on exertion (786.05) (R06.02)\par Traumatic amputation of fingertip, 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)\par Visual hallucinations (368.16) (R44.1)\par \par Past Medical History\par History of arthritis (V13.4) (Z87.39)\par History of congestive heart failure (V12.59) (Z86.79)\par History of hepatitis C (V12.09) (Z86.19)\par History of hypertension (V12.59) (Z86.79)\par History of hypertension (V12.59) (Z86.79)\par History of Wears dentures (V45.84) (Z97.2)\par \par Surgical History\par History of Dental Surgery\par History of Oral Surgery Tooth Extraction\par \par Social History\par Apartment\par Current non-smoker (V49.89) (Z78.9)\par Does not use illicit drugs (V49.89) (Z78.9)\par Drinks hard liquor (V49.89) (Z78.9)\par Exercises rarely (V49.89) (Z78.9)\par Lives with caregiver\par Lives with family\par Lives with family\par Lives with family\par Lives with family\par Never smoker\par Occupation\par Rarely consumes alcohol (V49.89) (Z78.9)\par Three children\par  (V61.07) (Z63.4)\par \par Functional Ability and Safety: \par Fall Risk: Patient reported no falls in the past year. \par ADLs:. requires assistance. \par IADLs:. managed by daugher. \par Durable Medical Equipment: walker. \par Home Safety: smoke detector and carbon monoxide detector. \par General Safety Concerns: not driving. \par  \par Current Meds\par Colace 100 MG Oral Capsule\par Furosemide 20 MG Oral Tablet; TAKE 1 TABLET EVERY OTHER DAY\par Liothyronine Sodium 5 MCG Oral Tablet\par Lisinopril 10 MG Oral Tablet; TAKE 1 TABLET BY MOUTH EVERY DAY\par Spironolactone 25 MG Oral Tablet; TAKE 1 TABLET BY MOUTH EVERY DAY\par Tylenol Extra Strength TABS\par \par Allergies\par No Known Allergies\par \par CLINICAL FINDINGS\par Oral Peripheral Assessment:\par Structures:   Within functional limits\par Symmetry:   Within functional limits\par Dentition: Upper and lower dentures in place\par Soft Palate:   Within functional limits\par Secretions Management:   Adequate\par Labial Strength, Range and Coordination:   Within functional limits\par Lingual Strength, Range and Coordination:   Within functional limits\par Mandibular Strength and Range:   Within functional limits\par Volitional Cough: Weak\par Volitional Swallow: Delayed yet complete\par Voice: Vocal quality was perceived to be unremarkable for the patient's age and gender\par    \par Consistencies Administered: \par Solids:  _X_ Regular   _X_  Mechanical Soft   _X_Puree	\par Liquids:  _X_ Thin   _X_  Nectar Thick   _X_Honey Thick\par _X_   via single sips             __ via consecutive sips\par \par Oral Stage: The patient demonstrated a mild oral dysphagia characterized by increased mastication time, decreased bolus collection, delayed anterior-posterior transfer and trace/diffuse oral residue post deglutition for regular solid textures; patient benefitted from liquid wash to facilitate adequate oral clearance. Functional bolus collection, manipulation and transfer with adequate oral clearance noted for puree, mechanical soft and thin liquid textures. There was no evidence of buccal pocketing and/or anterior/lateral loss across all PO trials.\par \par Pharyngeal Stage: The patient demonstrated a suspected mild to moderate pharyngeal dysphagia characterized by a suspected delay in pharyngeal trigger with reduced laryngeal elevation upon digital palpation across PO trials of puree, regular solids, honey-thick, nectar-thick and thin liquids. Immediate throat clearing noted post deglutition for regular solids, nectar-thick and thin liquids, suggestive of laryngeal penetration versus aspiration. No overt, clinical signs/symptoms of airway penetration/aspiration noted for puree, mechanical soft and honey-thick liquids. Of note, frequent belching was also exhibited post deglutition across all textures, suggestive of a possible GI component.\par \par IMPRESSIONS: Oropharyngeal Dysphagia\par \par RECOMMENDATIONS:\par 1)   Mechanical Soft with Honey-Thick Liquids\par 2)   Dysphagia Guidelines: Upright position (90 degrees) during/after eating for 30 minutes; Slow rate of intake; Small bites; Small single cup sips; No straws; Eliminate environmental distractions during meals.\par 3)   Maintain Aspiration Precautions\par 4)   Swallow Therapy is recommended to maximize the overall swallow mechanism\par 5)   Consider GI Consult due to frequent belching noted immediately post deglutition across all PO textures\par 6)   Follow up with referring physician as directed\par \par EDUCATION: Verbal and written education were provided regarding the aforementioned findings and recommendations. The patient's daughter demonstrated full understanding.\par \par Should you have any further questions/concerns, please contact the center at [phone].\par \par Maria Isabel Ty M.S., CCC-SLP\par Speech-Language Pathologist\par [facility]

## 2020-11-03 ENCOUNTER — APPOINTMENT (OUTPATIENT)
Dept: RHEUMATOLOGY | Facility: CLINIC | Age: 85
End: 2020-11-03
Payer: MEDICARE

## 2020-11-03 ENCOUNTER — APPOINTMENT (OUTPATIENT)
Dept: ELECTROPHYSIOLOGY | Facility: CLINIC | Age: 85
End: 2020-11-03
Payer: MEDICARE

## 2020-11-03 VITALS
DIASTOLIC BLOOD PRESSURE: 82 MMHG | HEIGHT: 64 IN | SYSTOLIC BLOOD PRESSURE: 132 MMHG | HEART RATE: 62 BPM | OXYGEN SATURATION: 98 % | TEMPERATURE: 96.4 F | RESPIRATION RATE: 14 BRPM

## 2020-11-03 PROCEDURE — 93298 REM INTERROG DEV EVAL SCRMS: CPT

## 2020-11-03 PROCEDURE — G2066: CPT

## 2020-11-03 PROCEDURE — 99213 OFFICE O/P EST LOW 20 MIN: CPT | Mod: 25

## 2020-11-03 PROCEDURE — 20610 DRAIN/INJ JOINT/BURSA W/O US: CPT | Mod: RT

## 2020-11-03 RX ORDER — METHYLPRED ACET/NACL,ISO-OS/PF 40 MG/ML
40 VIAL (ML) INJECTION
Qty: 1 | Refills: 0 | Status: COMPLETED | OUTPATIENT
Start: 2020-11-03

## 2020-11-03 RX ORDER — LIDOCAINE HYDROCHLORIDE 10 MG/ML
1 INJECTION, SOLUTION INFILTRATION; PERINEURAL
Refills: 0 | Status: COMPLETED | OUTPATIENT
Start: 2020-11-03

## 2020-11-03 RX ADMIN — METHYLPREDNISOLONE ACETATE 1 MG/ML: 40 INJECTION, SUSPENSION INTRA-ARTICULAR; INTRALESIONAL; INTRAMUSCULAR; SOFT TISSUE at 00:00

## 2020-11-03 RX ADMIN — LIDOCAINE HYDROCHLORIDE %: 10 INJECTION, SOLUTION INFILTRATION; PERINEURAL at 00:00

## 2020-11-12 ENCOUNTER — NON-APPOINTMENT (OUTPATIENT)
Age: 85
End: 2020-11-12

## 2020-12-08 ENCOUNTER — APPOINTMENT (OUTPATIENT)
Dept: ELECTROPHYSIOLOGY | Facility: CLINIC | Age: 85
End: 2020-12-08
Payer: MEDICARE

## 2020-12-08 PROCEDURE — 93298 REM INTERROG DEV EVAL SCRMS: CPT

## 2020-12-08 PROCEDURE — G2066: CPT

## 2020-12-15 ENCOUNTER — NON-APPOINTMENT (OUTPATIENT)
Age: 85
End: 2020-12-15

## 2020-12-15 ENCOUNTER — APPOINTMENT (OUTPATIENT)
Dept: CARDIOLOGY | Facility: CLINIC | Age: 85
End: 2020-12-15
Payer: MEDICARE

## 2020-12-15 VITALS
HEART RATE: 70 BPM | TEMPERATURE: 97.4 F | SYSTOLIC BLOOD PRESSURE: 165 MMHG | HEIGHT: 64 IN | WEIGHT: 159 LBS | OXYGEN SATURATION: 100 % | BODY MASS INDEX: 27.14 KG/M2 | RESPIRATION RATE: 16 BRPM | DIASTOLIC BLOOD PRESSURE: 86 MMHG

## 2020-12-15 PROCEDURE — 99214 OFFICE O/P EST MOD 30 MIN: CPT

## 2020-12-15 PROCEDURE — 93000 ELECTROCARDIOGRAM COMPLETE: CPT

## 2020-12-15 NOTE — HISTORY OF PRESENT ILLNESS
[FreeTextEntry1] : 89 year-old woman with history of hypertension, HCV, hypothyroidism comes today with her daughter for follow-up. 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 In July 2018, patient noted to have right leg swelling. Evaluation by orthopedic surgeon with Doppler revealed DVT and cellulitis. Patient was started on Augmentin and Xarelto. Patient has no pleuritic chest pain. \par December 17, 2019: The patient complains of shortness of breath. The daughter admits that she may have had some salty soups or other foods. She denies any chest pains or palpitations.\par Patient has also been sent to pulmonary who also check overnight saturation studies and have r/o hypoxia as a source of fatigue and SOB. \par June 2, 2020: Patient gets more short of breath.  She gets dyspnea on exertion.  She is extremely inactive.  And maybe salt in her food.  She denies any chest pains or palpitations.  She had never gotten the echocardiogram that was ordered because of the COVID-19 pandemic.\par OA of knees have been chronic and also promotes to patients sedentary lifestyle. She uses a cane on occasion. No recent falls. On NSAIDS PRN for pain.\par \par September 15, 2020: Patient denies any chest pains or palpitations.  She is does get dyspnea on exertion but she is very inactive.  Sometimes she sighs a lot but she does not have air hunger.\par December 15, 2020: Patient gets some shortness of breath and dyspnea on exertion.  She denies any chest pains or palpitations.  She denies any dizziness or fainting.  She does have some caffeinated coffee and some salt.  She did have an argument with her daughter on the way here.\par In February 2018, patient was admitted with presyncope. Her ILR was interrogated and was without corresponding event. She was discharged without changes in her medications.\par \par December 2018 - Patient's chief complaint today is right hip pain. She has been started on Vicodin for it. She is taking it once per day. She also got a cortisone injection last week that provided mild relief.\par Labs drawn earlier today by Dr. Dickson.\par \par March 2019 - Patient has just started CBD/THC for pain relief of chronic arthritis. It is well tolerated so far, but effect may not be seen at this early time.\par \par June 2019 - In May 2019, patient was treated for gout with steroids per rheumatology. Patient admitted to [facility] in May 2019 with syncope. At that time, her furosemide was stopped and her Xarelto was discontinued. She remains on CBD for pain. She has no signs or symptoms of volume overload while off loop diuretic.\par \par PMD/Geriatrics: Beverly Wheeler MD [phone]\par Orthopedist: Alex Hoff MD [phone]\par Plastic Surgeon: Miguel Soto MD [phone]\par Former Cardiologist: Jose Santoyo MD [phone]\par GI: Johnny Dickson MD [phone]

## 2020-12-15 NOTE — DISCUSSION/SUMMARY
[FreeTextEntry1] : The patient is a very pleasant 89 year-old woman who presents today for follow-up of her poor exercise capacity, shortness of breath with even minimal exertion, and worsening of her right hip pain.  Patient has clear lungs, mild lower extremity edema, and a loud S2 which raises the possibility of pulmonary hypertension, although none was seen on a recent echocardiogram. Suspect symptom of dyspnea on minimal exertion is related to labile blood pressure and overall deconditioning due to inactivity.  Given normal oxygen saturation on room and and unchanged ECG, no evidence of significant PE at this time. \par \par In July 2018, she had a DVT in RLE. She completed 21 days of Xarelto 15mg BID, then 20mg daily for more than a year afterwards. She is now off anticoagulation. ILR without evidence of atrial fibrillation as of 7/23/2019.\par \par  Cortisone shot provided only mild relief. She has been started on Vicodin and stool softener. There is concern for opioid induced constipation. Patient referred for pain management. Case discussed with Gricelda Edwards MD. Patient now started on high CBD, low THC medicinal marijuana therapy. She has been on medication since 3/14/2019. \par \par For hypertension, HFpEF, and LE edema:\par Continue ACEi, spironolactone - periodically monitor metabolic panel for monitor renal function and potassium. Adjust medications as needed. I will add furosemide 20 mg twice a week to see if it improves her shortness of breath and pedal edema. I will try to order an echocardiogram for LV size and function.\par \par  Follow-up in office in 4 months,  or earlier as needed.

## 2020-12-15 NOTE — PHYSICAL EXAM
[General Appearance - Well Developed] : well developed [Normal Appearance] : normal appearance [Well Groomed] : well groomed [General Appearance - Well Nourished] : well nourished [No Deformities] : no deformities [General Appearance - In No Acute Distress] : no acute distress [Normal Conjunctiva] : the conjunctiva exhibited no abnormalities [Eyelids - No Xanthelasma] : the eyelids demonstrated no xanthelasmas [Normal Oral Mucosa] : normal oral mucosa [No Oral Pallor] : no oral pallor [No Oral Cyanosis] : no oral cyanosis [Normal Oropharynx] : normal oropharynx [Normal Jugular Venous V Waves Present] : normal jugular venous V waves present [No Jugular Venous Díaz A Waves] : no jugular venous ídaz A waves [] : no respiratory distress [Respiration, Rhythm And Depth] : normal respiratory rhythm and effort [Exaggerated Use Of Accessory Muscles For Inspiration] : no accessory muscle use [Auscultation Breath Sounds / Voice Sounds] : lungs were clear to auscultation bilaterally [Heart Rate And Rhythm] : heart rate and rhythm were normal [Heart Sounds] : normal S1 and S2 [Murmurs] : no murmurs present [Bowel Sounds] : normal bowel sounds [Abdomen Soft] : soft [Abdomen Tenderness] : non-tender [Nail Clubbing] : no clubbing of the fingernails [Cyanosis, Localized] : no localized cyanosis [Skin Color & Pigmentation] : normal skin color and pigmentation [No Xanthoma] : no  xanthoma was observed [Oriented To Time, Place, And Person] : oriented to person, place, and time [Impaired Insight] : insight and judgment were intact [Affect] : the affect was normal [Mood] : the mood was normal [No Anxiety] : not feeling anxious [Conjunctiva] : the conjunctiva were normal in both eyes [PERRL] : pupils were equal in size, round, and reactive to light [EOM Intact] : extraocular movements were intact [5th Left ICS - MCL] : palpated at the 5th LICS in the midclavicular line [Normal] : normal [No Precordial Heave] : no precordial heave was noted [Normal Rate] : normal [Rhythm Regular] : regular [Normal S1] : normal S1 [Normal S2] : normal S2 [S2 Accentuated] : was accentuated [No Gallop] : no gallop heard [No Murmur] : no murmurs heard [2+] : left 2+ [___ +] : bilateral [unfilled]U+ pretibial pitting edema [FreeTextEntry1] : evidence of chronic venous stasis [Yellow Sclera (Icteric)] : no scleral icterus was seen [Right Carotid Bruit] : no bruit heard over the right carotid [Left Carotid Bruit] : no bruit heard over the left carotid

## 2021-01-01 NOTE — ED PROVIDER NOTE - CARDIAC RATE
normal
Anus position normal and patency confirmed, rectal-cutaneous fistula absent, normal anal wink.

## 2021-01-08 ENCOUNTER — APPOINTMENT (OUTPATIENT)
Dept: RHEUMATOLOGY | Facility: CLINIC | Age: 86
End: 2021-01-08
Payer: MEDICARE

## 2021-01-08 VITALS
TEMPERATURE: 97.2 F | SYSTOLIC BLOOD PRESSURE: 135 MMHG | HEART RATE: 69 BPM | BODY MASS INDEX: 26.61 KG/M2 | WEIGHT: 155 LBS | DIASTOLIC BLOOD PRESSURE: 78 MMHG

## 2021-01-08 PROCEDURE — 20610 DRAIN/INJ JOINT/BURSA W/O US: CPT | Mod: RT

## 2021-01-08 PROCEDURE — 99213 OFFICE O/P EST LOW 20 MIN: CPT | Mod: 25

## 2021-01-08 RX ORDER — LIDOCAINE HYDROCHLORIDE 10 MG/ML
1 INJECTION, SOLUTION INFILTRATION; PERINEURAL
Refills: 0 | Status: COMPLETED | OUTPATIENT
Start: 2021-01-08

## 2021-01-08 RX ORDER — METHYLPRED ACET/NACL,ISO-OS/PF 40 MG/ML
40 VIAL (ML) INJECTION
Qty: 1 | Refills: 0 | Status: COMPLETED | OUTPATIENT
Start: 2021-01-08

## 2021-01-08 RX ADMIN — METHYLPREDNISOLONE ACETATE 1 MG/ML: 40 INJECTION, SUSPENSION INTRA-ARTICULAR; INTRALESIONAL; INTRAMUSCULAR; SOFT TISSUE at 00:00

## 2021-01-08 RX ADMIN — LIDOCAINE HYDROCHLORIDE %: 10 INJECTION, SOLUTION INFILTRATION; PERINEURAL at 00:00

## 2021-01-08 NOTE — PROCEDURE
[Today's Date:] : Date: [unfilled] [Arthrocentesis] : arthrocentesis was performed [Risks] : risks [Benefits] : benefits [Alternatives] : alternatives [Consent Obtained] : written consent was obtained prior to the procedure and is detailed in the patient's record [Patient] : Prior to the start of the procedure a time out was taken and the identity of the patient was confirmed via name and date of birth with the patient. The correct site and the procedure to be performed were confirmed. The correct side was confirmed if applicable. The availability of the correct equipment was verified [Therapeutic] : therapeutic [#1 Site: ______] : #1 site identified in the [unfilled] [Ethyl Chloride] : ethyl chloride [Chlorhexidine] : chlorhexidine [22 gauge 1.5 inch] : A 22 gauge 1.5 inch needle was used [___ml 1% Lidocaine] : [unfilled] ml of 1% lidocaine [Depomedrol ___ mg] : Depomedrol [unfilled] mg [Tolerated Well] : the patient tolerated the procedure well [Reports Improvement in Symptoms] : reports improvement in symptoms [No Complications] : there were no complications [Instructions Given] : handouts/patient instructions were given to patient [Patient Instructed to Call] : patient was instructed to call if redness at site, a decrease in range of motion or an increase in pain is noted after procedure.

## 2021-01-08 NOTE — HISTORY OF PRESENT ILLNESS
[FreeTextEntry1] : JAQUAN KAM is a 89 year  old female, seen on 01/08/2021 for\par \par \par OA\par Pseudogout\par Right knee pain due to OA \par Pain in the left knee and leg and difficulty moving around at home. \par \par no injuries. \par Right knee is is painful the past month and she wants another injectoin.  the last injectoin was very helpful. \par

## 2021-01-08 NOTE — DATA REVIEWED
[FreeTextEntry1] : Laboratory and radiology studies that were personally reviewed at today's visit are summarized above\par

## 2021-01-08 NOTE — PHYSICAL EXAM
[General Appearance - Alert] : alert [General Appearance - Well Nourished] : well nourished [General Appearance - Well Developed] : well developed [Sclera] : the sclera and conjunctiva were normal [Extraocular Movements] : extraocular movements were intact [Skin Color & Pigmentation] : normal skin color and pigmentation [FreeTextEntry1] : OA changes in bilateral knees with right knee with crepitus. mild effusoin with no warmth in the right knee from bilateral knees and no pain with movement of hips.

## 2021-01-08 NOTE — ASSESSMENT
[FreeTextEntry1] : JAQUAN KAM is a 89 year  old female, seen on 01/08/2021 for\par \par OA, CPPD shoulder pain and knee pain. \par \par -OA \par inject right knee today and if not improved to call for further direction \par will inject earlier than 3 month interval given r/b of pain \par \par rtc in 3 months or earlier if needed. \par \par \par COVID pandemic \par [] preventative measures d/w patient\par [] Covid vaccine d/w patient and risk of vaccine is less than potential risk of infection.  \par The current available data from clinical trials of the available COVID vaccines did not include information on patients who are on medications that lower their immune system.  These patients were not included in the current analysis because there is concern that immunosuppressive medications may lower the efficacy of the vaccine.   The clinical trial was designed to optimize efficacy of the vaccine.  There have not been any significant safety concerns brought up in the clinical trials about the vaccine causing a flare of autoimmune disease, but most of the trial participants did not have autoimmune disease.  There is also no evidence that the patients in the clinical trials developed new autoimmune diseases.  The COVID vaccine is new and as such there is some unknown risk.  Over time more information will become known about side effects and efficacy of this vaccine.  That all being said, we know the short-term risk of COVID virus and the prevalence of the virus in our community.  We are also beginning to see long term effects of COVID virus infection.  All, while both the vaccine and the COVID virus have known and unknown risks, I believe that the risk of the vaccine is less than the risk of keaton the COVID virus. DAUGHTER IS UNSURE IF PATIENT WILL WANT VACCINE. \par \par

## 2021-01-08 NOTE — REASON FOR VISIT
[Follow-Up: _____] : a [unfilled] follow-up visit [Source: ______] : History obtained from [unfilled] [FreeTextEntry1] : OA

## 2021-01-12 ENCOUNTER — APPOINTMENT (OUTPATIENT)
Dept: ELECTROPHYSIOLOGY | Facility: CLINIC | Age: 86
End: 2021-01-12
Payer: MEDICARE

## 2021-01-12 PROCEDURE — G2066: CPT

## 2021-01-12 PROCEDURE — 93298 REM INTERROG DEV EVAL SCRMS: CPT

## 2021-01-19 ENCOUNTER — RX RENEWAL (OUTPATIENT)
Age: 86
End: 2021-01-19

## 2021-02-01 RX ORDER — DOCUSATE SODIUM 100 MG/1
100 CAPSULE ORAL
Refills: 0 | Status: DISCONTINUED | COMMUNITY
Start: 2019-07-19 | End: 2021-02-01

## 2021-02-09 ENCOUNTER — APPOINTMENT (OUTPATIENT)
Dept: GERIATRICS | Facility: CLINIC | Age: 86
End: 2021-02-09

## 2021-02-16 ENCOUNTER — APPOINTMENT (OUTPATIENT)
Dept: ELECTROPHYSIOLOGY | Facility: CLINIC | Age: 86
End: 2021-02-16
Payer: MEDICARE

## 2021-02-16 PROCEDURE — 93298 REM INTERROG DEV EVAL SCRMS: CPT

## 2021-02-16 PROCEDURE — G2066: CPT

## 2021-02-28 ENCOUNTER — OUTPATIENT (OUTPATIENT)
Dept: OUTPATIENT SERVICES | Facility: HOSPITAL | Age: 86
LOS: 1 days | Discharge: HOME | End: 2021-02-28

## 2021-02-28 DIAGNOSIS — Z95.818 PRESENCE OF OTHER CARDIAC IMPLANTS AND GRAFTS: Chronic | ICD-10-CM

## 2021-02-28 DIAGNOSIS — G89.3 NEOPLASM RELATED PAIN (ACUTE) (CHRONIC): ICD-10-CM

## 2021-03-04 ENCOUNTER — APPOINTMENT (OUTPATIENT)
Dept: HEMATOLOGY ONCOLOGY | Facility: CLINIC | Age: 86
End: 2021-03-04
Payer: MEDICARE

## 2021-03-04 ENCOUNTER — NON-APPOINTMENT (OUTPATIENT)
Age: 86
End: 2021-03-04

## 2021-03-04 VITALS
WEIGHT: 156.99 LBS | DIASTOLIC BLOOD PRESSURE: 75 MMHG | TEMPERATURE: 97.3 F | BODY MASS INDEX: 26.95 KG/M2 | SYSTOLIC BLOOD PRESSURE: 125 MMHG | RESPIRATION RATE: 16 BRPM | HEART RATE: 79 BPM | OXYGEN SATURATION: 97 %

## 2021-03-04 PROCEDURE — 99212 OFFICE O/P EST SF 10 MIN: CPT

## 2021-03-04 NOTE — ASSESSMENT
[FreeTextEntry1] : 89yoF with:\par \par 1. Chronic pain 2/2 osteoarthritis - Medical cannabis re-certification completed today.  \par \par RTO as needed, call with questions re: medical cannabis dosing.

## 2021-03-04 NOTE — HISTORY OF PRESENT ILLNESS
[FreeTextEntry1] : 89yoF with chronic pain from osteoarthritis and pseudogout presents for follow up visit for medical cannabis re-certification.  \par \par Has been using medical cannabis in a 1:1 THC:CBD oil formulation for her pain and per patient's daughter this has been very helpful for her joint pains. She is using a dose of ~6mg THC:CBD.  \par  She finds that when patient doesn't use the cannabis oil she is less mobile than when on it. No AEs. \par \par ROS:\par appetite is good\par Denies n/v, constipation, diarrhea\par \par Patient lives with her daughter.  \par \par I-Stop Ref#: [id number]

## 2021-03-14 NOTE — ED PROVIDER NOTE - CPE EDP SKIN NORM
patient with trauma while bending over-hit hed on sink-required stiches.  Is hear after stitches with c/o SOB.   His EKG shows Afib and AC noted-xarelto.    1-CHF-get BNP and ECHO-will watch on telemetry for 24 hours.   Last EF 40%   2-Afib- patient with no evidence of GIB/anemia-will have to weigh fall/trauma risk with AC risk(anai vasc 4); monitor on telemetry for pauses CXR findings R base appear to be similar, unchanged c/w 10/2017 CXR.    Stable chronic findings on PET scan 5/2020 of R lower lungfield, c/w previous CT chest 10/2017.  For CT chest while in hospital for further following.  Would also scan abdomen/pelvis with IV contrast re finding in pancreas on previous CT scan chest.  He refused imaging for the latter previously.  He will also need to continue to follow with his urologist after discharge.  WOUNDS

## 2021-03-23 ENCOUNTER — APPOINTMENT (OUTPATIENT)
Dept: ELECTROPHYSIOLOGY | Facility: CLINIC | Age: 86
End: 2021-03-23
Payer: MEDICARE

## 2021-03-23 ENCOUNTER — NON-APPOINTMENT (OUTPATIENT)
Age: 86
End: 2021-03-23

## 2021-03-23 PROCEDURE — G2066: CPT

## 2021-03-23 PROCEDURE — 93298 REM INTERROG DEV EVAL SCRMS: CPT

## 2021-03-26 ENCOUNTER — NON-APPOINTMENT (OUTPATIENT)
Age: 86
End: 2021-03-26

## 2021-03-26 ENCOUNTER — APPOINTMENT (OUTPATIENT)
Dept: GERIATRICS | Facility: CLINIC | Age: 86
End: 2021-03-26
Payer: MEDICARE

## 2021-03-26 VITALS
TEMPERATURE: 97.8 F | OXYGEN SATURATION: 98 % | BODY MASS INDEX: 27.12 KG/M2 | DIASTOLIC BLOOD PRESSURE: 80 MMHG | HEART RATE: 72 BPM | WEIGHT: 158 LBS | SYSTOLIC BLOOD PRESSURE: 130 MMHG | RESPIRATION RATE: 18 BRPM

## 2021-03-26 DIAGNOSIS — Z71.89 OTHER SPECIFIED COUNSELING: ICD-10-CM

## 2021-03-26 DIAGNOSIS — Z00.00 ENCOUNTER FOR GENERAL ADULT MEDICAL EXAMINATION W/OUT ABNORMAL FINDINGS: ICD-10-CM

## 2021-03-26 DIAGNOSIS — B18.2 CHRONIC VIRAL HEPATITIS C: ICD-10-CM

## 2021-03-26 PROCEDURE — 99214 OFFICE O/P EST MOD 30 MIN: CPT | Mod: 25,GC

## 2021-03-26 PROCEDURE — 99497 ADVNCD CARE PLAN 30 MIN: CPT | Mod: 25,GC

## 2021-03-26 NOTE — HISTORY OF PRESENT ILLNESS
[FreeTextEntry1] : Patient is a 89 y/o F w/ PMH dementia, HTN, Hep. C, HFpEF, OA, CPPD shoulder who presented to the clinic for follow up on dementia. Accompanied with daughter Sondra\twan Has 3 children - , lives with the oldest daughter who is also the HCP \twan Has no active complaints today , no fall in the past year , good appetite, no behavioural problems, no sleep problems. Independent in ADL, daughter helps with cooking, groceries \par Ambulates with walker at home,\[facility] Has a HHA 12 hours, 3days a week,\par \par Rheum- YULY WOOTEN MD\par PMR- REBECCA SHUKLA MD;\par Cardiogist - ZENIA STOKES MD;\par

## 2021-03-26 NOTE — END OF VISIT
[FreeTextEntry3] : 89 y/o woman w/ PMH as above presents for f/u visit. Overall doing quite well. Lives with daughter. No agitation or behavioral disturbance. We discussed advanced directives in detail today.

## 2021-03-26 NOTE — REVIEW OF SYSTEMS
[Fever] : no fever [Chills] : no chills [Eye Pain] : no eye pain [Discharge From Eyes] : no purulent discharge from the eyes [Earache] : no earache [Loss Of Hearing] : no hearing loss [Chest Pain] : no chest pain [Palpitations] : no palpitations [Shortness Of Breath] : no shortness of breath [Wheezing] : no wheezing [Constipation] : no constipation [Diarrhea] : no diarrhea [Dysuria] : no dysuria [Incontinence] : no incontinence [Joint Swelling] : no joint swelling [Joint Stiffness] : no joint stiffness [Skin Lesions] : no skin lesions [Skin Wound] : no skin wound [Convulsions] : no convulsions [Suicidal] : not suicidal [Sleep Disturbances] : no sleep disturbances [Deepening Of The Voice] : no deepening of the voice [Feelings Of Weakness] : no feelings of weakness [Easy Bleeding] : no tendency for easy bleeding [Easy Bruising] : no tendency for easy bruising

## 2021-03-26 NOTE — SOCIAL HISTORY
[Driving] : not driving [de-identified] : requires assistance [de-identified] : managed by vicente

## 2021-03-26 NOTE — ASSESSMENT
[FreeTextEntry1] : Prevnar 10/2020\par flu shot - 09/2020\par covid vaccination - scheduled for April 16th \par \par RTC in 3 months

## 2021-04-08 NOTE — DISCHARGE NOTE ADULT - NSFTFSERV1RD_GEN_ALL_CORE
medication teaching and assessment/rehabilitation services/observation and assessment Peng Advancement Flap Text: The defect edges were debeveled with a #15 scalpel blade.  Given the location of the defect, shape of the defect and the proximity to free margins a Peng advancement flap was deemed most appropriate.  Using a sterile surgical marker, an appropriate advancement flap was drawn incorporating the defect and placing the expected incisions within the relaxed skin tension lines where possible. The area thus outlined was incised deep to adipose tissue with a #15 scalpel blade.  The skin margins were undermined to an appropriate distance in all directions utilizing iris scissors.

## 2021-04-09 ENCOUNTER — APPOINTMENT (OUTPATIENT)
Dept: RHEUMATOLOGY | Facility: CLINIC | Age: 86
End: 2021-04-09
Payer: MEDICARE

## 2021-04-09 VITALS
TEMPERATURE: 97.2 F | HEIGHT: 64 IN | BODY MASS INDEX: 26.98 KG/M2 | SYSTOLIC BLOOD PRESSURE: 130 MMHG | RESPIRATION RATE: 16 BRPM | OXYGEN SATURATION: 98 % | DIASTOLIC BLOOD PRESSURE: 79 MMHG | WEIGHT: 158 LBS | HEART RATE: 74 BPM

## 2021-04-09 PROCEDURE — 99214 OFFICE O/P EST MOD 30 MIN: CPT

## 2021-04-13 ENCOUNTER — NON-APPOINTMENT (OUTPATIENT)
Age: 86
End: 2021-04-13

## 2021-04-13 ENCOUNTER — APPOINTMENT (OUTPATIENT)
Dept: CARDIOLOGY | Facility: CLINIC | Age: 86
End: 2021-04-13
Payer: MEDICARE

## 2021-04-13 VITALS
HEIGHT: 64 IN | WEIGHT: 158 LBS | BODY MASS INDEX: 26.98 KG/M2 | SYSTOLIC BLOOD PRESSURE: 151 MMHG | DIASTOLIC BLOOD PRESSURE: 88 MMHG | OXYGEN SATURATION: 100 % | TEMPERATURE: 96.3 F | RESPIRATION RATE: 17 BRPM | HEART RATE: 70 BPM

## 2021-04-13 DIAGNOSIS — M20.009 UNSPECIFIED DEFORMITY OF UNSPECIFIED FINGER(S): ICD-10-CM

## 2021-04-13 DIAGNOSIS — M25.061 HEMARTHROSIS, RIGHT KNEE: ICD-10-CM

## 2021-04-13 DIAGNOSIS — S68.119S COMPLETE TRAUMATIC METACARPOPHALANGEAL AMPUTATION OF UNSPECIFIED FINGER, SEQUELA: ICD-10-CM

## 2021-04-13 DIAGNOSIS — F03.90 UNSPECIFIED DEMENTIA W/OUT BEHAVIORAL DISTURBANCE: ICD-10-CM

## 2021-04-13 PROCEDURE — 93291 INTERROG DEV EVAL SCRMS IP: CPT

## 2021-04-13 PROCEDURE — 99214 OFFICE O/P EST MOD 30 MIN: CPT

## 2021-04-13 PROCEDURE — 93000 ELECTROCARDIOGRAM COMPLETE: CPT | Mod: 59

## 2021-04-13 NOTE — HISTORY OF PRESENT ILLNESS
[FreeTextEntry1] : 89 year-old woman with history of hypertension, HCV, hypothyroidism comes today with her daughter for follow-up. 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 In July 2018, patient noted to have right leg swelling. Evaluation by orthopedic surgeon with Doppler revealed DVT and cellulitis. Patient was started on Augmentin and Xarelto. Patient has no pleuritic chest pain. \par December 17, 2019: The patient complains of shortness of breath. The daughter admits that she may have had some salty soups or other foods. She denies any chest pains or palpitations.\par Patient has also been sent to pulmonary who also check overnight saturation studies and have r/o hypoxia as a source of fatigue and SOB. \par June 2, 2020: Patient gets more short of breath.  She gets dyspnea on exertion.  She is extremely inactive.  And maybe salt in her food.  She denies any chest pains or palpitations.  She had never gotten the echocardiogram that was ordered because of the COVID-19 pandemic.\par OA of knees have been chronic and also promotes to patients sedentary lifestyle. She uses a cane on occasion. No recent falls. On NSAIDS PRN for pain.\par \par September 15, 2020: Patient denies any chest pains or palpitations.  She is does get dyspnea on exertion but she is very inactive.  Sometimes she sighs a lot but she does not have air hunger.\par December 15, 2020: Patient gets some shortness of breath and dyspnea on exertion.  She denies any chest pains or palpitations.  She denies any dizziness or fainting.  She does have some caffeinated coffee and some salt.  She did have an argument with her daughter on the way here.\par In February 2018, patient was admitted with presyncope. Her ILR was interrogated and was without corresponding event. She was discharged without changes in her medications.\par \par December 2018 - Patient's chief complaint today is right hip pain. She has been started on Vicodin for it. She is taking it once per day. She also got a cortisone injection last week that provided mild relief.\par Labs drawn earlier today by Dr. Dickson.\par \par March 2019 - Patient has just started CBD/THC for pain relief of chronic arthritis. It is well tolerated so far, but effect may not be seen at this early time.\par \par June 2019 - In May 2019, patient was treated for gout with steroids per rheumatology. Patient admitted to [facility] in May 2019 with syncope. At that time, her furosemide was stopped and her Xarelto was discontinued. She remains on CBD for pain. She has no signs or symptoms of volume overload while off loop diuretic.\par April 13, 2021: The patient has been placed on prednisone and she has gained weight has some pedal edema.  She does report shortness of breath at rest as well as dyspnea on exertion.  Her furosemide was increased from once a week to every other day 4 days ago.  She has been having some salty foods such as soups.  She has a [place] loop recorder and has not heard anything about reports.\par \par PMD/Geriatrics: Beverly Wheeler MD [phone]\par Orthopedist: Alex Hoff MD [phone]\par Plastic Surgeon: Miguel Soto MD [phone]\par Former Cardiologist: Jose Santoyo MD [phone]\par GI: Johnny Dickson MD [phone]

## 2021-04-13 NOTE — CARDIOLOGY SUMMARY
[___] : [unfilled] [___] : [unfilled] [LVEF ___%] : LVEF [unfilled]% [None] : no pulmonary hypertension [Normal] : normal LA size [Mild] : mild mitral regurgitation

## 2021-04-13 NOTE — DISCUSSION/SUMMARY
[FreeTextEntry1] : The patient is a very pleasant 89 year-old woman who presents today for follow-up of her poor exercise capacity, shortness of breath with even minimal exertion, and worsening of her right hip pain.  Patient has clear lungs, mild lower extremity edema, and a loud S2 which raises the possibility of pulmonary hypertension, although none was seen on a recent echocardiogram. Suspect symptom of dyspnea on minimal exertion is related to labile blood pressure and overall deconditioning due to inactivity.  Given normal oxygen saturation on room and and unchanged ECG, no evidence of significant PE at this time. \par \par In July 2018, she had a DVT in RLE. She completed 21 days of Xarelto 15mg BID, then 20mg daily for more than a year afterwards. She is now off anticoagulation. ILR without evidence of atrial fibrillation as of 7/23/2019.\par \par  Cortisone shot provided only mild relief. She has been started on Vicodin and stool softener. There is concern for opioid induced constipation. Patient referred for pain management. Case discussed with Gricelda Edwards MD. Patient now started on high CBD, low THC medicinal marijuana therapy. She has been on medication since 3/14/2019. \par \par For hypertension, HFpEF, and LE edema:\par Continue ACEi, spironolactone - periodically monitor metabolic panel for monitor renal function and potassium. Adjust medications as needed. I will add furosemide 20 mg twice a week to see if it improves her shortness of breath and pedal edema. I will try to order an echocardiogram for LV size and function.\par \par  April 13, 2021: The patient will continue her furosemide every other day.  She will stop having salty foods such as soups.  She will report into the electrophysiologist because her loop recorder is at recommended replacement time.  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  No acute changes were seen.  She will be getting her COVID-19 vaccination and she will confer with Dr. Webster the rheumatologist about whether she should adjust her prednisone or not.  I spent a total of 37 minutes on the date of the encounter evaluating and treating this patient.  Follow-up in office in 4 months,  or earlier as needed.

## 2021-04-13 NOTE — PHYSICAL EXAM
[General Appearance - Well Developed] : well developed [Normal Appearance] : normal appearance [Well Groomed] : well groomed [General Appearance - Well Nourished] : well nourished [No Deformities] : no deformities [General Appearance - In No Acute Distress] : no acute distress [Normal Conjunctiva] : the conjunctiva exhibited no abnormalities [Eyelids - No Xanthelasma] : the eyelids demonstrated no xanthelasmas [Normal Oral Mucosa] : normal oral mucosa [No Oral Pallor] : no oral pallor [No Oral Cyanosis] : no oral cyanosis [Normal Oropharynx] : normal oropharynx [Normal Jugular Venous V Waves Present] : normal jugular venous V waves present [No Jugular Venous Díaz A Waves] : no jugular venous díaz A waves [] : no respiratory distress [Respiration, Rhythm And Depth] : normal respiratory rhythm and effort [Exaggerated Use Of Accessory Muscles For Inspiration] : no accessory muscle use [Auscultation Breath Sounds / Voice Sounds] : lungs were clear to auscultation bilaterally [Heart Rate And Rhythm] : heart rate and rhythm were normal [Heart Sounds] : normal S1 and S2 [Murmurs] : no murmurs present [Bowel Sounds] : normal bowel sounds [Abdomen Soft] : soft [Abdomen Tenderness] : non-tender [Nail Clubbing] : no clubbing of the fingernails [Cyanosis, Localized] : no localized cyanosis [Skin Color & Pigmentation] : normal skin color and pigmentation [No Xanthoma] : no  xanthoma was observed [Oriented To Time, Place, And Person] : oriented to person, place, and time [Impaired Insight] : insight and judgment were intact [Affect] : the affect was normal [Mood] : the mood was normal [No Anxiety] : not feeling anxious [Conjunctiva] : the conjunctiva were normal in both eyes [PERRL] : pupils were equal in size, round, and reactive to light [EOM Intact] : extraocular movements were intact [5th Left ICS - MCL] : palpated at the 5th LICS in the midclavicular line [Normal] : normal [No Precordial Heave] : no precordial heave was noted [Normal Rate] : normal [Rhythm Regular] : regular [Normal S1] : normal S1 [Normal S2] : normal S2 [S2 Accentuated] : was accentuated [No Gallop] : no gallop heard [No Murmur] : no murmurs heard [2+] : left 2+ [___ +] : bilateral [unfilled]U+ pretibial pitting edema [FreeTextEntry1] : evidence of chronic venous stasis [Yellow Sclera (Icteric)] : no scleral icterus was seen [Right Carotid Bruit] : no bruit heard over the right carotid [Left Carotid Bruit] : no bruit heard over the left carotid

## 2021-04-14 ENCOUNTER — APPOINTMENT (OUTPATIENT)
Dept: ELECTROPHYSIOLOGY | Facility: CLINIC | Age: 86
End: 2021-04-14
Payer: MEDICARE

## 2021-04-14 VITALS
HEART RATE: 80 BPM | OXYGEN SATURATION: 100 % | SYSTOLIC BLOOD PRESSURE: 138 MMHG | DIASTOLIC BLOOD PRESSURE: 80 MMHG | WEIGHT: 158 LBS | HEIGHT: 64 IN | BODY MASS INDEX: 26.98 KG/M2

## 2021-04-14 PROCEDURE — 93291 INTERROG DEV EVAL SCRMS IP: CPT

## 2021-04-14 NOTE — DIETITIAN INITIAL EVALUATION ADULT. - OTHER INFO
Patient is a 9y4m old  Female who presents with a chief complaint of Leukocytosis (14 Apr 2021 08:59) today procedure #2 due to WBC rebound to 318. Scheduled  for another leukopheresis tomorrow at 8 am if the WBC count is above 150k.  Patient is tolerating the procedure well without complications. Nutrition consult received for education. Per daughter, patient's UBW is ~150 pounds, denies any recent large fluctuations in weight. Per medical record, dosing weight of 150pounds and weight 9/30 of 145.5 pounds noted. States patient has been eating well since admission, daughter has been selecting menu daily for patient. Reports NKFA. Denies chewing/swallowing difficulty. Denies nausea/emesis. Last BM 9/27. Declined need for nutritional supplementation at this time

## 2021-04-15 ENCOUNTER — NON-APPOINTMENT (OUTPATIENT)
Age: 86
End: 2021-04-15

## 2021-04-20 NOTE — ED ADULT NURSE NOTE - CHIEF COMPLAINT
[facility]   MD Dion Dasilva MD, D.O. Ruoru Wong, PA    From 7 AM - 5 PM:  OFFICE: [phone]  Dr. Muhammad cell: [phone]  Dr. Montero cell: [phone]  Dr. Soria cell: [phone]  RASHIDA Smith cell: [phone]    From 5 PM - 7 AM: Answering Service: [phone]  Date of service: 04-20-21 @ 10:10    RENAL FOLLOW UP NOTE  --------------------------------------------------------------------------------  HPI:  Pt seen and examined at bedside.   Denies SOB, chest pain     PAST HISTORY  --------------------------------------------------------------------------------  No significant changes to PMH, PSH, FHx, SHx, unless otherwise noted    ALLERGIES & MEDICATIONS  --------------------------------------------------------------------------------  Allergies    azithromycin (Hives; Pruritus)    Intolerances      Standing Inpatient Medications  aspirin enteric coated 81 milliGRAM(s) Oral daily  atorvastatin 80 milliGRAM(s) Oral at bedtime  buMETAnide Injectable 2 milliGRAM(s) IV Push two times a day  clopidogrel Tablet 75 milliGRAM(s) Oral daily  dextrose 40% Gel 15 Gram(s) Oral once  dextrose 5%. 1000 milliLiter(s) IV Continuous <Continuous>  dextrose 5%. 1000 milliLiter(s) IV Continuous <Continuous>  dextrose 5%. 1000 milliLiter(s) IV Continuous <Continuous>  dextrose 50% Injectable 25 Gram(s) IV Push once  dextrose 50% Injectable 12.5 Gram(s) IV Push once  dextrose 50% Injectable 25 Gram(s) IV Push once  glucagon  Injectable 1 milliGRAM(s) IntraMuscular once  heparin   Injectable 5000 Unit(s) SubCutaneous every 8 hours  hydrALAZINE 37.5 milliGRAM(s) Oral three times a day  insulin glargine Injectable (LANTUS) 10 Unit(s) SubCutaneous at bedtime  insulin lispro (ADMELOG) corrective regimen sliding scale   SubCutaneous three times a day before meals  insulin lispro (ADMELOG) corrective regimen sliding scale   SubCutaneous at bedtime  levothyroxine 50 MICROGram(s) Oral daily  melatonin 3 milliGRAM(s) Oral at bedtime  metoprolol succinate  milliGRAM(s) Oral daily  pantoprazole    Tablet 40 milliGRAM(s) Oral before breakfast  senna 2 Tablet(s) Oral at bedtime  sodium bicarbonate 650 milliGRAM(s) Oral daily  spironolactone 12.5 milliGRAM(s) Oral daily    PRN Inpatient Medications  ALPRAZolam 0.25 milliGRAM(s) Oral three times a day PRN  polyethylene glycol 3350 17 Gram(s) Oral two times a day PRN      REVIEW OF SYSTEMS  --------------------------------------------------------------------------------  General: no fever  CVS: no chest pain  RESP: no sob, no cough  ABD: no abdominal pain  : no dysuria,  MSK: no edema     VITALS/PHYSICAL EXAM  --------------------------------------------------------------------------------  ALEX): 36.5 (04-20-21 @ 04:15), Max: 36.8 (04-20-21 @ 00:26)  HR: 82 (04-20-21 @ 04:15) (79 - 88)  BP: 102/55 (04-20-21 @ 04:15) (95/55 - 130/68)  RR: 18 (04-20-21 @ 04:15) (16 - 20)  SpO2: 98% (04-20-21 @ 04:15) (93% - 98%)  Wt(kg): --        04-19-21 @ 07:01  -  04-20-21 @ 07:00  --------------------------------------------------------  IN: 240 mL / OUT: 700 mL / NET: -460 mL      Physical Exam:  	Gen: NAD  	HEENT: MMM  	Pulm: CTA B/L  	CV: S1S2  	Abd: Soft, +BS  	Ext: No LE edema B/L                      Neuro: Awake   	Skin: Warm and Dry   	    LABS/STUDIES  --------------------------------------------------------------------------------              8.4    10.27 >-----------<  357      [04-20-21 @ 07:18]              29.3     141  |  107  |  54  ----------------------------<  177      [04-20-21 @ 07:18]  4.2   |  21  |  1.69        Ca     8.7     [04-20-21 @ 07:18]      Mg     2.1     [04-20-21 @ 07:18]      Phos  3.2     [04-20-21 @ 07:18]    TPro  7.6  /  Alb  3.4  /  TBili  0.4  /  DBili  x   /  AST  28  /  ALT  16  /  AlkPhos  154  [04-19-21 @ 01:13]    Creatinine Trend:  SCr 1.69 [04-20 @ 07:18]  SCr 1.71 [04-19 @ 19:33]  SCr 1.58 [04-19 @ 01:13]  SCr 1.58 [04-18 @ 21:56]  SCr 1.50 [04-18 @ 07:15]    Ferritin 111      [01-13-21 @ 01:42]  HbA1c 7.5      [10-08-19 @ 14:30]  TSH 1.94      [04-17-21 @ 02:28]       The patient is a 89y Female complaining of fever.

## 2021-04-21 ENCOUNTER — NON-APPOINTMENT (OUTPATIENT)
Age: 86
End: 2021-04-21

## 2021-04-23 ENCOUNTER — NON-APPOINTMENT (OUTPATIENT)
Age: 86
End: 2021-04-23

## 2021-04-27 ENCOUNTER — APPOINTMENT (OUTPATIENT)
Dept: GERIATRICS | Facility: CLINIC | Age: 86
End: 2021-04-27
Payer: MEDICARE

## 2021-04-27 VITALS
HEART RATE: 71 BPM | WEIGHT: 158.38 LBS | TEMPERATURE: 97.5 F | BODY MASS INDEX: 27.04 KG/M2 | RESPIRATION RATE: 16 BRPM | HEIGHT: 64 IN | OXYGEN SATURATION: 99 % | SYSTOLIC BLOOD PRESSURE: 122 MMHG | DIASTOLIC BLOOD PRESSURE: 60 MMHG

## 2021-04-27 DIAGNOSIS — R53.83 OTHER FATIGUE: ICD-10-CM

## 2021-04-27 DIAGNOSIS — E03.9 HYPOTHYROIDISM, UNSPECIFIED: ICD-10-CM

## 2021-04-27 PROCEDURE — 99214 OFFICE O/P EST MOD 30 MIN: CPT | Mod: GC

## 2021-04-27 NOTE — DISCHARGE NOTE ADULT - LAUNCH MEDICATION RECONCILIATION
Patient called stating that no one has called him to schedule his spinal injection.     Please assist patient in coordinating an appt.    <<-----Click here for Discharge Medication Review

## 2021-04-29 ENCOUNTER — NON-APPOINTMENT (OUTPATIENT)
Age: 86
End: 2021-04-29

## 2021-04-29 ENCOUNTER — OUTPATIENT (OUTPATIENT)
Dept: OUTPATIENT SERVICES | Facility: HOSPITAL | Age: 86
LOS: 1 days | End: 2021-04-29
Payer: MEDICARE

## 2021-04-29 ENCOUNTER — APPOINTMENT (OUTPATIENT)
Dept: RHEUMATOLOGY | Facility: CLINIC | Age: 86
End: 2021-04-29
Payer: MEDICARE

## 2021-04-29 ENCOUNTER — RESULT REVIEW (OUTPATIENT)
Age: 86
End: 2021-04-29

## 2021-04-29 ENCOUNTER — APPOINTMENT (OUTPATIENT)
Dept: RADIOLOGY | Facility: IMAGING CENTER | Age: 86
End: 2021-04-29
Payer: MEDICARE

## 2021-04-29 DIAGNOSIS — M25.511 PAIN IN RIGHT SHOULDER: ICD-10-CM

## 2021-04-29 DIAGNOSIS — Z95.818 PRESENCE OF OTHER CARDIAC IMPLANTS AND GRAFTS: Chronic | ICD-10-CM

## 2021-04-29 LAB
25(OH)D3 SERPL-MCNC: 37 NG/ML
ALBUMIN SERPL ELPH-MCNC: 4.7 G/DL
ALP BLD-CCNC: 96 U/L
ALT SERPL-CCNC: 28 U/L
ANION GAP SERPL CALC-SCNC: 14 MMOL/L
AST SERPL-CCNC: 44 U/L
BASOPHILS # BLD AUTO: 0.06 K/UL
BASOPHILS NFR BLD AUTO: 1 %
BILIRUB SERPL-MCNC: 0.4 MG/DL
BUN SERPL-MCNC: 24 MG/DL
CALCIUM SERPL-MCNC: 10.4 MG/DL
CHLORIDE SERPL-SCNC: 95 MMOL/L
CO2 SERPL-SCNC: 24 MMOL/L
CREAT SERPL-MCNC: 0.98 MG/DL
EOSINOPHIL # BLD AUTO: 0.12 K/UL
EOSINOPHIL NFR BLD AUTO: 2 %
GLUCOSE SERPL-MCNC: 109 MG/DL
HCT VFR BLD CALC: 42.4 %
HGB BLD-MCNC: 13.9 G/DL
IMM GRANULOCYTES NFR BLD AUTO: 0.7 %
LYMPHOCYTES # BLD AUTO: 1.43 K/UL
LYMPHOCYTES NFR BLD AUTO: 23.8 %
MAN DIFF?: NORMAL
MCHC RBC-ENTMCNC: 31 PG
MCHC RBC-ENTMCNC: 32.8 GM/DL
MCV RBC AUTO: 94.4 FL
MONOCYTES # BLD AUTO: 0.6 K/UL
MONOCYTES NFR BLD AUTO: 10 %
NEUTROPHILS # BLD AUTO: 3.77 K/UL
NEUTROPHILS NFR BLD AUTO: 62.5 %
PLATELET # BLD AUTO: 220 K/UL
POTASSIUM SERPL-SCNC: 4.8 MMOL/L
PROT SERPL-MCNC: 7.5 G/DL
RBC # BLD: 4.49 M/UL
RBC # FLD: 14.8 %
SARS-COV-2 N GENE NPH QL NAA+PROBE: NOT DETECTED
SODIUM SERPL-SCNC: 133 MMOL/L
TSH SERPL-ACNC: 1.44 UIU/ML
WBC # FLD AUTO: 6.02 K/UL

## 2021-04-29 PROCEDURE — 73030 X-RAY EXAM OF SHOULDER: CPT | Mod: 26,50

## 2021-04-29 PROCEDURE — 73030 X-RAY EXAM OF SHOULDER: CPT

## 2021-04-29 PROCEDURE — 20610 DRAIN/INJ JOINT/BURSA W/O US: CPT | Mod: RT

## 2021-04-29 PROCEDURE — 99214 OFFICE O/P EST MOD 30 MIN: CPT | Mod: 25

## 2021-04-29 RX ORDER — LIDOCAINE HYDROCHLORIDE 10 MG/ML
1 INJECTION, SOLUTION INFILTRATION; PERINEURAL
Refills: 0 | Status: COMPLETED | OUTPATIENT
Start: 2021-04-29

## 2021-04-29 RX ORDER — METHYLPRED ACET/NACL,ISO-OS/PF 40 MG/ML
40 VIAL (ML) INJECTION
Qty: 1 | Refills: 0 | Status: COMPLETED | OUTPATIENT
Start: 2021-04-29

## 2021-04-29 RX ADMIN — METHYLPREDNISOLONE ACETATE 1 MG/ML: 40 INJECTION, SUSPENSION INTRA-ARTICULAR; INTRALESIONAL; INTRAMUSCULAR; SOFT TISSUE at 00:00

## 2021-04-29 RX ADMIN — LIDOCAINE HYDROCHLORIDE %: 10 INJECTION, SOLUTION INFILTRATION; PERINEURAL at 00:00

## 2021-04-30 ENCOUNTER — NON-APPOINTMENT (OUTPATIENT)
Age: 86
End: 2021-04-30

## 2021-05-25 ENCOUNTER — APPOINTMENT (OUTPATIENT)
Dept: GERIATRICS | Facility: CLINIC | Age: 86
End: 2021-05-25
Payer: MEDICARE

## 2021-05-25 VITALS
DIASTOLIC BLOOD PRESSURE: 80 MMHG | HEIGHT: 64 IN | TEMPERATURE: 97.5 F | OXYGEN SATURATION: 96 % | HEART RATE: 68 BPM | RESPIRATION RATE: 16 BRPM | BODY MASS INDEX: 27.68 KG/M2 | SYSTOLIC BLOOD PRESSURE: 138 MMHG | WEIGHT: 162.13 LBS

## 2021-05-25 DIAGNOSIS — M25.511 PAIN IN RIGHT SHOULDER: ICD-10-CM

## 2021-05-25 DIAGNOSIS — M25.512 PAIN IN RIGHT SHOULDER: ICD-10-CM

## 2021-05-25 PROCEDURE — 99214 OFFICE O/P EST MOD 30 MIN: CPT | Mod: GC

## 2021-05-25 RX ORDER — PREDNISONE 2.5 MG/1
2.5 TABLET ORAL
Qty: 42 | Refills: 0 | Status: DISCONTINUED | COMMUNITY
Start: 2021-04-09 | End: 2021-05-25

## 2021-05-26 PROBLEM — M25.511 BILATERAL SHOULDER PAIN: Status: ACTIVE | Noted: 2021-04-29

## 2021-05-26 NOTE — END OF VISIT
[] : Fellow [FreeTextEntry3] : 89 y/o woman w/ PMH as above presents for f/u visit. Patient overall feels well. Is up 4 pounds since last visit. No real change in SOB. Patient report she feels well, but daughter notes that she appears SOB with exertion at times. No evidence of hypoxia or tachypnea on exam, lungs are grossly clear. She does have 1-2+ pitting edema that appears relatively unchanged since last visit. We will increase lasix to daily and c/w monitoring. She has f/u with cardiology next month. Has been following w/ rheum for polyarticular pain/arthritis; adjusted pain regimen. Will f/u in 1 month.

## 2021-05-26 NOTE — PHYSICAL EXAM
[General Appearance - Alert] : alert [General Appearance - In No Acute Distress] : in no acute distress [Sclera] : the sclera and conjunctiva were normal [Normal Oral Mucosa] : normal oral mucosa [Outer Ear] : the ears and nose were normal in appearance [Oropharynx] : The oropharynx was normal [Neck Appearance] : the appearance of the neck was normal [Neck Cervical Mass (___cm)] : no neck mass was observed [Jugular Venous Distention Increased] : there was no jugular-venous distention [Respiration, Rhythm And Depth] : normal respiratory rhythm and effort [Exaggerated Use Of Accessory Muscles For Inspiration] : no accessory muscle use [Auscultation Breath Sounds / Voice Sounds] : lungs were clear to auscultation bilaterally [Heart Rate And Rhythm] : heart rate was normal and rhythm regular [Heart Sounds] : normal S1 and S2 [Full Pulse] : the pedal pulses are present [Bowel Sounds] : normal bowel sounds [Abdomen Soft] : soft [Abdomen Tenderness] : non-tender [Abdomen Mass (___ Cm)] : no abdominal mass palpated [Cervical Lymph Nodes Enlarged Posterior Bilaterally] : posterior cervical [Cervical Lymph Nodes Enlarged Anterior Bilaterally] : anterior cervical [Supraclavicular Lymph Nodes Enlarged Bilaterally] : supraclavicular [No CVA Tenderness] : no ~M costovertebral angle tenderness [No Spinal Tenderness] : no spinal tenderness [Involuntary Movements] : no involuntary movements were seen [Ataxic, Wide-Based] : wide-based and ataxic [Musculoskeletal - Swelling] : no joint swelling seen [] : no rash [Skin Lesions] : no skin lesions [Sensation] : the sensory exam was normal to light touch and pinprick [Motor Exam] : the motor exam was normal [No Focal Deficits] : no focal deficits [Oriented to Person] : oriented to person [Oriented to Place] : oriented to place [Oriented to Time] : disoriented to time

## 2021-05-26 NOTE — HISTORY OF PRESENT ILLNESS
[No falls in past year] : Patient reported no falls in the past year [Independent] : feeding [Some assistance needed] : walking [Full assistance needed] : dressing [Walker] : walker [Moderate] : Stage: Moderate [FreeTextEntry1] : 5/25/2021 : \par CC : Weight gain \par HPI : Patient 89 yo old female with history of dementia, heart failure, osteoarthritis, and overall debility. \par Comes to office with daughter because of recent weight gain of approx 3-4 lbs. above last 2 weeks and over last 2-3 months approx. 6-7 lbs. The concern was kyleigh gain related to fluid retention. Daughter states mom is Sob with minimal exertion to bathroom. Patient currently is not complaining of any sob, and feels comfortable at this moment. Patient also does not recognize a large amount of fluid retention her legs over past few weeks. \par Daughter also does not appreciate a big difference in the way her legs look. \par Patient's daughter does state that her mother has been less mobile and active partly due to her osteoarthritis. She recently received an injection in left knee approx. 1 month ago some improvement in first 2 weeks now with increasing discomfort. IN addition, daughter stated she was giving the patient advil 400 - 600 mg every day or so with minimal improvement. \par \par Patient and daughter otherwise deny all of the following including but not limited to : \par CP, SOB, Bleeding, or fever. \par

## 2021-06-25 ENCOUNTER — APPOINTMENT (OUTPATIENT)
Dept: GERIATRICS | Facility: CLINIC | Age: 86
End: 2021-06-25

## 2021-07-06 ENCOUNTER — APPOINTMENT (OUTPATIENT)
Dept: ELECTROPHYSIOLOGY | Facility: CLINIC | Age: 86
End: 2021-07-06

## 2021-07-09 ENCOUNTER — APPOINTMENT (OUTPATIENT)
Dept: RHEUMATOLOGY | Facility: CLINIC | Age: 86
End: 2021-07-09
Payer: MEDICARE

## 2021-07-09 VITALS
RESPIRATION RATE: 16 BRPM | BODY MASS INDEX: 26.98 KG/M2 | DIASTOLIC BLOOD PRESSURE: 71 MMHG | OXYGEN SATURATION: 97 % | HEART RATE: 71 BPM | SYSTOLIC BLOOD PRESSURE: 112 MMHG | TEMPERATURE: 97.2 F | HEIGHT: 64 IN | WEIGHT: 158 LBS

## 2021-07-09 PROCEDURE — 20610 DRAIN/INJ JOINT/BURSA W/O US: CPT | Mod: RT

## 2021-07-09 PROCEDURE — 99213 OFFICE O/P EST LOW 20 MIN: CPT | Mod: 25

## 2021-07-09 RX ORDER — METHYLPRED ACET/NACL,ISO-OS/PF 40 MG/ML
40 VIAL (ML) INJECTION
Qty: 1 | Refills: 0 | Status: COMPLETED | OUTPATIENT
Start: 2021-07-09

## 2021-07-09 RX ORDER — LIDOCAINE HYDROCHLORIDE 10 MG/ML
1 INJECTION, SOLUTION INFILTRATION; PERINEURAL
Refills: 0 | Status: COMPLETED | OUTPATIENT
Start: 2021-07-09

## 2021-07-09 RX ADMIN — LIDOCAINE HYDROCHLORIDE %: 10 INJECTION, SOLUTION INFILTRATION; PERINEURAL at 00:00

## 2021-07-09 RX ADMIN — METHYLPREDNISOLONE ACETATE 1 MG/ML: 40 INJECTION, SUSPENSION INTRA-ARTICULAR; INTRALESIONAL; INTRAMUSCULAR; SOFT TISSUE at 00:00

## 2021-07-13 ENCOUNTER — NON-APPOINTMENT (OUTPATIENT)
Age: 86
End: 2021-07-13

## 2021-07-13 ENCOUNTER — APPOINTMENT (OUTPATIENT)
Dept: CARDIOLOGY | Facility: CLINIC | Age: 86
End: 2021-07-13
Payer: MEDICARE

## 2021-07-13 VITALS
SYSTOLIC BLOOD PRESSURE: 111 MMHG | WEIGHT: 160 LBS | HEART RATE: 80 BPM | BODY MASS INDEX: 27.31 KG/M2 | HEIGHT: 64 IN | OXYGEN SATURATION: 95 % | DIASTOLIC BLOOD PRESSURE: 75 MMHG

## 2021-07-13 PROCEDURE — 93000 ELECTROCARDIOGRAM COMPLETE: CPT

## 2021-07-13 PROCEDURE — 99214 OFFICE O/P EST MOD 30 MIN: CPT

## 2021-07-13 NOTE — REVIEW OF SYSTEMS
[SOB] : shortness of breath [Dyspnea on exertion] : dyspnea during exertion [Negative] : Heme/Lymph [Chest Discomfort] : no chest discomfort [Palpitations] : no palpitations

## 2021-07-13 NOTE — DISCUSSION/SUMMARY
[FreeTextEntry1] : The patient is a very pleasant 89 year-old woman who presents today for follow-up of her poor exercise capacity, shortness of breath with even minimal exertion, and worsening of her right hip pain.  Patient has clear lungs, mild lower extremity edema, and a loud S2 which raises the possibility of pulmonary hypertension, although none was seen on a recent echocardiogram. Suspect symptom of dyspnea on minimal exertion is related to labile blood pressure and overall deconditioning due to inactivity.  Given normal oxygen saturation on room and and unchanged ECG, no evidence of significant PE at this time. \par \par In July 2018, she had a DVT in RLE. She completed 21 days of Xarelto 15mg BID, then 20mg daily for more than a year afterwards. She is now off anticoagulation. ILR without evidence of atrial fibrillation as of 7/23/2019.\par \par  Cortisone shot provided only mild relief. She has been started on Vicodin and stool softener. There is concern for opioid induced constipation. Patient referred for pain management. Case discussed with Gricelda Edwards MD. Patient now started on high CBD, low THC medicinal marijuana therapy. She has been on medication since 3/14/2019. \par \par For hypertension, HFpEF, and LE edema:\par Continue ACEi, spironolactone - periodically monitor metabolic panel for monitor renal function and potassium. Adjust medications as needed. I will add furosemide 20 mg twice a week to see if it improves her shortness of breath and pedal edema. I will try to order an echocardiogram for LV size and function.\par \par  April 13, 2021: The patient will continue her furosemide every other day.  She will stop having salty foods such as soups.  She will report into the electrophysiologist because her loop recorder is at recommended replacement time.  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  No acute changes were seen.  She will be getting her COVID-19 vaccination and she will confer with Dr. Webster the rheumatologist about whether she should adjust her prednisone or not.  I spent a total of 37 minutes on the date of the encounter evaluating and treating this patient.  Follow-up in office in 4 months,  or earlier as needed.\par July 13, 2021: The patient still gets some shortness of breath and dyspnea on exertion. She is cut out the salty soups but still has some salt in her diet. She denies any chest pains or palpitations. Her blood pressure was 111/75, and her pulse was 80. Her lungs revealed rales at the bases. Her heart revealed a regular rhythm. Her EKG shows normal sinus rhythm, a first-degree heart block, a left anterior hemiblock, and a right bundle branch block pattern with nonspecific ST and T wave changes. This was unchanged from previous. We will continue her on her current medications. She will try to cut down on salt. She will return in 4 months, or earlier if needed. Total time spent on the day of the encounter was 36 minutes which includes  face-to-face and non face-to-face times personally spent by the physician preparing to see the patient, obtaining  separately obtained history, performing a medically appropriate exam and evaluation, counseling, educating, talking to the family or caregivers, ordering medicines, ordering tests or procedures, referring and communicating with other healthcare foods professionals, and documenting clinical information in the electronic health record.

## 2021-07-13 NOTE — HISTORY OF PRESENT ILLNESS
[FreeTextEntry1] : 89 year-old woman with history of hypertension, HCV, hypothyroidism comes today with her daughter for follow-up. 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 In July 2018, patient noted to have right leg swelling. Evaluation by orthopedic surgeon with Doppler revealed DVT and cellulitis. Patient was started on Augmentin and Xarelto. Patient has no pleuritic chest pain. \par December 17, 2019: The patient complains of shortness of breath. The daughter admits that she may have had some salty soups or other foods. She denies any chest pains or palpitations.\par Patient has also been sent to pulmonary who also check overnight saturation studies and have r/o hypoxia as a source of fatigue and SOB. \par June 2, 2020: Patient gets more short of breath.  She gets dyspnea on exertion.  She is extremely inactive.  And maybe salt in her food.  She denies any chest pains or palpitations.  She had never gotten the echocardiogram that was ordered because of the COVID-19 pandemic.\par OA of knees have been chronic and also promotes to patients sedentary lifestyle. She uses a cane on occasion. No recent falls. On NSAIDS PRN for pain.\par \par September 15, 2020: Patient denies any chest pains or palpitations.  She is does get dyspnea on exertion but she is very inactive.  Sometimes she sighs a lot but she does not have air hunger.\par December 15, 2020: Patient gets some shortness of breath and dyspnea on exertion.  She denies any chest pains or palpitations.  She denies any dizziness or fainting.  She does have some caffeinated coffee and some salt.  She did have an argument with her daughter on the way here.\par In February 2018, patient was admitted with presyncope. Her ILR was interrogated and was without corresponding event. She was discharged without changes in her medications.\par \par December 2018 - Patient's chief complaint today is right hip pain. She has been started on Vicodin for it. She is taking it once per day. She also got a cortisone injection last week that provided mild relief.\par Labs drawn earlier today by Dr. Dicksno.\par \par March 2019 - Patient has just started CBD/THC for pain relief of chronic arthritis. It is well tolerated so far, but effect may not be seen at this early time.\par \par June 2019 - In May 2019, patient was treated for gout with steroids per rheumatology. Patient admitted to [facility] in May 2019 with syncope. At that time, her furosemide was stopped and her Xarelto was discontinued. She remains on CBD for pain. She has no signs or symptoms of volume overload while off loop diuretic.\par April 13, 2021: The patient has been placed on prednisone and she has gained weight has some pedal edema.  She does report shortness of breath at rest as well as dyspnea on exertion.  Her furosemide was increased from once a week to every other day 4 days ago.  She has been having some salty foods such as soups.  She has a [employer] loop recorder and has not heard anything about reports.\par July 13, 2021: The patient gets occasional episodes of shortness of breath.. She has dyspnea on exertion but has been very inactive. She has cut out most most soups but still has salt in her diet. She denies any chest pains or palpitations.\par \par PMD/Geriatrics: Beverly Wheeler MD [phone]\par Orthopedist: Alex Hoff MD [phone]\par Plastic Surgeon: Miguel Soto MD [phone]\par Former Cardiologist: Jose Santoyo MD [phone] GI: Johnny Dickson MD [phone]

## 2021-07-19 ENCOUNTER — RX RENEWAL (OUTPATIENT)
Age: 86
End: 2021-07-19

## 2021-07-22 NOTE — ED ADULT NURSE NOTE - PRIMARY CARE PROVIDER
CARDIAC CONSULT NOTE       Orlando Rosales  70 y.o.  male    Chief Complaint   Patient presents with    New Patient       Referring physician:  Alejandro Hale DO     Primary care physician:  Alejandro Hale DO    History of Present Illness:     Orlando Rosales is a 70 y.o. male referred for evaluation and management of CAD. Patient moved from [place], has known H/O CAD, had MI while in [place] in 2017. Patient is had chest pains for about 2 weeks. CHEST PAIN:  1. When did it began:2 weeks  2. How long does it last: continuous  3. How severe:6/10  4. Radiation:no  5. Aggravating factors:no  6. Relieving factors:no   7. Associated features: TREJO, has COPD  6. Tenderness to palpation:no     has a past medical history of Acid reflux, COPD (chronic obstructive pulmonary disease) ([address] Hyperlipemia, Hypothyroidism, Pulmonary emphysema ([address] Silent myocardial infarction ([address] and Tachycardia. has a past surgical history that includes hernia repair; Appendectomy; Carpal tunnel release; eye surgery (Bilateral); Colonoscopy (2014); Colonoscopy (2004); Colonoscopy (12/27/2019); and Colonoscopy (N/A, 12/27/2019). reports that he has been smoking cigarettes. He started smoking about 81 years ago. He has a 30.00 pack-year smoking history. He has never used smokeless tobacco. He reports previous alcohol use. He reports previous drug use.    family history includes Dementia in his father; Emphysema in his mother. Review of Systems:   1. Cardiovascular: No chest pain, dyspnea on exertion, palpitations or loss of consciousness  2. Respiratory: No cough or wheezing    3. Musculoskeletal:  No gait disturbance, weakness, muscle cramps, aches & pains or joint complaints  4. Neurological: No TIA or stroke symptoms  5. Psychiatric: No anxiety or depression  6.  Hematologic/Lymphatic: No bleeding problems, blood clots or swollen lymph nodes    Physical Examination:    /72   Pulse 62   Ht 5' 8\" (1.727 m)   Wt 225 lb 3.2 oz (102.2 kg)   SpO2 97%   BMI 34.24 kg/m²    Wt Readings from Last 3 Encounters:   07/22/21 225 lb 3.2 oz (102.2 kg)   07/14/21 224 lb 12.8 oz (102 kg)   02/12/21 239 lb (108.4 kg)     Body mass index is 34.24 kg/m². General Appearance:  Non-obese/Well Nourished  1. Skin: It is warm & dry. No rashes noted. 2. Eyes: No conjunctival Pallor seen. No jaundice noted. 3. Neck: is supple there is no elevation of JVD. No thyromegaly  4. Respiratory:  · Resp Assessment: No abnormal findings. · Resp Auscultation: Vesicular breath sounds without rales or wheezing. 5. Cardiovascular:  · Auscultation: Normal S1 & S2, No prominent murmurs  · Carotid Arteries: No bruits present  · Abdominal Aorta: Non-palpable  · Pedal Pulses: 2+ and equal   6. Abdomen:  · No masses or tenderness  · Liver/Spleen: No Abnormalities Noted, no organomegaly. 7. Musculoskeletal: No joint deformities. No muscle wasting  8. Extremities:  ·  No Cyanosis or Clubbing. No significant edema   9. Rectal / genital:  ·  Deferred  10.  Neurological/Psychiatric:  · Oriented to time, place, and person  · Non-anxious    No results found for: CKTOTAL, CKMB, CKMBINDEX, TROPONINT  BNP:  No results found for: BNP, PROBNP  PT/INR:  No results found for: PTINR  Lab Results   Component Value Date    LABA1C 6.1 07/14/2021    LABA1C 6.6 (H) 04/07/2021     Lab Results   Component Value Date    CHOL 144 11/12/2020    TRIG 120 11/12/2020    HDL 43 11/12/2020    HDL 41 10/22/2019    LDLDIRECT 85 11/12/2020    LDLDIRECT 82 10/22/2019     Lab Results   Component Value Date    ALT 20 11/12/2020    ALT 20 10/22/2019    AST 23 11/12/2020    AST 27 10/22/2019     BMP:    Lab Results   Component Value Date     07/19/2021     04/07/2021    K 3.6 07/19/2021    K 4.5 04/07/2021    CL 98 07/19/2021     04/07/2021    CO2 29 07/19/2021    CO2 26 04/07/2021    BUN 15 07/19/2021    BUN 19 04/07/2021    CREATININE 0.9 07/19/2021 CREATININE 1.4 2021     CMP:    Lab Results   Component Value Date     2021     2021    K 3.6 2021    K 4.5 2021    CL 98 2021     2021    CO2 29 2021    CO2 26 2021    BUN 15 2021    BUN 19 2021    CREATININE 0.9 2021    CREATININE 1.4 2021    PROT 6.4 2020    PROT 6.3 10/22/2019     TSH:    Lab Results   Component Value Date    TSHHS 1.820 2021    TSHHS 4.420 2021       Echo:2017 · Normal left ventricular end-diastolic dimension. The estimated left   ventricular ejection fraction is 55 - 60%. Normal left ventricular   ejection fraction. No regional wall motion abnormalities. Normal left   ventricular diastolic function. · The right ventricle is moderately dilated. Mildly to moderately reduced   right ventricular systolic function. Stress Cardiolyte: 2017   a moderate size fixed inferoapical   perfusion abnormality on both stress and rest tomographic images   suggesting a small to moderate size area of previous myocardial damage.     There was no definite suggestion of superimposed ischemia. [id number]  Marked sinus  Bradycardia 47/min  -Right bundle branch block with left axis -bifascicular block.    -Atypical T axis -possible acute process. QUALITY MEASURES REVIEWED:  1.CAD:Patient is taking anti platelet agent:Yes  2. DYSLIPIDEMIA: Patient is on cholesterol lowering medication:Yes   3. Beta-Blocker therapy for CAD, if prior Myocardial Infarction:Yes   4. Counselled regarding smoking cessation. Yes   5. Anticoagulation therapy (for A.Fib) No   Does Not have A.Fib.  6.Discussed weight management strategies. Assessment, Recommendations & Tests:     1. CAD: C/O chest pain are atypical. Not like his MI CHEST PAIN. Will repeat Lexiscan Cardiolite  2. Dyslipidemia: Good profile, CPM  3. Hypothyroidism: on synthroid  4. HTN: Well controlled, CPM, Check Echo  5. DM; Per PMD  6.  GERD;Per PMD  7. COPD/ Tobacco abuse: [facility]    Office Visit for test results.      Randolph Martin MD, 7/22/2021 9:31 AM unk

## 2021-08-02 ENCOUNTER — NON-APPOINTMENT (OUTPATIENT)
Age: 86
End: 2021-08-02

## 2021-08-17 ENCOUNTER — APPOINTMENT (OUTPATIENT)
Dept: CARDIOLOGY | Facility: CLINIC | Age: 86
End: 2021-08-17
Payer: MEDICARE

## 2021-08-17 ENCOUNTER — NON-APPOINTMENT (OUTPATIENT)
Age: 86
End: 2021-08-17

## 2021-08-17 ENCOUNTER — LABORATORY RESULT (OUTPATIENT)
Age: 86
End: 2021-08-17

## 2021-08-17 VITALS
HEART RATE: 61 BPM | DIASTOLIC BLOOD PRESSURE: 60 MMHG | OXYGEN SATURATION: 99 % | HEIGHT: 64 IN | BODY MASS INDEX: 27.31 KG/M2 | WEIGHT: 160 LBS | SYSTOLIC BLOOD PRESSURE: 131 MMHG | RESPIRATION RATE: 17 BRPM

## 2021-08-17 PROCEDURE — 99214 OFFICE O/P EST MOD 30 MIN: CPT

## 2021-08-17 PROCEDURE — 93000 ELECTROCARDIOGRAM COMPLETE: CPT

## 2021-08-17 NOTE — HISTORY OF PRESENT ILLNESS
[FreeTextEntry1] : 89 year-old woman with history of hypertension, HCV, hypothyroidism comes today with her daughter for follow-up. 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 In July 2018, patient noted to have right leg swelling. Evaluation by orthopedic surgeon with Doppler revealed DVT and cellulitis. Patient was started on Augmentin and Xarelto. Patient has no pleuritic chest pain. \par December 17, 2019: The patient complains of shortness of breath. The daughter admits that she may have had some salty soups or other foods. She denies any chest pains or palpitations.\par Patient has also been sent to pulmonary who also check overnight saturation studies and have r/o hypoxia as a source of fatigue and SOB. \par June 2, 2020: Patient gets more short of breath.  She gets dyspnea on exertion.  She is extremely inactive.  And maybe salt in her food.  She denies any chest pains or palpitations.  She had never gotten the echocardiogram that was ordered because of the COVID-19 pandemic.\par OA of knees have been chronic and also promotes to patients sedentary lifestyle. She uses a cane on occasion. No recent falls. On NSAIDS PRN for pain.\par \par September 15, 2020: Patient denies any chest pains or palpitations.  She is does get dyspnea on exertion but she is very inactive.  Sometimes she sighs a lot but she does not have air hunger.\par December 15, 2020: Patient gets some shortness of breath and dyspnea on exertion.  She denies any chest pains or palpitations.  She denies any dizziness or fainting.  She does have some caffeinated coffee and some salt.  She did have an argument with her daughter on the way here.\par In February 2018, patient was admitted with presyncope. Her ILR was interrogated and was without corresponding event. She was discharged without changes in her medications.\par \par December 2018 - Patient's chief complaint today is right hip pain. She has been started on Vicodin for it. She is taking it once per day. She also got a cortisone injection last week that provided mild relief.\par Labs drawn earlier today by Dr. Dickson.\par \par March 2019 - Patient has just started CBD/THC for pain relief of chronic arthritis. It is well tolerated so far, but effect may not be seen at this early time.\par \par June 2019 - In May 2019, patient was treated for gout with steroids per rheumatology. Patient admitted to [facility] in May 2019 with syncope. At that time, her furosemide was stopped and her Xarelto was discontinued. She remains on CBD for pain. She has no signs or symptoms of volume overload while off loop diuretic.\par April 13, 2021: The patient has been placed on prednisone and she has gained weight has some pedal edema.  She does report shortness of breath at rest as well as dyspnea on exertion.  Her furosemide was increased from once a week to every other day 4 days ago.  She has been having some salty foods such as soups.  She has a [employer] loop recorder and has not heard anything about reports.\par July 13, 2021: The patient gets occasional episodes of shortness of breath.. She has dyspnea on exertion but has been very inactive. She has cut out most most soups but still has salt in her diet. She denies any chest pains or palpitations.\par August 17, 2021: The patient has had 2 episodes of weakness after taking a shower.  She is taking furosemide 3 times a week.  She does  the shower as well as sitting down for part of it.\par PMD/Geriatrics: Beverly Wheeler MD [phone]\par Orthopedist: Alex Hoff MD [phone]\par Plastic Surgeon: Miguel Soto MD [phone]\par Former Cardiologist: Jose Santoyo MD [phone]\par GI: Johnny Dickson MD [phone]

## 2021-08-17 NOTE — REVIEW OF SYSTEMS
[SOB] : shortness of breath [Dyspnea on exertion] : dyspnea during exertion [Negative] : Heme/Lymph [Feeling Fatigued] : feeling fatigued [Chest Discomfort] : no chest discomfort [Palpitations] : no palpitations

## 2021-08-17 NOTE — DISCUSSION/SUMMARY
[FreeTextEntry1] : The patient is a very pleasant 89 year-old woman who presents today for follow-up of her poor exercise capacity, shortness of breath with even minimal exertion, and worsening of her right hip pain.  Patient has clear lungs, mild lower extremity edema, and a loud S2 which raises the possibility of pulmonary hypertension, although none was seen on a recent echocardiogram. Suspect symptom of dyspnea on minimal exertion is related to labile blood pressure and overall deconditioning due to inactivity.  Given normal oxygen saturation on room and and unchanged ECG, no evidence of significant PE at this time. \par \par In July 2018, she had a DVT in RLE. She completed 21 days of Xarelto 15mg BID, then 20mg daily for more than a year afterwards. She is now off anticoagulation. ILR without evidence of atrial fibrillation as of 7/23/2019.\par \par  Cortisone shot provided only mild relief. She has been started on Vicodin and stool softener. There is concern for opioid induced constipation. Patient referred for pain management. Case discussed with Gricelda Edwards MD. Patient now started on high CBD, low THC medicinal marijuana therapy. She has been on medication since 3/14/2019. \par \par For hypertension, HFpEF, and LE edema:\par Continue ACEi, spironolactone - periodically monitor metabolic panel for monitor renal function and potassium. Adjust medications as needed. I will add furosemide 20 mg twice a week to see if it improves her shortness of breath and pedal edema. I will try to order an echocardiogram for LV size and function.\par \par  April 13, 2021: The patient will continue her furosemide every other day.  She will stop having salty foods such as soups.  She will report into the electrophysiologist because her loop recorder is at recommended replacement time.  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  No acute changes were seen.  She will be getting her COVID-19 vaccination and she will confer with Dr. Webster the rheumatologist about whether she should adjust her prednisone or not.  I spent a total of 37 minutes on the date of the encounter evaluating and treating this patient.  Follow-up in office in 4 months,  or earlier as needed.\par July 13, 2021: The patient still gets some shortness of breath and dyspnea on exertion. She is cut out the salty soups but still has some salt in her diet. She denies any chest pains or palpitations. Her blood pressure was 131/60, and her pulse was 61. Her lungs revealed rales at the bases. Her heart revealed a regular rhythm. Her EKG shows normal sinus rhythm, a first-degree heart block, a left anterior hemiblock, and a right bundle branch block pattern with nonspecific ST and T wave changes. This was unchanged from previous. We will continue her on her current medications. She will try to cut down on salt. She will return in 3 months, or earlier if needed. Total time spent on the day of the encounter was 35 minutes which includes  face-to-face and non face-to-face times personally spent by the physician preparing to see the patient, obtaining  separately obtained history, performing a medically appropriate exam and evaluation, counseling, educating, talking to the family or caregivers, ordering medicines, ordering tests or procedures, referring and communicating with other healthcare foods professionals, and documenting clinical information in the electronic health record.\par August 17, 2021: The patient will try to take showers that are less hot with water and will sit more of the time.  Routine blood tests were sent today.  We will send the patient for an echocardiogram.  She will return in 3 months, or earlier if needed.  Total time spent on the day of the encounter was  34minutes which includes  face-to-face and non face-to-face times personally spent by the physician preparing to see the patient, obtaining  separately obtained history, performing a medically appropriate exam and evaluation, counseling, educating, talking to the family or caregivers, ordering medicines, ordering tests or procedures, referring and communicating with other healthcare foods professionals, and documenting clinical information in the electronic health record.

## 2021-08-17 NOTE — PHYSICAL EXAM
[General Appearance - Well Developed] : well developed [Normal Appearance] : normal appearance [Well Groomed] : well groomed [General Appearance - Well Nourished] : well nourished [No Deformities] : no deformities [General Appearance - In No Acute Distress] : no acute distress [Normal Conjunctiva] : the conjunctiva exhibited no abnormalities [Eyelids - No Xanthelasma] : the eyelids demonstrated no xanthelasmas [Normal Oral Mucosa] : normal oral mucosa [No Oral Pallor] : no oral pallor [No Oral Cyanosis] : no oral cyanosis [Normal Oropharynx] : normal oropharynx [Normal Jugular Venous V Waves Present] : normal jugular venous V waves present [No Jugular Venous Díaz A Waves] : no jugular venous díaz A waves [Respiration, Rhythm And Depth] : normal respiratory rhythm and effort [] : no respiratory distress [Exaggerated Use Of Accessory Muscles For Inspiration] : no accessory muscle use [Auscultation Breath Sounds / Voice Sounds] : lungs were clear to auscultation bilaterally [Heart Rate And Rhythm] : heart rate and rhythm were normal [Heart Sounds] : normal S1 and S2 [Murmurs] : no murmurs present [Bowel Sounds] : normal bowel sounds [Abdomen Soft] : soft [Abdomen Tenderness] : non-tender [Nail Clubbing] : no clubbing of the fingernails [Cyanosis, Localized] : no localized cyanosis [Skin Color & Pigmentation] : normal skin color and pigmentation [No Xanthoma] : no  xanthoma was observed [Oriented To Time, Place, And Person] : oriented to person, place, and time [Impaired Insight] : insight and judgment were intact [Affect] : the affect was normal [Mood] : the mood was normal [No Anxiety] : not feeling anxious [Conjunctiva] : the conjunctiva were normal in both eyes [PERRL] : pupils were equal in size, round, and reactive to light [EOM Intact] : extraocular movements were intact [5th Left ICS - MCL] : palpated at the 5th LICS in the midclavicular line [Normal] : normal [No Precordial Heave] : no precordial heave was noted [Normal Rate] : normal [Rhythm Regular] : regular [Normal S1] : normal S1 [Normal S2] : normal S2 [S2 Accentuated] : was accentuated [No Gallop] : no gallop heard [No Murmur] : no murmurs heard [2+] : left 2+ [___ +] : bilateral [unfilled]U+ pretibial pitting edema [FreeTextEntry1] : evidence of chronic venous stasis [Yellow Sclera (Icteric)] : no scleral icterus was seen [Right Carotid Bruit] : no bruit heard over the right carotid [Left Carotid Bruit] : no bruit heard over the left carotid

## 2021-08-18 DIAGNOSIS — R73.03 PREDIABETES.: ICD-10-CM

## 2021-08-18 LAB
ALBUMIN SERPL ELPH-MCNC: 3.9 G/DL
ALP BLD-CCNC: 76 U/L
ALT SERPL-CCNC: 29 U/L
ANION GAP SERPL CALC-SCNC: 9 MMOL/L
AST SERPL-CCNC: 38 U/L
BASOPHILS # BLD AUTO: 0.05 K/UL
BASOPHILS NFR BLD AUTO: 0.8 %
BILIRUB SERPL-MCNC: 0.3 MG/DL
BUN SERPL-MCNC: 19 MG/DL
CALCIUM SERPL-MCNC: 9.5 MG/DL
CHLORIDE SERPL-SCNC: 97 MMOL/L
CHOLEST SERPL-MCNC: 202 MG/DL
CO2 SERPL-SCNC: 24 MMOL/L
CREAT SERPL-MCNC: 0.88 MG/DL
EOSINOPHIL # BLD AUTO: 0.12 K/UL
EOSINOPHIL NFR BLD AUTO: 1.8 %
ESTIMATED AVERAGE GLUCOSE: 120 MG/DL
GLUCOSE SERPL-MCNC: 81 MG/DL
HBA1C MFR BLD HPLC: 5.8 %
HCT VFR BLD CALC: 38.1 %
HDLC SERPL-MCNC: 82 MG/DL
HGB BLD-MCNC: 12.5 G/DL
IMM GRANULOCYTES NFR BLD AUTO: 0.8 %
LDLC SERPL CALC-MCNC: 103 MG/DL
LYMPHOCYTES # BLD AUTO: 1.43 K/UL
LYMPHOCYTES NFR BLD AUTO: 21.6 %
MAN DIFF?: NORMAL
MCHC RBC-ENTMCNC: 31.7 PG
MCHC RBC-ENTMCNC: 32.8 GM/DL
MCV RBC AUTO: 96.7 FL
MONOCYTES # BLD AUTO: 1.05 K/UL
MONOCYTES NFR BLD AUTO: 15.8 %
NEUTROPHILS # BLD AUTO: 3.93 K/UL
NEUTROPHILS NFR BLD AUTO: 59.2 %
NONHDLC SERPL-MCNC: 121 MG/DL
PLATELET # BLD AUTO: 227 K/UL
POTASSIUM SERPL-SCNC: 5.1 MMOL/L
PROT SERPL-MCNC: 6.1 G/DL
RBC # BLD: 3.94 M/UL
RBC # FLD: 14.9 %
SODIUM SERPL-SCNC: 131 MMOL/L
T3RU NFR SERPL: 1.2 TBI
T4 SERPL-MCNC: 8.3 UG/DL
TRIGL SERPL-MCNC: 90 MG/DL
TSH SERPL-ACNC: 1 UIU/ML
URATE SERPL-MCNC: 6.2 MG/DL
WBC # FLD AUTO: 6.63 K/UL

## 2021-08-24 ENCOUNTER — APPOINTMENT (OUTPATIENT)
Dept: GERIATRICS | Facility: CLINIC | Age: 86
End: 2021-08-24
Payer: MEDICARE

## 2021-08-24 VITALS
OXYGEN SATURATION: 97 % | DIASTOLIC BLOOD PRESSURE: 78 MMHG | WEIGHT: 161.13 LBS | HEART RATE: 74 BPM | HEIGHT: 64 IN | BODY MASS INDEX: 27.51 KG/M2 | SYSTOLIC BLOOD PRESSURE: 120 MMHG | RESPIRATION RATE: 14 BRPM

## 2021-08-24 DIAGNOSIS — Z87.39 PERSONAL HISTORY OF OTHER DISEASES OF THE MUSCULOSKELETAL SYSTEM AND CONNECTIVE TISSUE: ICD-10-CM

## 2021-08-24 DIAGNOSIS — F42.4 EXCORIATION (SKIN-PICKING) DISORDER: ICD-10-CM

## 2021-08-24 DIAGNOSIS — G56.01 CARPAL TUNNEL SYNDROME, RIGHT UPPER LIMB: ICD-10-CM

## 2021-08-24 DIAGNOSIS — R44.1 VISUAL HALLUCINATIONS: ICD-10-CM

## 2021-08-24 PROCEDURE — 99214 OFFICE O/P EST MOD 30 MIN: CPT | Mod: GC

## 2021-08-24 RX ORDER — PREDNISONE 5 MG/1
5 TABLET ORAL
Qty: 20 | Refills: 0 | Status: DISCONTINUED | COMMUNITY
Start: 2021-08-02 | End: 2021-08-24

## 2021-08-25 NOTE — REVIEW OF SYSTEMS
[Arthralgias] : arthralgias [Joint Stiffness] : joint stiffness [As Noted in HPI] : as noted in HPI [Negative] : Heme/Lymph

## 2021-08-26 PROBLEM — F42.4 COMPULSIVE SKIN PICKING: Status: RESOLVED | Noted: 2018-09-25 | Resolved: 2021-08-26

## 2021-08-26 PROBLEM — R44.1 VISUAL HALLUCINATIONS: Status: RESOLVED | Noted: 2020-01-08 | Resolved: 2021-08-26

## 2021-08-26 PROBLEM — Z87.39 HISTORY OF BACK PAIN: Status: RESOLVED | Noted: 2020-02-11 | Resolved: 2021-08-26

## 2021-08-26 PROBLEM — G56.01 CARPAL TUNNEL SYNDROME OF RIGHT WRIST: Status: RESOLVED | Noted: 2018-09-25 | Resolved: 2021-08-26

## 2021-08-27 NOTE — HISTORY OF PRESENT ILLNESS
[Patient reported hearing was normal] : Patient reported hearing was normal [Patient reported vision is abnormal] : Patient reported vision is abnormal [Patient reported Patient reported dental screening is normal] : Patient reported dental screening is normal [Patient declined colon rectal/cancer screening] : Patient declined colon/rectal cancer screening [Patient reported skin cancer screening was normal] : Patient reported skin cancer screening was normal [Patient declined breast sonogram] : Patient declined breast sonogram [Patient declined cervical cancer screening] : Patient declined cervical cancer screening [No falls in past year] : Patient reported no falls in the past year [0] : 2) Feeling down, depressed, or hopeless: Not at all (0) [PHQ-2 Negative - No further assessment needed] : PHQ-2 Negative - No further assessment needed [FreeTextEntry1] : CC : RT Knee Osteoarthritis Pain\par \par HPI : Patient 89 yo female with history of heart failure, chronic viral hepatitis, osteoarthritis. \par Patient with both daughters and confirmed continued pain in rt knee, s/p rt knee injection. Patient taking occasional tylenol mild to \par moderate improvement in symptoms. She is able to ambulate with walker assistance but does state when pain not controlled well in inhibits her \par ambulation. \par Patient otherwise denies all of the following including but not limited to : CP, SOB, Fever, bleeding [TextBox_37] : + use of glasses

## 2021-08-27 NOTE — PHYSICAL EXAM
[JVD] : there was no jugular-venous distention [Nonpitting Edema] : nonpitting edema present [___ +] : bilateral [unfilled]+ pretibial pitting edema [Normal] : normal affect and normal mood [Normal Gait] : abnormal gait

## 2021-08-27 NOTE — END OF VISIT
[] : Fellow [Time Spent: ___ minutes] : I have spent [unfilled] minutes of time on the encounter. [FreeTextEntry3] : 89 y/o woman w/ PMH as above presents for f/u visit. Overall doing ok. No LE edema today. She has continued intermittent knee pain, s/p steroid injection from rheum. Does use tylenol as adjunct, which has helped. She had recent f/u w/ cardiology. Daughters accompany on visit and provide collateral hx and state she is doing well. We have completed a MOLST 3/2021, scanned into EMR. Answered questions about COVID vaccine booster today. Rec RTC in 3-4 months.

## 2021-09-03 ENCOUNTER — APPOINTMENT (OUTPATIENT)
Dept: RHEUMATOLOGY | Facility: CLINIC | Age: 86
End: 2021-09-03

## 2021-09-18 ENCOUNTER — RX RENEWAL (OUTPATIENT)
Age: 86
End: 2021-09-18

## 2021-09-21 ENCOUNTER — APPOINTMENT (OUTPATIENT)
Dept: CARDIOLOGY | Facility: CLINIC | Age: 86
End: 2021-09-21
Payer: MEDICARE

## 2021-09-21 VITALS
DIASTOLIC BLOOD PRESSURE: 83 MMHG | SYSTOLIC BLOOD PRESSURE: 131 MMHG | OXYGEN SATURATION: 100 % | BODY MASS INDEX: 26.8 KG/M2 | WEIGHT: 157 LBS | HEART RATE: 74 BPM | RESPIRATION RATE: 17 BRPM | HEIGHT: 64 IN

## 2021-09-21 PROCEDURE — 93306 TTE W/DOPPLER COMPLETE: CPT

## 2021-09-21 PROCEDURE — 99214 OFFICE O/P EST MOD 30 MIN: CPT

## 2021-09-21 NOTE — DISCUSSION/SUMMARY
[FreeTextEntry1] : The patient is a very pleasant 89 year-old woman who presents today for follow-up of her poor exercise capacity, shortness of breath with even minimal exertion, and worsening of her right hip pain.  Patient has clear lungs, mild lower extremity edema, and a loud S2 which raises the possibility of pulmonary hypertension, although none was seen on a recent echocardiogram. Suspect symptom of dyspnea on minimal exertion is related to labile blood pressure and overall deconditioning due to inactivity.  Given normal oxygen saturation on room and and unchanged ECG, no evidence of significant PE at this time. \par \par In July 2018, she had a DVT in RLE. She completed 21 days of Xarelto 15mg BID, then 20mg daily for more than a year afterwards. She is now off anticoagulation. ILR without evidence of atrial fibrillation as of 7/23/2019.\par \par  Cortisone shot provided only mild relief. She has been started on Vicodin and stool softener. There is concern for opioid induced constipation. Patient referred for pain management. Case discussed with Gricelda Edwards MD. Patient now started on high CBD, low THC medicinal marijuana therapy. She has been on medication since 3/14/2019. \par \par For hypertension, HFpEF, and LE edema:\par Continue ACEi, spironolactone - periodically monitor metabolic panel for monitor renal function and potassium. Adjust medications as needed. I will add furosemide 20 mg twice a week to see if it improves her shortness of breath and pedal edema. I will try to order an echocardiogram for LV size and function.\par \par  April 13, 2021: The patient will continue her furosemide every other day.  She will stop having salty foods such as soups.  She will report into the electrophysiologist because her loop recorder is at recommended replacement time.  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  No acute changes were seen.  She will be getting her COVID-19 vaccination and she will confer with Dr. Webster the rheumatologist about whether she should adjust her prednisone or not.  I spent a total of 37 minutes on the date of the encounter evaluating and treating this patient.  Follow-up in office in 4 months,  or earlier as needed.\par July 13, 2021: The patient still gets some shortness of breath and dyspnea on exertion. She is cut out the salty soups but still has some salt in her diet. She denies any chest pains or palpitations. Her blood pressure was 131/60, and her pulse was 61. Her lungs revealed rales at the bases. Her heart revealed a regular rhythm. Her EKG shows normal sinus rhythm, a first-degree heart block, a left anterior hemiblock, and a right bundle branch block pattern with nonspecific ST and T wave changes. This was unchanged from previous. We will continue her on her current medications. She will try to cut down on salt. She will return in 3 months, or earlier if needed. Total time spent on the day of the encounter was 35 minutes which includes  face-to-face and non face-to-face times personally spent by the physician preparing to see the patient, obtaining  separately obtained history, performing a medically appropriate exam and evaluation, counseling, educating, talking to the family or caregivers, ordering medicines, ordering tests or procedures, referring and communicating with other healthcare foods professionals, and documenting clinical information in the electronic health record.\par August 17, 2021: The patient will try to take showers that are less hot with water and will sit more of the time.  Routine blood tests were sent today.  We will send the patient for an echocardiogram.  She will return in 3 months, or earlier if needed.  Total time spent on the day of the encounter was  34 minutes which includes  face-to-face and non face-to-face times personally spent by the physician preparing to see the patient, obtaining  separately obtained history, performing a medically appropriate exam and evaluation, counseling, educating, talking to the family or caregivers, ordering medicines, ordering tests or procedures, referring and communicating with other healthcare foods professionals, and documenting clinical information in the electronic health record.\par 9/21/2021: The patient had an echocardiogram today and it showed normal systolic function with minimal aortic regurgitation and mitral regurgitation. She will continue on her current medication and return in approximately 3 months, or earlier if needed. She will stay on her current medication.  Total time spent on the day of the encounter was  31 minutes which includes  face-to-face and non face-to-face times personally spent by the physician preparing to see the patient, obtaining  separately obtained history, performing a medically appropriate exam and evaluation, counseling, educating, talking to the family or caregivers, ordering medicines, ordering tests or procedures, referring and communicating with other healthcare foods professionals, and documenting clinical information in the electronic health record.

## 2021-09-21 NOTE — PHYSICAL EXAM
[General Appearance - Well Developed] : well developed [Normal Appearance] : normal appearance [Well Groomed] : well groomed [General Appearance - Well Nourished] : well nourished [No Deformities] : no deformities [General Appearance - In No Acute Distress] : no acute distress [Normal Conjunctiva] : the conjunctiva exhibited no abnormalities [Eyelids - No Xanthelasma] : the eyelids demonstrated no xanthelasmas [Normal Oral Mucosa] : normal oral mucosa [No Oral Pallor] : no oral pallor [No Oral Cyanosis] : no oral cyanosis [Normal Oropharynx] : normal oropharynx [Normal Jugular Venous V Waves Present] : normal jugular venous V waves present [No Jugular Venous Díaz A Waves] : no jugular venous díaz A waves [] : no respiratory distress [Respiration, Rhythm And Depth] : normal respiratory rhythm and effort [Exaggerated Use Of Accessory Muscles For Inspiration] : no accessory muscle use [Auscultation Breath Sounds / Voice Sounds] : lungs were clear to auscultation bilaterally [Heart Rate And Rhythm] : heart rate and rhythm were normal [Murmurs] : no murmurs present [Heart Sounds] : normal S1 and S2 [Bowel Sounds] : normal bowel sounds [Abdomen Soft] : soft [Abdomen Tenderness] : non-tender [Nail Clubbing] : no clubbing of the fingernails [Cyanosis, Localized] : no localized cyanosis [Skin Color & Pigmentation] : normal skin color and pigmentation [No Xanthoma] : no  xanthoma was observed [Oriented To Time, Place, And Person] : oriented to person, place, and time [Impaired Insight] : insight and judgment were intact [Mood] : the mood was normal [Affect] : the affect was normal [No Anxiety] : not feeling anxious [Conjunctiva] : the conjunctiva were normal in both eyes [PERRL] : pupils were equal in size, round, and reactive to light [EOM Intact] : extraocular movements were intact [5th Left ICS - MCL] : palpated at the 5th LICS in the midclavicular line [Normal] : normal [No Precordial Heave] : no precordial heave was noted [Normal Rate] : normal [Rhythm Regular] : regular [Normal S1] : normal S1 [Normal S2] : normal S2 [S2 Accentuated] : was accentuated [No Gallop] : no gallop heard [No Murmur] : no murmurs heard [2+] : left 2+ [___ +] : bilateral [unfilled]U+ pretibial pitting edema [FreeTextEntry1] : evidence of chronic venous stasis [Yellow Sclera (Icteric)] : no scleral icterus was seen [Right Carotid Bruit] : no bruit heard over the right carotid [Left Carotid Bruit] : no bruit heard over the left carotid

## 2021-09-21 NOTE — REVIEW OF SYSTEMS
[Feeling Fatigued] : feeling fatigued [SOB] : shortness of breath [Dyspnea on exertion] : dyspnea during exertion [Negative] : Heme/Lymph [Chest Discomfort] : no chest discomfort [Palpitations] : no palpitations

## 2021-09-21 NOTE — HISTORY OF PRESENT ILLNESS
[FreeTextEntry1] : 89 year-old woman with history of hypertension, HCV, hypothyroidism comes today with her daughter for follow-up. 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 In July 2018, patient noted to have right leg swelling. Evaluation by orthopedic surgeon with Doppler revealed DVT and cellulitis. Patient was started on Augmentin and Xarelto. Patient has no pleuritic chest pain. \par December 17, 2019: The patient complains of shortness of breath. The daughter admits that she may have had some salty soups or other foods. She denies any chest pains or palpitations.\par Patient has also been sent to pulmonary who also check overnight saturation studies and have r/o hypoxia as a source of fatigue and SOB. \par June 2, 2020: Patient gets more short of breath.  She gets dyspnea on exertion.  She is extremely inactive.  And maybe salt in her food.  She denies any chest pains or palpitations.  She had never gotten the echocardiogram that was ordered because of the COVID-19 pandemic.\par OA of knees have been chronic and also promotes to patients sedentary lifestyle. She uses a cane on occasion. No recent falls. On NSAIDS PRN for pain.\par \par September 15, 2020: Patient denies any chest pains or palpitations.  She is does get dyspnea on exertion but she is very inactive.  Sometimes she sighs a lot but she does not have air hunger.\par December 15, 2020: Patient gets some shortness of breath and dyspnea on exertion.  She denies any chest pains or palpitations.  She denies any dizziness or fainting.  She does have some caffeinated coffee and some salt.  She did have an argument with her daughter on the way here.\par In February 2018, patient was admitted with presyncope. Her ILR was interrogated and was without corresponding event. She was discharged without changes in her medications.\par \par December 2018 - Patient's chief complaint today is right hip pain. She has been started on Vicodin for it. She is taking it once per day. She also got a cortisone injection last week that provided mild relief.\par Labs drawn earlier today by Dr. Dickson.\par \par March 2019 - Patient has just started CBD/THC for pain relief of chronic arthritis. It is well tolerated so far, but effect may not be seen at this early time.\par \par June 2019 - In May 2019, patient was treated for gout with steroids per rheumatology. Patient admitted to [facility] in May 2019 with syncope. At that time, her furosemide was stopped and her Xarelto was discontinued. She remains on CBD for pain. She has no signs or symptoms of volume overload while off loop diuretic.\par April 13, 2021: The patient has been placed on prednisone and she has gained weight has some pedal edema.  She does report shortness of breath at rest as well as dyspnea on exertion.  Her furosemide was increased from once a week to every other day 4 days ago.  She has been having some salty foods such as soups.  She has a [place] loop recorder and has not heard anything about reports.\par July 13, 2021: The patient gets occasional episodes of shortness of breath.. She has dyspnea on exertion but has been very inactive. She has cut out most most soups but still has salt in her diet. She denies any chest pains or palpitations.\par August 17, 2021: The patient has had 2 episodes of weakness after taking a shower.  She is taking furosemide 3 times a week.  She does  the shower as well as sitting down for part of it.\par 9/21/2021: The patient has had no further weak spells. The patient's daughter is leaving the shower door open slightly so that it gets less hot. She underwent an echocardiogram today which showed good LV systolic function and only aortic sclerosis with a minimal leak in the aortic and mitral valves. Pulmonary artery pressure was normal. The patient has no new symptoms.\par PMD/Geriatrics: Beverly Wheeler MD [phone]\par Orthopedist: Alex Hoff MD [phone]\par Plastic Surgeon: Miguel Soto MD [phone]\par Former Cardiologist: Jose Santoyo MD [phone]\par GI: Johnny Dickson MD [phone]

## 2021-10-08 NOTE — ED PROVIDER NOTE - CPE EDP CARDIAC NORM
[facility]      Subjective:      Chief Complaint   Patient presents with    Post-Op Check     right leg,Rupture of right Achilles tendon       HPI: Yazmin Kaur is a 37 y.o. female who here after a right Achilles tendon repair. 6 weeks, she was removed of her plantarflexion cast.  Looks good, free of infection. Has some stiffness. Past Medical History:   Diagnosis Date    Acute DVT (deep venous thrombosis) ([facility] [address] 8/31/2021    Arthritis     Pap smear for cervical cancer screening 01/12/2011    neg.     PCO (polycystic ovaries)     PCOS (polycystic ovarian syndrome)     PONV (postoperative nausea and vomiting)       Past Surgical History:   Procedure Laterality Date    ACHILLES TENDON SURGERY Right 8/20/2021    RIGHT ACHILLES TENDON REPAIR performed by Scott Moncada MD at [address] [facility] TYMPANOSTOMY TUBE PLACEMENT      UTERINE FIBROID SURGERY       Social History     Socioeconomic History    Marital status: Single     Spouse name: Not on file    Number of children: Not on file    Years of education: Not on file    Highest education level: Not on file   Occupational History    Not on file   Tobacco Use    Smoking status: Current Every Day Smoker     Packs/day: 0.50     Years: 10.00     Pack years: 5.00     Types: Cigarettes    Smokeless tobacco: Never Used   Vaping Use    Vaping Use: Never used   Substance and Sexual Activity    Alcohol use: Yes     Comment: occasionally    Drug use: No    Sexual activity: Not Currently   Other Topics Concern    Not on file   Social History Narrative    Not on file     Social Determinants of Health     Financial Resource Strain: Low Risk     Difficulty of Paying Living Expenses: Not hard at all   Food Insecurity: No Food Insecurity    Worried About Running Out of Food in the Last Year: Never true    Minnie of Food in the Last Year: Never true   Transportation Needs: No Transportation Needs    Lack of Transportation (Medical): No    Lack of Transportation (Non-Medical): No   Physical Activity:     Days of Exercise per Week:     Minutes of Exercise per Session:    Stress:     Feeling of Stress :    Social Connections:     Frequency of Communication with Friends and Family:     Frequency of Social Gatherings with Friends and Family:     Attends Adventism Services:     Active Member of Clubs or Organizations:     Attends Club or Organization Meetings:     Marital Status:    Intimate Partner Violence:     Fear of Current or Ex-Partner:     Emotionally Abused:     Physically Abused:     Sexually Abused:      Family History   Problem Relation Age of Onset    Substance Abuse Father         alcohol    Cancer Paternal Grandfather         throat cancer    High Blood Pressure Mother     Asthma Mother     High Cholesterol Mother     Cancer Maternal Grandfather         lung     Allergies   Allergen Reactions    Codeine      Current Outpatient Medications on File Prior to Visit   Medication Sig Dispense Refill    rivaroxaban (XARELTO) 20 MG TABS tablet Take 1 tablet by mouth daily (with breakfast) 30 tablet 5    Elastic Bandages & Supports (MEDICAL COMPRESSION STOCKINGS) MISC 1 each by Does not apply route daily Thigh high 20-30 mmhg compression stockings both legs wear daily during day and off Qhs. Wear as tolerated. Do not wear if they cause increased pain. 1 each 5     No current facility-administered medications on file prior to visit. Objective:   Pulse 84   Temp 98 °F (36.7 °C) (Temporal)   Ht 5' 6\" (1.676 m)   Wt 220 lb (99.8 kg)   SpO2 98%   BMI 35.51 kg/m²       Radiographs and Laboratory Studies:   Previous diagnostic imaging studies were reviewed.        Laboratory Studies:   Lab Results   Component Value Date    WBC 13.3 (H) 08/16/2021    HGB 13.2 08/16/2021    HCT 40.1 08/16/2021    MCV 93.3 08/16/2021     08/16/2021     No results found for: SEDRATE  No results found for: CRP    Assessment and Plan:      Diagnosis Orders   1. Rupture of right Achilles tendon, initial encounter         Cast removed today. She was placed in a boot with heel lift. Instructed to wear this at all times when ambulating for the next 4 weeks. Will initiate therapy. We will see her back at time and likely going to a thick soled shoe. The above plan was discussed in thorough detail with Dr. Aureliano Singletary and the patient. No orders of the defined types were placed in this encounter. No orders of the defined types were placed in this encounter. No follow-ups on file.     Shailesh Benites PA-C  [facility]  [phone] normal...

## 2021-11-05 ENCOUNTER — APPOINTMENT (OUTPATIENT)
Dept: RHEUMATOLOGY | Facility: CLINIC | Age: 86
End: 2021-11-05
Payer: MEDICARE

## 2021-11-05 VITALS
TEMPERATURE: 97.7 F | WEIGHT: 159 LBS | BODY MASS INDEX: 27.14 KG/M2 | DIASTOLIC BLOOD PRESSURE: 79 MMHG | RESPIRATION RATE: 16 BRPM | HEIGHT: 64 IN | SYSTOLIC BLOOD PRESSURE: 159 MMHG | OXYGEN SATURATION: 98 % | HEART RATE: 77 BPM

## 2021-11-05 PROCEDURE — 20611 DRAIN/INJ JOINT/BURSA W/US: CPT | Mod: 50

## 2021-11-05 PROCEDURE — 99213 OFFICE O/P EST LOW 20 MIN: CPT | Mod: 25

## 2021-11-05 RX ORDER — LIDOCAINE HYDROCHLORIDE 10 MG/ML
1 INJECTION, SOLUTION INFILTRATION; PERINEURAL
Refills: 0 | Status: COMPLETED | OUTPATIENT
Start: 2021-11-05

## 2021-11-05 RX ORDER — METHYLPRED ACET/NACL,ISO-OS/PF 80 MG/ML
80 VIAL (ML) INJECTION
Qty: 1 | Refills: 0 | Status: COMPLETED | OUTPATIENT
Start: 2021-11-05

## 2021-11-05 RX ADMIN — METHYLPREDNISOLONE ACETATE MG/ML: 80 INJECTION, SUSPENSION INTRA-ARTICULAR; INTRALESIONAL; INTRAMUSCULAR; SOFT TISSUE at 00:00

## 2021-11-05 RX ADMIN — LIDOCAINE HYDROCHLORIDE %: 10 INJECTION, SOLUTION INFILTRATION; PERINEURAL at 00:00

## 2021-11-16 ENCOUNTER — NON-APPOINTMENT (OUTPATIENT)
Age: 86
End: 2021-11-16

## 2021-11-16 ENCOUNTER — APPOINTMENT (OUTPATIENT)
Dept: CARDIOLOGY | Facility: CLINIC | Age: 86
End: 2021-11-16
Payer: MEDICARE

## 2021-11-16 VITALS
WEIGHT: 155.6 LBS | HEIGHT: 64 IN | HEART RATE: 63 BPM | SYSTOLIC BLOOD PRESSURE: 132 MMHG | DIASTOLIC BLOOD PRESSURE: 83 MMHG | BODY MASS INDEX: 26.56 KG/M2

## 2021-11-16 PROCEDURE — G0008: CPT

## 2021-11-16 PROCEDURE — 93000 ELECTROCARDIOGRAM COMPLETE: CPT

## 2021-11-16 PROCEDURE — 90662 IIV NO PRSV INCREASED AG IM: CPT

## 2021-11-16 PROCEDURE — 99214 OFFICE O/P EST MOD 30 MIN: CPT

## 2021-11-16 NOTE — HISTORY OF PRESENT ILLNESS
[FreeTextEntry1] : 89 year-old woman with history of hypertension, HCV, hypothyroidism comes today with her daughter for follow-up. 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 In July 2018, patient noted to have right leg swelling. Evaluation by orthopedic surgeon with Doppler revealed DVT and cellulitis. Patient was started on Augmentin and Xarelto. Patient has no pleuritic chest pain. \par December 17, 2019: The patient complains of shortness of breath. The daughter admits that she may have had some salty soups or other foods. She denies any chest pains or palpitations.\par Patient has also been sent to pulmonary who also check overnight saturation studies and have r/o hypoxia as a source of fatigue and SOB. \par June 2, 2020: Patient gets more short of breath.  She gets dyspnea on exertion.  She is extremely inactive.  And maybe salt in her food.  She denies any chest pains or palpitations.  She had never gotten the echocardiogram that was ordered because of the COVID-19 pandemic.\par OA of knees have been chronic and also promotes to patients sedentary lifestyle. She uses a cane on occasion. No recent falls. On NSAIDS PRN for pain.\par \par September 15, 2020: Patient denies any chest pains or palpitations.  She is does get dyspnea on exertion but she is very inactive.  Sometimes she sighs a lot but she does not have air hunger.\par December 15, 2020: Patient gets some shortness of breath and dyspnea on exertion.  She denies any chest pains or palpitations.  She denies any dizziness or fainting.  She does have some caffeinated coffee and some salt.  She did have an argument with her daughter on the way here.\par In February 2018, patient was admitted with presyncope. Her ILR was interrogated and was without corresponding event. She was discharged without changes in her medications.\par \par December 2018 - Patient's chief complaint today is right hip pain. She has been started on Vicodin for it. She is taking it once per day. She also got a cortisone injection last week that provided mild relief.\par Labs drawn earlier today by Dr. Dickson.\par \par March 2019 - Patient has just started CBD/THC for pain relief of chronic arthritis. It is well tolerated so far, but effect may not be seen at this early time.\par \par June 2019 - In May 2019, patient was treated for gout with steroids per rheumatology. Patient admitted to [facility] in May 2019 with syncope. At that time, her furosemide was stopped and her Xarelto was discontinued. She remains on CBD for pain. She has no signs or symptoms of volume overload while off loop diuretic.\par April 13, 2021: The patient has been placed on prednisone and she has gained weight has some pedal edema.  She does report shortness of breath at rest as well as dyspnea on exertion.  Her furosemide was increased from once a week to every other day 4 days ago.  She has been having some salty foods such as soups.  She has a [place] loop recorder and has not heard anything about reports.\par July 13, 2021: The patient gets occasional episodes of shortness of breath.. She has dyspnea on exertion but has been very inactive. She has cut out most most soups but still has salt in her diet. She denies any chest pains or palpitations.\par August 17, 2021: The patient has had 2 episodes of weakness after taking a shower.  She is taking furosemide 3 times a week.  She does  the shower as well as sitting down for part of it.\par 9/21/2021: The patient has had no further weak spells. The patient's daughter is leaving the shower door open slightly so that it gets less hot. She underwent an echocardiogram today which showed good LV systolic function and only aortic sclerosis with a minimal leak in the aortic and mitral valves. Pulmonary artery pressure was normal\par November 16, 2021:. The patient has no new symptoms.  She does complain of fatigue.  She is very inactive.  She denies any chest pains or palpitations.  Sometimes she gets short of breath.  She wishes to get a flu shot today.\par PMD/Geriatrics: Beverly Wheeler MD [phone]\par Orthopedist: Alex Hoff MD [phone]\par Plastic Surgeon: Miguel Soto MD [phone]\par Former Cardiologist: Jose Santoyo MD [phone]\par GI: Johnny Dickson MD [phone]

## 2021-12-15 ENCOUNTER — APPOINTMENT (OUTPATIENT)
Dept: RHEUMATOLOGY | Facility: CLINIC | Age: 86
End: 2021-12-15
Payer: MEDICARE

## 2021-12-15 VITALS
TEMPERATURE: 97 F | HEART RATE: 66 BPM | SYSTOLIC BLOOD PRESSURE: 130 MMHG | OXYGEN SATURATION: 98 % | RESPIRATION RATE: 16 BRPM | BODY MASS INDEX: 26.46 KG/M2 | DIASTOLIC BLOOD PRESSURE: 82 MMHG | HEIGHT: 64 IN | WEIGHT: 155 LBS

## 2021-12-15 PROCEDURE — 99213 OFFICE O/P EST LOW 20 MIN: CPT

## 2021-12-20 ENCOUNTER — RX RENEWAL (OUTPATIENT)
Age: 86
End: 2021-12-20

## 2021-12-27 ENCOUNTER — RX RENEWAL (OUTPATIENT)
Age: 86
End: 2021-12-27

## 2022-01-11 ENCOUNTER — APPOINTMENT (OUTPATIENT)
Dept: GERIATRICS | Facility: CLINIC | Age: 87
End: 2022-01-11

## 2022-01-18 ENCOUNTER — APPOINTMENT (OUTPATIENT)
Dept: CARDIOLOGY | Facility: CLINIC | Age: 87
End: 2022-01-18

## 2022-02-09 ENCOUNTER — APPOINTMENT (OUTPATIENT)
Dept: RHEUMATOLOGY | Facility: CLINIC | Age: 87
End: 2022-02-09
Payer: MEDICARE

## 2022-02-09 VITALS
HEART RATE: 68 BPM | BODY MASS INDEX: 26.46 KG/M2 | TEMPERATURE: 95.5 F | DIASTOLIC BLOOD PRESSURE: 87 MMHG | SYSTOLIC BLOOD PRESSURE: 127 MMHG | OXYGEN SATURATION: 97 % | WEIGHT: 155 LBS | HEIGHT: 64 IN

## 2022-02-09 DIAGNOSIS — M75.00 ADHESIVE CAPSULITIS OF UNSPECIFIED SHOULDER: ICD-10-CM

## 2022-02-09 PROCEDURE — 20610 DRAIN/INJ JOINT/BURSA W/O US: CPT | Mod: 50

## 2022-02-09 PROCEDURE — 99213 OFFICE O/P EST LOW 20 MIN: CPT | Mod: 25

## 2022-02-09 RX ORDER — METHYLPRED ACET/NACL,ISO-OS/PF 80 MG/ML
80 VIAL (ML) INJECTION
Qty: 1 | Refills: 0 | Status: COMPLETED | OUTPATIENT
Start: 2022-02-09

## 2022-02-09 RX ORDER — LIDOCAINE HYDROCHLORIDE 10 MG/ML
1 INJECTION, SOLUTION INFILTRATION; PERINEURAL
Refills: 0 | Status: COMPLETED | OUTPATIENT
Start: 2022-02-09

## 2022-02-09 RX ADMIN — LIDOCAINE HYDROCHLORIDE %: 10 INJECTION, SOLUTION INFILTRATION; PERINEURAL at 00:00

## 2022-02-09 RX ADMIN — METHYLPREDNISOLONE ACETATE MG/ML: 80 INJECTION, SUSPENSION INTRA-ARTICULAR; INTRALESIONAL; INTRAMUSCULAR; SOFT TISSUE at 00:00

## 2022-02-22 ENCOUNTER — APPOINTMENT (OUTPATIENT)
Dept: CARDIOLOGY | Facility: CLINIC | Age: 87
End: 2022-02-22
Payer: MEDICARE

## 2022-02-22 ENCOUNTER — NON-APPOINTMENT (OUTPATIENT)
Age: 87
End: 2022-02-22

## 2022-02-22 VITALS
BODY MASS INDEX: 26.43 KG/M2 | WEIGHT: 154 LBS | SYSTOLIC BLOOD PRESSURE: 118 MMHG | OXYGEN SATURATION: 96 % | DIASTOLIC BLOOD PRESSURE: 70 MMHG | HEART RATE: 69 BPM

## 2022-02-22 DIAGNOSIS — R06.02 SHORTNESS OF BREATH: ICD-10-CM

## 2022-02-22 PROCEDURE — 99214 OFFICE O/P EST MOD 30 MIN: CPT

## 2022-02-22 PROCEDURE — 93000 ELECTROCARDIOGRAM COMPLETE: CPT

## 2022-02-22 NOTE — DISCUSSION/SUMMARY
[FreeTextEntry1] : The patient is a very pleasant 9 8 year-old woman who presents today for follow-up of her poor exercise capacity, shortness of breath with even minimal exertion, and worsening of her right hip pain.  Patient has clear lungs, mild lower extremity edema, and a loud S2 which raises the possibility of pulmonary hypertension, although none was seen on a recent echocardiogram. Suspect symptom of dyspnea on minimal exertion is related to labile blood pressure and overall deconditioning due to inactivity.  Given normal oxygen saturation on room and and unchanged ECG, no evidence of significant PE at this time. \par \par In July 2018, she had a DVT in RLE. She completed 21 days of Xarelto 15mg BID, then 20mg daily for more than a year afterwards. She is now off anticoagulation. ILR without evidence of atrial fibrillation as of 7/23/2019.\par \par  Cortisone shot provided only mild relief. She has been started on Vicodin and stool softener. There is concern for opioid induced constipation. Patient referred for pain management. Case discussed with Gricelda Edwards MD. Patient now started on high CBD, low THC medicinal marijuana therapy. She has been on medication since 3/14/2019. \par \par For hypertension, HFpEF, and LE edema:\par Continue ACEi, spironolactone - periodically monitor metabolic panel for monitor renal function and potassium. Adjust medications as needed. I will add furosemide 20 mg twice a week to see if it improves her shortness of breath and pedal edema. I will try to order an echocardiogram for LV size and function.\par \par  April 13, 2021: The patient will continue her furosemide every other day.  She will stop having salty foods such as soups.  She will report into the electrophysiologist because her loop recorder is at recommended replacement time.  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  No acute changes were seen.  She will be getting her COVID-19 vaccination and she will confer with Dr. Webster the rheumatologist about whether she should adjust her prednisone or not.  I spent a total of 37 minutes on the date of the encounter evaluating and treating this patient.  Follow-up in office in 4 months,  or earlier as needed.\par July 13, 2021: The patient still gets some shortness of breath and dyspnea on exertion. She is cut out the salty soups but still has some salt in her diet. She denies any chest pains or palpitations. Her blood pressure was 131/60, and her pulse was 61. Her lungs revealed rales at the bases. Her heart revealed a regular rhythm. Her EKG shows normal sinus rhythm, a first-degree heart block, a left anterior hemiblock, and a right bundle branch block pattern with nonspecific ST and T wave changes. This was unchanged from previous. We will continue her on her current medications. She will try to cut down on salt. She will return in 3 months, or earlier if needed. Total time spent on the day of the encounter was 35 minutes which includes  face-to-face and non face-to-face times personally spent by the physician preparing to see the patient, obtaining  separately obtained history, performing a medically appropriate exam and evaluation, counseling, educating, talking to the family or caregivers, ordering medicines, ordering tests or procedures, referring and communicating with other healthcare foods professionals, and documenting clinical information in the electronic health record.\par August 17, 2021: The patient will try to take showers that are less hot with water and will sit more of the time.  Routine blood tests were sent today.  We will send the patient for an echocardiogram.  She will return in 3 months, or earlier if needed.  Total time spent on the day of the encounter was  34 minutes which includes  face-to-face and non face-to-face times personally spent by the physician preparing to see the patient, obtaining  separately obtained history, performing a medically appropriate exam and evaluation, counseling, educating, talking to the family or caregivers, ordering medicines, ordering tests or procedures, referring and communicating with other healthcare foods professionals, and documenting clinical information in the electronic health record.\par 9/21/2021: The patient had an echocardiogram today and it showed normal systolic function with minimal aortic regurgitation and mitral regurgitation. She will continue on her current medication and return in approximately 3 months, or earlier if needed. She will stay on her current medication.  Total time spent on the day of the encounter was  31 minutes which includes  face-to-face and non face-to-face times personally spent by the physician preparing to see the patient, obtaining  separately obtained history, performing a medically appropriate exam and evaluation, counseling, educating, talking to the family or caregivers, ordering medicines, ordering tests or procedures, referring and communicating with other healthcare foods professionals, and documenting clinical information in the electronic health record.\par November 16, 2021: High-dose flu shot will be administered today.\par February 22, 2022: The patient was encouraged to walk more.  We will continue her on her current medication.  She will return in 4 months, or earlier if needed.  Total time spent on the day of the encounter was 34  minutes which includes  face-to-face and non face-to-face times personally spent by the physician preparing to see the patient, obtaining  separately obtained history, performing a medically appropriate exam and evaluation, counseling, educating, talking to the family or caregivers, ordering medicines, ordering tests or procedures, referring and communicating with other healthcare  professionals, and documenting clinical information in the electronic health record.\par

## 2022-02-22 NOTE — REVIEW OF SYSTEMS
[Feeling Fatigued] : feeling fatigued [Dyspnea on exertion] : dyspnea during exertion [Negative] : Heme/Lymph [SOB] : no shortness of breath [Chest Discomfort] : no chest discomfort [Palpitations] : no palpitations

## 2022-03-08 ENCOUNTER — APPOINTMENT (OUTPATIENT)
Dept: RHEUMATOLOGY | Facility: CLINIC | Age: 87
End: 2022-03-08

## 2022-03-18 ENCOUNTER — APPOINTMENT (OUTPATIENT)
Dept: GERIATRICS | Facility: CLINIC | Age: 87
End: 2022-03-18
Payer: MEDICARE

## 2022-03-18 VITALS
SYSTOLIC BLOOD PRESSURE: 112 MMHG | RESPIRATION RATE: 15 BRPM | TEMPERATURE: 98 F | OXYGEN SATURATION: 99 % | DIASTOLIC BLOOD PRESSURE: 72 MMHG | WEIGHT: 158 LBS | BODY MASS INDEX: 27.12 KG/M2 | HEART RATE: 68 BPM

## 2022-03-18 PROCEDURE — 99214 OFFICE O/P EST MOD 30 MIN: CPT

## 2022-03-18 NOTE — PHYSICAL EXAM
[Alert] : alert [No Acute Distress] : in no acute distress [Normal Outer Ear/Nose] : the ears and nose were normal in appearance [Normal Appearance] : the appearance of the neck was normal [Supple] : the neck was supple [No Respiratory Distress] : no respiratory distress [No Acc Muscle Use] : no accessory muscle use [Respiration, Rhythm And Depth] : normal respiratory rhythm and effort [Auscultation Breath Sounds / Voice Sounds] : lungs were clear to auscultation bilaterally [Bowel Sounds] : normal bowel sounds [Heart Rate And Rhythm] : heart rate was normal and rhythm regular [Abdomen Tenderness] : non-tender [Abdomen Soft] : soft [No Spinal Tenderness] : no spinal tenderness [Normal Color / Pigmentation] : normal skin color and pigmentation [Normal Turgor] : normal skin turgor [No Focal Deficits] : no focal deficits [Normal Affect] : the affect was normal [Normal Mood] : the mood was normal [Oriented to Person] : oriented to person [Oriented to Place] : oriented to place [Normal Gait] : abnormal gait [Oriented to Time] : disoriented to time [de-identified] : ambulates w/ rolling walker

## 2022-03-18 NOTE — HISTORY OF PRESENT ILLNESS
[With Patient/Caregiver] : , with patient/caregiver [Reviewed no changes] : Reviewed, no changes [Designated Healthcare Proxy] : Designated healthcare proxy [Name: ___] : Health Care Proxy's Name: [unfilled]  [Relationship: ___] : Relationship: [unfilled] [DNR] : DNR [DNI] : DNI [Completely Independent] : Completely independent. [No falls in past year] : Patient reported no falls in the past year [Completely Dependent] : Completely dependent. [FreeTextEntry1] : 89 year old woman w/ PMH mild dementia, hypothyroidism, HCV, CHF, polyarticular OA, HTN presents for f/u visit.\par \par PCP: Andrea Valerio\par Rheum: Eleanor\par Cards: Gael Leija\par \par Patient saw cards and rheum since last visit w/ Dr. Valerio a few months ago. Noted continued gradual decline. Some decrease in exercise tolerance. She also had an additional intraarticular injection.\par \par Patient has part time HHA. Lives w/ daughter, Sondra, who is HCP. \par \par Last labs on file from 8/2021 w/ mild hyponatremia, CBC ok, normal TSH. Reportedly labs were done in [facility] earlier this month that were normal. Patient had reportedly been in the ER due to "dehydration", was d/c after IVF. She is using lasix once a week.\par \par Daughter notes that she has been taking longer naps during the day, but still sleeping well at night. She occasionally resists some recommendations from daughter, but they have been able deescalate w/ no agitation. She sometimes "sees things" but no distressing hallucinations. Has had increased repetitive statements.  [AdvancecareDate] : 03/22

## 2022-03-22 ENCOUNTER — RX RENEWAL (OUTPATIENT)
Age: 87
End: 2022-03-22

## 2022-04-04 ENCOUNTER — APPOINTMENT (OUTPATIENT)
Dept: HEMATOLOGY ONCOLOGY | Facility: CLINIC | Age: 87
End: 2022-04-04

## 2022-04-05 ENCOUNTER — APPOINTMENT (OUTPATIENT)
Dept: GERIATRICS | Facility: CLINIC | Age: 87
End: 2022-04-05
Payer: MEDICARE

## 2022-04-05 VITALS
HEART RATE: 78 BPM | RESPIRATION RATE: 16 BRPM | DIASTOLIC BLOOD PRESSURE: 64 MMHG | BODY MASS INDEX: 27.12 KG/M2 | SYSTOLIC BLOOD PRESSURE: 112 MMHG | WEIGHT: 158 LBS | OXYGEN SATURATION: 99 % | TEMPERATURE: 97.9 F

## 2022-04-05 PROCEDURE — 99214 OFFICE O/P EST MOD 30 MIN: CPT

## 2022-04-05 NOTE — HISTORY OF PRESENT ILLNESS
[FreeTextEntry1] : 89 yo F with chronic pain from osteoarthritis and pseudogout presents for follow up visit for medical cannabis re-certification.  \par \par Has been using medical cannabis in a 1:1 THC:CBD oil formulation for her pain and per patient's daughter this has been very helpful for her joint pains. She is using a dose of ~8mg THC:CBD once a day. She finds that when patient doesn't use the cannabis oil she is less mobile than when on it. No AEs. \par \par ROS:\par appetite is good\par Denies n/v, constipation, diarrhea\par \par Patient lives with her daughter.  \par \par I-Stop Ref#: #: [id number]\par 01/26/2022 hilda montalvo (1:1) 3mg thc and 3mg cbd/0.25ml oral solution # 1

## 2022-06-02 ENCOUNTER — APPOINTMENT (OUTPATIENT)
Dept: RHEUMATOLOGY | Facility: CLINIC | Age: 87
End: 2022-06-02
Payer: MEDICARE

## 2022-06-02 VITALS
SYSTOLIC BLOOD PRESSURE: 115 MMHG | DIASTOLIC BLOOD PRESSURE: 69 MMHG | WEIGHT: 156 LBS | HEIGHT: 64 IN | BODY MASS INDEX: 26.63 KG/M2 | HEART RATE: 65 BPM | OXYGEN SATURATION: 97 %

## 2022-06-02 DIAGNOSIS — R60.9 EDEMA, UNSPECIFIED: ICD-10-CM

## 2022-06-02 PROCEDURE — 99214 OFFICE O/P EST MOD 30 MIN: CPT

## 2022-06-04 ENCOUNTER — OUTPATIENT (OUTPATIENT)
Dept: OUTPATIENT SERVICES | Facility: HOSPITAL | Age: 87
LOS: 1 days | End: 2022-06-04
Payer: MEDICARE

## 2022-06-04 ENCOUNTER — APPOINTMENT (OUTPATIENT)
Dept: ULTRASOUND IMAGING | Facility: IMAGING CENTER | Age: 87
End: 2022-06-04
Payer: MEDICARE

## 2022-06-04 DIAGNOSIS — R60.9 EDEMA, UNSPECIFIED: ICD-10-CM

## 2022-06-04 DIAGNOSIS — Z95.818 PRESENCE OF OTHER CARDIAC IMPLANTS AND GRAFTS: Chronic | ICD-10-CM

## 2022-06-04 PROCEDURE — 93970 EXTREMITY STUDY: CPT

## 2022-06-04 PROCEDURE — 93970 EXTREMITY STUDY: CPT | Mod: 26

## 2022-06-05 DIAGNOSIS — M11.20 OTHER CHONDROCALCINOSIS, UNSPECIFIED SITE: ICD-10-CM

## 2022-06-06 ENCOUNTER — NON-APPOINTMENT (OUTPATIENT)
Age: 87
End: 2022-06-06

## 2022-06-10 ENCOUNTER — APPOINTMENT (OUTPATIENT)
Dept: GERIATRICS | Facility: CLINIC | Age: 87
End: 2022-06-10
Payer: MEDICARE

## 2022-06-10 VITALS
SYSTOLIC BLOOD PRESSURE: 132 MMHG | OXYGEN SATURATION: 96 % | HEART RATE: 72 BPM | DIASTOLIC BLOOD PRESSURE: 74 MMHG | BODY MASS INDEX: 27.19 KG/M2 | TEMPERATURE: 97.3 F | WEIGHT: 159.25 LBS | RESPIRATION RATE: 16 BRPM | HEIGHT: 64 IN

## 2022-06-10 PROCEDURE — 99483 ASSMT & CARE PLN PT COG IMP: CPT

## 2022-06-21 ENCOUNTER — NON-APPOINTMENT (OUTPATIENT)
Age: 87
End: 2022-06-21

## 2022-06-21 ENCOUNTER — APPOINTMENT (OUTPATIENT)
Dept: CARDIOLOGY | Facility: CLINIC | Age: 87
End: 2022-06-21
Payer: MEDICARE

## 2022-06-21 VITALS
WEIGHT: 154 LBS | OXYGEN SATURATION: 98 % | SYSTOLIC BLOOD PRESSURE: 136 MMHG | HEIGHT: 64 IN | BODY MASS INDEX: 26.29 KG/M2 | DIASTOLIC BLOOD PRESSURE: 80 MMHG | HEART RATE: 97 BPM

## 2022-06-21 VITALS
OXYGEN SATURATION: 98 % | BODY MASS INDEX: 26.29 KG/M2 | DIASTOLIC BLOOD PRESSURE: 80 MMHG | HEART RATE: 97 BPM | SYSTOLIC BLOOD PRESSURE: 136 MMHG | HEIGHT: 64 IN | WEIGHT: 154 LBS

## 2022-06-21 DIAGNOSIS — Z92.29 PERSONAL HISTORY OF OTHER DRUG THERAPY: ICD-10-CM

## 2022-06-21 DIAGNOSIS — Z86.718 PERSONAL HISTORY OF OTHER VENOUS THROMBOSIS AND EMBOLISM: ICD-10-CM

## 2022-06-21 PROCEDURE — 93000 ELECTROCARDIOGRAM COMPLETE: CPT

## 2022-06-21 PROCEDURE — 99214 OFFICE O/P EST MOD 30 MIN: CPT

## 2022-06-21 NOTE — DISCUSSION/SUMMARY
[FreeTextEntry1] : The patient is a very pleasant 9 8 year-old woman who presents today for follow-up of her poor exercise capacity, shortness of breath with even minimal exertion, and worsening of her right hip pain.  Patient has clear lungs, mild lower extremity edema, and a loud S2 which raises the possibility of pulmonary hypertension, although none was seen on a recent echocardiogram. Suspect symptom of dyspnea on minimal exertion is related to labile blood pressure and overall deconditioning due to inactivity.  Given normal oxygen saturation on room and and unchanged ECG, no evidence of significant PE at this time. \par \par In July 2018, she had a DVT in RLE. She completed 21 days of Xarelto 15mg BID, then 20mg daily for more than a year afterwards. She is now off anticoagulation. ILR without evidence of atrial fibrillation as of 7/23/2019.\par \par  Cortisone shot provided only mild relief. She has been started on Vicodin and stool softener. There is concern for opioid induced constipation. Patient referred for pain management. Case discussed with Gricelda Edwards MD. Patient now started on high CBD, low THC medicinal marijuana therapy. She has been on medication since 3/14/2019. \par \par For hypertension, HFpEF, and LE edema:\par Continue ACEi, spironolactone - periodically monitor metabolic panel for monitor renal function and potassium. Adjust medications as needed. I will add furosemide 20 mg twice a week to see if it improves her shortness of breath and pedal edema. I will try to order an echocardiogram for LV size and function.\par \par  April 13, 2021: The patient will continue her furosemide every other day.  She will stop having salty foods such as soups.  She will report into the electrophysiologist because her loop recorder is at recommended replacement time.  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  No acute changes were seen.  She will be getting her COVID-19 vaccination and she will confer with Dr. Webster the rheumatologist about whether she should adjust her prednisone or not.  I spent a total of 37 minutes on the date of the encounter evaluating and treating this patient.  Follow-up in office in 4 months,  or earlier as needed.\par July 13, 2021: The patient still gets some shortness of breath and dyspnea on exertion. She is cut out the salty soups but still has some salt in her diet. She denies any chest pains or palpitations. Her blood pressure was 131/60, and her pulse was 61. Her lungs revealed rales at the bases. Her heart revealed a regular rhythm. Her EKG shows normal sinus rhythm, a first-degree heart block, a left anterior hemiblock, and a right bundle branch block pattern with nonspecific ST and T wave changes. This was unchanged from previous. We will continue her on her current medications. She will try to cut down on salt. She will return in 3 months, or earlier if needed. Total time spent on the day of the encounter was 35 minutes which includes  face-to-face and non face-to-face times personally spent by the physician preparing to see the patient, obtaining  separately obtained history, performing a medically appropriate exam and evaluation, counseling, educating, talking to the family or caregivers, ordering medicines, ordering tests or procedures, referring and communicating with other healthcare foods professionals, and documenting clinical information in the electronic health record.\par August 17, 2021: The patient will try to take showers that are less hot with water and will sit more of the time.  Routine blood tests were sent today.  We will send the patient for an echocardiogram.  She will return in 3 months, or earlier if needed.  Total time spent on the day of the encounter was  34 minutes which includes  face-to-face and non face-to-face times personally spent by the physician preparing to see the patient, obtaining  separately obtained history, performing a medically appropriate exam and evaluation, counseling, educating, talking to the family or caregivers, ordering medicines, ordering tests or procedures, referring and communicating with other healthcare foods professionals, and documenting clinical information in the electronic health record.\par 9/21/2021: The patient had an echocardiogram today and it showed normal systolic function with minimal aortic regurgitation and mitral regurgitation. She will continue on her current medication and return in approximately 3 months, or earlier if needed. She will stay on her current medication.  Total time spent on the day of the encounter was  31 minutes which includes  face-to-face and non face-to-face times personally spent by the physician preparing to see the patient, obtaining  separately obtained history, performing a medically appropriate exam and evaluation, counseling, educating, talking to the family or caregivers, ordering medicines, ordering tests or procedures, referring and communicating with other healthcare foods professionals, and documenting clinical information in the electronic health record.\par November 16, 2021: High-dose flu shot will be administered today.\par February 22, 2022: The patient was encouraged to walk more.  We will continue her on her current medication.  She will return in 4 months, or earlier if needed.  Total time spent on the day of the encounter was 34  minutes which includes  face-to-face and non face-to-face times personally spent by the physician preparing to see the patient, obtaining  separately obtained history, performing a medically appropriate exam and evaluation, counseling, educating, talking to the family or caregivers, ordering medicines, ordering tests or procedures, referring and communicating with other healthcare  professionals, and documenting clinical information in the electronic health record.\par June 21, 2022: The patient is encouraged to walk more.  We will continue her on her current medication.  She will return in 4 months, or earlier if needed.  Total time spent on the day of the encounter was 33    minutes which includes  face-to-face and non face-to-face times personally spent by the physician preparing to see the patient, obtaining  separately obtained history, performing a medically appropriate exam and evaluation, counseling, educating, talking to the family or caregivers, ordering medicines, ordering tests or procedures, referring and communicating with other healthcare  professionals, and documenting clinical information in the electronic health record.\par

## 2022-06-21 NOTE — HISTORY OF PRESENT ILLNESS
[FreeTextEntry1] : 89 year-old woman with history of hypertension, HCV, hypothyroidism comes today with her daughter for follow-up. 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 In July 2018, patient noted to have right leg swelling. Evaluation by orthopedic surgeon with Doppler revealed DVT and cellulitis. Patient was started on Augmentin and Xarelto. Patient has no pleuritic chest pain. \par December 17, 2019: The patient complains of shortness of breath. The daughter admits that she may have had some salty soups or other foods. She denies any chest pains or palpitations.\par Patient has also been sent to pulmonary who also check overnight saturation studies and have r/o hypoxia as a source of fatigue and SOB. \par June 2, 2020: Patient gets more short of breath.  She gets dyspnea on exertion.  She is extremely inactive.  And maybe salt in her food.  She denies any chest pains or palpitations.  She had never gotten the echocardiogram that was ordered because of the COVID-19 pandemic.\par OA of knees have been chronic and also promotes to patients sedentary lifestyle. She uses a cane on occasion. No recent falls. On NSAIDS PRN for pain.\par \par September 15, 2020: Patient denies any chest pains or palpitations.  She is does get dyspnea on exertion but she is very inactive.  Sometimes she sighs a lot but she does not have air hunger.\par December 15, 2020: Patient gets some shortness of breath and dyspnea on exertion.  She denies any chest pains or palpitations.  She denies any dizziness or fainting.  She does have some caffeinated coffee and some salt.  She did have an argument with her daughter on the way here.\par In February 2018, patient was admitted with presyncope. Her ILR was interrogated and was without corresponding event. She was discharged without changes in her medications.\par \par December 2018 - Patient's chief complaint today is right hip pain. She has been started on Vicodin for it. She is taking it once per day. She also got a cortisone injection last week that provided mild relief.\par Labs drawn earlier today by Dr. Dickson.\par \par March 2019 - Patient has just started CBD/THC for pain relief of chronic arthritis. It is well tolerated so far, but effect may not be seen at this early time.\par \par June 2019 - In May 2019, patient was treated for gout with steroids per rheumatology. Patient admitted to [facility] in May 2019 with syncope. At that time, her furosemide was stopped and her Xarelto was discontinued. She remains on CBD for pain. She has no signs or symptoms of volume overload while off loop diuretic.\par April 13, 2021: The patient has been placed on prednisone and she has gained weight has some pedal edema.  She does report shortness of breath at rest as well as dyspnea on exertion.  Her furosemide was increased from once a week to every other day 4 days ago.  She has been having some salty foods such as soups.  She has a [employer] loop recorder and has not heard anything about reports.\par July 13, 2021: The patient gets occasional episodes of shortness of breath.. She has dyspnea on exertion but has been very inactive. She has cut out most most soups but still has salt in her diet. She denies any chest pains or palpitations.\par August 17, 2021: The patient has had 2 episodes of weakness after taking a shower.  She is taking furosemide 3 times a week.  She does  the shower as well as sitting down for part of it.\par 9/21/2021: The patient has had no further weak spells. The patient's daughter is leaving the shower door open slightly so that it gets less hot. She underwent an echocardiogram today which showed good LV systolic function and only aortic sclerosis with a minimal leak in the aortic and mitral valves. Pulmonary artery pressure was normal\par November 16, 2021:. The patient has no new symptoms.  She does complain of fatigue.  She is very inactive.  She denies any chest pains or palpitations.  Sometimes she gets short of breath.  She wishes to get a flu shot today.\par February 22, 2022: The patient complains of being tired.  She is very inactive.  She denies any chest pains or shortness of breath at rest or palpitations.\par June 21, 2022: The patient denies any chest pains or palpitations.  She is short of breath at rest.  She has become very inactive.  She sleeps a lot during the day but does not sleep well at night.  Her appetite is down.  She does use medical marijuana intermittently for chronic pain.\par PMD/Geriatrics: Beverly Wheeler MD [phone]\par Orthopedist: Alex Hoff MD [phone]\par Plastic Surgeon: Miguel Soto MD [phone]\par Former Cardiologist: Jose Santoyo MD [phone]\par GI: Johnny Dickson MD [phone]

## 2022-06-21 NOTE — PHYSICAL EXAM
[FreeTextEntry1] : evidence of chronic venous stasis [Yellow Sclera (Icteric)] : no scleral icterus was seen [Right Carotid Bruit] : no bruit heard over the right carotid [Left Carotid Bruit] : no bruit heard over the left carotid

## 2022-06-21 NOTE — REASON FOR VISIT
[FreeTextEntry1] : Patient presents today for follow-up. She used to follow-up with Jose Santoyo MD for many years.

## 2022-07-17 ENCOUNTER — RX RENEWAL (OUTPATIENT)
Age: 87
End: 2022-07-17

## 2022-07-18 ENCOUNTER — APPOINTMENT (OUTPATIENT)
Dept: RHEUMATOLOGY | Facility: CLINIC | Age: 87
End: 2022-07-18

## 2022-07-18 ENCOUNTER — RX RENEWAL (OUTPATIENT)
Age: 87
End: 2022-07-18

## 2022-07-20 ENCOUNTER — APPOINTMENT (OUTPATIENT)
Dept: RHEUMATOLOGY | Facility: CLINIC | Age: 87
End: 2022-07-20

## 2022-07-20 VITALS
RESPIRATION RATE: 16 BRPM | SYSTOLIC BLOOD PRESSURE: 124 MMHG | HEIGHT: 64 IN | WEIGHT: 154 LBS | HEART RATE: 86 BPM | OXYGEN SATURATION: 98 % | DIASTOLIC BLOOD PRESSURE: 79 MMHG | BODY MASS INDEX: 26.29 KG/M2 | TEMPERATURE: 96.9 F

## 2022-07-20 PROCEDURE — 20610 DRAIN/INJ JOINT/BURSA W/O US: CPT | Mod: 50

## 2022-07-20 PROCEDURE — 99213 OFFICE O/P EST LOW 20 MIN: CPT | Mod: 25

## 2022-07-20 RX ORDER — LIDOCAINE HYDROCHLORIDE 10 MG/ML
1 INJECTION, SOLUTION INFILTRATION; PERINEURAL
Refills: 0 | Status: COMPLETED | OUTPATIENT
Start: 2022-07-20

## 2022-07-20 RX ORDER — METHYLPRED ACET/NACL,ISO-OS/PF 80 MG/ML
80 VIAL (ML) INJECTION
Qty: 1 | Refills: 0 | Status: COMPLETED | OUTPATIENT
Start: 2022-07-20

## 2022-07-20 RX ADMIN — METHYLPREDNISOLONE ACETATE MG/ML: 80 INJECTION, SUSPENSION INTRA-ARTICULAR; INTRALESIONAL; INTRAMUSCULAR; SOFT TISSUE at 00:00

## 2022-07-20 RX ADMIN — LIDOCAINE HYDROCHLORIDE %: 10 INJECTION, SOLUTION INFILTRATION; PERINEURAL at 00:00

## 2022-07-20 NOTE — DISCHARGE NOTE ADULT - NS TRANSFER PATIENT BELONGINGS
Clothing Brow Lift Text: A midfrontal incision was made medially to the defect to allow access to the tissues just superior to the left eyebrow. Following careful dissection inferiorly in a supraperiosteal plane to the level of the left eyebrow, several 3-0 monocryl sutures were used to resuspend the eyebrow orbicularis oculi muscular unit to the superior frontal bone periosteum. This resulted in an appropriate reapproximation of static eyebrow symmetry and correction of the left brow ptosis.

## 2022-09-01 ENCOUNTER — APPOINTMENT (OUTPATIENT)
Dept: GERIATRICS | Facility: CLINIC | Age: 87
End: 2022-09-01

## 2022-09-01 VITALS
SYSTOLIC BLOOD PRESSURE: 132 MMHG | WEIGHT: 154.25 LBS | BODY MASS INDEX: 26.34 KG/M2 | RESPIRATION RATE: 16 BRPM | HEART RATE: 67 BPM | HEIGHT: 64 IN | OXYGEN SATURATION: 99 % | TEMPERATURE: 97.5 F | DIASTOLIC BLOOD PRESSURE: 80 MMHG

## 2022-09-01 PROCEDURE — 99214 OFFICE O/P EST MOD 30 MIN: CPT

## 2022-09-01 RX ORDER — DULOXETINE HYDROCHLORIDE 20 MG/1
20 CAPSULE, DELAYED RELEASE PELLETS ORAL DAILY
Qty: 30 | Refills: 1 | Status: DISCONTINUED | COMMUNITY
Start: 2021-12-15 | End: 2022-09-01

## 2022-09-01 RX ORDER — ACETAMINOPHEN 500 MG/1
500 TABLET, COATED ORAL EVERY 8 HOURS
Qty: 30 | Refills: 0 | Status: DISCONTINUED | COMMUNITY
Start: 2021-05-26 | End: 2022-09-01

## 2022-09-01 RX ORDER — PREDNISONE 2.5 MG/1
2.5 TABLET ORAL
Qty: 24 | Refills: 0 | Status: DISCONTINUED | COMMUNITY
Start: 2022-06-05 | End: 2022-09-01

## 2022-09-01 NOTE — PHYSICAL EXAM
[Alert] : alert [Normal Outer Ear/Nose] : the ears and nose were normal in appearance [Normal Appearance] : the appearance of the neck was normal [Supple] : the neck was supple [No Respiratory Distress] : no respiratory distress [No Acc Muscle Use] : no accessory muscle use [Respiration, Rhythm And Depth] : normal respiratory rhythm and effort [Auscultation Breath Sounds / Voice Sounds] : lungs were clear to auscultation bilaterally [Heart Rate And Rhythm] : heart rate was normal and rhythm regular [Bowel Sounds] : normal bowel sounds [Abdomen Tenderness] : non-tender [Abdomen Soft] : soft [No Spinal Tenderness] : no spinal tenderness [Normal Color / Pigmentation] : normal skin color and pigmentation [Normal Turgor] : normal skin turgor [Normal Affect] : the affect was normal [Normal Mood] : the mood was normal [Oriented to Person] : oriented to person [Oriented to Place] : oriented to place [Normal Gait] : abnormal gait [Oriented to Time] : disoriented to time [de-identified] : sitting in wheelchair

## 2022-09-01 NOTE — HISTORY OF PRESENT ILLNESS
[No falls in past year] : Patient reported no falls in the past year [Independent] : toileting [Completely Dependent] : Completely dependent. [Moderate] : Stage: Moderate [Worse] : Status: Worse [Memory Lapses Or Loss] : worsened memory impairment [Patient Observed To Be Agitated] : denies agitation [Hostility Toward Caregivers] : denies aggression [Sleep Disturbances] : worsened sleep disturbances [] : denies wandering [Fixed Beliefs Contradicted By Reality (Delusions)] : denies delusions [Difficulty Finding Desired Words] : denies difficulty finding desired words [With Patient/Caregiver] : , with patient/caregiver [Reviewed no changes] : Reviewed, no changes [Designated Healthcare Proxy] : Designated healthcare proxy [Name: ___] : Health Care Proxy's Name: [unfilled]  [Relationship: ___] : Relationship: [unfilled] [DNR] : DNR [DNI] : DNI [Last Verification Date: ___] : Last Verification Date: [unfilled] [FreeTextEntry1] : 89 year old woman w/ PMH moderate dementia, hypothyroidism, HCV, CHF, polyarticular OA, HTN presents for f/u visit.\par \par Rheum: Eleanor\par Cards: Gael Leija\par \par Reports doing ok today. Patient has no concerns. Daughter continues to have some caregiver stress due to dementia. Again counseled on non-pharm measures and disease progression. She has met w/ Opal on prior visits. \par \par Dementia: Moderate. MMSE 15/30 6/2022. Needs help with all IADLs and many ADLs due to functional impairments. Deficits in executive functioning and recall and orientation. Daughter notes frequent repetition, some delusions, no hallucinations.\par \par Patient has continued to decline in the past year. She is less ambulatory. She is increasingly confused and disoriented. No aggression or agitation. Sleep-wake cycle more disturbed. Naps on the couch during the day. Wakes up at night occasionally. Eating less, although weight is stable. Needs more reminders to eat. Sometimes pockets food. She still tries to do word searches and other puzzles. Patient has part time HHA but only 12 hours per week. Lives w/ daughter, Sondra, who is HCP. She states no interest in more HHA hours at this time. \par \par Recent visit w/ rheum for joint pain/swelling. Had b/l knee injection in July, unclear if it is helping. Daughter states only advil really helps the pain; she knows potential SE, gives ~once/day. Tylenol was ineffective. She does have medical marijuana which helps at times.\par \par Hypothyroid: On liothyroinine, TFTs WNL 8/2022\par \par HTN: Controlled. Remains on lisinopril and aldactone. FOllowing w/ cards. Recent Cr and K are ok.\par \par Last labs on file from 8/2022 w/ mild hyponatremia, CBC ok, normal TSH. She is using lasix PRN, not recently.\par \par COVID: Last booster 12/2021. Asked today if she should get another [AdvancecareDate] : 03/22

## 2022-09-01 NOTE — ASSESSMENT
[FreeTextEntry1] : Counseled she SHOULD take new COVID booster when approved and released (last booster 12/2021)\par \par RTC for flu shot in 6 weeks

## 2022-10-01 NOTE — PATIENT PROFILE ADULT - NSPROMUTPARTICIPCAREFT_GEN_A_NUR
VM left with detail msg, text and portal msg send also to call the office to r/s appt.  ACC/ECO, if patient calls back assist with r/s appt 10/21/22.  ty   None

## 2022-10-07 NOTE — PROGRESS NOTE ADULT - PROBLEM SELECTOR PLAN 9
Message sent to pt thru health portal  Med list updated  
Urine Culture with 100k E Coli, started on ceftriaxone.  - C/W ceftriaxone. On d/c can transition to macrobid x 3 more days.
dvt ppx: HSQ  PT/OT - [facility] recommended. However, patients daughter is requesting HHA.  DISPO: Pending improvement in BP

## 2022-10-21 ENCOUNTER — NON-APPOINTMENT (OUTPATIENT)
Age: 87
End: 2022-10-21

## 2022-10-25 ENCOUNTER — NON-APPOINTMENT (OUTPATIENT)
Age: 87
End: 2022-10-25

## 2022-10-25 ENCOUNTER — APPOINTMENT (OUTPATIENT)
Dept: CARDIOLOGY | Facility: CLINIC | Age: 87
End: 2022-10-25

## 2022-10-25 ENCOUNTER — LABORATORY RESULT (OUTPATIENT)
Age: 87
End: 2022-10-25

## 2022-10-25 VITALS
DIASTOLIC BLOOD PRESSURE: 81 MMHG | OXYGEN SATURATION: 99 % | SYSTOLIC BLOOD PRESSURE: 143 MMHG | BODY MASS INDEX: 25.4 KG/M2 | HEART RATE: 62 BPM | WEIGHT: 148 LBS

## 2022-10-25 PROCEDURE — 93000 ELECTROCARDIOGRAM COMPLETE: CPT

## 2022-10-25 PROCEDURE — 99214 OFFICE O/P EST MOD 30 MIN: CPT | Mod: 25

## 2022-10-25 NOTE — REVIEW OF SYSTEMS
[Feeling Fatigued] : feeling fatigued [Dyspnea on exertion] : dyspnea during exertion [Lower Ext Edema] : lower extremity edema [Negative] : Heme/Lymph [SOB] : shortness of breath [Chest Discomfort] : no chest discomfort [Palpitations] : no palpitations

## 2022-10-25 NOTE — DISCUSSION/SUMMARY
[EKG obtained to assist in diagnosis and management of assessed problem(s)] : EKG obtained to assist in diagnosis and management of assessed problem(s) [FreeTextEntry1] : The patient is a very pleasant 89 year-old woman who presents today for follow-up of her poor exercise capacity, shortness of breath with even minimal exertion, and worsening of her right hip pain.  Patient has clear lungs, mild lower extremity edema, and a loud S2 which raises the possibility of pulmonary hypertension, although none was seen on a recent echocardiogram. Suspect symptom of dyspnea on minimal exertion is related to labile blood pressure and overall deconditioning due to inactivity.  Given normal oxygen saturation on room and and unchanged ECG, no evidence of significant PE at this time. \par \par In July 2018, she had a DVT in RLE. She completed 21 days of Xarelto 15mg BID, then 20mg daily for more than a year afterwards. She is now off anticoagulation. ILR without evidence of atrial fibrillation as of 7/23/2019.\par \par  Cortisone shot provided only mild relief. She has been started on Vicodin and stool softener. There is concern for opioid induced constipation. Patient referred for pain management. Case discussed with Gricelda Edwards MD. Patient now started on high CBD, low THC medicinal marijuana therapy. She has been on medication since 3/14/2019. \par \par For hypertension, HFpEF, and LE edema:\par Continue ACEi, spironolactone - periodically monitor metabolic panel for monitor renal function and potassium. Adjust medications as needed. I will add furosemide 20 mg twice a week to see if it improves her shortness of breath and pedal edema. I will try to order an echocardiogram for LV size and function.\par \par  April 13, 2021: The patient will continue her furosemide every other day.  She will stop having salty foods such as soups.  She will report into the electrophysiologist because her loop recorder is at recommended replacement time.  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  No acute changes were seen.  She will be getting her COVID-19 vaccination and she will confer with Dr. Webster the rheumatologist about whether she should adjust her prednisone or not.  I spent a total of 37 minutes on the date of the encounter evaluating and treating this patient.  Follow-up in office in 4 months,  or earlier as needed.\par July 13, 2021: The patient still gets some shortness of breath and dyspnea on exertion. She is cut out the salty soups but still has some salt in her diet. She denies any chest pains or palpitations. Her blood pressure was 131/60, and her pulse was 61. Her lungs revealed rales at the bases. Her heart revealed a regular rhythm. Her EKG shows normal sinus rhythm, a first-degree heart block, a left anterior hemiblock, and a right bundle branch block pattern with nonspecific ST and T wave changes. This was unchanged from previous. We will continue her on her current medications. She will try to cut down on salt. She will return in 3 months, or earlier if needed. Total time spent on the day of the encounter was 35 minutes which includes  face-to-face and non face-to-face times personally spent by the physician preparing to see the patient, obtaining  separately obtained history, performing a medically appropriate exam and evaluation, counseling, educating, talking to the family or caregivers, ordering medicines, ordering tests or procedures, referring and communicating with other healthcare foods professionals, and documenting clinical information in the electronic health record.\par August 17, 2021: The patient will try to take showers that are less hot with water and will sit more of the time.  Routine blood tests were sent today.  We will send the patient for an echocardiogram.  She will return in 3 months, or earlier if needed.  Total time spent on the day of the encounter was  34 minutes which includes  face-to-face and non face-to-face times personally spent by the physician preparing to see the patient, obtaining  separately obtained history, performing a medically appropriate exam and evaluation, counseling, educating, talking to the family or caregivers, ordering medicines, ordering tests or procedures, referring and communicating with other healthcare foods professionals, and documenting clinical information in the electronic health record.\par 9/21/2021: The patient had an echocardiogram today and it showed normal systolic function with minimal aortic regurgitation and mitral regurgitation. She will continue on her current medication and return in approximately 3 months, or earlier if needed. She will stay on her current medication.  Total time spent on the day of the encounter was  31 minutes which includes  face-to-face and non face-to-face times personally spent by the physician preparing to see the patient, obtaining  separately obtained history, performing a medically appropriate exam and evaluation, counseling, educating, talking to the family or caregivers, ordering medicines, ordering tests or procedures, referring and communicating with other healthcare foods professionals, and documenting clinical information in the electronic health record.\par November 16, 2021: High-dose flu shot will be administered today.\par February 22, 2022: The patient was encouraged to walk more.  We will continue her on her current medication.  She will return in 4 months, or earlier if needed.  Total time spent on the day of the encounter was 34  minutes which includes  face-to-face and non face-to-face times personally spent by the physician preparing to see the patient, obtaining  separately obtained history, performing a medically appropriate exam and evaluation, counseling, educating, talking to the family or caregivers, ordering medicines, ordering tests or procedures, referring and communicating with other healthcare  professionals, and documenting clinical information in the electronic health record.\par June 21, 2022: The patient is encouraged to walk more.  We will continue her on her current medication.  She will return in 4 months, or earlier if needed.  Total time spent on the day of the encounter was 33    minutes which includes  face-to-face and non face-to-face times personally spent by the physician preparing to see the patient, obtaining  separately obtained history, performing a medically appropriate exam and evaluation, counseling, educating, talking to the family or caregivers, ordering medicines, ordering tests or procedures, referring and communicating with other healthcare  professionals, and documenting clinical information in the electronic health record.\par October 25, 2022: The patient was examined.  Her blood pressure was 143/81, and her pulse was 62.  Her lungs were clear to auscultation.  Cardiac exam was negative for murmurs rubs or gallops.  Her EKG showed normal sinus rhythm, a first-degree heart block, a left anterior hemiblock, and a right bundle branch block pattern.  Routine blood tests were sent.  She will continue her current medications.  She will return in 4 months, or earlier if needed.  Total time spent on the day of the encounter was 36 minutes which includes  face-to-face and non face-to-face times personally spent by the physician preparing to see the patient, obtaining  separately obtained history, performing a medically appropriate exam and evaluation, counseling, educating, talking to the family or caregivers, ordering medicines, ordering tests or procedures, referring and communicating with other healthcare  professionals, and documenting clinical information in the electronic health record.\par

## 2022-10-25 NOTE — HISTORY OF PRESENT ILLNESS
[FreeTextEntry1] : 89 year-old woman with history of hypertension, HCV, hypothyroidism comes today with her daughter for follow-up. 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 In July 2018, patient noted to have right leg swelling. Evaluation by orthopedic surgeon with Doppler revealed DVT and cellulitis. Patient was started on Augmentin and Xarelto. Patient has no pleuritic chest pain. \par December 17, 2019: The patient complains of shortness of breath. The daughter admits that she may have had some salty soups or other foods. She denies any chest pains or palpitations.\par Patient has also been sent to pulmonary who also check overnight saturation studies and have r/o hypoxia as a source of fatigue and SOB. \par June 2, 2020: Patient gets more short of breath.  She gets dyspnea on exertion.  She is extremely inactive.  And maybe salt in her food.  She denies any chest pains or palpitations.  She had never gotten the echocardiogram that was ordered because of the COVID-19 pandemic.\par OA of knees have been chronic and also promotes to patients sedentary lifestyle. She uses a cane on occasion. No recent falls. On NSAIDS PRN for pain.\par \par September 15, 2020: Patient denies any chest pains or palpitations.  She is does get dyspnea on exertion but she is very inactive.  Sometimes she sighs a lot but she does not have air hunger.\par December 15, 2020: Patient gets some shortness of breath and dyspnea on exertion.  She denies any chest pains or palpitations.  She denies any dizziness or fainting.  She does have some caffeinated coffee and some salt.  She did have an argument with her daughter on the way here.\par In February 2018, patient was admitted with presyncope. Her ILR was interrogated and was without corresponding event. She was discharged without changes in her medications.\par \par December 2018 - Patient's chief complaint today is right hip pain. She has been started on Vicodin for it. She is taking it once per day. She also got a cortisone injection last week that provided mild relief.\par Labs drawn earlier today by Dr. Dickson.\par \par March 2019 - Patient has just started CBD/THC for pain relief of chronic arthritis. It is well tolerated so far, but effect may not be seen at this early time.\par \par June 2019 - In May 2019, patient was treated for gout with steroids per rheumatology. Patient admitted to [facility] in May 2019 with syncope. At that time, her furosemide was stopped and her Xarelto was discontinued. She remains on CBD for pain. She has no signs or symptoms of volume overload while off loop diuretic.\par April 13, 2021: The patient has been placed on prednisone and she has gained weight has some pedal edema.  She does report shortness of breath at rest as well as dyspnea on exertion.  Her furosemide was increased from once a week to every other day 4 days ago.  She has been having some salty foods such as soups.  She has a [employer] loop recorder and has not heard anything about reports.\par July 13, 2021: The patient gets occasional episodes of shortness of breath.. She has dyspnea on exertion but has been very inactive. She has cut out most most soups but still has salt in her diet. She denies any chest pains or palpitations.\par August 17, 2021: The patient has had 2 episodes of weakness after taking a shower.  She is taking furosemide 3 times a week.  She does  the shower as well as sitting down for part of it.\par 9/21/2021: The patient has had no further weak spells. The patient's daughter is leaving the shower door open slightly so that it gets less hot. She underwent an echocardiogram today which showed good LV systolic function and only aortic sclerosis with a minimal leak in the aortic and mitral valves. Pulmonary artery pressure was normal\par November 16, 2021:. The patient has no new symptoms.  She does complain of fatigue.  She is very inactive.  She denies any chest pains or palpitations.  Sometimes she gets short of breath.  She wishes to get a flu shot today.\par February 22, 2022: The patient complains of being tired.  She is very inactive.  She denies any chest pains or shortness of breath at rest or palpitations.\par June 21, 2022: The patient denies any chest pains or palpitations.  She is short of breath at rest.  She has become very inactive.  She sleeps a lot during the day but does not sleep well at night.  Her appetite is down.  She does use medical marijuana intermittently for chronic pain.\par October 25, 2022: The patient's biggest  complaint is fatigue.  She occasionally gets some shortness of breath.  She denies any chest pains or palpitations.\par PMD/Geriatrics: Beverly Wheeler MD [phone]\par Orthopedist: Alex Hoff MD [phone]\par Plastic Surgeon: Miguel Soto MD [phone]\par Former Cardiologist: Jose Santoyo MD [phone]\par GI: Johnny Dickson MD [phone]

## 2022-10-25 NOTE — PHYSICAL EXAM
[General Appearance - Well Developed] : well developed [Normal Appearance] : normal appearance [Well Groomed] : well groomed [General Appearance - Well Nourished] : well nourished [No Deformities] : no deformities [General Appearance - In No Acute Distress] : no acute distress [Normal Conjunctiva] : the conjunctiva exhibited no abnormalities [Eyelids - No Xanthelasma] : the eyelids demonstrated no xanthelasmas [Normal Oral Mucosa] : normal oral mucosa [No Oral Pallor] : no oral pallor [No Oral Cyanosis] : no oral cyanosis [Normal Oropharynx] : normal oropharynx [Normal Jugular Venous V Waves Present] : normal jugular venous V waves present [No Jugular Venous Díaz A Waves] : no jugular venous díaz A waves [] : no respiratory distress [Respiration, Rhythm And Depth] : normal respiratory rhythm and effort [Exaggerated Use Of Accessory Muscles For Inspiration] : no accessory muscle use [Auscultation Breath Sounds / Voice Sounds] : lungs were clear to auscultation bilaterally [Heart Rate And Rhythm] : heart rate and rhythm were normal [Heart Sounds] : normal S1 and S2 [Bowel Sounds] : normal bowel sounds [Abdomen Soft] : soft [Abdomen Tenderness] : non-tender [Nail Clubbing] : no clubbing of the fingernails [Cyanosis, Localized] : no localized cyanosis [Skin Color & Pigmentation] : normal skin color and pigmentation [No Xanthoma] : no  xanthoma was observed [Oriented To Time, Place, And Person] : oriented to person, place, and time [Impaired Insight] : insight and judgment were intact [Affect] : the affect was normal [Mood] : the mood was normal [No Anxiety] : not feeling anxious [Conjunctiva] : the conjunctiva were normal in both eyes [PERRL] : pupils were equal in size, round, and reactive to light [EOM Intact] : extraocular movements were intact [5th Left ICS - MCL] : palpated at the 5th LICS in the midclavicular line [Normal] : normal [No Precordial Heave] : no precordial heave was noted [Normal Rate] : normal [Rhythm Regular] : regular [Normal S1] : normal S1 [Normal S2] : normal S2 [S2 Accentuated] : was accentuated [No Gallop] : no gallop heard [No Murmur] : no murmurs heard [2+] : left 2+ [___ +] : bilateral [unfilled]U+ pretibial pitting edema [FreeTextEntry1] : evidence of chronic venous stasis [Yellow Sclera (Icteric)] : no scleral icterus was seen [Right Carotid Bruit] : no bruit heard over the right carotid [Left Carotid Bruit] : no bruit heard over the left carotid

## 2022-10-26 ENCOUNTER — APPOINTMENT (OUTPATIENT)
Dept: RHEUMATOLOGY | Facility: CLINIC | Age: 87
End: 2022-10-26

## 2022-10-26 VITALS
TEMPERATURE: 97.8 F | OXYGEN SATURATION: 98 % | WEIGHT: 146 LBS | SYSTOLIC BLOOD PRESSURE: 126 MMHG | HEART RATE: 69 BPM | DIASTOLIC BLOOD PRESSURE: 79 MMHG | RESPIRATION RATE: 16 BRPM | BODY MASS INDEX: 24.92 KG/M2 | HEIGHT: 64 IN

## 2022-10-26 DIAGNOSIS — M25.561 PAIN IN RIGHT KNEE: ICD-10-CM

## 2022-10-26 DIAGNOSIS — M25.562 PAIN IN LEFT KNEE: ICD-10-CM

## 2022-10-26 LAB
ALBUMIN SERPL ELPH-MCNC: 4.5 G/DL
ALP BLD-CCNC: 99 U/L
ALT SERPL-CCNC: 21 U/L
ANION GAP SERPL CALC-SCNC: 12 MMOL/L
AST SERPL-CCNC: 41 U/L
BASOPHILS # BLD AUTO: 0.04 K/UL
BASOPHILS NFR BLD AUTO: 0.7 %
BILIRUB SERPL-MCNC: 0.3 MG/DL
BUN SERPL-MCNC: 24 MG/DL
CALCIUM SERPL-MCNC: 10.3 MG/DL
CHLORIDE SERPL-SCNC: 96 MMOL/L
CHOLEST SERPL-MCNC: 219 MG/DL
CO2 SERPL-SCNC: 22 MMOL/L
CREAT SERPL-MCNC: 0.95 MG/DL
EGFR: 54 ML/MIN/1.73M2
EOSINOPHIL # BLD AUTO: 0.15 K/UL
EOSINOPHIL NFR BLD AUTO: 2.7 %
ESTIMATED AVERAGE GLUCOSE: 111 MG/DL
GLUCOSE SERPL-MCNC: 91 MG/DL
HBA1C MFR BLD HPLC: 5.5 %
HCT VFR BLD CALC: 39.7 %
HDLC SERPL-MCNC: 80 MG/DL
HGB BLD-MCNC: 12.8 G/DL
IMM GRANULOCYTES NFR BLD AUTO: 0.4 %
LDLC SERPL CALC-MCNC: 117 MG/DL
LYMPHOCYTES # BLD AUTO: 1.45 K/UL
LYMPHOCYTES NFR BLD AUTO: 25.8 %
MAN DIFF?: NORMAL
MCHC RBC-ENTMCNC: 29.9 PG
MCHC RBC-ENTMCNC: 32.2 GM/DL
MCV RBC AUTO: 92.8 FL
MONOCYTES # BLD AUTO: 0.82 K/UL
MONOCYTES NFR BLD AUTO: 14.6 %
NEUTROPHILS # BLD AUTO: 3.14 K/UL
NEUTROPHILS NFR BLD AUTO: 55.8 %
NONHDLC SERPL-MCNC: 138 MG/DL
PLATELET # BLD AUTO: 226 K/UL
POTASSIUM SERPL-SCNC: 4.8 MMOL/L
PROT SERPL-MCNC: 7.2 G/DL
RBC # BLD: 4.28 M/UL
RBC # FLD: 15.7 %
SODIUM SERPL-SCNC: 131 MMOL/L
T3RU NFR SERPL: 1.2 TBI
T4 SERPL-MCNC: 9.5 UG/DL
TRIGL SERPL-MCNC: 108 MG/DL
TSH SERPL-ACNC: 1.27 UIU/ML
WBC # FLD AUTO: 5.62 K/UL

## 2022-10-26 PROCEDURE — 20610 DRAIN/INJ JOINT/BURSA W/O US: CPT | Mod: 50

## 2022-10-26 RX ORDER — LIDOCAINE HYDROCHLORIDE 10 MG/ML
1 INJECTION, SOLUTION INFILTRATION; PERINEURAL
Refills: 0 | Status: COMPLETED | OUTPATIENT
Start: 2022-10-26

## 2022-10-26 RX ORDER — METHYLPRED ACET/NACL,ISO-OS/PF 80 MG/ML
80 VIAL (ML) INJECTION
Qty: 1 | Refills: 0 | Status: COMPLETED | OUTPATIENT
Start: 2022-10-26

## 2022-10-26 RX ADMIN — METHYLPREDNISOLONE ACETATE 0.5 MG/ML: 80 INJECTION, SUSPENSION INTRA-ARTICULAR; INTRALESIONAL; INTRAMUSCULAR; SOFT TISSUE at 00:00

## 2022-10-26 RX ADMIN — LIDOCAINE HYDROCHLORIDE %: 10 INJECTION, SOLUTION INFILTRATION; PERINEURAL at 00:00

## 2022-11-04 ENCOUNTER — APPOINTMENT (OUTPATIENT)
Dept: GERIATRICS | Facility: CLINIC | Age: 87
End: 2022-11-04

## 2022-11-04 VITALS
HEART RATE: 63 BPM | BODY MASS INDEX: 25.44 KG/M2 | HEIGHT: 64 IN | DIASTOLIC BLOOD PRESSURE: 76 MMHG | SYSTOLIC BLOOD PRESSURE: 140 MMHG | TEMPERATURE: 97.2 F | OXYGEN SATURATION: 97 % | WEIGHT: 149 LBS | RESPIRATION RATE: 16 BRPM

## 2022-11-04 DIAGNOSIS — Z23 ENCOUNTER FOR IMMUNIZATION: ICD-10-CM

## 2022-11-04 PROCEDURE — G0008: CPT

## 2022-11-04 PROCEDURE — 99214 OFFICE O/P EST MOD 30 MIN: CPT

## 2022-11-04 PROCEDURE — 90662 IIV NO PRSV INCREASED AG IM: CPT

## 2022-11-04 NOTE — PHYSICAL EXAM
[Alert] : alert [Normal Outer Ear/Nose] : the ears and nose were normal in appearance [Normal Appearance] : the appearance of the neck was normal [Supple] : the neck was supple [No Respiratory Distress] : no respiratory distress [No Acc Muscle Use] : no accessory muscle use [Respiration, Rhythm And Depth] : normal respiratory rhythm and effort [Auscultation Breath Sounds / Voice Sounds] : lungs were clear to auscultation bilaterally [Heart Rate And Rhythm] : heart rate was normal and rhythm regular [Bowel Sounds] : normal bowel sounds [Abdomen Tenderness] : non-tender [Abdomen Soft] : soft [No Spinal Tenderness] : no spinal tenderness [Normal Color / Pigmentation] : normal skin color and pigmentation [Normal Turgor] : normal skin turgor [Normal Affect] : the affect was normal [Normal Mood] : the mood was normal [Oriented to Person] : oriented to person [Oriented to Place] : oriented to place [Normal Gait] : abnormal gait [Oriented to Time] : disoriented to time [de-identified] : sitting in wheelchair

## 2022-11-04 NOTE — HISTORY OF PRESENT ILLNESS
[No falls in past year] : Patient reported no falls in the past year [Independent] : toileting [] : Patient is incontinent. [Completely Dependent] : Completely dependent. [With Patient/Caregiver] : , with patient/caregiver [Reviewed no changes] : Reviewed, no changes [Designated Healthcare Proxy] : Designated healthcare proxy [Name: ___] : Health Care Proxy's Name: [unfilled]  [Relationship: ___] : Relationship: [unfilled] [DNR] : DNR [DNI] : DNI [Last Verification Date: ___] : Last Verification Date: [unfilled] [FreeTextEntry1] : 89 year old woman w/ PMH moderate dementia, hypothyroidism, HCV, CHF, polyarticular OA, HTN presents for f/u visit.\par \par Rheum: Eleanor\par Cards: Gael Leija\par \par Had phone call with daughter/HCP, Sondra, a few weeks ago. Has had continued delusions, sundowning. No agitation. Behaviors a bit better last few weeks. States patient not bothered by them. Is restless at times.Daughter used to enjoy watching TV with her in the evenings but notes that she "can't sit still anymore." Did  this is natural progression of disease w/ difficulty w/ concentration. \par \par Dementia: Moderate. MMSE 15/30 6/2022. Needs help with all IADLs and many ADLs due to functional impairments. Deficits in executive functioning and recall and orientation. Daughter notes frequent repetition, some delusions, no hallucinations.\par \par Patient has continued to decline in the past year. She is less ambulatory. She is increasingly confused and disoriented. No aggression or agitation. Sleep-wake cycle more disturbed. Naps on the couch during the day. Wakes up at night occasionally. Eating less, although weight is stable. Needs more reminders to eat. Sometimes pockets food. She still tries word searches and other puzzles. Patient has part time HHA but only 12 hours per week. Lives w/ daughter, Sondra, who is HCP. She states no interest in more HHA hours at this time. \par \par OA: Saw rheum 10/2022, had knee injection, which daughter thinks helps.\par \par Hypothyroid: On liothyroinine, TFTs WNL 10/2022\par \par HTN: Controlled. Remains on lisinopril and aldactone. FOllowing w/ cards. Recent Cr and K are ok from 10/2022 (unchanged)\par \par Last labs on file from 10/2022 w/ mild hyponatremia, CBC ok, normal TSH. She is using lasix PRN, not recently.\par \par COVID: Last booster 12/2021. Pending further COVID booster next month.  [AdvancecareDate] : 03/22

## 2022-11-04 NOTE — ASSESSMENT
[FreeTextEntry1] : Flu shot today\par \par Daughter will take her for COVID booster next month\par \par RTC 2-3 months\par \par Support provided again today to the daughter

## 2022-12-16 ENCOUNTER — RX RENEWAL (OUTPATIENT)
Age: 87
End: 2022-12-16

## 2023-01-04 ENCOUNTER — APPOINTMENT (OUTPATIENT)
Dept: CARDIOLOGY | Facility: CLINIC | Age: 88
End: 2023-01-04
Payer: MEDICARE

## 2023-01-04 ENCOUNTER — NON-APPOINTMENT (OUTPATIENT)
Age: 88
End: 2023-01-04

## 2023-01-04 VITALS
OXYGEN SATURATION: 97 % | HEART RATE: 71 BPM | DIASTOLIC BLOOD PRESSURE: 88 MMHG | WEIGHT: 147 LBS | SYSTOLIC BLOOD PRESSURE: 173 MMHG | BODY MASS INDEX: 25.23 KG/M2

## 2023-01-04 VITALS — SYSTOLIC BLOOD PRESSURE: 147 MMHG | DIASTOLIC BLOOD PRESSURE: 85 MMHG

## 2023-01-04 PROCEDURE — 99214 OFFICE O/P EST MOD 30 MIN: CPT | Mod: 25

## 2023-01-04 PROCEDURE — 93000 ELECTROCARDIOGRAM COMPLETE: CPT

## 2023-01-04 NOTE — HISTORY OF PRESENT ILLNESS
[FreeTextEntry1] : 89 year-old woman with history of hypertension, HCV, hypothyroidism comes today with her daughter for follow-up. 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 In July 2018, patient noted to have right leg swelling. Evaluation by orthopedic surgeon with Doppler revealed DVT and cellulitis. Patient was started on Augmentin and Xarelto. Patient has no pleuritic chest pain. \par December 17, 2019: The patient complains of shortness of breath. The daughter admits that she may have had some salty soups or other foods. She denies any chest pains or palpitations.\par Patient has also been sent to pulmonary who also check overnight saturation studies and have r/o hypoxia as a source of fatigue and SOB. \par June 2, 2020: Patient gets more short of breath.  She gets dyspnea on exertion.  She is extremely inactive.  And maybe salt in her food.  She denies any chest pains or palpitations.  She had never gotten the echocardiogram that was ordered because of the COVID-19 pandemic.\par OA of knees have been chronic and also promotes to patients sedentary lifestyle. She uses a cane on occasion. No recent falls. On NSAIDS PRN for pain.\par \par September 15, 2020: Patient denies any chest pains or palpitations.  She is does get dyspnea on exertion but she is very inactive.  Sometimes she sighs a lot but she does not have air hunger.\par December 15, 2020: Patient gets some shortness of breath and dyspnea on exertion.  She denies any chest pains or palpitations.  She denies any dizziness or fainting.  She does have some caffeinated coffee and some salt.  She did have an argument with her daughter on the way here.\par In February 2018, patient was admitted with presyncope. Her ILR was interrogated and was without corresponding event. She was discharged without changes in her medications.\par \par December 2018 - Patient's chief complaint today is right hip pain. She has been started on Vicodin for it. She is taking it once per day. She also got a cortisone injection last week that provided mild relief.\par Labs drawn earlier today by Dr. Dickson.\par \par March 2019 - Patient has just started CBD/THC for pain relief of chronic arthritis. It is well tolerated so far, but effect may not be seen at this early time.\par \par June 2019 - In May 2019, patient was treated for gout with steroids per rheumatology. Patient admitted to [facility] in May 2019 with syncope. At that time, her furosemide was stopped and her Xarelto was discontinued. She remains on CBD for pain. She has no signs or symptoms of volume overload while off loop diuretic.\par April 13, 2021: The patient has been placed on prednisone and she has gained weight has some pedal edema.  She does report shortness of breath at rest as well as dyspnea on exertion.  Her furosemide was increased from once a week to every other day 4 days ago.  She has been having some salty foods such as soups.  She has a [employer] loop recorder and has not heard anything about reports.\par July 13, 2021: The patient gets occasional episodes of shortness of breath.. She has dyspnea on exertion but has been very inactive. She has cut out most most soups but still has salt in her diet. She denies any chest pains or palpitations.\par August 17, 2021: The patient has had 2 episodes of weakness after taking a shower.  She is taking furosemide 3 times a week.  She does  the shower as well as sitting down for part of it.\par 9/21/2021: The patient has had no further weak spells. The patient's daughter is leaving the shower door open slightly so that it gets less hot. She underwent an echocardiogram today which showed good LV systolic function and only aortic sclerosis with a minimal leak in the aortic and mitral valves. Pulmonary artery pressure was normal\par November 16, 2021:. The patient has no new symptoms.  She does complain of fatigue.  She is very inactive.  She denies any chest pains or palpitations.  Sometimes she gets short of breath.  She wishes to get a flu shot today.\par February 22, 2022: The patient complains of being tired.  She is very inactive.  She denies any chest pains or shortness of breath at rest or palpitations.\par June 21, 2022: The patient denies any chest pains or palpitations.  She is short of breath at rest.  She has become very inactive.  She sleeps a lot during the day but does not sleep well at night.  Her appetite is down.  She does use medical marijuana intermittently for chronic pain.\par October 25, 2022: The patient's biggest  complaint is fatigue.  She occasionally gets some shortness of breath.  She denies any chest pains or palpitations.\par January 4, 2023: Patient's daughter brought her in because her legs were more swollen and she got up a couple pounds in weight.  Then the daughter increase the Lasix to 3 times a week from twice a week and the weight seemed to improve.  Legs are still swollen and they use any support stockings.  The patient does not walk and sits all day with the legs hanging down.\par PMD/Geriatrics: Beverly Wheeler MD [phone]\par Orthopedist: Alex Hoff MD [phone]\par Plastic Surgeon: Miguel Soto MD [phone]\par Former Cardiologist: Jose Santoyo MD [phone]\par GI: Johnny Dickson MD [phone]

## 2023-01-04 NOTE — REASON FOR VISIT
[Follow-Up - Clinic] : a clinic follow-up of [Abnormal ECG] : an abnormal ECG [Anticoagulation] : anticoagulation [A-V Block] : A-V block [Syncope] : syncope [Other: _____] : [unfilled]

## 2023-01-04 NOTE — DISCUSSION/SUMMARY
[EKG obtained to assist in diagnosis and management of assessed problem(s)] : EKG obtained to assist in diagnosis and management of assessed problem(s) [FreeTextEntry1] : The patient is a very pleasant 89 year-old woman who presents today for follow-up of her poor exercise capacity, shortness of breath with even minimal exertion, and worsening of her right hip pain.  Patient has clear lungs, mild lower extremity edema, and a loud S2 which raises the possibility of pulmonary hypertension, although none was seen on a recent echocardiogram. Suspect symptom of dyspnea on minimal exertion is related to labile blood pressure and overall deconditioning due to inactivity.  Given normal oxygen saturation on room and and unchanged ECG, no evidence of significant PE at this time. \par \par In July 2018, she had a DVT in RLE. She completed 21 days of Xarelto 15mg BID, then 20mg daily for more than a year afterwards. She is now off anticoagulation. ILR without evidence of atrial fibrillation as of 7/23/2019.\par \par  Cortisone shot provided only mild relief. She has been started on Vicodin and stool softener. There is concern for opioid induced constipation. Patient referred for pain management. Case discussed with Gricelda Edwards MD. Patient now started on high CBD, low THC medicinal marijuana therapy. She has been on medication since 3/14/2019. \par \par For hypertension, HFpEF, and LE edema:\par Continue ACEi, spironolactone - periodically monitor metabolic panel for monitor renal function and potassium. Adjust medications as needed. I will add furosemide 20 mg twice a week to see if it improves her shortness of breath and pedal edema. I will try to order an echocardiogram for LV size and function.\par \par  April 13, 2021: The patient will continue her furosemide every other day.  She will stop having salty foods such as soups.  She will report into the electrophysiologist because her loop recorder is at recommended replacement time.  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  No acute changes were seen.  She will be getting her COVID-19 vaccination and she will confer with Dr. Webster the rheumatologist about whether she should adjust her prednisone or not.  I spent a total of 37 minutes on the date of the encounter evaluating and treating this patient.  Follow-up in office in 4 months,  or earlier as needed.\par July 13, 2021: The patient still gets some shortness of breath and dyspnea on exertion. She is cut out the salty soups but still has some salt in her diet. She denies any chest pains or palpitations. Her blood pressure was 131/60, and her pulse was 61. Her lungs revealed rales at the bases. Her heart revealed a regular rhythm. Her EKG shows normal sinus rhythm, a first-degree heart block, a left anterior hemiblock, and a right bundle branch block pattern with nonspecific ST and T wave changes. This was unchanged from previous. We will continue her on her current medications. She will try to cut down on salt. She will return in 3 months, or earlier if needed. Total time spent on the day of the encounter was 35 minutes which includes  face-to-face and non face-to-face times personally spent by the physician preparing to see the patient, obtaining  separately obtained history, performing a medically appropriate exam and evaluation, counseling, educating, talking to the family or caregivers, ordering medicines, ordering tests or procedures, referring and communicating with other healthcare foods professionals, and documenting clinical information in the electronic health record.\par August 17, 2021: The patient will try to take showers that are less hot with water and will sit more of the time.  Routine blood tests were sent today.  We will send the patient for an echocardiogram.  She will return in 3 months, or earlier if needed.  Total time spent on the day of the encounter was  34 minutes which includes  face-to-face and non face-to-face times personally spent by the physician preparing to see the patient, obtaining  separately obtained history, performing a medically appropriate exam and evaluation, counseling, educating, talking to the family or caregivers, ordering medicines, ordering tests or procedures, referring and communicating with other healthcare foods professionals, and documenting clinical information in the electronic health record.\par 9/21/2021: The patient had an echocardiogram today and it showed normal systolic function with minimal aortic regurgitation and mitral regurgitation. She will continue on her current medication and return in approximately 3 months, or earlier if needed. She will stay on her current medication.  Total time spent on the day of the encounter was  31 minutes which includes  face-to-face and non face-to-face times personally spent by the physician preparing to see the patient, obtaining  separately obtained history, performing a medically appropriate exam and evaluation, counseling, educating, talking to the family or caregivers, ordering medicines, ordering tests or procedures, referring and communicating with other healthcare foods professionals, and documenting clinical information in the electronic health record.\par November 16, 2021: High-dose flu shot will be administered today.\par February 22, 2022: The patient was encouraged to walk more.  We will continue her on her current medication.  She will return in 4 months, or earlier if needed.  Total time spent on the day of the encounter was 34  minutes which includes  face-to-face and non face-to-face times personally spent by the physician preparing to see the patient, obtaining  separately obtained history, performing a medically appropriate exam and evaluation, counseling, educating, talking to the family or caregivers, ordering medicines, ordering tests or procedures, referring and communicating with other healthcare  professionals, and documenting clinical information in the electronic health record.\par June 21, 2022: The patient is encouraged to walk more.  We will continue her on her current medication.  She will return in 4 months, or earlier if needed.  Total time spent on the day of the encounter was 33    minutes which includes  face-to-face and non face-to-face times personally spent by the physician preparing to see the patient, obtaining  separately obtained history, performing a medically appropriate exam and evaluation, counseling, educating, talking to the family or caregivers, ordering medicines, ordering tests or procedures, referring and communicating with other healthcare  professionals, and documenting clinical information in the electronic health record.\par October 25, 2022: The patient was examined.  Her blood pressure was 143/81, and her pulse was 62.  Her lungs were clear to auscultation.  Cardiac exam was negative for murmurs rubs or gallops.  Her EKG showed normal sinus rhythm, a first-degree heart block, a left anterior hemiblock, and a right bundle branch block pattern.  Routine blood tests were sent.  She will continue her current medications.  She will return in 4 months, or earlier if needed.  Total time spent on the day of the encounter was 36 minutes which includes  face-to-face and non face-to-face times personally spent by the physician preparing to see the patient, obtaining  separately obtained history, performing a medically appropriate exam and evaluation, counseling, educating, talking to the family or caregivers, ordering medicines, ordering tests or procedures, referring and communicating with other healthcare  professionals, and documenting clinical information in the electronic health record.\par January 4, 2023: The patient's blood pressure initially was 173/88 but came down to 147/85 when sitting for several minutes.  Her pulse was 71.  Her lungs were clear to auscultation.  Cardiac exam was negative for murmurs rubs or gallops.  Both lower extremities showed 2+ edema.  The patient's EKG showed normal sinus rhythm, a first-degree heart block, a left anterior hemiblock, and a right bundle branch block pattern.  We will increase her Lasix to 20 mg 3 times a week.  She will try to elevate her legs.  She will return in 3 months, or earlier if needed.  Total time spent on the day of the encounter was 34     minutes which includes  face-to-face and non face-to-face times personally spent by the physician preparing to see the patient, obtaining  separately obtained history, performing a medically appropriate exam and evaluation, counseling, educating, talking to the family or caregivers, ordering medicines, ordering tests or procedures, referring and communicating with other healthcare  professionals, and documenting clinical information in the electronic health record.\par

## 2023-01-04 NOTE — REVIEW OF SYSTEMS
[Feeling Fatigued] : feeling fatigued [SOB] : shortness of breath [Dyspnea on exertion] : dyspnea during exertion [Lower Ext Edema] : lower extremity edema [Negative] : Heme/Lymph [Chest Discomfort] : no chest discomfort [Palpitations] : no palpitations

## 2023-01-06 ENCOUNTER — RX RENEWAL (OUTPATIENT)
Age: 88
End: 2023-01-06

## 2023-01-10 ENCOUNTER — RX RENEWAL (OUTPATIENT)
Age: 88
End: 2023-01-10

## 2023-01-27 ENCOUNTER — APPOINTMENT (OUTPATIENT)
Dept: RHEUMATOLOGY | Facility: CLINIC | Age: 88
End: 2023-01-27
Payer: MEDICARE

## 2023-01-27 VITALS
OXYGEN SATURATION: 95 % | DIASTOLIC BLOOD PRESSURE: 80 MMHG | TEMPERATURE: 97.1 F | SYSTOLIC BLOOD PRESSURE: 148 MMHG | BODY MASS INDEX: 25.1 KG/M2 | HEIGHT: 64 IN | HEART RATE: 69 BPM | WEIGHT: 147 LBS

## 2023-01-27 PROCEDURE — 20610 DRAIN/INJ JOINT/BURSA W/O US: CPT

## 2023-01-27 PROCEDURE — 99213 OFFICE O/P EST LOW 20 MIN: CPT | Mod: 25

## 2023-01-27 RX ORDER — METHYLPRED ACET/NACL,ISO-OS/PF 40 MG/ML
40 VIAL (ML) INJECTION
Qty: 1 | Refills: 0 | Status: COMPLETED | OUTPATIENT
Start: 2023-01-27

## 2023-01-27 RX ORDER — LIDOCAINE HYDROCHLORIDE 10 MG/ML
1 INJECTION, SOLUTION INFILTRATION; PERINEURAL
Refills: 0 | Status: COMPLETED | OUTPATIENT
Start: 2023-01-27

## 2023-01-27 RX ADMIN — METHYLPREDNISOLONE ACETATE 1 MG/ML: 40 INJECTION, SUSPENSION INTRA-ARTICULAR; INTRALESIONAL; INTRAMUSCULAR; SOFT TISSUE at 00:00

## 2023-01-27 RX ADMIN — LIDOCAINE HYDROCHLORIDE %: 10 INJECTION, SOLUTION INFILTRATION; PERINEURAL at 00:00

## 2023-01-27 NOTE — ASSESSMENT
[FreeTextEntry1] : OA \par inject bilateral knees today \par \par \par More than 50% of the encounter was spent counselling  JAQUAN KAM and her daughter on differential, workup, disease course, and treatment/management.   Education was provided to JAQUAN KAM and her daughterduring this encounter. All questions and concerns were answered and addressed in detail.  JAQUAN KAM and her daughter verbalized understanding and agreed to plan\par \par JAQUAN KAM and her daughter has been instructed to call for an earlier appointment if new symptoms develop \par JAQUAN KAM and her daughter has been instructed to make a follow up appointment 3 months \par \par \par

## 2023-01-27 NOTE — PHYSICAL EXAM
[General Appearance - Alert] : alert [General Appearance - In No Acute Distress] : in no acute distress [Sclera] : the sclera and conjunctiva were normal [FreeTextEntry1] : b/l swollen knees without warmth or redness

## 2023-01-27 NOTE — PROCEDURE
[Today's Date:] : Date: [unfilled] [Arthrocentesis] : arthrocentesis was performed [Patient] : the patient [Guardian] : the guardian [Risks] : risks [Benefits] : benefits [Alternatives] : alternatives [Family Member] : Prior to the start of the procedure a time out was taken and the identity of the patient was confirmed via name and date of birth with the patient's family member. The correct site and the procedure to be performed were confirmed. The correct side was confirmed if applicable. The availability of the correct equipment was verified [Therapeutic] : therapeutic [#1 Site: ______] : #1 site identified in the [unfilled] [1%] : 1%  [#2 Site: ___] : # 2 site identified in the [unfilled] [Ethyl Chloride] : ethyl chloride [___ ml Inj] : [unfilled] ~Uml [Without Epi] : without epinephrine [Betadine] : with betadine solution [22 gauge 1.5 inch] : A 22 gauge 1.5 inch needle was used [___ml of fluid] : [unfilled] ml of fluid was aspirated [___ml 1% Lidocaine] : [unfilled] ml of 1% lidocaine [Depomedrol ___ mg] : Depomedrol [unfilled] mg [Tolerated Well] : the patient tolerated the procedure well [No Complications] : there were no complications [Instructions Given] : handouts/patient instructions were given to patient [Patient Instructed to Call] : patient was instructed to call if redness at site, a decrease in range of motion or an increase in pain is noted after procedure. [de-identified] : class I

## 2023-01-27 NOTE — DATA REVIEWED
[FreeTextEntry1] : Laboratory and radiology studies that were personally reviewed at today's visit are summarized in above and below:\par \par PROCEDURE DATE: 05/06/2019 \par INTERPRETATION: CLINICAL INDICATION: right knee pain \par EXAM: \par Frontal and lateral right knee from 5/6/2019 at 1604. Compared to prior study from 12/26/2018. \par IMPRESSION: \par Moderate size knee joint effusion. \par No fractures or dislocations. \par Redemonstrated moderately advanced tricompartment osteoarthritis with lateral tibiofemoral compartment\par predominance. \par Unremarkable quadriceps and patellar tendon shadows. \par Generalized osteopenia again noted otherwise no developed discrete lytic or blastic lesions. \par Vascular calculations again noted.

## 2023-02-28 ENCOUNTER — APPOINTMENT (OUTPATIENT)
Dept: CARDIOLOGY | Facility: CLINIC | Age: 88
End: 2023-02-28

## 2023-03-10 ENCOUNTER — APPOINTMENT (OUTPATIENT)
Dept: GERIATRICS | Facility: CLINIC | Age: 88
End: 2023-03-10
Payer: MEDICARE

## 2023-03-10 VITALS
OXYGEN SATURATION: 97 % | BODY MASS INDEX: 26.38 KG/M2 | SYSTOLIC BLOOD PRESSURE: 156 MMHG | WEIGHT: 154.5 LBS | HEIGHT: 64 IN | HEART RATE: 64 BPM | DIASTOLIC BLOOD PRESSURE: 83 MMHG | TEMPERATURE: 97.4 F | RESPIRATION RATE: 16 BRPM

## 2023-03-10 PROCEDURE — 99214 OFFICE O/P EST MOD 30 MIN: CPT | Mod: GC

## 2023-03-10 NOTE — HISTORY OF PRESENT ILLNESS
[No falls in past year] : Patient reported no falls in the past year [Independent] : toileting [] : Patient is incontinent. [Completely Dependent] : Completely dependent. [With Patient/Caregiver] : , with patient/caregiver [Reviewed no changes] : Reviewed, no changes [Designated Healthcare Proxy] : Designated healthcare proxy [Name: ___] : Health Care Proxy's Name: [unfilled]  [Relationship: ___] : Relationship: [unfilled] [DNR] : DNR [DNI] : DNI [Last Verification Date: ___] : Last Verification Date: [unfilled] [FreeTextEntry1] : 89 yo F  PMH moderate dementia, hypothyroidism, HCV, CHF, polyarticular OA, HTN presents for f/u visit.\par \par Rheum: Eleanor\par Cards: Gael Leija\par \par notes that shes very slow walking and more fatigue . pt has gained weight. saw cardiology 1/2023 and was rec to increase lasix to TIW but did not do it. Some GARRIDO but comfortable at rest. \par \par Reports doing ok today. Patient has no concerns. Daughter continues to have some caregiver stress due to dementia. Again counseled on non-pharm measures and disease progression. She has met w/ Opal on prior visits. \par \par Dementia: Moderate. MMSE 15/30 6/2022. Needs help with all IADLs and many ADLs due to functional impairments. Deficits in executive functioning and recall and orientation. Daughter notes frequent repetition, some delusions, no hallucinations. mostly when she first wakes up. She states that with tylenol at night she has been sundowning less.  \par \par Patient has continued to decline in the past year. She is less ambulatory. She is increasingly confused and disoriented. No aggression or agitation. Sleep-wake cycle more disturbed. Naps on the couch during the day. Wakes up at night occasionally. appetite is stable. Sometimes pockets food. She still tries to do word searches and other puzzles. Patient has part time HHA but only 12 hours per week. Lives w/ daughter, Sondra, who is HCP. She states no interest in more HHA hours at this time. \par \par Recent visit w/ rheum for joint pain/swelling. Had b/l knee injection in July, unclear if it is helping. Daughter states only advil really helps the pain; she knows potential SE, gives ~once/day. Tylenol arthritis works in pm. She does have medical marijuana- has not been using \par \par Hypothyroid: On liothyroinine, TFTs WNL 10/2022\par \par HTN: Controlled. Remains on lisinopril and aldactone. Following w/ cards. scheduled for next month \par \par Last labs on file from 10/2022 w/ mild hyponatremia, CBC ok, normal TSH. She is using lasix twice a week and spironolactone daily \par \par unsure about constipation  or about BM\par \par is participating with PT. \par \par  [AdvancecareDate] : 03/22

## 2023-03-10 NOTE — PHYSICAL EXAM
[Alert] : alert [Well Nourished] : well nourished [No Acute Distress] : in no acute distress [Well Developed] : well developed [No Respiratory Distress] : no respiratory distress [No Acc Muscle Use] : no accessory muscle use [Respiration, Rhythm And Depth] : normal respiratory rhythm and effort [Auscultation Breath Sounds / Voice Sounds] : lungs were clear to auscultation bilaterally [Normal S1, S2] : normal S1 and S2 [Heart Rate And Rhythm] : heart rate was normal and rhythm regular [Bowel Sounds] : normal bowel sounds [Abdomen Tenderness] : non-tender [Abdomen Soft] : soft [No Focal Deficits] : no focal deficits [de-identified] : + systolic murmur  [de-identified] : b/l 2+ pitting edema R> L

## 2023-03-10 NOTE — REVIEW OF SYSTEMS
[Feeling Tired] : feeling tired [SOB on Exertion] : shortness of breath during exertion [As Noted in HPI] : as noted in HPI [Chest Pain] : no chest pain

## 2023-03-10 NOTE — END OF VISIT
[] : Fellow [Time Spent: ___ minutes] : I have spent [unfilled] minutes of time on the encounter. [FreeTextEntry3] : 89 year old woman w/ PMH as above presents for f/u visit. Has had progressive LE edema. Did not increase lasix as per cardiology recs from last visit. Her weight is up nearly 7 lbs since last visit in our office. She has clear lungs but notably has been more fatigued and reportedly w/ some SOB w/ ambulation. \par \par We will increase lasix to TIW. Daughter and patient agreeable. She does have h/o mild hyponatremia on labs dating back 5 years. Will check home draw BMP next week to ensure not worsening. \par \par She otherwise has been stable w/o significant agitation. Daughter still hesitant to increase HHA hours.\par \par She continues to f/u cards and rheum and gets regular knee steroid injections, which provide some relief. Tylenol arthritis has also worked. \par \par Patient will RTC in ~3-4 months.

## 2023-03-13 ENCOUNTER — LABORATORY RESULT (OUTPATIENT)
Age: 88
End: 2023-03-13

## 2023-03-14 ENCOUNTER — LABORATORY RESULT (OUTPATIENT)
Age: 88
End: 2023-03-14

## 2023-04-04 ENCOUNTER — APPOINTMENT (OUTPATIENT)
Dept: CARDIOLOGY | Facility: CLINIC | Age: 88
End: 2023-04-04
Payer: MEDICARE

## 2023-04-04 ENCOUNTER — NON-APPOINTMENT (OUTPATIENT)
Age: 88
End: 2023-04-04

## 2023-04-04 VITALS
HEIGHT: 64 IN | BODY MASS INDEX: 25.27 KG/M2 | DIASTOLIC BLOOD PRESSURE: 96 MMHG | HEART RATE: 67 BPM | SYSTOLIC BLOOD PRESSURE: 152 MMHG | OXYGEN SATURATION: 96 % | WEIGHT: 148 LBS

## 2023-04-04 VITALS — SYSTOLIC BLOOD PRESSURE: 163 MMHG | DIASTOLIC BLOOD PRESSURE: 89 MMHG

## 2023-04-04 DIAGNOSIS — I10 ESSENTIAL (PRIMARY) HYPERTENSION: ICD-10-CM

## 2023-04-04 DIAGNOSIS — I51.89 OTHER ILL-DEFINED HEART DISEASES: ICD-10-CM

## 2023-04-04 DIAGNOSIS — R06.02 SHORTNESS OF BREATH: ICD-10-CM

## 2023-04-04 PROCEDURE — 99214 OFFICE O/P EST MOD 30 MIN: CPT | Mod: 25

## 2023-04-04 PROCEDURE — 93000 ELECTROCARDIOGRAM COMPLETE: CPT

## 2023-04-04 RX ORDER — FUROSEMIDE 20 MG/1
20 TABLET ORAL
Qty: 15 | Refills: 11 | Status: ACTIVE | COMMUNITY
Start: 2019-12-17 | End: 1900-01-01

## 2023-04-04 NOTE — DISCUSSION/SUMMARY
[EKG obtained to assist in diagnosis and management of assessed problem(s)] : EKG obtained to assist in diagnosis and management of assessed problem(s) [FreeTextEntry1] : The patient is a very pleasant 89 year-old woman who presents today for follow-up of her poor exercise capacity, shortness of breath with even minimal exertion, and worsening of her right hip pain.  Patient has clear lungs, mild lower extremity edema, and a loud S2 which raises the possibility of pulmonary hypertension, although none was seen on a recent echocardiogram. Suspect symptom of dyspnea on minimal exertion is related to labile blood pressure and overall deconditioning due to inactivity.  Given normal oxygen saturation on room and and unchanged ECG, no evidence of significant PE at this time. \par \par In July 2018, she had a DVT in RLE. She completed 21 days of Xarelto 15mg BID, then 20mg daily for more than a year afterwards. She is now off anticoagulation. ILR without evidence of atrial fibrillation as of 7/23/2019.\par \par  Cortisone shot provided only mild relief. She has been started on Vicodin and stool softener. There is concern for opioid induced constipation. Patient referred for pain management. Case discussed with Gricelda Edwards MD. Patient now started on high CBD, low THC medicinal marijuana therapy. She has been on medication since 3/14/2019. \par \par For hypertension, HFpEF, and LE edema:\par Continue ACEi, spironolactone - periodically monitor metabolic panel for monitor renal function and potassium. Adjust medications as needed. I will add furosemide 20 mg twice a week to see if it improves her shortness of breath and pedal edema. I will try to order an echocardiogram for LV size and function.\par \par  April 13, 2021: The patient will continue her furosemide every other day.  She will stop having salty foods such as soups.  She will report into the electrophysiologist because her loop recorder is at recommended replacement time.  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  No acute changes were seen.  She will be getting her COVID-19 vaccination and she will confer with Dr. Webster the rheumatologist about whether she should adjust her prednisone or not.  I spent a total of 37 minutes on the date of the encounter evaluating and treating this patient.  Follow-up in office in 4 months,  or earlier as needed.\par July 13, 2021: The patient still gets some shortness of breath and dyspnea on exertion. She is cut out the salty soups but still has some salt in her diet. She denies any chest pains or palpitations. Her blood pressure was 131/60, and her pulse was 61. Her lungs revealed rales at the bases. Her heart revealed a regular rhythm. Her EKG shows normal sinus rhythm, a first-degree heart block, a left anterior hemiblock, and a right bundle branch block pattern with nonspecific ST and T wave changes. This was unchanged from previous. We will continue her on her current medications. She will try to cut down on salt. She will return in 3 months, or earlier if needed. Total time spent on the day of the encounter was 35 minutes which includes  face-to-face and non face-to-face times personally spent by the physician preparing to see the patient, obtaining  separately obtained history, performing a medically appropriate exam and evaluation, counseling, educating, talking to the family or caregivers, ordering medicines, ordering tests or procedures, referring and communicating with other healthcare foods professionals, and documenting clinical information in the electronic health record.\par August 17, 2021: The patient will try to take showers that are less hot with water and will sit more of the time.  Routine blood tests were sent today.  We will send the patient for an echocardiogram.  She will return in 3 months, or earlier if needed.  Total time spent on the day of the encounter was  34 minutes which includes  face-to-face and non face-to-face times personally spent by the physician preparing to see the patient, obtaining  separately obtained history, performing a medically appropriate exam and evaluation, counseling, educating, talking to the family or caregivers, ordering medicines, ordering tests or procedures, referring and communicating with other healthcare foods professionals, and documenting clinical information in the electronic health record.\par 9/21/2021: The patient had an echocardiogram today and it showed normal systolic function with minimal aortic regurgitation and mitral regurgitation. She will continue on her current medication and return in approximately 3 months, or earlier if needed. She will stay on her current medication.  Total time spent on the day of the encounter was  31 minutes which includes  face-to-face and non face-to-face times personally spent by the physician preparing to see the patient, obtaining  separately obtained history, performing a medically appropriate exam and evaluation, counseling, educating, talking to the family or caregivers, ordering medicines, ordering tests or procedures, referring and communicating with other healthcare foods professionals, and documenting clinical information in the electronic health record.\par November 16, 2021: High-dose flu shot will be administered today.\par February 22, 2022: The patient was encouraged to walk more.  We will continue her on her current medication.  She will return in 4 months, or earlier if needed.  Total time spent on the day of the encounter was 34  minutes which includes  face-to-face and non face-to-face times personally spent by the physician preparing to see the patient, obtaining  separately obtained history, performing a medically appropriate exam and evaluation, counseling, educating, talking to the family or caregivers, ordering medicines, ordering tests or procedures, referring and communicating with other healthcare  professionals, and documenting clinical information in the electronic health record.\par June 21, 2022: The patient is encouraged to walk more.  We will continue her on her current medication.  She will return in 4 months, or earlier if needed.  Total time spent on the day of the encounter was 33    minutes which includes  face-to-face and non face-to-face times personally spent by the physician preparing to see the patient, obtaining  separately obtained history, performing a medically appropriate exam and evaluation, counseling, educating, talking to the family or caregivers, ordering medicines, ordering tests or procedures, referring and communicating with other healthcare  professionals, and documenting clinical information in the electronic health record.\par October 25, 2022: The patient was examined.  Her blood pressure was 143/81, and her pulse was 62.  Her lungs were clear to auscultation.  Cardiac exam was negative for murmurs rubs or gallops.  Her EKG showed normal sinus rhythm, a first-degree heart block, a left anterior hemiblock, and a right bundle branch block pattern.  Routine blood tests were sent.  She will continue her current medications.  She will return in 4 months, or earlier if needed.  Total time spent on the day of the encounter was 36 minutes which includes  face-to-face and non face-to-face times personally spent by the physician preparing to see the patient, obtaining  separately obtained history, performing a medically appropriate exam and evaluation, counseling, educating, talking to the family or caregivers, ordering medicines, ordering tests or procedures, referring and communicating with other healthcare  professionals, and documenting clinical information in the electronic health record.\par April 4, 2023: The patient's blood pressure initially was 152/96 but changed to  to 163/89 when lying still  for several minutes.  Her pulse was 67.  Her lungs were clear to auscultation.  Cardiac exam was negative for murmurs rubs or gallops.  Both lower extremities showed 2+ edema.  The patient's EKG showed normal sinus rhythm, a first-degree heart block, a left anterior hemiblock, and a right bundle branch block pattern.  We will increase her Lasix to 20 mg 3 times a week.  She will try to elevate her legs.  She will return in 3 months, or earlier if needed.  Total time spent on the day of the encounter was 34 minutes which includes  face-to-face and non face-to-face times personally spent by the physician preparing to see the patient, obtaining  separately obtained history, performing a medically appropriate exam and evaluation, counseling, educating, talking to the family or caregivers, ordering medicines, ordering tests or procedures, referring and communicating with other healthcare  professionals, and documenting clinical information in the electronic health record.\par

## 2023-04-04 NOTE — HISTORY OF PRESENT ILLNESS
[FreeTextEntry1] : 89 year-old woman with history of hypertension, HCV, hypothyroidism comes today with her daughter for follow-up. 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 In July 2018, patient noted to have right leg swelling. Evaluation by orthopedic surgeon with Doppler revealed DVT and cellulitis. Patient was started on Augmentin and Xarelto. Patient has no pleuritic chest pain. \par December 17, 2019: The patient complains of shortness of breath. The daughter admits that she may have had some salty soups or other foods. She denies any chest pains or palpitations.\par Patient has also been sent to pulmonary who also check overnight saturation studies and have r/o hypoxia as a source of fatigue and SOB. \par June 2, 2020: Patient gets more short of breath.  She gets dyspnea on exertion.  She is extremely inactive.  And maybe salt in her food.  She denies any chest pains or palpitations.  She had never gotten the echocardiogram that was ordered because of the COVID-19 pandemic.\par OA of knees have been chronic and also promotes to patients sedentary lifestyle. She uses a cane on occasion. No recent falls. On NSAIDS PRN for pain.\par \par September 15, 2020: Patient denies any chest pains or palpitations.  She is does get dyspnea on exertion but she is very inactive.  Sometimes she sighs a lot but she does not have air hunger.\par December 15, 2020: Patient gets some shortness of breath and dyspnea on exertion.  She denies any chest pains or palpitations.  She denies any dizziness or fainting.  She does have some caffeinated coffee and some salt.  She did have an argument with her daughter on the way here.\par In February 2018, patient was admitted with presyncope. Her ILR was interrogated and was without corresponding event. She was discharged without changes in her medications.\par \par December 2018 - Patient's chief complaint today is right hip pain. She has been started on Vicodin for it. She is taking it once per day. She also got a cortisone injection last week that provided mild relief.\par Labs drawn earlier today by Dr. Dickson.\par \par March 2019 - Patient has just started CBD/THC for pain relief of chronic arthritis. It is well tolerated so far, but effect may not be seen at this early time.\par \par June 2019 - In May 2019, patient was treated for gout with steroids per rheumatology. Patient admitted to [facility] in May 2019 with syncope. At that time, her furosemide was stopped and her Xarelto was discontinued. She remains on CBD for pain. She has no signs or symptoms of volume overload while off loop diuretic.\par April 13, 2021: The patient has been placed on prednisone and she has gained weight has some pedal edema.  She does report shortness of breath at rest as well as dyspnea on exertion.  Her furosemide was increased from once a week to every other day 4 days ago.  She has been having some salty foods such as soups.  She has a [employer] loop recorder and has not heard anything about reports.\par July 13, 2021: The patient gets occasional episodes of shortness of breath.. She has dyspnea on exertion but has been very inactive. She has cut out most most soups but still has salt in her diet. She denies any chest pains or palpitations.\par August 17, 2021: The patient has had 2 episodes of weakness after taking a shower.  She is taking furosemide 3 times a week.  She does  the shower as well as sitting down for part of it.\par 9/21/2021: The patient has had no further weak spells. The patient's daughter is leaving the shower door open slightly so that it gets less hot. She underwent an echocardiogram today which showed good LV systolic function and only aortic sclerosis with a minimal leak in the aortic and mitral valves. Pulmonary artery pressure was normal\par November 16, 2021:. The patient has no new symptoms.  She does complain of fatigue.  She is very inactive.  She denies any chest pains or palpitations.  Sometimes she gets short of breath.  She wishes to get a flu shot today.\par February 22, 2022: The patient complains of being tired.  She is very inactive.  She denies any chest pains or shortness of breath at rest or palpitations.\par June 21, 2022: The patient denies any chest pains or palpitations.  She is short of breath at rest.  She has become very inactive.  She sleeps a lot during the day but does not sleep well at night.  Her appetite is down.  She does use medical marijuana intermittently for chronic pain.\par October 25, 2022: The patient's biggest  complaint is fatigue.  She occasionally gets some shortness of breath.  She denies any chest pains or palpitations.\par January 4, 2023: Patient's daughter brought her in because her legs were more swollen and she got up a couple pounds in weight.  Then the daughter increase the Lasix to 3 times a week from twice a week and the weight seemed to improve.  Legs are still swollen and they use any support stockings.  The patient does not walk and sits all day with the legs hanging down.\par April 4, 2023: Patient gets occasional shortness of breath.  She denies any chest pains.\par PMD/Geriatrics: Beverly Wheeler MD [phone]\par Orthopedist: Alex Hoff MD [phone]\par Plastic Surgeon: Miguel Soto MD [phone]\par Former Cardiologist: Jose Santoyo MD [phone]\par GI: Johnny Dickson MD [phone]

## 2023-04-24 ENCOUNTER — APPOINTMENT (OUTPATIENT)
Dept: RHEUMATOLOGY | Facility: CLINIC | Age: 88
End: 2023-04-24
Payer: MEDICARE

## 2023-04-24 VITALS
TEMPERATURE: 97.1 F | RESPIRATION RATE: 16 BRPM | SYSTOLIC BLOOD PRESSURE: 144 MMHG | BODY MASS INDEX: 25.78 KG/M2 | OXYGEN SATURATION: 98 % | HEART RATE: 73 BPM | DIASTOLIC BLOOD PRESSURE: 80 MMHG | WEIGHT: 151 LBS | HEIGHT: 64 IN

## 2023-04-24 PROCEDURE — 99214 OFFICE O/P EST MOD 30 MIN: CPT

## 2023-04-24 RX ORDER — DICLOFENAC SODIUM 20 MG/G
2 SOLUTION TOPICAL
Qty: 1 | Refills: 0 | Status: DISCONTINUED | COMMUNITY
Start: 2022-10-26 | End: 2023-04-24

## 2023-04-24 RX ORDER — DICLOFENAC SODIUM 1 %
1 KIT TOPICAL
Refills: 0 | Status: DISCONTINUED | COMMUNITY
Start: 2021-05-26 | End: 2023-04-24

## 2023-04-24 NOTE — PHYSICAL EXAM
[General Appearance - Alert] : alert [General Appearance - In No Acute Distress] : in no acute distress [Sclera] : the sclera and conjunctiva were normal [FreeTextEntry1] : no swollen joints + joint hypertrophy in knees and hands c/w OA

## 2023-04-24 NOTE — ASSESSMENT
[FreeTextEntry1] : OA \par diffuse joint pain \par no recent illness or injuries \par Joint pain c/w OA \par ->  trial of using diclofenac gel 2-3 times a day \par -> consider low dose pain medication if needed in future but will coordinate with geriatrics.  Currently does not walk independently.  Will try diclofenac gel first and if that doesn't help will consider the low dose pain medication.  Precautions nad warning about low dose narcotics d/w daughter and agree to try diclofenac gel topically first. message in 1 month on results of diclofeanc gel. \par \par \par More than 50% of the encounter was spent counselling  JAQUAN KAM and her daughter on differential, workup, disease course, and treatment/management.   Education was provided to JAQUAN KAM and her daughterduring this encounter. All questions and concerns were answered and addressed in detail.  JAQUAN KAM and her daughter verbalized understanding and agreed to plan\par \par JAQUAN KAM and her daughter has been instructed to call for an earlier appointment if new symptoms develop \par JAQUAN KAM and her daughter has been instructed to make a follow up appointment 3 months \par \par \par

## 2023-04-24 NOTE — HISTORY OF PRESENT ILLNESS
[FreeTextEntry1] : joint pain in the bilateral shoulders, neck and knee pain and calf pain. \par Pain is worst when she gets up in the morning \par \par LE edema worsening and planing to increase lasix to 3 times a (working with cardiology) \par \par hasn’t taken voltaren gel because she was afraid of it.

## 2023-06-02 ENCOUNTER — APPOINTMENT (OUTPATIENT)
Dept: GERIATRICS | Facility: CLINIC | Age: 88
End: 2023-06-02
Payer: MEDICARE

## 2023-06-02 VITALS
SYSTOLIC BLOOD PRESSURE: 137 MMHG | TEMPERATURE: 98.2 F | RESPIRATION RATE: 14 BRPM | DIASTOLIC BLOOD PRESSURE: 75 MMHG | OXYGEN SATURATION: 97 % | BODY MASS INDEX: 25.58 KG/M2 | HEART RATE: 72 BPM | WEIGHT: 149 LBS

## 2023-06-02 PROCEDURE — 99214 OFFICE O/P EST MOD 30 MIN: CPT | Mod: GC

## 2023-06-02 RX ORDER — MULTIVIT-MIN/FERROUS GLUCONATE 9 MG/15 ML
LIQUID (ML) ORAL
Refills: 0 | Status: ACTIVE | COMMUNITY
Start: 2023-06-02

## 2023-06-02 RX ORDER — LIDOCAINE 4 G/1
4 PATCH TOPICAL
Refills: 0 | Status: DISCONTINUED | COMMUNITY
Start: 2021-05-26 | End: 2023-06-02

## 2023-06-05 NOTE — REVIEW OF SYSTEMS
[As Noted in HPI] : as noted in HPI [Chest Pain] : no chest pain [Palpitations] : no palpitations [Cough] : no cough [Abdominal Pain] : no abdominal pain [Constipation] : no constipation

## 2023-06-05 NOTE — PHYSICAL EXAM
[Alert] : alert [No Acute Distress] : in no acute distress [No Respiratory Distress] : no respiratory distress [No Acc Muscle Use] : no accessory muscle use [Respiration, Rhythm And Depth] : normal respiratory rhythm and effort [Auscultation Breath Sounds / Voice Sounds] : lungs were clear to auscultation bilaterally [Normal S1, S2] : normal S1 and S2 [Heart Rate And Rhythm] : heart rate was normal and rhythm regular [Bowel Sounds] : normal bowel sounds [Abdomen Tenderness] : non-tender [Abdomen Soft] : soft [Normal Color / Pigmentation] : normal skin color and pigmentation [de-identified] : trace bilateral lower extremity edema  [de-identified] : decreased range of motion of bilateral knees [de-identified] : oriented to person, not place or time

## 2023-06-05 NOTE — HISTORY OF PRESENT ILLNESS
[No falls in past year] : Patient reported no falls in the past year [Independent] : toileting [] : Patient is incontinent. [Completely Dependent] : Completely dependent. [With Patient/Caregiver] : , with patient/caregiver [Reviewed no changes] : Reviewed, no changes [Designated Healthcare Proxy] : Designated healthcare proxy [Name: ___] : Health Care Proxy's Name: [unfilled]  [Relationship: ___] : Relationship: [unfilled] [DNR] : DNR [DNI] : DNI [Last Verification Date: ___] : Last Verification Date: [unfilled] [FreeTextEntry1] : 89 yo F PMH moderate dementia, hypothyroidism, HCV, CHF, polyarticular OA, HTN presents for f/u visit.\par \par Rheum: Eleanor\par Cards: Gael Leija\par \par Having more intermittent disorientation during the day now, not just in evening or night. No agitation or aggression. Sleeping well at night ~6 hours, but also taking nap during day after lunch. \par \par Saw Dr. Webster in April: Recommended Voltaren gel 2-3 times per day. Daughter states used twice a day for two weeks, didn't seem much benefit \par Using Advil 200 mg 2 tablets in AM and 2 tablets in PM. Not taking Tylenol Arthritis anymore because she didn't think it had any benefit. Not using medical marijuna. \par \par Pain only when changing position from sitting to standing or vice versa and when ambulating. At rest patient denies any pain. \par \par Was doing PT but daughter was told she needed to stop for about 4 months because of insurance. However [facility] plan of care in March 2023 had 90 day plan through 6/7. Patient enjoys PT and seemed to be helping so would like re-referral. \par \par Patient has part time HHA but only 12 hours per week. Lives w/ daughter, Sondra, who is HCP. Previously no interest in more HHA hours but other family members thinks she should increase HHA hours so is considering. \par \par Was taking lasix 3 times per week, but weight has improved (weighs daily) and minimal swelling of legs, so has not been taking lasix for past week. Wearing compression socks and taking sprinolactone daily\par \par Hypothyroid: On liothyroinine, TFTs WNL 3/23\par \par HTN: Controlled. Remains on lisinopril and aldactone. Following w/ cards.\par \par Last labs on file from 3/23 w/ mild hyponatremia, CBC ok, normal TSH.  [AdvancecareDate] : 03/22

## 2023-06-05 NOTE — END OF VISIT
[] : Fellow [FreeTextEntry3] : 89 year old woman w/ PMH as above presents for f/u visit. Has been slow, but steady, cognitive decline despite minimal change on MMSE past couple of years. Daughter remains primary caregiver w/ HHA 4x/week. We have discussed on each visit (w/ SW assistance) potential for increasing hours, but daughter has not pursued. Does remain some caregiver burden, however.\par \par Patient has chronic polyarticular pain from underlying OA. She is using advil. Benefits vs. risks discussed. Advil is effective; reasonable to continue at modest doses as goals are primarily focused on sx control. Tylenol has been ineffective and local steroid injections have provided only temporary relief. She will f/u rheum. Favor avoiding opioids if possible, but may require if worsens. \par \par Has had continued f/u w/ cards and rheum.\par \par RTC in 3 months.

## 2023-07-10 ENCOUNTER — RX RENEWAL (OUTPATIENT)
Age: 88
End: 2023-07-10

## 2023-07-26 ENCOUNTER — APPOINTMENT (OUTPATIENT)
Dept: RHEUMATOLOGY | Facility: CLINIC | Age: 88
End: 2023-07-26
Payer: MEDICARE

## 2023-07-26 VITALS
RESPIRATION RATE: 16 BRPM | WEIGHT: 147 LBS | HEIGHT: 64 IN | SYSTOLIC BLOOD PRESSURE: 160 MMHG | HEART RATE: 73 BPM | OXYGEN SATURATION: 91 % | DIASTOLIC BLOOD PRESSURE: 95 MMHG | TEMPERATURE: 98.1 F | BODY MASS INDEX: 25.1 KG/M2

## 2023-07-26 LAB
ALBUMIN SERPL ELPH-MCNC: 4.7 G/DL
ALP BLD-CCNC: 124 U/L
ALT SERPL-CCNC: 32 U/L
ANION GAP SERPL CALC-SCNC: 16 MMOL/L
AST SERPL-CCNC: 48 U/L
BILIRUB SERPL-MCNC: 0.3 MG/DL
BUN SERPL-MCNC: 32 MG/DL
CALCIUM SERPL-MCNC: 10.4 MG/DL
CHLORIDE SERPL-SCNC: 101 MMOL/L
CO2 SERPL-SCNC: 20 MMOL/L
CREAT SERPL-MCNC: 0.9 MG/DL
EGFR: 57 ML/MIN/1.73M2
GLUCOSE SERPL-MCNC: 71 MG/DL
POTASSIUM SERPL-SCNC: 5 MMOL/L
PROT SERPL-MCNC: 7.5 G/DL
SODIUM SERPL-SCNC: 137 MMOL/L

## 2023-07-26 PROCEDURE — 99213 OFFICE O/P EST LOW 20 MIN: CPT

## 2023-07-26 NOTE — ASSESSMENT
[FreeTextEntry1] : OA \par diffuse joint pain \par no recent illness or injuries \par Joint pain c/w OA \par ->  can try 3.75mg of mobic daily with close monitoring of Cr. \par ->  precautions for falls d/w daughter and it may be safer to use wheelchair rather than to push her to use the walker at home. \par ->  needs to try diclodenac gel on knees in additoin to meloxicam low dose oral for hip pain (diclofenac gel will not help the hips). \par ->  blood work today and in 2 weeks (home draw in 2 weeks) \par \par More than 50% of the encounter was spent counselling  JAQUAN KAM on differential, workup, disease course, and treatment/management.   Education was provided to JAQUAN KAM during this encounter. All questions and concerns were answered and addressed in detail.  JAQUAN EDDY verbalized understanding and agreed to plan\par \par JAQUAN EDDY has been instructed to call for an earlier appointment if new symptoms develop \par JAQUAN EDDY has been instructed to make a follow up appointment 3 months \par

## 2023-07-26 NOTE — PHYSICAL EXAM
[General Appearance - Alert] : alert [General Appearance - In No Acute Distress] : in no acute distress [Sclera] : the sclera and conjunctiva were normal [FreeTextEntry1] : no swollen joints + joint hypertrophy in knees and hands c/w OA ++ prominent crepitus.  Pain with flexing the right hip and bending bilateral knees.

## 2023-07-26 NOTE — HISTORY OF PRESENT ILLNESS
[FreeTextEntry1] : joint pain in the bilateral shoulders, neck and knee pain and calf pain. \par Pain is getting worse and limiting her mobility \par she has difficulty getting from a chair to the bed, minimal using walker in the house and using the walker more \par \par Still hasn’t taken voltaren gel because she was afraid of it. \par \par daughter is worried about the decline, the hip pain and the decreased mobility

## 2023-08-27 DIAGNOSIS — G89.29 PAIN IN UNSPECIFIED JOINT: ICD-10-CM

## 2023-08-27 DIAGNOSIS — M25.50 PAIN IN UNSPECIFIED JOINT: ICD-10-CM

## 2023-09-14 ENCOUNTER — APPOINTMENT (OUTPATIENT)
Dept: CARDIOLOGY | Facility: CLINIC | Age: 88
End: 2023-09-14
Payer: MEDICARE

## 2023-09-14 VITALS
SYSTOLIC BLOOD PRESSURE: 166 MMHG | BODY MASS INDEX: 25.23 KG/M2 | HEART RATE: 72 BPM | OXYGEN SATURATION: 97 % | WEIGHT: 147 LBS | DIASTOLIC BLOOD PRESSURE: 84 MMHG

## 2023-09-14 PROCEDURE — 99214 OFFICE O/P EST MOD 30 MIN: CPT

## 2023-09-22 NOTE — ED PROVIDER NOTE - MUSCULOSKELETAL NECK EXAM
no deformity, pain or tenderness. no restriction of movement Loud volume/Pressured rate/Increased productivity/Impaired articulation

## 2023-10-13 ENCOUNTER — APPOINTMENT (OUTPATIENT)
Dept: RHEUMATOLOGY | Facility: CLINIC | Age: 88
End: 2023-10-13
Payer: MEDICARE

## 2023-10-13 VITALS
SYSTOLIC BLOOD PRESSURE: 157 MMHG | WEIGHT: 148 LBS | TEMPERATURE: 98.5 F | OXYGEN SATURATION: 92 % | DIASTOLIC BLOOD PRESSURE: 83 MMHG | HEART RATE: 71 BPM | RESPIRATION RATE: 16 BRPM | BODY MASS INDEX: 25.27 KG/M2 | HEIGHT: 64 IN

## 2023-10-13 PROCEDURE — 99214 OFFICE O/P EST MOD 30 MIN: CPT

## 2023-10-13 RX ORDER — CELECOXIB 50 MG/1
50 CAPSULE ORAL
Qty: 90 | Refills: 0 | Status: DISCONTINUED | COMMUNITY
Start: 2023-07-27 | End: 2023-10-13

## 2023-10-20 NOTE — ED ADULT NURSE NOTE - TEMPLATE LIST FOR HEAD TO TOE ASSESSMENT
Head, normocephalic, atraumatic, Face, Face within normal limits, Ears, External ears within normal limits General

## 2023-10-26 ENCOUNTER — APPOINTMENT (OUTPATIENT)
Dept: GERIATRICS | Facility: CLINIC | Age: 88
End: 2023-10-26
Payer: MEDICARE

## 2023-10-26 VITALS
RESPIRATION RATE: 16 BRPM | HEART RATE: 67 BPM | TEMPERATURE: 97.9 F | DIASTOLIC BLOOD PRESSURE: 78 MMHG | HEIGHT: 64 IN | OXYGEN SATURATION: 98 % | WEIGHT: 149 LBS | BODY MASS INDEX: 25.44 KG/M2 | SYSTOLIC BLOOD PRESSURE: 135 MMHG

## 2023-10-26 DIAGNOSIS — E03.9 HYPOTHYROIDISM, UNSPECIFIED: ICD-10-CM

## 2023-10-26 PROCEDURE — 99214 OFFICE O/P EST MOD 30 MIN: CPT

## 2023-10-26 PROCEDURE — 90662 IIV NO PRSV INCREASED AG IM: CPT

## 2023-10-26 PROCEDURE — G0008: CPT

## 2023-10-26 RX ORDER — IBUPROFEN 200 MG/1
200 TABLET, COATED ORAL
Refills: 0 | Status: DISCONTINUED | COMMUNITY
Start: 2023-06-02 | End: 2023-10-26

## 2023-11-29 NOTE — ED ADULT NURSE NOTE - ISOLATION TYPE:
How Severe Are Your Spot(S)?: moderate What Type Of Note Output Would You Prefer (Optional)?: Standard Output What Is The Reason For Today's Visit?: Full Body Skin Examination What Is The Reason For Today's Visit? (Being Monitored For X): concerning skin lesions on an annual basis None

## 2023-12-13 NOTE — DISCHARGE NOTE ADULT - REASON FOR ADMISSION
Patient with 2 out of 2 blood cultures growing Streptococcus  Leukocytosis has resolved however Pro-Abelardo now elevated to 0.37  Repeat blood cultures without growth for 24 hours  With still continued upper respiratory congestion, lower extremity wounds  ID recommending to continue cefazolin 2g q6h. Infectious source lower extremity wounds. Will need podiatry and wound care follow up. Can likely be transitioned to po keflex 1g qid through 12/17 on discharge,   shoulder pain

## 2023-12-14 NOTE — PHYSICAL THERAPY INITIAL EVALUATION ADULT - ADDITIONAL COMMENTS
How Severe Is It?: moderate
Is This A New Presentation, Or A Follow-Up?: Nail Dystrophy
Social history obtained from pt's daughter at b/s secondary to pt poor historian.

## 2024-01-12 ENCOUNTER — RX RENEWAL (OUTPATIENT)
Age: 89
End: 2024-01-12

## 2024-01-12 RX ORDER — LIOTHYRONINE SODIUM 5 UG/1
5 TABLET ORAL DAILY
Qty: 90 | Refills: 1 | Status: ACTIVE | COMMUNITY
Start: 2016-09-11 | End: 1900-01-01

## 2024-01-23 DIAGNOSIS — M17.0 BILATERAL PRIMARY OSTEOARTHRITIS OF KNEE: ICD-10-CM

## 2024-01-24 RX ORDER — SPIRONOLACTONE 25 MG/1
25 TABLET ORAL
Qty: 90 | Refills: 2 | Status: ACTIVE | COMMUNITY
Start: 2020-03-28 | End: 1900-01-01

## 2024-02-09 ENCOUNTER — APPOINTMENT (OUTPATIENT)
Dept: RHEUMATOLOGY | Facility: CLINIC | Age: 89
End: 2024-02-09
Payer: MEDICARE

## 2024-02-09 VITALS
SYSTOLIC BLOOD PRESSURE: 156 MMHG | BODY MASS INDEX: 25.44 KG/M2 | OXYGEN SATURATION: 98 % | HEART RATE: 62 BPM | HEIGHT: 64 IN | TEMPERATURE: 98.1 F | WEIGHT: 149 LBS | RESPIRATION RATE: 16 BRPM | DIASTOLIC BLOOD PRESSURE: 72 MMHG

## 2024-02-09 DIAGNOSIS — M25.551 PAIN IN RIGHT HIP: ICD-10-CM

## 2024-02-09 PROCEDURE — 99214 OFFICE O/P EST MOD 30 MIN: CPT

## 2024-02-09 RX ORDER — MELOXICAM 7.5 MG/5ML
7.5 SUSPENSION ORAL DAILY
Qty: 5 | Refills: 0 | Status: DISCONTINUED | COMMUNITY
Start: 2023-07-26 | End: 2024-02-09

## 2024-02-09 RX ORDER — MELOXICAM 5 MG/1
5 CAPSULE ORAL
Qty: 30 | Refills: 0 | Status: DISCONTINUED | COMMUNITY
Start: 2023-10-20 | End: 2024-02-09

## 2024-02-09 NOTE — HISTORY OF PRESENT ILLNESS
[FreeTextEntry1] : has pain in the right hip and right leg and knees  pain is worst with moving and sitting and standing  hip pain started in December and it is worse than then knee pain  she has no pain when she is sitting other than in the right hip  she tried meloxicam for 3 weeks with no relief  she tried advil with minimal  Advil: 200mg - 2 pills po BID   Advil is better than meloxicam for her pain but the hip still not fully better   hasn't started the famotidine yet

## 2024-02-09 NOTE — ASSESSMENT
[FreeTextEntry1] : diffuse joint pain no recent illness or injuries Joint pain c/w OA ->  meloxicam didn't help but she is having some relief wiht advil 400mg bid  ->  start famotidine PO BID  ->  trial of cymbalta discussed but daughter defers for now.  -> r/b/a d/w daughter and will continue advil 400mg PO BID as it provides symptomatic relief. risks of nsaid use in espercially in this age group disucssed but alternatives do not have a better risk profile.  - monitor closely  -> PT prescribed  ->  check xray of hips   More than 50% of the encounter was spent counseling JAQUAN CHENGENDER on the differential, workup, disease course, and treatment/management.   Education was provided to JAQUAN KAM during this encounter. All questions and concerns were answered and addressed in detail.  JAQUAN EDDY verbalized understanding and agreed to the plan.   JAQUAN EDDY has been instructed to call for an earlier appointment if new symptoms develop in the interim. JAQUAN EDDY has been instructed to make a follow-up appointment in 3 months

## 2024-02-09 NOTE — PHYSICAL EXAM
[General Appearance - Alert] : alert [General Appearance - In No Acute Distress] : in no acute distress [Sclera] : the sclera and conjunctiva were normal [FreeTextEntry1] : no swollen joints + joint hypertrophy in knees and hands c/w OA ++ prominent crepitus.  Pain with flexinf the right hip from seated positiion.

## 2024-02-10 ENCOUNTER — RX RENEWAL (OUTPATIENT)
Age: 89
End: 2024-02-10

## 2024-02-12 ENCOUNTER — APPOINTMENT (OUTPATIENT)
Dept: RADIOLOGY | Facility: CLINIC | Age: 89
End: 2024-02-12

## 2024-02-16 ENCOUNTER — APPOINTMENT (OUTPATIENT)
Dept: GERIATRICS | Facility: CLINIC | Age: 89
End: 2024-02-16
Payer: MEDICARE

## 2024-02-16 VITALS
SYSTOLIC BLOOD PRESSURE: 163 MMHG | HEART RATE: 76 BPM | DIASTOLIC BLOOD PRESSURE: 75 MMHG | BODY MASS INDEX: 25.52 KG/M2 | WEIGHT: 149.5 LBS | TEMPERATURE: 97.3 F | HEIGHT: 64 IN

## 2024-02-16 DIAGNOSIS — F03.90 UNSPECIFIED DEMENTIA W/OUT BEHAVIORAL DISTURBANCE: ICD-10-CM

## 2024-02-16 PROCEDURE — 99214 OFFICE O/P EST MOD 30 MIN: CPT | Mod: GC

## 2024-02-16 PROCEDURE — G2211 COMPLEX E/M VISIT ADD ON: CPT

## 2024-02-16 RX ORDER — DICLOFENAC SODIUM 1% 10 MG/G
1 GEL TOPICAL
Qty: 1 | Refills: 0 | Status: COMPLETED | COMMUNITY
Start: 2023-04-24 | End: 2024-02-16

## 2024-02-16 NOTE — ASSESSMENT
[FreeTextEntry1] : 89 yo woman w/ PMH moderate dementia, hypothyroidism, HCV, CHF, polyarticular OA, HTN presents for f/u visit.  #Dementia  - patient with worsening short term memory loss and increasing frequency of hallucinations without much agitation  - support provided to patient and daughter at bedside   #Osteoarthritis  - patient using wheelchair more often due to worsening arthritis  - using advil twice per day, encouraged use of pepcid once a day  - continue with physical therapy   Follow up in six months   Radha Chacko MD PGY3 Internal Medicine d/w Dr. Scales

## 2024-02-16 NOTE — HISTORY OF PRESENT ILLNESS
[FreeTextEntry1] : 89 yo woman w/ PMH moderate dementia, hypothyroidism, HCV, CHF, polyarticular OA, HTN presents for f/u visit.  Rheum: Eleanor Cards: Gael Leija  Patient's daughter reports that she has been doing well overall since her last visit. Her short term memory has declined a bit and she is still sundowning. Shs is now having more hallucinations not just at night but during the day as well, reporting to her daughter and aide that she is seeing people who are not there.   Due to her arthritis she is using her wheelchair more often but sometimes still uses the walker. Has an aide 12 hours a week and lives with her daughter. Does physical therapy. Takes advil twice a day, advised to take pepcid but has not started yet.

## 2024-02-16 NOTE — END OF VISIT
[] : Fellow [FreeTextEntry3] : 89 year old woman w/ PMH as above presents for follow-up visit.  She continues to smoke.  There remains significant caregiver burden.  Today, the daughter seems a bit more amenable to more help in the home.  She still has 12 hours overall/ week.  She met with social work to discuss more strategies to reduce caregiver burden.  Will not make any changes to her medication today.  Her lower extremity edema also seems to be improved compared to last visit.  She is not taking Lasix regularly.  Does not need new labs today.  She will return to clinic in 3 to 6 months. [Time Spent: ___ minutes] : I have spent [unfilled] minutes of time on the encounter.

## 2024-02-16 NOTE — PHYSICAL EXAM
[No Acute Distress] : in no acute distress [No Respiratory Distress] : no respiratory distress [Auscultation Breath Sounds / Voice Sounds] : lungs were clear to auscultation bilaterally [Normal S1, S2] : normal S1 and S2 [Heart Rate And Rhythm] : heart rate was normal and rhythm regular [Abdomen Tenderness] : non-tender [Abdomen Soft] : soft

## 2024-02-20 LAB
ALBUMIN SERPL ELPH-MCNC: 4.5 G/DL
ALP BLD-CCNC: 117 U/L
ALT SERPL-CCNC: 29 U/L
ANION GAP SERPL CALC-SCNC: 12 MMOL/L
AST SERPL-CCNC: 42 U/L
BASOPHILS # BLD AUTO: 0.07 K/UL
BASOPHILS NFR BLD AUTO: 1 %
BILIRUB SERPL-MCNC: 0.3 MG/DL
BUN SERPL-MCNC: 26 MG/DL
CALCIUM SERPL-MCNC: 10.1 MG/DL
CHLORIDE SERPL-SCNC: 98 MMOL/L
CO2 SERPL-SCNC: 23 MMOL/L
CREAT SERPL-MCNC: 0.67 MG/DL
EGFR: 78 ML/MIN/1.73M2
EOSINOPHIL # BLD AUTO: 0.24 K/UL
EOSINOPHIL NFR BLD AUTO: 3.6 %
GLUCOSE SERPL-MCNC: 79 MG/DL
HCT VFR BLD CALC: 40.3 %
HGB BLD-MCNC: 13.3 G/DL
IMM GRANULOCYTES NFR BLD AUTO: 0.3 %
LYMPHOCYTES # BLD AUTO: 2.35 K/UL
LYMPHOCYTES NFR BLD AUTO: 35 %
MAN DIFF?: NORMAL
MCHC RBC-ENTMCNC: 30.6 PG
MCHC RBC-ENTMCNC: 33 GM/DL
MCV RBC AUTO: 92.6 FL
MONOCYTES # BLD AUTO: 0.88 K/UL
MONOCYTES NFR BLD AUTO: 13.1 %
NEUTROPHILS # BLD AUTO: 3.16 K/UL
NEUTROPHILS NFR BLD AUTO: 47 %
PLATELET # BLD AUTO: 190 K/UL
POTASSIUM SERPL-SCNC: 5.2 MMOL/L
PROT SERPL-MCNC: 7.5 G/DL
RBC # BLD: 4.35 M/UL
RBC # FLD: 14.7 %
SODIUM SERPL-SCNC: 132 MMOL/L
TSH SERPL-ACNC: 1.86 UIU/ML
WBC # FLD AUTO: 6.72 K/UL

## 2024-03-05 NOTE — ED ADULT NURSE NOTE - PRIMARY CARE PROVIDER
Last visit- 9/6/2023  Next visit- Visit date not found    Requested Prescriptions     Pending Prescriptions Disp Refills    lisinopril-hydroCHLOROthiazide (PRINZIDE;ZESTORETIC) 20-25 MG per tablet [Pharmacy Med Name: Lisinopril-hydroCHLOROthiazide 20-25 MG Oral Tablet] 90 tablet 0     Sig: Take 1 tablet by mouth once daily        unk

## 2024-03-05 NOTE — H&P ADULT - POSTERIOR CERVICAL R
Group Topic: BH Coping Skills Education    Date: 3/5/2024  Start Time:  1:00 PM  End Time:  1:50 PM  Facilitators: Silvia Mercedes LPC    Focus: Interpersonal Relationship Skills - Boundaries  Number in attendance: 8    A presentation was given on the topic of boundaries. Discussion centered around barriers to boundary setting and different types of boundaries (rigid, porous, healthy).     Method: Group  Attendance: Present  Participation: Active  Patient Response: Appropriate feedback, Attentive, Good eye contact, Interactive, Interested in topic, and Took notes    Pt was an active and attentive participant. Pt offered appropriate feedback and relevant insight into the discussion. Pt shared that she did not realize that some of the behaviors she engages in are in conjunction with boundaries. Pt was observed taking notes.     Silvia Mercedes LPC-IT  
15
normal

## 2024-03-22 ENCOUNTER — NON-APPOINTMENT (OUTPATIENT)
Age: 89
End: 2024-03-22

## 2024-03-22 ENCOUNTER — APPOINTMENT (OUTPATIENT)
Dept: GERIATRICS | Facility: CLINIC | Age: 89
End: 2024-03-22
Payer: MEDICARE

## 2024-03-22 DIAGNOSIS — F03.92 UNSPECIFIED DEMENTIA, UNSPECIFIED SEVERITY, WITH PSYCHOTIC DISTURBANCE: ICD-10-CM

## 2024-03-22 PROCEDURE — 99442: CPT | Mod: 93

## 2024-03-22 RX ORDER — QUETIAPINE FUMARATE 25 MG/1
25 TABLET ORAL
Qty: 45 | Refills: 1 | Status: ACTIVE | COMMUNITY
Start: 2024-03-22 | End: 1900-01-01

## 2024-04-09 RX ORDER — LISINOPRIL 10 MG/1
10 TABLET ORAL
Qty: 90 | Refills: 11 | Status: ACTIVE | COMMUNITY
Start: 2017-10-02 | End: 1900-01-01

## 2024-04-23 NOTE — PROGRESS NOTE ADULT - SUBJECTIVE AND OBJECTIVE BOX
Internal Medicine Team 3 Progress Note    EDDY JAQUAN  Patient is a 89y old  Female who presents with a chief complaint of diffuse joint pain, inability to ambulate (26 Dec 2018 21:10)    INTERVAL HPI / SUBJECTIVE:      REVIEW OF SYSTEMS:   Constitutional: no fatigue, fever, chills, generalized weakness  HEENT: no eye pain, vision changes, rhinorrhea, sore throat  Respiratory: no cough, wheezing, shortness of breath  CV: no chest pain, palpitations, dyspnea on exertion, orthopnea, PND  GI: no decreased appetite, nausea, vomiting, abdominal pain, diarrhea, constipation, melena  : no dysuria, hematuria, urinary frequency, incontinence, nocturia  MSK: no joint or muscle pain, muscle weakness, joint swelling  Skin: no rashes, bruising, hair loss, color change  Neuro: no headache, dizziness, fainting, paresthesias, memory loss  Endo: no heat or cold intolerance, increased thirst, hot flashes  Psych: no changes in mood    MEDICATIONS:   MEDICATIONS  (STANDING):  furosemide    Tablet 40 milliGRAM(s) Oral daily  lidocaine   Patch 1 Patch Transdermal daily  lidocaine   Patch 1 Patch Transdermal daily  liothyronine 5 MICROGram(s) Oral daily  lisinopril 10 milliGRAM(s) Oral daily  piperacillin/tazobactam IVPB. 3.375 Gram(s) IV Intermittent every 8 hours  rivaroxaban 20 milliGRAM(s) Oral every 24 hours  senna 1 Tablet(s) Oral daily  vancomycin  IVPB 1000 milliGRAM(s) IV Intermittent every 24 hours    MEDICATIONS  (PRN):  acetaminophen    Suspension .. 650 milliGRAM(s) Oral every 6 hours PRN Temp greater or equal to 38C (100.4F), Mild Pain (1 - 3), Moderate Pain (4 - 6), Severe Pain (7 - 10)    ALLERGIES:   No Known Allergies    OBJECTIVE:  Vital Signs Last 24 Hrs  T(C): 36.7 (27 Dec 2018 06:09), Max: 36.8 (26 Dec 2018 15:55)  T(F): 98 (27 Dec 2018 06:09), Max: 98.2 (26 Dec 2018 15:55)  HR: 81 (27 Dec 2018 06:09) (80 - 90)  BP: 120/62 (27 Dec 2018 06:09) (120/62 - 147/70)  BP(mean): --  RR: 17 (27 Dec 2018 06:09) (17 - 18)  SpO2: 98% (27 Dec 2018 06:09) (96% - 99%)    I&O's Summary    26 Dec 2018 07:01  -  27 Dec 2018 07:00  --------------------------------------------------------  IN: 100 mL / OUT: 400 mL / NET: -300 mL    PHYSICAL EXAM:  General: Well-appearing, NAD  HEENT:  EOMI, PERRL, conjunctiva and sclera clear, normal oropharynx  Neck:  Supple, no JVD, normal thyroid  Chest/Lungs: CTA B/L, no rales, rhonchi, rub or wheezing  Heart: RRR, normal S1, S2, no murmurs or gallops  Abdomen: Soft, nondistended, NTTP, normoactive bowel sounds  Extremities: Peripheral pulses 2+ B/L, no edema, cyanosis or clubbing  Skin: Warm, well-perfused, no rashes or lesions  Neurological: A&Ox3, no focal deficits      LABS:        127<L>  |  90<L>  |  10  ----------------------------<  133<H>  3.4<L>   |  25  |  0.59        129<L>  |  90<L>  |  15  ----------------------------<  116<H>  3.9   |  24  |  0.59    Ca    9.1      27 Dec 2018 06:30  Phos  3.3       Mg     1.9         TPro  6.3  /  Alb  3.2<L>  /  TBili  0.7  /  DBili  x   /  AST  18  /  ALT  12  /  AlkPhos  65      Culture - Urine (collected 26 Dec 2018 02:00)  Source: .Urine Clean Catch (Midstream)  Final Report (26 Dec 2018 21:45):    No growth    Culture - Blood (collected 26 Dec 2018 00:55)  Source: .Blood Blood-Peripheral  Preliminary Report (27 Dec 2018 01:02):    No growth to date.    Culture - Blood (collected 26 Dec 2018 00:55)  Source: .Blood Blood-Peripheral  Preliminary Report (27 Dec 2018 01:02):    No growth to date.    Urinalysis Basic - ( 25 Dec 2018 22:47 )  Color: Light Yellow / Appearance: Clear / S.014 / pH: x  Gluc: x / Ketone: Trace  / Bili: Negative / Urobili: Negative   Blood: x / Protein: Trace / Nitrite: Negative   Leuk Esterase: Negative / RBC: 1 /hpf / WBC 0 /hpf   Sq Epi: x / Non Sq Epi: 0 /hpf / Bacteria: Negative          IMAGING:       Labs and imaging personally reviewed and interpreted [  ]  Consult notes reviewed [  ]  Case discussed with consultants [  ] Internal Medicine Team 3 Progress Note    EDDY JAQUAN  Patient is a 89y old  Female who presents with a chief complaint of diffuse joint pain, inability to ambulate (26 Dec 2018 21:10)    INTERVAL HPI / SUBJECTIVE:  Had severe R hip pain overnight, given Tylenol with some improvement.  Seen and examined with daughter at bedside this morning. Continues to have pain and decreased ROM at hip and knee joints but in no acute distress.     REVIEW OF SYSTEMS:   Constitutional: no fatigue, fever, chills, generalized weakness  HEENT: no eye pain, vision changes, rhinorrhea, sore throat  Respiratory: no cough, wheezing, shortness of breath  CV: no chest pain, palpitations, dyspnea on exertion, orthopnea, PND  GI: no decreased appetite, nausea, vomiting, abdominal pain, diarrhea, constipation, melena  : no dysuria, hematuria, urinary frequency, incontinence, nocturia  MSK: +joint pain and swelling  Skin: no rashes, bruising, hair loss, color change  Neuro: no headache, dizziness, fainting, paresthesias, memory loss  Endo: no heat or cold intolerance, increased thirst, hot flashes  Psych: no changes in mood    MEDICATIONS:   MEDICATIONS  (STANDING):  furosemide    Tablet 40 milliGRAM(s) Oral daily  lidocaine   Patch 1 Patch Transdermal daily  lidocaine   Patch 1 Patch Transdermal daily  liothyronine 5 MICROGram(s) Oral daily  lisinopril 10 milliGRAM(s) Oral daily  piperacillin/tazobactam IVPB. 3.375 Gram(s) IV Intermittent every 8 hours  rivaroxaban 20 milliGRAM(s) Oral every 24 hours  senna 1 Tablet(s) Oral daily  vancomycin  IVPB 1000 milliGRAM(s) IV Intermittent every 24 hours    MEDICATIONS  (PRN):  acetaminophen    Suspension .. 650 milliGRAM(s) Oral every 6 hours PRN Temp greater or equal to 38C (100.4F), Mild Pain (1 - 3), Moderate Pain (4 - 6), Severe Pain (7 - 10)    ALLERGIES:   No Known Allergies    OBJECTIVE:  Vital Signs Last 24 Hrs  T(C): 36.7 (27 Dec 2018 06:09), Max: 36.8 (26 Dec 2018 15:55)  T(F): 98 (27 Dec 2018 06:09), Max: 98.2 (26 Dec 2018 15:55)  HR: 81 (27 Dec 2018 06:09) (80 - 90)  BP: 120/62 (27 Dec 2018 06:09) (120/62 - 147/70)  BP(mean): --  RR: 17 (27 Dec 2018 06:09) (17 - 18)  SpO2: 98% (27 Dec 2018 06:09) (96% - 99%)    I&O's Summary    26 Dec 2018 07:01  -  27 Dec 2018 07:00  --------------------------------------------------------  IN: 100 mL / OUT: 400 mL / NET: -300 mL    PHYSICAL EXAM:  General: Pleasant elderly woman, NAD  HEENT:  EOMI, PERRL, conjunctiva and sclera clear, normal oropharynx  Neck: Supple, no JVD, normal thyroid  Chest/Lungs: CTA B/L, no rales, rhonchi, rub or wheezing  Heart: RRR, normal S1, S2, no murmurs or gallops  Abdomen: Soft, nondistended, NTTP, normoactive bowel sounds  Extremities: Peripheral pulses 2+ B/L, non-pitting edema of B/L LE with decreased ROM at R hip join and B/L knees 2/2 pain  Skin: Warm, well-perfused, edema of R hip and legs B/L without appreciable erythema  Neurological: A&Ox2, no focal deficits      LABS:  CBC Full  -  ( 27 Dec 2018 08:10 )  WBC Count : 10.47 K/uL  Hemoglobin : 11.7 g/dL  Hematocrit : 33.8 %  Platelet Count - Automated : 265 K/uL  Mean Cell Volume : 87.6 fl  Mean Cell Hemoglobin : 30.3 pg  Mean Cell Hemoglobin Concentration : 34.6 gm/dL  Auto Neutrophil # : 8.36 K/uL  Auto Lymphocyte # : 0.73 K/uL  Auto Monocyte # : 1.29 K/uL  Auto Eosinophil # : 0.09 K/uL  Auto Basophil # : 0.00 K/uL  Auto Neutrophil % : 79.8 %  Auto Lymphocyte % : 7.0 %  Auto Monocyte % : 12.3 %  Auto Eosinophil % : 0.9 %  Auto Basophil % : 0.0 %        127<L>  |  90<L>  |  10  ----------------------------<  133<H>  3.4<L>   |  25  |  0.59        129<L>  |  90<L>  |  15  ----------------------------<  116<H>  3.9   |  24  |  0.59    Ca    9.1      27 Dec 2018 06:30  Phos  3.3       Mg     1.9         TPro  6.3  /  Alb  3.2<L>  /  TBili  0.7  /  DBili  x   /  AST  18  /  ALT  12  /  AlkPhos  65      LIVER FUNCTIONS - ( 27 Dec 2018 06:30 )  Alb: 3.2 g/dL / Pro: 6.3 g/dL / ALK PHOS: 65 U/L / ALT: 12 U/L / AST: 18 U/L / GGT: x           PT/INR - ( 27 Dec 2018 08:10 )   PT: 26.1 sec;   INR: 2.23 ratio     PTT - ( 27 Dec 2018 08:10 )  PTT:46.5 sec    Culture - Urine (collected 26 Dec 2018 02:00)  Source: .Urine Clean Catch (Midstream)  Final Report (26 Dec 2018 21:45):    No growth    Culture - Blood (collected 26 Dec 2018 00:55)  Source: .Blood Blood-Peripheral  Preliminary Report (27 Dec 2018 01:02):    No growth to date.    Culture - Blood (collected 26 Dec 2018 00:55)  Source: .Blood Blood-Peripheral  Preliminary Report (27 Dec 2018 01:02):    No growth to date.    Urinalysis Basic - ( 25 Dec 2018 22:47 )  Color: Light Yellow / Appearance: Clear / S.014 / pH: x  Gluc: x / Ketone: Trace  / Bili: Negative / Urobili: Negative   Blood: x / Protein: Trace / Nitrite: Negative   Leuk Esterase: Negative / RBC: 1 /hpf / WBC 0 /hpf   Sq Epi: x / Non Sq Epi: 0 /hpf / Bacteria: Negative      IMAGING:     < from: CT Pelvis Bony Only w/ IV Cont (18 @ 20:46) >  IMPRESSION:  1.  Severe right hip osteoarthritis.  2.  Small right hip joint effusion and moderate distention of the right   iliopsoas bursa. Findings likely reflect inflammatory change secondary to   the severe right hip arthritis. Superimposed infection is not excluded.   If there is concern for septic arthritis, correlation with hip aspiration   is recommended.  3.  Additional degenerative changes as detailed above.    < from: Xray Knee 1 or 2 Views, Bilateral (. @ 20:27) >  IMPRESSION:   Lateral tricompartmental arthrosis, most pronounced in the lateral   compartment where there is associated remodeling of the lateral tibial   plateau. Large joint effusion on the left. Small joint effusion on the   right. Please note that joint fluid analysis would be necessary to   further evaluate for septic joint.    Labs and imaging personally reviewed and interpreted [ x ]  Consult notes reviewed [ x ]  Case discussed with consultants [ x ] BIBA from [facility] for abdominal pain that started 2am this morning

## 2024-04-24 ENCOUNTER — APPOINTMENT (OUTPATIENT)
Dept: RADIOLOGY | Facility: IMAGING CENTER | Age: 89
End: 2024-04-24
Payer: MEDICARE

## 2024-04-24 ENCOUNTER — OUTPATIENT (OUTPATIENT)
Dept: OUTPATIENT SERVICES | Facility: HOSPITAL | Age: 89
LOS: 1 days | End: 2024-04-24
Payer: MEDICARE

## 2024-04-24 DIAGNOSIS — Z95.818 PRESENCE OF OTHER CARDIAC IMPLANTS AND GRAFTS: Chronic | ICD-10-CM

## 2024-04-24 DIAGNOSIS — M25.551 PAIN IN RIGHT HIP: ICD-10-CM

## 2024-04-24 PROCEDURE — 73521 X-RAY EXAM HIPS BI 2 VIEWS: CPT | Mod: 26

## 2024-04-24 PROCEDURE — 73521 X-RAY EXAM HIPS BI 2 VIEWS: CPT

## 2024-05-10 ENCOUNTER — APPOINTMENT (OUTPATIENT)
Dept: RHEUMATOLOGY | Facility: CLINIC | Age: 89
End: 2024-05-10
Payer: MEDICARE

## 2024-05-10 VITALS
WEIGHT: 150 LBS | RESPIRATION RATE: 16 BRPM | OXYGEN SATURATION: 92 % | HEART RATE: 76 BPM | DIASTOLIC BLOOD PRESSURE: 72 MMHG | HEIGHT: 64 IN | SYSTOLIC BLOOD PRESSURE: 139 MMHG | BODY MASS INDEX: 25.61 KG/M2 | TEMPERATURE: 98.1 F

## 2024-05-10 DIAGNOSIS — M17.9 OSTEOARTHRITIS OF KNEE, UNSPECIFIED: ICD-10-CM

## 2024-05-10 PROCEDURE — G2211 COMPLEX E/M VISIT ADD ON: CPT

## 2024-05-10 PROCEDURE — 99213 OFFICE O/P EST LOW 20 MIN: CPT

## 2024-05-10 RX ORDER — FAMOTIDINE 20 MG/1
20 TABLET, FILM COATED ORAL
Qty: 90 | Refills: 0 | Status: ACTIVE | COMMUNITY
Start: 2024-01-23 | End: 1900-01-01

## 2024-05-10 NOTE — DATA REVIEWED
[FreeTextEntry1] : Laboratory and radiology studies that were personally reviewed at today's visit are summarized in above and below: xray hip/pelvis (4-2024):  No hip fractures or dislocations. Intact pelvic and obturator rings and symmetrically aligned and spaced SI joints and pubic symphysis. Lower lumbar spine degenerative change apparent. Severe right hip osteoarthritis, progressed from prior. Preserved left hip joint space. Generalized osteopenia otherwise no discrete lytic or blastic lesions. Mid and lower pelvic popcorn type calcifications probably uterine fibroid related. PROCEDURE DATE: 05/06/2019  INTERPRETATION: CLINICAL INDICATION: right knee pain  EXAM:  Frontal and lateral right knee from 5/6/2019 at 1604. Compared to prior study from 12/26/2018.  IMPRESSION:  Moderate size knee joint effusion.  No fractures or dislocations.  Redemonstrated moderately advanced tricompartment osteoarthritis with lateral tibiofemoral compartment predominance.  Unremarkable quadriceps and patellar tendon shadows.  Generalized osteopenia again noted otherwise no developed discrete lytic or blastic lesions.  Vascular calculations again noted.

## 2024-05-10 NOTE — PHYSICAL EXAM
[General Appearance - Alert] : alert [General Appearance - In No Acute Distress] : in no acute distress [Sclera] : the sclera and conjunctiva were normal [FreeTextEntry1] : OA changes

## 2024-05-10 NOTE — ASSESSMENT
[FreeTextEntry1] : diffuse joint pain no recent illness or injuries Joint pain c/w OA goal of care:  Maintain ability to transfer from chair to toilet/bed and decreased pain  advil 200-400mg PO BID  Needs to take famotidine for GI protection  PT to help maintain function  Risks of, benefits of and alternatives to this treatment plan discussed with patient and patient expressed understanding.  feb 2024 labs reviewed   More than 50% of the encounter was spent counseling JAQUAN KAM on the differential, workup, disease course, and treatment/management.   Education was provided to JAQUAN KAM during this encounter. All questions and concerns were answered and addressed in detail.  JAQUAN CHENGENDER verbalized understanding and agreed to the plan.   JAQUAN KAM has been instructed to call for an earlier appointment if new symptoms develop in the interim. JAQUAN KAM has been instructed to make a follow-up appointment in 6 months

## 2024-05-10 NOTE — HISTORY OF PRESENT ILLNESS
[FreeTextEntry1] : OA  Advil: 200mg - 2 pills po BID  daughter stopped the famotidine but she will restart it.  no complaints when sitting but has complaints with moving around   Advil is better than meloxicam for her pain but the hip still not fully better

## 2024-05-14 NOTE — ED ADULT NURSE NOTE - NS ED STAT RN HAND OFF 2
Spoke with pt brother Ramon today. I tried to reassure Ramon, that Dr. Saha will be following a long with what the ED is doing. Brother stated the [facility] has a process and that she will just be sent home. Didn't really listen when I said that the ED will be the ones to place the pt in rehab if they fell that it is necessary. Kept saying the system is broken and they don't help. Would like Dr. Saha to do something. Informed the bother that Dr Saha cannot not order or ask the ED physician to do anything. Suggested that if he feels Adalberto isn't doing much to help his sister that he speak with a  or pt advocate. He stated that don't even call back any more and that the system is broken.   
See MAR for last dose taken
Additional handoff

## 2024-05-20 ENCOUNTER — APPOINTMENT (OUTPATIENT)
Dept: CARDIOLOGY | Facility: CLINIC | Age: 89
End: 2024-05-20
Payer: MEDICARE

## 2024-05-20 ENCOUNTER — NON-APPOINTMENT (OUTPATIENT)
Age: 89
End: 2024-05-20

## 2024-05-20 VITALS
HEIGHT: 64 IN | SYSTOLIC BLOOD PRESSURE: 130 MMHG | BODY MASS INDEX: 25.78 KG/M2 | WEIGHT: 151 LBS | HEART RATE: 67 BPM | OXYGEN SATURATION: 100 % | DIASTOLIC BLOOD PRESSURE: 80 MMHG

## 2024-05-20 DIAGNOSIS — I50.30 UNSPECIFIED DIASTOLIC (CONGESTIVE) HEART FAILURE: ICD-10-CM

## 2024-05-20 DIAGNOSIS — M19.90 UNSPECIFIED OSTEOARTHRITIS, UNSPECIFIED SITE: ICD-10-CM

## 2024-05-20 DIAGNOSIS — R60.0 LOCALIZED EDEMA: ICD-10-CM

## 2024-05-20 PROCEDURE — 93000 ELECTROCARDIOGRAM COMPLETE: CPT

## 2024-05-20 PROCEDURE — G2211 COMPLEX E/M VISIT ADD ON: CPT

## 2024-05-20 PROCEDURE — 99214 OFFICE O/P EST MOD 30 MIN: CPT

## 2024-05-20 NOTE — DISCUSSION/SUMMARY
[FreeTextEntry1] : Heart failure with preserved ejection fraction-continue current regimen.  Patient has no current evidence of active heart failure. Abnormal EKG-unchanged Dementia-clinically stable Osteoarthritis-clinically stable but somewhat limiting. Return visit 6 months or sooner if any further problems develop.   Total time of the encounter: 30 minutes which included but was not limited to the following: Face-to-face and non face-to-face time personally spent by the physician preparing to see the patient, obtaining and/or resuming separately obtained history, performing a medically appropriate examination and/or evaluation, counseling and educating the patient/family/caregiver, ordering medications, tests or procedures, referring and communicating with other healthcare professionals, documenting clinical information in the electronic health record, independently interpreting results and communicated results to the patient/family/caregiver and care coordination. [EKG obtained to assist in diagnosis and management of assessed problem(s)] : EKG obtained to assist in diagnosis and management of assessed problem(s)

## 2024-05-20 NOTE — REVIEW OF SYSTEMS
[TextEntry] : The review of systems was limited by the patient's memory issues.  However, except as noted above... Constitutional: The patient denied headache, fatigue, fever, sweats, loss of appetite or chills Eyes: The patient denied double vision, eye pain, eye discharge, red eyes or itchy eyes ENT: The patient denied ear pain, ear discharge, nasal congestion, nasal discharge, sore throat, enlarged tonsils, hoarseness, neck pain or neck swelling Cardiovascular: The patient denied chest pain, chest discomfort, dizziness, palpitations, fainting  or leg cramps Respiratory: The patient denied shortness of breath, cough, coughing up blood, wheezing, chest congestion or mucous production GI: The patient denied weight gain, weight loss, abdominal pain, nausea, vomiting, diarrhea, constipation, black stools or bloody stools : The patient denied pain on urination, burning with urination, frequent urination or blood in the urine Skin: The patient denied rashes, redness or swelling Neurologic: The patient denied headache, stiff neck, weakness, numbness, difficulty speaking, unsteadiness or numbness/tingling in feet Psychiatric: The patient denied hallucinations, agitation or disorientation Endocrine: The patient denied excessive thirst, excessive urination, cold intolerance or heat intolerance Hematologic: The patient denied easy bruisability or pallor Allergic/Immunologic: The patient denied runny nose, recurrent infections, hives or pruritis Musculoskeletal: The patient denied arthritic pains, muscle weakness or muscle aches Extremities: The patient denied clubbing, cyanosis or lower extremity swelling

## 2024-05-20 NOTE — PHYSICAL EXAM
[TextEntry] : General/Constitutional: WD/ WN in NAD H: NC/AT Eyes:  PERRL, sclerae and conjunctivae normal without jaundice or xanthelasma; ENMT:normal teeth, gums and palate with no petechiae, pallor or cyanosis Neck: w/o JVD, thyromegaly or adenopathy; normal venous contour Respiratory: clear to auscultation, normal respiratory effort with no retractions or use of accessory respiratory muscles Heart: [id number], regular rate, normal S1, S2 without murmurs, rubs, gallops, heaves or thrills Vascular exam: normal carotid upstrokes without carotid or abdominal bruits. 2+/2+ pulses to posterior tibialis and dorsalis pedis Abdomen: soft, nontender, bowels sounds normal without hepatomegaly or splenomegaly, masses or bruits Musculoskeletal:without significant kyphosis or scoliosis Extremities: w/o CC; trace bilateral lower extremity edema to the knees, good capillary filling Skin: no stasis changes; no ulcers  Neuro: AA and O x 3; no focal neurologic deficits Psych: normal mood and affect

## 2024-05-20 NOTE — REASON FOR VISIT
[FreeTextEntry1] : The patient is here today for follow-up of heart failure with preserved ejection fraction, hypertension and dementia.  The patient spends most of her day in a wheelchair at this point.  She does walk at home with a walker a little bit.  She is primarily limited by moderately severe lower back arthritis.  She takes Advil with some improvement.  She has tried to management injections with no improvement.  She is also been having some difficulties with memory loss..  Her leg swelling is well-managed with compression stockings and her daughter has been using Lasix on apparent basis with the patient's weight drifts upward or when the patient has any shortness of breath.  Overall she is very stable.

## 2024-05-26 NOTE — PHYSICAL THERAPY INITIAL EVALUATION ADULT - STANDING BALANCE: DYNAMIC, REHAB EVAL
"Answer Assessment - Initial Assessment Questions  1. DRUG NAME: \"What medicine do you need to have refilled?\"      Clonazepam 1 mg at bedtime    2. PRESCRIBING HCP: \"Who prescribed it?\" Reason: If prescribed by specialist, call should be referred to that group.      Yuliana    Protocols used: Medication Refill and Renewal Call-ADULT-      On call provided called in prescription.  Pt's  advised.  " fair balance

## 2024-05-28 NOTE — INPATIENT CERTIFICATION FOR MEDICARE PATIENTS - PHYSICIAN CONCUR
Lung Transplant Consult Follow Up Note   May 27, 2024         Assessment and Plan:   Jefferson Cassidy is a 69 year old male with a PMH significant for IPF, chronic hypoxemic respiratory failure, CAD, GERD with presbyesophagus, and history of basal cell cancer.  Initially admitted to OSH 4/30/24 with acute hypoxic respiratory failure with elevated procalcitonin and lactic acidosis following right heart catheterization and angiogram the day prior (4/29) without complication.  Intubated and transferred to [facility] (5/4) for management and expedited transplant evaluation.  Initially extubated on 5/3 but required reintubation on 5/4 for delirium and tachypnea, also remained on pressors for septic vs distributive shock.  On steroid burst and taper prior leading up to transplant.  Pt. is now s/p BSLT, CABG x3, and left atrial appendage excision on 5/13 with Osmani Morris and Mary Beth.  Surgery complicated by significant coagulopathy requiring blood product replacement.  Noted to have pneumoperitoneum post-op, CT with no contrast leak from bowel.  Extubated on 5/16, initially on HFNC, weaned to 1L NC 5/19. Then with acute hypoxic/hypercapneic respiratory failure 5/21 and AMS, required emergent intubation and transfer to MICU. Now extubated and transferred back to floor service 5/23.     Today's recommendations:  - Continue cellcept to 250mg daily, reduced for progressive leukopenia  - Aggressive pulmonary toilet with chest physiotherapy QID, nebs (as below); and vest therapy QID  - Volume management per primary team  - CXR daily as below  - Tacrolimus level 6.5, not steady state but the level is decreasing, increase to 6 mg twice daily and recheck a trending level on Wednesday and a steady-state level on Thursday (ordered).  - Holding bactrim for PJP ppx (monitor closely for reaction) (5/21-5/24) due to leukopenia; pentamadine administered 5/24, reassess ~6/24  - EBV, IgG, and donor labs ordered 6/12  - Antimicrobials per  transplant ID  - Fungal culture and A. galactomannan on future bronchs  - Staple removal per CVTS (every other staple removed 5/27) remaining staples will be removed in 2-3 weeks.  - Increase vest pressure to 7 (ordered)  - Chest CT to further evaluate pleural effusion (suggest IR for pigtail placement if moderate or larger) and for follow-up granuloma on donor CT  - Sputum culture 5/28 - increased darker phlegm.      S/p bilateral sequential lung transplant (BSLT) for IPF:  Acute on chronic hypoxic/hypercapneic respiratory failure:  Explant pathology with nonspecific interstitial pneumonitis (NSIP)-like pattern, cellular and fibrotic types, with scattered periairway lymphoid aggregates, foci of organizing pneumonia and areas of end-stage fibrosis, negative for malignancy.  PGD initially 3-->1-2.  Pressor weaned off 5/17 and Karin weaned off 5/15.  Lactic acid peaked at 12.9 post-op and now improved with aggressive IV fluid resuscitation, diuresis started 5/15.  Bronch (5/15) with bilateral anastomoses intact with no dehiscence, mild erythema of right anastomosis site, left anastomosis site appears normal, and LLL secretions.  Extubated on 5/16, initially on 4L NC then placed on HFNC 35-40%, 40L, weak cough gradually improving.  Now weaned to 1L NC.  CT AP (5/18) noted multiple loculated pleural effusions on right side and small pleural effusion on left side, bibasilar consolidative and GGO. While patient was being transported for CXR 5/21 he developed acute hypoxia (SpO2 mid 70s) and became obtunded, required bag ventilation. Code blue was called with intubation at bedside and transferred to MICU. Chest CTPE (5/21) negative for PE, showed near complete right lower lobe collapse with increased left lower lobe atelectasis, increased moderate bilateral loculated pleural effusions and left surgical chest tube not positioned in pleural collection.  Bronchoscopy (5/21, per MICU) with copious thick secretions throughout  right side, minimal secretions left side, anastomosis intact.  Repeat bronch 5/22 per MICU with decreased secretions.  Extubated, weaned to RA as of 5/25.  - 5/28 chest x-ray reviewed by me, stable R basilar infiltrate + increasing R pleural effusion  - 5/20 DSA (-)   - Nebs: levalbuterol QID, and Mucomyst BID to alternate with 3% HTS BID (added 5/21 mucous plugging)  - Aggressive pulmonary toilet with chest physiotherapy QID, Aerobika and IS hourly when extubated. Added vesting 5/23 QID, increase vest  pressure to 7 on 5/27 (ordered).  - DSA one month post-transplant (additionally per protocol)  - Ammonia monitoring qMTh (screening for hyperammonemia post-lung transplant) 5/27 Ammonia 33   = Ureaplasma PCR 5/22 (-).  - Wean supplemental oxygen to maintain SpO2 >92%  - Diuresis per primary team, dizzy with standing, no diuresis today.  - Paravertebral pain catheters placed 5/16, removed 5/25  - s/p R pigtail placement 5/22 with IR, removed by surgery 5/25.  Increasing right pleural effusion, monitored by x-ray.  If continued increase, will need to replace the catheter.  - Daily CXR  - TF via nasoduodenal FT per RD  - SLP following for dysphagia, VFSS (5/20, see note) with recommendation to continue NPO given high risk for aspiration with all PO intake, repeat VFSS per SLP (favor holding off until week of 5/27)  -Staple removal per CVTS (every other staple removed 5/27) remaining staples will be removed in 2-3 weeks.  - Explant path:  BRONCHUS, LEVEL 7, EXCISION:  -Benign bronchial mucosa with fibrosis and dystrophic calcifications.  -One benign lymph node.  NATIVE, PNEUMONECTOMY:  -Nonspecific interstitial pneumonitis (NSIP)-like pattern, cellular and fibrotic types, with scattered periairway lymphoid aggregates, foci of organizing pneumonia and areas of end-stage fibrosis.  -Acute bronchiolitis and focus of acute bronchopneumonia in right lower lobe, left upper and lower lobes.  -12 benign lymph nodes.   HEART,  LEFT ATRIAL APPENDAGE, EXCISION:  -Fragment of benign atrial wall.     Immunosuppression: Solumedrol 500 mg daily 5/6-5/8 followed by rapid taper, although received methylprednisolone 1000 mg and MMF 1000 mg on 5/11 before prior transplant was cancelled.  Now s/p induction therapy with high dose IV steroid intraoperatively.  Basiliximab held intraoperatively given fever/hypotension day of transplant and given POD #4, repeat basiliximab dose POD #8 (5/21, ordered) given subtherapeutic tacrolimus level.  - Tacrolimus goal level 8-12. Tacrolimus level subtherapeutic at 6.5, not steady state but the level is decreasing, increase to 6 mg twice daily and recheck a trending level on Wednesday and a steady-state level on Thursday (ordered).  -  mg daily  (decreased 5/19,  5/20,  5/24, and 5/26 for progressive leukopenia)  - Prednisolone 20 mg daily with accelerated taper (given on steroids prior to transplant) per lung transplant protocol (next taper due 6/12, not ordered)  Date Daily Dose (mg)   5/15/2024 30   5/22/2024 20   6/12/2024 15   6/26/2024 10   7/10/2024 5      Prophylaxis:   - Bactrim for PJP ppx deferred initially post-op pending assessment of renal function; started 5/21 and held 5/24 due to leukopenia  - Pentamadine neb 5/24, next due 6/24 (NOT ordered)  - VGCV for CMV ppx--> started 5/22 with repeat CMV in one week (ordered 5/28), prior held starting given leukopenia (CMV 5/21 detectable level 47).  - Ampho B nebs twice weekly for antifungal ppx through discharge, then will stop  - Nystatin swish and spit for oral candidiasis ppx, 6 month course   - See below for serologies and viral ppx:    Donor Recipient Intervention   CMV status Positive Positive Valganciclovir POD #8-90   EBV status Positive Positive EBV monthly (ordered 6/12)   HSV status N/A Positive NA                  ID: No prior history of infection/colonization.  IgG adequate at 852 at time of transplant.  S/p cefepime (5/4-5/9) and  doxycycline (5/4-5/9) for empiric coverage for ILD flare vs CAP vs aspiration (sputum culture (5/4) with normal francheska) and Histo/Blasto urine Ag negative 5/4.  Fever of 101.5 (5/13, day of transplant) associated with rising WBC (10) and elevated procalcitonin (1.33).  Sputum culture (5/13) with P-S Streptococcus constellatus.  Respiratory panel and COVID negative 5/13 and 5/18.  MRSA nares negative 5/13.  Leukopenia noted since 5/18.  C.diff (5/20) negative  - IgG at one month (ordered 6/12)  - Donor cultures ([facility]) with Candida albicans and (OSH) with GPR and yeast on stain and Candida albicans on culture  - Recipient cultures with MRSE  - Bronch cultures (5/15) with Candida krusei (R-fluconazole) and Candida kefyr P-S  - Right/left pleural cultures (5/19) NGTD  - IV vancomycin (5/13-5/26) for 14 day course to cover Strep and MRSE; s/p IV ceftriaxone (narrowed 5/17-5/18) and ceftazidime (5/13-5/17)   - RML BAL culture (5/21)  Nl Francheska  - 5/21 BAL Glactomannan (-)  - 5/21 blood fungitell (+)  269 (500).  - 5/22 Pleural fluid Cx NGTD  - Micafungin 150 mg daily for peritransplant fungal colonization for a 14 day course per TxID, (5/13-5/26)  - Vancomycin 14 course for intra-operative sputum cultures with strep and MRSE (5/13-5/26) per TxID      Hemodynamics: Persistent tachycardia.  Patient reports dizziness with standing.  No significant response to IV fluid yesterday.  Continue monitoring, no diuresis today (5/27)     Donor RUL calcified granuloma: Noted on OSH chest CT.  Fungitell (5/15) positive (>500).  Histo/Blasto blood/urine Ag and A. galactomannan negative 5/15.  Candida noted on respiratory cultures as above.  Transplant ID consulted 5/18, see their note for details.  - Repeat fungitell 5/21 (per transplant ID) improved to 269  - Tissue from right bronchus/lymph node (5/13, donor) with Candida albicans  - Additional cultures with Candida as above  - Micafungin for peritransplant fungal colonization for a 14  day course per TxID, (5/13-5/26). Revisit pending repeat fungitell and bronch findings.  Repeat 13BDG from 5/28 pending.   - Repeat chest CT in 2 weeks (~5/29)  - Fungal culture and A. galactomannan on future bronchs     PHS risk criteria donor: Additional labs required post-transplant (between 4-8 weeks post-op): Hepatitis B, Hepatitis C, and HIV by CULLEN ([id number], ordered 6/12).     Other relevant problems managed by primary team:      Subdural hemorrhage:  Head CT (5/21)with thin subdural hemorrhage overlying the left greater than right parietal convexities. Repeat head CT 6 hours later was stable without midline shift. Repeat CT 5/28 with mildly decreased density with otherwise unchanged.   - Management per primary team      CAD s/p CABG x3: LAD, diagonal, OM CABG on 5/13 at same time as lung transplant surgery.  ASA continues, rosuvastatin resumed 5/18.     GERD with presbyeseophagus: PPI BID.  Unable to complete pH/manometry test prior to transplant.  Will be NPO post-transplant with reassessment pending recovery (as above).     Pneumoperitoneum: Noted on CXR 5/15 post-op, known intraoperatively.  CT AP with enteral contrast (5/18) with moderate simple fluid density ascites and moderate pneumoperitoneum, source of air is unknown, there is no contrast leak from the bowel.  Management per primary tem. Improving on chest CT 5/21     We appreciate the excellent care provided by the [place] [id number] team.  Recommendations communicated via in person rounding and this note.  Will continue to follow along closely, please do not hesitate to call with any questions or concerns.     Tamara Martin MD PGY4  Pulmonary and Critical Care         Interval History:   Report of increased work of breathing overnight - he doesn't remember much of this episode though and just recalls feeling quite warm before he was placed on high flow nasal cannula. Not feeling dyspneic; no chest tightness. Scant darker sputum. Incisional pain stable.             Review of Systems:   C: NEGATIVE for fever, chills  INTEGUMENTARY/SKIN: no rash or obvious new lesions  ENT/MOUTH: no sore throat, new sinus pain or nasal drainage  RESP: see interval history  CV: NEGATIVE for chest pain, palpitations or peripheral edema  GI: no nausea, vomiting, decreasing stool frequency  : no dysuria  MUSCULOSKELETAL: no myalgias, arthralgias          Medications:     Current Facility-Administered Medications   Medication Dose Route Frequency Provider Last Rate Last Admin    acetaminophen (TYLENOL) tablet 975 mg  975 mg Oral or Feeding Tube Q8H Sosa Mcleod MD   975 mg at 05/28/24 0624    acetylcysteine (MUCOMYST) 20 % nebulizer solution 2 mL  2 mL Nebulization BID Ritu Chase NP   2 mL at 05/27/24 1924    albuterol (PROVENTIL) neb solution 2.5 mg  2.5 mg Nebulization Q28 Days Tamara Martin MD        And    pentamidine (NEBUPENT) neb solution 300 mg  300 mg Inhalation Q28 Days Tamara Martin MD   300 mg at 05/24/24 1532    amphotericin B LIPOSOME (AMBISOME) 4 mg/ml inhalation solution 50 mg  50 mg Nebulization Once per day on Monday Thursday Pedro Pablo Mock MD   50 mg at 05/27/24 0732    aspirin (ASA) chewable tablet 162 mg  162 mg Oral or NG Tube Daily Sosa Mcleod MD   162 mg at 05/28/24 0858    calcium carbonate-vitamin D (CALTRATE) 600-10 MG-MCG per tablet 1 tablet  1 tablet Oral or Feeding Tube BID w/meals Pedro Pablo Mock MD   1 tablet at 05/27/24 2238    cyanocobalamin (VITAMIN B-12) tablet 1,000 mcg  1,000 mcg Oral or Feeding Tube Daily Perlman, David Morris, MD   1,000 mcg at 05/27/24 1225    fiber modular (BANATROL TF) packet 1 packet  1 packet Per Feeding Tube Q8H [facility] Gloria Jain MD   1 packet at 05/28/24 0625    gabapentin (NEURONTIN) capsule 100 mg  100 mg Oral At Bedtime Sosa Mcleod MD   100 mg at 05/27/24 2238    heparin ANTICOAGULANT injection 5,000 Units  5,000 Units Subcutaneous Q8H Sosa Mcleod,  I concur with the Admission Order and I certify that services are provided in accordance with Section 42 CFR § 412.3 MD   5,000 Units at 05/28/24 0625    insulin aspart (NovoLOG) injection (RAPID ACTING)  1-12 Units Subcutaneous Q4H Bhavani Osullivan PA-C   2 Units at 05/28/24 0443    levalbuterol (XOPENEX) neb solution 1.25 mg  1.25 mg Nebulization 4x Daily Pedro Pablo Mock MD   1.25 mg at 05/28/24 0753    metoprolol tartrate (LOPRESSOR) tablet 25 mg  25 mg Oral or Feeding Tube BID Serge Briseno MD   25 mg at 05/28/24 0859    multivitamin, therapeutic (THERA-VIT) tablet 1 tablet  1 tablet Oral or Feeding Tube Daily Abena Alfred MD   1 tablet at 05/27/24 1224    mycophenolate (GENERIC EQUIVALENT) capsule 250 mg  250 mg Oral Daily J Carlos Hannah MD        Or    mycophenolate (CELLCEPT BRAND) suspension 250 mg  250 mg Oral or NG Tube Daily J Carlos Hannah MD   250 mg at 05/28/24 0857    nystatin (MYCOSTATIN) suspension 1,000,000 Units  1,000,000 Units Swish & Spit 4x Daily Mela Nolasco PA-C   1,000,000 Units at 05/28/24 0907    pantoprazole (PROTONIX) IV push injection 40 mg  40 mg Intravenous BID Yamilet Wilkins MD   40 mg at 05/28/24 0905    predniSONE (DELTASONE) tablet 20 mg  20 mg Per Feeding Tube Daily Mela Nolasco PA-C   20 mg at 05/28/24 0859    protein modular (PROSOURCE TF20) packet 1 packet  1 packet Per Feeding Tube Daily Gloria Jain MD        rosuvastatin (CRESTOR) tablet 10 mg  10 mg Oral or Feeding Tube Daily Bhavani Osullivan PA-C   10 mg at 05/28/24 0859    sodium chloride (NEBUSAL) 3 % neb solution 4 mL  4 mL Nebulization BID Ritu Chase NP   4 mL at 05/27/24 1529    sodium chloride (PF) 0.9% PF flush 3 mL  3 mL Intracatheter Q8H Pedro Pablo Mock MD   3 mL at 05/28/24 0904    [Held by provider] sulfamethoxazole-trimethoprim (BACTRIM/SEPTRA) suspension 80 mg  10 mL Oral or NG Tube Daily Pedro Pablo Mock MD   80 mg at 05/23/24 0753    Or    [Held by provider] sulfamethoxazole-trimethoprim (BACTRIM) 400-80 MG per tablet 1 tablet  1 tablet Oral or NG  Tube Daily Pedro Pablo Mock MD   1 tablet at 05/24/24 0846    tacrolimus (GENERIC) suspension 6 mg  6 mg Per Feeding Tube QAM J Carlos Hannah MD   6 mg at 05/28/24 0857    tacrolimus (GENERIC) suspension 6 mg  6 mg Per Feeding Tube QPM J Carlos Hannah MD   6 mg at 05/27/24 1830    traZODone (DESYREL) tablet 50 mg  50 mg Oral At Bedtime Chan Caputo APRN CNP   50 mg at 05/27/24 2238    valGANciclovir (VALCYTE) solution 900 mg  900 mg Per Feeding Tube Daily Ritu Chase NP   900 mg at 05/28/24 0857     Current Facility-Administered Medications   Medication Dose Route Frequency Provider Last Rate Last Admin    albuterol (PROVENTIL) neb solution 2.5 mg  2.5 mg Nebulization Q2H PRN Gloria Jain MD   2.5 mg at 05/05/24 1711    bisacodyl (DULCOLAX) suppository 10 mg  10 mg Rectal Daily PRN Pedro Pablo Mock MD        dextrose 10% infusion   Intravenous Continuous PRN Talat Gaitan PA-C        dextrose 10% infusion   Intravenous Continuous PRN Gloria Jain MD        glucose gel 15-30 g  15-30 g Oral Q15 Min PRN Bhavani Osullivan PA-C        Or    dextrose 50 % injection 25-50 mL  25-50 mL Intravenous Q15 Min PRN Bhavani Osullivan PA-C   25 mL at 05/22/24 1619    Or    glucagon injection 1 mg  1 mg Subcutaneous Q15 Min PRN Bhavani Osullivan PA-C        hydrALAZINE (APRESOLINE) injection 10 mg  10 mg Intravenous Q6H PRN Arturo England MD        ipratropium - albuterol 0.5 mg/2.5 mg/3 mL (DUONEB) neb solution 3 mL  3 mL Nebulization Q4H PRN Gloria Jain MD   3 mL at 05/27/24 1818    labetalol (NORMODYNE/TRANDATE) injection 10 mg  10 mg Intravenous Q6H PRN Arturo England MD   10 mg at 05/16/24 1824    lidocaine (LMX4) cream   Topical Q1H PRN Pedro Pablo Mock MD        lidocaine 1 % 0.1-1 mL  0.1-1 mL Other Q1H PRN Pedro Pablo Mock MD        magnesium hydroxide (MILK OF MAGNESIA) suspension 30 mL  30 mL Oral Daily PRN Pedro Pablo Mock MD         methocarbamol (ROBAXIN) tablet 500 mg  500 mg Oral or Feeding Tube Q6H PRN Pedro Pablo Mock MD   500 mg at 05/26/24 1243    naloxone (NARCAN) injection 0.2 mg  0.2 mg Intravenous Q2 Min PRN Perlman, David Morris, MD        Or    naloxone (NARCAN) injection 0.4 mg  0.4 mg Intravenous Q2 Min PRN Perlman, David Morris, MD        Or    naloxone (NARCAN) injection 0.2 mg  0.2 mg Intramuscular Q2 Min PRN Perlman, David Morris, MD        Or    naloxone (NARCAN) injection 0.4 mg  0.4 mg Intramuscular Q2 Min PRN Perlman, David Morris, MD        NO anticoagulation unless approved by Anesthesia Provider   Does not apply Continuous PRN Vernon Godfrey MD        ondansetron (ZOFRAN ODT) ODT tab 4 mg  4 mg Oral Q6H PRN Pedro Pablo Mock MD        Or    ondansetron (ZOFRAN) injection 4 mg  4 mg Intravenous Q6H PRN Pedro Pablo Mock MD        oxyCODONE (ROXICODONE) solution 5 mg  5 mg Per Feeding Tube Q4H PRN Mirtha Scott MD   5 mg at 05/26/24 0412    oxyCODONE (ROXICODONE) solution 7.5 mg  7.5 mg Per Feeding Tube Q4H PRN Mirtha Scott MD        polyethylene glycol (MIRALAX) Packet 17 g  17 g Oral Daily PRN Perlman, David Morris, MD        prochlorperazine (COMPAZINE) injection 5 mg  5 mg Intravenous Q6H PRN Pedro Pablo Mock MD        Or    prochlorperazine (COMPAZINE) tablet 5 mg  5 mg Oral Q6H PRN Pedro Pablo Mock MD        senna-docusate (SENOKOT-S/PERICOLACE) 8.6-50 MG per tablet 2 tablet  2 tablet Oral or Feeding Tube BID PRN Mirtha Scott MD        sodium chloride (PF) 0.9% PF flush 3 mL  3 mL Intracatheter q1 min prn Pedro Pablo Mock MD                Physical Exam:   Temp:  [97.2  F (36.2  C)-98.2  F (36.8  C)] 97.7  F (36.5  C)  Pulse:  [104-113] 112  Resp:  [23-34] 30  BP: ()/(59-75) 120/62  FiO2 (%):  [30 %] 30 %  SpO2:  [96 %-100 %] 100 %    Intake/Output Summary (Last 24 hours) at 5/27/2024 0771  Last data filed at 5/27/2024 0500  Gross per 24 hour   Intake 2205 ml    Output 600 ml   Net 1605 ml     General: alert, awake, resting comfortably in bed  HEENT: extraocular movements intact, trachea midline, HFNC in place at FiO2 0.3 and 25L  CV: RRR  Pulmonary: normal chest excursion, good air movement on posterior auscultation, few crackles  Extremities: warm, well perfused, no edema in the bilateral lower extremities  Neuro: conversational, oriented, tremulous at rest         Data:   All laboratory and imaging data reviewed.    Results for orders placed or performed during the hospital encounter of 05/04/24 (from the past 24 hour(s))   Glucose by meter   Result Value Ref Range    GLUCOSE BY METER POCT 165 (H) 70 - 99 mg/dL   Magnesium   Result Value Ref Range    Magnesium 2.2 1.7 - 2.3 mg/dL   Glucose by meter   Result Value Ref Range    GLUCOSE BY METER POCT 115 (H) 70 - 99 mg/dL   Basic metabolic panel   Result Value Ref Range    Sodium 132 (L) 135 - 145 mmol/L    Potassium 5.1 3.4 - 5.3 mmol/L    Chloride 102 98 - 107 mmol/L    Carbon Dioxide (CO2) 22 22 - 29 mmol/L    Anion Gap 8 7 - 15 mmol/L    Urea Nitrogen 20.9 8.0 - 23.0 mg/dL    Creatinine 0.55 (L) 0.67 - 1.17 mg/dL    GFR Estimate >90 >60 mL/min/1.73m2    Calcium 8.3 (L) 8.8 - 10.2 mg/dL    Glucose 145 (H) 70 - 99 mg/dL   Glucose by meter   Result Value Ref Range    GLUCOSE BY METER POCT 192 (H) 70 - 99 mg/dL   Glucose by meter   Result Value Ref Range    GLUCOSE BY METER POCT 166 (H) 70 - 99 mg/dL   CBC with platelets differential    Narrative    The following orders were created for panel order CBC with platelets differential.  Procedure                               Abnormality         Status                     ---------                               -----------         ------                     CBC with platelets and d...[[id number]]  Abnormal            Final result               Manual Differential[[id number]]          Abnormal            Final result                 Please view results for these tests on the  individual orders.   Basic metabolic panel   Result Value Ref Range    Sodium 131 (L) 135 - 145 mmol/L    Potassium 5.2 3.4 - 5.3 mmol/L    Chloride 100 98 - 107 mmol/L    Carbon Dioxide (CO2) 25 22 - 29 mmol/L    Anion Gap 6 (L) 7 - 15 mmol/L    Urea Nitrogen 22.5 8.0 - 23.0 mg/dL    Creatinine 0.54 (L) 0.67 - 1.17 mg/dL    GFR Estimate >90 >60 mL/min/1.73m2    Calcium 8.1 (L) 8.8 - 10.2 mg/dL    Glucose 166 (H) 70 - 99 mg/dL   Fibrinogen activity   Result Value Ref Range    Fibrinogen Activity 470 170 - 490 mg/dL   INR   Result Value Ref Range    INR 1.01 0.85 - 1.15   Partial thromboplastin time   Result Value Ref Range    aPTT 28 22 - 38 Seconds   Cytomegalovirus DNA by PCR, Quantitative    Specimen: Hand, Right; Blood   Result Value Ref Range    CMV DNA IU/mL, Instrument 36 (H) Not Detected IU/mL    CMV log 1.6     Narrative    The moustapha  CMV assay is a FDA-approved in vitro nucleic acid amplification test for the quantification of cytomegalovirus (CMV) DNA in human EDTA plasma using the Roche moustapha  6800 instrument for automated viral nucleic acid extraction and purification (silica-based capture technique), followed by PCR amplification and real-time detection. Selective amplification of target nucleic acid from the sample is achieved by the use of target virus-specific forward and reverse primers which are selected from highly conserved regions of the CMV DNA polymerase (UL54) gene.     This test is intended for use as an aid in the management of CMV in transplant patients. In patients receiving anti-CMV therapy, serial DNA measurements can be used to assess viral response to treatment. Titer results are reported in International Units/mL (IU/mL). This assay has received FDA approval for the testing of human EDTA plasma only. The [facility] at [facility] has validated the performance characteristics of the moustapha  CMV assay for plasma and urine.   Phosphorus   Result  Value Ref Range    Phosphorus 3.3 2.5 - 4.5 mg/dL   Magnesium   Result Value Ref Range    Magnesium 1.8 1.7 - 2.3 mg/dL   CBC with platelets and differential   Result Value Ref Range    WBC Count 2.0 (L) 4.0 - 11.0 10e3/uL    RBC Count 2.45 (L) 4.40 - 5.90 10e6/uL    Hemoglobin 8.1 (L) 13.3 - 17.7 g/dL    Hematocrit 25.4 (L) 40.0 - 53.0 %     (H) 78 - 100 fL    MCH 33.1 (H) 26.5 - 33.0 pg    MCHC 31.9 31.5 - 36.5 g/dL    RDW 22.0 (H) 10.0 - 15.0 %    Platelet Count 239 150 - 450 10e3/uL    % Neutrophils      % Lymphocytes      % Monocytes      % Eosinophils      % Basophils      % Immature Granulocytes      NRBCs per 100 WBC 2 (H) <1 /100    Absolute Neutrophils      Absolute Lymphocytes      Absolute Monocytes      Absolute Eosinophils      Absolute Basophils      Absolute Immature Granulocytes      Absolute NRBCs 0.0 10e3/uL   Manual Differential   Result Value Ref Range    % Neutrophils 75 %    % Lymphocytes 23 %    % Monocytes 1 %    % Eosinophils 0 %    % Basophils 1 %    NRBCs per 100 WBC 3 (H) <=0 %    Absolute Neutrophils 1.5 (L) 1.6 - 8.3 10e3/uL    Absolute Lymphocytes 0.5 (L) 0.8 - 5.3 10e3/uL    Absolute Monocytes 0.0 0.0 - 1.3 10e3/uL    Absolute Eosinophils 0.0 0.0 - 0.7 10e3/uL    Absolute Basophils 0.0 0.0 - 0.2 10e3/uL    Absolute NRBCs 0.1 (H) <=0.0 10e3/uL    RBC Morphology Confirmed RBC Indices     Platelet Assessment  Automated Count Confirmed. Platelet morphology is normal.     Automated Count Confirmed. Platelet morphology is normal.    Polychromasia Slight (A) None Seen   Glucose by meter   Result Value Ref Range    GLUCOSE BY METER POCT 169 (H) 70 - 99 mg/dL   CT Head w/o Contrast    Narrative    EXAM: CT HEAD W/O CONTRAST  5/28/2024 6:35 AM     HISTORY:  Recent hx of SDH, follow up imaging       COMPARISON:  5/21/2024    TECHNIQUE: Using multidetector thin collimation helical acquisition  technique, axial, coronal and sagittal CT images from the skull base  to the vertex were  obtained without intravenous contrast.   (topogram) image(s) also obtained and reviewed.    FINDINGS: There is decreased density with unchanged maximal  thickness/extent of subdural hemorrhages overlying the bilateral  parietal convexities, measuring up to 2 mm on the right and 5 mm on  the left (series 3 image 16). No significant mass effect or midline  shift.    No new or worsening intracranial hemorrhage. No acute loss of  gray-white matter differentiation is suspected. Bilateral basal  ganglia and cerebellar hemisphere calcifications are unchanged.      Impression    IMPRESSION: Mild decreased density with otherwise unchanged extent of  subdural hematomas overlying the bilateral parietal convexities.    I have personally reviewed the examination and initial interpretation  and I agree with the findings.    GUILLERMO ANDINO MD         SYSTEM ID:  [id number]   Glucose by meter   Result Value Ref Range    GLUCOSE BY METER POCT 131 (H) 70 - 99 mg/dL   Glucose by meter   Result Value Ref Range    GLUCOSE BY METER POCT 163 (H) 70 - 99 mg/dL   XR Chest Port 1 View    Narrative    EXAM: XR CHEST PORT 1 VIEW  5/28/2024 9:03 AM      HISTORY: s/p lung transplant, interval monitoring    COMPARISON: 5/27/2024    FINDINGS: Single view of the chest. Stable postoperative changes in  the chest with intact median sternotomy wires and mediastinal surgical  clips. Enteric tube courses inferiorly below the diaphragm at the  field-of-view. Enteric contrast within the stomach. Trachea is  midline. Cardiac silhouette is stable. Continued bibasilar patchy  opacities. Stable small-to-moderate right pleural effusion. No  pneumothorax.      Impression    IMPRESSION: Underlying status post bilateral lung transplantation   1. Stable patchy bibasilar opacities, atelectasis versus infection.  2. Stable small-to-moderate right pleural effusion.    I have personally reviewed the examination and initial interpretation  and I agree with the  findings.    KIT VANCE MD         SYSTEM ID:  [id number]

## 2024-07-15 ENCOUNTER — RX RENEWAL (OUTPATIENT)
Age: 89
End: 2024-07-15

## 2024-07-19 ENCOUNTER — APPOINTMENT (OUTPATIENT)
Dept: GERIATRICS | Facility: CLINIC | Age: 89
End: 2024-07-19
Payer: MEDICARE

## 2024-07-19 VITALS
DIASTOLIC BLOOD PRESSURE: 75 MMHG | HEIGHT: 64 IN | TEMPERATURE: 96.5 F | HEART RATE: 69 BPM | BODY MASS INDEX: 26.55 KG/M2 | SYSTOLIC BLOOD PRESSURE: 144 MMHG | OXYGEN SATURATION: 99 % | WEIGHT: 155.5 LBS

## 2024-07-19 DIAGNOSIS — F03.90 UNSPECIFIED DEMENTIA W/OUT BEHAVIORAL DISTURBANCE: ICD-10-CM

## 2024-07-19 DIAGNOSIS — M17.0 BILATERAL PRIMARY OSTEOARTHRITIS OF KNEE: ICD-10-CM

## 2024-07-19 DIAGNOSIS — E03.9 HYPOTHYROIDISM, UNSPECIFIED: ICD-10-CM

## 2024-07-19 DIAGNOSIS — I10 ESSENTIAL (PRIMARY) HYPERTENSION: ICD-10-CM

## 2024-07-19 PROCEDURE — G2211 COMPLEX E/M VISIT ADD ON: CPT

## 2024-07-19 PROCEDURE — 99214 OFFICE O/P EST MOD 30 MIN: CPT | Mod: GC

## 2024-07-22 DIAGNOSIS — M19.90 UNSPECIFIED OSTEOARTHRITIS, UNSPECIFIED SITE: ICD-10-CM

## 2024-07-29 NOTE — PROGRESS NOTE ADULT - PROBLEM SELECTOR PROBLEM 8
S/p stenting 2020 with outside facility, POA no shortness of breath, no chest pain, no palpitations  Continue home beta-blocker therapy, ARB, currently on home Aspirin/Plavix  Initial EKG demonstrated rapid A-fib  Patient self converted to sinus rhythm  Cardiology consulted, appreciate input  Continue lopressor 12.5 mg BID  Stress test 7/20 completed.  No ischemia and normal LV systolic function  Follow-up with outpatient cardiology  
Chronic hepatitis C without hepatic coma
Acute cystitis without hematuria
Prophylactic measure
Prophylactic measure

## 2024-08-10 NOTE — CONSULT NOTE ADULT - SUBJECTIVE AND OBJECTIVE BOX
ER Vascular Surgery Consult  Consulting surgical team: Vascular Surgery  Consulting attending: Dr. Varela    HPI:  89F PMH dementia, HCV (in remission), HTN, CHF, hypothyroidism, Osteoarthritis, presenting with cc difficulty ambulating 2/2 pain. Per patient's daughter (bedside), pt has chronic hip and LE pain. Patient received a cortisone shot to the R hip (), which helped at first but daughter noticed that pt seemed to have increasing pain over the past few days along with decreased ambulation. Patient herself denies any pain but is demented at baseline.     Upon arrival to [facility] ED, AVSS. WBC 12.3. Primary team noticed that patient's LLE appeared cooler than the RLE. Vascular Surgery consulted to assess. Patient's daughter states they have seen a Vascular surgeon once before (referred by PMD for "pooling" of water in the feet, pt daughter does not remember name of surgeon - was recommended to wear compression stockings at the time, no further follow up).      PAST MEDICAL HISTORY:  Dementia without behavioral disturbance, unspecified dementia type  Hepatitis C  Hypertension  Arthritis  Hypothyroidism  Hepatitis C  Hypertension      PAST SURGICAL HISTORY:  No significant past surgical history      MEDICATIONS:      ALLERGIES:  No Known Allergies      VITALS & I/Os:  Vital Signs Last 24 Hrs  T(C): 36.5 (26 Dec 2018 02:30), Max: 39.2 (25 Dec 2018 21:08)  T(F): 97.7 (26 Dec 2018 02:30), Max: 102.5 (25 Dec 2018 21:08)  HR: 89 (26 Dec 2018 02:30) (89 - 102)  BP: 120/71 (26 Dec 2018 02:30) (120/71 - 151/86)  BP(mean): --  RR: 18 (26 Dec 2018 02:30) (16 - 18)  SpO2: 97% (26 Dec 2018 02:30) (97% - 100%)    I&O's Summary      PHYSICAL EXAM:  General: Laying in bed, in No acute distress  Respiratory: Nonlabored  Abdominal: Soft, nondistended, nontender  Extremities:   BL LE: Palpable Femoral, Popliteal and DP bilaterally. LLE toes appear cooler compared to RLE, however FROM, sensory intact, FROM. 2+ pitting edema BL     LABS:                        13.8   12.3  )-----------( 279      ( 25 Dec 2018 20:55 )             40.2         128<L>  |  88<L>  |  16  ----------------------------<  135<H>  4.2   |  24  |  0.69    Ca    9.9      25 Dec 2018 20:55    TPro  7.8  /  Alb  4.2  /  TBili  0.5  /  DBili  x   /  AST  25  /  ALT  16  /  AlkPhos  85      Lactate:    PT/INR - ( 25 Dec 2018 20:55 )   PT: 15.3 sec;   INR: 1.33 ratio         PTT - ( 25 Dec 2018 20:55 )  PTT:31.0 sec          Urinalysis Basic - ( 25 Dec 2018 22:47 )    Color: Light Yellow / Appearance: Clear / S.014 / pH: x  Gluc: x / Ketone: Trace  / Bili: Negative / Urobili: Negative   Blood: x / Protein: Trace / Nitrite: Negative   Leuk Esterase: Negative / RBC: 1 /hpf / WBC 0 /hpf   Sq Epi: x / Non Sq Epi: 0 /hpf / Bacteria: Negative        IMAGING:  Xray Hip w/ Pelvis 2 or 3 Views, Right (.18 @ 22:30)  No acute fracture or dislocation.  Bilateral hip OA

## 2024-09-06 ENCOUNTER — APPOINTMENT (OUTPATIENT)
Dept: RHEUMATOLOGY | Facility: CLINIC | Age: 89
End: 2024-09-06

## 2024-09-10 ENCOUNTER — APPOINTMENT (OUTPATIENT)
Dept: RHEUMATOLOGY | Facility: CLINIC | Age: 89
End: 2024-09-10
Payer: MEDICARE

## 2024-09-10 VITALS
WEIGHT: 154.25 LBS | BODY MASS INDEX: 26.34 KG/M2 | DIASTOLIC BLOOD PRESSURE: 78 MMHG | OXYGEN SATURATION: 90 % | HEIGHT: 64 IN | HEART RATE: 65 BPM | SYSTOLIC BLOOD PRESSURE: 157 MMHG | RESPIRATION RATE: 16 BRPM

## 2024-09-10 DIAGNOSIS — M11.20 OTHER CHONDROCALCINOSIS, UNSPECIFIED SITE: ICD-10-CM

## 2024-09-10 PROCEDURE — 99214 OFFICE O/P EST MOD 30 MIN: CPT

## 2024-09-10 RX ORDER — PREDNISONE 5 MG/1
5 TABLET ORAL
Qty: 12 | Refills: 0 | Status: ACTIVE | COMMUNITY
Start: 2024-09-10 | End: 1900-01-01

## 2024-09-10 NOTE — PHYSICAL EXAM
[General Appearance - Alert] : alert [General Appearance - In No Acute Distress] : in no acute distress [General Appearance - Well Nourished] : well nourished [General Appearance - Well Developed] : well developed [FreeTextEntry1] : chronic changes with dec rom bilateral shoudlers, oa changes in hands, left knee with moderate swelling and mild warmth, right knee with mild cool swelling.

## 2024-09-10 NOTE — HISTORY OF PRESENT ILLNESS
[FreeTextEntry1] : last week with pain swelling in the knees  pain was swelling was worst on Tuesday and Friday and better Wednesday/Thursday  also has some pain in the shoulders and left hip  has been having difficulty moving around due to the pain  tried ice and biofreeze and sitting which was helping  no recent infections  dry cough intermittently  no fevers  uses advil 400 mg daily  she had an issue with dehydration addressed by EMTs that occurred the same day as the joint pain started (9-3-2024) currently working to stay hydrated (water, tea, watermelon)

## 2024-09-10 NOTE — PHYSICAL EXAM
Pre-Visit Chart Review  For Appointment Scheduled on 10-30-17    Health Maintenance Due   Topic Date Due    Hepatitis C Screening  1963    TETANUS VACCINE  09/30/1981    Pneumococcal PPSV23 (Medium Risk) (1) 09/30/1981    Pap Smear with HPV Cotest  09/30/1984    Mammogram  09/30/2003    Colonoscopy  09/30/2013    Influenza Vaccine  08/01/2017       [General Appearance - Alert] : alert [General Appearance - In No Acute Distress] : in no acute distress [General Appearance - Well Nourished] : well nourished [General Appearance - Well Developed] : well developed [FreeTextEntry1] : chronic changes with dec rom bilateral shoudlers, oa changes in hands, left knee with moderate swelling and mild warmth, right knee with mild cool swelling.

## 2024-09-10 NOTE — ASSESSMENT
[FreeTextEntry1] : OA and crystal arthopathy  recent flare (oligoarticular) with episode of dehydration   -> ok to continue advil 400mg daily but need to add fmaotidine (d/w daughter again today )   -> mild cppd flare - steroids (low dose short taper below)   -> goal of care:  Maintain ability to transfer from chair to toilet/bed and decreased pain  PT to help maintain function - held last week due to dehydration and planning to restart  Risks of, benefits of and alternatives to this treatment plan discussed with patient and patient expressed understanding.   Today's medical care services serve as the continuing focal point for needed health care services that are part of ongoing care related to a patient's one or more serious conditions or a complex condition.   Time spent on the encounter included but is not limited to, preparing to see the patient, obtaining and/or reviewing separately obtained history, performing the evaluation, counseling and educating, independently interpreting results with communication to the patient, order placement, referring and/or communicating with other health professionals as described, and documenting clinical information in the electronic health record.   JAQUAN KAM was counseled on the differential, workup, disease course, and treatment/management.   Education was provided to JAQUAN KAM during this encounter. All questions and concerns were answered and addressed in detail.  JAQUAN KAM verbalized understanding and agreed to the plan.   JAQUAN KAM has been instructed to call for an earlier appointment if new symptoms develop in the interim. JAQUAN KAM has been instructed to make a follow-up appointment in 6 months

## 2024-09-13 ENCOUNTER — NON-APPOINTMENT (OUTPATIENT)
Age: 89
End: 2024-09-13

## 2024-10-01 ENCOUNTER — APPOINTMENT (OUTPATIENT)
Dept: GERIATRICS | Facility: CLINIC | Age: 89
End: 2024-10-01

## 2024-10-09 ENCOUNTER — APPOINTMENT (OUTPATIENT)
Dept: CARDIOLOGY | Facility: CLINIC | Age: 89
End: 2024-10-09
Payer: MEDICARE

## 2024-10-09 ENCOUNTER — NON-APPOINTMENT (OUTPATIENT)
Age: 89
End: 2024-10-09

## 2024-10-09 VITALS
BODY MASS INDEX: 27.29 KG/M2 | SYSTOLIC BLOOD PRESSURE: 164 MMHG | OXYGEN SATURATION: 94 % | DIASTOLIC BLOOD PRESSURE: 81 MMHG | HEART RATE: 74 BPM | WEIGHT: 154 LBS | HEIGHT: 63 IN

## 2024-10-09 VITALS — SYSTOLIC BLOOD PRESSURE: 161 MMHG | DIASTOLIC BLOOD PRESSURE: 80 MMHG

## 2024-10-09 DIAGNOSIS — R73.03 PREDIABETES.: ICD-10-CM

## 2024-10-09 DIAGNOSIS — I10 ESSENTIAL (PRIMARY) HYPERTENSION: ICD-10-CM

## 2024-10-09 DIAGNOSIS — M19.90 UNSPECIFIED OSTEOARTHRITIS, UNSPECIFIED SITE: ICD-10-CM

## 2024-10-09 DIAGNOSIS — E03.9 HYPOTHYROIDISM, UNSPECIFIED: ICD-10-CM

## 2024-10-09 DIAGNOSIS — I51.89 OTHER ILL-DEFINED HEART DISEASES: ICD-10-CM

## 2024-10-09 DIAGNOSIS — Z00.00 ENCOUNTER FOR GENERAL ADULT MEDICAL EXAMINATION W/OUT ABNORMAL FINDINGS: ICD-10-CM

## 2024-10-09 PROCEDURE — 93000 ELECTROCARDIOGRAM COMPLETE: CPT

## 2024-10-09 PROCEDURE — 99214 OFFICE O/P EST MOD 30 MIN: CPT

## 2024-10-09 PROCEDURE — G2211 COMPLEX E/M VISIT ADD ON: CPT

## 2024-10-10 LAB
25(OH)D3 SERPL-MCNC: 43 NG/ML
ALBUMIN SERPL ELPH-MCNC: 4.5 G/DL
ALP BLD-CCNC: 126 U/L
ALT SERPL-CCNC: 28 U/L
ANION GAP SERPL CALC-SCNC: 13 MMOL/L
AST SERPL-CCNC: 44 U/L
BILIRUB SERPL-MCNC: 0.2 MG/DL
BUN SERPL-MCNC: 41 MG/DL
CALCIUM SERPL-MCNC: 10.4 MG/DL
CHLORIDE SERPL-SCNC: 99 MMOL/L
CHOLEST SERPL-MCNC: 225 MG/DL
CO2 SERPL-SCNC: 21 MMOL/L
CREAT SERPL-MCNC: 0.84 MG/DL
EGFR: 62 ML/MIN/1.73M2
ESTIMATED AVERAGE GLUCOSE: 114 MG/DL
GLUCOSE SERPL-MCNC: 90 MG/DL
HBA1C MFR BLD HPLC: 5.6 %
HCT VFR BLD CALC: 36 %
HDLC SERPL-MCNC: 87 MG/DL
HGB BLD-MCNC: 12.3 G/DL
LDLC SERPL CALC-MCNC: 126 MG/DL
MCHC RBC-ENTMCNC: 31.9 PG
MCHC RBC-ENTMCNC: 34.2 GM/DL
MCV RBC AUTO: 93.3 FL
NONHDLC SERPL-MCNC: 138 MG/DL
PLATELET # BLD AUTO: 209 K/UL
POTASSIUM SERPL-SCNC: 5.4 MMOL/L
PROT SERPL-MCNC: 7.6 G/DL
RBC # BLD: 3.86 M/UL
RBC # FLD: 15.3 %
SODIUM SERPL-SCNC: 133 MMOL/L
T4 FREE SERPL-MCNC: 1 NG/DL
TRIGL SERPL-MCNC: 72 MG/DL
TSH SERPL-ACNC: 1.37 UIU/ML
WBC # FLD AUTO: 6.03 K/UL

## 2024-10-12 ENCOUNTER — RX RENEWAL (OUTPATIENT)
Age: 89
End: 2024-10-12

## 2024-10-13 NOTE — HISTORY OF PRESENT ILLNESS
[No falls in past year] : Patient reported no falls in the past year [Completely Dependent] : Completely dependent. [Full assistance needed] : Assistance needed managing medications [] : Assistance needed managing finances. [Walker] : walker [Wheelchair] : wheelchair [Grab Bars] : grab bars [Shower Chair] : shower chair [Night Light] : night light [0] : 2) Feeling down, depressed, or hopeless: Not at all (0) [PHQ-2 Negative - No further assessment needed] : PHQ-2 Negative - No further assessment needed [Stable] : Status: Stable [FreeTextEntry1] : 89 yo woman w/ PMH moderate dementia, hypothyroidism, HCV, CHF, polyarticular OA, HTN presents for f/u visit.  Rheum: Eleanor Cards: Gael Leija  Patient's daughter reports that she has been doing well, her episodes of agitation have reduced. She was recommended to start Seroquel 12.5mg as patients aid on last visit endorsed that patient was having hallucinations and was very agitated. Seroquel was not started as daughter was concerned about adverse effects.   Due to her arthritis she is using her wheelchair more often but sometimes still uses the walker. Has an aide 12 hours a week and lives with her daughter. Does physical therapy. She was using advil for pain previously, however has switched to Tylenol now.   Daughter also noted that patient tends to chew her food and spit it out. There have been no significant changes to her weight.  [Stair Lift] : no stair lift used in home [[id number]] : 0

## 2024-10-13 NOTE — HISTORY OF PRESENT ILLNESS
[No falls in past year] : Patient reported no falls in the past year [Completely Dependent] : Completely dependent. [Full assistance needed] : Assistance needed managing medications [] : Assistance needed managing finances. [Walker] : walker [Wheelchair] : wheelchair [Grab Bars] : grab bars [Shower Chair] : shower chair [Night Light] : night light [0] : 2) Feeling down, depressed, or hopeless: Not at all (0) [PHQ-2 Negative - No further assessment needed] : PHQ-2 Negative - No further assessment needed [Stable] : Status: Stable [FreeTextEntry1] : 89 yo woman w/ PMH moderate dementia, hypothyroidism, HCV, CHF, polyarticular OA, HTN presents for f/u visit.  Rheum: Eleanor Cards: Gael Leija  Patient's daughter reports that she has been doing well, her episodes of agitation have reduced. She was recommended to start Seroquel 12.5mg as patients aid on last visit endorsed that patient was having hallucinations and was very agitated. Seroquel was not started as daughter was concerned about adverse effects.   Due to her arthritis she is using her wheelchair more often but sometimes still uses the walker. Has an aide 12 hours a week and lives with her daughter. Does physical therapy. She was using advil for pain previously, however has switched to Tylenol now.   Daughter also noted that patient tends to chew her food and spit it out. There have been no significant changes to her weight.  [Stair Lift] : no stair lift used in home [[id number]] : 0

## 2024-11-08 ENCOUNTER — EMERGENCY (EMERGENCY)
Facility: HOSPITAL | Age: 89
LOS: 1 days | Discharge: HOME | End: 2024-11-08
Attending: EMERGENCY MEDICINE
Payer: MEDICARE

## 2024-11-08 ENCOUNTER — APPOINTMENT (OUTPATIENT)
Dept: RHEUMATOLOGY | Facility: CLINIC | Age: 89
End: 2024-11-08
Payer: MEDICARE

## 2024-11-08 VITALS
OXYGEN SATURATION: 97 % | SYSTOLIC BLOOD PRESSURE: 142 MMHG | HEART RATE: 80 BPM | RESPIRATION RATE: 16 BRPM | TEMPERATURE: 98 F | DIASTOLIC BLOOD PRESSURE: 79 MMHG

## 2024-11-08 VITALS
HEART RATE: 80 BPM | OXYGEN SATURATION: 93 % | WEIGHT: 157 LBS | BODY MASS INDEX: 27.82 KG/M2 | SYSTOLIC BLOOD PRESSURE: 155 MMHG | DIASTOLIC BLOOD PRESSURE: 90 MMHG | HEIGHT: 63 IN

## 2024-11-08 VITALS
WEIGHT: 156.97 LBS | SYSTOLIC BLOOD PRESSURE: 146 MMHG | RESPIRATION RATE: 20 BRPM | DIASTOLIC BLOOD PRESSURE: 92 MMHG | TEMPERATURE: 98 F | OXYGEN SATURATION: 99 % | HEIGHT: 63 IN | HEART RATE: 76 BPM

## 2024-11-08 DIAGNOSIS — Z95.818 PRESENCE OF OTHER CARDIAC IMPLANTS AND GRAFTS: Chronic | ICD-10-CM

## 2024-11-08 LAB
ALBUMIN SERPL ELPH-MCNC: 4.6 G/DL — SIGNIFICANT CHANGE UP (ref 3.3–5)
ALP SERPL-CCNC: 126 U/L — HIGH (ref 40–120)
ALT FLD-CCNC: 30 U/L — SIGNIFICANT CHANGE UP (ref 10–45)
ANION GAP SERPL CALC-SCNC: 14 MMOL/L — SIGNIFICANT CHANGE UP (ref 5–17)
AST SERPL-CCNC: 49 U/L — HIGH (ref 10–40)
BASOPHILS # BLD AUTO: 0.05 K/UL — SIGNIFICANT CHANGE UP (ref 0–0.2)
BASOPHILS NFR BLD AUTO: 0.7 % — SIGNIFICANT CHANGE UP (ref 0–2)
BILIRUB SERPL-MCNC: 0.4 MG/DL — SIGNIFICANT CHANGE UP (ref 0.2–1.2)
BUN SERPL-MCNC: 40 MG/DL — HIGH (ref 7–23)
CALCIUM SERPL-MCNC: 10.6 MG/DL — HIGH (ref 8.4–10.5)
CHLORIDE SERPL-SCNC: 96 MMOL/L — SIGNIFICANT CHANGE UP (ref 96–108)
CO2 SERPL-SCNC: 20 MMOL/L — LOW (ref 22–31)
CREAT SERPL-MCNC: 0.97 MG/DL — SIGNIFICANT CHANGE UP (ref 0.5–1.3)
EGFR: 52 ML/MIN/1.73M2 — LOW
EOSINOPHIL # BLD AUTO: 0.34 K/UL — SIGNIFICANT CHANGE UP (ref 0–0.5)
EOSINOPHIL NFR BLD AUTO: 4.7 % — SIGNIFICANT CHANGE UP (ref 0–6)
GAS PNL BLDV: SIGNIFICANT CHANGE UP
GLUCOSE SERPL-MCNC: 94 MG/DL — SIGNIFICANT CHANGE UP (ref 70–99)
HCT VFR BLD CALC: 37.2 % — SIGNIFICANT CHANGE UP (ref 34.5–45)
HGB BLD-MCNC: 12.5 G/DL — SIGNIFICANT CHANGE UP (ref 11.5–15.5)
IMM GRANULOCYTES NFR BLD AUTO: 0.7 % — SIGNIFICANT CHANGE UP (ref 0–0.9)
LYMPHOCYTES # BLD AUTO: 1.56 K/UL — SIGNIFICANT CHANGE UP (ref 1–3.3)
LYMPHOCYTES # BLD AUTO: 21.7 % — SIGNIFICANT CHANGE UP (ref 13–44)
MAGNESIUM SERPL-MCNC: 2.3 MG/DL — SIGNIFICANT CHANGE UP (ref 1.6–2.6)
MCHC RBC-ENTMCNC: 31.7 PG — SIGNIFICANT CHANGE UP (ref 27–34)
MCHC RBC-ENTMCNC: 33.6 G/DL — SIGNIFICANT CHANGE UP (ref 32–36)
MCV RBC AUTO: 94.4 FL — SIGNIFICANT CHANGE UP (ref 80–100)
MONOCYTES # BLD AUTO: 0.94 K/UL — HIGH (ref 0–0.9)
MONOCYTES NFR BLD AUTO: 13.1 % — SIGNIFICANT CHANGE UP (ref 2–14)
NEUTROPHILS # BLD AUTO: 4.25 K/UL — SIGNIFICANT CHANGE UP (ref 1.8–7.4)
NEUTROPHILS NFR BLD AUTO: 59.1 % — SIGNIFICANT CHANGE UP (ref 43–77)
NRBC # BLD: 0 /100 WBCS — SIGNIFICANT CHANGE UP (ref 0–0)
NT-PROBNP SERPL-SCNC: 107 PG/ML — SIGNIFICANT CHANGE UP (ref 0–300)
PLATELET # BLD AUTO: 205 K/UL — SIGNIFICANT CHANGE UP (ref 150–400)
POTASSIUM SERPL-MCNC: 5.6 MMOL/L — HIGH (ref 3.5–5.3)
POTASSIUM SERPL-SCNC: 5.6 MMOL/L — HIGH (ref 3.5–5.3)
PROT SERPL-MCNC: 8.1 G/DL — SIGNIFICANT CHANGE UP (ref 6–8.3)
RBC # BLD: 3.94 M/UL — SIGNIFICANT CHANGE UP (ref 3.8–5.2)
RBC # FLD: 14.3 % — SIGNIFICANT CHANGE UP (ref 10.3–14.5)
SODIUM SERPL-SCNC: 130 MMOL/L — LOW (ref 135–145)
TROPONIN T, HIGH SENSITIVITY RESULT: 37 NG/L — SIGNIFICANT CHANGE UP (ref 0–51)
TROPONIN T, HIGH SENSITIVITY RESULT: 40 NG/L — SIGNIFICANT CHANGE UP (ref 0–51)
WBC # BLD: 7.19 K/UL — SIGNIFICANT CHANGE UP (ref 3.8–10.5)
WBC # FLD AUTO: 7.19 K/UL — SIGNIFICANT CHANGE UP (ref 3.8–10.5)

## 2024-11-08 PROCEDURE — 73030 X-RAY EXAM OF SHOULDER: CPT | Mod: 26,RT

## 2024-11-08 PROCEDURE — G2211 COMPLEX E/M VISIT ADD ON: CPT

## 2024-11-08 PROCEDURE — 84132 ASSAY OF SERUM POTASSIUM: CPT

## 2024-11-08 PROCEDURE — 82947 ASSAY GLUCOSE BLOOD QUANT: CPT

## 2024-11-08 PROCEDURE — 82435 ASSAY OF BLOOD CHLORIDE: CPT

## 2024-11-08 PROCEDURE — 96374 THER/PROPH/DIAG INJ IV PUSH: CPT

## 2024-11-08 PROCEDURE — 71045 X-RAY EXAM CHEST 1 VIEW: CPT

## 2024-11-08 PROCEDURE — 85018 HEMOGLOBIN: CPT

## 2024-11-08 PROCEDURE — 85025 COMPLETE CBC W/AUTO DIFF WBC: CPT

## 2024-11-08 PROCEDURE — 99285 EMERGENCY DEPT VISIT HI MDM: CPT | Mod: 25

## 2024-11-08 PROCEDURE — 83735 ASSAY OF MAGNESIUM: CPT

## 2024-11-08 PROCEDURE — 73020 X-RAY EXAM OF SHOULDER: CPT

## 2024-11-08 PROCEDURE — 99284 EMERGENCY DEPT VISIT MOD MDM: CPT

## 2024-11-08 PROCEDURE — 93005 ELECTROCARDIOGRAM TRACING: CPT

## 2024-11-08 PROCEDURE — 99214 OFFICE O/P EST MOD 30 MIN: CPT

## 2024-11-08 PROCEDURE — 83880 ASSAY OF NATRIURETIC PEPTIDE: CPT

## 2024-11-08 PROCEDURE — 73030 X-RAY EXAM OF SHOULDER: CPT

## 2024-11-08 PROCEDURE — 71045 X-RAY EXAM CHEST 1 VIEW: CPT | Mod: 26

## 2024-11-08 PROCEDURE — 85014 HEMATOCRIT: CPT

## 2024-11-08 PROCEDURE — 84484 ASSAY OF TROPONIN QUANT: CPT

## 2024-11-08 PROCEDURE — 82330 ASSAY OF CALCIUM: CPT

## 2024-11-08 PROCEDURE — 82803 BLOOD GASES ANY COMBINATION: CPT

## 2024-11-08 PROCEDURE — 80053 COMPREHEN METABOLIC PANEL: CPT

## 2024-11-08 PROCEDURE — 83605 ASSAY OF LACTIC ACID: CPT

## 2024-11-08 PROCEDURE — 84295 ASSAY OF SERUM SODIUM: CPT

## 2024-11-08 RX ORDER — ACETAMINOPHEN 500 MG
1000 TABLET ORAL ONCE
Refills: 0 | Status: COMPLETED | OUTPATIENT
Start: 2024-11-08 | End: 2024-11-08

## 2024-11-08 RX ADMIN — Medication 400 MILLIGRAM(S): at 16:39

## 2024-11-08 NOTE — ED PROVIDER NOTE - NSFOLLOWUPINSTRUCTIONS_ED_ALL_ED_FT
Today in the emergency department you were evaluated for right shoulder pain.  You have age-related changes in your shoulder that likely resulted in a locked shoulder.  Please follow-up with your primary care doctor in order to get physical therapy at home.  Please also continue to move your forearms and hands is much as possible to increase blood flow to that area.    Please follow up with your primary care physician within 1-2 weeks of discharge from the emergency department.  Please bring a copy of your results with you.  Please return to the emergency department for worsening of your symptoms.    You may take Acetaminophen over the counter as needed for pain and/or fever. Use as directed and see medication warnings.

## 2024-11-08 NOTE — ED PROVIDER NOTE - PROGRESS NOTE DETAILS
Rosie Addison PGY3 - sign out -89-year-old female with a history of osteoarthritis, dementia, HCV in remission, HTN, HFpEF, hypothyroidism, remote DVT (2017) on Xarelto brought to the ED for right shoulder pain.  Chest x-ray shows trace linear atelectasis at the bilateral lung bases.  XR of the right shoulder shows demineralized bone, no acute displaced fracture, no dislocation and advanced degenerative changes.  Likely locked shoulder in the setting of advanced age and degenerative changes.  Discussed results with patient.  Dispo to home with PMD follow-up for home PT.

## 2024-11-08 NOTE — ED ADULT NURSE NOTE - NSSEPSISSUSPECTED_ED_A_ED
Chief Complaint   Patient presents with     Follow Up     Follow up, patient skin improving, has dark spots      Teledermatology Nurse Call Patients:     Are you in the [facility] at the time of the encounter? yes    Today's visit will be billed to you and your insurance.    A teledermatology visit is not as thorough as an in-person visit and the quality of the photograph sent may not be of the same quality as that taken by the dermatology clinic.    Allergies and medications reviewed with patient. Ebony has not picked up the Maureen cream yet as it was not covered by insurance. Advised patient there is a [employer] coupon for this drug if it is necessary to her treatment. No other concerns at this time. Patient will send photos for appointment by 4:30PM.    MARY KAY Amezcua     No

## 2024-11-08 NOTE — ED ADULT NURSE NOTE - NSFALLHARMRISKINTERV_ED_ALL_ED

## 2024-11-08 NOTE — ED PROVIDER NOTE - CLINICAL SUMMARY MEDICAL DECISION MAKING FREE TEXT BOX
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 . No reported fevers, chills, nausea, vomiting or diarrhea, dysuria. Patient reports no burning with urination or increased urinary frequency. on exam, able to move at the elbow not able to move at the shoulder, intact neurovascular, no swelling, concern for fracture or dislocation will start with Xrays, and reassess.

## 2024-11-08 NOTE — ED ADULT NURSE NOTE - OBJECTIVE STATEMENT
89 y.o female, A&Ox2 oriented to self and place, PMH dementia, CHF, HTN, hep c, arthritis, hypothyroid, pt presents to ED c/o right shoulder pain. pt daughter at bedside states pt has been having right shoulder pain since Wednesday, denies recent falls or traumas. went to rheumatologist today thinking it was an arthritis flare but rheumatologist advised pt to come to ED for further assessment. upon assessment pt unable to range shoulder, pain upon palpation to the right shoulder, mild swelling noted. pt denies chest pain, sob, N/V/D, abdominal pain. IV access established, pt safety and comfort provided.

## 2024-11-08 NOTE — ED PROVIDER NOTE - PATIENT PORTAL LINK FT
You can access the FollowMyHealth Patient Portal offered by [facility] by registering at the following website: [email or web address]. By joining [employer]’s FollowMyHealth portal, you will also be able to view your health information using other applications (apps) compatible with our system.

## 2024-11-08 NOTE — ED ADULT NURSE NOTE - NSHOSCREENINGQ1_ED_ALL_ED
Patient with fall and head strike, will obtain CT head and C-spine to evaluate for acute pathology.  Patient also found to be febrile and tachycardic, concerning for underlying sepsis.  Possible flu versus COVID versus pneumonia.  Possible UTI as well with history of BPH no other evidence of traumatic injury seen outpatient for additional traumatic workup other than CT head and C-spine.  Plan on labs, imaging, meds and reassess.
No

## 2024-11-08 NOTE — ED ADULT TRIAGE NOTE - IDEAL BODY WEIGHT(KG)
[facility]  Infectious Disease Progress Note          Assessment and Plan:   IMPRESSION:   1.  A 89-year-old male with 2 falls in the last week, some weakness and sleepiness for 2-3 days, cause of this syndrome is now apparent with blood cultures positive for methicillin-sensitive Staph aureus, the patient has methicillin-sensitive Staph aureus bacteremia syndrome, no clear secondary site, possibly left leg as source, although leg does not look like cellulitis to me more stasis with one small wound as a possible entry site.   2.  Low back pain, not obvious tenderness, conceivable secondary site of infection, but also possibly just related to fall.   3.  Diabetes mellitus.   4.  Atrial fibrillation.   5.  Mild dementia.   6.  Venous stasis disease without much history of major infection process, some wound formation in his left leg, recurrently.       RECOMMENDATIONS:   1.  Continue Ancef, improved acute renal insufficiency, increase dose as renal function allows.   2.   blood cultures  FU neg  3.  Transthoracic echocardiogram abnormal, but no obvious infection, would hold off on a transesophageal echocardiogram, patient will require a 4-week course of antibiotics, regardless would only do a GAVIN if we are not clearing the blood.  No long line for now, get serial blood cultures until clear.   4.  Further workup, tests and evaluation depending on clinical course, culture results, etc.   5.  Looks better only 1 cx +, no obvious secondary sites  6 OK PICC, orders in for ancef ? Rehab any time          Interval History:   no new complaints and doing well; no cp, sob, n/v/d, or abd pain. Looks better cx as above              Medications:       furosemide  80 mg Oral Daily     warfarin  4 mg Oral ONCE at 18:00     sucralfate  1 g Oral 4x Daily AC & HS     sodium chloride (PF)  3 mL Intracatheter Q8H     ceFAZolin  2 g Intravenous Q8H     sodium chloride (PF)  10 mL Intracatheter Q7 Days      "lisinopril  5 mg Oral Daily     atenolol  50 mg Oral Daily     gabapentin (NEURONTIN) capsule 300 mg  300 mg Oral BID     glipiZIDE  5 mg Oral BID AC     levothyroxine  100 mcg Oral Daily     omeprazole  20 mg Oral Daily     polyethylene glycol  17 g Oral Daily     senna-docusate  2 tablet Oral BID     simvastatin  10 mg Oral At Bedtime     tamsulosin  0.4 mg Oral QPM     insulin aspart  1-7 Units Subcutaneous TID AC     insulin aspart  1-5 Units Subcutaneous At Bedtime                  Physical Exam:   Blood pressure 137/75, pulse 95, temperature 96.3  F (35.7  C), temperature source Axillary, resp. rate 20, height 1.676 m (5' 6\"), weight 105.3 kg (232 lb 3.2 oz), SpO2 (!) 85 %.  Wt Readings from Last 2 Encounters:   04/10/18 105.3 kg (232 lb 3.2 oz)   05/28/17 99.3 kg (218 lb 14.4 oz)     Vital Signs with Ranges  Temp:  [96.3  F (35.7  C)-97.8  F (36.6  C)] 96.3  F (35.7  C)  Heart Rate:  [62-84] 62  Resp:  [20] 20  BP: (137-149)/(66-75) 137/75  SpO2:  [85 %-94 %] 85 %    Constitutional: Awake, alert, cooperative, no apparent distress   Lungs: Clear to auscultation bilaterally, no crackles or wheezing   Cardiovascular: Regular rate and rhythm, normal S1 and S2, and no murmur noted   Abdomen: Normal bowel sounds, soft, non-distended, non-tender   Skin: No rashes, no cyanosis, same edema no emboli  Mostly stasis   Other:           Data:   All microbiology laboratory data reviewed.  Recent Labs   Lab Test  04/08/18   0945  04/06/18   0722  04/05/18   0651   WBC  8.0  8.7  8.9   HGB  12.2*  12.5*  11.6*   HCT  38.8*  39.6*  38.0*   MCV  101*  100  104*   PLT  187  120*  120*     Recent Labs   Lab Test  04/10/18   0437  04/09/18   0550  04/08/18   0945   CR  1.01  1.03  1.07     No lab results found.  Recent Labs   Lab Test  04/06/18   0721  04/05/18   0650  04/04/18   1604  04/03/18   1252  04/03/18   1204  05/14/17   1052  05/13/17   2043   CULT  No growth after 4 days  No growth after 5 days  No growth  Cultured on " the 1st day of incubation:  Staphylococcus aureus  *  Critical Value/Significant Value, preliminary result only, called to and read back by  Nell Valerio RN at 0929 4/4/18 [id number]    Cultured on the 1st day of incubation:  Streptococcus mitis group  *  Critical Value/Significant Value called to and read back by  Tamanna Evans RN at 1015 on [id number]    (Note)  POSITIVE for STAPHYLOCOCCUS AUREUS and NEGATIVE for the mecA gene  (not MRSA) by [employer] multiplex nucleic acid test. The mecA gene was  not detected. Final identification and antimicrobial susceptibility  testing will be verified by standard methods.    Specimen tested with [employer] multiplex, gram-positive blood culture  nucleic acid test for the following targets: Staph aureus, Staph  epidermidis, Staph lugdunensis, other Staph species, Enterococcus  faecalis, Enterococcus faecium, Streptococcus species, S. agalactiae,  S. anginosus grp., S. pneumoniae, S. pyogenes, Listeria sp., mecA  (methicillin resistance) and Renetta/B (vancomycin resistance).    Critical Value/Significant Value called to and read back by  Komal Mckeon RN @1240 04/04/18 gd    No growth  No anaerobes isolated  No growth  <10,000 colonies/mL mixed urogenital conner Susceptibility testing not routinely   done          52

## 2024-11-08 NOTE — ED PROVIDER NOTE - ATTENDING CONTRIBUTION TO CARE
I performed a history and physical exam of the patient and discussed their management with the resident. I reviewed the resident's note and agree with the documented findings and plan of care.  Magali Hightower MD  see MDM

## 2024-11-10 ENCOUNTER — INPATIENT (INPATIENT)
Facility: HOSPITAL | Age: 89
LOS: 1 days | Discharge: HOME HEALTH | DRG: 554 | End: 2024-11-12
Attending: STUDENT IN AN ORGANIZED HEALTH CARE EDUCATION/TRAINING PROGRAM | Admitting: HOSPITALIST
Payer: MEDICARE

## 2024-11-10 VITALS
WEIGHT: 100.09 LBS | OXYGEN SATURATION: 98 % | HEART RATE: 82 BPM | SYSTOLIC BLOOD PRESSURE: 152 MMHG | HEIGHT: 63 IN | DIASTOLIC BLOOD PRESSURE: 84 MMHG | RESPIRATION RATE: 18 BRPM | TEMPERATURE: 97 F

## 2024-11-10 DIAGNOSIS — Z95.818 PRESENCE OF OTHER CARDIAC IMPLANTS AND GRAFTS: Chronic | ICD-10-CM

## 2024-11-10 LAB
ALBUMIN SERPL ELPH-MCNC: 4.3 G/DL — SIGNIFICANT CHANGE UP (ref 3.3–5)
ALP SERPL-CCNC: 128 U/L — HIGH (ref 40–120)
ALT FLD-CCNC: 29 U/L — SIGNIFICANT CHANGE UP (ref 10–45)
ANION GAP SERPL CALC-SCNC: 17 MMOL/L — SIGNIFICANT CHANGE UP (ref 5–17)
AST SERPL-CCNC: 56 U/L — HIGH (ref 10–40)
BASOPHILS # BLD AUTO: 0.04 K/UL — SIGNIFICANT CHANGE UP (ref 0–0.2)
BASOPHILS NFR BLD AUTO: 0.7 % — SIGNIFICANT CHANGE UP (ref 0–2)
BILIRUB SERPL-MCNC: 0.4 MG/DL — SIGNIFICANT CHANGE UP (ref 0.2–1.2)
BUN SERPL-MCNC: 39 MG/DL — HIGH (ref 7–23)
CALCIUM SERPL-MCNC: 10.7 MG/DL — HIGH (ref 8.4–10.5)
CHLORIDE SERPL-SCNC: 99 MMOL/L — SIGNIFICANT CHANGE UP (ref 96–108)
CO2 SERPL-SCNC: 17 MMOL/L — LOW (ref 22–31)
CREAT SERPL-MCNC: 0.87 MG/DL — SIGNIFICANT CHANGE UP (ref 0.5–1.3)
EGFR: 59 ML/MIN/1.73M2 — LOW
EOSINOPHIL # BLD AUTO: 0.03 K/UL — SIGNIFICANT CHANGE UP (ref 0–0.5)
EOSINOPHIL NFR BLD AUTO: 0.5 % — SIGNIFICANT CHANGE UP (ref 0–6)
GAS PNL BLDV: SIGNIFICANT CHANGE UP
GAS PNL BLDV: SIGNIFICANT CHANGE UP
GLUCOSE SERPL-MCNC: 110 MG/DL — HIGH (ref 70–99)
HCT VFR BLD CALC: 36.9 % — SIGNIFICANT CHANGE UP (ref 34.5–45)
HGB BLD-MCNC: 12.4 G/DL — SIGNIFICANT CHANGE UP (ref 11.5–15.5)
IMM GRANULOCYTES NFR BLD AUTO: 0.8 % — SIGNIFICANT CHANGE UP (ref 0–0.9)
LYMPHOCYTES # BLD AUTO: 0.81 K/UL — LOW (ref 1–3.3)
LYMPHOCYTES # BLD AUTO: 13.6 % — SIGNIFICANT CHANGE UP (ref 13–44)
MCHC RBC-ENTMCNC: 31.6 PG — SIGNIFICANT CHANGE UP (ref 27–34)
MCHC RBC-ENTMCNC: 33.6 G/DL — SIGNIFICANT CHANGE UP (ref 32–36)
MCV RBC AUTO: 94.1 FL — SIGNIFICANT CHANGE UP (ref 80–100)
MONOCYTES # BLD AUTO: 0.69 K/UL — SIGNIFICANT CHANGE UP (ref 0–0.9)
MONOCYTES NFR BLD AUTO: 11.6 % — SIGNIFICANT CHANGE UP (ref 2–14)
NEUTROPHILS # BLD AUTO: 4.34 K/UL — SIGNIFICANT CHANGE UP (ref 1.8–7.4)
NEUTROPHILS NFR BLD AUTO: 72.8 % — SIGNIFICANT CHANGE UP (ref 43–77)
NRBC # BLD: 0 /100 WBCS — SIGNIFICANT CHANGE UP (ref 0–0)
PLATELET # BLD AUTO: 220 K/UL — SIGNIFICANT CHANGE UP (ref 150–400)
POTASSIUM SERPL-MCNC: 5.8 MMOL/L — HIGH (ref 3.5–5.3)
POTASSIUM SERPL-SCNC: 5.8 MMOL/L — HIGH (ref 3.5–5.3)
PROT SERPL-MCNC: 8.1 G/DL — SIGNIFICANT CHANGE UP (ref 6–8.3)
RBC # BLD: 3.92 M/UL — SIGNIFICANT CHANGE UP (ref 3.8–5.2)
RBC # FLD: 14.3 % — SIGNIFICANT CHANGE UP (ref 10.3–14.5)
SODIUM SERPL-SCNC: 133 MMOL/L — LOW (ref 135–145)
TROPONIN T, HIGH SENSITIVITY RESULT: 36 NG/L — SIGNIFICANT CHANGE UP (ref 0–51)
WBC # BLD: 5.96 K/UL — SIGNIFICANT CHANGE UP (ref 3.8–10.5)
WBC # FLD AUTO: 5.96 K/UL — SIGNIFICANT CHANGE UP (ref 3.8–10.5)

## 2024-11-10 PROCEDURE — 72125 CT NECK SPINE W/O DYE: CPT | Mod: 26,MC

## 2024-11-10 PROCEDURE — 99285 EMERGENCY DEPT VISIT HI MDM: CPT

## 2024-11-10 PROCEDURE — 71045 X-RAY EXAM CHEST 1 VIEW: CPT | Mod: 26

## 2024-11-10 PROCEDURE — 73030 X-RAY EXAM OF SHOULDER: CPT | Mod: 26,RT

## 2024-11-10 RX ORDER — FUROSEMIDE 40 MG
20 TABLET ORAL ONCE
Refills: 0 | Status: COMPLETED | OUTPATIENT
Start: 2024-11-10 | End: 2024-11-10

## 2024-11-10 RX ORDER — INSULIN REG, HUM S-S BUFF 100/ML
5 VIAL (ML) INJECTION ONCE
Refills: 0 | Status: COMPLETED | OUTPATIENT
Start: 2024-11-10 | End: 2024-11-10

## 2024-11-10 RX ORDER — OXYCODONE HYDROCHLORIDE 30 MG/1
5 TABLET ORAL ONCE
Refills: 0 | Status: DISCONTINUED | OUTPATIENT
Start: 2024-11-10 | End: 2024-11-10

## 2024-11-10 RX ORDER — ACETAMINOPHEN 500 MG
675 TABLET ORAL ONCE
Refills: 0 | Status: COMPLETED | OUTPATIENT
Start: 2024-11-10 | End: 2024-11-10

## 2024-11-10 RX ORDER — INSULIN REG, HUM S-S BUFF 100/ML
5 VIAL (ML) INJECTION ONCE
Refills: 0 | Status: DISCONTINUED | OUTPATIENT
Start: 2024-11-10 | End: 2024-11-10

## 2024-11-10 RX ORDER — SODIUM ZIRCONIUM CYCLOSILICATE 10 G/10G
10 POWDER, FOR SUSPENSION ORAL ONCE
Refills: 0 | Status: COMPLETED | OUTPATIENT
Start: 2024-11-10 | End: 2024-11-10

## 2024-11-10 RX ADMIN — OXYCODONE HYDROCHLORIDE 5 MILLIGRAM(S): 30 TABLET ORAL at 22:12

## 2024-11-10 RX ADMIN — Medication 5 UNIT(S): at 22:23

## 2024-11-10 RX ADMIN — SODIUM ZIRCONIUM CYCLOSILICATE 10 GRAM(S): 10 POWDER, FOR SUSPENSION ORAL at 22:12

## 2024-11-10 RX ADMIN — Medication 50 MILLILITER(S): at 22:13

## 2024-11-10 RX ADMIN — Medication 20 MILLIGRAM(S): at 22:13

## 2024-11-10 RX ADMIN — Medication 270 MILLIGRAM(S): at 19:45

## 2024-11-10 NOTE — ED ADULT NURSE NOTE - OBJECTIVE STATEMENT
89 y/o female BIB EMS with complaints of right shoulder radiating down her arm pain. Was seen here 2 days ago with unremarkable findings. Denies CP, SOB, numbness, tingling, weakness, back pain.

## 2024-11-10 NOTE — ED PROVIDER NOTE - ATTENDING CONTRIBUTION TO CARE
I have personally performed a face to face medical and diagnostic evaluation of the patient. I have discussed with and reviewed the Resident's and/or ACP's and/or Medical/PA/NP student's note and agree with the History, ROS, Physical Exam and MDM unless otherwise indicated. A brief summary of my personal evaluation and impression can be found below.     89F PMH  Dementia, osteoarthritis (R-knee hemiarthrosis now on medical marijuana for pain), dementia, HCV (in remission) HTN, HFpEF (stage I diastolic dysfunction), HTN, Hypothyroidism, DVT in 2017 on Xarelto Presenting to the emergency department with worsening right shoulder pain radiating to the right humerus, patient was seen in the emergency department few days ago with similar pain, patient labs without emergent findings at that time,  x-ray showed advanced osteoarthritis at the right shoulder.  Patient denies paresthesias.  Pain is worse with movement.  No chest pain or shortness of breath.  No nausea or vomiting.  No back pain.  Denies neck pain.   Physical exam shows decreased range of motion at the right shoulder.  Strength intact, neurovascular intact, distal pulses intact. patient ANO x 2.  Regular rate and rhythm, lungs clear to auscultation, abdomen soft nontender.   Full range of motion in lower extremities, no bony tenderness with the exception of ttp at R anterior shoulder. Given hx and physical, ddx includes but is not limited to OA, fracture, lower concern for ACS given pt with similar presentation a few days ago in the setting of severe OA, no CP. Possible cervical radiculopathy. Plan for ecg, labs, xr, meds, reassess.

## 2024-11-10 NOTE — ED PROVIDER NOTE - PROGRESS NOTE DETAILS
Stephanie Mijares PGY-1:  labs showing K 5.8 which is slightly elevated from 5.6 on 11/8. no EKG changes suggestive of hyperkalemia. will treat with Lokelma, insulin, and furosemide. pts pain not improved with Tylenol. will try oxycodone and reassess. Stephanie Mijares PGY-1:  pts pain improved with oxycodone. repeat labs showed repeat K 4.4. pt failed road test because she is having difficulty using the R arm with her walker.

## 2024-11-10 NOTE — ED PROVIDER NOTE - WR ORDER NAME 2
45 yo male, PMH morbid obesity, BMI 58.6, DM2, (HgA1c 12.1, previously 11.3 in November - pt. on dexamethasone), glioblastoma multiforme, diagnosed 8/2019 s/p resection and chemo-RT, post op DVT was on Lovenox and presently on aspirin, necrotizing fascitis on left leg and discharged from [facility] on 2/4/20. Pt.'s most recent MRI brain reveals progression of multifocal GBM s/p  Left craniotomy for resection of recurrent Left frontal GBM 2/28/20    Plan    Neuro stable. Decadron tapered to 2mg q12 . On Keppra 1G BID. Should make Neuroonc appt prior to discharge   Vitals stable  Type 2 DM-  on Lantus and premeal. endo following   Hyponatremia -Stable on  salt tabs 2 q8    Left thigh wound- Dsg as per Wound care   DVT ppx   PT/OT- PM&R- HIMA to LTC . awaiting ins auth Xray Chest 1 View- PORTABLE-Urgent

## 2024-11-10 NOTE — ED ADULT NURSE REASSESSMENT NOTE - NS ED NURSE REASSESS COMMENT FT1
Pt placed on Primafit as per request. Safety and comfort measures initiated- bed placed in lowest position and side rails raised.

## 2024-11-11 DIAGNOSIS — M19.011 PRIMARY OSTEOARTHRITIS, RIGHT SHOULDER: ICD-10-CM

## 2024-11-11 DIAGNOSIS — E03.9 HYPOTHYROIDISM, UNSPECIFIED: ICD-10-CM

## 2024-11-11 DIAGNOSIS — Z29.9 ENCOUNTER FOR PROPHYLACTIC MEASURES, UNSPECIFIED: ICD-10-CM

## 2024-11-11 DIAGNOSIS — I50.32 CHRONIC DIASTOLIC (CONGESTIVE) HEART FAILURE: ICD-10-CM

## 2024-11-11 DIAGNOSIS — F03.90 UNSPECIFIED DEMENTIA, UNSPECIFIED SEVERITY, WITHOUT BEHAVIORAL DISTURBANCE, PSYCHOTIC DISTURBANCE, MOOD DISTURBANCE, AND ANXIETY: ICD-10-CM

## 2024-11-11 DIAGNOSIS — M25.511 PAIN IN RIGHT SHOULDER: ICD-10-CM

## 2024-11-11 DIAGNOSIS — E87.5 HYPERKALEMIA: ICD-10-CM

## 2024-11-11 DIAGNOSIS — I10 ESSENTIAL (PRIMARY) HYPERTENSION: ICD-10-CM

## 2024-11-11 DIAGNOSIS — R79.89 OTHER SPECIFIED ABNORMAL FINDINGS OF BLOOD CHEMISTRY: ICD-10-CM

## 2024-11-11 DIAGNOSIS — E87.1 HYPO-OSMOLALITY AND HYPONATREMIA: ICD-10-CM

## 2024-11-11 LAB
ALBUMIN SERPL ELPH-MCNC: 4.6 G/DL — SIGNIFICANT CHANGE UP (ref 3.3–5)
ALP SERPL-CCNC: 134 U/L — HIGH (ref 40–120)
ALT FLD-CCNC: 30 U/L — SIGNIFICANT CHANGE UP (ref 10–45)
ANION GAP SERPL CALC-SCNC: 15 MMOL/L — SIGNIFICANT CHANGE UP (ref 5–17)
ANION GAP SERPL CALC-SCNC: 18 MMOL/L — HIGH (ref 5–17)
AST SERPL-CCNC: 50 U/L — HIGH (ref 10–40)
BILIRUB SERPL-MCNC: 0.5 MG/DL — SIGNIFICANT CHANGE UP (ref 0.2–1.2)
BUN SERPL-MCNC: 34 MG/DL — HIGH (ref 7–23)
BUN SERPL-MCNC: 34 MG/DL — HIGH (ref 7–23)
CALCIUM SERPL-MCNC: 10.6 MG/DL — HIGH (ref 8.4–10.5)
CALCIUM SERPL-MCNC: 10.6 MG/DL — HIGH (ref 8.4–10.5)
CHLORIDE SERPL-SCNC: 95 MMOL/L — LOW (ref 96–108)
CHLORIDE SERPL-SCNC: 95 MMOL/L — LOW (ref 96–108)
CO2 SERPL-SCNC: 18 MMOL/L — LOW (ref 22–31)
CO2 SERPL-SCNC: 23 MMOL/L — SIGNIFICANT CHANGE UP (ref 22–31)
CREAT SERPL-MCNC: 0.78 MG/DL — SIGNIFICANT CHANGE UP (ref 0.5–1.3)
CREAT SERPL-MCNC: 0.88 MG/DL — SIGNIFICANT CHANGE UP (ref 0.5–1.3)
EGFR: 58 ML/MIN/1.73M2 — LOW
EGFR: 67 ML/MIN/1.73M2 — SIGNIFICANT CHANGE UP
GLUCOSE BLDC GLUCOMTR-MCNC: 137 MG/DL — HIGH (ref 70–99)
GLUCOSE SERPL-MCNC: 122 MG/DL — HIGH (ref 70–99)
GLUCOSE SERPL-MCNC: 130 MG/DL — HIGH (ref 70–99)
POTASSIUM SERPL-MCNC: 4.4 MMOL/L — SIGNIFICANT CHANGE UP (ref 3.5–5.3)
POTASSIUM SERPL-MCNC: 4.5 MMOL/L — SIGNIFICANT CHANGE UP (ref 3.5–5.3)
POTASSIUM SERPL-SCNC: 4.4 MMOL/L — SIGNIFICANT CHANGE UP (ref 3.5–5.3)
POTASSIUM SERPL-SCNC: 4.5 MMOL/L — SIGNIFICANT CHANGE UP (ref 3.5–5.3)
PROT SERPL-MCNC: 8.4 G/DL — HIGH (ref 6–8.3)
SODIUM SERPL-SCNC: 131 MMOL/L — LOW (ref 135–145)
SODIUM SERPL-SCNC: 133 MMOL/L — LOW (ref 135–145)
TROPONIN T, HIGH SENSITIVITY RESULT: 42 NG/L — SIGNIFICANT CHANGE UP (ref 0–51)

## 2024-11-11 PROCEDURE — 99223 1ST HOSP IP/OBS HIGH 75: CPT

## 2024-11-11 RX ORDER — SODIUM CHLORIDE 9 MG/ML
1000 INJECTION, SOLUTION INTRAMUSCULAR; INTRAVENOUS; SUBCUTANEOUS
Refills: 0 | Status: DISCONTINUED | OUTPATIENT
Start: 2024-11-11 | End: 2024-11-12

## 2024-11-11 RX ORDER — MAGNESIUM, ALUMINUM HYDROXIDE 200-200 MG
30 TABLET,CHEWABLE ORAL EVERY 4 HOURS
Refills: 0 | Status: DISCONTINUED | OUTPATIENT
Start: 2024-11-11 | End: 2024-11-12

## 2024-11-11 RX ORDER — HEPARIN SODIUM 10000 [USP'U]/ML
5000 INJECTION INTRAVENOUS; SUBCUTANEOUS EVERY 12 HOURS
Refills: 0 | Status: DISCONTINUED | OUTPATIENT
Start: 2024-11-11 | End: 2024-11-12

## 2024-11-11 RX ORDER — LIDOCAINE HYDROCHLORIDE 40 MG/ML
1 SOLUTION TOPICAL DAILY
Refills: 0 | Status: DISCONTINUED | OUTPATIENT
Start: 2024-11-11 | End: 2024-11-12

## 2024-11-11 RX ORDER — GABAPENTIN 300 MG/1
100 CAPSULE ORAL DAILY
Refills: 0 | Status: DISCONTINUED | OUTPATIENT
Start: 2024-11-11 | End: 2024-11-12

## 2024-11-11 RX ORDER — MELATONIN 5 MG
3 TABLET ORAL AT BEDTIME
Refills: 0 | Status: DISCONTINUED | OUTPATIENT
Start: 2024-11-11 | End: 2024-11-12

## 2024-11-11 RX ORDER — INFLUENZ VIR VAC TV P-SURF2003 15MCG/.5ML
0.5 SYRINGE (ML) INTRAMUSCULAR ONCE
Refills: 0 | Status: DISCONTINUED | OUTPATIENT
Start: 2024-11-11 | End: 2024-11-12

## 2024-11-11 RX ORDER — ACETAMINOPHEN 500 MG
650 TABLET ORAL EVERY 6 HOURS
Refills: 0 | Status: DISCONTINUED | OUTPATIENT
Start: 2024-11-11 | End: 2024-11-12

## 2024-11-11 RX ORDER — LISINOPRIL 40 MG
10 TABLET ORAL DAILY
Refills: 0 | Status: DISCONTINUED | OUTPATIENT
Start: 2024-11-11 | End: 2024-11-12

## 2024-11-11 RX ORDER — LIOTHYRONINE SODIUM 5 UG/1
5 TABLET ORAL DAILY
Refills: 0 | Status: DISCONTINUED | OUTPATIENT
Start: 2024-11-11 | End: 2024-11-12

## 2024-11-11 RX ORDER — ONDANSETRON HYDROCHLORIDE 2 MG/ML
4 INJECTION, SOLUTION INTRAMUSCULAR; INTRAVENOUS EVERY 8 HOURS
Refills: 0 | Status: DISCONTINUED | OUTPATIENT
Start: 2024-11-11 | End: 2024-11-12

## 2024-11-11 RX ADMIN — Medication 10 MILLIGRAM(S): at 17:53

## 2024-11-11 RX ADMIN — LIDOCAINE HYDROCHLORIDE 1 PATCH: 40 SOLUTION TOPICAL at 12:52

## 2024-11-11 RX ADMIN — LIDOCAINE HYDROCHLORIDE 1 PATCH: 40 SOLUTION TOPICAL at 21:17

## 2024-11-11 RX ADMIN — HEPARIN SODIUM 5000 UNIT(S): 10000 INJECTION INTRAVENOUS; SUBCUTANEOUS at 17:53

## 2024-11-11 RX ADMIN — SODIUM CHLORIDE 50 MILLILITER(S): 9 INJECTION, SOLUTION INTRAMUSCULAR; INTRAVENOUS; SUBCUTANEOUS at 17:46

## 2024-11-11 NOTE — PATIENT PROFILE ADULT - FALL HARM RISK - HARM RISK INTERVENTIONS

## 2024-11-11 NOTE — CHART NOTE - NSCHARTNOTEFT_GEN_A_CORE
89yo F PMHx  Dementia, osteoarthritis (R-knee hemiarthrosis now on medical marijuana for pain), dementia, HCV (in remission) HTN, HFpEF (stage I diastolic dysfunction), HTN, Hypothyroidism, DVT in 2017 on Xarelto p/w worsening R shoulder pain radiating to the right humerus, patient was seen in the emergency department few days ago with similar pain, patient labs without emergent findings at that time,  x-ray showed advanced osteoarthritis at the right shoulder.  Pt s/p RRT today for mental status change ( lethargic while sitting in chair)  that resolved without treatment. event d/w Dr. Wick- ivf hydration started and UA ordered.

## 2024-11-11 NOTE — H&P ADULT - PROBLEM SELECTOR PLAN 6
TTE with Grade 1 diastolic dysfuntion  - No e/o acute exacerbation. Holding aldactone for hyperkalemia.

## 2024-11-11 NOTE — PHYSICAL THERAPY INITIAL EVALUATION ADULT - PERTINENT HX OF CURRENT PROBLEM, REHAB EVAL
89F PMH  Dementia, osteoarthritis (R-knee hemiarthrosis now on medical marijuana for pain), dementia, HCV (in remission) HTN, HFpEF (stage I diastolic dysfunction), HTN, Hypothyroidism, DVT in 2017 on Xarelto Presenting to the emergency department with worsening right shoulder pain radiating to the right humerus, patient was seen in the emergency department few days ago with similar pain, patient labs without emergent findings at that time,  x-ray showed advanced osteoarthritis at the right shoulder.  CT: Similar appearing high riding humeral head related to rotator cuff   arthropathy. No acute displaced fracture. No dislocation. Partially   imaged lung is clear.  XR: no fractures

## 2024-11-11 NOTE — H&P ADULT - ASSESSMENT
Rosa Turcios is a 89 y/o F with PMH significant for dementia, HTN, HFpEF, hypothyroidism, OA, HCV (s/p treatment), and provoked DVT (not on AC) presenting with severe right shoulder pain.

## 2024-11-11 NOTE — H&P ADULT - NSHPLABSRESULTS_GEN_ALL_CORE
LABS:                         12.4   5.96  )-----------( 220      ( 10 Nov 2024 19:50 )             36.9     11-10    131[L]  |  95[L]  |  34[H]  ----------------------------<  130[H]  4.4   |  18[L]  |  0.78    Ca    10.6[H]      10 Nov 2024 23:47    TPro  8.4[H]  /  Alb  4.6  /  TBili  0.5  /  DBili  x   /  AST  50[H]  /  ALT  30  /  AlkPhos  134[H]  11-10      Urinalysis Basic - ( 10 Nov 2024 23:47 )    Color: x / Appearance: x / SG: x / pH: x  Gluc: 130 mg/dL / Ketone: x  / Bili: x / Urobili: x   Blood: x / Protein: x / Nitrite: x   Leuk Esterase: x / RBC: x / WBC x   Sq Epi: x / Non Sq Epi: x / Bacteria: x    Records reviewed   Labs and imaging reviewed

## 2024-11-11 NOTE — H&P ADULT - HISTORY OF PRESENT ILLNESS
Rosa Turcios is a 89 y/o F with PMH significant for dementia, HTN, HFpEF, hypothyroidism, OA, HCV (s/p treatment), and provoked DVT (not on AC) presenting with severe right shoulder pain. Pain has been worsening over the last week with associated decreased range of motion. Denies any fall or trauma to right shoulder. Denies any fever, chills, redness, warmth, or lesion over right shoulder. She has tried ibuprofen and Tylenol with minimal relief of pain. Recently seen by Rheumatologist on 11/8 at which time she was referred to the ED for further imaging. Imaging of the R shoulder at that time consistent with significant AC and glenohumeral osteoarthritis. Pain is also affecting her ADL as she is not unable to ambulate with her walker. She lives with her daughter who assists her with ADL.

## 2024-11-11 NOTE — RAPID RESPONSE TEAM SUMMARY - NSSITUATIONBACKGROUNDRRT_GEN_ALL_CORE
Rosa Turcios is a 89 y/o F with PMH significant for dementia, HTN, HFpEF, hypothyroidism, OA, HCV (s/p treatment), and provoked DVT (not on AC) presenting with severe right shoulder pain.    RRT called for unresponsiveness and hypotension. Upon arrival of rapid response team, patient in bed and not responsive. VS on portable machine with BP 84/50 (from 2 min prior per RN). Patient placed back in bed, repeat VS with improvement in BP to 130s/80s, HR 60-70 (sinus on bedside cardiac monitor), SpO2 WNL. Within 1-2 minutes, patient returned back to baseline mental status. Neuro exam nonfocal. Repeat BP confirmed to be stable. Suspect likely vagal episode.

## 2024-11-11 NOTE — H&P ADULT - PROBLEM SELECTOR PLAN 8
- Delirium precautions, aspiration precautions, fall precautions  - Melatonin QHS    Dispo: Home with HPT vs [facility], pending PT eval.     Discussed plan of care with patient's daughter Katty at bedside.     - Reviewing, and interpreting labs and testing.  - Independently obtaining a review of systems and performing a physical exam  - Reviewing consultant documentation/recommendations in addition to discussing plan of care with consultants.  - Counselling and educating patient and family regarding interpretation of aforementioned items and plan of care.  - This excludes time spent teaching residents/medical students    Time Spent: 78 minutes

## 2024-11-11 NOTE — PHYSICAL THERAPY INITIAL EVALUATION ADULT - TRANSFER SAFETY CONCERNS NOTED: BED/CHAIR, REHAB EVAL
Chief Complaint   Patient presents with    Hypertension    Diabetes    Follow-up     1. Have you been to the ER, urgent care clinic since your last visit? Hospitalized since your last visit? No    2. Have you seen or consulted any other health care providers outside of the [address] [place] [address] since your last visit? Include any pap smears or colon screening. Yes Ortho - Dr. Jeanine Wolf and Dr. Karl Riley.
HISTORY OF PRESENT ILLNESS  Octaviano Casarez is a 47 y.o. male. HPI Pt. Comes in for blood pressure, cholesterol, and diabetes check. Breathing has been doing reasonably well. Getting ready to see Dr. Jacki Estrella for carpal tunnel. Not checking blood sugars. Lost glucometer. No complaints of chest pain, shortness of breath, TIAs, claudication or edema. ROS    Physical Exam  Vitals signs and nursing note reviewed. Constitutional:       Appearance: He is well-developed. HENT:      Right Ear: External ear normal.      Left Ear: External ear normal.   Neck:      Thyroid: No thyromegaly. Cardiovascular:      Rate and Rhythm: Normal rate and regular rhythm. Heart sounds: Normal heart sounds. Pulmonary:      Effort: Pulmonary effort is normal. No respiratory distress. Breath sounds: Normal breath sounds. No wheezing. Abdominal:      General: Bowel sounds are normal. There is no distension. Palpations: Abdomen is soft. There is no mass. Tenderness: There is no abdominal tenderness. There is no guarding. Musculoskeletal: Normal range of motion. Lymphadenopathy:      Cervical: No cervical adenopathy. ASSESSMENT and PLAN  Orders Placed This Encounter    METABOLIC PANEL, BASIC    HEMOGLOBIN A1C WITH EAG    furosemide (LASIX) 80 mg tablet     Diagnoses and all orders for this visit:    1. Essential hypertension  -     METABOLIC PANEL, BASIC; Future    2. Type 2 diabetes with nephropathy (HCC)  -     HEMOGLOBIN A1C WITH EAG; Future    3. Hypercholesterolemia    Other orders  -     furosemide (LASIX) 80 mg tablet; TAKE TWO TABLETS BY MOUTH EVERY MORNING AND TAKE ONE TABLET BY MOUTH EVERY EVENING      Follow-up and Dispositions    · Return in about 3 months (around 7/1/2021).
pc with pt. Not taking potassium regularly . Compliance stressed.
decreased balance during turns/decreased safety awareness/decreased step length/decreased weight-shifting ability

## 2024-11-11 NOTE — PHYSICAL THERAPY INITIAL EVALUATION ADULT - GAIT DEVIATIONS NOTED, PT EVAL
decreased mir/increased time in double stance/decreased step length/decreased stride length/decreased weight-shifting ability

## 2024-11-11 NOTE — PHYSICAL THERAPY INITIAL EVALUATION ADULT - ADDITIONAL COMMENTS
according to daughter, pt lives in an apartment with no WILLIAM or steps inside. she lives with her daughter. pt uses a rw in the house with assist of 1 and a wc out in the community. she owns a commode and shower chair. pt has a HHA 3 days/week for 4 hour/day. daughter assists when aide is not present.

## 2024-11-11 NOTE — H&P ADULT - NSHPREVIEWOFSYSTEMS_GEN_ALL_CORE
Review of Systems:   CONSTITUTIONAL: No fever, weight loss  EYES: No eye pain, visual disturbances, or discharge  ENMT:  No difficulty hearing, tinnitus, vertigo; No sinus or throat pain  RESPIRATORY: No SOB. No cough, wheezing, chills or hemoptysis  CARDIOVASCULAR: No chest pain, palpitations, dizziness, or leg swelling  GASTROINTESTINAL: No abdominal or epigastric pain. No nausea, vomiting, or hematemesis; No diarrhea or constipation. No melena or hematochezia.  GENITOURINARY: No dysuria, frequency, hematuria, or incontinence  NEUROLOGICAL: No headaches, memory loss, loss of strength, numbness, or tremors  SKIN: No itching, burning, rashes, or lesions   LYMPH NODES: No enlarged glands  ENDOCRINE: No heat or cold intolerance; No hair loss  MUSCULOSKELETAL: + r shoulder pain and decrease ROM. No joint swelling; No muscle, back pain  PSYCHIATRIC: No depression, anxiety, mood swings, or difficulty sleeping  HEME/LYMPH: No easy bruising, or bleeding gums

## 2024-11-11 NOTE — H&P ADULT - NSHPPHYSICALEXAM_GEN_ALL_CORE
Vital Signs Last 24 Hrs  T(C): 36.7 (11 Nov 2024 01:45), Max: 36.7 (11 Nov 2024 01:45)  T(F): 98 (11 Nov 2024 01:45), Max: 98 (11 Nov 2024 01:45)  HR: 80 (11 Nov 2024 01:45) (80 - 86)  BP: 152/88 (11 Nov 2024 01:45) (152/84 - 156/94)  BP(mean): 113 (10 Nov 2024 22:10) (113 - 113)  RR: 16 (11 Nov 2024 01:45) (16 - 18)  SpO2: 98% (11 Nov 2024 01:45) (98% - 100%)    Parameters below as of 11 Nov 2024 01:45  Patient On (Oxygen Delivery Method): room air        PHYSICAL EXAM:  GENERAL:  Well appearing, in NAD  HEENT: Normocephalic, atraumatic, no conjunctival injection  CHEST/LUNG: CTA B/L. No w/r/r.  HEART: Reg rate and rhythm  ABDOMEN: BS normoactive, no TTP  EXTREMITIES: Trace b/l LE edema, no cyanosis or clubbing  PSYCH: AAOx3, appropriate affect  NEUROLOGY: No new FND  MSK: TTP over R shoulder glenohumeral joint, decrease ROM RUE, only able to lift a few inches due to pain.   SKIN: No rashes or lesions

## 2024-11-11 NOTE — PATIENT PROFILE ADULT - DO YOU LACK THE NECESSARY SUPPORT TO HELP YOU COPE WITH LIFE CHALLENGES?
Discharge Summary - OB/GYN   Isak Sahni 28 y.o. female MRN: [id number]  Unit/Bed#: LD TRIAGE  Encounter: [id number]      Admission Date: 2023     Discharge Date: 2023    Admitting Diagnosis:   Pregnancy at 39w6d  GBS negative  Rh positive  Elevated BMI       Discharge Diagnosis:   Same, delivered  Elevated BP without dx of HTN    Procedures: primary  section, low transverse incision    Delivering Attending: Claudia Lowry MD  Discharge Attending: Dr. Lilly Skelton Course:     Isak Sahni is a 28 y.o.  at 39w6d wks who was initially admitted for elective primary low transverse  section. She was initially scheduled for a bilateral salpingectomy, but "cancelled" her procedure the morning of. Surgical consent was re-obtained with a [place] and she wishes to proceed with primary low transverse  section. She delivered a viable male  on 2023 at 46. Weight 6lbs 8oz via primary  section, low transverse incision. Apgars were 9 (1 min) and 9 (5 min).  was transferred to  nursery. Patient tolerated the procedure well and was transferred to recovery in stable condition. Her post-operative course was uncomplicated. Preoperative hemaglobin was 13.2, postoperative was 11.6. Her postoperative pain was well controlled with oral analgesics. On day of discharge, she was ambulating and able to reasonably perform all ADLs. She was voiding and had appropriate bowel function. Pain was well controlled. She was discharged home on post-operative day #2 without complications. Patient was instructed to follow up with her OB as an outpatient and was given appropriate warnings to call provider if she develops signs of infection or uncontrolled pain.     Complications: none apparent    Condition at discharge: stable     Discharge instructions/Information to patient and family:   See after visit summary for information provided to patient and family. Provisions for Follow-Up Care:  See after visit summary for information related to follow-up care and any pertinent home health orders. Disposition: See After Visit Summary for discharge disposition information. Planned Readmission: No    Discharge Medications: For a complete list of the patient's medications, please refer to her med rec.       Pan Garrido MD   PGY-I, OBGYN  9/23/2023  6:17 PM no

## 2024-11-12 ENCOUNTER — TRANSCRIPTION ENCOUNTER (OUTPATIENT)
Age: 89
End: 2024-11-12

## 2024-11-12 VITALS
TEMPERATURE: 98 F | HEART RATE: 85 BPM | DIASTOLIC BLOOD PRESSURE: 81 MMHG | OXYGEN SATURATION: 98 % | RESPIRATION RATE: 18 BRPM | SYSTOLIC BLOOD PRESSURE: 128 MMHG

## 2024-11-12 LAB
ANION GAP SERPL CALC-SCNC: 13 MMOL/L — SIGNIFICANT CHANGE UP (ref 5–17)
ANION GAP SERPL CALC-SCNC: 14 MMOL/L — SIGNIFICANT CHANGE UP (ref 5–17)
BUN SERPL-MCNC: 37 MG/DL — HIGH (ref 7–23)
BUN SERPL-MCNC: 37 MG/DL — HIGH (ref 7–23)
CALCIUM SERPL-MCNC: 9.7 MG/DL — SIGNIFICANT CHANGE UP (ref 8.4–10.5)
CALCIUM SERPL-MCNC: 9.8 MG/DL — SIGNIFICANT CHANGE UP (ref 8.4–10.5)
CHLORIDE SERPL-SCNC: 100 MMOL/L — SIGNIFICANT CHANGE UP (ref 96–108)
CHLORIDE SERPL-SCNC: 99 MMOL/L — SIGNIFICANT CHANGE UP (ref 96–108)
CO2 SERPL-SCNC: 14 MMOL/L — LOW (ref 22–31)
CO2 SERPL-SCNC: 20 MMOL/L — LOW (ref 22–31)
CREAT SERPL-MCNC: 0.8 MG/DL — SIGNIFICANT CHANGE UP (ref 0.5–1.3)
CREAT SERPL-MCNC: 0.81 MG/DL — SIGNIFICANT CHANGE UP (ref 0.5–1.3)
EGFR: 64 ML/MIN/1.73M2 — SIGNIFICANT CHANGE UP
EGFR: 65 ML/MIN/1.73M2 — SIGNIFICANT CHANGE UP
GLUCOSE SERPL-MCNC: 104 MG/DL — HIGH (ref 70–99)
GLUCOSE SERPL-MCNC: 99 MG/DL — SIGNIFICANT CHANGE UP (ref 70–99)
LACTATE SERPL-SCNC: 1.9 MMOL/L — SIGNIFICANT CHANGE UP (ref 0.5–2)
POTASSIUM SERPL-MCNC: 4.6 MMOL/L — SIGNIFICANT CHANGE UP (ref 3.5–5.3)
POTASSIUM SERPL-MCNC: 6.1 MMOL/L — HIGH (ref 3.5–5.3)
POTASSIUM SERPL-SCNC: 4.6 MMOL/L — SIGNIFICANT CHANGE UP (ref 3.5–5.3)
POTASSIUM SERPL-SCNC: 6.1 MMOL/L — HIGH (ref 3.5–5.3)
SODIUM SERPL-SCNC: 127 MMOL/L — LOW (ref 135–145)
SODIUM SERPL-SCNC: 133 MMOL/L — LOW (ref 135–145)

## 2024-11-12 PROCEDURE — 99239 HOSP IP/OBS DSCHRG MGMT >30: CPT

## 2024-11-12 RX ORDER — OXYCODONE HYDROCHLORIDE 30 MG/1
0.5 TABLET ORAL
Qty: 5 | Refills: 0
Start: 2024-11-12 | End: 2024-11-16

## 2024-11-12 RX ORDER — NALOXONE HYDROCHLORIDE 1 MG/ML
1 INJECTION, SOLUTION INTRAMUSCULAR; INTRAVENOUS; SUBCUTANEOUS
Qty: 1 | Refills: 0
Start: 2024-11-12 | End: 2024-11-12

## 2024-11-12 RX ORDER — LIDOCAINE HYDROCHLORIDE 40 MG/ML
1 SOLUTION TOPICAL
Qty: 30 | Refills: 0
Start: 2024-11-12 | End: 2024-12-11

## 2024-11-12 RX ORDER — GABAPENTIN 300 MG/1
1 CAPSULE ORAL
Qty: 30 | Refills: 0
Start: 2024-11-12 | End: 2024-12-11

## 2024-11-12 RX ADMIN — HEPARIN SODIUM 5000 UNIT(S): 10000 INJECTION INTRAVENOUS; SUBCUTANEOUS at 05:40

## 2024-11-12 RX ADMIN — GABAPENTIN 100 MILLIGRAM(S): 300 CAPSULE ORAL at 11:19

## 2024-11-12 RX ADMIN — LIOTHYRONINE SODIUM 5 MICROGRAM(S): 5 TABLET ORAL at 05:37

## 2024-11-12 RX ADMIN — LIDOCAINE HYDROCHLORIDE 1 PATCH: 40 SOLUTION TOPICAL at 11:19

## 2024-11-12 RX ADMIN — Medication 10 MILLIGRAM(S): at 05:37

## 2024-11-12 RX ADMIN — LIDOCAINE HYDROCHLORIDE 1 PATCH: 40 SOLUTION TOPICAL at 01:21

## 2024-11-12 NOTE — DISCHARGE NOTE PROVIDER - NSDCFUSCHEDAPPT_GEN_ALL_CORE_FT
Tejas Tejada  [facility] Physician [facility]  CARDIOLOGY [address] [facility]   Scheduled Appointment: 11/18/2024

## 2024-11-12 NOTE — PROVIDER CONTACT NOTE (SEPSIS SCREENING) - NOTIFICATION DETAILS (SBAR) SITUATION:
RN was notified that pt had an elevated MEWS score. Provider was notified to determine next course of action

## 2024-11-12 NOTE — DISCHARGE NOTE PROVIDER - HOSPITAL COURSE
HPI:  Rosa Turcios is a 89 y/o F with PMH significant for dementia, HTN, HFpEF, hypothyroidism, OA, HCV (s/p treatment), and provoked DVT (not on AC) presenting with severe right shoulder pain. Pain has been worsening over the last week with associated decreased range of motion. Denies any fall or trauma to right shoulder. Denies any fever, chills, redness, warmth, or lesion over right shoulder. She has tried ibuprofen and Tylenol with minimal relief of pain. Recently seen by Rheumatologist on 11/8 at which time she was referred to the ED for further imaging. Imaging of the R shoulder at that time consistent with significant AC and glenohumeral osteoarthritis. Pain is also affecting her ADL as she is not unable to ambulate with her walker. She lives with her daughter who assists her with ADL.  (11 Nov 2024 09:14)    Hospital Course: Rosa Turcios is a 89 y/o F with PMH significant for dementia, HTN, HFpEF, hypothyroidism, OA, HCV (s/p treatment), and provoked DVT (not on AC) presenting with severe right shoulder pain-p> Osteoarthritis of right shoulder XR R shoulder 11/8 demonstrating severe OA R glenohumeral and AC joints.- CT C-spine with DJD, no cord involvement- PT recommended home with PT. outpt follow-up with your rheumatologist. Hospital course c/b brief episode of mental status change> s/p RRT; mental status change resolved without treatment.  Elevated lactic acid level- resolved after IVF. Hyperkalemia; - Likely 2/2 aldactone- K 5.8 -->4.4 s/p Lasix and Lokelma- Hold aldactone,   Hyponatremia: Chronic, at baseline. Hx Chronic heart failure with preserved ejection fraction (HFpEF): TTE with Grade 1 diastolic dysfunction- No e/o acute exacerbation. Holding aldactone for hyperkalemia. Hypertension: Continue lisinopril 10 mg qd.      Important Medication Changes and Reason: ( gabapentin, oxy and Lidoderm prescribed as  d/w Dr. Dhaliwal)  Dr. Dhaliwal has medically cleared pt for discharge home with follow-up as advised.     Active or Pending Issues Requiring Follow-up:     Advanced Directives:   (x ] Full code  [ ] DNR  [ ] Hospice    Discharge Diagnoses:  severe Osteoarthritis of right shoulder  HTN  CHF         HPI:  Rosa Turcios is a 89 y/o F with PMH significant for dementia, HTN, HFpEF, hypothyroidism, OA, HCV (s/p treatment), and provoked DVT (not on AC) presenting with severe right shoulder pain. Pain has been worsening over the last week with associated decreased range of motion. Denies any fall or trauma to right shoulder. Denies any fever, chills, redness, warmth, or lesion over right shoulder. She has tried ibuprofen and Tylenol with minimal relief of pain. Recently seen by Rheumatologist on 11/8 at which time she was referred to the ED for further imaging. Imaging of the R shoulder at that time consistent with significant AC and glenohumeral osteoarthritis. Pain is also affecting her ADL as she is not unable to ambulate with her walker. She lives with her daughter who assists her with ADL.  (11 Nov 2024 09:14)    Hospital Course: Rsoa Turcios is a 89 y/o F with PMH significant for dementia, HTN, HFpEF, hypothyroidism, OA, HCV (s/p treatment), and provoked DVT (not on AC) presenting with severe right shoulder pain-p> Osteoarthritis of right shoulder XR R shoulder 11/8 demonstrating severe OA R glenohumeral and AC joints.- CT C-spine with DJD, no cord involvement- PT recommended home with PT. outpt follow-up with your rheumatologist. Hospital course c/b brief episode of mental status change> s/p RRT; mental status change resolved without treatment.  Elevated lactic acid level- resolved after IVF. Hyperkalemia; - Likely 2/2 aldactone- K 5.8 -->4.4 s/p Lasix and Lokelma- Hold aldactone,   Hyponatremia: Chronic, at baseline. Hx Chronic heart failure with preserved ejection fraction (HFpEF): TTE with Grade 1 diastolic dysfunction- No e/o acute exacerbation. Holding aldactone for hyperkalemia. Hypertension: Continue lisinopril 10 mg qd.      Important Medication Changes and Reason: ( gabapentin, oxy and Lidoderm prescribed as  d/w Dr. Dhaliwal)  Dr. Dhaliwal has medically cleared pt for discharge home with follow-up as advised.     Active or Pending Issues Requiring Follow-up:   Rheum   Ortho  PCP    Advanced Directives:   (x ] Full code  [ ] DNR  [ ] Hospice    Discharge Diagnoses:  severe Osteoarthritis of right shoulder  HTN  chronic CHF  Hyponatremia, likely 2/2 to diuretic use and poor po intake

## 2024-11-12 NOTE — DISCHARGE NOTE NURSING/CASE MANAGEMENT/SOCIAL WORK - NSDCPEFALRISK_GEN_ALL_CORE
For information on Fall & Injury Prevention, visit: [email or web address] OR  [email or web address] OR  https://www.cdc.gov/steadi/patient.html

## 2024-11-12 NOTE — DISCHARGE NOTE PROVIDER - ATTENDING DISCHARGE PHYSICAL EXAMINATION:
Vital Signs Last 24 Hrs  T(C): 36.4 (12 Nov 2024 15:10), Max: 36.4 (12 Nov 2024 15:10)  T(F): 97.5 (12 Nov 2024 15:10), Max: 97.5 (12 Nov 2024 15:10)  HR: 85 (12 Nov 2024 15:10) (72 - 97)  BP: 128/81 (12 Nov 2024 15:10) (128/81 - 149/76)  BP(mean): --  RR: 18 (12 Nov 2024 15:10) (18 - 18)  SpO2: 98% (12 Nov 2024 15:10) (97% - 100%)    Parameters below as of 12 Nov 2024 15:10  Patient On (Oxygen Delivery Method): room air        CONSTITUTIONAL: Well-groomed, in no apparent distress  NECK: Trachea midline   RESPIRATORY: Breathing comfortably; lungs CTA without wheeze/rhonchi/rales  CARDIOVASCULAR: +S1S2, RRR  GASTROINTESTINAL: No palpable masses or tenderness, +BS throughout  PSYCHIATRIC: A+O x 2-3

## 2024-11-12 NOTE — DISCHARGE NOTE NURSING/CASE MANAGEMENT/SOCIAL WORK - PATIENT PORTAL LINK FT
You can access the FollowMyHealth Patient Portal offered by [facility] by registering at the following website: [email or web address]. By joining [employer]’s FollowMyHealth portal, you will also be able to view your health information using other applications (apps) compatible with our system.

## 2024-11-12 NOTE — DISCHARGE NOTE PROVIDER - NSDCMRMEDTOKEN_GEN_ALL_CORE_FT
liothyronine 5 mcg oral tablet: 1 tab(s) orally once a day  lisinopril 10 mg oral tablet: 1 tab(s) orally once a day  spironolactone 25 mg oral tablet: 1 tab(s) orally once a day   gabapentin 100 mg oral capsule: 1 cap(s) orally once a day  lidocaine 4% topical film: Apply topically to affected area once a day 12hrs on 12hrs off  liothyronine 5 mcg oral tablet: 1 tab(s) orally once a day  lisinopril 10 mg oral tablet: 1 tab(s) orally once a day  Narcan 4 mg/0.1 mL nasal spray: 1 spray(s) intranasally once a day use as instructed  oxyCODONE 5 mg oral tablet: 0.5 tab(s) orally 2 times a day MDD: 1

## 2024-11-12 NOTE — DISCHARGE NOTE NURSING/CASE MANAGEMENT/SOCIAL WORK - FINANCIAL ASSISTANCE
[facility] provides services at a reduced cost to those who are determined to be eligible through [facility]’s financial assistance program. Information regarding [facility]’s financial assistance program can be found by going to [email or web address] or by calling [phone].

## 2024-11-12 NOTE — DISCHARGE NOTE PROVIDER - NSDCCPCAREPLAN_GEN_ALL_CORE_FT
PRINCIPAL DISCHARGE DIAGNOSIS  Diagnosis: Right shoulder pain  Assessment and Plan of Treatment: Rt shoulder pain-> Osteoarthritis of right shouldel XR R shoulder 11/8 demonstrating severe OA R glenohumeral and AC joints.- CT C-spine with DJD, no cord involvement  - Home with home PT   - outpt follow-up with your rheumatologist.   - continue bowel regimen as prescribed.

## 2024-11-12 NOTE — DISCHARGE NOTE PROVIDER - NSDCFUADDAPPT_GEN_ALL_CORE_FT
APPTS ARE READY TO BE MADE: [x ] YES    Best Family or Patient Contact (if needed):    Additional Information about above appointments (if needed):    1:   2:   3:     Other comments or requests:    APPTS ARE READY TO BE MADE: [X] YES    Best Family or Patient Contact (if needed):    Additional Information about above appointments (if needed):    1: Rheum  2: PCP  3:     Other comments or requests:

## 2024-11-12 NOTE — DISCHARGE NOTE PROVIDER - CARE PROVIDERS DIRECT ADDRESSES
,[email or web address] ([email or web address] ([email or web address]

## 2024-11-12 NOTE — DISCHARGE NOTE PROVIDER - CARE PROVIDER_API CALL
Tejas Tejada  Cardiovascular Disease  [address], [address]  [place] [address]  Phone: [phone]  Fax: [phone]  Follow Up Time:     Manish Scales  Geriatric Medicine  [address], [address]  [place] [address]  Phone: [phone]  Fax: [phone]  Follow Up Time:    Tejas Tejada  Cardiovascular Disease  [address], [address]  [place] [address]  Phone: [phone]  Fax: [phone]  Follow Up Time:     Manish Scales  Geriatric Medicine  [address], [address]  [place] [address]  Phone: [phone]  Fax: [phone]  Follow Up Time:     Jodee Webster  Rheumatology  [address], [facility] [[email or web address]  [place] [address]  Phone: [phone]  Fax: [phone]  Follow Up Time: 1 week

## 2024-11-12 NOTE — DISCHARGE NOTE PROVIDER - PROVIDER TOKENS
PROVIDER:[TOKEN:[[id number]]],PROVIDER:[TOKEN:[[id number]]] PROVIDER:[TOKEN:[[id number]]],PROVIDER:[TOKEN:[[id number]]],PROVIDER:[TOKEN:[[id number]],FOLLOWUP:[1 week]]

## 2024-11-12 NOTE — DISCHARGE NOTE NURSING/CASE MANAGEMENT/SOCIAL WORK - NSDCVIVACCINE_GEN_ALL_CORE_FT
influenza, injectable, quadrivalent, preservative free; 18-Nov-2014 16:58; Mami Alvarez (RN); [employer]; [id number]; IntraMuscular; Deltoid Left.; 0.5 milliLiter(s);   Influenza, high-dose, trivalent, preservative free; 31-Dec-2018 16:24; Wilson Sevilla (RN); [employer]; [id number] (Exp. Date: 16-Mar-2019); IntraMuscular; Deltoid Left.; 0.5 milliLiter(s); VIS (VIS Published: 07-Aug-2015, VIS Presented: 31-Dec-2018);

## 2024-11-13 ENCOUNTER — NON-APPOINTMENT (OUTPATIENT)
Age: 89
End: 2024-11-13

## 2024-11-13 ENCOUNTER — INPATIENT (INPATIENT)
Facility: HOSPITAL | Age: 88
LOS: 11 days | Discharge: NURSING, REHAB OR LONG-TERM CARE | DRG: 189 | End: 2024-11-25
Attending: STUDENT IN AN ORGANIZED HEALTH CARE EDUCATION/TRAINING PROGRAM | Admitting: HOSPITALIST
Payer: MEDICARE

## 2024-11-13 ENCOUNTER — APPOINTMENT (OUTPATIENT)
Dept: RHEUMATOLOGY | Facility: CLINIC | Age: 89
End: 2024-11-13

## 2024-11-13 VITALS
SYSTOLIC BLOOD PRESSURE: 87 MMHG | WEIGHT: 175.05 LBS | OXYGEN SATURATION: 100 % | RESPIRATION RATE: 22 BRPM | DIASTOLIC BLOOD PRESSURE: 57 MMHG | HEART RATE: 77 BPM | HEIGHT: 63 IN

## 2024-11-13 DIAGNOSIS — I50.9 HEART FAILURE, UNSPECIFIED: ICD-10-CM

## 2024-11-13 DIAGNOSIS — J96.01 ACUTE RESPIRATORY FAILURE WITH HYPOXIA: ICD-10-CM

## 2024-11-13 DIAGNOSIS — E03.9 HYPOTHYROIDISM, UNSPECIFIED: ICD-10-CM

## 2024-11-13 DIAGNOSIS — R93.89 ABNORMAL FINDINGS ON DIAGNOSTIC IMAGING OF OTHER SPECIFIED BODY STRUCTURES: ICD-10-CM

## 2024-11-13 DIAGNOSIS — Z95.818 PRESENCE OF OTHER CARDIAC IMPLANTS AND GRAFTS: Chronic | ICD-10-CM

## 2024-11-13 DIAGNOSIS — M25.511 PAIN IN RIGHT SHOULDER: ICD-10-CM

## 2024-11-13 LAB
ALBUMIN SERPL ELPH-MCNC: 3.6 G/DL — SIGNIFICANT CHANGE UP (ref 3.3–5)
ALBUMIN SERPL ELPH-MCNC: 4 G/DL — SIGNIFICANT CHANGE UP (ref 3.3–5)
ALP SERPL-CCNC: 104 U/L — SIGNIFICANT CHANGE UP (ref 40–120)
ALP SERPL-CCNC: 94 U/L — SIGNIFICANT CHANGE UP (ref 40–120)
ALT FLD-CCNC: 23 U/L — SIGNIFICANT CHANGE UP (ref 10–45)
ALT FLD-CCNC: 25 U/L — SIGNIFICANT CHANGE UP (ref 10–45)
ANION GAP SERPL CALC-SCNC: 15 MMOL/L — SIGNIFICANT CHANGE UP (ref 5–17)
ANION GAP SERPL CALC-SCNC: 17 MMOL/L — SIGNIFICANT CHANGE UP (ref 5–17)
AST SERPL-CCNC: 39 U/L — SIGNIFICANT CHANGE UP (ref 10–40)
AST SERPL-CCNC: 45 U/L — HIGH (ref 10–40)
BASOPHILS # BLD AUTO: 0.04 K/UL — SIGNIFICANT CHANGE UP (ref 0–0.2)
BASOPHILS NFR BLD AUTO: 0.4 % — SIGNIFICANT CHANGE UP (ref 0–2)
BILIRUB SERPL-MCNC: 0.6 MG/DL — SIGNIFICANT CHANGE UP (ref 0.2–1.2)
BILIRUB SERPL-MCNC: 0.7 MG/DL — SIGNIFICANT CHANGE UP (ref 0.2–1.2)
BUN SERPL-MCNC: 40 MG/DL — HIGH (ref 7–23)
BUN SERPL-MCNC: 43 MG/DL — HIGH (ref 7–23)
CALCIUM SERPL-MCNC: 10 MG/DL — SIGNIFICANT CHANGE UP (ref 8.4–10.5)
CALCIUM SERPL-MCNC: 9.3 MG/DL — SIGNIFICANT CHANGE UP (ref 8.4–10.5)
CHLORIDE SERPL-SCNC: 97 MMOL/L — SIGNIFICANT CHANGE UP (ref 96–108)
CHLORIDE SERPL-SCNC: 99 MMOL/L — SIGNIFICANT CHANGE UP (ref 96–108)
CO2 SERPL-SCNC: 16 MMOL/L — LOW (ref 22–31)
CO2 SERPL-SCNC: 16 MMOL/L — LOW (ref 22–31)
CREAT SERPL-MCNC: 0.85 MG/DL — SIGNIFICANT CHANGE UP (ref 0.5–1.3)
CREAT SERPL-MCNC: 1.11 MG/DL — SIGNIFICANT CHANGE UP (ref 0.5–1.3)
EGFR: 44 ML/MIN/1.73M2 — LOW
EGFR: 61 ML/MIN/1.73M2 — SIGNIFICANT CHANGE UP
EOSINOPHIL # BLD AUTO: 0.01 K/UL — SIGNIFICANT CHANGE UP (ref 0–0.5)
EOSINOPHIL NFR BLD AUTO: 0.1 % — SIGNIFICANT CHANGE UP (ref 0–6)
GAS PNL BLDV: SIGNIFICANT CHANGE UP
GAS PNL BLDV: SIGNIFICANT CHANGE UP
GLUCOSE SERPL-MCNC: 106 MG/DL — HIGH (ref 70–99)
GLUCOSE SERPL-MCNC: 133 MG/DL — HIGH (ref 70–99)
HCT VFR BLD CALC: 37.4 % — SIGNIFICANT CHANGE UP (ref 34.5–45)
HGB BLD-MCNC: 12.4 G/DL — SIGNIFICANT CHANGE UP (ref 11.5–15.5)
IMM GRANULOCYTES NFR BLD AUTO: 0.9 % — SIGNIFICANT CHANGE UP (ref 0–0.9)
LYMPHOCYTES # BLD AUTO: 1.26 K/UL — SIGNIFICANT CHANGE UP (ref 1–3.3)
LYMPHOCYTES # BLD AUTO: 13 % — SIGNIFICANT CHANGE UP (ref 13–44)
MCHC RBC-ENTMCNC: 31.4 PG — SIGNIFICANT CHANGE UP (ref 27–34)
MCHC RBC-ENTMCNC: 33.2 G/DL — SIGNIFICANT CHANGE UP (ref 32–36)
MCV RBC AUTO: 94.7 FL — SIGNIFICANT CHANGE UP (ref 80–100)
MONOCYTES # BLD AUTO: 1.3 K/UL — HIGH (ref 0–0.9)
MONOCYTES NFR BLD AUTO: 13.4 % — SIGNIFICANT CHANGE UP (ref 2–14)
NEUTROPHILS # BLD AUTO: 6.98 K/UL — SIGNIFICANT CHANGE UP (ref 1.8–7.4)
NEUTROPHILS NFR BLD AUTO: 72.2 % — SIGNIFICANT CHANGE UP (ref 43–77)
NRBC # BLD: 0 /100 WBCS — SIGNIFICANT CHANGE UP (ref 0–0)
NT-PROBNP SERPL-SCNC: 186 PG/ML — SIGNIFICANT CHANGE UP (ref 0–300)
NT-PROBNP SERPL-SCNC: 222 PG/ML — SIGNIFICANT CHANGE UP (ref 0–300)
PLATELET # BLD AUTO: 212 K/UL — SIGNIFICANT CHANGE UP (ref 150–400)
POTASSIUM SERPL-MCNC: 4.8 MMOL/L — SIGNIFICANT CHANGE UP (ref 3.5–5.3)
POTASSIUM SERPL-MCNC: 5.4 MMOL/L — HIGH (ref 3.5–5.3)
POTASSIUM SERPL-SCNC: 4.8 MMOL/L — SIGNIFICANT CHANGE UP (ref 3.5–5.3)
POTASSIUM SERPL-SCNC: 5.4 MMOL/L — HIGH (ref 3.5–5.3)
PROT SERPL-MCNC: 6.7 G/DL — SIGNIFICANT CHANGE UP (ref 6–8.3)
PROT SERPL-MCNC: 7.4 G/DL — SIGNIFICANT CHANGE UP (ref 6–8.3)
RBC # BLD: 3.95 M/UL — SIGNIFICANT CHANGE UP (ref 3.8–5.2)
RBC # FLD: 14.3 % — SIGNIFICANT CHANGE UP (ref 10.3–14.5)
SODIUM SERPL-SCNC: 128 MMOL/L — LOW (ref 135–145)
SODIUM SERPL-SCNC: 132 MMOL/L — LOW (ref 135–145)
TROPONIN T, HIGH SENSITIVITY RESULT: 50 NG/L — SIGNIFICANT CHANGE UP (ref 0–51)
TROPONIN T, HIGH SENSITIVITY RESULT: 53 NG/L — HIGH (ref 0–51)
WBC # BLD: 9.68 K/UL — SIGNIFICANT CHANGE UP (ref 3.8–10.5)
WBC # FLD AUTO: 9.68 K/UL — SIGNIFICANT CHANGE UP (ref 3.8–10.5)

## 2024-11-13 PROCEDURE — 99285 EMERGENCY DEPT VISIT HI MDM: CPT

## 2024-11-13 PROCEDURE — 99223 1ST HOSP IP/OBS HIGH 75: CPT

## 2024-11-13 PROCEDURE — 72192 CT PELVIS W/O DYE: CPT | Mod: 26,MC

## 2024-11-13 PROCEDURE — 71275 CT ANGIOGRAPHY CHEST: CPT | Mod: 26,MC

## 2024-11-13 PROCEDURE — 93308 TTE F-UP OR LMTD: CPT | Mod: 26

## 2024-11-13 PROCEDURE — 73521 X-RAY EXAM HIPS BI 2 VIEWS: CPT | Mod: 26

## 2024-11-13 PROCEDURE — 93010 ELECTROCARDIOGRAM REPORT: CPT

## 2024-11-13 PROCEDURE — 93970 EXTREMITY STUDY: CPT | Mod: 26

## 2024-11-13 PROCEDURE — 72170 X-RAY EXAM OF PELVIS: CPT | Mod: 26,XE

## 2024-11-13 PROCEDURE — 73030 X-RAY EXAM OF SHOULDER: CPT | Mod: 26,RT

## 2024-11-13 PROCEDURE — 71045 X-RAY EXAM CHEST 1 VIEW: CPT | Mod: 26

## 2024-11-13 RX ORDER — ONDANSETRON HYDROCHLORIDE 4 MG/1
4 TABLET, FILM COATED ORAL EVERY 8 HOURS
Refills: 0 | Status: DISCONTINUED | OUTPATIENT
Start: 2024-11-13 | End: 2024-11-25

## 2024-11-13 RX ORDER — ACETAMINOPHEN, DIPHENHYDRAMINE HCL, PHENYLEPHRINE HCL 325; 25; 5 MG/1; MG/1; MG/1
3 TABLET ORAL AT BEDTIME
Refills: 0 | Status: DISCONTINUED | OUTPATIENT
Start: 2024-11-13 | End: 2024-11-25

## 2024-11-13 RX ORDER — NALOXONE HCL 0.4 MG/ML
0.4 AMPUL (ML) INJECTION ONCE
Refills: 0 | Status: DISCONTINUED | OUTPATIENT
Start: 2024-11-13 | End: 2024-11-18

## 2024-11-13 RX ORDER — ACETAMINOPHEN 500MG 500 MG/1
1000 TABLET, COATED ORAL ONCE
Refills: 0 | Status: COMPLETED | OUTPATIENT
Start: 2024-11-13 | End: 2024-11-13

## 2024-11-13 RX ORDER — MAGNESIUM, ALUMINUM HYDROXIDE 200-225/5
30 SUSPENSION, ORAL (FINAL DOSE FORM) ORAL EVERY 4 HOURS
Refills: 0 | Status: DISCONTINUED | OUTPATIENT
Start: 2024-11-13 | End: 2024-11-25

## 2024-11-13 RX ORDER — LIDOCAINE 40 MG/G
1 CREAM TOPICAL DAILY
Refills: 0 | Status: DISCONTINUED | OUTPATIENT
Start: 2024-11-13 | End: 2024-11-25

## 2024-11-13 RX ORDER — ENOXAPARIN SODIUM 30 MG/.3ML
40 INJECTION SUBCUTANEOUS EVERY 24 HOURS
Refills: 0 | Status: DISCONTINUED | OUTPATIENT
Start: 2024-11-13 | End: 2024-11-18

## 2024-11-13 RX ORDER — POLYETHYLENE GLYCOL 3350 17 G/17G
17 POWDER, FOR SOLUTION ORAL DAILY
Refills: 0 | Status: DISCONTINUED | OUTPATIENT
Start: 2024-11-13 | End: 2024-11-25

## 2024-11-13 RX ORDER — OXYCODONE HYDROCHLORIDE 30 MG/1
2.5 TABLET ORAL EVERY 4 HOURS
Refills: 0 | Status: DISCONTINUED | OUTPATIENT
Start: 2024-11-13 | End: 2024-11-18

## 2024-11-13 RX ORDER — OXYCODONE HYDROCHLORIDE 30 MG/1
5 TABLET ORAL EVERY 4 HOURS
Refills: 0 | Status: DISCONTINUED | OUTPATIENT
Start: 2024-11-13 | End: 2024-11-15

## 2024-11-13 RX ORDER — LIOTHYRONINE SODIUM 25 MCG
5 TABLET ORAL DAILY
Refills: 0 | Status: DISCONTINUED | OUTPATIENT
Start: 2024-11-13 | End: 2024-11-25

## 2024-11-13 RX ORDER — FUROSEMIDE 40 MG/1
20 TABLET ORAL DAILY
Refills: 0 | Status: DISCONTINUED | OUTPATIENT
Start: 2024-11-13 | End: 2024-11-15

## 2024-11-13 RX ORDER — SENNOSIDES 8.6 MG
2 TABLET ORAL AT BEDTIME
Refills: 0 | Status: DISCONTINUED | OUTPATIENT
Start: 2024-11-13 | End: 2024-11-25

## 2024-11-13 RX ORDER — ACETAMINOPHEN 500MG 500 MG/1
650 TABLET, COATED ORAL EVERY 6 HOURS
Refills: 0 | Status: DISCONTINUED | OUTPATIENT
Start: 2024-11-13 | End: 2024-11-25

## 2024-11-13 RX ORDER — SODIUM CHLORIDE 9 MG/ML
500 INJECTION, SOLUTION INTRAMUSCULAR; INTRAVENOUS; SUBCUTANEOUS ONCE
Refills: 0 | Status: COMPLETED | OUTPATIENT
Start: 2024-11-13 | End: 2024-11-13

## 2024-11-13 RX ADMIN — ACETAMINOPHEN 500MG 1000 MILLIGRAM(S): 500 TABLET, COATED ORAL at 23:21

## 2024-11-13 RX ADMIN — SODIUM CHLORIDE 500 MILLILITER(S): 9 INJECTION, SOLUTION INTRAMUSCULAR; INTRAVENOUS; SUBCUTANEOUS at 15:08

## 2024-11-13 RX ADMIN — ACETAMINOPHEN 500MG 400 MILLIGRAM(S): 500 TABLET, COATED ORAL at 14:07

## 2024-11-13 NOTE — H&P ADULT - NSHPPHYSICALEXAM_GEN_ALL_CORE
T(C): 36.8 (11-13-24 @ 23:17), Max: 37.2 (11-13-24 @ 13:08)  HR: 77 (11-13-24 @ 23:17) (60 - 77)  BP: 138/77 (11-13-24 @ 23:17) (87/57 - 147/79)  RR: 17 (11-13-24 @ 23:17) (17 - 22)  SpO2: 100% (11-13-24 @ 23:17) (100% - 100%)  GENERAL: NAD, lying in bed, initially uncomfortable as she is crunched up in bed, feels better after being propped up  EYES: EOMI, PERRLA; conjunctiva and sclera clear  ENMT: Moist oral mucosa, no pharyngeal injection or exudates; nasal cannula irritating nasal mucosa w small amounts of blood  NECK: Supple, no palpable masses; no JVD  RESPIRATORY: Normal respiratory effort; lungs w decreased breath sounds +/- crackles over bases on auscultation bilaterally  CARDIOVASCULAR: Regular rate and rhythm, normal S1 and S2, no murmur/rub/gallop; mild lower extremity edema; Peripheral pulses are 2+ bilaterally  ABDOMEN: Nontender to palpation, normoactive bowel sounds, no rebound/guarding   MUSCULOSKELETAL: bruising over right shoulder; unable to lift shoulder or adduct shoulder whatsoever due to pain, can only move rue at elbow joint  PSYCH: A+O x1-2; affect appropriate  NEUROLOGY: CN 2-12 are intact and symmetric; no gross motor or sensory deficits   SKIN: No rashes; no palpable lesions T(C): 36.8 (11-13-24 @ 23:17), Max: 37.2 (11-13-24 @ 13:08)  HR: 77 (11-13-24 @ 23:17) (60 - 77)  BP: 138/77 (11-13-24 @ 23:17) (87/57 - 147/79)  RR: 17 (11-13-24 @ 23:17) (17 - 22)  SpO2: 100% (11-13-24 @ 23:17) (100% - 100%)  GENERAL: NAD, lying in bed, initially uncomfortable as she is crunched up in bed, feels better after being propped up  EYES: EOMI, PERRLA; conjunctiva and sclera clear  ENMT: Moist oral mucosa, no pharyngeal injection or exudates; nasal cannula irritating nasal mucosa w small amounts of blood  NECK: Supple, no palpable masses; no JVD  RESPIRATORY: Normal respiratory effort; lungs w decreased breath sounds +/- crackles over bases on auscultation bilaterally  CARDIOVASCULAR: Regular rate and rhythm, normal S1 and S2, no murmur/rub/gallop; mild lower extremity edema; Peripheral pulses are 2+ bilaterally  ABDOMEN: Nontender to palpation, normoactive bowel sounds, no rebound/guarding   MUSCULOSKELETAL: bruising over right shoulder; unable to lift shoulder or adduct shoulder whatsoever due to pain, can only move rue at elbow joint  PSYCH: A+O x2, at baseline; affect appropriate  NEUROLOGY: CN 2-12 are intact and symmetric; no gross motor or sensory deficits   SKIN: No rashes; no palpable lesions

## 2024-11-13 NOTE — H&P ADULT - PROBLEM SELECTOR PLAN 4
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 However, septic arthritis of the right hip joint space cannot be excluded and is also a differential consideration. If there is clinical concern for septic arthritis of the right hip joint space, right hip joints aspiration should be performed....Mild subchondral collapse of the right femoral head, fairly similar to the prior radiographs dated 4/24/2024.  afebrile, no leukocytosis, hds  analgesics as needed   consider ortho consult in am    => Partially imaged indeterminate hypoattenuating lesion within the inferior pole of the left kidney. Renal neoplasm is one of the differential considerations. Recommend further evaluation with renal ultrasound to rule out neoplastic etiologies.  consider renal us  outpatient follow up    =>Indeterminate complex mixed attenuating lesion within the region of the left ovary/adnexa. Recommend further evaluation with pelvic ultrasound or pelvic MRI without and with contrast to rule out an underlying neoplasm in this region.  consider tvus/taus  outpatient follow up

## 2024-11-13 NOTE — H&P ADULT - PROBLEM SELECTOR PLAN 2
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

## 2024-11-13 NOTE — CONSULT NOTE ADULT - SUBJECTIVE AND OBJECTIVE BOX
HPI: 89y old F w/ dementia, HTN, HF, hypothyroidism, OA, hepatitis C s/p treatment, prior DVT (not on A/C currently) who presents with a chief complaint of SOB, FTT and bradycardia at home. No family at bedside at time of exam so history limited to charts and ED Providers' notes. Per chart review, pt was discharged a day prior from [facility] for R shoulder pain that limited ambulation with walker. X-ray unremarkable except for severe R shoulder OA and PT rec outpt PT. Course during that hospital visit was c/b transient AMS/hypotension that resolved as well as hyperkalemia likely 2/2 aldactone. Per ED providers, this time, when pt was home, continued to be unable to use walker and was complaining of SOB, which was concerning to daughter and that's why she was brought into the ED. She follows with Dr. Webster outpatient in rheumatology for R shoulder OA. Pt denies fall since discharge from hospital.    REVIEW OF SYSTEMS:  All Review of systems negative, except for those marked:  Constitutional	Normal: no fever, weight loss, fatigue, repeated infections, loss of appetite  .		[] Abnormal:  Eyes		Normal: no double or blurred vision, red eye, glaucoma, cataracts, photophobia,   .		eye pain  .		[] Comments/Additional Information:  ENT		Normal: no decreased hearing, discharge, stuffiness, change in voice, difficulty   .		swallowing, mouth sores  .		[] Abnormal:  Respiratory	Normal: no SOB, asthma, bronchitis, coughing, pain with breathing, TB  .		[] Abnormal:  Cardiovascular	Normal: no chest pain, palpitations, tachycardia, high blood pressure, abnormal   .		ECG  .		[] Abnormal:  GI		Normal: no food intolerance, diet change, jaundice, hepatitis, nausea, vomiting,   .		abdominal pain, diarrhea, blood in stool  .		[] Abnormal:  Genitourinary	Normal: no kidney failure, difficulty with urination, blood in urine, dysuria  .		[] Abnormal:  Integumentary	Normal: no rashes, psoriasis, moles, hair loss, Raynaud’s  .		[] Abnormal:  Psychiatric	Normal: no depression, psychosis, sleeping difficulties, confusion  .		[] Abnormal:  Endocrine	Normal: no thyroid disease, diabetes, hirsuitism, obesity  .		[] Abnormal:  Neurologic	Normal: no headaches, seizures, speech disturbances, cognitive changes,   .		clumsiness, numbness  .		[] Abnormal:  Hematologic/Lymph	Normal: no low HCT, blood transfusions, lymph node enlargement,   .			bleeding, bruising  .			[] Abnormal:  Musculoskeletal		Normal: no joint pain, cramps, weakness, myalgias  .			[x] Abnormal: R shoulder pain on movement and palpation        Allergies    No Known Allergies      PAST MEDICAL & SURGICAL HISTORY:  Hypothyroidism  Arthritis  Hypertension  Hepatitis C  Dementia without behavioral disturbance, unspecified dementia type  Bifascicular block  Status post placement of implantable loop recorder  Growth & Development:    FAMILY HISTORY: unable to be obtained due to dementia    Vital Signs Last 24 Hrs  T(C): 37.2 (13 Nov 2024 13:08), Max: 37.2 (13 Nov 2024 13:08)  T(F): 98.9 (13 Nov 2024 13:08), Max: 98.9 (13 Nov 2024 13:08)  HR: 60 (13 Nov 2024 13:08) (60 - 77)  BP: 127/82 (13 Nov 2024 13:08) (87/57 - 127/82)  BP(mean): 94 (13 Nov 2024 13:08) (94 - 94)  RR: 21 (13 Nov 2024 13:08) (21 - 22)  SpO2: 100% (13 Nov 2024 13:08) (100% - 100%)    Parameters below as of 13 Nov 2024 13:08  Patient On (Oxygen Delivery Method): room air      Daily Height in cm: 160.02 (13 Nov 2024 12:20)    Daily     PHYSICAL EXAM:  All physical exam findings normal, except for those marked:  General Appearance:  Skin 		WNL: no rash, lesion, ulcers, indurations, nodules or tightening, normal nail bed   .		capillaries  .		[] Abnormal:  Eyes		WNL: normal conjunctiva and lids, normal pupils and iris  .		[] Abnormal:  ENT		WNL: normal appearance of ears, nose lips, teeth, gums, oropharynx, oral   .		mucosal and palate  .		[] Abnormal:  Neck: 		WNL: no masses, normal thyroid  .		[] Abnormal:  Cardiovascular: WNL: normal auscultation, normal peripheral pulses, no peripheral edema  .		[] Abnormal:  Respiratory: 	WNL: normal respiratory effort  .		[] Abnormal:  GI:		WNL: no masses or tenderness, normal liver and spleen  .		[] Abnormal:  Lymphatic: 	WNL: normal cervical, axillary and inguinal nodes  .		[] Abnormal:  Neurologic: 	WNL: normal DTR’s, normal sensation  .		[] Abnormal:  Psychiatric: 	WNL: normal judgment and insight, normal memory, normal mood and affect  .		[] Abnormal:  Genitalia: 	WNL: normal breasts, genitals and pubic hair  .		[] Abnormal:  Musculoskeletal:	 ABNORMAL: R humerus more swollen with clearly demarcated bruising around anterior aspect; passive and active ROM limited in both shoulders but more limited active ROM in R shoulder compared to L; due to heat packs, both shoulder were warm to palpation.      Lab Results:                        12.4   9.68  )-----------( 212      ( 13 Nov 2024 13:32 )             37.4     11-13    132[L]  |  99  |  43[H]  ----------------------------<  133[H]  5.4[H]   |  16[L]  |  1.11    Ca    10.0      13 Nov 2024 13:32    TPro  7.4  /  Alb  4.0  /  TBili  0.7  /  DBili  x   /  AST  45[H]  /  ALT  25  /  AlkPhos  104  11-13      Urinalysis Basic - ( 13 Nov 2024 13:32 )    Color: x / Appearance: x / SG: x / pH: x  Gluc: 133 mg/dL / Ketone: x  / Bili: x / Urobili: x   Blood: x / Protein: x / Nitrite: x   Leuk Esterase: x / RBC: x / WBC x   Sq Epi: x / Non Sq Epi: x / Bacteria: x HPI: 89y old F w/ dementia, HTN, HF, hypothyroidism, OA, hepatitis C s/p treatment, prior DVT (not on A/C currently) who presents with a chief complaint of SOB, FTT and bradycardia at home. No family at bedside at time of exam so history limited to charts and ED Providers' notes. Per chart review, pt was discharged a day prior from [facility] for R shoulder pain that limited ambulation with walker. X-ray unremarkable except for severe R shoulder OA and PT rec outpt PT. Course during that hospital visit was c/b transient AMS/hypotension that resolved as well as hyperkalemia likely 2/2 aldactone. Per ED providers, this time, when pt was home, continued to be unable to use walker and was complaining of SOB, which was concerning to daughter and that's why she was brought into the ED. She follows with Dr. Webster outpatient in rheumatology for R shoulder OA. Pt denies fall since discharge from hospital.    Rheumatology history:  Follows with Dr Leroy for R shoulder OA that pt has taken advil for outpatient. R shoulder pain inc in severity starting 11/5/24 which was concerning for infection so was sent in to ED at that time, which resulted in admission, and x-rays.    REVIEW OF SYSTEMS:  All Review of systems negative, except for those marked:  Constitutional	Normal: no fever, weight loss, fatigue, repeated infections, loss of appetite  .		[] Abnormal:  Eyes		Normal: no double or blurred vision, red eye, glaucoma, cataracts, photophobia,   .		eye pain  .		[] Comments/Additional Information:  ENT		Normal: no decreased hearing, discharge, stuffiness, change in voice, difficulty   .		swallowing, mouth sores  .		[] Abnormal:  Respiratory	Normal: no SOB, asthma, bronchitis, coughing, pain with breathing, TB  .		[] Abnormal:  Cardiovascular	Normal: no chest pain, palpitations, tachycardia, high blood pressure, abnormal   .		ECG  .		[] Abnormal:  GI		Normal: no food intolerance, diet change, jaundice, hepatitis, nausea, vomiting,   .		abdominal pain, diarrhea, blood in stool  .		[] Abnormal:  Genitourinary	Normal: no kidney failure, difficulty with urination, blood in urine, dysuria  .		[] Abnormal:  Integumentary	Normal: no rashes, psoriasis, moles, hair loss, Raynaud’s  .		[] Abnormal:  Psychiatric	Normal: no depression, psychosis, sleeping difficulties, confusion  .		[] Abnormal:  Endocrine	Normal: no thyroid disease, diabetes, hirsuitism, obesity  .		[] Abnormal:  Neurologic	Normal: no headaches, seizures, speech disturbances, cognitive changes,   .		clumsiness, numbness  .		[] Abnormal:  Hematologic/Lymph	Normal: no low HCT, blood transfusions, lymph node enlargement,   .			bleeding, bruising  .			[] Abnormal:  Musculoskeletal		Normal: no joint pain, cramps, weakness, myalgias  .			[x] Abnormal: R shoulder pain on movement and palpation        Allergies    No Known Allergies      PAST MEDICAL & SURGICAL HISTORY:  Hypothyroidism  Arthritis  Hypertension  Hepatitis C  Dementia without behavioral disturbance, unspecified dementia type  Bifascicular block  Status post placement of implantable loop recorder  Growth & Development:    FAMILY HISTORY: unable to be obtained due to dementia    Vital Signs Last 24 Hrs  T(C): 37.2 (13 Nov 2024 13:08), Max: 37.2 (13 Nov 2024 13:08)  T(F): 98.9 (13 Nov 2024 13:08), Max: 98.9 (13 Nov 2024 13:08)  HR: 60 (13 Nov 2024 13:08) (60 - 77)  BP: 127/82 (13 Nov 2024 13:08) (87/57 - 127/82)  BP(mean): 94 (13 Nov 2024 13:08) (94 - 94)  RR: 21 (13 Nov 2024 13:08) (21 - 22)  SpO2: 100% (13 Nov 2024 13:08) (100% - 100%)    Parameters below as of 13 Nov 2024 13:08  Patient On (Oxygen Delivery Method): room air      Daily Height in cm: 160.02 (13 Nov 2024 12:20)    Daily     PHYSICAL EXAM:  All physical exam findings normal, except for those marked:  General Appearance:  Skin 		WNL: no rash, lesion, ulcers, indurations, nodules or tightening, normal nail bed   .		capillaries  .		[] Abnormal:  Eyes		WNL: normal conjunctiva and lids, normal pupils and iris  .		[] Abnormal:  ENT		WNL: normal appearance of ears, nose lips, teeth, gums, oropharynx, oral   .		mucosal and palate  .		[] Abnormal:  Neck: 		WNL: no masses, normal thyroid  .		[] Abnormal:  Cardiovascular: WNL: normal auscultation, normal peripheral pulses, no peripheral edema  .		[] Abnormal:  Respiratory: 	WNL: normal respiratory effort  .		[] Abnormal:  GI:		WNL: no masses or tenderness, normal liver and spleen  .		[] Abnormal:  Lymphatic: 	WNL: normal cervical, axillary and inguinal nodes  .		[] Abnormal:  Neurologic: 	WNL: normal DTR’s, normal sensation  .		[] Abnormal:  Psychiatric: 	WNL: normal judgment and insight, normal memory, normal mood and affect  .		[] Abnormal:  Genitalia: 	WNL: normal breasts, genitals and pubic hair  .		[] Abnormal:  Musculoskeletal:	 ABNORMAL: R humerus more swollen with clearly demarcated bruising around anterior aspect; passive and active ROM limited in both shoulders but more limited active ROM in R shoulder compared to L; due to heat packs, both shoulder were warm to palpation.      Lab Results:                        12.4   9.68  )-----------( 212      ( 13 Nov 2024 13:32 )             37.4     11-13    132[L]  |  99  |  43[H]  ----------------------------<  133[H]  5.4[H]   |  16[L]  |  1.11    Ca    10.0      13 Nov 2024 13:32    TPro  7.4  /  Alb  4.0  /  TBili  0.7  /  DBili  x   /  AST  45[H]  /  ALT  25  /  AlkPhos  104  11-13      Urinalysis Basic - ( 13 Nov 2024 13:32 )    Color: x / Appearance: x / SG: x / pH: x  Gluc: 133 mg/dL / Ketone: x  / Bili: x / Urobili: x   Blood: x / Protein: x / Nitrite: x   Leuk Esterase: x / RBC: x / WBC x   Sq Epi: x / Non Sq Epi: x / Bacteria: x HPI: 89y old F w/ dementia, HTN, HF, hypothyroidism, OA, hepatitis C s/p treatment, prior DVT (not on A/C currently) who presents with a chief complaint of SOB, FTT and bradycardia at home. No family at bedside at time of exam so history limited to charts and ED Providers' notes. Per chart review, pt was discharged a day prior from [facility] for R shoulder pain that limited ambulation with walker. X-ray unremarkable except for severe R shoulder OA and PT rec outpt PT. Course during that hospital visit was c/b transient AMS/hypotension that resolved as well as hyperkalemia likely 2/2 aldactone. Per ED providers, this time, when pt was home, continued to be unable to use walker and was complaining of SOB, which was concerning to daughter and that's why she was brought into the ED. She follows with Dr. Webster outpatient in rheumatology for R shoulder OA. Pt denies fall since discharge from hospital.    Rheumatology history:  Follows with Dr Leroy for R shoulder OA that pt has taken advil for outpatient. R shoulder pain inc in severity starting 11/5/24 which was concerning for infection so was sent in to ED at that time, which resulted in admission, and x-rays.    REVIEW OF SYSTEMS:   CONSTITUTIONAL: No weakness, fevers, chills, sick contacts  RESPIRATORY: shortness of breath  CARDIOVASCULAR: No chest pain   GASTROINTESTINAL: No abdominal or epigastric pain.   NEUROLOGICAL: pain in Right shoulder      Allergies    No Known Allergies      PAST MEDICAL & SURGICAL HISTORY:  Hypothyroidism  Arthritis  Hypertension  Hepatitis C  Dementia without behavioral disturbance, unspecified dementia type  Bifascicular block  Status post placement of implantable loop recorder  Growth & Development:    FAMILY HISTORY: unable to be obtained due to dementia    PHYSICAL EXAM:  VITALS:   T(C): 37.1 (11-13-24 @ 15:21), Max: 37.2 (11-13-24 @ 13:08)  HR: 76 (11-13-24 @ 15:21) (60 - 77)  BP: 128/72 (11-13-24 @ 15:21) (87/57 - 128/72)  RR: 19 (11-13-24 @ 15:21) (19 - 22)  SpO2: 100% (11-13-24 @ 15:21) (100% - 100%)    GENERAL: NAD, lying in bed comfortably  HEAD:  Atraumatic, Normocephalic  EYES: conjunctiva and sclera clear  ENT: Moist mucous membranes  CHEST/LUNG: Unlabored respirations  ABDOMEN: nondistended  MSK: R humerus more swollen with clearly demarcated bruising around anterior aspect; passive and active ROM limited in both shoulders but more limited active ROM in R shoulder compared to L; due to heat packs, both shoulder were warm to palpation.      Lab Results:                        12.4   9.68  )-----------( 212      ( 13 Nov 2024 13:32 )             37.4     11-13    132[L]  |  99  |  43[H]  ----------------------------<  133[H]  5.4[H]   |  16[L]  |  1.11    Ca    10.0      13 Nov 2024 13:32    TPro  7.4  /  Alb  4.0  /  TBili  0.7  /  DBili  x   /  AST  45[H]  /  ALT  25  /  AlkPhos  104  11-13      Urinalysis Basic - ( 13 Nov 2024 13:32 )    Color: x / Appearance: x / SG: x / pH: x  Gluc: 133 mg/dL / Ketone: x  / Bili: x / Urobili: x   Blood: x / Protein: x / Nitrite: x   Leuk Esterase: x / RBC: x / WBC x   Sq Epi: x / Non Sq Epi: x / Bacteria: x HPI: 89y old F w/ dementia, HTN, HF, hypothyroidism, OA, hepatitis C s/p treatment, prior DVT (not on A/C currently) who presents with a chief complaint of SOB, FTT and bradycardia at home. No family at bedside at time of exam so history limited to charts and ED Providers' notes. Per chart review, pt was discharged a day prior from [facility] for R shoulder pain that limited ambulation with walker. X-ray unremarkable except for severe R shoulder OA and PT rec outpt PT. Course during that hospital visit was c/b transient AMS/hypotension that resolved as well as hyperkalemia likely 2/2 aldactone. Per ED providers, this time, when pt was home, continued to be unable to use walker and was complaining of SOB, which was concerning to daughter and that's why she was brought into the ED. She follows with Dr. Webster outpatient in rheumatology for R shoulder OA. Pt denies fall since discharge from hospital.    Rheumatology history:  Follows with Dr Webster for R shoulder OA that pt has taken Advil for outpatient. R shoulder pain inc in severity starting 11/5/24 which was concerning for infection so was sent in to ED at that time, which resulted in admission, and x-rays.    REVIEW OF SYSTEMS:   CONSTITUTIONAL: No weakness, fevers, chills, sick contacts  RESPIRATORY: shortness of breath  CARDIOVASCULAR: No chest pain   GASTROINTESTINAL: No abdominal or epigastric pain.   NEUROLOGICAL: pain in Right shoulder    Allergies    No Known Allergies    PAST MEDICAL & SURGICAL HISTORY:  Hypothyroidism  Arthritis  Hypertension  Hepatitis C  Dementia without behavioral disturbance, unspecified dementia type  Bifascicular block  Status post placement of implantable loop recorder  Growth & Development:    FAMILY HISTORY: unable to be obtained due to dementia    PHYSICAL EXAM:  VITALS:   T(C): 37.1 (11-13-24 @ 15:21), Max: 37.2 (11-13-24 @ 13:08)  HR: 76 (11-13-24 @ 15:21) (60 - 77)  BP: 128/72 (11-13-24 @ 15:21) (87/57 - 128/72)  RR: 19 (11-13-24 @ 15:21) (19 - 22)  SpO2: 100% (11-13-24 @ 15:21) (100% - 100%)    GENERAL: NAD, lying in bed comfortably  HEAD:  Atraumatic, Normocephalic  EYES: conjunctiva and sclera clear  ENT: Moist mucous membranes  CHEST/LUNG: Unlabored respirations  ABDOMEN: nondistended  MSK: R humerus more swollen with clearly demarcated bruising around anterior aspect; passive and active ROM limited in both shoulders but more limited active ROM in R shoulder compared to L; due to heat packs, both shoulder were warm to palpation. No synovitis in other joints.     Lab Results:                        12.4   9.68  )-----------( 212      ( 13 Nov 2024 13:32 )             37.4     11-13    132[L]  |  99  |  43[H]  ----------------------------<  133[H]  5.4[H]   |  16[L]  |  1.11    Ca    10.0      13 Nov 2024 13:32    TPro  7.4  /  Alb  4.0  /  TBili  0.7  /  DBili  x   /  AST  45[H]  /  ALT  25  /  AlkPhos  104  11-13      Urinalysis Basic - ( 13 Nov 2024 13:32 )    Color: x / Appearance: x / SG: x / pH: x  Gluc: 133 mg/dL / Ketone: x  / Bili: x / Urobili: x   Blood: x / Protein: x / Nitrite: x   Leuk Esterase: x / RBC: x / WBC x   Sq Epi: x / Non Sq Epi: x / Bacteria: x

## 2024-11-13 NOTE — ED PROVIDER NOTE - PROGRESS NOTE DETAILS
Romi Huffman DO (Attending):   ED cardiac POCUS limited but without signs of pericardial effusion.  Rheumatology evaluated patient due to her severe osteoarthritis, states concern for possible hematoma versus hemarthrosis, pending MRI inpatient for further evaluation.  Patient's hemoglobin is stable compared to prior labs.  patient's troponin noted to be 50, will plan for repeat. Pending CT/US, pt will be admitted pending ED workup. Romi Huffman DO (Attending): Pt trop stable. Pt US neg for DVT. CT pelvis showing arthritis and hip effusion at R hip, low concern for septic joint (pt without pain at hip with passive ROM, afebrile, no skin changes). Pt CTs also showing possible lesions concerning for malignancy in renal area and pelvis/ovarian region. Pt pending CTA chest. Pt tba for new hypoxia, MRI for further eval of R shoulder as per rheum, possible ortho eval for hip effusion. Pt full code according to last admission. Pt signed out to Dr. Granados, pending CTAr. Narciso Cerda MD ([id number]): Received signout from outgoing provider 89-year-old female with a and O x 1 at baseline presenting with difficulty breathing and right shoulder pain 2/2 fall few days prior.  Here patient with negative DVT negative CXR.  Patient pending CT PE study as patient with new oxygen requirement on 2 L nasal cannula.  CT negative for acute pulmonary embolism.  At this time patient to be admitted for new O2 requirement likely in setting of acute hypoxemic respiratory failure of unclear etiology.

## 2024-11-13 NOTE — ED PROVIDER NOTE - CLINICAL SUMMARY MEDICAL DECISION MAKING FREE TEXT BOX
89-year-old female patient past medical history of dementia, hypertension, heart failure, hypothyroidism, OA, hepatitis C virus status posttreatment, provoked DVT not on blood thinners who presents to the emergency department for concerns of tachypnea and bradycardia at home.  Spoke with patient's daughter who endorses she had been discharged about a day ago for shoulder pain and this morning was found breathing fast.  Pulse ox at home was used to evaluate the patient and was found bradycardic to the 30s.  Patient was reevaluated an hour later and found with normal heart rate.  Patient at this time endorses feeling off/not like herself.  Otherwise, does not endorse any other symptoms or cannot identify any other concerns.  On exam, found uncomfortable when moving because of right shoulder pain.  Patient's daughter denies any new falls.  On exam, found with normal vital signs except for labile/soft blood pressure.  Found with clear breath sounds and tender right shoulder joint.  Will evaluate with blood work including blood gas, cardiac enzyme, and x-rays.  Patient's daughter endorses she has been experiencing lower extremity pain worse on the left and has been unable to ambulate in setting of pain.  On exam patient comfortable with logroll of left lower extremity and hip flexion.  Will evaluate for any fractures or any other bony pathology.

## 2024-11-13 NOTE — ED ADULT NURSE NOTE - OBJECTIVE STATEMENT
Pt presents to ED from home via EMS, as per EMS, pt was "breathing hard and fast at home this AM and experiencing SOB and HR was 37". Pt denies chest pain, chest palpitations, SOB, n/v/d, fever/chills. Pt presents at baseline mental status, AAOx1-only oriented to self, and moving all limbs spontaneously with tremendous amount of pain. Legs are slightly swollen. VS as documented. Plan of care and monitoring discussed with pt. Bed locked and lowered for safety. Safety and comfort maintained.

## 2024-11-13 NOTE — H&P ADULT - PROBLEM SELECTOR PLAN 1
h/o hfpef  in no respiratory distress with adequate spo2 on 3 LPM via NC  exam w decreased breath sounds + crackles on auscultation   ct imaging shows interstitial edema  bnp 189  otherwise, no clinft of acs, ekg w no st seg - t wave changes suggestive of acute ischemia, trop 53->50 likely demand/decreased clearance  suspect mild adhf; of note, patient's daughter has not been giving patient her lasix dose for a while now as it causes patient to pass too much urine, and makes it difficult to care for patient as she is totally dependent   follow up tte  Monitor SpO2, RR, for signs of respiratory distress; Goal SpO2 >88-92%, PaO2 >55-60 mmHg  trend volume status via I/Os, daily weights   gentle diuresis w lasix 20 ivp od; adjust to maintain goal net negative fluid balance  hold home lisinopril in lieu of hyperk

## 2024-11-13 NOTE — H&P ADULT - ASSESSMENT
89yo f w pmh mci/dementia, htn, hld, hfpef, hypothyroidism, severe oa, hcv s/p treatment, vte (dvt), p/w sob/ku, persistent shoulder pain, inability to perform adls; in er, found to be in ahrf req ~3 lpm via nc; suspect 2/2 mild adhf; admit to medicine for further mgmt 89yo f w pmh mci/dementia, htn, hld, hfpef, hypothyroidism, severe oa, hcv s/p treatment, vte (dvt), p/w sob/ku, persistent shoulder pain, inability to perform adls; in er, found to be in ahrf req ~3 lpm via nc; suspect 2/2 mild adhf; admit to medicine for further mgmt      Advanced, somewhat erosive arthropathy of the right hip joint space with   thinning of the medial and posterior acetabular walls, as well as a large   right hip joint effusion, which may be degenerative or inflammatory in   etiology. However, septic arthritis of the right hip joint space cannot   be excluded and is also a differential consideration. If there is   clinical concern for septic arthritis of the right hip joint space, right   hip joints aspiration should be performed.    Mild subchondral collapse of the right femoral head, fairly similar to   the prior radiographs dated 4/24/2024.    Indeterminate complex mixed attenuating lesion within the region of the   left ovary/adnexa. Recommend further evaluation with pelvic ultrasound or   pelvic MRI without and with contrast to rule out an underlying neoplasm   in this region.    Partially imaged indeterminate hypoattenuating lesion within the inferior   pole of the left kidney. Renal neoplasm is one of the differential   considerations. Recommend further evaluation with renal ultrasound to   rule out neoplastic etiologies.

## 2024-11-13 NOTE — ED ADULT NURSE NOTE - NSFALLRISKINTERV_ED_ALL_ED
Assistance OOB with selected safe patient handling equipment if applicable/Assistance with ambulation/Communicate fall risk and risk factors to all staff, patient, and family/Monitor gait and stability/Monitor for mental status changes and reorient to person, place, and time, as needed/Provide patient with walking aids/Provide visual cue: yellow wristband, yellow gown, etc/Reinforce activity limits and safety measures with patient and family/Toileting schedule using arm’s reach rule for commode and bathroom/Use of alarms - bed, stretcher, chair and/or video monitoring/Call bell, personal items and telephone in reach/Instruct patient to call for assistance before getting out of bed/chair/stretcher/Non-slip footwear applied when patient is off stretcher/[place] to call system/Physically safe environment - no spills, clutter or unnecessary equipment/Purposeful Proactive Rounding/Room/bathroom lighting operational, light cord in reach

## 2024-11-13 NOTE — H&P ADULT - HISTORY OF PRESENT ILLNESS
89yo f w pmh mci/dementia, htn, hld, hfpef, hypothyroidism, severe oa, hcv s/p treatment, vte (dvt), p/w sob/ku, persistent shoulder pain, inability to perform adls; in er, found to be in ahrf req ~3 lpm via nc; suspect 2/2 mild adhf; admit to medicine for further mgmt 89yo f w pmh mci/dementia, htn, hld, hfpef, hypothyroidism, severe oa, hcv s/p treatment, vte (dvt), p/w sob/ku, persistent shoulder pain, inability to perform adls; patient initially with right shoulder pain on 11/8, came to er, and was discharged home with outpatient follow up; right shoulder pain persisted and so patient returned to [facility] er on 11/10 and was hospitalized 11/10-11/12; imaging showed severe oa, pain managed w oxycodone, and patient discharged home with home pt. since arriving home, after recent hospitalization, daughter has found that patient now requires more assistance, w she herself covering the other 12 hours hha is unavailable for (at baseline, patient requires assistance with adls, has 12 hour hha at home). in addition, shoulder pain persists and daughter noticed patient appearing short of breath; she grew concerned, so had patient brought back to [facility] er for further evaluation. in er, found to be in ahrf req ~3 lpm via nc; suspect 2/2 mild adhf; admit to medicine for further mgmt 89yo f w pmh mci/dementia, htn, hld, hfpef, hypothyroidism, severe oa, hcv s/p treatment, vte (dvt), p/w sob/ku, persistent shoulder pain, inability to perform adls; patient initially with right shoulder pain on 11/8, came to er, and was discharged home with outpatient follow up; right shoulder pain persisted and so patient returned to [facility] er on 11/10 and was hospitalized 11/10-11/12; imaging showed severe oa, pain managed w oxycodone, and patient discharged home with home pt. since arriving home, after recent hospitalization, daughter has found that patient now requires more assistance, w she herself covering the other 12 hours hha is unavailable for (at baseline, patient requires assistance with adls, has 12 hour hha at home). in addition, shoulder pain persists and daughter noticed patient appearing short of breath; she mentions that she has not been giving patient her home lasix for a while now as it causes patient to pass too much urine, and makes it difficult to care for patient as she is totally dependent ; she grew concerned, so had patient brought back to [facility] er for further evaluation. in er, found to be in ahrf req ~3 lpm via nc; suspect 2/2 mild adhf; admit to medicine for further mgmt

## 2024-11-13 NOTE — H&P ADULT - NSHPREVIEWOFSYSTEMS_GEN_ALL_CORE
CONSTITUTIONAL: No fever. no weakness  ENMT:  No sinus or throat pain  RESPIRATORY: No cough, wheezing, chills or hemoptysis; +sob  CARDIOVASCULAR: No chest pain, palpitations, dizziness, or leg swelling  GASTROINTESTINAL: No abdominal or epigastric pain. No nausea, vomiting, or hematemesis; No diarrhea or constipation. No melena or hematochezia.  GENITOURINARY: No dysuria or incontinence  NEUROLOGICAL: No headaches, memory loss, loss of strength, numbness, or tremors  SKIN: No rashes,  No hives or eczema  ENDOCRINE: No heat or cold intolerance; No hair loss  MUSCULOSKELETAL: +right shoulder pain  PSYCHIATRIC: No depression, anxiety, mood swings, or difficulty sleeping  HEME/LYMPH: No easy bruising, or bleeding gums; no enlarged LN

## 2024-11-13 NOTE — ED PROVIDER NOTE - ATTENDING CONTRIBUTION TO CARE
I have personally performed a face to face medical and diagnostic evaluation of the patient. I have discussed with and reviewed the Resident's and/or ACP's and/or Medical/PA/NP student's note and agree with the History, ROS, Physical Exam and MDM unless otherwise indicated. A brief summary of my personal evaluation and impression can be found below.     89 female past medical history dementia, hypertension, heart failure, hypothyroid, osteoarthritis, hep C status posttreatment, provoked DVT no longer on blood thinners,   Recent admission for hyperkalemia as well as severe shoulder pain in the setting of osteoarthritis, requiring PT eval due to difficulty with ambulation using her walker due to pain, discharged recently presenting to the emergency department due to concerns of tachypnea and bradycardia at home,  patient noted to be hypoxic via EMS who placed them on nonrebreather as well as bradycardic to the 30s  which self resolved. patient poor historian, reports mild right shoulder pain and right groin pain but otherwise states she does not have any complaints other than some mild shortness of breath.      On ED arrival, patient mildly tachypneic mid 20s, O2 sat noted to be normal on room air initially however when patient was sleeping she was noted to drop to the 80s, improved with 2 L nasal cannula.  Physical exam shows  neuro intact but global decreased strength throughout, distal pulses 2+ bl,  regular rate and rhythm, lungs clear to auscultation.  No edema bilateral lower extremities, patient with decreased range of motion's in all extremities due to pain. Abd soft/nt.  new ecchymosis noted at right anterior shoulder, no hematoma noted.  Given history and physical differential includes but is not limited to deconditioning from chronic osteoarthritis, possible PE, possible pelvic fracture  though no falls reported.  Possible pneumonia versus ACS. Possible metabolic derangement given recent hyperK.  plan for labs, EKG, x-ray, CT, [facility] duplex to evaluate for DVT, likely admission given new hypoxia.

## 2024-11-13 NOTE — CONSULT NOTE ADULT - ASSESSMENT
89y old F w/ dementia, HTN, HF, hypothyroidism, OA, hepatitis C s/p treatment, prior DVT (not on A/C currently) who presents with a chief complaint of SOB, FTT and bradycardia at home. Rheumatology consulted for R shoulder pain and swelling.    # 89y old F w/ dementia, HTN, HF, hypothyroidism, OA, hepatitis C s/p treatment, prior DVT (not on A/C currently) who presents with a chief complaint of SOB, FTT and bradycardia at home. Rheumatology consulted for R shoulder pain and swelling.    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 Rheumatology consulted for R shoulder pain and swelling.    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

## 2024-11-13 NOTE — H&P ADULT - PROBLEM SELECTOR PLAN 3
h/o mci/dementia, severe oa  wheelchair bound/bed bound, a+o x1-2 at baseline  trauma work up with no acute significant findings     likely mechanical in nature (multiple er visits in the past few years for metabolic derangements consistent w hypovolemia/dehydration iso falls and presyncopal/syncopal episodes)  obtain tsh, folate, b12, rpr; orthostatic vitals, tte, carotid duplex; ua +/- uc, rvp + cxr; cpk  Monitor mental status with frequent neurochecks  maintain fall + frac, delirium, aspiration precautions; keep head end of bed elevated  nutrition consult  dysphagia screen/slp eval  pt/ot eval + sw/cm consult for disposition  goc and advanced directives conversation h/o mci/dementia, severe oa  a+o x1-2 at baseline  maintain fall + frac, delirium, aspiration precautions; keep head end of bed elevated  nutrition consult  dysphagia screen/slp eval  pt/ot eval + sw/cm consult for disposition  goc and advanced directives conversation

## 2024-11-14 DIAGNOSIS — Z29.9 ENCOUNTER FOR PROPHYLACTIC MEASURES, UNSPECIFIED: ICD-10-CM

## 2024-11-14 LAB
A1C WITH ESTIMATED AVERAGE GLUCOSE RESULT: 5.4 % — SIGNIFICANT CHANGE UP (ref 4–5.6)
ADD ON TEST-SPECIMEN IN LAB: SIGNIFICANT CHANGE UP
ALBUMIN SERPL ELPH-MCNC: 4.1 G/DL — SIGNIFICANT CHANGE UP (ref 3.3–5)
ALP SERPL-CCNC: 104 U/L — SIGNIFICANT CHANGE UP (ref 40–120)
ALT FLD-CCNC: 24 U/L — SIGNIFICANT CHANGE UP (ref 10–45)
ANION GAP SERPL CALC-SCNC: 13 MMOL/L — SIGNIFICANT CHANGE UP (ref 5–17)
ANION GAP SERPL CALC-SCNC: 14 MMOL/L — SIGNIFICANT CHANGE UP (ref 5–17)
APPEARANCE UR: ABNORMAL
APTT BLD: 34.8 SEC — SIGNIFICANT CHANGE UP (ref 24.5–35.6)
AST SERPL-CCNC: 43 U/L — HIGH (ref 10–40)
BACTERIA # UR AUTO: ABNORMAL /HPF
BASOPHILS # BLD AUTO: 0.05 K/UL — SIGNIFICANT CHANGE UP (ref 0–0.2)
BASOPHILS NFR BLD AUTO: 0.7 % — SIGNIFICANT CHANGE UP (ref 0–2)
BILIRUB SERPL-MCNC: 0.9 MG/DL — SIGNIFICANT CHANGE UP (ref 0.2–1.2)
BILIRUB UR-MCNC: NEGATIVE — SIGNIFICANT CHANGE UP
BUN SERPL-MCNC: 36 MG/DL — HIGH (ref 7–23)
BUN SERPL-MCNC: 36 MG/DL — HIGH (ref 7–23)
CALCIUM SERPL-MCNC: 10.3 MG/DL — SIGNIFICANT CHANGE UP (ref 8.4–10.5)
CALCIUM SERPL-MCNC: 10.4 MG/DL — SIGNIFICANT CHANGE UP (ref 8.4–10.5)
CAST: 4 /LPF — SIGNIFICANT CHANGE UP (ref 0–4)
CHLORIDE SERPL-SCNC: 99 MMOL/L — SIGNIFICANT CHANGE UP (ref 96–108)
CHLORIDE SERPL-SCNC: 99 MMOL/L — SIGNIFICANT CHANGE UP (ref 96–108)
CHOLEST SERPL-MCNC: 222 MG/DL — HIGH
CO2 SERPL-SCNC: 23 MMOL/L — SIGNIFICANT CHANGE UP (ref 22–31)
CO2 SERPL-SCNC: 23 MMOL/L — SIGNIFICANT CHANGE UP (ref 22–31)
COLOR SPEC: YELLOW — SIGNIFICANT CHANGE UP
CREAT ?TM UR-MCNC: 86 MG/DL — SIGNIFICANT CHANGE UP
CREAT SERPL-MCNC: 0.8 MG/DL — SIGNIFICANT CHANGE UP (ref 0.5–1.3)
CREAT SERPL-MCNC: 0.81 MG/DL — SIGNIFICANT CHANGE UP (ref 0.5–1.3)
CRP SERPL-MCNC: 21 MG/L — HIGH (ref 0–4)
CRP SERPL-MCNC: 22 MG/L — HIGH (ref 0–4)
DIFF PNL FLD: NEGATIVE — SIGNIFICANT CHANGE UP
EGFR: 64 ML/MIN/1.73M2 — SIGNIFICANT CHANGE UP
EGFR: 65 ML/MIN/1.73M2 — SIGNIFICANT CHANGE UP
EOSINOPHIL # BLD AUTO: 0.25 K/UL — SIGNIFICANT CHANGE UP (ref 0–0.5)
EOSINOPHIL NFR BLD AUTO: 3.3 % — SIGNIFICANT CHANGE UP (ref 0–6)
ESTIMATED AVERAGE GLUCOSE: 108 MG/DL — SIGNIFICANT CHANGE UP (ref 68–114)
GLUCOSE SERPL-MCNC: 105 MG/DL — HIGH (ref 70–99)
GLUCOSE SERPL-MCNC: 106 MG/DL — HIGH (ref 70–99)
GLUCOSE UR QL: NEGATIVE MG/DL — SIGNIFICANT CHANGE UP
HCT VFR BLD CALC: 37 % — SIGNIFICANT CHANGE UP (ref 34.5–45)
HCT VFR BLD CALC: 37.2 % — SIGNIFICANT CHANGE UP (ref 34.5–45)
HDLC SERPL-MCNC: 76 MG/DL — SIGNIFICANT CHANGE UP
HGB BLD-MCNC: 12.2 G/DL — SIGNIFICANT CHANGE UP (ref 11.5–15.5)
HGB BLD-MCNC: 12.3 G/DL — SIGNIFICANT CHANGE UP (ref 11.5–15.5)
IMM GRANULOCYTES NFR BLD AUTO: 0.7 % — SIGNIFICANT CHANGE UP (ref 0–0.9)
INR BLD: 1.02 RATIO — SIGNIFICANT CHANGE UP (ref 0.85–1.16)
KETONES UR-MCNC: NEGATIVE MG/DL — SIGNIFICANT CHANGE UP
LEUKOCYTE ESTERASE UR-ACNC: ABNORMAL
LIPID PNL WITH DIRECT LDL SERPL: 138 MG/DL — HIGH
LYMPHOCYTES # BLD AUTO: 1.38 K/UL — SIGNIFICANT CHANGE UP (ref 1–3.3)
LYMPHOCYTES # BLD AUTO: 18.5 % — SIGNIFICANT CHANGE UP (ref 13–44)
MAGNESIUM SERPL-MCNC: 2.2 MG/DL — SIGNIFICANT CHANGE UP (ref 1.6–2.6)
MCHC RBC-ENTMCNC: 31.1 PG — SIGNIFICANT CHANGE UP (ref 27–34)
MCHC RBC-ENTMCNC: 31.4 PG — SIGNIFICANT CHANGE UP (ref 27–34)
MCHC RBC-ENTMCNC: 33 G/DL — SIGNIFICANT CHANGE UP (ref 32–36)
MCHC RBC-ENTMCNC: 33.1 G/DL — SIGNIFICANT CHANGE UP (ref 32–36)
MCV RBC AUTO: 94.4 FL — SIGNIFICANT CHANGE UP (ref 80–100)
MCV RBC AUTO: 94.9 FL — SIGNIFICANT CHANGE UP (ref 80–100)
MONOCYTES # BLD AUTO: 1.11 K/UL — HIGH (ref 0–0.9)
MONOCYTES NFR BLD AUTO: 14.9 % — HIGH (ref 2–14)
NEUTROPHILS # BLD AUTO: 4.63 K/UL — SIGNIFICANT CHANGE UP (ref 1.8–7.4)
NEUTROPHILS NFR BLD AUTO: 61.9 % — SIGNIFICANT CHANGE UP (ref 43–77)
NITRITE UR-MCNC: POSITIVE
NON HDL CHOLESTEROL: 146 MG/DL — HIGH
NRBC # BLD: 0 /100 WBCS — SIGNIFICANT CHANGE UP (ref 0–0)
NRBC # BLD: 0 /100 WBCS — SIGNIFICANT CHANGE UP (ref 0–0)
OSMOLALITY UR: 764 MOS/KG — SIGNIFICANT CHANGE UP (ref 300–900)
PH UR: 5.5 — SIGNIFICANT CHANGE UP (ref 5–8)
PHOSPHATE SERPL-MCNC: 2.6 MG/DL — SIGNIFICANT CHANGE UP (ref 2.5–4.5)
PLATELET # BLD AUTO: 207 K/UL — SIGNIFICANT CHANGE UP (ref 150–400)
PLATELET # BLD AUTO: 211 K/UL — SIGNIFICANT CHANGE UP (ref 150–400)
POTASSIUM SERPL-MCNC: 4 MMOL/L — SIGNIFICANT CHANGE UP (ref 3.5–5.3)
POTASSIUM SERPL-MCNC: 4 MMOL/L — SIGNIFICANT CHANGE UP (ref 3.5–5.3)
POTASSIUM SERPL-SCNC: 4 MMOL/L — SIGNIFICANT CHANGE UP (ref 3.5–5.3)
POTASSIUM SERPL-SCNC: 4 MMOL/L — SIGNIFICANT CHANGE UP (ref 3.5–5.3)
POTASSIUM UR-SCNC: 44 MMOL/L — SIGNIFICANT CHANGE UP
PROT ?TM UR-MCNC: 65 MG/DL — HIGH (ref 0–12)
PROT SERPL-MCNC: 7.6 G/DL — SIGNIFICANT CHANGE UP (ref 6–8.3)
PROT UR-MCNC: 30 MG/DL
PROT/CREAT UR-RTO: 0.8 RATIO — HIGH (ref 0–0.2)
PROTHROM AB SERPL-ACNC: 11.6 SEC — SIGNIFICANT CHANGE UP (ref 9.9–13.4)
RBC # BLD: 3.92 M/UL — SIGNIFICANT CHANGE UP (ref 3.8–5.2)
RBC # BLD: 3.92 M/UL — SIGNIFICANT CHANGE UP (ref 3.8–5.2)
RBC # FLD: 14.2 % — SIGNIFICANT CHANGE UP (ref 10.3–14.5)
RBC # FLD: 14.4 % — SIGNIFICANT CHANGE UP (ref 10.3–14.5)
RBC CASTS # UR COMP ASSIST: 0 /HPF — SIGNIFICANT CHANGE UP (ref 0–4)
SODIUM SERPL-SCNC: 135 MMOL/L — SIGNIFICANT CHANGE UP (ref 135–145)
SODIUM SERPL-SCNC: 136 MMOL/L — SIGNIFICANT CHANGE UP (ref 135–145)
SODIUM UR-SCNC: 102 MMOL/L — SIGNIFICANT CHANGE UP
SP GR SPEC: >1.03 — HIGH (ref 1–1.03)
SQUAMOUS # UR AUTO: 3 /HPF — SIGNIFICANT CHANGE UP (ref 0–5)
TRIGL SERPL-MCNC: 45 MG/DL — SIGNIFICANT CHANGE UP
UROBILINOGEN FLD QL: 0.2 MG/DL — SIGNIFICANT CHANGE UP (ref 0.2–1)
UUN UR-MCNC: 934 MG/DL — SIGNIFICANT CHANGE UP
WBC # BLD: 7.47 K/UL — SIGNIFICANT CHANGE UP (ref 3.8–10.5)
WBC # BLD: 7.62 K/UL — SIGNIFICANT CHANGE UP (ref 3.8–10.5)
WBC # FLD AUTO: 7.47 K/UL — SIGNIFICANT CHANGE UP (ref 3.8–10.5)
WBC # FLD AUTO: 7.62 K/UL — SIGNIFICANT CHANGE UP (ref 3.8–10.5)
WBC UR QL: 28 /HPF — HIGH (ref 0–5)

## 2024-11-14 PROCEDURE — 99221 1ST HOSP IP/OBS SF/LOW 40: CPT | Mod: GC

## 2024-11-14 PROCEDURE — 73223 MRI JOINT UPR EXTR W/O&W/DYE: CPT | Mod: 26,RT

## 2024-11-14 PROCEDURE — 99233 SBSQ HOSP IP/OBS HIGH 50: CPT

## 2024-11-14 RX ORDER — LISINOPRIL 20 MG/1
10 TABLET ORAL DAILY
Refills: 0 | Status: DISCONTINUED | OUTPATIENT
Start: 2024-11-14 | End: 2024-11-16

## 2024-11-14 RX ORDER — GABAPENTIN 300 MG/1
100 CAPSULE ORAL DAILY
Refills: 0 | Status: DISCONTINUED | OUTPATIENT
Start: 2024-11-14 | End: 2024-11-25

## 2024-11-14 RX ORDER — INFLUENZA VIRUS VACCINE 15; 15; 15; 15 UG/.5ML; UG/.5ML; UG/.5ML; UG/.5ML
0.5 SUSPENSION INTRAMUSCULAR ONCE
Refills: 0 | Status: DISCONTINUED | OUTPATIENT
Start: 2024-11-14 | End: 2024-11-25

## 2024-11-14 RX ADMIN — OXYCODONE HYDROCHLORIDE 5 MILLIGRAM(S): 30 TABLET ORAL at 12:11

## 2024-11-14 RX ADMIN — FUROSEMIDE 20 MILLIGRAM(S): 40 TABLET ORAL at 06:40

## 2024-11-14 RX ADMIN — GABAPENTIN 100 MILLIGRAM(S): 300 CAPSULE ORAL at 12:47

## 2024-11-14 RX ADMIN — Medication 5 MICROGRAM(S): at 06:40

## 2024-11-14 RX ADMIN — ENOXAPARIN SODIUM 40 MILLIGRAM(S): 30 INJECTION SUBCUTANEOUS at 06:40

## 2024-11-14 RX ADMIN — OXYCODONE HYDROCHLORIDE 5 MILLIGRAM(S): 30 TABLET ORAL at 12:41

## 2024-11-14 RX ADMIN — Medication 2 TABLET(S): at 21:58

## 2024-11-14 RX ADMIN — LISINOPRIL 10 MILLIGRAM(S): 20 TABLET ORAL at 12:47

## 2024-11-14 NOTE — PROGRESS NOTE ADULT - SUBJECTIVE AND OBJECTIVE BOX
[facility] Division of Hospital Medicine  Genoveva Dhaliwal MD  Available via MS Teams      SUBJECTIVE / OVERNIGHT EVENTS: No acute events overnight. Pt's daughter reports right hip pain yesterday    ADDITIONAL REVIEW OF SYSTEMS: ROS negative    MEDICATIONS  (STANDING):  enoxaparin Injectable 40 milliGRAM(s) SubCutaneous every 24 hours  furosemide   Injectable 20 milliGRAM(s) IV Push daily  gabapentin 100 milliGRAM(s) Oral daily  influenza  Vaccine (HIGH DOSE) 0.5 milliLiter(s) IntraMuscular once  lidocaine   4% Patch 1 Patch Transdermal daily  liothyronine 5 MICROGram(s) Oral daily  lisinopril 10 milliGRAM(s) Oral daily  naloxone Injectable 0.4 milliGRAM(s) IV Push once  polyethylene glycol 3350 17 Gram(s) Oral daily  senna 2 Tablet(s) Oral at bedtime    MEDICATIONS  (PRN):  acetaminophen     Tablet .. 650 milliGRAM(s) Oral every 6 hours PRN Temp greater or equal to 38C (100.4F), Mild Pain (1 - 3)  aluminum hydroxide/magnesium hydroxide/simethicone Suspension 30 milliLiter(s) Oral every 4 hours PRN Dyspepsia  bisacodyl 5 milliGRAM(s) Oral daily PRN Constipation  melatonin 3 milliGRAM(s) Oral at bedtime PRN Insomnia  ondansetron Injectable 4 milliGRAM(s) IV Push every 8 hours PRN Nausea and/or Vomiting  oxyCODONE    IR 5 milliGRAM(s) Oral every 4 hours PRN Severe Pain (7 - 10)  oxyCODONE    IR 2.5 milliGRAM(s) Oral every 4 hours PRN Moderate Pain (4 - 6)      I&O's Summary      PHYSICAL EXAM:  Vital Signs Last 24 Hrs  T(C): 36.4 (14 Nov 2024 12:00), Max: 37.1 (13 Nov 2024 15:21)  T(F): 97.6 (14 Nov 2024 12:00), Max: 98.7 (13 Nov 2024 15:21)  HR: 68 (14 Nov 2024 12:00) (68 - 77)  BP: 163/86 (14 Nov 2024 12:00) (118/75 - 163/86)  BP(mean): --  RR: 18 (14 Nov 2024 12:00) (17 - 19)  SpO2: 100% (14 Nov 2024 12:00) (93% - 100%)    Parameters below as of 14 Nov 2024 12:00  Patient On (Oxygen Delivery Method): room air      CONSTITUTIONAL: NAD=  ENMT: Moist oral mucosa  NECK: Supple  RESPIRATORY: Normal respiratory effort; lungs are clear to auscultation bilaterally  CARDIOVASCULAR: Regular rate and rhythm, normal S1 and S2  ABDOMEN: Nontender to palpation, normoactive bowel sounds, no rebound/guarding; No hepatosplenomegaly  MUSCULOSKELETAL:  Normal gait  PSYCH: A+O to person, place, and time  SKIN: No rashes    LABS:                        12.3   7.47  )-----------( 207      ( 14 Nov 2024 09:33 )             37.2     11-14    136  |  99  |  36[H]  ----------------------------<  105[H]  4.0   |  23  |  0.80    Ca    10.4      14 Nov 2024 09:33  Phos  2.6     11-14  Mg     2.2     11-14    TPro  7.6  /  Alb  4.1  /  TBili  0.9  /  DBili  x   /  AST  43[H]  /  ALT  24  /  AlkPhos  104  11-14    PT/INR - ( 14 Nov 2024 09:33 )   PT: 11.6 sec;   INR: 1.02 ratio         PTT - ( 14 Nov 2024 09:33 )  PTT:34.8 sec      Urinalysis Basic - ( 14 Nov 2024 09:33 )    Color: x / Appearance: x / SG: x / pH: x  Gluc: 105 mg/dL / Ketone: x  / Bili: x / Urobili: x   Blood: x / Protein: x / Nitrite: x   Leuk Esterase: x / RBC: x / WBC x   Sq Epi: x / Non Sq Epi: x / Bacteria: x            RADIOLOGY & ADDITIONAL TESTS:  New Imaging Personally Reviewed Today:  New Electrocardiogram Personally Reviewed Today:  Other Results Reviewed Today:   Prior or Outpatient Records Reviewed Today with Summary:    COORDINATION OF CARE:  Consultant Communication and Details of Discussion (where applicable):     [facility] Division of Hospital Medicine  Genoveva Dhaliwal MD  Available via MS Teams      SUBJECTIVE / OVERNIGHT EVENTS: No acute events overnight. Pt's daughter reports right hip pain yesterday    ADDITIONAL REVIEW OF SYSTEMS: ROS negative otherwise     MEDICATIONS  (STANDING):  enoxaparin Injectable 40 milliGRAM(s) SubCutaneous every 24 hours  furosemide   Injectable 20 milliGRAM(s) IV Push daily  gabapentin 100 milliGRAM(s) Oral daily  influenza  Vaccine (HIGH DOSE) 0.5 milliLiter(s) IntraMuscular once  lidocaine   4% Patch 1 Patch Transdermal daily  liothyronine 5 MICROGram(s) Oral daily  lisinopril 10 milliGRAM(s) Oral daily  naloxone Injectable 0.4 milliGRAM(s) IV Push once  polyethylene glycol 3350 17 Gram(s) Oral daily  senna 2 Tablet(s) Oral at bedtime    MEDICATIONS  (PRN):  acetaminophen     Tablet .. 650 milliGRAM(s) Oral every 6 hours PRN Temp greater or equal to 38C (100.4F), Mild Pain (1 - 3)  aluminum hydroxide/magnesium hydroxide/simethicone Suspension 30 milliLiter(s) Oral every 4 hours PRN Dyspepsia  bisacodyl 5 milliGRAM(s) Oral daily PRN Constipation  melatonin 3 milliGRAM(s) Oral at bedtime PRN Insomnia  ondansetron Injectable 4 milliGRAM(s) IV Push every 8 hours PRN Nausea and/or Vomiting  oxyCODONE    IR 5 milliGRAM(s) Oral every 4 hours PRN Severe Pain (7 - 10)  oxyCODONE    IR 2.5 milliGRAM(s) Oral every 4 hours PRN Moderate Pain (4 - 6)      I&O's Summary      PHYSICAL EXAM:  Vital Signs Last 24 Hrs  T(C): 36.4 (14 Nov 2024 12:00), Max: 37.1 (13 Nov 2024 15:21)  T(F): 97.6 (14 Nov 2024 12:00), Max: 98.7 (13 Nov 2024 15:21)  HR: 68 (14 Nov 2024 12:00) (68 - 77)  BP: 163/86 (14 Nov 2024 12:00) (118/75 - 163/86)  BP(mean): --  RR: 18 (14 Nov 2024 12:00) (17 - 19)  SpO2: 100% (14 Nov 2024 12:00) (93% - 100%)    Parameters below as of 14 Nov 2024 12:00  Patient On (Oxygen Delivery Method): room air      CONSTITUTIONAL: NAD=  ENMT: Moist oral mucosa  NECK: Supple  RESPIRATORY: Normal respiratory effort; lungs are clear to auscultation bilaterally  CARDIOVASCULAR: Regular rate and rhythm, normal S1 and S2  ABDOMEN: Nontender to palpation, normoactive bowel sounds, no rebound/guarding; No hepatosplenomegaly  MUSCULOSKELETAL:  Normal gait  PSYCH: A+O to person, place, and time  SKIN: No rashes    LABS:                        12.3   7.47  )-----------( 207      ( 14 Nov 2024 09:33 )             37.2     11-14    136  |  99  |  36[H]  ----------------------------<  105[H]  4.0   |  23  |  0.80    Ca    10.4      14 Nov 2024 09:33  Phos  2.6     11-14  Mg     2.2     11-14    TPro  7.6  /  Alb  4.1  /  TBili  0.9  /  DBili  x   /  AST  43[H]  /  ALT  24  /  AlkPhos  104  11-14    PT/INR - ( 14 Nov 2024 09:33 )   PT: 11.6 sec;   INR: 1.02 ratio         PTT - ( 14 Nov 2024 09:33 )  PTT:34.8 sec      Urinalysis Basic - ( 14 Nov 2024 09:33 )    Color: x / Appearance: x / SG: x / pH: x  Gluc: 105 mg/dL / Ketone: x  / Bili: x / Urobili: x   Blood: x / Protein: x / Nitrite: x   Leuk Esterase: x / RBC: x / WBC x   Sq Epi: x / Non Sq Epi: x / Bacteria: x            RADIOLOGY & ADDITIONAL TESTS:  New Imaging Personally Reviewed Today:  New Electrocardiogram Personally Reviewed Today:  Other Results Reviewed Today:   Prior or Outpatient Records Reviewed Today with Summary:    COORDINATION OF CARE:  Consultant Communication and Details of Discussion (where applicable):

## 2024-11-14 NOTE — OCCUPATIONAL THERAPY INITIAL EVALUATION ADULT - PERTINENT HX OF CURRENT PROBLEM, REHAB EVAL
89yo f w pmh mci/dementia, htn, hld, hfpef, hypothyroidism, severe oa, hcv s/p treatment, vte (dvt), p/w sob/ku, persistent shoulder pain, inability to perform adls; patient initially with right shoulder pain on 11/8, came to er, and was discharged home with outpatient follow up; right shoulder pain persisted and so patient returned to [facility] er on 11/10 and was hospitalized 11/10-11/12; imaging showed severe oa, pain managed w oxycodone, and patient discharged home with home pt. since arriving home, after recent hospitalization, daughter has found that patient now requires more assistance, w she herself covering the other 12 hours hha is unavailable for (at baseline, patient requires assistance with adls, has 12 hour hha at home). in addition, shoulder pain persists and daughter noticed patient appearing short of breath; she mentions that she has not been giving patient her home lasix for a while now as it causes patient to pass too much urine, and makes it difficult to care for patient as she is totally dependent ; she grew concerned, so had patient brought back to [facility] er for further evaluation. in er, found to be in ahrf req ~3 lpm via nc; 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 CT Chest- No pulmonary embolism to the level of the segmental arteries. Indeterminate lytic lesion within the tip of the left scapula. Diffuse mild septal thickening may relate to component of interstitial edema. Cardiomegaly. XRAY Hip-No acute fracture or dislocation. Advanced right hip osteoarthritis with severe joint space narrowing and subchondral cystic change and sclerosis as well as osteophytosis. Vascular calcifications. Calcified fibroids. XRAY Shoulder- Widening of the acromioclavicular interval with irregular morphology of the distal clavicle, which may be related to prior surgery, distal clavicular osteolysis, or an acromioclavicular joint separation. Coracoclavicular distance appears normal. Findings are similar to the prior radiographs on 11/8/2024. Superior subluxation of the humeral head at the glenohumeral joint with remodeling of the undersurface of the acromion, likely related to rotator cuff arthropathy. Osteoarthritic changes seen within the glenohumeral and acromioclavicular joint spaces. No definite acute displaced fracture seen.

## 2024-11-14 NOTE — CONSULT NOTE ADULT - SUBJECTIVE AND OBJECTIVE BOX
HPI  89y Female w/ severe OA, dementia, and acute decompensated heart failure presenting with mild R hip pain. Patient admitted to medicine one day ago for R shoulder pain with imaging c/w full-thickness supraspinatus tear. Orthopedics was consulted for incidental finding of large R hip effusion on CT Pelvis. + UA since admission w/ESR 22. Patient is able to bear weight in the RLE. Denies fevers/chills. Denies numbness/tingling in the RLE. Denies any recent trauma/injuries/sugery/injections. Denies hx of crystal arthopathy or other inflammatory joint disorders, IV drug use, new sexual encounters/STIs, or other infections. At baseline, patients ambulates with walker, which has been impeded due to R shoulder pain.     ROS  Negative unless otherwise specified in HPI.    PAST MEDICAL & SURGICAL Hx  PAST MEDICAL & SURGICAL HISTORY:  Hypothyroidism      Arthritis      Hypertension      Hepatitis C      Dementia without behavioral disturbance, unspecified dementia type      Bifascicular block      Status post placement of implantable loop recorder          MEDICATIONS  Home Medications:  liothyronine 5 mcg oral tablet: 1 tab(s) orally once a day (13 Nov 2024 23:22)  lisinopril 10 mg oral tablet: 1 tab(s) orally once a day (13 Nov 2024 23:22)      ALLERGIES  No Known Allergies      FAMILY Hx  FAMILY HISTORY:      SOCIAL Hx  Social History:      VITALS  Vital Signs Last 24 Hrs  T(C): 36.4 (14 Nov 2024 12:00), Max: 36.9 (13 Nov 2024 18:35)  T(F): 97.6 (14 Nov 2024 12:00), Max: 98.5 (13 Nov 2024 18:35)  HR: 68 (14 Nov 2024 12:00) (68 - 77)  BP: 163/86 (14 Nov 2024 12:00) (118/75 - 163/86)  BP(mean): --  RR: 18 (14 Nov 2024 12:00) (17 - 18)  SpO2: 100% (14 Nov 2024 12:00) (93% - 100%)    Parameters below as of 14 Nov 2024 12:00  Patient On (Oxygen Delivery Method): room air        PHYSICAL EXAM  Gen: Lying in bed, non-toxic appearing, NAD  Resp: No increased WOB  RLE:  Skin intact, +effusion, no erythema over R hip  no TTP over R hip, warmer to touch relative to L side; compartments soft  R hip passive  deg, mild discomfort   Motor: TA/EHL/GS/FHL intact  Sensory: DP/SP/Tib/Tammie/Saph SILT  +DP pulse, WWP    LABS                        12.3   7.47  )-----------( 207      ( 14 Nov 2024 09:33 )             37.2     11-14    136  |  99  |  36[H]  ----------------------------<  105[H]  4.0   |  23  |  0.80    Ca    10.4      14 Nov 2024 09:33  Phos  2.6     11-14  Mg     2.2     11-14    TPro  7.6  /  Alb  4.1  /  TBili  0.9  /  DBili  x   /  AST  43[H]  /  ALT  24  /  AlkPhos  104  11-14    PT/INR - ( 14 Nov 2024 09:33 )   PT: 11.6 sec;   INR: 1.02 ratio         PTT - ( 14 Nov 2024 09:33 )  PTT:34.8 sec        IMAGING  XRs: R hip effusion present, but no acute fx or dislocation (personal read)    ASSESSMENT & PLAN  89yFemale w/ large R hip effusion. Mild R hip pain, no erythema, or significant TTP.  ESR 22. Low suspicion for septic arthritis. R hip effusion likely secondary to fluid shifts from current UTI and acute decompensated heart failure.     -WBAT RLE  -No acute orthopedic surgical intervention at this time.   -Recommend physical therapy and pain control  -If continued concern for septic joint, recommend IR aspiration of R hip to obtain cell count, crystals, Gram stain, and synovial fluid culture     HPI  89y Female w/ severe OA, dementia, and acute decompensated heart failure presenting with mild R hip pain. Patient admitted to medicine one day ago for R shoulder pain with imaging c/w full-thickness supraspinatus tear. Orthopedics was consulted for incidental finding of large R hip effusion on CT Pelvis. + UA since admission w/ESR 22. Patient is able to bear weight in the RLE. Denies fevers/chills. Denies numbness/tingling in the RLE. Denies any recent trauma/injuries/sugery/injections. Denies hx of crystal arthopathy or other inflammatory joint disorders, IV drug use, new sexual encounters/STIs, or other infections. At baseline, patients ambulates with walker, which has been impeded due to R shoulder pain.     ROS  Negative unless otherwise specified in HPI.    PAST MEDICAL & SURGICAL Hx  PAST MEDICAL & SURGICAL HISTORY:  Hypothyroidism      Arthritis      Hypertension      Hepatitis C      Dementia without behavioral disturbance, unspecified dementia type      Bifascicular block      Status post placement of implantable loop recorder          MEDICATIONS  Home Medications:  liothyronine 5 mcg oral tablet: 1 tab(s) orally once a day (13 Nov 2024 23:22)  lisinopril 10 mg oral tablet: 1 tab(s) orally once a day (13 Nov 2024 23:22)      ALLERGIES  No Known Allergies      FAMILY Hx  FAMILY HISTORY:      SOCIAL Hx  Social History:      VITALS  Vital Signs Last 24 Hrs  T(C): 36.4 (14 Nov 2024 12:00), Max: 36.9 (13 Nov 2024 18:35)  T(F): 97.6 (14 Nov 2024 12:00), Max: 98.5 (13 Nov 2024 18:35)  HR: 68 (14 Nov 2024 12:00) (68 - 77)  BP: 163/86 (14 Nov 2024 12:00) (118/75 - 163/86)  BP(mean): --  RR: 18 (14 Nov 2024 12:00) (17 - 18)  SpO2: 100% (14 Nov 2024 12:00) (93% - 100%)    Parameters below as of 14 Nov 2024 12:00  Patient On (Oxygen Delivery Method): room air        PHYSICAL EXAM  Gen: Lying in bed, non-toxic appearing, NAD  Resp: No increased WOB  RLE:  Skin intact, +effusion, no erythema over R hip  no TTP over R hip, warmer to touch relative to L side; compartments soft  R hip passive flexion ROM 0-100 deg, mild discomfort with straight leg raise   Motor: TA/EHL/GS/FHL intact  Sensory: DP/SP/Tib/Tammie/Saph SILT  +DP pulse, WWP    LABS                        12.3   7.47  )-----------( 207      ( 14 Nov 2024 09:33 )             37.2     11-14    136  |  99  |  36[H]  ----------------------------<  105[H]  4.0   |  23  |  0.80    Ca    10.4      14 Nov 2024 09:33  Phos  2.6     11-14  Mg     2.2     11-14    TPro  7.6  /  Alb  4.1  /  TBili  0.9  /  DBili  x   /  AST  43[H]  /  ALT  24  /  AlkPhos  104  11-14    PT/INR - ( 14 Nov 2024 09:33 )   PT: 11.6 sec;   INR: 1.02 ratio         PTT - ( 14 Nov 2024 09:33 )  PTT:34.8 sec        IMAGING  XRs: R hip effusion present, but no acute fx or dislocation (personal read)    ASSESSMENT & PLAN  89yFemale w/ large R hip effusion. Mild R hip pain, no erythema, or significant TTP.  ESR 22. Low suspicion for septic arthritis. R hip effusion likely secondary to fluid shifts from current UTI and acute decompensated heart failure.     -WBAT RLE  -No acute orthopedic surgical intervention at this time.   -Recommend physical therapy and pain control  -If continued concern for septic joint, recommend IR aspiration of R hip to obtain cell count, crystals, Gram stain, and synovial fluid culture     HPI  89y Female w/ severe OA, dementia, and acute decompensated heart failure presenting with R shoulder and mild R hip pain. Patient admitted to medicine one day ago for worsening R shoulder pain with imaging c/w full-thickness supraspinatus tear and large R hip effusion on CT. Patient is able to bear weight in the RLE but has limited active ROM of the RUE. Denies fevers/chills. Denies numbness/tingling in the RUE or RLE. Denies any recent trauma/injuries/sugery/injections. Denies hx of crystal arthopathy or other inflammatory joint disorders, IV drug use, new sexual encounters/STIs, or other infections. At baseline, patients ambulates with walker, which has been hindered by R shoulder pain. + UA since admission w/ESR 22.     ROS  Negative unless otherwise specified in HPI.    PAST MEDICAL & SURGICAL Hx  PAST MEDICAL & SURGICAL HISTORY:  Hypothyroidism      Arthritis      Hypertension      Hepatitis C      Dementia without behavioral disturbance, unspecified dementia type      Bifascicular block      Status post placement of implantable loop recorder          MEDICATIONS  Home Medications:  liothyronine 5 mcg oral tablet: 1 tab(s) orally once a day (13 Nov 2024 23:22)  lisinopril 10 mg oral tablet: 1 tab(s) orally once a day (13 Nov 2024 23:22)      ALLERGIES  No Known Allergies      FAMILY Hx  FAMILY HISTORY:      SOCIAL Hx  Social History:      VITALS  Vital Signs Last 24 Hrs  T(C): 36.4 (14 Nov 2024 12:00), Max: 36.9 (13 Nov 2024 18:35)  T(F): 97.6 (14 Nov 2024 12:00), Max: 98.5 (13 Nov 2024 18:35)  HR: 68 (14 Nov 2024 12:00) (68 - 77)  BP: 163/86 (14 Nov 2024 12:00) (118/75 - 163/86)  BP(mean): --  RR: 18 (14 Nov 2024 12:00) (17 - 18)  SpO2: 100% (14 Nov 2024 12:00) (93% - 100%)    Parameters below as of 14 Nov 2024 12:00  Patient On (Oxygen Delivery Method): room air        PHYSICAL EXAM  Gen: Lying in bed, non-toxic appearing, NAD  Resp: No increased WOB  RUE :  Skin intact, moderate swelling, no erythema/hematom  SILT axillary, lateral brachial cutaneous, LABCN, median, radial ulnar distributions   Motor: +AIN/PIN/Ulnar/Radial/Musc/Median, limited abduction d/t pain  2+ radial pulse, soft compartments    RLE:  Skin intact, +effusion, no erythema over R hip  no TTP over R hip, warmer to touch relative to L side; compartments soft  R hip passive flexion ROM 0-100 deg, mild discomfort with straight leg raise   Motor: TA/EHL/GS/FHL intact  Sensory: DP/SP/Tib/Tammie/Saph SILT  +DP pulse, WWP    LABS                        12.3   7.47  )-----------( 207      ( 14 Nov 2024 09:33 )             37.2     11-14    136  |  99  |  36[H]  ----------------------------<  105[H]  4.0   |  23  |  0.80    Ca    10.4      14 Nov 2024 09:33  Phos  2.6     11-14  Mg     2.2     11-14    TPro  7.6  /  Alb  4.1  /  TBili  0.9  /  DBili  x   /  AST  43[H]  /  ALT  24  /  AlkPhos  104  11-14    PT/INR - ( 14 Nov 2024 09:33 )   PT: 11.6 sec;   INR: 1.02 ratio         PTT - ( 14 Nov 2024 09:33 )  PTT:34.8 sec        IMAGING  XRs: R hip effusion and R supraspinatus tear, but no acute fx or dislocation (personal read)    ASSESSMENT & PLAN  89y Female w/ large R hip effusion and R supraspinatus tear. ESR 22. Low suspicion for septic arthritis of the hip or the shoulder. Effusions likely secondary to fluid shifts from current UTI and acute decompensated heart failure.     -WBAT RUE and RLE  -No acute orthopedic surgical intervention at this time.   -Recommend physical therapy and pain control       HPI  89y Female w/ severe OA, dementia, and acute decompensated heart failure presenting with R shoulder and mild R hip pain. Patient admitted to medicine one day ago for worsening R shoulder pain with imaging c/w full-thickness supraspinatus tear and large R hip effusion on CT. Patient is able to bear weight in the RLE but has limited active ROM of the RUE. Denies fevers/chills. Denies numbness/tingling in the RUE or RLE. Denies any recent trauma/injuries/sugery/injections. Denies hx of crystal arthopathy or other inflammatory joint disorders, IV drug use, new sexual encounters/STIs, or other infections. At baseline, patients ambulates with walker, which has been hindered by R shoulder pain. + UA since admission w/ESR 22.     ROS  Negative unless otherwise specified in HPI.    PAST MEDICAL & SURGICAL Hx  PAST MEDICAL & SURGICAL HISTORY:  Hypothyroidism      Arthritis      Hypertension      Hepatitis C      Dementia without behavioral disturbance, unspecified dementia type      Bifascicular block      Status post placement of implantable loop recorder          MEDICATIONS  Home Medications:  liothyronine 5 mcg oral tablet: 1 tab(s) orally once a day (13 Nov 2024 23:22)  lisinopril 10 mg oral tablet: 1 tab(s) orally once a day (13 Nov 2024 23:22)      ALLERGIES  No Known Allergies      FAMILY Hx  FAMILY HISTORY:      SOCIAL Hx  Social History:      VITALS  Vital Signs Last 24 Hrs  T(C): 36.4 (14 Nov 2024 12:00), Max: 36.9 (13 Nov 2024 18:35)  T(F): 97.6 (14 Nov 2024 12:00), Max: 98.5 (13 Nov 2024 18:35)  HR: 68 (14 Nov 2024 12:00) (68 - 77)  BP: 163/86 (14 Nov 2024 12:00) (118/75 - 163/86)  BP(mean): --  RR: 18 (14 Nov 2024 12:00) (17 - 18)  SpO2: 100% (14 Nov 2024 12:00) (93% - 100%)    Parameters below as of 14 Nov 2024 12:00  Patient On (Oxygen Delivery Method): room air        PHYSICAL EXAM  Gen: Lying in bed, non-toxic appearing, NAD  Resp: No increased WOB  RUE :  Skin intact, moderate swelling, no erythema/hematom  SILT axillary, lateral brachial cutaneous, LABCN, median, radial ulnar distributions   Motor: +AIN/PIN/Ulnar/Radial/Musc/Median, limited abduction d/t pain  2+ radial pulse, soft compartments    RLE:  Skin intact, +effusion, no erythema over R hip  no TTP over R hip, warmer to touch relative to L side; compartments soft  R hip passive flexion ROM 0-100 deg, mild discomfort with straight leg raise   Motor: TA/EHL/GS/FHL intact  Sensory: DP/SP/Tib/Tammie/Saph SILT  +DP pulse, WWP    LABS                        12.3   7.47  )-----------( 207      ( 14 Nov 2024 09:33 )             37.2     11-14    136  |  99  |  36[H]  ----------------------------<  105[H]  4.0   |  23  |  0.80    Ca    10.4      14 Nov 2024 09:33  Phos  2.6     11-14  Mg     2.2     11-14    TPro  7.6  /  Alb  4.1  /  TBili  0.9  /  DBili  x   /  AST  43[H]  /  ALT  24  /  AlkPhos  104  11-14    PT/INR - ( 14 Nov 2024 09:33 )   PT: 11.6 sec;   INR: 1.02 ratio         PTT - ( 14 Nov 2024 09:33 )  PTT:34.8 sec        IMAGING  XRs: R hip effusion and R supraspinatus tear, but no acute fx or dislocation (personal read)    ASSESSMENT & PLAN  89y Female w/ R hip and R shoulder effusions. Low suspicion for septic arthritis. Effusions likely secondary to severe OA vs fluid shifts from current UTI and decompensated heart failure. R shoulder effusion likely secondary to rotator cuff arthropathy.     -WBAT RUE and RLE  -No acute orthopedic surgical intervention at this time.   -Recommend physical therapy and pain control       HPI  89y female w/ osteoarthritis, dementia and acute decompensated heart failure presenting with R shoulder and R hip pain. Notes she has had the R shoulder pain for at least a week and was recently admitted for the same issue a few days ago. Returned to hospital as R shoulder pain has gotten worse. Patient is able to bear weight in the RLE but has limited active ROM of the RUE, making it difficult to use RW. Denies fevers/chills. Denies numbness/tingling in the RUE or RLE. Denies any recent trauma/injuries/surgery/injections. Denies hx of crystal arthropathy or other inflammatory joint disorders, IV drug use, new sexual encounters/STIs, or other infections. MRI R shoulder demonstrated full-thickness rotator cuff tear and CT pelvis showed large R hip effusion. At baseline, patients ambulates with walker. + UA since admission w/ CRP 22.     ROS  Negative unless otherwise specified in HPI.    PAST MEDICAL & SURGICAL Hx  PAST MEDICAL & SURGICAL HISTORY:  Hypothyroidism      Arthritis      Hypertension      Hepatitis C      Dementia without behavioral disturbance, unspecified dementia type      Bifascicular block      Status post placement of implantable loop recorder          MEDICATIONS  Home Medications:  liothyronine 5 mcg oral tablet: 1 tab(s) orally once a day (13 Nov 2024 23:22)  lisinopril 10 mg oral tablet: 1 tab(s) orally once a day (13 Nov 2024 23:22)      ALLERGIES  No Known Allergies      FAMILY Hx  FAMILY HISTORY:      SOCIAL Hx  Social History:      VITALS  Vital Signs Last 24 Hrs  T(C): 36.4 (14 Nov 2024 12:00), Max: 36.9 (13 Nov 2024 18:35)  T(F): 97.6 (14 Nov 2024 12:00), Max: 98.5 (13 Nov 2024 18:35)  HR: 68 (14 Nov 2024 12:00) (68 - 77)  BP: 163/86 (14 Nov 2024 12:00) (118/75 - 163/86)  BP(mean): --  RR: 18 (14 Nov 2024 12:00) (17 - 18)  SpO2: 100% (14 Nov 2024 12:00) (93% - 100%)    Parameters below as of 14 Nov 2024 12:00  Patient On (Oxygen Delivery Method): room air        PHYSICAL EXAM  Gen: Lying in bed, non-toxic appearing, NAD  Resp: No increased WOB  RUE :  Skin intact, minimal swelling, no erythema or warmth  SILT axillary, lateral brachial cutaneous, LABCN, median, radial ulnar distributions   Motor: +AIN/PIN/Ulnar/Radial/Musc/Median  Shoulder active abduction and fwd flexion 0-70 limited 2/2 pain, internal rotation to sacrum  2+ radial pulse, soft compartments    RLE:  Skin intact, no palpable effusion, no erythema over hip  no TTP over R hip, no warmth relative to L side; compartments soft  R hip passive flexion ROM 0-100 deg  neg log roll  Motor: TA/EHL/GS/FHL intact  Sensory: DP/SP/Tib/Tammie/Saph SILT  +DP pulse, WWP    LABS                        12.3   7.47  )-----------( 207      ( 14 Nov 2024 09:33 )             37.2     11-14    136  |  99  |  36[H]  ----------------------------<  105[H]  4.0   |  23  |  0.80    Ca    10.4      14 Nov 2024 09:33  Phos  2.6     11-14  Mg     2.2     11-14    TPro  7.6  /  Alb  4.1  /  TBili  0.9  /  DBili  x   /  AST  43[H]  /  ALT  24  /  AlkPhos  104  11-14    PT/INR - ( 14 Nov 2024 09:33 )   PT: 11.6 sec;   INR: 1.02 ratio         PTT - ( 14 Nov 2024 09:33 )  PTT:34.8 sec        IMAGING  < from: MR Shoulder Joint w/wo IV Cont, Right (11.14.24 @ 11:54) >  IMPRESSION:  1.  Patient motion causes some limitation.  2.  There appears to be extensive full-thickness tearing of the rotator   cuff.  3.  Severe osteoarthritis is present.  4.  Large effusion is noted with severe synovitis.    < end of copied text >  < from: CT Pelvis Bony Only No Cont (11.13.24 @ 17:47) >  IMPRESSION:    Advanced, somewhat erosive arthropathy of the right hip joint space with   thinning of the medial and posterior acetabular walls, as well as a large   right hip joint effusion, which may be degenerative or inflammatory in   etiology. However, septic arthritis of the right hip joint space cannot   be excluded and is also a differential consideration. If there is   clinical concern for septic arthritis of the right hip joint space, right   hip joints aspiration should be performed.    Mild subchondral collapse of the right femoral head, fairly similar to   the prior radiographs dated 4/24/2024.    Indeterminate complex mixed attenuating lesion within the region of the   left ovary/adnexa. Recommend further evaluation with pelvic ultrasound or   pelvic MRI without and with contrast to rule out an underlying neoplasm   in this region.    Partially imaged indeterminate hypoattenuating lesion within the inferior   pole of the left kidney. Renal neoplasm is one of the differential   considerations. Recommend further evaluation with renal ultrasound to   rule out neoplastic etiologies.    < end of copied text >

## 2024-11-14 NOTE — CONSULT NOTE ADULT - ATTENDING COMMENTS
Acute on chronic limited active ROM in R shoulder, some bruising and ?effusion. Recommend further imaging as above.
I agree with the above note and have personally seen and examined this patient. All pertinent films have been reviewed. Please refer to clinical documentation of the history, physical examinations, data summary, and both assessment and plan as documented above and with which I agree.    Larry Roberts MD  Attending Orthopedic Surgeon

## 2024-11-14 NOTE — PROGRESS NOTE ADULT - PROBLEM SELECTOR PLAN 3
h/o mci/dementia, severe oa  a+o x1-2 at baseline  maintain fall + frac, delirium, aspiration precautions; keep head end of bed elevated  nutrition consult  dysphagia screen/ slp eval  pt/ot eval + sw/cm consult for disposition  goc and advanced directives conversation

## 2024-11-14 NOTE — PATIENT PROFILE ADULT - FALL HARM RISK - HARM RISK INTERVENTIONS

## 2024-11-14 NOTE — PHARMACOTHERAPY INTERVENTION NOTE - COMMENTS
Reviewed patient's home medications list in Outpatient Medication Review with inpatient medications orders.     Home Medications:  gabapentin 100 mg oral capsule: 1 cap(s) orally once a day  lidocaine 4% topical film: Apply topically to affected area once a day 12hrs on 12hrs off  liothyronine 5 mcg oral tablet: 1 tab(s) orally once a day  lisinopril 10 mg oral tablet: 1 tab(s) orally once a day  Narcan 4 mg/0.1 mL nasal spray: 1 spray(s) intranasally once a day use as instructed  oxyCODONE 5 mg oral tablet: 0.5 tab(s) orally 2 times a day MDD: 1    Recommendations:   89yo f w pmh mci/dementia, htn, hld, hfpef, hypothyroidism, severe oa, hcv s/p treatment, vte (dvt). Lisinopril 10 mg was held on admission due to potential hyperkalemia.    Potassium: 4.0 mmol/L (11.14.24 @ 09:33)   Potassium: 4.0 mmol/L (11.14.24 @ 09:33)   Potassium: 4.8 mmol/L (11.13.24 @ 22:42)   Potassium: 5.4 mmol/L (11.13.24 @ 13:32)   Potassium: 4.6 mmol/L (11.12.24 @ 12:09)   Potassium: 6.1: Severe Hemolysis, results may be falsely elevated mmol/L (11.12.24 @ 06:55)   Potassium: 4.5 mmol/L (11.11.24 @ 10:33)   Potassium: 4.4 mmol/L (11.10.24 @ 23:47)   Potassium: 5.8 mmol/L (11.10.24 @ 19:50)   Potassium: 5.6 mmol/L (11.08.24 @ 16:55)     Patient is currently on furosemide IV 20mg once a day for gentle diuresis. Recommending to add back on lisinopril 10 mg orally once a day. Previous BP readings: 159/78, 138/68, 139/77.  Add home med, gabapentin 100 mg orally once a day.    Jaime (Emre Beckett  Transitions of Care Pharmacist  Available on [employer] Teams (Preferred)  Spectra: [phone]

## 2024-11-14 NOTE — PROGRESS NOTE ADULT - PROBLEM SELECTOR PLAN 6
DVT ppx Lovenox 40    11/14: Plan discusse DVT ppx Lovenox 40    11/14: Plan discussed for MRI R shoulder, ortho eval of right hip and cardiology w/u for acute on chronic HFpEF DVT ppx Lovenox 40    11/14: Plan discussed for MRI R shoulder, ortho eval of right hip and cardiology w/u for acute on chronic HFpEF.

## 2024-11-14 NOTE — CONSULT NOTE ADULT - ASSESSMENT
ASSESSMENT & PLAN  89y Female w/ R hip and R shoulder shoulder pain, with XR showing severe OA of R hip and R shoulder, and MRI R shoulder revealing full thickness rotator cuff tear. Very low suspicion for septic arthritis. R hip effusion likely secondary to OA flare. R shoulder effusion likely secondary to rotator cuff arthropathy.     Plan:  -WBAT RUE and RLE  -rec PT RUE and RLE  -pain control  -No acute orthopedic surgical intervention at this time.   -f/u outpatient with Dr. Roberts, call office for appt    Dominique Messina, PGY-2  Orthopedic Surgery    [facility] [place]: [phone]  [facility]: [phone]  [facility]: [phone]

## 2024-11-14 NOTE — CONSULT NOTE ADULT - SUBJECTIVE AND OBJECTIVE BOX
DATE OF SERVICE: 11-14-24 @ 18:10    CHIEF COMPLAINT:Patient is a 89y old  Female who presents with a chief complaint of sob/ku, persistent shoulder pain, inability to perform adls, acute decompensated heart failure (14 Nov 2024 15:28)      HISTORY OF PRESENT ILLNESS:HPI:  89yo f w pmh mci/dementia, htn, hld, hfpef, hypothyroidism, severe oa, hcv s/p treatment, vte (dvt), p/w sob/ku, persistent shoulder pain, inability to perform adls; patient initially with right shoulder pain on 11/8, came to er, and was discharged home with outpatient follow up; right shoulder pain persisted and so patient returned to [facility] er on 11/10 and was hospitalized 11/10-11/12; imaging showed severe oa, pain managed w oxycodone, and patient discharged home with home pt. since arriving home, after recent hospitalization, daughter has found that patient now requires more assistance, w she herself covering the other 12 hours hha is unavailable for (at baseline, patient requires assistance with adls, has 12 hour hha at home). in addition, shoulder pain persists and daughter noticed patient appearing short of breath; she mentions that she has not been giving patient her home lasix for a while now as it causes patient to pass too much urine, and makes it difficult to care for patient as she is totally dependent ; she grew concerned, so had patient brought back to [facility] er for further evaluation. in er, found to be in ahrf req ~3 lpm via nc; suspect 2/2 mild adhf; admit to medicine for further mgmt (13 Nov 2024 23:08)      PAST MEDICAL & SURGICAL HISTORY:  Hypothyroidism      Arthritis      Hypertension      Hepatitis C      Dementia without behavioral disturbance, unspecified dementia type      Bifascicular block      Status post placement of implantable loop recorder              MEDICATIONS:  enoxaparin Injectable 40 milliGRAM(s) SubCutaneous every 24 hours  furosemide   Injectable 20 milliGRAM(s) IV Push daily  lisinopril 10 milliGRAM(s) Oral daily        acetaminophen     Tablet .. 650 milliGRAM(s) Oral every 6 hours PRN  gabapentin 100 milliGRAM(s) Oral daily  melatonin 3 milliGRAM(s) Oral at bedtime PRN  ondansetron Injectable 4 milliGRAM(s) IV Push every 8 hours PRN  oxyCODONE    IR 5 milliGRAM(s) Oral every 4 hours PRN  oxyCODONE    IR 2.5 milliGRAM(s) Oral every 4 hours PRN    aluminum hydroxide/magnesium hydroxide/simethicone Suspension 30 milliLiter(s) Oral every 4 hours PRN  bisacodyl 5 milliGRAM(s) Oral daily PRN  polyethylene glycol 3350 17 Gram(s) Oral daily  senna 2 Tablet(s) Oral at bedtime    liothyronine 5 MICROGram(s) Oral daily    influenza  Vaccine (HIGH DOSE) 0.5 milliLiter(s) IntraMuscular once  lidocaine   4% Patch 1 Patch Transdermal daily      FAMILY HISTORY:      Non-contributory    SOCIAL HISTORY:    [- ] Tobacco  [- ] Drugs  [- ] Alcohol    Allergies    No Known Allergies    Intolerances    	    REVIEW OF SYSTEMS:  CONSTITUTIONAL: No fever  EYES: No eye pain, visual disturbances, or discharge  ENMT:  No difficulty hearing, tinnitus  NECK: No pain or stiffness  RESPIRATORY: No cough, wheezing, + SOB  CARDIOVASCULAR: No chest pain, palpitations, passing out, dizziness, or leg swelling  GASTROINTESTINAL:  No nausea, vomiting, diarrhea or constipation. No melena.  GENITOURINARY: No dysuria, hematuria  NEUROLOGICAL: No stroke like symptoms  SKIN: No burning or lesions   ENDOCRINE: No heat or cold intolerance  MUSCULOSKELETAL: No joint pain or swelling  PSYCHIATRIC: No  anxiety, mood swings  HEME/LYMPH: No bleeding gums  ALLERGY AND IMMUNOLOGIC: No hives or eczema	    All other ROS negative    PHYSICAL EXAM:  T(C): 36.4 (11-14-24 @ 16:30), Max: 36.9 (11-13-24 @ 18:35)  HR: 83 (11-14-24 @ 16:30) (68 - 83)  BP: 137/85 (11-14-24 @ 16:30) (118/75 - 163/86)  RR: 18 (11-14-24 @ 16:30) (17 - 18)  SpO2: 97% (11-14-24 @ 16:30) (93% - 100%)  Wt(kg): --  I&O's Summary      Appearance: Normal	  HEENT:   Normal oral mucosa, EOMI	  Cardiovascular:  S1 S2, No JVD,    Respiratory: Lungs clear to auscultation	  Psychiatry: Alert  Gastrointestinal:  Soft, Non-tender, + BS	  Skin: No rashes   Neurologic: Non-focal  Extremities:  No edema  Vascular: Peripheral pulses palpable    	    	  	  CARDIAC MARKERS:  Labs personally reviewed by me                                  12.3   7.47  )-----------( 207      ( 14 Nov 2024 09:33 )             37.2     11-14    136  |  99  |  36[H]  ----------------------------<  105[H]  4.0   |  23  |  0.80    Ca    10.4      14 Nov 2024 09:33  Phos  2.6     11-14  Mg     2.2     11-14    TPro  7.6  /  Alb  4.1  /  TBili  0.9  /  DBili  x   /  AST  43[H]  /  ALT  24  /  AlkPhos  104  11-14          EKG: Personally reviewed by me - SR 1st AVB, bifascicular block  - < from: Xray Chest 1 View- PORTABLE-Urgent (Xray Chest 1 View- PORTABLE-Urgent .) (11.13.24 @ 13:49) >  FINDINGS:  The cardiac silhouette is normal in size. There is a loop   recorder device. There are no focal consolidations or pleural effusions.   The hilar and mediastinal structures appear unremarkable. The osseous   structures areintact.    IMPRESSION: Clear lungs.    < end of copied text >  < from: CT Angio Chest PE Protocol w/ IV Cont (11.13.24 @ 17:47) >  No pulmonary embolism to the level of the segmental arteries. Limited   evaluation of subsegmental arteries secondary to motion artifact.    Indeterminate lytic lesion within the tip of the left scapula. Consider   myeloma or metastatic disease.    Diffuse mild septal thickening may relate to component of interstitial   edema.    Cardiomegaly. Dilated main pulmonary artery suggests pulmonary   hypertension, unchanged since 02/25/2018.    < end of copied text >    Radiology: Personally reviewed by me -       Assessment /Plan:     89yo f w pmh mci/dementia, htn, hld, hfpef, hypothyroidism, severe oa, hcv s/p treatment, vte (dvt), p/w sob/ku, persistent shoulder pain, inability to perform adls; in er, found to be in ahrf req ~3 lpm via nc; 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    Problem/Plan #1:  Problem: Shortness of Breath  - ECG SR 1st AVB bifascicular block  - Trop 53 --> 50  -   - CXR with clear lungs  - CT neg for PE but suggestive of intersitial edema and pHTN  - Obtain TTE  - c/w lasix 20mg IV BID  - Monitor strict I&Os, daily weights    Problem/Plan #2:  Problem: Hypertension  - c/w lisinopril 10mg PO daily    Problem/Plan #3:  Problem: HLD  - not currently on any statin or anti-lipid medication    Problem/Plan #4:  Problem: DVT PPx  - c/w SQ lovenox    Differential diagnosis and plan of care discussed with patient after the evaluation. Counseling on diet, nutritional counseling, weight management, exercise and medication compliance was done.   Advanced care planning/advanced directives discussed with patient/family. DNR status including forceful chest compressions to attempt to restart the heart, ventilator support/artificial breathing, electric shock, artificial nutrition, health care proxy, Molst form all discussed with pt. Pt wishes to consider. More than fifteen minutes spent on discussing advanced directives.       Gato Gomez DO [facility]  Cardiovascular Medicine  [address] [place] [address] Dr, [address]  Available for call or text via [employer]  Office [phone]   DATE OF SERVICE: 11-14-24 @ 18:10    CHIEF COMPLAINT:Patient is a 89y old  Female who presents with a chief complaint of sob/ku, persistent shoulder pain, inability to perform adls, acute decompensated heart failure (14 Nov 2024 15:28)      HISTORY OF PRESENT ILLNESS:HPI:  89yo f w pmh mci/dementia, htn, hld, hfpef, hypothyroidism, severe oa, hcv s/p treatment, vte (dvt), p/w sob/ku, persistent shoulder pain, inability to perform adls; patient initially with right shoulder pain on 11/8, came to er, and was discharged home with outpatient follow up; right shoulder pain persisted and so patient returned to [facility] er on 11/10 and was hospitalized 11/10-11/12; imaging showed severe oa, pain managed w oxycodone, and patient discharged home with home pt. since arriving home, after recent hospitalization, daughter has found that patient now requires more assistance, w she herself covering the other 12 hours hha is unavailable for (at baseline, patient requires assistance with adls, has 12 hour hha at home). in addition, shoulder pain persists and daughter noticed patient appearing short of breath; she mentions that she has not been giving patient her home lasix for a while now as it causes patient to pass too much urine, and makes it difficult to care for patient as she is totally dependent ; she grew concerned, so had patient brought back to [facility] er for further evaluation. in er, found to be in ahrf req ~3 lpm via nc; suspect 2/2 mild adhf; admit to medicine for further mgmt (13 Nov 2024 23:08)      PAST MEDICAL & SURGICAL HISTORY:  Hypothyroidism      Arthritis      Hypertension      Hepatitis C      Dementia without behavioral disturbance, unspecified dementia type      Bifascicular block      Status post placement of implantable loop recorder              MEDICATIONS:  enoxaparin Injectable 40 milliGRAM(s) SubCutaneous every 24 hours  furosemide   Injectable 20 milliGRAM(s) IV Push daily  lisinopril 10 milliGRAM(s) Oral daily        acetaminophen     Tablet .. 650 milliGRAM(s) Oral every 6 hours PRN  gabapentin 100 milliGRAM(s) Oral daily  melatonin 3 milliGRAM(s) Oral at bedtime PRN  ondansetron Injectable 4 milliGRAM(s) IV Push every 8 hours PRN  oxyCODONE    IR 5 milliGRAM(s) Oral every 4 hours PRN  oxyCODONE    IR 2.5 milliGRAM(s) Oral every 4 hours PRN    aluminum hydroxide/magnesium hydroxide/simethicone Suspension 30 milliLiter(s) Oral every 4 hours PRN  bisacodyl 5 milliGRAM(s) Oral daily PRN  polyethylene glycol 3350 17 Gram(s) Oral daily  senna 2 Tablet(s) Oral at bedtime    liothyronine 5 MICROGram(s) Oral daily    influenza  Vaccine (HIGH DOSE) 0.5 milliLiter(s) IntraMuscular once  lidocaine   4% Patch 1 Patch Transdermal daily      FAMILY HISTORY:      Non-contributory    SOCIAL HISTORY:    [- ] Tobacco  [- ] Drugs  [- ] Alcohol    Allergies    No Known Allergies    Intolerances    	    REVIEW OF SYSTEMS:  CONSTITUTIONAL: No fever  EYES: No eye pain, visual disturbances, or discharge  ENMT:  No difficulty hearing, tinnitus  NECK: No pain or stiffness  RESPIRATORY: No cough, wheezing, + SOB  CARDIOVASCULAR: No chest pain, palpitations, passing out, dizziness, or leg swelling  GASTROINTESTINAL:  No nausea, vomiting, diarrhea or constipation. No melena.  GENITOURINARY: No dysuria, hematuria  NEUROLOGICAL: No stroke like symptoms  SKIN: No burning or lesions   ENDOCRINE: No heat or cold intolerance  MUSCULOSKELETAL: No joint pain or swelling  PSYCHIATRIC: No  anxiety, mood swings  HEME/LYMPH: No bleeding gums  ALLERGY AND IMMUNOLOGIC: No hives or eczema	    All other ROS negative    PHYSICAL EXAM:  T(C): 36.4 (11-14-24 @ 16:30), Max: 36.9 (11-13-24 @ 18:35)  HR: 83 (11-14-24 @ 16:30) (68 - 83)  BP: 137/85 (11-14-24 @ 16:30) (118/75 - 163/86)  RR: 18 (11-14-24 @ 16:30) (17 - 18)  SpO2: 97% (11-14-24 @ 16:30) (93% - 100%)  Wt(kg): --  I&O's Summary      Appearance: Normal	  HEENT:   Normal oral mucosa, EOMI	  Cardiovascular:  S1 S2, No JVD,    Respiratory: Lungs clear to auscultation	  Psychiatry: Alert  Gastrointestinal:  Soft, Non-tender, + BS	  Skin: No rashes   Neurologic: Non-focal  Extremities:  No edema  Vascular: Peripheral pulses palpable    	    	  	  CARDIAC MARKERS:  Labs personally reviewed by me                                  12.3   7.47  )-----------( 207      ( 14 Nov 2024 09:33 )             37.2     11-14    136  |  99  |  36[H]  ----------------------------<  105[H]  4.0   |  23  |  0.80    Ca    10.4      14 Nov 2024 09:33  Phos  2.6     11-14  Mg     2.2     11-14    TPro  7.6  /  Alb  4.1  /  TBili  0.9  /  DBili  x   /  AST  43[H]  /  ALT  24  /  AlkPhos  104  11-14          EKG: Personally reviewed by me - SR 1st AVB, bifascicular block  - < from: Xray Chest 1 View- PORTABLE-Urgent (Xray Chest 1 View- PORTABLE-Urgent .) (11.13.24 @ 13:49) >  FINDINGS:  The cardiac silhouette is normal in size. There is a loop   recorder device. There are no focal consolidations or pleural effusions.   The hilar and mediastinal structures appear unremarkable. The osseous   structures areintact.    IMPRESSION: Clear lungs.    < end of copied text >  < from: CT Angio Chest PE Protocol w/ IV Cont (11.13.24 @ 17:47) >  No pulmonary embolism to the level of the segmental arteries. Limited   evaluation of subsegmental arteries secondary to motion artifact.    Indeterminate lytic lesion within the tip of the left scapula. Consider   myeloma or metastatic disease.    Diffuse mild septal thickening may relate to component of interstitial   edema.    Cardiomegaly. Dilated main pulmonary artery suggests pulmonary   hypertension, unchanged since 02/25/2018.    < end of copied text >    Radiology: Personally reviewed by me -       Assessment /Plan:     89yo f w pmh mci/dementia, htn, hld, hfpef, hypothyroidism, severe oa, hcv s/p treatment, vte (dvt), p/w sob/ku, persistent shoulder pain, inability to perform adls; in er, found to be in ahrf req ~3 lpm via nc; 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    Follows with Julissa Tejada/[facility]. I discussed with Dr Leija    Problem/Plan #1:  Problem: Shortness of Breath  - ECG SR 1st AVB bifascicular block  - Trop 53 --> 50  -   - CXR with clear lungs  - CT neg for PE but suggestive of intersitial edema and pHTN  - Obtain TTE  - c/w lasix 20mg IV BID to asses for LV filling pressures  - Appears close to euvolemic will likely switch to PO diuretics Friday    Problem/Plan #2:  Problem: Hypertension  - c/w lisinopril 10mg PO daily    Problem/Plan #3:  Problem: HLD  - not currently on any statin or anti-lipid medication    Problem/Plan #4:  Problem: DVT PPx  - c/w SQ lovenox          Differential diagnosis and plan of care discussed with patient after the evaluation. Counseling on diet, nutritional counseling, weight management, exercise and medication compliance was done.   Advanced care planning/advanced directives discussed with patient/family. DNR status including forceful chest compressions to attempt to restart the heart, ventilator support/artificial breathing, electric shock, artificial nutrition, health care proxy, Molst form all discussed with pt. Pt wishes to consider. More than fifteen minutes spent on discussing advanced directives.       Gato Gomez DO [facility]  Cardiovascular Medicine  [address], [address]  Available for call or text via [employer]  Office [phone]

## 2024-11-14 NOTE — OCCUPATIONAL THERAPY INITIAL EVALUATION ADULT - ADDITIONAL COMMENTS
pt lives in an apartment with daughter. prior to admission, pt was ambulating with use of rolling walker, owns wheelchair, bedside commode. pt has HHA 3 days a week for 4 hrs and daughter assists.

## 2024-11-14 NOTE — PROGRESS NOTE ADULT - PROBLEM SELECTOR PLAN 1
h/o hfpef  in no respiratory distress with adequate spo2 on 3 LPM via NC  exam w decreased breath sounds + crackles on auscultation on admission, improved   ct imaging shows interstitial edema  bnp 189  otherwise, no clinft of acs, ekg w no st seg - t wave changes suggestive of acute ischemia, trop 53->50 likely demand/decreased clearance  suspect mild adhf; of note, patient's daughter has not been giving patient her lasix dose for a while now as it causes patient to pass too much urine, and makes it difficult to care for patient as she is totally dependent   follow up tte  Monitor SpO2, RR, for signs of respiratory distress; Goal SpO2 >88-92%, PaO2 >55-60 mmHg  trend volume status via I/Os, daily weights   gentle diuresis w lasix 20 ivp od; adjust to maintain goal net negative fluid balance  Continue lisinopril per pharmacy recs  Cardiology consulted

## 2024-11-15 ENCOUNTER — RESULT REVIEW (OUTPATIENT)
Age: 89
End: 2024-11-15

## 2024-11-15 LAB
ANION GAP SERPL CALC-SCNC: 18 MMOL/L — HIGH (ref 5–17)
BUN SERPL-MCNC: 37 MG/DL — HIGH (ref 7–23)
CALCIUM SERPL-MCNC: 10.1 MG/DL — SIGNIFICANT CHANGE UP (ref 8.4–10.5)
CHLORIDE SERPL-SCNC: 97 MMOL/L — SIGNIFICANT CHANGE UP (ref 96–108)
CO2 SERPL-SCNC: 19 MMOL/L — LOW (ref 22–31)
CREAT SERPL-MCNC: 1.14 MG/DL — SIGNIFICANT CHANGE UP (ref 0.5–1.3)
EGFR: 43 ML/MIN/1.73M2 — LOW
ERYTHROCYTE [SEDIMENTATION RATE] IN BLOOD: 52 MM/HR — HIGH (ref 0–20)
GLUCOSE SERPL-MCNC: 159 MG/DL — HIGH (ref 70–99)
HCT VFR BLD CALC: 38.8 % — SIGNIFICANT CHANGE UP (ref 34.5–45)
HGB BLD-MCNC: 12.9 G/DL — SIGNIFICANT CHANGE UP (ref 11.5–15.5)
MAGNESIUM SERPL-MCNC: 2.1 MG/DL — SIGNIFICANT CHANGE UP (ref 1.6–2.6)
MCHC RBC-ENTMCNC: 31.5 PG — SIGNIFICANT CHANGE UP (ref 27–34)
MCHC RBC-ENTMCNC: 33.2 G/DL — SIGNIFICANT CHANGE UP (ref 32–36)
MCV RBC AUTO: 94.9 FL — SIGNIFICANT CHANGE UP (ref 80–100)
NRBC # BLD: 0 /100 WBCS — SIGNIFICANT CHANGE UP (ref 0–0)
PHOSPHATE SERPL-MCNC: 3 MG/DL — SIGNIFICANT CHANGE UP (ref 2.5–4.5)
PLATELET # BLD AUTO: 243 K/UL — SIGNIFICANT CHANGE UP (ref 150–400)
POTASSIUM SERPL-MCNC: 4.3 MMOL/L — SIGNIFICANT CHANGE UP (ref 3.5–5.3)
POTASSIUM SERPL-SCNC: 4.3 MMOL/L — SIGNIFICANT CHANGE UP (ref 3.5–5.3)
RBC # BLD: 4.09 M/UL — SIGNIFICANT CHANGE UP (ref 3.8–5.2)
RBC # FLD: 14 % — SIGNIFICANT CHANGE UP (ref 10.3–14.5)
SODIUM SERPL-SCNC: 134 MMOL/L — LOW (ref 135–145)
WBC # BLD: 9.74 K/UL — SIGNIFICANT CHANGE UP (ref 3.8–10.5)
WBC # FLD AUTO: 9.74 K/UL — SIGNIFICANT CHANGE UP (ref 3.8–10.5)

## 2024-11-15 PROCEDURE — 93306 TTE W/DOPPLER COMPLETE: CPT | Mod: 26

## 2024-11-15 PROCEDURE — 99233 SBSQ HOSP IP/OBS HIGH 50: CPT

## 2024-11-15 RX ORDER — FUROSEMIDE 40 MG/1
20 TABLET ORAL DAILY
Refills: 0 | Status: DISCONTINUED | OUTPATIENT
Start: 2024-11-15 | End: 2024-11-16

## 2024-11-15 RX ORDER — CEFTRIAXONE SODIUM 1 G
1000 VIAL (EA) INJECTION EVERY 24 HOURS
Refills: 0 | Status: DISCONTINUED | OUTPATIENT
Start: 2024-11-15 | End: 2024-11-15

## 2024-11-15 RX ORDER — CEFTRIAXONE SODIUM 1 G
1000 VIAL (EA) INJECTION EVERY 24 HOURS
Refills: 0 | Status: COMPLETED | OUTPATIENT
Start: 2024-11-15 | End: 2024-11-18

## 2024-11-15 RX ADMIN — OXYCODONE HYDROCHLORIDE 5 MILLIGRAM(S): 30 TABLET ORAL at 05:40

## 2024-11-15 RX ADMIN — OXYCODONE HYDROCHLORIDE 5 MILLIGRAM(S): 30 TABLET ORAL at 06:40

## 2024-11-15 RX ADMIN — FUROSEMIDE 20 MILLIGRAM(S): 40 TABLET ORAL at 05:41

## 2024-11-15 RX ADMIN — LIDOCAINE 1 PATCH: 40 CREAM TOPICAL at 11:15

## 2024-11-15 RX ADMIN — LIDOCAINE 1 PATCH: 40 CREAM TOPICAL at 23:15

## 2024-11-15 RX ADMIN — LIDOCAINE 1 PATCH: 40 CREAM TOPICAL at 19:30

## 2024-11-15 RX ADMIN — Medication 5 MICROGRAM(S): at 05:41

## 2024-11-15 RX ADMIN — Medication 2 TABLET(S): at 21:24

## 2024-11-15 RX ADMIN — ENOXAPARIN SODIUM 40 MILLIGRAM(S): 30 INJECTION SUBCUTANEOUS at 05:41

## 2024-11-15 RX ADMIN — POLYETHYLENE GLYCOL 3350 17 GRAM(S): 17 POWDER, FOR SOLUTION ORAL at 11:14

## 2024-11-15 RX ADMIN — LISINOPRIL 10 MILLIGRAM(S): 20 TABLET ORAL at 05:41

## 2024-11-15 NOTE — PROGRESS NOTE ADULT - WHAT MATTERS MOST
Spoke to pt's daughter at bedside, feels as though she is unable to care for pt at home due to increasing needs. She talked about possibly sending pt to facility for more support. She wants to discuss with other siblings/ family about limited medical intervention and GOC regarding MOLST.

## 2024-11-15 NOTE — DIETITIAN INITIAL EVALUATION ADULT - OTHER INFO
Daughter reports UBW 157lb, reports pt's weight stable PTA.  Dosing weight: 157lb (11/14, bed).  Daily weight: 156lb (11/15, bed).  IBW: 115lb, %IBW: 136% based on daily weight 156lb.  Weight history per [facility]: 154lb (10/9/24), 151.3lb (9/10/24), 155.5lb (7/19/24), 151lb (3/20/24), 149lb (2/9/24), 149lb (10/26/23), 149lb (11/4/22).  Weight appears stable PTA. RD to continue to monitor weight trends as available/able.     -Acute decompensated heart failure per chart. Ordered for lasix.  -elevated cholesterol noted.

## 2024-11-15 NOTE — DIETITIAN INITIAL EVALUATION ADULT - REASON INDICATOR FOR ASSESSMENT
RD assessment warranted for: Consult for Assessment and Education. Chart reviewed, events noted.  Source: medical record, patient's daughter at bedside.  Patient lethargic at time of visit in setting of pain medications so unable to participate in nutrition interview at time of visit.

## 2024-11-15 NOTE — PROGRESS NOTE ADULT - PROBLEM SELECTOR PLAN 1
h/o hfpef  in no respiratory distress with adequate spo2 on 3 LPM via NC  exam w decreased breath sounds + crackles on auscultation on admission, improved   ct imaging shows interstitial edema  bnp 189  otherwise, no clinft of acs, ekg w no st seg - t wave changes suggestive of acute ischemia, trop 53->50 likely demand/decreased clearance  suspect mild adhf; of note, patient's daughter has not been giving patient her lasix dose for a while now as it causes patient to pass too much urine, and makes it difficult to care for patient as she is totally dependent   follow up tte  Monitor SpO2, RR, for signs of respiratory distress; Goal SpO2 >88-92%, PaO2 >55-60 mmHg  trend volume status via I/Os, daily weights   gentle diuresis w lasix 20 ivp qd; adjust to maintain goal net negative fluid balance- switched to PO today - monitor Mg P K  Continue lisinopril per pharmacy recs- monitor K   Cardiology consulted

## 2024-11-15 NOTE — PHYSICAL THERAPY INITIAL EVALUATION ADULT - PERTINENT HX OF CURRENT PROBLEM, REHAB EVAL
89yo f w pmh mci/dementia, htn, hld, hfpef, hypothyroidism, severe oa, hcv s/p treatment, vte (dvt), p/w sob/ku, persistent shoulder pain, inability to perform adls; patient initially with right shoulder pain on 11/8, came to er, and was discharged home with outpatient follow up; right shoulder pain persisted and so patient returned to [facility] er on 11/10 and was hospitalized 11/10-11/12; imaging showed severe oa, pain managed w oxycodone, and patient discharged home with home pt. since arriving home, after recent hospitalization, daughter has found that patient now requires more assistance, w she herself covering the other 12 hours hha is unavailable for (at baseline, patient requires assistance with adls, has 12 hour hha at home). in addition, shoulder pain persists and daughter noticed patient appearing short of breath; she mentions that she has not been giving patient her home lasix for a while now as it causes patient to pass too much urine, and makes it difficult to care for patient as she is totally dependent ; she grew concerned, so had patient brought back to [facility] er for further evaluation. in er, found to be in ahrf req ~3 lpm via nc; 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 CT Chest- No pulmonary embolism to the level of the segmental arteries. Indeterminate lytic lesion within the tip of the left scapula. Diffuse mild septal thickening may relate to component of interstitial edema. Cardiomegaly. XRAY Hip-No acute fracture or dislocation. Advanced right hip osteoarthritis with severe joint space narrowing and subchondral cystic change and sclerosis as well as osteophytosis. Vascular calcifications. Calcified fibroids. XRAY Shoulder- Widening of the acromioclavicular interval with irregular morphology of the distal clavicle, which may be related to prior surgery, distal clavicular osteolysis, or an acromioclavicular joint separation. Coracoclavicular distance appears normal. Findings are similar to the prior radiographs on 11/8/2024. Superior subluxation of the humeral head at the glenohumeral joint with remodeling of the undersurface of the acromion, likely related to rotator cuff arthropathy. Osteoarthritic changes seen within the glenohumeral and acromioclavicular joint spaces. No definite acute displaced fracture seen.

## 2024-11-15 NOTE — SWALLOW BEDSIDE ASSESSMENT ADULT - ADDITIONAL RECOMMENDATIONS
89yo f w pmh mci/dementia, severe oa, hcv s/p treatment, vte (dvt), p/w sob/ku, persistent shoulder pain, inability to perform adls; in er, found to be in ahrf req ~3 lpm via nc; suspect 2/2 mild adhf; admit to medicine for further mgmt Maintain good oral care

## 2024-11-15 NOTE — DIETITIAN INITIAL EVALUATION ADULT - PERTINENT MEDS FT
MEDICATIONS  (STANDING):  enoxaparin Injectable 40 milliGRAM(s) SubCutaneous every 24 hours  furosemide    Tablet 20 milliGRAM(s) Oral daily  gabapentin 100 milliGRAM(s) Oral daily  influenza  Vaccine (HIGH DOSE) 0.5 milliLiter(s) IntraMuscular once  lidocaine   4% Patch 1 Patch Transdermal daily  liothyronine 5 MICROGram(s) Oral daily  lisinopril 10 milliGRAM(s) Oral daily  naloxone Injectable 0.4 milliGRAM(s) IV Push once  polyethylene glycol 3350 17 Gram(s) Oral daily  senna 2 Tablet(s) Oral at bedtime    MEDICATIONS  (PRN):  acetaminophen     Tablet .. 650 milliGRAM(s) Oral every 6 hours PRN Temp greater or equal to 38C (100.4F), Mild Pain (1 - 3)  aluminum hydroxide/magnesium hydroxide/simethicone Suspension 30 milliLiter(s) Oral every 4 hours PRN Dyspepsia  bisacodyl 5 milliGRAM(s) Oral daily PRN Constipation  melatonin 3 milliGRAM(s) Oral at bedtime PRN Insomnia  ondansetron Injectable 4 milliGRAM(s) IV Push every 8 hours PRN Nausea and/or Vomiting  oxyCODONE    IR 2.5 milliGRAM(s) Oral every 4 hours PRN Moderate Pain (4 - 6)

## 2024-11-15 NOTE — DIETITIAN INITIAL EVALUATION ADULT - PERSON TAUGHT/METHOD
Provided recommendations to optimize PO and protein intake, recommended small frequent meals by ordering nutrient-dense snacks and leaving non-perishable food away from tray for later consumption during the day or between meals, to start with protein, and sips of supplement throughout the day; reviewed foods with protein and menu order procedures in hospital./verbal instruction/daughter instructed

## 2024-11-15 NOTE — PROGRESS NOTE ADULT - SUBJECTIVE AND OBJECTIVE BOX
[facility] Division of Hospital Medicine  Genoveva Dhaliwal MD  Available via MS Teams      SUBJECTIVE / OVERNIGHT EVENTS: No acute events overnight. Pt more tired today after oxy 5     ADDITIONAL REVIEW OF SYSTEMS: ROS negative     MEDICATIONS  (STANDING):  enoxaparin Injectable 40 milliGRAM(s) SubCutaneous every 24 hours  furosemide   Injectable 20 milliGRAM(s) IV Push daily  gabapentin 100 milliGRAM(s) Oral daily  influenza  Vaccine (HIGH DOSE) 0.5 milliLiter(s) IntraMuscular once  lidocaine   4% Patch 1 Patch Transdermal daily  liothyronine 5 MICROGram(s) Oral daily  lisinopril 10 milliGRAM(s) Oral daily  naloxone Injectable 0.4 milliGRAM(s) IV Push once  polyethylene glycol 3350 17 Gram(s) Oral daily  senna 2 Tablet(s) Oral at bedtime    MEDICATIONS  (PRN):  acetaminophen     Tablet .. 650 milliGRAM(s) Oral every 6 hours PRN Temp greater or equal to 38C (100.4F), Mild Pain (1 - 3)  aluminum hydroxide/magnesium hydroxide/simethicone Suspension 30 milliLiter(s) Oral every 4 hours PRN Dyspepsia  bisacodyl 5 milliGRAM(s) Oral daily PRN Constipation  melatonin 3 milliGRAM(s) Oral at bedtime PRN Insomnia  ondansetron Injectable 4 milliGRAM(s) IV Push every 8 hours PRN Nausea and/or Vomiting  oxyCODONE    IR 2.5 milliGRAM(s) Oral every 4 hours PRN Moderate Pain (4 - 6)      I&O's Summary    14 Nov 2024 07:01  -  15 Nov 2024 07:00  --------------------------------------------------------  IN: 50 mL / OUT: 700 mL / NET: -650 mL        PHYSICAL EXAM:  Vital Signs Last 24 Hrs  T(C): 36.3 (15 Nov 2024 11:45), Max: 37.1 (15 Nov 2024 08:45)  T(F): 97.4 (15 Nov 2024 11:45), Max: 98.7 (15 Nov 2024 08:45)  HR: 86 (15 Nov 2024 11:45) (72 - 91)  BP: 120/81 (15 Nov 2024 11:46) (87/67 - 168/87)  BP(mean): --  RR: 18 (15 Nov 2024 11:45) (18 - 18)  SpO2: 97% (15 Nov 2024 11:45) (95% - 100%)    Parameters below as of 15 Nov 2024 11:45  Patient On (Oxygen Delivery Method): room air      CONSTITUTIONAL: NAD  ENMT: Moist oral mucosa  NECK: Supple  RESPIRATORY: Normal respiratory effort; lungs are clear to auscultation bilaterally  CARDIOVASCULAR: Regular rate and rhythm, normal S1 and S2  ABDOMEN: Nontender to palpation, normoactive bowel sounds, no rebound/guarding; No hepatosplenomegaly  MUSCULOSKELETAL:  Normal gait  PSYCH: A+O to person, place, and time  SKIN: No rashes      LABS:                        12.9   9.74  )-----------( 243      ( 15 Nov 2024 10:02 )             38.8     11-15    134[L]  |  97  |  37[H]  ----------------------------<  159[H]  4.3   |  19[L]  |  1.14    Ca    10.1      15 Nov 2024 10:02  Phos  3.0     11-15  Mg     2.1     11-15    TPro  7.6  /  Alb  4.1  /  TBili  0.9  /  DBili  x   /  AST  43[H]  /  ALT  24  /  AlkPhos  104  11-14    PT/INR - ( 14 Nov 2024 09:33 )   PT: 11.6 sec;   INR: 1.02 ratio         PTT - ( 14 Nov 2024 09:33 )  PTT:34.8 sec      Urinalysis Basic - ( 15 Nov 2024 10:02 )    Color: x / Appearance: x / SG: x / pH: x  Gluc: 159 mg/dL / Ketone: x  / Bili: x / Urobili: x   Blood: x / Protein: x / Nitrite: x   Leuk Esterase: x / RBC: x / WBC x   Sq Epi: x / Non Sq Epi: x / Bacteria: x            RADIOLOGY & ADDITIONAL TESTS:  New Imaging Personally Reviewed Today:  New Electrocardiogram Personally Reviewed Today:  Other Results Reviewed Today:   Prior or Outpatient Records Reviewed Today with Summary:    COORDINATION OF CARE:  Consultant Communication and Details of Discussion (where applicable):

## 2024-11-15 NOTE — PHYSICAL THERAPY INITIAL EVALUATION ADULT - TRANSFER TRAINING, PT EVAL
GOAL: Patient will perform sit to stand transfers with Perico at rolling walker with proper hand placement in 2 weeks

## 2024-11-15 NOTE — SWALLOW BEDSIDE ASSESSMENT ADULT - ASR SWALLOW DENTITION
Upper dentures; lower partials though patient does not consistently wear them while eating/incomplete

## 2024-11-15 NOTE — SWALLOW BEDSIDE ASSESSMENT ADULT - COMMENTS
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   xrays have thus far shown rotator cuff arthropathy + severe oa. Ortho consulted -> No acute orthopedic surgical intervention at this time    Speech/swallow hx: Pt is new to this service

## 2024-11-15 NOTE — DIETITIAN INITIAL EVALUATION ADULT - NSFNSNUTRCHEWSWALLOWFT_GEN_A_CORE
s/p Swallow Bedside Assessment today with speech language pathologist recommendations for continuing soft and bite sized diet with thin liquids.  Pt's daughter notes patient has dentures.

## 2024-11-15 NOTE — PHYSICAL THERAPY INITIAL EVALUATION ADULT - ACTIVE RANGE OF MOTION EXAMINATION, REHAB EVAL
Except R shoulder ROM limited 2/2 pain/bilateral upper extremity Active ROM was WFL (within functional limits)/bilateral  lower extremity Active ROM was WFL (within functional limits)

## 2024-11-15 NOTE — SWALLOW BEDSIDE ASSESSMENT ADULT - SWALLOW EVAL: DIAGNOSIS
89yo f w pmh notable for mci/dementia, p/w sob/ku, persistent shoulder pain, found to be in ahrf req ~3 lpm via nc; suspect 2/2 mild adhf, also with torn rotator cuff. Pt presents with a mild oral dysphagia, with prolonged mastication & delayed oral transit with solids, likely i/s/o incomplete dentition +/- component of presbyphagia. No signs of aspiration or evidence of a pharyngeal dysphagia across administered textures. Appears safe to continue on current diet, which is pt's baseline as per daughter.

## 2024-11-15 NOTE — DIETITIAN INITIAL EVALUATION ADULT - PROBLEM SELECTOR PLAN 3
h/o mci/dementia, severe oa  a+o x1-2 at baseline  maintain fall + frac, delirium, aspiration precautions; keep head end of bed elevated  nutrition consult  dysphagia screen/slp eval  pt/ot eval + sw/cm consult for disposition  goc and advanced directives conversation

## 2024-11-15 NOTE — PROGRESS NOTE ADULT - PROBLEM SELECTOR PLAN 3
h/o mci/dementia, severe oa  a+o x1-2 at baseline  maintain fall + frac, delirium, aspiration precautions; keep head end of bed elevated  nutrition consult  dysphagia screen/ slp eval  pt/ot eval + sw/cm consult for disposition  goc and advanced directives conversation as above

## 2024-11-15 NOTE — PHYSICAL THERAPY INITIAL EVALUATION ADULT - ADDITIONAL COMMENTS
Pt resides in an apartment with her daughter. PTA, pt ambulated household distances with RW and required assistance for ADLs. Pt has HHA 4hrs/3days. Pt owns RW, wheelchair, commode

## 2024-11-15 NOTE — DIETITIAN INITIAL EVALUATION ADULT - DIET TYPE
Ensure Plus High Protein x 1/day (provides 350 kcal, 20 g protein)/Defer texture/consistency to speech language pathologist/medical team./regular/supplement (specify)

## 2024-11-15 NOTE — SWALLOW BEDSIDE ASSESSMENT ADULT - H & P REVIEW
89yo f w pmh mci/dementia, htn, hld, hfpef, hypothyroidism, severe oa, hcv s/p treatment, vte (dvt), p/w sob/ku, persistent shoulder pain, inability to perform adls; patient initially with right shoulder pain on 11/8, came to er, and was discharged home with outpatient follow up; right shoulder pain persisted and so patient returned to [facility] er on 11/10 and was hospitalized 11/10-11/12; imaging showed severe oa, pain managed w oxycodone, and patient discharged home with home pt. since arriving home, after recent hospitalization, daughter has found that patient now requires more assistance, w she herself covering the other 12 hours hha is unavailable for (at baseline, patient requires assistance with adls, has 12 hour hha at home). in addition, shoulder pain persists and daughter noticed patient appearing short of breath; she mentions that she has not been giving patient her home lasix for a while now as it causes patient to pass too much urine, and makes it difficult to care for patient as she is totally dependent ; she grew concerned, so had patient brought back to [facility] er for further evaluation./yes

## 2024-11-15 NOTE — DIETITIAN INITIAL EVALUATION ADULT - LAB (SPECIFY)
electrolytes  Picato Counseling:  I discussed with the patient the risks of Picato including but not limited to erythema, scaling, itching, weeping, crusting, and pain.

## 2024-11-15 NOTE — SWALLOW BEDSIDE ASSESSMENT ADULT - SLP GENERAL OBSERVATIONS
Pt encountered in bed, awake, intermittently closing eyes, on room air, Ox1. Not consistently following directives. Vocal quality WFL.

## 2024-11-15 NOTE — DIETITIAN INITIAL EVALUATION ADULT - PROBLEM SELECTOR PLAN 4
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 However, septic arthritis of the right hip joint space cannot be excluded and is also a differential consideration. If there is clinical concern for septic arthritis of the right hip joint space, right hip joints aspiration should be performed....Mild subchondral collapse of the right femoral head, fairly similar to the prior radiographs dated 4/24/2024.  afebrile, no leukocytosis, hds  analgesics as needed   consider ortho consult in am    => Partially imaged indeterminate hypoattenuating lesion within the inferior pole of the left kidney. Renal neoplasm is one of the differential considerations. Recommend further evaluation with renal ultrasound to rule out neoplastic etiologies.  consider renal us  outpatient follow up    =>Indeterminate complex mixed attenuating lesion within the region of the left ovary/adnexa. Recommend further evaluation with pelvic ultrasound or pelvic MRI without and with contrast to rule out an underlying neoplasm in this region.  consider tvus/taus  outpatient follow up

## 2024-11-15 NOTE — DIETITIAN INITIAL EVALUATION ADULT - ORAL INTAKE PTA/DIET HISTORY
Per daughter pt's appetite and intake has decreased with age. States patient has becoming a more picky eater. Reports no dietary restrictions followed PTA. Confirms no known food allergies. Patient drinks Boost 1x/day at night PTA.

## 2024-11-15 NOTE — DIETITIAN INITIAL EVALUATION ADULT - OTHER CALCULATIONS
Defer fluid needs to team.  Estimated nutrient needs based on current daily weight 156lb, with consideration for acute diastolic heart failure, age

## 2024-11-15 NOTE — DIETITIAN INITIAL EVALUATION ADULT - ADD RECOMMEND
-Continue diet free of therapeutic restriction, diet texture per SLP/team.  -Add Ensure Plus High Protein x 1/day (provides 350 kcal, 20 g protein).  -Monitor PO intake, diet, weight, labs, skin, GI symptoms, and BM regularity.  -RD remains available upon request and will follow up per protocol.  -Monitor for malnutrition. -Continue diet free of therapeutic restriction, diet texture per SLP/team.  -Add Ensure Plus High Protein x 1/day (provides 350 kcal, 20 g protein).  -RD to add High Protein Apple Juice (provides ~15 g PRO, ~100 kcal per serving) and High Protein Gelatin 1x/day (18 Gm protein).  -Monitor PO intake, diet, weight, labs, skin, GI symptoms, and BM regularity.  -RD remains available upon request and will follow up per protocol.  -Monitor for malnutrition.

## 2024-11-15 NOTE — DIETITIAN INITIAL EVALUATION ADULT - PERTINENT LABORATORY DATA
11-15    134[L]  |  97  |  37[H]  ----------------------------<  159[H]  4.3   |  19[L]  |  1.14    Ca    10.1      15 Nov 2024 10:02  Phos  3.0     11-15  Mg     2.1     11-15    TPro  7.6  /  Alb  4.1  /  TBili  0.9  /  DBili  x   /  AST  43[H]  /  ALT  24  /  AlkPhos  104  11-14  A1C with Estimated Average Glucose Result: 5.4 % (11-14-24 @ 09:33)    11/14  Cholesterol 222 mg/dL <H>  Triglycerides 45 mg/dL  HDL Cholesterol 76 mg/dL  Non HDL Cholesterol 146 mg/dL <H>  LDL Cholesterol 158 mg/dL <H>

## 2024-11-15 NOTE — PROGRESS NOTE ADULT - SUBJECTIVE AND OBJECTIVE BOX
DATE OF SERVICE: 11-15-24 @ 12:26    Patient is a 89y old  Female who presents with a chief complaint of sob/ku, persistent shoulder pain, inability to perform adls (14 Nov 2024 18:09)      INTERVAL HISTORY:     REVIEW OF SYSTEMS:  CONSTITUTIONAL: No weakness  EYES/ENT: No visual changes;  No throat pain   NECK: No pain or stiffness  RESPIRATORY: No cough, wheezing; No shortness of breath  CARDIOVASCULAR: No chest pain or palpitations  GASTROINTESTINAL: No abdominal  pain. No nausea, vomiting, or hematemesis  GENITOURINARY: No dysuria, frequency or hematuria  NEUROLOGICAL: No stroke like symptoms  SKIN: No rashes    TELEMETRY Personally reviewed:  	  MEDICATIONS:  furosemide   Injectable 20 milliGRAM(s) IV Push daily  lisinopril 10 milliGRAM(s) Oral daily        PHYSICAL EXAM:  T(C): 36.3 (11-15-24 @ 11:45), Max: 37.1 (11-15-24 @ 08:45)  HR: 86 (11-15-24 @ 11:45) (72 - 86)  BP: 120/81 (11-15-24 @ 11:46) (87/67 - 168/87)  RR: 18 (11-15-24 @ 11:45) (18 - 18)  SpO2: 97% (11-15-24 @ 11:45) (95% - 100%)  Wt(kg): --  I&O's Summary    14 Nov 2024 07:01  -  15 Nov 2024 07:00  --------------------------------------------------------  IN: 50 mL / OUT: 700 mL / NET: -650 mL          Appearance: In no distress	  HEENT:    PERRL, EOMI	  Cardiovascular:  S1 S2, No JVD  Respiratory: Lungs clear to auscultation	  Gastrointestinal:  Soft, Non-tender, + BS	  Vascularature:  No edema of LE  Psychiatric: Appropriate affect   Neuro: no acute focal deficits                               12.9   9.74  )-----------( 243      ( 15 Nov 2024 10:02 )             38.8     11-15    134[L]  |  97  |  37[H]  ----------------------------<  159[H]  4.3   |  19[L]  |  1.14    Ca    10.1      15 Nov 2024 10:02  Phos  3.0     11-15  Mg     2.1     11-15    TPro  7.6  /  Alb  4.1  /  TBili  0.9  /  DBili  x   /  AST  43[H]  /  ALT  24  /  AlkPhos  104  11-14        Labs personally reviewed    TTE 11/15 CONCLUSIONS:      1. Left ventricular cavity is small. Left ventricular systolic function is hyperdynamic with an ejection fraction of 69 % by Feliz's method of disks. There are no regional wall motion abnormalities seen.   2. The left ventricular diastolic function is indeterminate, with elevated left ventricular filling pressure.   3. Probably normal right ventricular systolic function.   4. Left atrium is normal in size.   5. No pericardial effusion seen.   6. Ascending aorta is dilated, measuring 3.90 cm (indexed 2.16 cm/m²).   7. Compared to the transthoracic echocardiogram performed on 9/21/2021, there have been no significant interval changes.    ASSESSMENT/PLAN: 	    89yo f w pmh mci/dementia, htn, hld, hfpef, hypothyroidism, severe oa, hcv s/p treatment, vte (dvt), p/w sob/ku, persistent shoulder pain, inability to perform adls; in er, found to be in ahrf req ~3 lpm via nc; 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    Follows with Julissa Tejada/Heladio. I discussed with Dr Leija    Problem/Plan #1:  Problem: Shortness of Breath  - ECG SR 1st AVB bifascicular block  - Trop 53 --> 50  -   - CXR with clear lungs  - CT neg for PE but suggestive of intersitial edema and pHTN  - TTE 11/15- EF 69%, no WMA, elevated LV filling pressure  - c/w lasix 20mg IV daily  - Appears close to euvolemic will likely switch to PO diuretics    Problem/Plan #2:  Problem: Hypertension  - c/w lisinopril 10mg PO daily    Problem/Plan #3:  Problem: HLD  - not currently on any statin or anti-lipid medication    Problem/Plan #4:  Problem: DVT PPx  - c/w SQ lovenox          Camryn Mashall, PA-C  Gato Javdan, DO Located within [facility]  Cardiovascular Medicine  [address] [place] [address], [address]  Available through call or text on [employer]  Office: [phone]     DATE OF SERVICE: 11-15-24 @ 12:26    Patient is a 89y old  Female who presents with a chief complaint of sob/ku, persistent shoulder pain, inability to perform adls (14 Nov 2024 18:09)      INTERVAL HISTORY:     REVIEW OF SYSTEMS:  CONSTITUTIONAL: No weakness  EYES/ENT: No visual changes;  No throat pain   NECK: No pain or stiffness  RESPIRATORY: No cough, wheezing; No shortness of breath  CARDIOVASCULAR: No chest pain or palpitations  GASTROINTESTINAL: No abdominal  pain. No nausea, vomiting, or hematemesis  GENITOURINARY: No dysuria, frequency or hematuria  NEUROLOGICAL: No stroke like symptoms  SKIN: No rashes    TELEMETRY Personally reviewed:  	  MEDICATIONS:  furosemide   Injectable 20 milliGRAM(s) IV Push daily  lisinopril 10 milliGRAM(s) Oral daily        PHYSICAL EXAM:  T(C): 36.3 (11-15-24 @ 11:45), Max: 37.1 (11-15-24 @ 08:45)  HR: 86 (11-15-24 @ 11:45) (72 - 86)  BP: 120/81 (11-15-24 @ 11:46) (87/67 - 168/87)  RR: 18 (11-15-24 @ 11:45) (18 - 18)  SpO2: 97% (11-15-24 @ 11:45) (95% - 100%)  Wt(kg): --  I&O's Summary    14 Nov 2024 07:01  -  15 Nov 2024 07:00  --------------------------------------------------------  IN: 50 mL / OUT: 700 mL / NET: -650 mL          Appearance: In no distress	  HEENT:    PERRL, EOMI	  Cardiovascular:  S1 S2, No JVD  Respiratory: Lungs clear to auscultation	  Gastrointestinal:  Soft, Non-tender, + BS	  Vascularature:  No edema of LE  Psychiatric: Appropriate affect   Neuro: no acute focal deficits                               12.9   9.74  )-----------( 243      ( 15 Nov 2024 10:02 )             38.8     11-15    134[L]  |  97  |  37[H]  ----------------------------<  159[H]  4.3   |  19[L]  |  1.14    Ca    10.1      15 Nov 2024 10:02  Phos  3.0     11-15  Mg     2.1     11-15    TPro  7.6  /  Alb  4.1  /  TBili  0.9  /  DBili  x   /  AST  43[H]  /  ALT  24  /  AlkPhos  104  11-14        Labs personally reviewed    TTE 11/15 CONCLUSIONS:      1. Left ventricular cavity is small. Left ventricular systolic function is hyperdynamic with an ejection fraction of 69 % by Feliz's method of disks. There are no regional wall motion abnormalities seen.   2. The left ventricular diastolic function is indeterminate, with elevated left ventricular filling pressure.   3. Probably normal right ventricular systolic function.   4. Left atrium is normal in size.   5. No pericardial effusion seen.   6. Ascending aorta is dilated, measuring 3.90 cm (indexed 2.16 cm/m²).   7. Compared to the transthoracic echocardiogram performed on 9/21/2021, there have been no significant interval changes.          ASSESSMENT/PLAN: 	    89yo f w pmh mci/dementia, htn, hld, hfpef, hypothyroidism, severe oa, hcv s/p treatment, vte (dvt), p/w sob/ku, persistent shoulder pain, inability to perform adls; in er, found to be in ahrf req ~3 lpm via nc; 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    Follows with Julissa Tejada/Hel[facility]io. I discussed with Dr Leija    Problem/Plan #1:  Problem: Shortness of Breath  - ECG SR 1st AVB bifascicular block  -   - CXR with clear lungs  - CT neg for PE but suggestive of intersitial edema and pHTN  - TTE 11/15- EF 69%, no WMA, elevated LV filling pressure  - s/p lasix 20mg IV daily  - Appears close to euvolemic will switch to Lasix 20mg PO qd    Problem/Plan #2:  Problem: Hypertension  - c/w lisinopril 10mg PO daily    Problem/Plan #3:  Problem: HLD  - not currently on any statin or anti-lipid medication    Problem/Plan #4:  Problem: DVT PPx  - c/w SQ lovenox          THONG Barksdale DO Merged with [facility]  Cardiovascular Medicine  [address], [address]  Available through call or text on [employer] TE[employer]  Office: [phone]

## 2024-11-16 DIAGNOSIS — N39.0 URINARY TRACT INFECTION, SITE NOT SPECIFIED: ICD-10-CM

## 2024-11-16 LAB
ANION GAP SERPL CALC-SCNC: 16 MMOL/L — SIGNIFICANT CHANGE UP (ref 5–17)
ANION GAP SERPL CALC-SCNC: 31 MMOL/L — HIGH (ref 5–17)
BUN SERPL-MCNC: 39 MG/DL — HIGH (ref 7–23)
BUN SERPL-MCNC: 47 MG/DL — HIGH (ref 7–23)
CALCIUM SERPL-MCNC: 10.3 MG/DL — SIGNIFICANT CHANGE UP (ref 8.4–10.5)
CALCIUM SERPL-MCNC: 7.5 MG/DL — LOW (ref 8.4–10.5)
CHLORIDE SERPL-SCNC: 74 MMOL/L — LOW (ref 96–108)
CHLORIDE SERPL-SCNC: 97 MMOL/L — SIGNIFICANT CHANGE UP (ref 96–108)
CO2 SERPL-SCNC: 15 MMOL/L — LOW (ref 22–31)
CO2 SERPL-SCNC: 20 MMOL/L — LOW (ref 22–31)
CREAT SERPL-MCNC: 0.87 MG/DL — SIGNIFICANT CHANGE UP (ref 0.5–1.3)
CREAT SERPL-MCNC: 1.09 MG/DL — SIGNIFICANT CHANGE UP (ref 0.5–1.3)
EGFR: 45 ML/MIN/1.73M2 — LOW
EGFR: 59 ML/MIN/1.73M2 — LOW
ERYTHROCYTE [SEDIMENTATION RATE] IN BLOOD: 45 MM/HR — HIGH (ref 0–20)
GLUCOSE SERPL-MCNC: 139 MG/DL — HIGH (ref 70–99)
GLUCOSE SERPL-MCNC: 924 MG/DL — CRITICAL HIGH (ref 70–99)
MAGNESIUM SERPL-MCNC: 1.8 MG/DL — SIGNIFICANT CHANGE UP (ref 1.6–2.6)
PHOSPHATE SERPL-MCNC: 5.2 MG/DL — HIGH (ref 2.5–4.5)
POTASSIUM SERPL-MCNC: 3.6 MMOL/L — SIGNIFICANT CHANGE UP (ref 3.5–5.3)
POTASSIUM SERPL-MCNC: 5.1 MMOL/L — SIGNIFICANT CHANGE UP (ref 3.5–5.3)
POTASSIUM SERPL-SCNC: 3.6 MMOL/L — SIGNIFICANT CHANGE UP (ref 3.5–5.3)
POTASSIUM SERPL-SCNC: 5.1 MMOL/L — SIGNIFICANT CHANGE UP (ref 3.5–5.3)
SODIUM SERPL-SCNC: 120 MMOL/L — CRITICAL LOW (ref 135–145)
SODIUM SERPL-SCNC: 133 MMOL/L — LOW (ref 135–145)

## 2024-11-16 PROCEDURE — 99233 SBSQ HOSP IP/OBS HIGH 50: CPT

## 2024-11-16 RX ORDER — FUROSEMIDE 40 MG/1
20 TABLET ORAL
Refills: 0 | Status: DISCONTINUED | OUTPATIENT
Start: 2024-11-16 | End: 2024-11-18

## 2024-11-16 RX ADMIN — GABAPENTIN 100 MILLIGRAM(S): 300 CAPSULE ORAL at 10:06

## 2024-11-16 RX ADMIN — OXYCODONE HYDROCHLORIDE 2.5 MILLIGRAM(S): 30 TABLET ORAL at 16:45

## 2024-11-16 RX ADMIN — LIDOCAINE 1 PATCH: 40 CREAM TOPICAL at 19:25

## 2024-11-16 RX ADMIN — Medication 100 MILLIGRAM(S): at 06:04

## 2024-11-16 RX ADMIN — FUROSEMIDE 20 MILLIGRAM(S): 40 TABLET ORAL at 06:05

## 2024-11-16 RX ADMIN — ACETAMINOPHEN 500MG 650 MILLIGRAM(S): 500 TABLET, COATED ORAL at 01:20

## 2024-11-16 RX ADMIN — OXYCODONE HYDROCHLORIDE 2.5 MILLIGRAM(S): 30 TABLET ORAL at 04:30

## 2024-11-16 RX ADMIN — LIDOCAINE 1 PATCH: 40 CREAM TOPICAL at 10:07

## 2024-11-16 RX ADMIN — ENOXAPARIN SODIUM 40 MILLIGRAM(S): 30 INJECTION SUBCUTANEOUS at 06:04

## 2024-11-16 RX ADMIN — OXYCODONE HYDROCHLORIDE 2.5 MILLIGRAM(S): 30 TABLET ORAL at 03:30

## 2024-11-16 RX ADMIN — POLYETHYLENE GLYCOL 3350 17 GRAM(S): 17 POWDER, FOR SOLUTION ORAL at 10:06

## 2024-11-16 RX ADMIN — LIDOCAINE 1 PATCH: 40 CREAM TOPICAL at 22:00

## 2024-11-16 RX ADMIN — ACETAMINOPHEN 500MG 650 MILLIGRAM(S): 500 TABLET, COATED ORAL at 00:03

## 2024-11-16 RX ADMIN — LISINOPRIL 10 MILLIGRAM(S): 20 TABLET ORAL at 06:05

## 2024-11-16 RX ADMIN — Medication 5 MICROGRAM(S): at 06:05

## 2024-11-16 RX ADMIN — Medication 2 TABLET(S): at 21:18

## 2024-11-16 NOTE — PROGRESS NOTE ADULT - PROBLEM SELECTOR PLAN 3
Started on CTX 11/16-  Follow up urine Cx for sensitivity Started on CTX 11/16-  Follow up urine Cx for sensitivity  ESR elevated

## 2024-11-16 NOTE — PROGRESS NOTE ADULT - PROBLEM SELECTOR PLAN 1
h/o hfpef  in no respiratory distress with adequate spo2 on 3 LPM via NC  exam w decreased breath sounds + crackles on auscultation on admission, improved   ct imaging shows interstitial edema  bnp 189  otherwise, no clinft of acs, ekg w no st seg - t wave changes suggestive of acute ischemia, trop 53->50 likely demand/decreased clearance  suspect mild adhf; of note, patient's daughter has not been giving patient her lasix dose for a while now as it causes patient to pass too much urine, and makes it difficult to care for patient as she is totally dependent   TTE no changes compared to previous   Monitor SpO2, RR, for signs of respiratory distress; Goal SpO2 >88-92%, PaO2 >55-60 mmHg  trend volume status via I/Os, daily weights   gentle diuresis w lasix 20 ivp qd; adjust to maintain goal net negative fluid balance- switched to PO- monitor Mg P K  Willhold lisinopril as K is trending up, monitor BP  Cardiology consulted appreciate recs

## 2024-11-16 NOTE — PROGRESS NOTE ADULT - SUBJECTIVE AND OBJECTIVE BOX
DATE OF SERVICE: 11-16-24 @ 16:05    Patient is a 89y old  Female who presents with a chief complaint of sob/ku, persistent shoulder pain, inability to perform adls (16 Nov 2024 12:54)      INTERVAL HISTORY: feels okay, daughter at bedside    REVIEW OF SYSTEMS:  CONSTITUTIONAL: No weakness  EYES/ENT: No visual changes;  No throat pain   NECK: No pain or stiffness  RESPIRATORY: No cough, wheezing; No shortness of breath  CARDIOVASCULAR: No chest pain or palpitations  GASTROINTESTINAL: No abdominal  pain. No nausea, vomiting, or hematemesis  GENITOURINARY: No dysuria, frequency or hematuria  NEUROLOGICAL: No stroke like symptoms  SKIN: No rashes    	  MEDICATIONS:  furosemide    Tablet 20 milliGRAM(s) Oral daily        PHYSICAL EXAM:  T(C): 36.6 (11-16-24 @ 13:00), Max: 37.2 (11-16-24 @ 01:15)  HR: 79 (11-16-24 @ 13:00) (71 - 83)  BP: 132/83 (11-16-24 @ 13:00) (116/71 - 149/82)  RR: 18 (11-16-24 @ 13:00) (18 - 18)  SpO2: 98% (11-16-24 @ 13:00) (96% - 99%)  Wt(kg): --  I&O's Summary    15 Nov 2024 07:01  -  16 Nov 2024 07:00  --------------------------------------------------------  IN: 0 mL / OUT: 925 mL / NET: -925 mL          Appearance: In no distress	  HEENT:    PERRL, EOMI	  Cardiovascular:  S1 S2, No JVD  Respiratory: Lungs clear to auscultation	  Gastrointestinal:  Soft, Non-tender, + BS	  Vascularature:  No edema of LE  Psychiatric: Appropriate affect   Neuro: no acute focal deficits                               12.9   9.74  )-----------( 243      ( 15 Nov 2024 10:02 )             38.8     11-16    133[L]  |  97  |  47[H]  ----------------------------<  139[H]  5.1   |  20[L]  |  1.09    Ca    10.3      16 Nov 2024 08:52  Phos  5.2     11-16  Mg     1.8     11-16          Labs personally reviewed      ASSESSMENT/PLAN: 	    89yo f w pmh mci/dementia, htn, hld, hfpef, hypothyroidism, severe oa, hcv s/p treatment, vte (dvt), p/w sob/ku, persistent shoulder pain, inability to perform adls; in er, found to be in ahrf req ~3 lpm via nc; 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    Follows with Julissa Tejada/Heladio. I discussed with Dr Leija    Problem/Plan #1:  Problem: Shortness of Breath  - ECG SR 1st AVB bifascicular block  -   - CXR with clear lungs  - CT neg for PE but suggestive of intersitial edema and pHTN  - TTE 11/15- EF 69%, no WMA, elevated LV filling pressure  - s/p lasix 20mg IV daily  - Appears close to euvolemic will switch to Lasix 20mg PO qd    Problem/Plan #2:  Problem: Hypertension  - c/w lisinopril 10mg PO daily    Problem/Plan #3:  Problem: HLD  - not currently on any statin or anti-lipid medication    Problem/Plan #4:  Problem: DVT PPx  - c/w SQ lovenox        Iolani Behkirstie, AG-NP   Gato Gomez DO [facility]  Cardiovascular Medicine  [address] [place] [address], [place] [address]  Available through call or text on [employer] TEAM[employer]  Office: [phone]   DATE OF SERVICE: 11-16-24 @ 16:05    Patient is a 89y old  Female who presents with a chief complaint of sob/ku, persistent shoulder pain, inability to perform adls (16 Nov 2024 12:54)      INTERVAL HISTORY: feels okay, c/o some SOB. daughter at bedside    REVIEW OF SYSTEMS:  CONSTITUTIONAL: No weakness  EYES/ENT: No visual changes;  No throat pain   NECK: No pain or stiffness  RESPIRATORY: No cough, wheezing; No shortness of breath  CARDIOVASCULAR: No chest pain or palpitations  GASTROINTESTINAL: No abdominal  pain. No nausea, vomiting, or hematemesis  GENITOURINARY: No dysuria, frequency or hematuria  NEUROLOGICAL: No stroke like symptoms  SKIN: No rashes    	  MEDICATIONS:  furosemide    Tablet 20 milliGRAM(s) Oral daily        PHYSICAL EXAM:  T(C): 36.6 (11-16-24 @ 13:00), Max: 37.2 (11-16-24 @ 01:15)  HR: 79 (11-16-24 @ 13:00) (71 - 83)  BP: 132/83 (11-16-24 @ 13:00) (116/71 - 149/82)  RR: 18 (11-16-24 @ 13:00) (18 - 18)  SpO2: 98% (11-16-24 @ 13:00) (96% - 99%)  Wt(kg): --  I&O's Summary    15 Nov 2024 07:01  -  16 Nov 2024 07:00  --------------------------------------------------------  IN: 0 mL / OUT: 925 mL / NET: -925 mL          Appearance: In no distress	  HEENT:    PERRL, EOMI	  Cardiovascular:  S1 S2, No JVD  Respiratory: Lungs clear to auscultation	  Gastrointestinal:  Soft, Non-tender, + BS	  Vascularature:  No edema of LE  Psychiatric: Appropriate affect   Neuro: no acute focal deficits                               12.9   9.74  )-----------( 243      ( 15 Nov 2024 10:02 )             38.8     11-16    133[L]  |  97  |  47[H]  ----------------------------<  139[H]  5.1   |  20[L]  |  1.09    Ca    10.3      16 Nov 2024 08:52  Phos  5.2     11-16  Mg     1.8     11-16          Labs personally reviewed      ASSESSMENT/PLAN: 	    89yo f w pmh mci/dementia, htn, hld, hfpef, hypothyroidism, severe oa, hcv s/p treatment, vte (dvt), p/w sob/ku, persistent shoulder pain, inability to perform adls; in er, found to be in ahrf req ~3 lpm via nc; 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    Follows with Julissa Tejada/Heladio. I discussed with Dr Leija    Problem/Plan #1:  Problem: Shortness of Breath  - ECG SR 1st AVB bifascicular block  -   - CXR with clear lungs  - CT neg for PE but suggestive of intersitial edema and pHTN  - TTE 11/15- EF 69%, no WMA, elevated LV filling pressure  - s/p lasix 20mg IV daily  - Appears close to euvolemic will switch to Lasix 20mg PO qd  - 11/16 pt with increased SOB, restart IVP lasix 20mg bid    Problem/Plan #2:  Problem: Hypertension  - c/w lisinopril 10mg PO daily    Problem/Plan #3:  Problem: HLD  - not currently on any statin or anti-lipid medication    Problem/Plan #4:  Problem: DVT PPx  - c/w SQ lovenox        Iolani Behrbom, AG-NP   Gato Gomez DO [facility]  Cardiovascular Medicine  [address] [place] [address], [address]  Available through call or text on [employer] TEAMs  Office: [phone]

## 2024-11-16 NOTE — PROGRESS NOTE ADULT - SUBJECTIVE AND OBJECTIVE BOX
[facility] Division of Hospital Medicine  Genoveva Dhaliwal MD  Available via MS Teams      SUBJECTIVE / OVERNIGHT EVENTS: Pt started on CTX overnight for UTI    ADDITIONAL REVIEW OF SYSTEMS: ROS otherwise negative     MEDICATIONS  (STANDING):  cefTRIAXone   IVPB 1000 milliGRAM(s) IV Intermittent every 24 hours  enoxaparin Injectable 40 milliGRAM(s) SubCutaneous every 24 hours  furosemide    Tablet 20 milliGRAM(s) Oral daily  gabapentin 100 milliGRAM(s) Oral daily  influenza  Vaccine (HIGH DOSE) 0.5 milliLiter(s) IntraMuscular once  lidocaine   4% Patch 1 Patch Transdermal daily  liothyronine 5 MICROGram(s) Oral daily  lisinopril 10 milliGRAM(s) Oral daily  naloxone Injectable 0.4 milliGRAM(s) IV Push once  polyethylene glycol 3350 17 Gram(s) Oral daily  senna 2 Tablet(s) Oral at bedtime    MEDICATIONS  (PRN):  acetaminophen     Tablet .. 650 milliGRAM(s) Oral every 6 hours PRN Temp greater or equal to 38C (100.4F), Mild Pain (1 - 3)  aluminum hydroxide/magnesium hydroxide/simethicone Suspension 30 milliLiter(s) Oral every 4 hours PRN Dyspepsia  bisacodyl 5 milliGRAM(s) Oral daily PRN Constipation  melatonin 3 milliGRAM(s) Oral at bedtime PRN Insomnia  ondansetron Injectable 4 milliGRAM(s) IV Push every 8 hours PRN Nausea and/or Vomiting  oxyCODONE    IR 2.5 milliGRAM(s) Oral every 4 hours PRN Moderate Pain (4 - 6)      I&O's Summary    15 Nov 2024 07:01  -  16 Nov 2024 07:00  --------------------------------------------------------  IN: 0 mL / OUT: 925 mL / NET: -925 mL        PHYSICAL EXAM:  Vital Signs Last 24 Hrs  T(C): 36.6 (16 Nov 2024 05:04), Max: 37.2 (16 Nov 2024 01:15)  T(F): 97.8 (16 Nov 2024 05:04), Max: 99 (16 Nov 2024 01:15)  HR: 74 (16 Nov 2024 05:04) (71 - 83)  BP: 116/71 (16 Nov 2024 05:04) (116/71 - 149/82)  BP(mean): --  RR: 18 (16 Nov 2024 05:04) (18 - 18)  SpO2: 99% (16 Nov 2024 05:04) (96% - 99%)    Parameters below as of 16 Nov 2024 05:04  Patient On (Oxygen Delivery Method): room air    CONSTITUTIONAL: NAD  ENMT: Moist oral mucosa  NECK: Supple  RESPIRATORY: Normal respiratory effort; lungs are clear to auscultation bilaterally  CARDIOVASCULAR: Regular rate and rhythm, normal S1 and S2  ABDOMEN: Nontender to palpation, normoactive bowel sounds, no rebound/guarding; No hepatosplenomegaly  MUSCULOSKELETAL:  Normal gait  PSYCH: A+O to person, place, and time  SKIN: No rashes    LABS:                        12.9   9.74  )-----------( 243      ( 15 Nov 2024 10:02 )             38.8     11-16    133[L]  |  97  |  47[H]  ----------------------------<  139[H]  5.1   |  20[L]  |  1.09    Ca    10.3      16 Nov 2024 08:52  Phos  5.2     11-16  Mg     1.8     11-16            Urinalysis Basic - ( 16 Nov 2024 08:52 )    Color: x / Appearance: x / SG: x / pH: x  Gluc: 139 mg/dL / Ketone: x  / Bili: x / Urobili: x   Blood: x / Protein: x / Nitrite: x   Leuk Esterase: x / RBC: x / WBC x   Sq Epi: x / Non Sq Epi: x / Bacteria: x        Culture - Urine (collected 14 Nov 2024 09:53)  Source: Clean Catch Clean Catch (Midstream)  Preliminary Report (15 Nov 2024 15:25):    >100,000 CFU/ml Escherichia coli    <10,000 CFU/ml Normal Urogenital christiane present          RADIOLOGY & ADDITIONAL TESTS:  New Imaging Personally Reviewed Today:  New Electrocardiogram Personally Reviewed Today:  Other Results Reviewed Today:   Prior or Outpatient Records Reviewed Today with Summary:    COORDINATION OF CARE:  Consultant Communication and Details of Discussion (where applicable):

## 2024-11-16 NOTE — PROGRESS NOTE ADULT - PROBLEM SELECTOR PLAN 7
DVT ppx Lovenox 40    11/16: Plan discussed with daughter Katty via phone DVT ppx Lovenox 40    11/16: Plan discussed with daughter Katty via phone    Mild hyponatremia - on lasix, trend

## 2024-11-17 LAB
-  AMOXICILLIN/CLAVULANIC ACID: SIGNIFICANT CHANGE UP
-  AMPICILLIN/SULBACTAM: SIGNIFICANT CHANGE UP
-  AMPICILLIN: SIGNIFICANT CHANGE UP
-  AZTREONAM: SIGNIFICANT CHANGE UP
-  CEFAZOLIN: SIGNIFICANT CHANGE UP
-  CEFEPIME: SIGNIFICANT CHANGE UP
-  CEFOXITIN: SIGNIFICANT CHANGE UP
-  CEFTRIAXONE: SIGNIFICANT CHANGE UP
-  CEFUROXIME: SIGNIFICANT CHANGE UP
-  CIPROFLOXACIN: SIGNIFICANT CHANGE UP
-  ERTAPENEM: SIGNIFICANT CHANGE UP
-  GENTAMICIN: SIGNIFICANT CHANGE UP
-  IMIPENEM: SIGNIFICANT CHANGE UP
-  LEVOFLOXACIN: SIGNIFICANT CHANGE UP
-  MEROPENEM: SIGNIFICANT CHANGE UP
-  NITROFURANTOIN: SIGNIFICANT CHANGE UP
-  PIPERACILLIN/TAZOBACTAM: SIGNIFICANT CHANGE UP
-  TOBRAMYCIN: SIGNIFICANT CHANGE UP
-  TRIMETHOPRIM/SULFAMETHOXAZOLE: SIGNIFICANT CHANGE UP
ADD ON TEST-SPECIMEN IN LAB: SIGNIFICANT CHANGE UP
ANION GAP SERPL CALC-SCNC: 19 MMOL/L — HIGH (ref 5–17)
BUN SERPL-MCNC: 39 MG/DL — HIGH (ref 7–23)
CALCIUM SERPL-MCNC: 10.7 MG/DL — HIGH (ref 8.4–10.5)
CHLORIDE SERPL-SCNC: 97 MMOL/L — SIGNIFICANT CHANGE UP (ref 96–108)
CO2 SERPL-SCNC: 19 MMOL/L — LOW (ref 22–31)
CREAT SERPL-MCNC: 0.9 MG/DL — SIGNIFICANT CHANGE UP (ref 0.5–1.3)
CULTURE RESULTS: ABNORMAL
EGFR: 57 ML/MIN/1.73M2 — LOW
GLUCOSE SERPL-MCNC: 127 MG/DL — HIGH (ref 70–99)
HCT VFR BLD CALC: 41 % — SIGNIFICANT CHANGE UP (ref 34.5–45)
HGB BLD-MCNC: 14 G/DL — SIGNIFICANT CHANGE UP (ref 11.5–15.5)
MCHC RBC-ENTMCNC: 31.7 PG — SIGNIFICANT CHANGE UP (ref 27–34)
MCHC RBC-ENTMCNC: 34.1 G/DL — SIGNIFICANT CHANGE UP (ref 32–36)
MCV RBC AUTO: 92.8 FL — SIGNIFICANT CHANGE UP (ref 80–100)
METHOD TYPE: SIGNIFICANT CHANGE UP
NRBC # BLD: 0 /100 WBCS — SIGNIFICANT CHANGE UP (ref 0–0)
NT-PROBNP SERPL-SCNC: 229 PG/ML — SIGNIFICANT CHANGE UP (ref 0–300)
ORGANISM # SPEC MICROSCOPIC CNT: ABNORMAL
ORGANISM # SPEC MICROSCOPIC CNT: ABNORMAL
PLATELET # BLD AUTO: 264 K/UL — SIGNIFICANT CHANGE UP (ref 150–400)
POTASSIUM SERPL-MCNC: 4.1 MMOL/L — SIGNIFICANT CHANGE UP (ref 3.5–5.3)
POTASSIUM SERPL-SCNC: 4.1 MMOL/L — SIGNIFICANT CHANGE UP (ref 3.5–5.3)
RBC # BLD: 4.42 M/UL — SIGNIFICANT CHANGE UP (ref 3.8–5.2)
RBC # FLD: 13.7 % — SIGNIFICANT CHANGE UP (ref 10.3–14.5)
SODIUM SERPL-SCNC: 135 MMOL/L — SIGNIFICANT CHANGE UP (ref 135–145)
SPECIMEN SOURCE: SIGNIFICANT CHANGE UP
WBC # BLD: 9.68 K/UL — SIGNIFICANT CHANGE UP (ref 3.8–10.5)
WBC # FLD AUTO: 9.68 K/UL — SIGNIFICANT CHANGE UP (ref 3.8–10.5)

## 2024-11-17 PROCEDURE — 99233 SBSQ HOSP IP/OBS HIGH 50: CPT

## 2024-11-17 RX ADMIN — OXYCODONE HYDROCHLORIDE 2.5 MILLIGRAM(S): 30 TABLET ORAL at 21:10

## 2024-11-17 RX ADMIN — Medication 100 MILLIGRAM(S): at 06:40

## 2024-11-17 RX ADMIN — GABAPENTIN 100 MILLIGRAM(S): 300 CAPSULE ORAL at 12:06

## 2024-11-17 RX ADMIN — Medication 5 MICROGRAM(S): at 06:27

## 2024-11-17 RX ADMIN — LIDOCAINE 1 PATCH: 40 CREAM TOPICAL at 20:24

## 2024-11-17 RX ADMIN — OXYCODONE HYDROCHLORIDE 2.5 MILLIGRAM(S): 30 TABLET ORAL at 06:30

## 2024-11-17 RX ADMIN — OXYCODONE HYDROCHLORIDE 2.5 MILLIGRAM(S): 30 TABLET ORAL at 00:13

## 2024-11-17 RX ADMIN — LIDOCAINE 1 PATCH: 40 CREAM TOPICAL at 12:06

## 2024-11-17 RX ADMIN — ENOXAPARIN SODIUM 40 MILLIGRAM(S): 30 INJECTION SUBCUTANEOUS at 06:32

## 2024-11-17 RX ADMIN — OXYCODONE HYDROCHLORIDE 2.5 MILLIGRAM(S): 30 TABLET ORAL at 20:10

## 2024-11-17 RX ADMIN — FUROSEMIDE 20 MILLIGRAM(S): 40 TABLET ORAL at 13:48

## 2024-11-17 RX ADMIN — Medication 2 TABLET(S): at 22:00

## 2024-11-17 RX ADMIN — FUROSEMIDE 20 MILLIGRAM(S): 40 TABLET ORAL at 06:31

## 2024-11-17 RX ADMIN — OXYCODONE HYDROCHLORIDE 2.5 MILLIGRAM(S): 30 TABLET ORAL at 01:13

## 2024-11-17 RX ADMIN — ACETAMINOPHEN, DIPHENHYDRAMINE HCL, PHENYLEPHRINE HCL 3 MILLIGRAM(S): 325; 25; 5 TABLET ORAL at 01:50

## 2024-11-17 NOTE — PROGRESS NOTE ADULT - PROBLEM SELECTOR PLAN 1
h/o hfpef  in no respiratory distress with adequate spo2 on 3 LPM via NC  exam w decreased breath sounds + crackles on auscultation on admission, improved   ct imaging shows interstitial edema  bnp 189  otherwise, no clinft of acs, ekg w no st seg - t wave changes suggestive of acute ischemia, trop 53->50 likely demand/decreased clearance  suspect mild adhf; of note, patient's daughter has not been giving patient her lasix dose for a while now as it causes patient to pass too much urine, and makes it difficult to care for patient as she is totally dependent   TTE no changes compared to previous   Monitor SpO2, RR, for signs of respiratory distress; Goal SpO2 >88-92%, PaO2 >55-60 mmHg  trend volume status via I/Os, daily weights   gentle diuresis w lasix 20 ivp qd; adjust to maintain goal net negative fluid balance- switched to PO- back to IV for shortness of breath overnight monitor Mg P K  Will hold lisinopril as K is trending up, monitor BP  Cardiology consulted appreciate recs

## 2024-11-17 NOTE — PROGRESS NOTE ADULT - SUBJECTIVE AND OBJECTIVE BOX
[facility] Division of Hospital Medicine  Genoveva Dhaliwal MD  Available via MS Teams      SUBJECTIVE / OVERNIGHT EVENTS: Pt tearful at bedside. No current symptoms.    ADDITIONAL REVIEW OF SYSTEMS: ROS negative     MEDICATIONS  (STANDING):  cefTRIAXone   IVPB 1000 milliGRAM(s) IV Intermittent every 24 hours  enoxaparin Injectable 40 milliGRAM(s) SubCutaneous every 24 hours  furosemide   Injectable 20 milliGRAM(s) IV Push two times a day  gabapentin 100 milliGRAM(s) Oral daily  influenza  Vaccine (HIGH DOSE) 0.5 milliLiter(s) IntraMuscular once  lidocaine   4% Patch 1 Patch Transdermal daily  liothyronine 5 MICROGram(s) Oral daily  naloxone Injectable 0.4 milliGRAM(s) IV Push once  polyethylene glycol 3350 17 Gram(s) Oral daily  senna 2 Tablet(s) Oral at bedtime    MEDICATIONS  (PRN):  acetaminophen     Tablet .. 650 milliGRAM(s) Oral every 6 hours PRN Temp greater or equal to 38C (100.4F), Mild Pain (1 - 3)  aluminum hydroxide/magnesium hydroxide/simethicone Suspension 30 milliLiter(s) Oral every 4 hours PRN Dyspepsia  bisacodyl 5 milliGRAM(s) Oral daily PRN Constipation  melatonin 3 milliGRAM(s) Oral at bedtime PRN Insomnia  ondansetron Injectable 4 milliGRAM(s) IV Push every 8 hours PRN Nausea and/or Vomiting  oxyCODONE    IR 2.5 milliGRAM(s) Oral every 4 hours PRN Moderate Pain (4 - 6)      I&O's Summary    16 Nov 2024 07:01  -  17 Nov 2024 07:00  --------------------------------------------------------  IN: 120 mL / OUT: 450 mL / NET: -330 mL        PHYSICAL EXAM:  Vital Signs Last 24 Hrs  T(C): 36.7 (17 Nov 2024 04:25), Max: 37 (17 Nov 2024 00:06)  T(F): 98 (17 Nov 2024 04:25), Max: 98.6 (17 Nov 2024 00:06)  HR: 87 (17 Nov 2024 06:34) (84 - 100)  BP: 156/78 (17 Nov 2024 06:34) (122/86 - 156/78)  BP(mean): --  RR: 18 (17 Nov 2024 04:25) (18 - 18)  SpO2: 98% (17 Nov 2024 04:25) (94% - 98%)    Parameters below as of 17 Nov 2024 04:25  Patient On (Oxygen Delivery Method): room air      CONSTITUTIONAL: NAD, tearful  NECK: Supple  RESPIRATORY: Normal respiratory effort; lungs are clear to auscultation bilaterally  CARDIOVASCULAR: Regular rate and rhythm, normal S1 and S2  ABDOMEN: Nontender to palpation      LABS:                        14.0   9.68  )-----------( 264      ( 17 Nov 2024 10:55 )             41.0     11-17    135  |  97  |  39[H]  ----------------------------<  127[H]  4.1   |  19[L]  |  0.90    Ca    10.7[H]      17 Nov 2024 10:55  Phos  5.2     11-16  Mg     1.8     11-16            Urinalysis Basic - ( 17 Nov 2024 10:55 )    Color: x / Appearance: x / SG: x / pH: x  Gluc: 127 mg/dL / Ketone: x  / Bili: x / Urobili: x   Blood: x / Protein: x / Nitrite: x   Leuk Esterase: x / RBC: x / WBC x   Sq Epi: x / Non Sq Epi: x / Bacteria: x            RADIOLOGY & ADDITIONAL TESTS:  New Imaging Personally Reviewed Today:  New Electrocardiogram Personally Reviewed Today:  Other Results Reviewed Today:   Prior or Outpatient Records Reviewed Today with Summary:    COORDINATION OF CARE:  Consultant Communication and Details of Discussion (where applicable):

## 2024-11-17 NOTE — PROGRESS NOTE ADULT - PROBLEM SELECTOR PLAN 7
DVT ppx Lovenox 40    11/17: Plan discussed with daughter Katty via phone    Mild hyponatremia - on lasix, trend

## 2024-11-17 NOTE — PROGRESS NOTE ADULT - SUBJECTIVE AND OBJECTIVE BOX
DATE OF SERVICE: 11-17-24 @ 14:33    Patient is a 89y old  Female who presents with a chief complaint of sob/ku, persistent shoulder pain, inability to perform adls (17 Nov 2024 13:00)      INTERVAL HISTORY: resting comfortably in bed, family at bedside    REVIEW OF SYSTEMS:  CONSTITUTIONAL: No weakness  EYES/ENT: No visual changes;  No throat pain   NECK: No pain or stiffness  RESPIRATORY: No cough, wheezing; No shortness of breath  CARDIOVASCULAR: No chest pain or palpitations  GASTROINTESTINAL: No abdominal  pain. No nausea, vomiting, or hematemesis  GENITOURINARY: No dysuria, frequency or hematuria  NEUROLOGICAL: No stroke like symptoms  SKIN: No rashes      	  MEDICATIONS:  furosemide   Injectable 20 milliGRAM(s) IV Push two times a day        PHYSICAL EXAM:  T(C): 36.7 (11-17-24 @ 04:25), Max: 37 (11-17-24 @ 00:06)  HR: 87 (11-17-24 @ 06:34) (84 - 100)  BP: 156/78 (11-17-24 @ 06:34) (122/86 - 156/78)  RR: 18 (11-17-24 @ 04:25) (18 - 18)  SpO2: 98% (11-17-24 @ 04:25) (94% - 98%)  Wt(kg): --  I&O's Summary    16 Nov 2024 07:01  -  17 Nov 2024 07:00  --------------------------------------------------------  IN: 120 mL / OUT: 450 mL / NET: -330 mL          Appearance: In no distress	  HEENT:    PERRL, EOMI	  Cardiovascular:  S1 S2, No JVD  Respiratory: Lungs clear to auscultation	  Gastrointestinal:  Soft, Non-tender, + BS	  Vascularature:  No edema of LE  Psychiatric: Appropriate affect   Neuro: no acute focal deficits                               14.0   9.68  )-----------( 264      ( 17 Nov 2024 10:55 )             41.0     11-17    135  |  97  |  39[H]  ----------------------------<  127[H]  4.1   |  19[L]  |  0.90    Ca    10.7[H]      17 Nov 2024 10:55  Phos  5.2     11-16  Mg     1.8     11-16          Labs personally reviewed      ASSESSMENT/PLAN: 	    89yo f w pmh mci/dementia, htn, hld, hfpef, hypothyroidism, severe oa, hcv s/p treatment, vte (dvt), p/w sob/ku, persistent shoulder pain, inability to perform adls; in er, found to be in ahrf req ~3 lpm via nc; 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    Follows with Julissa Tejada/Heladio. I discussed with Dr Leija    Problem/Plan #1:  Problem: Shortness of Breath  - ECG SR 1st AVB bifascicular block  -   - CXR with clear lungs  - CT neg for PE but suggestive of intersitial edema and pHTN  - TTE 11/15- EF 69%, no WMA, elevated LV filling pressure  - s/p lasix 20mg IV daily  - Appears close to euvolemic will switch to Lasix 20mg PO qd  - 11/16 pt with increased SOB, restart IVP lasix 20mg bid.  Improved 11/17    Problem/Plan #2:  Problem: Hypertension  - c/w lisinopril 10mg PO daily    Problem/Plan #3:  Problem: HLD  - not currently on any statin or anti-lipid medication    Problem/Plan #4:  Problem: DVT PPx  - c/w SQ lovenox              Iolani Behrbom, AG-NP   Gato Gomez DO [facility]  Cardiovascular Medicine  [address] [place] [address], [address]  Available through call or text on [employer] TE[employer]  Office: [phone]   DATE OF SERVICE: 11-17-24 @ 14:33    Patient is a 89y old  Female who presents with a chief complaint of sob/ku, persistent shoulder pain, inability to perform adls (17 Nov 2024 13:00)      INTERVAL HISTORY: resting comfortably in bed, family at bedside    REVIEW OF SYSTEMS:  CONSTITUTIONAL: No weakness  EYES/ENT: No visual changes;  No throat pain   NECK: No pain or stiffness  RESPIRATORY: No cough, wheezing; No shortness of breath  CARDIOVASCULAR: No chest pain or palpitations  GASTROINTESTINAL: No abdominal  pain. No nausea, vomiting, or hematemesis  GENITOURINARY: No dysuria, frequency or hematuria  NEUROLOGICAL: No stroke like symptoms  SKIN: No rashes      	  MEDICATIONS:  furosemide   Injectable 20 milliGRAM(s) IV Push two times a day        PHYSICAL EXAM:  T(C): 36.7 (11-17-24 @ 04:25), Max: 37 (11-17-24 @ 00:06)  HR: 87 (11-17-24 @ 06:34) (84 - 100)  BP: 156/78 (11-17-24 @ 06:34) (122/86 - 156/78)  RR: 18 (11-17-24 @ 04:25) (18 - 18)  SpO2: 98% (11-17-24 @ 04:25) (94% - 98%)  Wt(kg): --  I&O's Summary    16 Nov 2024 07:01  -  17 Nov 2024 07:00  --------------------------------------------------------  IN: 120 mL / OUT: 450 mL / NET: -330 mL          Appearance: In no distress	  HEENT:    PERRL, EOMI	  Cardiovascular:  S1 S2, No JVD  Respiratory: Lungs clear to auscultation	  Gastrointestinal:  Soft, Non-tender, + BS	  Vascularature:  No edema of LE  Psychiatric: Appropriate affect   Neuro: no acute focal deficits                               14.0   9.68  )-----------( 264      ( 17 Nov 2024 10:55 )             41.0     11-17    135  |  97  |  39[H]  ----------------------------<  127[H]  4.1   |  19[L]  |  0.90    Ca    10.7[H]      17 Nov 2024 10:55  Phos  5.2     11-16  Mg     1.8     11-16          Labs personally reviewed      ASSESSMENT/PLAN: 	    89yo f w pmh mci/dementia, htn, hld, hfpef, hypothyroidism, severe oa, hcv s/p treatment, vte (dvt), p/w sob/ku, persistent shoulder pain, inability to perform adls; in er, found to be in ahrf req ~3 lpm via nc; 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    Follows with Julissa Tejada/Heladio. I discussed with Dr Leija    Problem/Plan #1:  Problem: Shortness of Breath  - ECG SR 1st AVB bifascicular block  -   - CXR with clear lungs  - CT neg for PE but suggestive of intersitial edema and pHTN  - TTE 11/15- EF 69%, no WMA, elevated LV filling pressuredaily  - Appears close to euvolemic will switch to Lasix 20mg PO qd  - 11/16 pt with increased SOB, restarted IVP lasix 20mg bid.  Improved 11/17    Problem/Plan #2:  Problem: Hypertension  - c/w lisinopril 10mg PO daily    Problem/Plan #3:  Problem: HLD  - not currently on any statin or anti-lipid medication    Problem/Plan #4:  Problem: DVT PPx  - c/w SQ lovenox              Iolani Behrbom, AG-NP   Gato Gomez DO [facility]  Cardiovascular Medicine  [address] [place] [address], [address] [facility]  Available through call or text on [employer]  Office: [phone]

## 2024-11-18 ENCOUNTER — APPOINTMENT (OUTPATIENT)
Dept: CARDIOLOGY | Facility: CLINIC | Age: 89
End: 2024-11-18

## 2024-11-18 DIAGNOSIS — N17.9 ACUTE KIDNEY FAILURE, UNSPECIFIED: ICD-10-CM

## 2024-11-18 DIAGNOSIS — E87.5 HYPERKALEMIA: ICD-10-CM

## 2024-11-18 LAB
ANION GAP SERPL CALC-SCNC: 13 MMOL/L — SIGNIFICANT CHANGE UP (ref 5–17)
ANION GAP SERPL CALC-SCNC: 15 MMOL/L — SIGNIFICANT CHANGE UP (ref 5–17)
BUN SERPL-MCNC: 48 MG/DL — HIGH (ref 7–23)
BUN SERPL-MCNC: 50 MG/DL — HIGH (ref 7–23)
CALCIUM SERPL-MCNC: 10 MG/DL — SIGNIFICANT CHANGE UP (ref 8.4–10.5)
CALCIUM SERPL-MCNC: 10 MG/DL — SIGNIFICANT CHANGE UP (ref 8.4–10.5)
CHLORIDE SERPL-SCNC: 101 MMOL/L — SIGNIFICANT CHANGE UP (ref 96–108)
CHLORIDE SERPL-SCNC: 101 MMOL/L — SIGNIFICANT CHANGE UP (ref 96–108)
CO2 SERPL-SCNC: 21 MMOL/L — LOW (ref 22–31)
CO2 SERPL-SCNC: 21 MMOL/L — LOW (ref 22–31)
CREAT SERPL-MCNC: 1.61 MG/DL — HIGH (ref 0.5–1.3)
CREAT SERPL-MCNC: 1.63 MG/DL — HIGH (ref 0.5–1.3)
EGFR: 28 ML/MIN/1.73M2 — LOW
EGFR: 28 ML/MIN/1.73M2 — LOW
GLUCOSE BLDC GLUCOMTR-MCNC: 111 MG/DL — HIGH (ref 70–99)
GLUCOSE BLDC GLUCOMTR-MCNC: 112 MG/DL — HIGH (ref 70–99)
GLUCOSE BLDC GLUCOMTR-MCNC: 115 MG/DL — HIGH (ref 70–99)
GLUCOSE BLDC GLUCOMTR-MCNC: 135 MG/DL — HIGH (ref 70–99)
GLUCOSE BLDC GLUCOMTR-MCNC: 152 MG/DL — HIGH (ref 70–99)
GLUCOSE SERPL-MCNC: 113 MG/DL — HIGH (ref 70–99)
GLUCOSE SERPL-MCNC: 128 MG/DL — HIGH (ref 70–99)
HCT VFR BLD CALC: 39.4 % — SIGNIFICANT CHANGE UP (ref 34.5–45)
HGB BLD-MCNC: 12.8 G/DL — SIGNIFICANT CHANGE UP (ref 11.5–15.5)
MAGNESIUM SERPL-MCNC: 2.5 MG/DL — SIGNIFICANT CHANGE UP (ref 1.6–2.6)
MCHC RBC-ENTMCNC: 31.5 PG — SIGNIFICANT CHANGE UP (ref 27–34)
MCHC RBC-ENTMCNC: 32.5 G/DL — SIGNIFICANT CHANGE UP (ref 32–36)
MCV RBC AUTO: 97 FL — SIGNIFICANT CHANGE UP (ref 80–100)
NRBC # BLD: 0 /100 WBCS — SIGNIFICANT CHANGE UP (ref 0–0)
NT-PROBNP SERPL-SCNC: 507 PG/ML — HIGH (ref 0–300)
PHOSPHATE SERPL-MCNC: 4.1 MG/DL — SIGNIFICANT CHANGE UP (ref 2.5–4.5)
PLATELET # BLD AUTO: 266 K/UL — SIGNIFICANT CHANGE UP (ref 150–400)
POTASSIUM SERPL-MCNC: 5.6 MMOL/L — HIGH (ref 3.5–5.3)
POTASSIUM SERPL-MCNC: 5.8 MMOL/L — HIGH (ref 3.5–5.3)
POTASSIUM SERPL-SCNC: 5.6 MMOL/L — HIGH (ref 3.5–5.3)
POTASSIUM SERPL-SCNC: 5.8 MMOL/L — HIGH (ref 3.5–5.3)
RBC # BLD: 4.06 M/UL — SIGNIFICANT CHANGE UP (ref 3.8–5.2)
RBC # FLD: 14.3 % — SIGNIFICANT CHANGE UP (ref 10.3–14.5)
SODIUM SERPL-SCNC: 135 MMOL/L — SIGNIFICANT CHANGE UP (ref 135–145)
SODIUM SERPL-SCNC: 137 MMOL/L — SIGNIFICANT CHANGE UP (ref 135–145)
WBC # BLD: 13.42 K/UL — HIGH (ref 3.8–10.5)
WBC # FLD AUTO: 13.42 K/UL — HIGH (ref 3.8–10.5)

## 2024-11-18 PROCEDURE — 99223 1ST HOSP IP/OBS HIGH 75: CPT

## 2024-11-18 PROCEDURE — 99233 SBSQ HOSP IP/OBS HIGH 50: CPT

## 2024-11-18 PROCEDURE — 93010 ELECTROCARDIOGRAM REPORT: CPT

## 2024-11-18 PROCEDURE — 99497 ADVNCD CARE PLAN 30 MIN: CPT | Mod: 25

## 2024-11-18 RX ORDER — NALOXONE HCL 0.4 MG/ML
0.1 AMPUL (ML) INJECTION
Refills: 0 | Status: DISCONTINUED | OUTPATIENT
Start: 2024-11-18 | End: 2024-11-25

## 2024-11-18 RX ORDER — OXYCODONE HYDROCHLORIDE 30 MG/1
2.5 TABLET ORAL EVERY 4 HOURS
Refills: 0 | Status: DISCONTINUED | OUTPATIENT
Start: 2024-11-18 | End: 2024-11-25

## 2024-11-18 RX ORDER — SODIUM ZIRCONIUM CYCLOSILICATE 5 G/5G
5 POWDER, FOR SUSPENSION ORAL ONCE
Refills: 0 | Status: COMPLETED | OUTPATIENT
Start: 2024-11-18 | End: 2024-11-18

## 2024-11-18 RX ORDER — INSULIN REG, HUM S-S BUFF 100/ML
5 VIAL (ML) INJECTION ONCE
Refills: 0 | Status: COMPLETED | OUTPATIENT
Start: 2024-11-18 | End: 2024-11-18

## 2024-11-18 RX ORDER — HEPARIN SODIUM,PORCINE 1000/ML
5000 VIAL (ML) INJECTION EVERY 8 HOURS
Refills: 0 | Status: DISCONTINUED | OUTPATIENT
Start: 2024-11-18 | End: 2024-11-25

## 2024-11-18 RX ORDER — SODIUM ZIRCONIUM CYCLOSILICATE 5 G/5G
5 POWDER, FOR SUSPENSION ORAL EVERY 8 HOURS
Refills: 0 | Status: COMPLETED | OUTPATIENT
Start: 2024-11-18 | End: 2024-11-19

## 2024-11-18 RX ORDER — OXYCODONE HYDROCHLORIDE 30 MG/1
2.5 TABLET ORAL
Refills: 0 | Status: DISCONTINUED | OUTPATIENT
Start: 2024-11-18 | End: 2024-11-25

## 2024-11-18 RX ORDER — OXYCODONE HYDROCHLORIDE 30 MG/1
2.5 TABLET ORAL EVERY 4 HOURS
Refills: 0 | Status: DISCONTINUED | OUTPATIENT
Start: 2024-11-18 | End: 2024-11-18

## 2024-11-18 RX ADMIN — Medication 5000 UNIT(S): at 16:32

## 2024-11-18 RX ADMIN — POLYETHYLENE GLYCOL 3350 17 GRAM(S): 17 POWDER, FOR SOLUTION ORAL at 13:12

## 2024-11-18 RX ADMIN — OXYCODONE HYDROCHLORIDE 2.5 MILLIGRAM(S): 30 TABLET ORAL at 08:29

## 2024-11-18 RX ADMIN — LIDOCAINE 1 PATCH: 40 CREAM TOPICAL at 13:15

## 2024-11-18 RX ADMIN — Medication 2 TABLET(S): at 22:30

## 2024-11-18 RX ADMIN — FUROSEMIDE 20 MILLIGRAM(S): 40 TABLET ORAL at 06:10

## 2024-11-18 RX ADMIN — Medication 50 MILLILITER(S): at 16:01

## 2024-11-18 RX ADMIN — SODIUM ZIRCONIUM CYCLOSILICATE 5 GRAM(S): 5 POWDER, FOR SUSPENSION ORAL at 23:41

## 2024-11-18 RX ADMIN — ENOXAPARIN SODIUM 40 MILLIGRAM(S): 30 INJECTION SUBCUTANEOUS at 06:10

## 2024-11-18 RX ADMIN — OXYCODONE HYDROCHLORIDE 2.5 MILLIGRAM(S): 30 TABLET ORAL at 22:29

## 2024-11-18 RX ADMIN — OXYCODONE HYDROCHLORIDE 2.5 MILLIGRAM(S): 30 TABLET ORAL at 18:38

## 2024-11-18 RX ADMIN — Medication 100 MILLIGRAM(S): at 06:10

## 2024-11-18 RX ADMIN — SODIUM ZIRCONIUM CYCLOSILICATE 5 GRAM(S): 5 POWDER, FOR SUSPENSION ORAL at 16:01

## 2024-11-18 RX ADMIN — Medication 5 MICROGRAM(S): at 06:10

## 2024-11-18 RX ADMIN — LIDOCAINE 1 PATCH: 40 CREAM TOPICAL at 19:54

## 2024-11-18 RX ADMIN — SODIUM ZIRCONIUM CYCLOSILICATE 5 GRAM(S): 5 POWDER, FOR SUSPENSION ORAL at 13:11

## 2024-11-18 RX ADMIN — OXYCODONE HYDROCHLORIDE 2.5 MILLIGRAM(S): 30 TABLET ORAL at 03:07

## 2024-11-18 RX ADMIN — LIDOCAINE 1 PATCH: 40 CREAM TOPICAL at 00:00

## 2024-11-18 RX ADMIN — GABAPENTIN 100 MILLIGRAM(S): 300 CAPSULE ORAL at 13:11

## 2024-11-18 RX ADMIN — Medication 5000 UNIT(S): at 22:30

## 2024-11-18 RX ADMIN — Medication 5 UNIT(S): at 16:02

## 2024-11-18 NOTE — CONSULT NOTE ADULT - SURROGATE PHONE NUMBER
Pre-Operative Instructions    Patient name: Devi Morelos         We have scheduled you today for a GI procedure that will be performed sometime within the next few months. Because we realize that there may be some changes in the maintenance of your health after today, but prior to your procedure, we ask that you note the followin. If you have any additional medications that are added to the current medications you are taking, please notify our office so that we can properly instruct you in how to take this around the time of your procedure. For example,  if you started on any type of blood thinner, any type of anti inflammatory medication, or any type of iron supplement or vitamin, these medications will need to be stopped, depending on what they are, several days BEFORE the procedure. If you are given pain medications or any new type of heart medication or blood pressure pills or are a newly diagnoses diabetic put on blood sugar medication, we may also need to make adjustments regarding these.  2. If you have any type of device implanted (pacemaker, defibrillator, implanted pain device or stimulator, or have any kind of joint replacement) please let us know, as special arrangements may need to be made in order for the procedure to be performed.  3. If you develop new allergies, such as Latex or Versed, special arrangements may need to be made.  4. We will also need to be made aware of any changes in your insurance as we want to be sure that you receive the full benefit of your plan.      We understand that situations may arise causing you to cancel and/or reschedule your procedure date. We would appreciate at least two weeks notice. Thank you for your cooperation.    The staff of the [facility] offers these suggestions if you are scheduled for surgery.    1. Plan for unforeseen changes in surgery time. Although we try to maintain a set surgery schedule, there are times  when a surgery case may take longer than expected. This may result in your being in the surgery center longer than originally planned.      2. Arrange for an adult to stay with you at the surgery center. It is very important that you have an adult stay with you at the surgery center during your procedure. The medications you receive can make you sleepy and forgetful. For this reason, the doctor may want to discuss your surgery and post-operative care with an adult friend or family member. Additionally, an adult will be needed to drive you home. An adult must stay with you for the first 24 hours after your surgery. IF YOU DO  NOT HAVE AN ADULT TO DRIVE YOU HOME, YOUR SURGERY WILL BE CANCELLED.     3. Remember to follow pre-operative dietary restrictions. An empty stomach is imperative to prevent nausea or vomiting during and after your procedure.      4. Things to bring with you:  · a list of your current medications (including \"over the counter\" and herbal medications)  · important legal documents such as Power of , Guardianship papers  · any assistive device you are currently using (crutches, walker, hearing aid, etc.)    5. Limit visitors while you are at the surgery center. The time spent at the surgery center is very brief and will be limited to preparing you for surgery and assisting you to recover afterwards. Surgery center rooms are very small, which requires us to limit the number of visitors allowed in at one time.      6. Avoid alcoholic beverages. Because you will be asked to \"fast\" before your procedure, your body will be in a naturally dehydrated state. Alcohol will add to this dehydration making you more prone to problems with blood pressure during and after your procedure You should avoid alcoholic beverages for at least 24 hours prior to your surgery.    7. Wear loose comfortable clothes. Clothes that are easy to put on and take off are best. Sweat pants, shirts with buttons, and slip-on  shoes are wise choices. Avoid tight-fitting clothing such as blue jeans and turtlenecks.    8. Patients on Anticoagulation Medications:   IF YOU HAVE NOT RECEIVED INSTRUCTIONS REGARDING YOUR BLOOD THINNING MEDICATIONS WITHIN 7 DAYS OF YOUR SURGERY, PLEASE CALL THE GI DOCTOR'S OFFICE. FAILURE TO DO SO MAY RESULT IN CANCELLATION OF YOUR SURGERY.     9. Ask questions and insist on answers. Surgery can be a stressful time for anyone. If you have any questions or are unsure about any information given to you, be sure to ask for clarification. A patient can never ask too many questions. If there is something on your mind, let us know. We always want you to feel safe and confident in the care you are receiving.     TO ALL [facility] AMBULATORY SURGERY PATIENTS    You can decide today about the care you will receive in the future. [facility] respects your rights and will support your decisions to the fullest extent permitted by law, including your right to refuse or accept medical or surgical treatment. Please be advised that the [facility] prohibits ambulatory surgery centers from upholding Advanced Directives during surgical procedures. For this reason, any Advanced Directive will be null and void during your stay in the [facility] [place].     Although not honored in the [facility] [place], you are still entitled to be informed about your rights surrounding Advanced Directives. Advanced Directives are legal documents that enable you to specify what forms of treatment you want performed or withheld should you become unable to make or communicate these decisions on your own.  Although this is not a common decision made by outpatient surgery patients, we would like to let you know that an information booklet, \"A Personal Decision\" is available upon your request.      How do you know if an Advanced Directive  is right for you?  It is important to realize your rights as an individual, what the nature of consent for treatment implies, what a durable power of  for health care is and what a living will is.      After reviewing the booklet, \"A Personal Decision,\" should you have any further questions, please call us at [phone].      Thank you.     PATIENT’S RIGHTS    I. To be treated with respect, consideration and dignity, free from all forms of abuse, harassment and discrimination.     II. To receive quality care and high professional standards in a safe environment.    II. To be provided with appropriate privacy.    III. To expect that all disclosures and records are treated confidentially and, except when required by law, to be given the opportunity to approve or refuse their release.    IV. To be provided, to the degree known, complete information concerning their diagnosis, treatment and prognosis. When appropriate, the information is provided to a person designated by the patient to be a legally authorized person.    V. To review the records pertaining to his/her medical care and to have the information explained or interpreted as necessary except when restricted law.    VI. To expect that we will communicate with you in a matter that you can understand.     VII. To be given the names of all practitioners and health care personnel participating in his/her care.    VIII. To make decisions about the plan of care prior to and during the course of treatment.  IX. To refuse a recommended treatment or plan of care to the extent permitted by law and to be informed of the medical consequences of this action.     X. To expect that your treatment preferences will be responded to as delineated in your Advanced Directive, within the limits of the ASC bylaws regarding resuscitation    XI. To be informed as to:  A. Rights and Responsibilities.  B. Services available in the organization.  C. Provisions for after-hours  and emergency care.  D.  The charges for services and available payment options.   E. The right to consent or decline participation in proposed research  studies.  F.  The available resources for resolving disputes, grievances, and conflicts.      XII. To expect emergency procedures to be implemented without unnecessary delay.    XIII. To expect a safe hospital transfer with appropriate medical records when necessary.     XIV. To know that all patients rights extend to the patient’s legal representatives.     XV. Complaints regarding Patients Rights or any issue surrounding care received during your stay can be made by contacting any of the following organizations:  i. Medicare patient: Visit the Office of Medicare Beneficiary Ombudsman at www.medicare.gov on the web.  Or call 1-800-Medicare (1-303.957.5914).     TTY users should call 1-360.243.6956.  ii. Non-Medicare patients can contact the [facility] at [phone]; fax [phone], TTY [phone].  iii. Any patient can also contact the [facility] at [phone] (toll free 8:30 am to 5:00 pm, central time, weekdays).        Patient Responsibilities    It is your responsibility as a [facility] patient:    · To provide all personal and family health information needed to provide you with appropriate care.    · To participate to the best of your ability in making decisions about your medical treatment, and to comply with the agreed upon plan of care.    · To ask questions of your physician or other care providers when you do not understand any information or instructions.    · To maintain appointments as scheduled, or to reschedule in a timely fashion.    · To inform your physician and other care providers if you anticipate problems in following prescribed treatment.    · To recognize the impact of your lifestyle on your personal health.    · To inform your physician or other care provider if you desire a  transfer of care to another physician.    · To be considerate of others receiving and providing care.  To observe relevant surgery center policies and procedures.    · To accept financial responsibility for health care services and to work cooperatively with [facility] to resolve financial obligations    Please contact [facility] regarding billing, to verify your coverage and any out-of-pocket responsibility at [phone]             [phone]

## 2024-11-18 NOTE — CONSULT NOTE ADULT - ASSESSMENT
full note to f/u.   I met with the patient's daughter and d/w her about the patient's advanced illness (Advanced Dementia), acute medical issues (IZZY over CKD, Shoulder pain, HF exacerbation) and incidental finding in current work up (? metastatic CA).      We discussed about hospice and home care. I indicated that hospice care was an insurance benefit that provides supportive and symptomatic care, mainly at home (either home, NH, or DONNA), for patient's that have goals towards quality over quantity of life. Hospice includes a visiting nurse, durable medical equipment, medications for symptom Rx, a 24/7 call line for acute medical issues, and inpatient hospice care for those patients with intractable symptoms. I indicated that home care was a service that provides temporary support (usually less than 4 weeks) for patients that have illnesses that limit their functionality or capacity to participate in their medical care (e.g., need for wound care or IV Abx). It usually includes a visiting nurse that will see the patient depending on his/her needs; however, with limited availability. If a crisis were to present, under home care, 911 will need to be called. I indicated that this kind of care may be better for patients that want to have prolongation of life as a priority. If this were to be the goals (quantity of life over quality of life), understanding the patient's condition, coming back and forth into the hospital may be required.     We also discussed about [facility] for which she may be eligible based on her Dx of Advanced Dementia and possible metastatic CA which may indicate her life expectancy may be of weeks to months.     Due to caregiver burden, I also provided detailed info about [facility], NH, and [facility].     Will ask the GaP SW to f/u.    Finally, I d/w her daughter about code status. We also discussed code status. After going over the actual procedure of CPR, given the patient’s medical condition, I explained that survival after cardiopulmonary resuscitation (CPR) was unlikely and, if successful, would likely result in prolonged life support without a realistic chance of recovery or discharge. To help with defining code status, I provided her with a video from [facility] (https://my.acpdecisions.org/start/JNIG). Her daughter will d/w her sibling and f/u with the primary or GaP team.          Rui Huff MD  Associate Chief Geriatrics and Palliative Care (GaP) [facility]   GaP Consult Service   , [employer] [facility] [employer] at [facility]      Please contact me via Teams from Monday through Friday between 9am-5pm. If not answering, please call the palliative care pager [phone]    After 5pm and on weekends, please see the contact information below:    In the event of newly developing, evolving, or worsening symptoms, please contact the Palliative Medicine team via pager (if the patient is at [facility] #[phone] or if the patient is at [facility] #[phone]) The Geriatric and Palliative Medicine service has coverage 24 hours a day/ 7 days a week to provide medical recommendations regarding symptom management needs via telephone 89yo f w pmh mci/dementia, htn, hld, hfpef, hypothyroidism, severe oa, hcv s/p treatment, vte (dvt), p/w sob/ku, persistent shoulder pain, inability to perform adls; in er, found to be in ahrf req ~3 lpm via nc; 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 Geriatrics and Palliative Medicine (GaP) Team was called for goals of care and resources. However, we will also give recs regarding pain.

## 2024-11-18 NOTE — CONSULT NOTE ADULT - CONVERSATION DETAILS
I met with the patient's daughter and discussed her mother's advanced illness (advanced dementia, FAST stage 7c), current medical issues (acute kidney injury [IZZY] on top of chronic kidney disease [CKD], shoulder pain, and heart failure exacerbation), and an incidental finding of a possible metastatic cancer during recent tests. When it comes to cancer, her daughter agree on that, due to lack of treatment options, further work up was not to be pursued.     We explored potential goals of care (GOC) and disposition options based on these goals:    1) Aggressive Care: Attempting to prolong life by exploring available therapies and services to restore function (even though this may be challenging due to limited physical reserves). This could involve skilled nursing care (SNC) with or without transition to a nursing home (NH) or assisted living facility (DONNA).      2) Symptom Management: Prioritizing the patient's comfort. This option focuses on symptom control through hospice care. I explained that hospice is an insurance benefit providing supportive and symptom management at home (including NH or penitentiary) for patients prioritizing quality of life over quantity. Hospice services include a visiting nurse, durable medical equipment, medications for symptom management, a 24/7 call line for acute medical issues, and inpatient hospice care for patients with difficult-to-control symptoms.    3)   [facility]: I discussed [facility] as a potential option based on the advanced dementia diagnosis and possible metastatic cancer, indicating a life expectancy of weeks to months.    Based on these paths of care, her daughter was going to discussed about GOC with her siblings and, based on it, defining a plan of care.     We also discussed the patient's code status. After explaining cardiopulmonary resuscitation (CPR) procedures, I highlighted the low likelihood of survival after CPR in her current medical condition. Even if successful, it may lead to prolonged life support with minimal chance of recovery or discharge. To assist with the decision, I provided a video resource from [facility] (https://my.acpdecisions.org/start/JNIG) that can guide them. The daughter will discuss this further with her sibling and then follow up with the primary care physician or geriatrics and palliative care (GAPC) team.    Finally, due to caregiver burden, I discussed with her daughter about [facility], NH, and penitentiary. Will ask the GaP SW to f/u.    This GOC meeting lasted 46' and 16' were spent in [facility].

## 2024-11-18 NOTE — PROGRESS NOTE ADULT - PROBLEM SELECTOR PLAN 3
hold lisinopril as K is trending up, monitor BP  Start 6 doses lokelma  insulin + dextrose x 1  Order EKG

## 2024-11-18 NOTE — PROGRESS NOTE ADULT - PROBLEM SELECTOR PLAN 6
h/o mci/dementia, severe oa  a+o x1-2 at baseline  maintain fall + frac, delirium, aspiration precautions; keep head end of bed elevated  nutrition consult  dysphagia screen/ slp eval  pt/ot eval + sw/cm consult for disposition  goc and advanced directives conversation as above. Palliative consulted to assist with conversation, consult placed

## 2024-11-18 NOTE — CONSULT NOTE ADULT - PROBLEM SELECTOR RECOMMENDATION 6
See the [facility] note; however, in summary, I met with the patient's daughter and  discussed with her about her mother's advanced illness and multiple health issues. Further workup for possible metastatic cancer was deemed unnecessary. We explored care options, focusing on either aggressive treatment for potential function restoration (SNC/NH/snf) or comfort-focused care through hospice. Code status and caregiver burden were also addressed. The daughter will discuss further with family and healthcare providers. Social work follow-up is planned.

## 2024-11-18 NOTE — CONSULT NOTE ADULT - PROBLEM SELECTOR PROBLEM 3
Patient ID: Richard Bustos is a 67 y.o. male who presents for follow-up of Follow-up  For CAD, stress related chest pains, chronic fatigue, test results  PCP: Dr. Ramirez, see every 4 months  Hematologist: Dr. Quezada  Urologist: Dr. Moss  Podiatrist: Gopal Lyonmette  Wound care: Dr. 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 Suffered a NSTEMI in 8/2010, catheterization showed SUMMARY:   1. Three vessel coronary artery disease.   2. Successful PCI. Involved 2 JAYCEE in the LAD and OM1.    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 Off diet at times, had underwent gastri bypass in 2008.    Last Echo in 2010 showed CONCLUSIONS   1 - Mild left ventricular enlargement.   2 - Normal left ventricular function (EF 58%).   3 - Diastolic dysfunction.   4 - Biatrial enlargement.   5 - Mildly to moderately enlarged ascending aorta.    Last carotid US: 9/13/2010  Bilateral 20%-40% narrowing.  No symptoms.    Coronary Artery Disease  Presents for follow-up visit. Pertinent negatives include no chest pain, dizziness, leg swelling, muscle weakness, palpitations, shortness of breath or weight gain. Risk factors include hyperlipidemia. The symptoms have been resolved. Compliance with diet is variable. Compliance with exercise is good. Compliance with medications is good.   Hyperlipidemia  Pertinent negatives include no chest pain, focal weakness, myalgias or shortness of breath.     EKG shows sinus bradycardia, rate 45, today NSR, 75, VPC, and nonspecific STTW abnormalities.     Since visit of 7/2012, have lots of  "stress with tenants, BP noted to be elevated, log reviewed 153-174/, HR 52-74. Had cardiac testing -   ECHO CONCLUSIONS 7/2012   1 - Mild left ventricular enlargement.   2 - Mildly to moderately depressed left ventricular function (EF 40%).   3 - Eccentric hypertrophy.   4 - Mildly to moderately enlarged ascending aorta.   5 - Mild left atrial enlargement.   6 - Normal diastolic function.   7 - Mild aortic regurgitation.   8 - Suggestion for inferoposterior hypokinesia.   9 - Compare to 8/17/2010, there is increase in LVH with decrease in   LVEF from 58%, and new inferoposterior hypokinesia. The Aortic dimensions   have not changed.    Carotid US, 7/2012  IMPRESSION   Findings consistent with less than 50% diameter stenosis of proximal   internal carotid arteries by NASCAET criteria. Flow in the vertebral   arteries appears antegrade bilaterally.    Since visit of 1/23, still with occasional angina, average once a week, lasting about a minute, "light" grade 3-4/10.  Workup:  ECHO, 2/5/20163, CONCLUSIONS   1 - Mild left ventricular enlargement.   2 - Mildly to moderately depressed left ventricular function (EF 40%).   3 - Normal diastolic function.   4 - Inferoposterior hypokinesia consistent with ischemic disease..   5 - Moderately enlarged ascending aorta. 4.4 cm - stable   6 - No significant change from study of 7/17/2012.    Lexiscan, 2/5/2013  Nuclear Quantitative Functional Analysis:   LVEF: 34 % (normal is 47 - 59)   LVED Volume: 131 ml (normal is 91 - 155)   LVES Volume: 87 ml (normal is 40 - 78)   Impression: ABNORMAL MYOCARDIAL PERFUSION   1. There is evidence for mild to moderate myocardial ischemia in the   lateral apical wall of the left ventricle with associated stress induced   LV cavity dilatation with underlying injury present.   2. There is moderate intensity fixed defect in the inferolateral wall of   the left ventricle, consistent with myocardial injury. There is trivial   francine-injury " ischemia.   3. There is abnormal wall motion at rest showing akinesis of the lateral   wall of the left ventricle, moderate global hypokinesis of the left   ventricle. dyskinesis of the inferolateral wall of the left ventricle.   4. There is resting LV dysfunction with a reduced ejection fraction of 34   %. (normal is 47 - 59)   5. The left ventricular volume is mildly increased at rest.   6. The extracardiac distribution of radioactivity is normal.  7. [facility] SSS =15, SRS =10, SDS =5, suggest 6.25% of myocardium may be   ischemic.    Since visit of 2/8, noticed some bilateral leg weakness, post bilateral THR, also occasional charley horse at night, mostly right sided. Today had mild chest pain, grade 2/10, while driving here. Claims to have not heard from [facility], INR on 2/18 was 1.8. Question about Plavix answered. Discussed with daughter, Citlali, over the telephone.     Since visit 2/22, no new problem. Discussed aortic aneurysm, will need at least annual imaging, do not recall the last time. Have claustrophobia requesting Xanax. No problem with the medications, stays very active. Weight reviewed was below 290 lbs in 12/2011.   Apparently likes [employer]!     Since visit of 3/16, had MRA done with use of Xanax and hydrocodone,   Result - Max ascending aorta 4.3 cm stable.  Tolerating medications without problem, using BiPAP nightly, still lot of stress with [employer]. Discuss need for exercise program with weigh reduction of 10%. New problem include left leg weakness with localized pain behind the knee. For about 2 weeks, been dancing with new woman.    Since visit of 4/11/2013, hospitalized 2 weeks ago at [facility] for large hematoma due to use of Lovenox as bridge for oral surgery. Coumadin on hold, seems to be getting smaller and softer. Denies any heart symptoms or CP nor SOB. No problem with taking medications and using CPAP. Slowed down due to leg pain and fatigue.  Feels good in the AM.    Since visit of 4/9/2014, complain of leg weakness, noted since 2/2013. Denies any CP nor SOB. Miss 2-3 doses of medications monthly. No other problem with medications. Awaken all night due to dog, sleep 10-4. Feels tired in the morning despite using CPAP. Have not had much blood work done. ECG shows AF rate 67, RBBB.  ECHO in 5/2014 CONCLUSIONS   1 - Enlarged aortic root. measuring 3.9 cm at sinotubular junction.  2 - Biatrial enlargement.   3 - Eccentric hypertrophy.   4 - Mildly to moderately depressed left ventricular systolic function (EF 40-45%).   5 - Normal left ventricular diastolic function.   6 - Right ventricular enlargement with normal systolic function.   7 - No significant change from echo on 2/5/2013..   8 - Difficult apical window, Optison contrast used for wall motion analysis..     Since visit of 11/21, no new problem. Some concern of HR down to 40 after exercise. Noted easy fatigue but remains quite sedentary. Discussed need for Coreg titration for LV dysfunction. Labs reviewed, CKD eith eGFR of 47, mild anemia Hgb 12.1, , A1C 4.7, and proteinuria.   Cardiac workup:  Carotid US, 12/2014 - CONCLUSIONS   There is 0 - 19% right Internal Carotid stenosis.  There is 0 - 19% left Internal Carotid stenosis.    Clean vessels    Lexiscan Nuclear Quantitative Functional Analysis:   LVEF: 38 % (normal is 47 - 59)  LVED Volume: 172 ml (normal is 91 - 155)  LVES Volume: 107 ml (normal is 40 - 78)    Impression: ABNORMAL MYOCARDIAL PERFUSION  1. There is evidence for mild to moderate myocardial ischemia in the inferior and lateral walls of the left ventricle with associated stress induced LV cavity dilatation with underlying injury present.   2. There is moderate to severe intensity fixed defect in the inferolateral wall of the left ventricle, consistent with myocardial injury.   3. There is abnormal wall motion at rest showing moderate global hypokinesis of the left ventricle.  severe hypokinesis of the inferior and septal walls of the left ventricle.   4. There is resting LV dysfunction with a reduced ejection fraction of 38 %. (normal is 47 - 59)  5. The left ventricular volume is moderately increased at rest.   6. The extracardiac distribution of radioactivity is normal.   7. When compared to the previous study from 02/05/2013, no significant changes noted.  8. [facility] SSS=15, SRS=8, and SDS=7, suggest that 8.7% of myocardium may be ischemic.    Holter, 12/2014:  PREDOMINANT RHYTHM  1. Sinus arrhythmia with heart rates varying between 41 and 110 bpm with an average of 64 bpm.     VENTRICULAR ARRHYTHMIAS  1. There were frequent mostly monomorphic PVCs totalling 1297 and averaging 54 per hour. There was 1 couplet.    2. 1.6% of complexes.    3. There were no episodes of ventricular tachycardia.    SUPRA VENTRICULAR ARRHYTHMIAS  1. There were rare PACs totalling 108 and averaging 4 per hour.     2. There were no episodes of sustained supraventricular tachycardia.    SINUS NODE FUNCTION  1. There was no evidence of high grade SA sang block.     AV CONDUCTION  1. There was no evidence of high grade AV block.     DIARY  1. The diary was not returned    MISCELLANEOUS  1. There were persistent hookup related artifacts. Overall, the study was of good quality.     2. This was a tape of adequate length (24 hrs).     Since visit of 12/23/2014, underwent C with PCI of RCA. Off ASA due to rectal bleed. Discuss use of triple Rx for hopefully 3 months post JAYCEE implant. Feeling more energetic, no CP nor SOB. Discussed ascending Aortic aneurysm, last checked in 5/2014 with Echo - stable.   HEMODYNAMIC RESULTS:    LVEDP (Pre): 16 mmHg  LVEDP (Post): 18 mmHg  Ejection Fraction: 40%  Global LV Function: moderately depressed  BP: 109/70  HR: 96    Air rest:  LVEDP: 18    C. ANGIOGRAPHIC RESULTS:    DIAGNOSTIC:  Patient has a right dominant coronary artery.     - Left Main Coronary Artery:  The LM is  normal. There is DAREK 3 flow.    - Left Anterior Descending Artery:  The mid LAD has a 50% stenosis. There is DAREK 3 flow. The remaining portion of the vessel has multiple lesions < 50% stenosed. Long stent noted in mid-LAD, 50% ISR.    - Left Circumflex Artery:  The LCX has luminal irregularities. There is DAREK 3 flow.    - Right Coronary Artery:  The mid RCA has a 75% stenosis. There is DAREK 3 flow. The remaining portion of the vessel has luminal irregularities. Appears to be close to distal end of previously placed stent.    - Aortic Root:  The Aortic Root is normal. There is DAREK 3 flow.      D. SUMMARY:    1. Two vessel coronary artery disease.  2. Diastolic dysfunction.  3. - Very difficult case due to tortuosity of the innominate artery, multiple manipulation needed for cannulation of CA.  4. - RCA lesion appears as a large plaque with significant luminal stenois.    E. RECOMMENDATIONS:    1. Continue medical management.  2. Cardiac rehab referral.  3. Weight loss.  4. Follow up with Dr. Familia Tate.  5. Schedule for PCI.  6. - Hydration overnight, eGFR 47.  7. - Dr. Gallegos consulted for PCI of RCA  8. - On chronic warfarin Rx, on hold for now  9. - Start  mg today and daily for total of 5 days as warfarin is resumed post PCI  10. - Start Plavix, load with 300 mg today then 75 mg daily in addition to warfarin.    PCI INTERVENTION:    Proximal RCA:  The lesion was successfully intervened. Post-stenosis of 0% and post-DAREK 3 flow. The vessel was accessed natively. The following items were used: Stent Resolute Rx 4.00x30 (JAYCEE).    C. SUMMARY:    1. Successful PCI/JAYCEE of the proximal RCA.    D. RECOMMENDATIONS:    1. Routine post PCI care.  2. Risk factor reductions.  3. ASA 81mg.  4. Clopidogrel (Plavix) for one year.  5. Weight loss.    Since visit of 1/22/2015, no CP nor SOB. Been compliant with medications, no problem. Noted venous ulcer in the left leg about a week ago. Dressing with peroxide. Lab  "from [facility] reviewed, K+ remains 5.5 with stable Cr of 1.78, eGFR 39. Active with rental properties upkeep.     Since visit of 3/27, no new problem, difficulties in getting lab results from [facility]. Reviewed eGFR 39, K+ 5.5, will need to stay with low K+ diet. Problem with venous stasis ulceration in the left leg. Worked with Wound Care for 2 weeks, told to return if problem.     Since visit of 4/23,no new problem, feeling "pretty good". In process of selling rental properties. Active with walking about 4 miles a day, takes about an hour. Legs better with ACE stocking. Review lab, BMP on 5/15/2015 K+ 4.7, stable renal function with eGFR 41, CBC in 1/2015 Hgb 11.8, last Ferritin level in 4/2011.  ECHO, 5/2015, CONCLUSIONS   1 - Moderately enlarged aortic root. measuring 4.1 cm at sinotubular junction.  2 - Concentric hypertrophy. Wall thickness is increased, with the septum and the posterior wall each measuring 1.4 cm across.  3 - Low normal to mildly depressed left ventricular systolic function (EF 50-55%).   4 - Normal left ventricular diastolic function.   5 - Right ventricular enlargement with normal systolic function.   6 - Difficult windows, Optison contrast used for wall motion analysis.   7 - Suggestion for some improvement in LVEF from Echo on 5/7/2014.     Since visit of 5/29/2015, more active, compliant with medications, PT twice weekly for an hour, no problem. Last BMP in 5/2015, K+ 4.7, Cr 1.7, eGFR 41. Under care of Dr. Torrez for venous stasis and ulcer. Uses support hose occasionally due to the hot weather.    Since visit of 9/10/2015, no new problem, less stress, no regular exercise. Had completed phase II Cardiac Rehab. Asked to consider phase III. Home BP is good, 135. Citlali fixes medications, don't skip.    Since visit of 2/8/2016, no new problem. Undergoing urologic evaluation for kidney mass with biopsies of the bladder, prostate, and the right kidney, no problem with the procedure, awaiting " results. ECG shows RBBB, no problem with medications, no regular exercise but considering to start. Decline stress test, no CP nor SOB.     In 12/2016, first concern is chronic fatigue, received 42 radiation Rx, have nocturia 3X nightly which interrupt the sleep, gets only about 5 hours. Will review with upcoming visit with Urologist. Stay active, able to walk 2 blocks with can, also uses free weights 2X weekly, for 15-20 minutes. Have DORA, uses CPAP 100%. For past 2 months had 2 episode of chest pain under emotional stress, last up to 15 minutes, max grade 3/10, have not use any NTG. Chart reviewed - last nuclear stress test in 12/2014. Had JAYCEE placed in 1/2015. Last lipid is excellent, LDL 57, non-HDL 75. Weight stable at around 294 lbs.   Echo, 6/2016 CONCLUSIONS     1 - Mild left ventricular enlargement.     2 - Eccentric hypertrophy.     3 - Moderately depressed left ventricular systolic function (EF 35-40%).     4 - Normal left ventricular diastolic function.     5 - Right ventricular enlargement with normal systolic function.     6 - The estimated PA systolic pressure is greater than 25 mmHg.     7 - Mild tricuspid regurgitation.     8 - Trivial to mild aortic regurgitation.     9 - Suggestion for deterioration in LVEF from Echo in 5/2015.     10 - Difficult windows, Optison contrast used for wall motion analysis.     Carotid US  Consider repeat study in six months.    CONCLUSIONS   There is 40 - 49% right Internal Carotid stenosis.  There is 20 - 39% left Internal Carotid stenosis.    Antegrade flows in the vertebral arteries. Homogenous plaques noted in both ICAs.    In 2/2017, active with rental, feels tired all the time, using CPAP 100%, wake up feeling all right then tired after 2-3 hours. Was using hydrocodone bid for chronic pains, out of medications for 3 weeks, feels more tired off the narcotic. Explained need for regular exercise with muscle strengthening.  Lexiscan - Nuclear Quantitative Functional  Analysis:   LVEF: 42 % (normal is 47 - 59)  LVED Volume: 124 ml (normal is 91 - 155)  LVES Volume: 72 ml (normal is 40 - 78)    Impression: ABNORMAL MYOCARDIAL PERFUSION  1. There is moderate intensity fixed defects in the lateral and inferolateral walls of the left ventricle, consistent with myocardial injury. There is trivial francine-injury ischemia.   2. There is abnormal wall motion at rest showing moderate hypokinesis of the inferolateral and inferior walls of the left ventricle.   3. There is resting LV dysfunction with a reduced ejection fraction of 42 %.  (normal is 47 - 59)  4. The ventricular volumes are normal at rest and stress.   5. The extracardiac distribution of radioactivity is normal.   6. When compared to the previous study from 12/15/2014, the inferolateral defects now appears fixed.  7. [facility] SSS=15, SRS=14, and SDS=1, suggest that only 1% of myocardium may be ischemic.    Carotid US - CONCLUSIONS   There is 20 - 39% right Internal Carotid stenosis.  There is 0 - 19% left Internal Carotid stenosis.    Homogenous plaques noted in the ICAs, antegrade flows in the vertebral  arteries.    Review of Systems   Constitution: Negative for diaphoresis, fever, some weakness, and malaise/fatigue, no night sweats and but weight loss 1 lbs since last visit.   HENT: Negative for headaches, nosebleeds and tinnitus.   Eyes: Negative for visual disturbance.   Cardiovascular: Positive for dyspnea on exertion and chest pain with emotional stress. Negative claudication, cyanosis, irregular heartbeat, leg swelling, near-syncope, orthopnea, palpitations and paroxysmal nocturnal dyspnia.   Respiratory: Negative for cough, shortness of breath, sleep disturbances due to breathing, snoring and wheezing.   Endocrine: Negative for polydipsia and polyuria.   Hematologic/Lymphatic: Does not bruise/bleed easily.   Skin: Negative for nail changes, color change, flushing, poor wound healing and suspicious lesions. ecchymosis  "on ASA, and warfarin  Musculoskeletal: Positive for arthritis, back pain and muscle cramps on daily Valium 5 mg. Negative for falls, gout, joint pain, joint swelling,   Bilateral hip replacements (right 1996, left 2001), right knee arthroscopic procedure 1996.  Have muscle weakness and myalgias in hip and legs  Gastrointestinal: Negative for heartburn, hematemesis, hematochezia, melena and nausea.   Neurological: Negative for disturbances in coordination, excessive daytime sleepiness, dizziness, focal weakness, light-headedness, loss of balance, numbness and vertigo.   Psychiatric/Behavioral: Negative for depression and substance abuse. The patient is nervous/anxious and stressed with rentals. The patient does not have insomnia.        Objective:     Physical Exam   Constitutional: He is oriented to person, place, and time. He appears well-developed and well-nourished. Lots of yarning   HENT:   Head: Normocephalic.   Eyes: Conjunctivae and EOM are normal. Pupils are equal, round, and reactive to light.   Neck: Normal range of motion. Neck supple. No JVD present. No thyromegaly present.   Cardiovascular: Irregular rate, irregular rhythm, soft heart sounds and intact distal pulses. Exam reveals no gallop and no friction rub.   No murmur heard.  No swelling.   Pulmonary/Chest: Effort normal. He has no wheezes. He has no rales. He exhibits no tenderness.   Abdominal: Soft. Bowel sounds are normal. There is no tenderness.  Protuberance, waist circumference 57.5 inches increased to 58" , hip 55 inches.   Musculoskeletal: Normal range of motion. He exhibits No tenderness (mild behind the left knee.). He exhibits 1+ edema in both lower extremities to the knee.   Lymphadenopathy:   He has no cervical adenopathy.   Neurological: He is alert and oriented to person, place, and time.   Skin: Skin is warm and dry. No rash noted.   Venous stasis, bilateral with stasis dermatitis, 1+ pitting edema bilateral.        Assessment:     "   1. Coronary artery disease involving native coronary artery of native heart without angina pectoris     2. Thoracic aortic aneurysm without rupture     3. Bilateral carotid artery disease, 20%-40%         4. Morbid obesity due to excess calories          Plan:      Richard was seen today for cardiac follow up    Diagnoses and associated orders for this visit:    Coronary artery disease involving native coronary artery of native heart without angina pectoris    Morbid obesity due to excess calories    Carotid disease, bilateral    Essential hypertension  -     lisinopril (PRINIVIL,ZESTRIL) 40 MG tablet; Take 1 tablet (40 mg total) by mouth every evening.  Dispense: 90 tablet; Refill: 3    LV dysfunction, EF 40%  -     lisinopril (PRINIVIL,ZESTRIL) 40 MG tablet; Take 1 tablet (40 mg total) by mouth every evening.  Dispense: 90 tablet; Refill: 3    Chronic fatigue  -     Ambulatory consult to Pulmonology    Obstructive sleep apnea syndrome  -     Ambulatory consult to Pulmonology      - Follow up with Dr. Ramirez consider using short acting muscle relaxant in place of Valium  - CV status stable, continue current Rx except to increase Lisinopril to 40 mg daily, all medications reviewed, patient acknowledge good understanding.  - Recommend at least biannual cardiovascular evaluation in view of his significant risk factors.    Thoracic aortic aneurysm without rupture    Hypertensive left ventricular hypertrophy, without heart failure - controlled    Type 2 diabetes mellitus with diabetic chronic kidney disease    Venous stasis with ulcers - stable    Hypercoagulable state, Prothrombin mutation    CKD (chronic kidney disease) stage 3, GFR 30-59 ml/min, 41    Paroxysmal atrial fibrillation, new onset, 11/2014    Sedentary lifestyle - improved    Patient Active Problem List   Diagnosis    Diabetes mellitus with chronic kidney disease, stage III    MTHFR mutation (methylenetetrahydrofolate reductase)    Sleep apnea, using  BiPAP nightly, 1999, last sleep test in 2013    Hypertension    CAD (coronary artery disease), NSTEMI, JAYCEE x2, LAD & OM1, 8/18/2010, JAYCEE to RCA , 1/15/2015    Hyperlipidemia    Gout    GERD (gastroesophageal reflux disease)    Venous stasis with ulcers    Hypercoagulable state, Prothrombin mutation    Anemia, Hgb 12.1    Colon polyps    Anxiety    Chronic back pain    Morbid obesity, gstric bypass 2008, BMI 45.2 decreased to 42.7    Carotid disease, bilateral    Aortic aneurysm, thoracic, 4.3 cm, 8/2010    GARY (generalized anxiety disorder)    CKD (chronic kidney disease) stage 3, GFR 30-59 ml/min, 41    Abnormal cardiovascular stress test    LV dysfunction, EF 40%    Long term (current) use of anticoagulants    OA (osteoarthritis). 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    Diabetic neuropathy    H/O bariatric surgery, 2008    Osteoarthritis, knee    BPH with obstruction/lower urinary tract symptoms    Elevated PSA    Microscopic hematuria    Leg weakness, bilateral    Proteinuria    Paroxysmal atrial fibrillation, new onset, 11/2014    Fatigue    Left ventricular dysfunction with reduced left ventricular function    Stasis dermatitis of both legs    Hyperkalemia    Type 2 diabetes mellitus with neurologic complication    Type 2 diabetes mellitus with diabetic nephropathy    Hypertensive left ventricular hypertrophy, without heart failure    Prostate cancer    Abdominal wall hematoma    Nocturia more than twice per night    Sleep deprivation    Chronic fatigue    Valium use disorder, mild, onset 2015    Stress at work    Precordial pain     Total face-to-face time with the patient was 30 minutes and greater than 50% was spent in counseling and coordination of care. The above assessment and plan have been discussed at length. Labs and procedure over the last 6 months reviewed. Problem List updated.  Asked to bring in all active medications / pills bottles with next visit.        Right shoulder pain

## 2024-11-18 NOTE — CONSULT NOTE ADULT - PROBLEM SELECTOR RECOMMENDATION 7
Will continue to f/u for GOC, ACP, resources, and support.         Rui Huff MD  Associate Chief Geriatrics and Palliative Care ([facility]) [facility]   [facility] Consult Service   , [employer] at [facility]      Please contact me via Teams from Monday through Friday between 9am-5pm. If not answering, please call the palliative care pager [phone]    After 5pm and on weekends, please see the contact information below:    In the event of newly developing, evolving, or worsening symptoms, please contact the Palliative Medicine team via pager (if the patient is at [facility] #[phone] or if the patient is at [facility] #[phone]) The Geriatric and Palliative Medicine service has coverage 24 hours a day/ 7 days a week to provide medical recommendations regarding symptom management needs via telephone

## 2024-11-18 NOTE — CONSULT NOTE ADULT - TIME BILLING
Total time spent including the following  [] Physical chart review and documentation   An extensive review of the physical chart, electronic health record, and documentation was conducted to obtain collateral information including but not limited to:   - Current inpatient records (ED, H&P, primary team, and consultants [ [facility]  ])   - Inpatient values/results (CBC, CMP, Imaging)   - Outpatient records (No HCP form was found)    - Prior inpatient records (if available)   - Social work assessments   - Current or proposed treatment plans   - Pharmacotherapy review (including I-STOP if applicable)  [x]care coordination  []discussion with consultant(s)  [x]discussion with the patient, surrogate decision maker, or family  [x]Physical Exam and/or review of systems   [x]Formulation of assessment and plan   [x]Evaluating for response to treatment and side effects of opioids or benzodiazepines      The 16______ minutes spent in ACP (    see the [facility] note above             ) were independent to the time spent in time based billing

## 2024-11-18 NOTE — CONSULT NOTE ADULT - PROBLEM SELECTOR RECOMMENDATION 9
Advanced. FAST 7c. The patient is hospice eligible. See the [facility] not above for my discussion with the patient's daughter about this benefit.   Will also recommend:   -Frequent reassurance and verbal orientation   -Family members or other familiar persons by his bedside.   -Delusions and hallucinations should be neither endorsed nor challenged.   -Physical restraints should be avoided. Alternatives to restraint use, such as constant observation (preferably by someone familiar to the patient such as a family member), may be more effective.  -PT eval and early ambulation if possible  -Move to a room with a window   -Update the calendar date in the room.   -If possible, transfer to [facility]

## 2024-11-18 NOTE — CONSULT NOTE ADULT - SUBJECTIVE AND OBJECTIVE BOX
Date of Service:11-18-24 @ 17:54  HPI:  89yo f w pmh mci/dementia, htn, hld, hfpef, hypothyroidism, severe oa, hcv s/p treatment, vte (dvt), p/w sob/ku, persistent shoulder pain, inability to perform adls; patient initially with right shoulder pain on 11/8, came to er, and was discharged home with outpatient follow up; right shoulder pain persisted and so patient returned to [facility] er on 11/10 and was hospitalized 11/10-11/12; imaging showed severe oa, pain managed w oxycodone, and patient discharged home with home pt. since arriving home, after recent hospitalization, daughter has found that patient now requires more assistance, w she herself covering the other 12 hours hha is unavailable for (at baseline, patient requires assistance with adls, has 12 hour hha at home). in addition, shoulder pain persists and daughter noticed patient appearing short of breath; she mentions that she has not been giving patient her home lasix for a while now as it causes patient to pass too much urine, and makes it difficult to care for patient as she is totally dependent ; she grew concerned, so had patient brought back to [facility] er for further evaluation. in er, found to be in ahrf req ~3 lpm via nc; suspect 2/2 mild adhf; admit to medicine for further mgmt (13 Nov 2024 23:08)    The patient is treated for decompensated HF, IZZY over CKD, Shoulder pain (likely 2/2 to OA), and UTI. Also work up showed incidental findings that question the possibility of a neoplasm.      PERTINENT PM/SXH:   Hypertension    Hepatitis C    Hypothyroidism    Arthritis    Hypertension    Hepatitis C    Dementia without behavioral disturbance, unspecified dementia type    Bifascicular block      No significant past surgical history    Status post placement of implantable loop recorder      FAMILY HISTORY:    Family Hx substance abuse [ ]yes [ ]no  ITEMS NOT CHECKED ARE NOT PRESENT    SOCIAL HISTORY:   Significant other/partner[ ]  Children[ ]  Restoration/Spirituality:  Substance hx:  [ ]   Tobacco hx:  [ ]   Alcohol hx: [ ]   Home Opioid hx:  [ ] I-Stop Reference No:  Living Situation: [ ]Home  [ ]Long term care  [ ]Rehab [ ]Other  Met c patient at bedside c daughter/care giver,Katty Turcios,at bedside  for the introduction of care of case management and discuss DC planning  Daughter confirmed demographic,patient resides c daughter in an apartment c  elevator access. PTA,patient was using a walker to ambulate and has private HHA  via [facility] for 12 hours per week to assist c ADL.   ADVANCE DIRECTIVES:    DNR/MOLST  [ ]  Living Will  [ ]   DECISION MAKER(s):  [ ] Health Care Proxy(s)  [ ] Surrogate(s)  [ ] Guardian           Name(s): Phone Number(s):    BASELINE (I)ADL(s) (prior to admission):  [place]: [ ]Total  [ ] Moderate [ ]Dependent    Allergies    No Known Allergies    Intolerances    MEDICATIONS  (STANDING):  gabapentin 100 milliGRAM(s) Oral daily  heparin   Injectable 5000 Unit(s) SubCutaneous every 8 hours  influenza  Vaccine (HIGH DOSE) 0.5 milliLiter(s) IntraMuscular once  lidocaine   4% Patch 1 Patch Transdermal daily  liothyronine 5 MICROGram(s) Oral daily  naloxone Injectable 0.4 milliGRAM(s) IV Push once  polyethylene glycol 3350 17 Gram(s) Oral daily  senna 2 Tablet(s) Oral at bedtime  sodium zirconium cyclosilicate 5 Gram(s) Oral every 8 hours    MEDICATIONS  (PRN):  acetaminophen     Tablet .. 650 milliGRAM(s) Oral every 6 hours PRN Temp greater or equal to 38C (100.4F), Mild Pain (1 - 3)  aluminum hydroxide/magnesium hydroxide/simethicone Suspension 30 milliLiter(s) Oral every 4 hours PRN Dyspepsia  bisacodyl 5 milliGRAM(s) Oral daily PRN Constipation  melatonin 3 milliGRAM(s) Oral at bedtime PRN Insomnia  ondansetron Injectable 4 milliGRAM(s) IV Push every 8 hours PRN Nausea and/or Vomiting    PRESENT SYMPTOMS: [ ]Unable to self-report  [ ] CPOT [ ] PAINADs [ ] RDOS  Source if other than patient:  [ ]Family   [ ]Team     Pain: [ ]yes [ ]no  QOL impact -   Location -                    Aggravating factors -  Quality -  Radiation -  Timing-  Severity (0-10 scale):  Minimal acceptable level (0-10 scale):     CPOT:    https://www.[facility].org/getattachment/ord47h39-4f7b-6d3w-5r8g-2243p1012h4y/Critical-Care-Pain-Observation-Tool-(CPOT)    PAINAD Score: See PAINAD tool and score below     Dyspnea:                           [ ]Mild [ ]Moderate [ ]Severe    RDOS: See RDOS tool and score below   0 to 2  minimal or no respiratory distress   3  mild distress  4 to 6 moderate distress  >7 severe distress      Anxiety:                             [ ]Mild [ ]Moderate [ ]Severe  Fatigue:                             [ ]Mild [ ]Moderate [ ]Severe  Nausea:                             [ ]Mild [ ]Moderate [ ]Severe  Loss of appetite:              [ ]Mild [ ]Moderate [ ]Severe  Constipation:                    [ ]Mild [ ]Moderate [ ]Severe    PCSSQ[Palliative Care Spiritual Screening Question]   Severity (0-10):  Score of 4 or > indicate consideration of Chaplaincy referral.  Chaplaincy Referral: [ ] yes [ ] refused [ ] following [ ] Deferred     Caregiver [place]? : [ ] yes [ ] no [ ] Deferred [ ] Declined             Social work referral [ ] Patient & Family Centered Care Referral [ ]     Anticipatory Grief present?:  [ ] yes [ ] no  [ ] Deferred                  Social work referral [ ] Chaplaincy Referral [ ]    		  Other Symptoms:  [ ]All other review of systems negative     Palliative Performance Status Version 2:   See PPSv2 tool and score below          PHYSICAL EXAM:  Vital Signs Last 24 Hrs  T(C): 36.6 (18 Nov 2024 16:30), Max: 37 (17 Nov 2024 20:40)  T(F): 97.9 (18 Nov 2024 16:30), Max: 98.6 (17 Nov 2024 20:40)  HR: 90 (18 Nov 2024 16:30) (76 - 90)  BP: 112/68 (18 Nov 2024 16:30) (100/66 - 135/70)  BP(mean): --  RR: 18 (18 Nov 2024 16:30) (18 - 18)  SpO2: 98% (18 Nov 2024 16:30) (96% - 100%)    Parameters below as of 18 Nov 2024 16:30  Patient On (Oxygen Delivery Method): room air     I&O's Summary    17 Nov 2024 07:01  -  18 Nov 2024 07:00  --------------------------------------------------------  IN: 0 mL / OUT: 1000 mL / NET: -1000 mL    18 Nov 2024 07:01  -  18 Nov 2024 17:54  --------------------------------------------------------  IN: 240 mL / OUT: 0 mL / NET: 240 mL      GENERAL: [ ]Cachexia    [ ]Alert  [ ]Oriented x   [ ]Lethargic  [ ]Unarousable  [ ]Verbal  [ ]Non-Verbal  Behavioral:   [ ] Anxiety  [ ] Delirium [ ] Agitation [ ] Other  HEENT:  [ ]Normal   [ ]Dry mouth   [ ]ET Tube/Trach  [ ]Oral lesions  PULMONARY:   [ ]Clear [ ]Tachypnea  [ ]Audible excessive secretions   [ ]Rhonchi        [ ]Right [ ]Left [ ]Bilateral  [ ]Crackles        [ ]Right [ ]Left [ ]Bilateral  [ ]Wheezing     [ ]Right [ ]Left [ ]Bilateral  [ ]Diminished breath sounds [ ]right [ ]left [ ]bilateral  CARDIOVASCULAR:    [ ]Regular [ ]Irregular [ ]Tachy  [ ]Leander [ ]Murmur [ ]Other  GASTROINTESTINAL:  [ ]Soft  [ ]Distended   [ ]+BS  [ ]Non tender [ ]Tender  [ ]Other [ ]PEG [ ]OGT/ NGT  Last BM:  GENITOURINARY:  [ ]Normal [ ] Incontinent   [ ]Oliguria/Anuria   [ ][place]  MUSCULOSKELETAL:   [ ]Normal   [ ]Weakness  [ ]Bed/Wheelchair bound [ ]Edema  NEUROLOGIC:   [ ]No focal deficits  [ ]Cognitive impairment  [ ]Dysphagia [ ]Dysarthria [ ]Paresis [ ]Other   SKIN:   [ ]Normal  [ ]Rash  [ ]Other  [ ]Pressure ulcer(s)       Present on admission [ ]y [ ]n    CRITICAL CARE:  [ ] Shock Present  [ ]Septic [ ]Cardiogenic [ ]Neurologic [ ]Hypovolemic  [ ]  Vasopressors [ ]  Inotropes   [ ]Respiratory failure present [ ]Mechanical ventilation [ ]Non-invasive ventilatory support [ ]High flow    [ ]Acute  [ ]Chronic [ ]Hypoxic  [ ]Hypercarbic [ ]Other  [ ]Other organ failure     LABS:                        12.8   13.42 )-----------( 266      ( 18 Nov 2024 07:02 )             39.4   11-18    137  |  101  |  50[H]  ----------------------------<  113[H]  5.8[H]   |  21[L]  |  1.61[H]    Ca    10.0      18 Nov 2024 09:53  Phos  4.1     11-18  Mg     2.5     11-18        Urinalysis Basic - ( 18 Nov 2024 09:53 )    Color: x / Appearance: x / SG: x / pH: x  Gluc: 113 mg/dL / Ketone: x  / Bili: x / Urobili: x   Blood: x / Protein: x / Nitrite: x   Leuk Esterase: x / RBC: x / WBC x   Sq Epi: x / Non Sq Epi: x / Bacteria: x      RADIOLOGY & ADDITIONAL STUDIES:  < from: MR Shoulder Joint w/wo IV Cont, Right (11.14.24 @ 11:54) >    IMPRESSION:  1.  Patient motion causes some limitation.  2.  There appears to be extensive full-thickness tearing of the rotator   cuff.  3.  Severe osteoarthritis is present.  4.  Large effusion is noted with severe synovitis.    --- End of Report ---            KATERYNA REESE MD; Attending Radiologist  This document has been electronically signed. Nov 14 2024 12:22PM    < end of copied text >  < from: CT Angio Chest PE Protocol w/ IV Cont (11.13.24 @ 17:47) >  IMPRESSION:    No pulmonary embolism to the level of the segmental arteries. Limited   evaluation of subsegmental arteries secondary to motion artifact.    Indeterminate lytic lesion within the tip of the left scapula. Consider   myeloma or metastatic disease.    Diffuse mild septal thickening may relate to component of interstitial   edema.    Cardiomegaly. Dilated main pulmonary artery suggests pulmonary   hypertension, unchanged since 02/25/2018.    --- End of Report ---          SHABNAM COREAS MD; Resident Radiologist  This document has been electronically signed.  VÍCTOR RODRIGES MD; Attending Radiologist  This document has been electronically signed. Nov 13 2024  7:23PM    < end of copied text >  < from: CT Pelvis Bony Only No Cont (11.13.24 @ 17:47) >    IMPRESSION:    Advanced, somewhat erosive arthropathy of the right hip joint space with   thinning of the medial and posterior acetabular walls, as well as a large   right hip joint effusion, which may be degenerative or inflammatory in   etiology. However, septic arthritis of the right hip joint space cannot   be excluded and is also a differential consideration. If there is   clinical concern for septic arthritis of the right hip joint space, right   hip joints aspiration should be performed.    Mild subchondral collapse of the right femoral head, fairly similar to   the prior radiographs dated 4/24/2024.    Indeterminate complex mixed attenuating lesion within the region of the   left ovary/adnexa. Recommend further evaluation with pelvic ultrasound or   pelvic MRI without and with contrast to rule out an underlying neoplasm   in this region.    Partially imaged indeterminate hypoattenuating lesion within the inferior   pole of the left kidney. Renal neoplasm is one of the differential   considerations. Recommend further evaluation with renal ultrasound to   rule out neoplastic etiologies.    --- End of Report ---            SUSHMA DUBOIS M.D., ATTENDING RADIOLOGIST  This document has been electronically signed. Nov 13 2024  6:35PM    < end of copied text >    PROTEIN CALORIE MALNUTRITION PRESENT: [ ]mild [ ]moderate [ ]severe [ ]underweight [ ]morbid obesity  https://www.andeal.org/vault/2440/web/files/ONC/Table_Clinical%20Characteristics%20to%20Document%20Malnutrition-White%20JV%20et%20al%202012.pdf    Height (cm): 160 (11-13-24 @ 12:20), 160 (11-10-24 @ 18:20), 160 (11-08-24 @ 15:42)  Weight (kg): 71.2 (11-14-24 @ 07:50), 45.4 (11-10-24 @ 18:20), 71.2 (11-08-24 @ 15:42)  BMI (kg/m2): 27.8 (11-14-24 @ 07:50), 17.7 (11-13-24 @ 12:20), 17.7 (11-10-24 @ 18:20)    [ ]PPSV2 < or = to 30% [ ]significant weight loss  [ ]poor nutritional intake  [ ]anasarca[ ]Artificial Nutrition      Other REFERRALS:  [ ]Hospice  [ ]Child Life  [ ]Social Work  [ ]Case management [ ]Holistic Therapy     Goals of Care Document:  Date of Service:11-18-24 @ 17:54  HPI:  89yo f w pmh mci/dementia, htn, hld, hfpef, hypothyroidism, severe oa, hcv s/p treatment, vte (dvt), p/w sob/ku, persistent shoulder pain, inability to perform adls; patient initially with right shoulder pain on 11/8, came to er, and was discharged home with outpatient follow up; right shoulder pain persisted and so patient returned to [facility] er on 11/10 and was hospitalized 11/10-11/12; imaging showed severe oa, pain managed w oxycodone, and patient discharged home with home pt. since arriving home, after recent hospitalization, daughter has found that patient now requires more assistance, w she herself covering the other 12 hours hha is unavailable for (at baseline, patient requires assistance with adls, has 12 hour hha at home). in addition, shoulder pain persists and daughter noticed patient appearing short of breath; she mentions that she has not been giving patient her home lasix for a while now as it causes patient to pass too much urine, and makes it difficult to care for patient as she is totally dependent ; she grew concerned, so had patient brought back to [facility] er for further evaluation. in er, found to be in ahrf req ~3 lpm via nc; suspect 2/2 mild adhf; admit to medicine for further mgmt (13 Nov 2024 23:08)    The patient is treated for decompensated HF, IZZY over CKD, Shoulder pain (likely 2/2 to OA), and UTI. Also work up showed incidental findings that question the possibility of a neoplasm.  Geriatrics and Palliative Medicine ([facility]) Team was called for goals of care and resources. However, we will also give recs regarding pain.     Over 24 hours, she used Oxy 2.5mg PO x 3 over 24 hours (6am to 6am)     PERTINENT PM/SXH:   Hypertension    Hepatitis C    Hypothyroidism    Arthritis    Hypertension    Hepatitis C    Dementia without behavioral disturbance, unspecified dementia type    Bifascicular block      No significant past surgical history    Status post placement of implantable loop recorder      FAMILY HISTORY:    Family Hx substance abuse [ ]yes [ ]no  ITEMS NOT CHECKED ARE NOT PRESENT    SOCIAL HISTORY:   Significant other/partner[ ]  Children[ ]  Alevism/Spirituality:  Substance hx:  [ ]   Tobacco hx:  [ ]   Alcohol hx: [ ]   Home Opioid hx:  [ ] I-Stop Reference No:  #: [id number]    PDI	My Rx	Current Rx	Drug Type	Rx Written	Rx Dispensed	Drug	Quantity	Days Supply	Prescriber Name	Prescriber OBINNA #	Payment Method	Dispenser  A	N	N	O	11/12/2024	11/12/2024	oxycodone hcl (ir) 5 mg tablet	5	5	Ted Mohr (NP-Bc)	[id number]	Insurance	[facility]  Living Situation: [ ]Home  [ ]Long term care  [ ]Rehab [ ]Other  Met c patient at bedside c daughter/care giver,Katty Turcios,at bedside  for the introduction of care of case management and discuss DC planning  Daughter confirmed demographic,patient resides c daughter in an apartment c  elevator access. PTA,patient was using a walker to ambulate and has private HHA  via [facility] for 12 hours per week to assist c ADL.   ADVANCE DIRECTIVES:    DNR/MOLST  [ ]  Living Will  [ ]   DECISION MAKER(s):  [ ] Health Care Proxy(s)  [x ] Surrogate(s)  [ ] Guardian           Name(s): Phone Number(s): Children (x 3)    BASELINE (I)ADL(s) (prior to admission):  [place]: [ ]Total  [ ] Moderate [x ]Dependent    Allergies    No Known Allergies    Intolerances    MEDICATIONS  (STANDING):  gabapentin 100 milliGRAM(s) Oral daily  heparin   Injectable 5000 Unit(s) SubCutaneous every 8 hours  influenza  Vaccine (HIGH DOSE) 0.5 milliLiter(s) IntraMuscular once  lidocaine   4% Patch 1 Patch Transdermal daily  liothyronine 5 MICROGram(s) Oral daily  naloxone Injectable 0.4 milliGRAM(s) IV Push once  polyethylene glycol 3350 17 Gram(s) Oral daily  senna 2 Tablet(s) Oral at bedtime  sodium zirconium cyclosilicate 5 Gram(s) Oral every 8 hours    MEDICATIONS  (PRN):  acetaminophen     Tablet .. 650 milliGRAM(s) Oral every 6 hours PRN Temp greater or equal to 38C (100.4F), Mild Pain (1 - 3)  aluminum hydroxide/magnesium hydroxide/simethicone Suspension 30 milliLiter(s) Oral every 4 hours PRN Dyspepsia  bisacodyl 5 milliGRAM(s) Oral daily PRN Constipation  melatonin 3 milliGRAM(s) Oral at bedtime PRN Insomnia  ondansetron Injectable 4 milliGRAM(s) IV Push every 8 hours PRN Nausea and/or Vomiting    PRESENT SYMPTOMS: [x ]Unable to self-report  [ ] CPOT [ ] PAINADs [ ] RDOS  Source if other than patient:  [ ]Family   [ ]Team     Pain: [ ]yes [ ]no  QOL impact -   Location -      Right shoulder              Aggravating factors -  Quality -  Radiation -  Timing-  Severity (0-10 scale):  Minimal acceptable level (0-10 scale):     CPOT:    https://www.[facility].org/getattachment/[email or web address]-[email or web address]/Critical-Care-Pain-Observation-Tool-(CPOT)    PAINAD Score: See PAINAD tool and score below     Dyspnea:                           [ ]Mild [ ]Moderate [ ]Severe    RDOS: See RDOS tool and score below   0 to 2  minimal or no respiratory distress   3  mild distress  4 to 6 moderate distress  >7 severe distress      Anxiety:                             [ ]Mild [ ]Moderate [ ]Severe  Fatigue:                             [ ]Mild [ ]Moderate [ ]Severe  Nausea:                             [ ]Mild [ ]Moderate [ ]Severe  Loss of appetite:              [ ]Mild [ ]Moderate [ ]Severe  Constipation:                    [ ]Mild [ ]Moderate [ ]Severe    PCSSQ[Palliative Care Spiritual Screening Question]   Severity (0-10):  Score of 4 or > indicate consideration of Chaplaincy referral.  Chaplaincy Referral: [ ] yes [ ] refused [ ] following [x ] Deferred     Caregiver [place]? : [ x] yes [ ] no [ ] Deferred [ ] Declined             Social work referral [x ] Patient & Family Centered Care Referral [ ]     Anticipatory Grief present?:  [ ] yes [ x] no  [ ] Deferred                  Social work referral [ ] Chaplaincy Referral [ ]    		  Other Symptoms:  [ ]All other review of systems negative     Palliative Performance Status Version 2:   See PPSv2 tool and score below          PHYSICAL EXAM:  Vital Signs Last 24 Hrs  T(C): 36.6 (18 Nov 2024 16:30), Max: 37 (17 Nov 2024 20:40)  T(F): 97.9 (18 Nov 2024 16:30), Max: 98.6 (17 Nov 2024 20:40)  HR: 90 (18 Nov 2024 16:30) (76 - 90)  BP: 112/68 (18 Nov 2024 16:30) (100/66 - 135/70)  BP(mean): --  RR: 18 (18 Nov 2024 16:30) (18 - 18)  SpO2: 98% (18 Nov 2024 16:30) (96% - 100%)    Parameters below as of 18 Nov 2024 16:30  Patient On (Oxygen Delivery Method): room air     I&O's Summary    17 Nov 2024 07:01  -  18 Nov 2024 07:00  --------------------------------------------------------  IN: 0 mL / OUT: 1000 mL / NET: -1000 mL    18 Nov 2024 07:01  -  18 Nov 2024 17:54  --------------------------------------------------------  IN: 240 mL / OUT: 0 mL / NET: 240 mL      GENERAL: [ ]Cachexia    [ ]Alert  [ ]Oriented x   [x ]Lethargic  [ ]Unarousable  [ ]Verbal  [ ]Non-Verbal  Behavioral:   [ ] Anxiety  [ ] Delirium [ ] Agitation [ ] Other  HEENT:  [ ]Normal   [x ]Dry mouth   [ ]ET Tube/Trach  [ ]Oral lesions  PULMONARY:   [ ]Clear [ ]Tachypnea  [ ]Audible excessive secretions   [ ]Rhonchi        [ ]Right [ ]Left [ ]Bilateral  [ ]Crackles        [ ]Right [ ]Left [ ]Bilateral  [ ]Wheezing     [ ]Right [ ]Left [ ]Bilateral  [ x]Diminished breath sounds [ ]right [ ]left [x ]bilateral  CARDIOVASCULAR:    [ x]Regular [ ]Irregular [ ]Tachy  [ ]Leander [ ]Murmur [ ]Other  GASTROINTESTINAL:  [x ]Soft  [ ]Distended   [x ]+BS  [x ]Non tender [ ]Tender  [ ]Other [ ]PEG [ ]OGT/ NGT  Last BM:  GENITOURINARY:  [ ]Normal [ ] Incontinent   [ ]Oliguria/Anuria   [ ][place]  MUSCULOSKELETAL:   [ ]Normal   [ ]Weakness  [ x]Bed/Wheelchair bound [ ]Edema  NEUROLOGIC:   [ ]No focal deficits  [x ]Cognitive impairment (FAST 7c)  [ ]Dysphagia [ ]Dysarthria [ ]Paresis [ ]Other   SKIN:   [x ]Normal  [ ]Rash  [ ]Other  [ ]Pressure ulcer(s)       Present on admission [ ]y [ ]n    CRITICAL CARE:  [ ] Shock Present  [ ]Septic [ ]Cardiogenic [ ]Neurologic [ ]Hypovolemic  [ ]  Vasopressors [ ]  Inotropes   [ ]Respiratory failure present [ ]Mechanical ventilation [ ]Non-invasive ventilatory support [ ]High flow    [ ]Acute  [ ]Chronic [ ]Hypoxic  [ ]Hypercarbic [ ]Other  [ ]Other organ failure     LABS:                        12.8   13.42 )-----------( 266      ( 18 Nov 2024 07:02 )             39.4   11-18    137  |  101  |  50[H]  ----------------------------<  113[H]  5.8[H]   |  21[L]  |  1.61[H]    Ca    10.0      18 Nov 2024 09:53  Phos  4.1     11-18  Mg     2.5     11-18        Urinalysis Basic - ( 18 Nov 2024 09:53 )    Color: x / Appearance: x / SG: x / pH: x  Gluc: 113 mg/dL / Ketone: x  / Bili: x / Urobili: x   Blood: x / Protein: x / Nitrite: x   Leuk Esterase: x / RBC: x / WBC x   Sq Epi: x / Non Sq Epi: x / Bacteria: x      RADIOLOGY & ADDITIONAL STUDIES:  < from: MR Shoulder Joint w/wo IV Cont, Right (11.14.24 @ 11:54) >    IMPRESSION:  1.  Patient motion causes some limitation.  2.  There appears to be extensive full-thickness tearing of the rotator   cuff.  3.  Severe osteoarthritis is present.  4.  Large effusion is noted with severe synovitis.    --- End of Report ---            KATERYNA REESE MD; Attending Radiologist  This document has been electronically signed. Nov 14 2024 12:22PM    < end of copied text >  < from: CT Angio Chest PE Protocol w/ IV Cont (11.13.24 @ 17:47) >  IMPRESSION:    No pulmonary embolism to the level of the segmental arteries. Limited   evaluation of subsegmental arteries secondary to motion artifact.    Indeterminate lytic lesion within the tip of the left scapula. Consider   myeloma or metastatic disease.    Diffuse mild septal thickening may relate to component of interstitial   edema.    Cardiomegaly. Dilated main pulmonary artery suggests pulmonary   hypertension, unchanged since 02/25/2018.    --- End of Report ---          SHABNAM COREAS MD; Resident Radiologist  This document has been electronically signed.  VÍCTOR RODRIGES MD; Attending Radiologist  This document has been electronically signed. Nov 13 2024  7:23PM    < end of copied text >  < from: CT Pelvis Bony Only No Cont (11.13.24 @ 17:47) >    IMPRESSION:    Advanced, somewhat erosive arthropathy of the right hip joint space with   thinning of the medial and posterior acetabular walls, as well as a large   right hip joint effusion, which may be degenerative or inflammatory in   etiology. However, septic arthritis of the right hip joint space cannot   be excluded and is also a differential consideration. If there is   clinical concern for septic arthritis of the right hip joint space, right   hip joints aspiration should be performed.    Mild subchondral collapse of the right femoral head, fairly similar to   the prior radiographs dated 4/24/2024.    Indeterminate complex mixed attenuating lesion within the region of the   left ovary/adnexa. Recommend further evaluation with pelvic ultrasound or   pelvic MRI without and with contrast to rule out an underlying neoplasm   in this region.    Partially imaged indeterminate hypoattenuating lesion within the inferior   pole of the left kidney. Renal neoplasm is one of the differential   considerations. Recommend further evaluation with renal ultrasound to   rule out neoplastic etiologies.    --- End of Report ---            SUSHMA DUBOIS M.D., ATTENDING RADIOLOGIST  This document has been electronically signed. Nov 13 2024  6:35PM    < end of copied text >    PROTEIN CALORIE MALNUTRITION PRESENT: [ ]mild [ ]moderate [ ]severe [ ]underweight [ ]morbid obesity  https://www.andeal.org/vault/2440/web/files/ONC/Table_Clinical%20Characteristics%20to%20Document%20Malnutrition-White%20JV%20et%20al%202012.pdf    Height (cm): 160 (11-13-24 @ 12:20), 160 (11-10-24 @ 18:20), 160 (11-08-24 @ 15:42)  Weight (kg): 71.2 (11-14-24 @ 07:50), 45.4 (11-10-24 @ 18:20), 71.2 (11-08-24 @ 15:42)  BMI (kg/m2): 27.8 (11-14-24 @ 07:50), 17.7 (11-13-24 @ 12:20), 17.7 (11-10-24 @ 18:20)    [ ]PPSV2 < or = to 30% [ ]significant weight loss  [ ]poor nutritional intake  [ ]anasarca[ ]Artificial Nutrition      Other REFERRALS:  [ ]Hospice  [ ]Child Life  [ ]Social Work  [ ]Case management [ ]Holistic Therapy     Goals of Care Document:

## 2024-11-18 NOTE — PROGRESS NOTE ADULT - PROBLEM SELECTOR PLAN 7
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 However, septic arthritis of the right hip joint space cannot be excluded and is also a differential consideration. If there is clinical concern for septic arthritis of the right hip joint space, right hip joints aspiration should be performed....Mild subchondral collapse of the right femoral head, fairly similar to the prior radiographs dated 4/24/2024.  afebrile, no leukocytosis, hds  analgesics as needed - pt more sleepy with oxy 5mg, continue with 2.5   Ortho consulted- no internvention    => Partially imaged indeterminate hypoattenuating lesion within the inferior pole of the left kidney. Renal neoplasm is one of the differential considerations. Recommend further evaluation with renal ultrasound to rule out neoplastic etiologies.  consider renal us  outpatient follow up    =>Indeterminate complex mixed attenuating lesion within the region of the left ovary/adnexa. Recommend further evaluation with pelvic ultrasound or pelvic MRI without and with contrast to rule out an underlying neoplasm in this region.  consider tvus/taus  outpatient follow up

## 2024-11-18 NOTE — PROGRESS NOTE ADULT - SUBJECTIVE AND OBJECTIVE BOX
DATE OF SERVICE: 11-18-24 @ 15:05    Patient is a 89y old  Female who presents with a chief complaint of sob/ku, persistent shoulder pain, inability to perform adls (17 Nov 2024 14:33)      INTERVAL HISTORY: Feels ok.     REVIEW OF SYSTEMS: Limited participant in ROS  CONSTITUTIONAL: No weakness  EYES/ENT: No visual changes;  No throat pain   NECK: No pain or stiffness  RESPIRATORY: No cough, wheezing; No shortness of breath  CARDIOVASCULAR: No chest pain or palpitations  GASTROINTESTINAL: No abdominal  pain. No nausea, vomiting, or hematemesis  GENITOURINARY: No dysuria, frequency or hematuria  NEUROLOGICAL: No stroke like symptoms  SKIN: No rashes    	  MEDICATIONS:        PHYSICAL EXAM:  T(C): 36.5 (11-18-24 @ 11:06), Max: 37 (11-17-24 @ 15:58)  HR: 78 (11-18-24 @ 11:06) (76 - 90)  BP: 111/62 (11-18-24 @ 11:06) (100/66 - 137/85)  RR: 18 (11-18-24 @ 11:06) (18 - 18)  SpO2: 100% (11-18-24 @ 11:06) (96% - 100%)  Wt(kg): --  I&O's Summary    17 Nov 2024 07:01  -  18 Nov 2024 07:00  --------------------------------------------------------  IN: 0 mL / OUT: 1000 mL / NET: -1000 mL    18 Nov 2024 07:01  -  18 Nov 2024 15:06  --------------------------------------------------------  IN: 240 mL / OUT: 0 mL / NET: 240 mL          Appearance: In no distress	  HEENT:    PERRL, EOMI	  Cardiovascular:  S1 S2, No JVD  Respiratory: Lungs clear to auscultation	  Gastrointestinal:  Soft, Non-tender, + BS	  Vascularature:  No edema of LE  Psychiatric: Appropriate affect   Neuro: no acute focal deficits                               12.8   13.42 )-----------( 266      ( 18 Nov 2024 07:02 )             39.4     11-18    137  |  101  |  50[H]  ----------------------------<  113[H]  5.8[H]   |  21[L]  |  1.61[H]    Ca    10.0      18 Nov 2024 09:53  Phos  4.1     11-18  Mg     2.5     11-18          Labs personally reviewed      ASSESSMENT/PLAN: 	      89yo f w pmh mci/dementia, htn, hld, hfpef, hypothyroidism, severe oa, hcv s/p treatment, vte (dvt), p/w sob/ku, persistent shoulder pain, inability to perform adls; in er, found to be in ahrf req ~3 lpm via nc; 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    Follows with Julissa Tejada/[facility]. I discussed with Dr Leija    Problem/Plan #1:  Problem: Shortness of Breath  - ECG SR 1st AVB bifascicular block  -   - CXR with clear lungs  - CT neg for PE but suggestive of intersitial edema and pHTN  - TTE 11/15- EF 69%, no WMA, elevated LV filling pressure daily  - Appears euvolemic and now with IZZY. Will hold diuresis.     Problem/Plan #2:  Problem: Hypertension  - lisinopril 10mg PO daily on hold given IZZY    Problem/Plan #3:  Problem: HLD  - not currently on any statin or anti-lipid medication    Problem/Plan #4:  Problem: DVT PPx  - c/w SQ lovenox          CHELSIE Arellano-NP   Gato Gomez DO [facility]  Cardiovascular Medicine  [address] [place] [address], [address]  Available through call or text on [employer]  Office: [phone]

## 2024-11-18 NOTE — PROGRESS NOTE ADULT - SUBJECTIVE AND OBJECTIVE BOX
SUBJECTIVE / OVERNIGHT EVENTS:  Today is hospital day 5d. There are no new issues or overnight events.   Did not report any lightheadedness, vertigo, shortness of breathe, chest pain, palpitations, vomiting, diarrhea currently    HPI:  89yo f w pmh mci/dementia, htn, hld, hfpef, hypothyroidism, severe oa, hcv s/p treatment, vte (dvt), p/w sob/ku, persistent shoulder pain, inability to perform adls; patient initially with right shoulder pain on 11/8, came to er, and was discharged home with outpatient follow up; right shoulder pain persisted and so patient returned to [facility] on 11/10 and was hospitalized 11/10-11/12; imaging showed severe oa, pain managed w oxycodone, and patient discharged home with home pt. since arriving home, after recent hospitalization, daughter has found that patient now requires more assistance, w she herself covering the other 12 hours hha is unavailable for (at baseline, patient requires assistance with adls, has 12 hour hha at home). in addition, shoulder pain persists and daughter noticed patient appearing short of breath; she mentions that she has not been giving patient her home lasix for a while now as it causes patient to pass too much urine, and makes it difficult to care for patient as she is totally dependent ; she grew concerned, so had patient brought back to [facility] for further evaluation. in er, found to be in ahrf req ~3 lpm via nc; suspect 2/2 mild adhf; admit to medicine for further mgmt (13 Nov 2024 23:08)    MEDICATIONS  (STANDING):  dextrose 50% Injectable 50 milliLiter(s) IV Push once  gabapentin 100 milliGRAM(s) Oral daily  heparin   Injectable 5000 Unit(s) SubCutaneous every 8 hours  influenza  Vaccine (HIGH DOSE) 0.5 milliLiter(s) IntraMuscular once  insulin regular  human recombinant 5 Unit(s) IV Push once  lidocaine   4% Patch 1 Patch Transdermal daily  liothyronine 5 MICROGram(s) Oral daily  naloxone Injectable 0.4 milliGRAM(s) IV Push once  polyethylene glycol 3350 17 Gram(s) Oral daily  senna 2 Tablet(s) Oral at bedtime  sodium zirconium cyclosilicate 5 Gram(s) Oral every 8 hours    MEDICATIONS  (PRN):  acetaminophen     Tablet .. 650 milliGRAM(s) Oral every 6 hours PRN Temp greater or equal to 38C (100.4F), Mild Pain (1 - 3)  aluminum hydroxide/magnesium hydroxide/simethicone Suspension 30 milliLiter(s) Oral every 4 hours PRN Dyspepsia  bisacodyl 5 milliGRAM(s) Oral daily PRN Constipation  melatonin 3 milliGRAM(s) Oral at bedtime PRN Insomnia  ondansetron Injectable 4 milliGRAM(s) IV Push every 8 hours PRN Nausea and/or Vomiting    HOME MEDICATIONS:  gabapentin 100 mg oral capsule: 1 cap(s) orally once a day  lidocaine 4% topical film: Apply topically to affected area once a day 12hrs on 12hrs off  liothyronine 5 mcg oral tablet: 1 tab(s) orally once a day  lisinopril 10 mg oral tablet: 1 tab(s) orally once a day  Narcan 4 mg/0.1 mL nasal spray: 1 spray(s) intranasally once a day use as instructed  oxyCODONE 5 mg oral tablet: 0.5 tab(s) orally 2 times a day MDD: 1    PHYSICAL EXAM  Vital Signs Last 24 Hrs  T(C): 36.5 (18 Nov 2024 11:06), Max: 37 (17 Nov 2024 15:58)  T(F): 97.7 (18 Nov 2024 11:06), Max: 98.6 (17 Nov 2024 15:58)  HR: 78 (18 Nov 2024 11:06) (76 - 90)  BP: 111/62 (18 Nov 2024 11:06) (100/66 - 137/85)  BP(mean): --  RR: 18 (18 Nov 2024 11:06) (18 - 18)  SpO2: 100% (18 Nov 2024 11:06) (96% - 100%)    Parameters below as of 18 Nov 2024 11:06  Patient On (Oxygen Delivery Method): room air        11-17-24 @ 07:01  -  11-18-24 @ 07:00  --------------------------------------------------------  IN: 0 mL / OUT: 1000 mL / NET: -1000 mL    11-18-24 @ 07:01  -  11-18-24 @ 15:15  --------------------------------------------------------  IN: 240 mL / OUT: 0 mL / NET: 240 mL      CONSTITUTIONAL: Well-groomed, in no apparent distress;  EYES: No conjunctival or scleral injection, non-icteric;  ENMT: No external nasal lesions; Normal outer ears;  NECK: Trachea midline;  RESPIRATORY: Normal respiratory effort;   CARDIOVASCULAR: Regular rate and rhythm;  GASTROINTESTINAL: Non-distended;   EXTREMITIES:  No lower extremity edema;  NEUROLOGY: Does respond to commands appropriately;  PSYCHIATRY: Mood and Affect appropriate    LABS:                        12.8   13.42 )-----------( 266      ( 18 Nov 2024 07:02 )             39.4     11-18    137  |  101  |  50[H]  ----------------------------<  113[H]  5.8[H]   |  21[L]  |  1.61[H]    Ca    10.0      18 Nov 2024 09:53  Phos  4.1     11-18  Mg     2.5     11-18            Urinalysis Basic - ( 18 Nov 2024 09:53 )    Color: x / Appearance: x / SG: x / pH: x  Gluc: 113 mg/dL / Ketone: x  / Bili: x / Urobili: x   Blood: x / Protein: x / Nitrite: x   Leuk Esterase: x / RBC: x / WBC x   Sq Epi: x / Non Sq Epi: x / Bacteria: x            RADIOLOGY & ADDITIONAL TESTS:  EKG  12 Lead ECG:   Ventricular Rate 76 BPM    Atrial Rate 76 BPM    P-R Interval 262 ms    QRS Duration 144 ms    Q-T Interval 420 ms    QTC Calculation(Bazett) 472 ms    P Axis 46 degrees    R Axis -49 degrees    T Axis -19 degrees    Diagnosis Line SINUS RHYTHM WITH 1ST DEGREE A-V BLOCK  RIGHT BUNDLE BRANCH BLOCK  LEFT ANTERIOR FASCICULAR BLOCK  *** BIFASCICULAR BLOCK ***  ABNORMAL ECG  WHEN COMPARED WITH ECG OF 08-NOV-2024 17:06,  NO SIGNIFICANT CHANGE WAS FOUND  Confirmed by MD LEYLA, MADELIN ([id number]) on 11/11/2024 11:27:30 AM (11-10-24 @ 19:13)  12 Lead ECG:   Ventricular Rate 72 BPM    Atrial Rate 72 BPM    P-R Interval 270 ms    QRS Duration 146 ms    Q-T Interval 418 ms    QTC Calculation(Bazett) 457 ms    P Axis 39 degrees    R Axis -53 degrees    T Axis -10 degrees    Diagnosis Line SINUS RHYTHM WITH 1ST DEGREE A-V BLOCK  RIGHT BUNDLE BRANCH BLOCK  LEFT ANTERIOR FASCICULAR BLOCK  *** BIFASCICULAR BLOCK ***  ABNORMAL ECG  WHEN COMPARED WITH ECG OF 18-MAY-2019 12:22,  NO SIGNIFICANT CHANGE WAS FOUND  Confirmed by MD ROSS JONATHAN ([id number]) on 11/9/2024 1:10:27 PM (11-08-24 @ 17:06)    CT Angio Chest PE Protocol w/ IV Cont:   ACC: [id number] EXAM:  CT ANGIO CHEST PULM ART [facility]   ORDERED BY: FABIANA KAMARA     PROCEDURE DATE:  11/13/2024          INTERPRETATION:  CLINICAL INFORMATION: Respiratory distress. Not   ambulatory.    COMPARISON: CT chest 2/25/2018.    CONTRAST/COMPLICATIONS:  IV Contrast: Omnipaque 350  60 cc administered   40 cc discarded  Oral Contrast: NONE  None reported at time of study completion    PROCEDURE:  CT Angiography of the Chest.  Sagittal and coronal reformats were performed as well as 3D (MIP)   reconstructions.    FINDINGS:  Study is limited due to respiratory motion artifact.    LUNGS AND LARGE AIRWAYS: Respiratory motion artifact significantly limits   assessment of the lower lungs. Patent central airways. Subsegmental   atelectasis in the lingula. Diffuse mild septal thickening may relate to   component of interstitial edema.  PLEURA: No pleural effusion.  VESSELS: No pulmonary embolism to the level of the segmental arteries.   Limited evaluation of subsegmental pulmonary arteries secondary to motion   artifact. Dilatation of the main pulmonary artery, measuring 3.6 cm,   unchanged. Mild aortic calcification.  HEART: Cardiomegaly. No pericardial effusion.  MEDIASTINUM AND KLAUDIA: No lymphadenopathy.  CHEST WALL ANDLOWER NECK: Loop recorder device in the left chest wall.  VISUALIZED UPPER ABDOMEN: Hepatic cysts. Bilocular left renal cyst with   thinly calcified septum.  BONES: 2.1 cm lytic lesion within the tip of the left scapula (601, 81).   Degenerative changes including advanced bilateral shoulder arthropathy   and diffuse multilevel degenerative changes of the spine.    IMPRESSION:    No pulmonary embolism to the level of the segmental arteries. Limited   evaluation of subsegmental arteries secondary to motion artifact.    Indeterminate lytic lesion within the tip of the left scapula. Consider   myeloma or metastatic disease.    Diffuse mild septal thickening may relate to component of interstitial   edema.    Cardiomegaly. Dilated main pulmonary artery suggests pulmonary   hypertension, unchanged since 02/25/2018.    --- End of Report ---          SHABNAM COREAS MD; Resident Radiologist  This document has been electronically signed.  VÍCTOR RODRIGES MD; Attending Radiologist  This document has been electronically signed. Nov 13 2024  7:23PM (11-13-24 @ 17:47)  Xray Chest 1 View- PORTABLE-Urgent:   ACC: [id number] EXAM:  XR CHEST PORTABLE URGENT 1V   ORDERED BY: FABIANA KAMARA     PROCEDURE DATE:  11/13/2024          INTERPRETATION:  HISTORY: Evaluate for pneumonia    TECHNIQUE: A single AP view of the chest was obtained.    COMPARISON: 11/10/2024    FINDINGS:  The cardiac silhouette is normal in size. There is a loop   recorder device. There are no focal consolidations or pleural effusions.   The hilar and mediastinal structures appear unremarkable. The osseous   structures areintact.    IMPRESSION: Clear lungs.    --- End of Report ---            BLUE CAT MD; Attending Radiologist  This document has been electronically signed. Nov 13 2024  2:08PM (11-13-24 @ 13:49)  Xray Chest 1 View- PORTABLE-Urgent:   ACC: [id number] EXAM:  XR CHEST PORTABLE URGENT 1V   ORDERED BY: SRINATH LAZO     PROCEDURE DATE:  11/10/2024          INTERPRETATION:  EXAMINATION: XR CHEST URGENT    CLINICAL INDICATION: Cough.    TECHNIQUE: Single frontal, portable view of the chest was obtained.    COMPARISON: Chest x-ray 11/8/2024.    FINDINGS:  Cardiac loop recorder.  The heart is not accurately assessed in this AP projection.  No focal consolidation.  There is no pneumothorax or pleural effusion.  No acute bony abnormality.    IMPRESSION:  No focal consolidation, pneumothorax, or pleural effusion.    --- End of Report ---          ARVIND DOVE MD; Resident Radiologist  This document has been electronically signed.  MARTHA CURRY MD; Attending Radiologist  This document has been electronically signed. Nov 11 2024 10:08AM (11-10-24 @ 19:37)

## 2024-11-18 NOTE — CONSULT NOTE ADULT - PROBLEM SELECTOR RECOMMENDATION 3
As per her daughter, Tylenol does not work  -Start Oxy 2.5mg PO q 6 ATC and will continue Oxy 2.5mg PO q 4 PRN moderate to severe pain. As per her daughter Oxy 5 caused major sedation, so will avoid that dose for now.   -Narcan PRN.

## 2024-11-18 NOTE — CONSULT NOTE ADULT - REASON FOR ADMISSION
sob/ku, persistent shoulder pain, inability to perform adls
sob/ku, persistent shoulder pain, inability to perform adls
sob/ku, persistent shoulder pain, inability to perform adls, acute decompensated heart failure

## 2024-11-18 NOTE — PROGRESS NOTE ADULT - PROBLEM SELECTOR PLAN 1
h/o hfpef  in no respiratory distress with adequate spo2 on 3 LPM via NC  exam w decreased breath sounds + crackles on auscultation on admission, improved   ct imaging shows interstitial edema  bnp 189  otherwise, no clinft of acs, ekg w no st seg - t wave changes suggestive of acute ischemia, trop 53->50 likely demand/decreased clearance  suspect mild adhf; of note, patient's daughter has not been giving patient her lasix dose for a while now as it causes patient to pass too much urine, and makes it difficult to care for patient as she is totally dependent   TTE no changes compared to previous   Monitor SpO2, RR, for signs of respiratory distress; Goal SpO2 >88-92%, PaO2 >55-60 mmHg  trend volume status via I/Os, daily weights   gentle diuresis w lasix 20 ivp qd; adjust to maintain goal net negative fluid balance- switched to PO- back to IV as became dyspneic  -lasix held for Tracy. Restart after cards eval  Cardiology consulted appreciate raphael

## 2024-11-19 DIAGNOSIS — Z71.89 OTHER SPECIFIED COUNSELING: ICD-10-CM

## 2024-11-19 DIAGNOSIS — Z51.5 ENCOUNTER FOR PALLIATIVE CARE: ICD-10-CM

## 2024-11-19 PROCEDURE — 99233 SBSQ HOSP IP/OBS HIGH 50: CPT

## 2024-11-19 PROCEDURE — 99497 ADVNCD CARE PLAN 30 MIN: CPT | Mod: 25

## 2024-11-19 PROCEDURE — 99232 SBSQ HOSP IP/OBS MODERATE 35: CPT

## 2024-11-19 RX ADMIN — Medication 5 MICROGRAM(S): at 05:30

## 2024-11-19 RX ADMIN — Medication 5000 UNIT(S): at 13:51

## 2024-11-19 RX ADMIN — Medication 5000 UNIT(S): at 22:48

## 2024-11-19 RX ADMIN — OXYCODONE HYDROCHLORIDE 2.5 MILLIGRAM(S): 30 TABLET ORAL at 22:49

## 2024-11-19 RX ADMIN — OXYCODONE HYDROCHLORIDE 2.5 MILLIGRAM(S): 30 TABLET ORAL at 18:16

## 2024-11-19 RX ADMIN — SODIUM ZIRCONIUM CYCLOSILICATE 5 GRAM(S): 5 POWDER, FOR SUSPENSION ORAL at 05:31

## 2024-11-19 RX ADMIN — Medication 2 TABLET(S): at 22:49

## 2024-11-19 RX ADMIN — OXYCODONE HYDROCHLORIDE 2.5 MILLIGRAM(S): 30 TABLET ORAL at 19:32

## 2024-11-19 RX ADMIN — LIDOCAINE 1 PATCH: 40 CREAM TOPICAL at 12:42

## 2024-11-19 RX ADMIN — Medication 5 MILLIGRAM(S): at 22:49

## 2024-11-19 RX ADMIN — Medication 5000 UNIT(S): at 05:31

## 2024-11-19 RX ADMIN — LIDOCAINE 1 PATCH: 40 CREAM TOPICAL at 20:57

## 2024-11-19 RX ADMIN — OXYCODONE HYDROCHLORIDE 2.5 MILLIGRAM(S): 30 TABLET ORAL at 12:42

## 2024-11-19 RX ADMIN — OXYCODONE HYDROCHLORIDE 2.5 MILLIGRAM(S): 30 TABLET ORAL at 06:06

## 2024-11-19 RX ADMIN — OXYCODONE HYDROCHLORIDE 2.5 MILLIGRAM(S): 30 TABLET ORAL at 20:30

## 2024-11-19 RX ADMIN — POLYETHYLENE GLYCOL 3350 17 GRAM(S): 17 POWDER, FOR SOLUTION ORAL at 12:43

## 2024-11-19 RX ADMIN — OXYCODONE HYDROCHLORIDE 2.5 MILLIGRAM(S): 30 TABLET ORAL at 05:31

## 2024-11-19 RX ADMIN — LIDOCAINE 1 PATCH: 40 CREAM TOPICAL at 04:54

## 2024-11-19 RX ADMIN — GABAPENTIN 100 MILLIGRAM(S): 300 CAPSULE ORAL at 12:42

## 2024-11-19 NOTE — PROGRESS NOTE ADULT - PROBLEM SELECTOR PLAN 7
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 However, septic arthritis of the right hip joint space cannot be excluded and is also a differential consideration. If there is clinical concern for septic arthritis of the right hip joint space, right hip joints aspiration should be performed....Mild subchondral collapse of the right femoral head, fairly similar to the prior radiographs dated 4/24/2024.  afebrile, no leukocytosis, hds  analgesics as needed - pt more sleepy with oxy 5mg, continue with 2.5   Ortho consulted- no intervention    => Partially imaged indeterminate hypoattenuating lesion within the inferior pole of the left kidney. Renal neoplasm is one of the differential considerations. Recommend further evaluation with renal ultrasound to rule out neoplastic etiologies.  consider renal us  outpatient follow up    =>Indeterminate complex mixed attenuating lesion within the region of the left ovary/adnexa. Recommend further evaluation with pelvic ultrasound or pelvic MRI without and with contrast to rule out an underlying neoplasm in this region.  consider tvus/taus  outpatient follow up

## 2024-11-19 NOTE — PROGRESS NOTE ADULT - PROBLEM SELECTOR PLAN 6
see goc note above  pt DNR/I  surrogates are pt's 3 children Addy Amaro, and Katarzyna Woods referral placed for disposition

## 2024-11-19 NOTE — PROGRESS NOTE ADULT - PROBLEM SELECTOR PLAN 6
h/o mci/dementia, severe oa  a+o x1-2 at baseline  maintain fall + frac, delirium, aspiration precautions; keep head end of bed elevated  nutrition consult  dysphagia screen/ slp eval - soft/bite size diet, thin liquids  pt/ot eval + sw/cm consult for disposition  Palliative cs - home hospice at [place]

## 2024-11-19 NOTE — PROGRESS NOTE ADULT - PROBLEM SELECTOR PLAN 1
Advanced. FAST 7c. The patient is hospice eligible. See the [facility] not above for my discussion with the patient's daughter about this benefit.   Will also recommend:   -Frequent reassurance and verbal orientation   -Family members or other familiar persons by his bedside.   -Delusions and hallucinations should be neither endorsed nor challenged.   -Physical restraints should be avoided. Alternatives to restraint use, such as constant observation (preferably by someone familiar to the patient such as a family member), may be more effective.

## 2024-11-19 NOTE — PROGRESS NOTE ADULT - SUBJECTIVE AND OBJECTIVE BOX
SUBJECTIVE / OVERNIGHT EVENTS:  Today is hospital day 6d. There are no new issues or overnight events.     HPI:  89yo f w pmh mci/dementia, htn, hld, hfpef, hypothyroidism, severe oa, hcv s/p treatment, vte (dvt), p/w sob/ku, persistent shoulder pain, inability to perform adls; patient initially with right shoulder pain on 11/8, came to er, and was discharged home with outpatient follow up; right shoulder pain persisted and so patient returned to [facility] er on 11/10 and was hospitalized 11/10-11/12; imaging showed severe oa, pain managed w oxycodone, and patient discharged home with home pt. since arriving home, after recent hospitalization, daughter has found that patient now requires more assistance, w she herself covering the other 12 hours hha is unavailable for (at baseline, patient requires assistance with adls, has 12 hour hha at home). in addition, shoulder pain persists and daughter noticed patient appearing short of breath; she mentions that she has not been giving patient her home lasix for a while now as it causes patient to pass too much urine, and makes it difficult to care for patient as she is totally dependent ; she grew concerned, so had patient brought back to [facility] er for further evaluation. in er, found to be in ahrf req ~3 lpm via nc; suspect 2/2 mild adhf; admit to medicine for further mgmt (13 Nov 2024 23:08)    MEDICATIONS  (STANDING):  bisacodyl 5 milliGRAM(s) Oral at bedtime  gabapentin 100 milliGRAM(s) Oral daily  heparin   Injectable 5000 Unit(s) SubCutaneous every 8 hours  influenza  Vaccine (HIGH DOSE) 0.5 milliLiter(s) IntraMuscular once  lidocaine   4% Patch 1 Patch Transdermal daily  liothyronine 5 MICROGram(s) Oral daily  oxyCODONE    IR 2.5 milliGRAM(s) Oral <User Schedule>  polyethylene glycol 3350 17 Gram(s) Oral daily  senna 2 Tablet(s) Oral at bedtime    MEDICATIONS  (PRN):  acetaminophen     Tablet .. 650 milliGRAM(s) Oral every 6 hours PRN Temp greater or equal to 38C (100.4F), Mild Pain (1 - 3)  aluminum hydroxide/magnesium hydroxide/simethicone Suspension 30 milliLiter(s) Oral every 4 hours PRN Dyspepsia  melatonin 3 milliGRAM(s) Oral at bedtime PRN Insomnia  naloxone Injectable 0.1 milliGRAM(s) IV Push every 3 minutes PRN Sedation or respiratory depression 2/2 to opioids  ondansetron Injectable 4 milliGRAM(s) IV Push every 8 hours PRN Nausea and/or Vomiting  oxyCODONE    IR 2.5 milliGRAM(s) Oral every 4 hours PRN Moderate to Severe pain    HOME MEDICATIONS:  gabapentin 100 mg oral capsule: 1 cap(s) orally once a day  lidocaine 4% topical film: Apply topically to affected area once a day 12hrs on 12hrs off  liothyronine 5 mcg oral tablet: 1 tab(s) orally once a day  lisinopril 10 mg oral tablet: 1 tab(s) orally once a day  Narcan 4 mg/0.1 mL nasal spray: 1 spray(s) intranasally once a day use as instructed  oxyCODONE 5 mg oral tablet: 0.5 tab(s) orally 2 times a day MDD: 1    PHYSICAL EXAM  Vital Signs Last 24 Hrs  T(C): 36.7 (19 Nov 2024 11:41), Max: 37 (18 Nov 2024 20:40)  T(F): 98.1 (19 Nov 2024 11:41), Max: 98.6 (18 Nov 2024 20:40)  HR: 71 (19 Nov 2024 11:41) (71 - 103)  BP: 109/65 (19 Nov 2024 11:41) (109/65 - 144/76)  BP(mean): --  RR: 18 (19 Nov 2024 11:41) (18 - 18)  SpO2: 99% (19 Nov 2024 11:41) (94% - 99%)    Parameters below as of 19 Nov 2024 11:41  Patient On (Oxygen Delivery Method): room air        11-18-24 @ 07:01  -  11-19-24 @ 07:00  --------------------------------------------------------  IN: 240 mL / OUT: 250 mL / NET: -10 mL    11-19-24 @ 07:01  -  11-19-24 @ 14:54  --------------------------------------------------------  IN: 0 mL / OUT: 100 mL / NET: -100 mL      CONSTITUTIONAL: Well-groomed, in no apparent distress;  EYES: No conjunctival or scleral injection, non-icteric;  ENMT: No external nasal lesions; Normal outer ears;  NECK: Trachea midline;  RESPIRATORY: Normal respiratory effort;  CARDIOVASCULAR: Regular rate and rhythm;  GASTROINTESTINAL: Non-distended;   EXTREMITIES:  No lower extremity edema;  NEUROLOGY: Does respond to limited commands appropriately;  PSYCHIATRY: Mood and Affect appropriate    LABS:                        12.8   13.42 )-----------( 266      ( 18 Nov 2024 07:02 )             39.4     11-18    137  |  101  |  50[H]  ----------------------------<  113[H]  5.8[H]   |  21[L]  |  1.61[H]    Ca    10.0      18 Nov 2024 09:53  Phos  4.1     11-18  Mg     2.5     11-18            Urinalysis Basic - ( 18 Nov 2024 09:53 )    Color: x / Appearance: x / SG: x / pH: x  Gluc: 113 mg/dL / Ketone: x  / Bili: x / Urobili: x   Blood: x / Protein: x / Nitrite: x   Leuk Esterase: x / RBC: x / WBC x   Sq Epi: x / Non Sq Epi: x / Bacteria: x            RADIOLOGY & ADDITIONAL TESTS:  EKG  12 Lead ECG:   Ventricular Rate 70 BPM    Atrial Rate 70 BPM    P-R Interval 238 ms    QRS Duration 144 ms    Q-T Interval 430 ms    QTC Calculation(Bazett) 464 ms    P Axis 35 degrees    R Axis -46 degrees    T Axis -13 degrees    Diagnosis Line SINUS RHYTHM WITH 1ST DEGREE A-V BLOCK  RIGHT BUNDLE BRANCH BLOCK  LEFT ANTERIOR FASCICULAR BLOCK  *** BIFASCICULAR BLOCK ***  ABNORMAL ECG  WHEN COMPARED WITH ECG OF 10-NOV-2024 19:13,  NO SIGNIFICANT CHANGE WAS FOUND  Confirmed by Sommer Acosta ([id number]) on 11/18/2024 10:26:05 PM (11-13-24 @ 12:44)  12 Lead ECG:   Ventricular Rate 76 BPM    Atrial Rate 76 BPM    P-R Interval 262 ms    QRS Duration 144 ms    Q-T Interval 420 ms    QTC Calculation(Bazett) 472 ms    P Axis 46 degrees    R Axis -49 degrees    T Axis -19 degrees    Diagnosis Line SINUS RHYTHM WITH 1ST DEGREE A-V BLOCK  RIGHT BUNDLE BRANCH BLOCK  LEFT ANTERIOR FASCICULAR BLOCK  *** BIFASCICULAR BLOCK ***  ABNORMAL ECG  WHEN COMPARED WITH ECG OF 08-NOV-2024 17:06,  NO SIGNIFICANT CHANGE WAS FOUND  Confirmed by MD LEYLA, MADELIN ([id number]) on 11/11/2024 11:27:30 AM (11-10-24 @ 19:13)    CT Angio Chest PE Protocol w/ IV Cont:   ACC: [id number] EXAM:  CT ANGIO CHEST PULM ART [facility]   ORDERED BY: FABIANA KAMARA     PROCEDURE DATE:  11/13/2024          INTERPRETATION:  CLINICAL INFORMATION: Respiratory distress. Not   ambulatory.    COMPARISON: CT chest 2/25/2018.    CONTRAST/COMPLICATIONS:  IV Contrast: Omnipaque 350  60 cc administered   40 cc discarded  Oral Contrast: NONE  None reported at time of study completion    PROCEDURE:  CT Angiography of the Chest.  Sagittal and coronal reformats were performed as well as 3D (MIP)   reconstructions.    FINDINGS:  Study is limited due to respiratory motion artifact.    LUNGS AND LARGE AIRWAYS: Respiratory motion artifact significantly limits   assessment of the lower lungs. Patent central airways. Subsegmental   atelectasis in the lingula. Diffuse mild septal thickening may relate to   component of interstitial edema.  PLEURA: No pleural effusion.  VESSELS: No pulmonary embolism to the level of the segmental arteries.   Limited evaluation of subsegmental pulmonary arteries secondary to motion   artifact. Dilatation of the main pulmonary artery, measuring 3.6 cm,   unchanged. Mild aortic calcification.  HEART: Cardiomegaly. No pericardial effusion.  MEDIASTINUM AND KLAUDIA: No lymphadenopathy.  CHEST WALL ANDLOWER NECK: Loop recorder device in the left chest wall.  VISUALIZED UPPER ABDOMEN: Hepatic cysts. Bilocular left renal cyst with   thinly calcified septum.  BONES: 2.1 cm lytic lesion within the tip of the left scapula (601, 81).   Degenerative changes including advanced bilateral shoulder arthropathy   and diffuse multilevel degenerative changes of the spine.    IMPRESSION:    No pulmonary embolism to the level of the segmental arteries. Limited   evaluation of subsegmental arteries secondary to motion artifact.    Indeterminate lytic lesion within the tip of the left scapula. Consider   myeloma or metastatic disease.    Diffuse mild septal thickening may relate to component of interstitial   edema.    Cardiomegaly. Dilated main pulmonary artery suggests pulmonary   hypertension, unchanged since 02/25/2018.    --- End of Report ---          SHABNAM COREAS MD; Resident Radiologist  This document has been electronically signed.  VÍCTOR RODRIGES MD; Attending Radiologist  This document has been electronically signed. Nov 13 2024  7:23PM (11-13-24 @ 17:47)  Xray Chest 1 View- PORTABLE-Urgent:   ACC: [id number] EXAM:  XR CHEST PORTABLE URGENT 1V   ORDERED BY: FABIANA KAMARA     PROCEDURE DATE:  11/13/2024          INTERPRETATION:  HISTORY: Evaluate for pneumonia    TECHNIQUE: A single AP view of the chest was obtained.    COMPARISON: 11/10/2024    FINDINGS:  The cardiac silhouette is normal in size. There is a loop   recorder device. There are no focal consolidations or pleural effusions.   The hilar and mediastinal structures appear unremarkable. The osseous   structures areintact.    IMPRESSION: Clear lungs.    --- End of Report ---            BLUE CAT MD; Attending Radiologist  This document has been electronically signed. Nov 13 2024  2:08PM (11-13-24 @ 13:49)  Xray Chest 1 View- PORTABLE-Urgent:   ACC: [id number] EXAM:  XR CHEST PORTABLE URGENT 1V   ORDERED BY: SRINATH LAZO     PROCEDURE DATE:  11/10/2024          INTERPRETATION:  EXAMINATION: XR CHEST URGENT    CLINICAL INDICATION: Cough.    TECHNIQUE: Single frontal, portable view of the chest was obtained.    COMPARISON: Chest x-ray 11/8/2024.    FINDINGS:  Cardiac loop recorder.  The heart is not accurately assessed in this AP projection.  No focal consolidation.  There is no pneumothorax or pleural effusion.  No acute bony abnormality.    IMPRESSION:  No focal consolidation, pneumothorax, or pleural effusion.    --- End of Report ---          ARVIND DOVE MD; Resident Radiologist  This document has been electronically signed.  MARTHA CURRY MD; Attending Radiologist  This document has been electronically signed. Nov 11 2024 10:08AM (11-10-24 @ 19:37)

## 2024-11-19 NOTE — PROGRESS NOTE ADULT - PROBLEM SELECTOR PLAN 7
will continue to follow for goc  case discussed with primary team and CM team  chaplaincy consult placed for spiritual support  Can be reached by TEAMS M-F 9-5 Beatris Llamas Any other time please page [phone] if needed

## 2024-11-19 NOTE — PROGRESS NOTE ADULT - PROBLEM SELECTOR PLAN 3
hold lisinopril as K is trending up, monitor BP  Start 6 doses lokelma  insulin + dextrose x 1  EKG with 1 degree AV block, RBBB unchanged from prior. QTc 465

## 2024-11-19 NOTE — PROGRESS NOTE ADULT - SUBJECTIVE AND OBJECTIVE BOX
DATE OF SERVICE: 11-19-24 @ 17:58    Patient is a 89y old  Female who presents with a chief complaint of sob/ku, persistent shoulder pain, inability to perform adls (19 Nov 2024 14:54)      INTERVAL HISTORY: In no acute distress.     REVIEW OF SYSTEMS: Limited participant in ROS  CONSTITUTIONAL: No weakness  EYES/ENT: No visual changes;  No throat pain   NECK: No pain or stiffness  RESPIRATORY: No cough, wheezing; No shortness of breath  CARDIOVASCULAR: No chest pain or palpitations  GASTROINTESTINAL: No abdominal  pain. No nausea, vomiting, or hematemesis  GENITOURINARY: No dysuria, frequency or hematuria  NEUROLOGICAL: No stroke like symptoms  SKIN: No rashes      	  MEDICATIONS:        PHYSICAL EXAM:  T(C): 36.7 (11-19-24 @ 16:46), Max: 37 (11-18-24 @ 20:40)  HR: 88 (11-19-24 @ 16:46) (71 - 103)  BP: 136/77 (11-19-24 @ 16:46) (109/65 - 144/76)  RR: 18 (11-19-24 @ 16:46) (18 - 18)  SpO2: 93% (11-19-24 @ 16:46) (93% - 99%)  Wt(kg): --  I&O's Summary    18 Nov 2024 07:01  -  19 Nov 2024 07:00  --------------------------------------------------------  IN: 240 mL / OUT: 250 mL / NET: -10 mL    19 Nov 2024 07:01  -  19 Nov 2024 17:58  --------------------------------------------------------  IN: 0 mL / OUT: 100 mL / NET: -100 mL          Appearance: In no distress	  HEENT:    PERRL, EOMI	  Cardiovascular:  S1 S2, No JVD  Respiratory: Lungs clear to auscultation	  Gastrointestinal:  Soft, Non-tender, + BS	  Vascularature:  No edema of LE  Psychiatric: Appropriate affect   Neuro: no acute focal deficits                               12.8   13.42 )-----------( 266      ( 18 Nov 2024 07:02 )             39.4     11-18    137  |  101  |  50[H]  ----------------------------<  113[H]  5.8[H]   |  21[L]  |  1.61[H]    Ca    10.0      18 Nov 2024 09:53  Phos  4.1     11-18  Mg     2.5     11-18          Labs personally reviewed      ASSESSMENT/PLAN: 	    89yo f w pmh mci/dementia, htn, hld, hfpef, hypothyroidism, severe oa, hcv s/p treatment, vte (dvt), p/w sob/ku, persistent shoulder pain, inability to perform adls; in er, found to be in ahrf req ~3 lpm via nc; 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    Follows with Julissa Tejada/[facility]. I discussed with Dr Leija    Problem/Plan #1:  Problem: Shortness of Breath  - ECG SR 1st AVB bifascicular block  -   - CXR with clear lungs  - CT neg for PE but suggestive of intersitial edema and pHTN  - TTE 11/15- EF 69%, no WMA, elevated LV filling pressure daily  - Appears euvolemic and now with IZZY. Will hold diuresis.     Problem/Plan #2:  Problem: Hypertension  - lisinopril 10mg PO daily on hold given IZZY    Problem/Plan #3:  Problem: HLD  - not currently on any statin or anti-lipid medication    Problem/Plan #4:  Problem: DVT PPx  - c/w SQ lovenox        Ashlyn Rudolph, AG-NP   Gato Gomez DO [facility]  Cardiovascular Medicine  [address], [address]  Available through call or text on [employer] TEAMs  Office: [phone]

## 2024-11-19 NOTE — PROGRESS NOTE ADULT - SUBJECTIVE AND OBJECTIVE BOX
SUBJECTIVE AND OBJECTIVE: pt seen and examined at bedside. pt awake, alert.   Indication for Geriatrics and Palliative Care Services/INTERVAL HPI: GOC/symptoms     OVERNIGHT EVENTS: no acute events o/n. In 24 hours (8a-8a) pt used 2 prns in 24 hours in addition to ATC oxycodone     DNR on chart:DNI  DNI      Allergies    No Known Allergies    Intolerances    MEDICATIONS  (STANDING):  gabapentin 100 milliGRAM(s) Oral daily  heparin   Injectable 5000 Unit(s) SubCutaneous every 8 hours  influenza  Vaccine (HIGH DOSE) 0.5 milliLiter(s) IntraMuscular once  lidocaine   4% Patch 1 Patch Transdermal daily  liothyronine 5 MICROGram(s) Oral daily  oxyCODONE    IR 2.5 milliGRAM(s) Oral <User Schedule>  polyethylene glycol 3350 17 Gram(s) Oral daily  senna 2 Tablet(s) Oral at bedtime    MEDICATIONS  (PRN):  acetaminophen     Tablet .. 650 milliGRAM(s) Oral every 6 hours PRN Temp greater or equal to 38C (100.4F), Mild Pain (1 - 3)  aluminum hydroxide/magnesium hydroxide/simethicone Suspension 30 milliLiter(s) Oral every 4 hours PRN Dyspepsia  bisacodyl 5 milliGRAM(s) Oral daily PRN Constipation  melatonin 3 milliGRAM(s) Oral at bedtime PRN Insomnia  naloxone Injectable 0.1 milliGRAM(s) IV Push every 3 minutes PRN Sedation or respiratory depression 2/2 to opioids  ondansetron Injectable 4 milliGRAM(s) IV Push every 8 hours PRN Nausea and/or Vomiting  oxyCODONE    IR 2.5 milliGRAM(s) Oral every 4 hours PRN Moderate to Severe pain      ITEMS UNCHECKED ARE NOT PRESENT    PRESENT SYMPTOMS: [ x]Unable to self-report - see [ ] CPOT [x ] PAINADS [ x] RDOS  Source if other than patient:  [ ]Family   [ x]Team     Pain:  [ ]yes [ x]no- no pain at time of exam, pt able to state when pain present shoulder pain, unable to participate in further pain exam   QOL impact -   Location -                    Aggravating factors -  Quality -  Radiation -  Timing-  Severity (0-10 scale):  Minimal acceptable level (0-10 scale):     CPOT:    https://www.sccm.org/getattachment/wlt72v26-4x3u-1j4e-5c7k-7851w8710l4n/Critical-Care-Pain-Observation-Tool-(CPOT)    PAINAD Score: See PAINAD tool and score below       Dyspnea:                           [ ]Mild [ ]Moderate [ ]Severe None     RDOS: See RDOS tool and score below   0 to 2  minimal or no respiratory distress   3  mild distress  4 to 6 moderate distress  >7 severe distress      Anxiety:                             [ ]Mild [ ]Moderate [ ]Severe  Fatigue:                             [ ]Mild [ ]Moderate [ ]Severe  Nausea:                             [ ]Mild [ ]Moderate [ ]Severe  Loss of appetite:              [ ]Mild [ ]Moderate [ ]Severe  Constipation:                    [ ]Mild [ ]Moderate [ ]Severe    PCSSQ[Palliative Care Spiritual Screening Question]   Severity (0-10):  Score of 4 or > indicate consideration of Chaplaincy referral.  Chaplaincy Referral: [x ] yes [ ] refused [ ] following [ ] Deferred     Caregiver [place]? : [ ] yes [ ] no [ ] Deferred [ ] Declined             Social work referral [ ] Patient & Family Centered Care Referral [ ]     Anticipatory Grief present?:  [ ] yes [ ] no  [ ] Deferred                  Social work referral [ ] Chaplaincy Referral [ ]    		  Other Symptoms:  [x ]All other review of systems negative     Palliative Performance Status Version 2:   See PPSv2 tool and score below         PHYSICAL EXAM:  Vital Signs Last 24 Hrs  T(C): 36.7 (19 Nov 2024 11:41), Max: 37 (18 Nov 2024 20:40)  T(F): 98.1 (19 Nov 2024 11:41), Max: 98.6 (18 Nov 2024 20:40)  HR: 71 (19 Nov 2024 11:41) (71 - 103)  BP: 109/65 (19 Nov 2024 11:41) (109/65 - 144/76)  BP(mean): --  RR: 18 (19 Nov 2024 11:41) (18 - 18)  SpO2: 99% (19 Nov 2024 11:41) (94% - 99%)    Parameters below as of 19 Nov 2024 11:41  Patient On (Oxygen Delivery Method): room air     I&O's Summary    18 Nov 2024 07:01  -  19 Nov 2024 07:00  --------------------------------------------------------  IN: 240 mL / OUT: 250 mL / NET: -10 mL    19 Nov 2024 07:01  -  19 Nov 2024 14:28  --------------------------------------------------------  IN: 0 mL / OUT: 100 mL / NET: -100 mL       GENERAL: [ ]Cachexia    [x ]Alert  [ x]Oriented x 1  [ ]Lethargic  [ ]Unarousable  [ ]Verbal  [ ]Non-Verbal  Behavioral:   [ ]Anxiety  [ ]Delirium [ ]Agitation [ ]Other  HEENT:  [ ]Normal   [x ]Dry mouth   [ ]ET Tube/Trach  [ ]Oral lesions  PULMONARY:   [ ]Clear [ ]Tachypnea  [ ]Audible excessive secretions   [ ]Rhonchi        [ ]Right [ ]Left [ ]Bilateral  [ ]Crackles        [ ]Right [ ]Left [ ]Bilateral  [ ]Wheezing     [ ]Right [ ]Left [ ]Bilateral  [x ]Diminished BS [ ] Right [ ]Left [x ]Bilateral  CARDIOVASCULAR:    [ ]Regular [ x]Irregular [ ]Tachy  [ ]Leander [ ]Murmur [ ]Other  GASTROINTESTINAL:  [ x]Soft  [ ]Distended   [x ]+BS  [x ]Non tender [ ]Tender  [ ]Other [ ]PEG [ ]OGT/ NGT   Last BM: 11/16  GENITOURINARY:  [ ]Normal [x ]Incontinent   [ ]Oliguria/Anuria   [ ][place]  MUSCULOSKELETAL:   [ ]Normal   [ x]Weakness  [x ]Bed/Wheelchair bound [ ]Edema  NEUROLOGIC:   [ ]No focal deficits  [x ] Cognitive impairment  [ ] Dysphagia [ ]Dysarthria [ ] Paresis [ ]Other   SKIN:   [ x]Normal  [ ]Rash  [ ]Other  [ ]Pressure ulcer(s) [ ]y [ ]n present on admission    CRITICAL CARE:  [ ]Shock Present  [ ]Septic [ ]Cardiogenic [ ]Neurologic [ ]Hypovolemic  [ ]Vasopressors [ ]Inotropes  [ ]Respiratory failure present [ ]Mechanical Ventilation [ ]Non-invasive ventilatory support [ ]High-Flow   [ ]Acute  [ ]Chronic [ ]Hypoxic  [ ]Hypercarbic [ ]Other  [ ]Other organ failure     LABS:                        12.8   13.42 )-----------( 266      ( 18 Nov 2024 07:02 )             39.4   11-18    137  |  101  |  50[H]  ----------------------------<  113[H]  5.8[H]   |  21[L]  |  1.61[H]    Ca    10.0      18 Nov 2024 09:53  Phos  4.1     11-18  Mg     2.5     11-18        Urinalysis Basic - ( 18 Nov 2024 09:53 )    Color: x / Appearance: x / SG: x / pH: x  Gluc: 113 mg/dL / Ketone: x  / Bili: x / Urobili: x   Blood: x / Protein: x / Nitrite: x   Leuk Esterase: x / RBC: x / WBC x   Sq Epi: x / Non Sq Epi: x / Bacteria: x      RADIOLOGY & ADDITIONAL STUDIES:  < from: MR Shoulder Joint w/wo IV Cont, Right (11.14.24 @ 11:54) >    ACC: [id number] EXAM:  MR SHOULDER WAW IC RT   ORDERED BY: JOSE RAFAEL HARKINS     PROCEDURE DATE:  11/14/2024          INTERPRETATION:  MR SHOULDER WITHOUT AND WITH IV CONTRAST RIGHT    HISTORY: Right shoulder pain. Osteoarthritis. Swelling. Duration of one   week.    TECHNIQUE:  Multiplanar MRI of the right shoulder was performed without   contrast. Patient motion causes degradation of several sequences.    COMPARISON: Chest CT dated 11/13/2024. Right shoulder x-rays 11/13/2024.    FINDINGS:    OSSEOUS STRUCTURES    Fractures/Contusions:  None.    ROTATOR CUFF TENDONS    Supraspinatus Tendon:  There is complete full-thickness tearing of the   attachment with fibers retracted to the level of the glenoid. Motion   causes limitation.    InfraspinatusTendon:  There is suspected complete full-thickness   tearing with fibers retracted to the level of the glenoid on coronal   series 19 image 23. Motion causes limitation.    Teres Minor Tendon:  Suspected to be intact.    Subscapularis Tendon:  Thereis suspected complete full-thickness   tearing with fibers retracted to the level of the glenoid. Motion causes   some limitation.    Muscle Atrophy:  Moderate to severe generalized rotator cuff muscle   atrophy is noted.    CORACOACROMIAL ARCH & AC JOINT    Acromiohumeral Space:  Humeral head is high riding with remodeling and   acetabularization of the acromial arch.    Acromioclavicular Joint:  Mild arthrosis is present.    Subacromial/Subdeltoid Bursa:  There is a large amount of fluid   extending through the rotator cuff tearing.    GLENOID LABRUM AND BICEPS TENDON    Biceps-Labral Complex:  Appears to be degenerated and torn with rupture   of the biceps anchor.    Glenoid Labrum: Degenerated and torn.    Biceps Tendon (long head):  Poorly visualized suspected to be ruptured   proximally.    GLENOHUMERAL JOINT    Articular Cartilage:  Severe cartilage loss appears to be present with   mild flattening of the humeral head, broadening of the glenoid, and small   to moderate-sized osteophytes.    JOINT CAPSULE    Effusion/Synovitis:  A large effusion is present with severe synovitis.    Inferior Glenohumeral Ligament:  Unremarkable.    OTHER SOFT TISSUES    Musculature:  Otherwise unremarkable.    Subcutaneous Tissues:  Unremarkable.    IMPRESSION:  1.  Patient motion causes some limitation.  2.  There appears to be extensive full-thickness tearing of the rotator   cuff.  3.  Severe osteoarthritis is present.  4.  Large effusion is noted with severe synovitis.    --- End of Report ---            KATERYNA REESE MD; Attending Radiologist  This document has been electronically signed. Nov 14 2024 12:22PM    < end of copied text >    Protein Calorie Malnutrition Present: [ ]mild [ ]moderate [ ]severe [ ]underweight [ ]morbid obesity  https://www.andeal.org/vault/2440/web/files/ONC/Table_Clinical%20Characteristics%20to%20Document%20Malnutrition-White%20JV%20et%20al%202012.pdf    Height (cm): 160 (11-13-24 @ 12:20), 160 (11-10-24 @ 18:20), 160 (11-08-24 @ 15:42)  Weight (kg): 71.2 (11-14-24 @ 07:50), 45.4 (11-10-24 @ 18:20), 71.2 (11-08-24 @ 15:42)  BMI (kg/m2): 27.8 (11-14-24 @ 07:50), 17.7 (11-13-24 @ 12:20), 17.7 (11-10-24 @ 18:20)    [x ]PPSV2 < or = 30%  [ ]significant weight loss [ ]poor nutritional intake [ ]anasarca[ ]Artificial Nutrition    Other REFERRALS:  [ ]Hospice  [ ]Child Life  [ ]Social Work  [ ]Case management [ ]Holistic Therapy     Goals of Care Document:

## 2024-11-19 NOTE — PROGRESS NOTE ADULT - PROBLEM SELECTOR PLAN 3
pt endorsing symptom relief, no pain or non-verbal s&s of pain on exam   c/w Oxy 2.5mg PO q 6 ATC Oxy 2.5mg PO q 4 PRN moderate to severe pain. As per her daughter Oxy 5 caused major sedation, so will avoid that dose for now.   -Narcan PRN.  bowel regimen while on opioids: pt remains on senna, miralax, changed dulcolax to standing

## 2024-11-20 LAB
ANION GAP SERPL CALC-SCNC: 15 MMOL/L — SIGNIFICANT CHANGE UP (ref 5–17)
BUN SERPL-MCNC: 59 MG/DL — HIGH (ref 7–23)
CALCIUM SERPL-MCNC: 10.1 MG/DL — SIGNIFICANT CHANGE UP (ref 8.4–10.5)
CHLORIDE SERPL-SCNC: 101 MMOL/L — SIGNIFICANT CHANGE UP (ref 96–108)
CO2 SERPL-SCNC: 20 MMOL/L — LOW (ref 22–31)
CREAT SERPL-MCNC: 1.39 MG/DL — HIGH (ref 0.5–1.3)
EGFR: 34 ML/MIN/1.73M2 — LOW
GLUCOSE SERPL-MCNC: 112 MG/DL — HIGH (ref 70–99)
MAGNESIUM SERPL-MCNC: 2.7 MG/DL — HIGH (ref 1.6–2.6)
PHOSPHATE SERPL-MCNC: 4 MG/DL — SIGNIFICANT CHANGE UP (ref 2.5–4.5)
POTASSIUM SERPL-MCNC: 5 MMOL/L — SIGNIFICANT CHANGE UP (ref 3.5–5.3)
POTASSIUM SERPL-SCNC: 5 MMOL/L — SIGNIFICANT CHANGE UP (ref 3.5–5.3)
SODIUM SERPL-SCNC: 136 MMOL/L — SIGNIFICANT CHANGE UP (ref 135–145)

## 2024-11-20 PROCEDURE — 99232 SBSQ HOSP IP/OBS MODERATE 35: CPT

## 2024-11-20 RX ADMIN — Medication 5000 UNIT(S): at 05:56

## 2024-11-20 RX ADMIN — POLYETHYLENE GLYCOL 3350 17 GRAM(S): 17 POWDER, FOR SOLUTION ORAL at 12:06

## 2024-11-20 RX ADMIN — OXYCODONE HYDROCHLORIDE 2.5 MILLIGRAM(S): 30 TABLET ORAL at 18:51

## 2024-11-20 RX ADMIN — Medication 5 MICROGRAM(S): at 05:56

## 2024-11-20 RX ADMIN — OXYCODONE HYDROCHLORIDE 2.5 MILLIGRAM(S): 30 TABLET ORAL at 13:08

## 2024-11-20 RX ADMIN — Medication 5000 UNIT(S): at 15:00

## 2024-11-20 RX ADMIN — ACETAMINOPHEN, DIPHENHYDRAMINE HCL, PHENYLEPHRINE HCL 3 MILLIGRAM(S): 325; 25; 5 TABLET ORAL at 00:06

## 2024-11-20 RX ADMIN — LIDOCAINE 1 PATCH: 40 CREAM TOPICAL at 12:05

## 2024-11-20 RX ADMIN — Medication 5 MILLIGRAM(S): at 22:00

## 2024-11-20 RX ADMIN — Medication 2 TABLET(S): at 22:00

## 2024-11-20 RX ADMIN — OXYCODONE HYDROCHLORIDE 2.5 MILLIGRAM(S): 30 TABLET ORAL at 22:00

## 2024-11-20 RX ADMIN — GABAPENTIN 100 MILLIGRAM(S): 300 CAPSULE ORAL at 12:06

## 2024-11-20 RX ADMIN — OXYCODONE HYDROCHLORIDE 2.5 MILLIGRAM(S): 30 TABLET ORAL at 12:05

## 2024-11-20 RX ADMIN — ACETAMINOPHEN 500MG 650 MILLIGRAM(S): 500 TABLET, COATED ORAL at 00:05

## 2024-11-20 RX ADMIN — OXYCODONE HYDROCHLORIDE 2.5 MILLIGRAM(S): 30 TABLET ORAL at 05:56

## 2024-11-20 RX ADMIN — Medication 5000 UNIT(S): at 22:00

## 2024-11-20 NOTE — PROGRESS NOTE ADULT - PROBLEM SELECTOR PLAN 3
pt endorsing symptom relief, no pain or non-verbal s&s of pain on exam   c/w Oxy 2.5mg PO q 6 ATC Oxy 2.5mg PO q 4 PRN moderate to severe pain. As per her daughter Oxy 5 caused major sedation, so will avoid that dose for now.   -Narcan PRN.  bowel regimen while on opioids: pt remains on senna, miralax, dulcolax standing

## 2024-11-20 NOTE — PROGRESS NOTE ADULT - PROBLEM SELECTOR PLAN 7
will continue to follow for goc  case discussed with primary team and CM team  chaplaincy consult placed for spiritual support  Can be reached by TEAMS M-F 9-5 Beatris Llamas Any other time please page [phone] if needed

## 2024-11-20 NOTE — PROGRESS NOTE ADULT - PROBLEM SELECTOR PLAN 6
h/o mci/dementia, severe oa  a+o x1-2 at baseline  maintain fall + frac, delirium, aspiration precautions; keep head end of bed elevated  nutrition consult  dysphagia screen/ slp eval - soft/bite size diet, thin liquids  pt/ot eval + sw/cm consult for disposition  Palliative cs - home hospice at [place]

## 2024-11-20 NOTE — PROGRESS NOTE ADULT - PROBLEM SELECTOR PLAN 1
Advanced. FAST 7c. The patient is hospice eligible. See the [facility] not above for my discussion with the patient's daughter about this benefit.   Will also recommend:   -Frequent reassurance and verbal orientation   -Family members or other familiar persons by his bedside.   -Delusions and hallucinations should be neither endorsed nor challenged.   -Physical restraints should be avoided. Alternatives to restraint use, such as constant observation (preferably by someone familiar to the patient such as a family member), may be more effective. Advanced. FAST 7c. The patient is hospice eligible.  Will also recommend:   -Frequent reassurance and verbal orientation   -Family members or other familiar persons by his bedside.   -Delusions and hallucinations should be neither endorsed nor challenged.   -Physical restraints should be avoided. Alternatives to restraint use, such as constant observation (preferably by someone familiar to the patient such as a family member), may be more effective.

## 2024-11-20 NOTE — PROGRESS NOTE ADULT - PROBLEM SELECTOR PLAN 6
pt DNR/I  surrogates are pt's 3 children Addy Amaro, and Katarzyna Woods referral placed for disposition pt DNR/I  surrogates are pt's 3 children Addy Amaro, and Katarzyna Woods referral placed for disposition-> other options discussed see sw note

## 2024-11-20 NOTE — PROGRESS NOTE ADULT - SUBJECTIVE AND OBJECTIVE BOX
SUBJECTIVE / OVERNIGHT EVENTS:  Today is hospital day 7d. There are no new issues or overnight events.     HPI:  89yo f w pmh mci/dementia, htn, hld, hfpef, hypothyroidism, severe oa, hcv s/p treatment, vte (dvt), p/w sob/ku, persistent shoulder pain, inability to perform adls; patient initially with right shoulder pain on 11/8, came to er, and was discharged home with outpatient follow up; right shoulder pain persisted and so patient returned to [facility] er on 11/10 and was hospitalized 11/10-11/12; imaging showed severe oa, pain managed w oxycodone, and patient discharged home with home pt. since arriving home, after recent hospitalization, daughter has found that patient now requires more assistance, w she herself covering the other 12 hours hha is unavailable for (at baseline, patient requires assistance with adls, has 12 hour hha at home). in addition, shoulder pain persists and daughter noticed patient appearing short of breath; she mentions that she has not been giving patient her home lasix for a while now as it causes patient to pass too much urine, and makes it difficult to care for patient as she is totally dependent ; she grew concerned, so had patient brought back to [facility] er for further evaluation. in er, found to be in ahrf req ~3 lpm via nc; suspect 2/2 mild adhf; admit to medicine for further mgmt (13 Nov 2024 23:08)    MEDICATIONS  (STANDING):  bisacodyl 5 milliGRAM(s) Oral at bedtime  gabapentin 100 milliGRAM(s) Oral daily  heparin   Injectable 5000 Unit(s) SubCutaneous every 8 hours  influenza  Vaccine (HIGH DOSE) 0.5 milliLiter(s) IntraMuscular once  lidocaine   4% Patch 1 Patch Transdermal daily  liothyronine 5 MICROGram(s) Oral daily  oxyCODONE    IR 2.5 milliGRAM(s) Oral <User Schedule>  polyethylene glycol 3350 17 Gram(s) Oral daily  senna 2 Tablet(s) Oral at bedtime    MEDICATIONS  (PRN):  acetaminophen     Tablet .. 650 milliGRAM(s) Oral every 6 hours PRN Temp greater or equal to 38C (100.4F), Mild Pain (1 - 3)  aluminum hydroxide/magnesium hydroxide/simethicone Suspension 30 milliLiter(s) Oral every 4 hours PRN Dyspepsia  melatonin 3 milliGRAM(s) Oral at bedtime PRN Insomnia  naloxone Injectable 0.1 milliGRAM(s) IV Push every 3 minutes PRN Sedation or respiratory depression 2/2 to opioids  ondansetron Injectable 4 milliGRAM(s) IV Push every 8 hours PRN Nausea and/or Vomiting  oxyCODONE    IR 2.5 milliGRAM(s) Oral every 4 hours PRN Moderate to Severe pain    HOME MEDICATIONS:  gabapentin 100 mg oral capsule: 1 cap(s) orally once a day  lidocaine 4% topical film: Apply topically to affected area once a day 12hrs on 12hrs off  liothyronine 5 mcg oral tablet: 1 tab(s) orally once a day  lisinopril 10 mg oral tablet: 1 tab(s) orally once a day  Narcan 4 mg/0.1 mL nasal spray: 1 spray(s) intranasally once a day use as instructed  oxyCODONE 5 mg oral tablet: 0.5 tab(s) orally 2 times a day MDD: 1    PHYSICAL EXAM  Vital Signs Last 24 Hrs  T(C): 36.5 (20 Nov 2024 12:14), Max: 36.8 (19 Nov 2024 21:00)  T(F): 97.7 (20 Nov 2024 12:14), Max: 98.3 (19 Nov 2024 21:00)  HR: 77 (20 Nov 2024 12:14) (70 - 88)  BP: 150/80 (20 Nov 2024 12:14) (113/76 - 150/80)  BP(mean): --  RR: 18 (20 Nov 2024 12:14) (18 - 19)  SpO2: 95% (20 Nov 2024 12:14) (93% - 96%)    Parameters below as of 20 Nov 2024 12:14  Patient On (Oxygen Delivery Method): room air        11-19-24 @ 07:01  -  11-20-24 @ 07:00  --------------------------------------------------------  IN: 0 mL / OUT: 300 mL / NET: -300 mL    11-20-24 @ 07:01  -  11-20-24 @ 14:25  --------------------------------------------------------  IN: 340 mL / OUT: 0 mL / NET: 340 mL      CONSTITUTIONAL: Well-groomed, in no apparent distress;  EYES: No conjunctival or scleral injection, non-icteric;  ENMT: No external nasal lesions; Normal outer ears;  NECK: Trachea midline;  RESPIRATORY: Normal respiratory effort;   CARDIOVASCULAR: Regular rate and rhythm;  GASTROINTESTINAL: Non-distended;   EXTREMITIES:  No lower extremity edema;  NEUROLOGY: Does respond to limited commands appropriately;  PSYCHIATRY: Mood and Affect appropriate    LABS:    11-20    136  |  101  |  59[H]  ----------------------------<  112[H]  5.0   |  20[L]  |  1.39[H]    Ca    10.1      20 Nov 2024 06:59  Phos  4.0     11-20  Mg     2.7     11-20            Urinalysis Basic - ( 20 Nov 2024 06:59 )    Color: x / Appearance: x / SG: x / pH: x  Gluc: 112 mg/dL / Ketone: x  / Bili: x / Urobili: x   Blood: x / Protein: x / Nitrite: x   Leuk Esterase: x / RBC: x / WBC x   Sq Epi: x / Non Sq Epi: x / Bacteria: x            RADIOLOGY & ADDITIONAL TESTS:  EKG  12 Lead ECG:   Ventricular Rate 99 BPM    Atrial Rate 99 BPM    P-R Interval 238 ms    QRS Duration 128 ms    Q-T Interval 360 ms    QTC Calculation(Bazett) 462 ms    P Axis 12 degrees    R Axis -40 degrees    T Axis -37 degrees    Diagnosis Line SINUS RHYTHM WITH 1ST DEGREE A-V BLOCK  LEFT AXIS DEVIATION  RIGHT BUNDLE BRANCH BLOCK  POSSIBLE LATERAL INFARCT , AGE UNDETERMINED  INFERIOR INFARCT , AGE UNDETERMINED  ABNORMAL ECG  WHEN COMPARED WITH ECG OF 11/13/24   NO SIGNIFICANT CHANGE WAS FOUND  Confirmed by ABBEY Menjivar Zlata ([id number]) on 11/19/2024 6:48:15 PM (11-18-24 @ 16:47)  12 Lead ECG:   Ventricular Rate 70 BPM    Atrial Rate 70 BPM    P-R Interval 238 ms    QRS Duration 144 ms    Q-T Interval 430 ms    QTC Calculation(Bazett) 464 ms    P Axis 35 degrees    R Axis -46 degrees    T Axis -13 degrees    Diagnosis Line SINUS RHYTHM WITH 1ST DEGREE A-V BLOCK  RIGHT BUNDLE BRANCH BLOCK  LEFT ANTERIOR FASCICULAR BLOCK  *** BIFASCICULAR BLOCK ***  ABNORMAL ECG  WHEN COMPARED WITH ECG OF 10-NOV-2024 19:13,  NO SIGNIFICANT CHANGE WAS FOUND  Confirmed by Sommer Acosta ([id number]) on 11/18/2024 10:26:05 PM (11-13-24 @ 12:44)    CT Angio Chest PE Protocol w/ IV Cont:   ACC: [id number] EXAM:  CT ANGIO CHEST PULM ART WAWIC   ORDERED BY: FABIANA KAMARA     PROCEDURE DATE:  11/13/2024          INTERPRETATION:  CLINICAL INFORMATION: Respiratory distress. Not   ambulatory.    COMPARISON: CT chest 2/25/2018.    CONTRAST/COMPLICATIONS:  IV Contrast: Omnipaque 350  60 cc administered   40 cc discarded  Oral Contrast: NONE  None reported at time of study completion    PROCEDURE:  CT Angiography of the Chest.  Sagittal and coronal reformats were performed as well as 3D (MIP)   reconstructions.    FINDINGS:  Study is limited due to respiratory motion artifact.    LUNGS AND LARGE AIRWAYS: Respiratory motion artifact significantly limits   assessment of the lower lungs. Patent central airways. Subsegmental   atelectasis in the lingula. Diffuse mild septal thickening may relate to   component of interstitial edema.  PLEURA: No pleural effusion.  VESSELS: No pulmonary embolism to the level of the segmental arteries.   Limited evaluation of subsegmental pulmonary arteries secondary to motion   artifact. Dilatation of the main pulmonary artery, measuring 3.6 cm,   unchanged. Mild aortic calcification.  HEART: Cardiomegaly. No pericardial effusion.  MEDIASTINUM AND KLAUDIA: No lymphadenopathy.  CHEST WALL ANDLOWER NECK: Loop recorder device in the left chest wall.  VISUALIZED UPPER ABDOMEN: Hepatic cysts. Bilocular left renal cyst with   thinly calcified septum.  BONES: 2.1 cm lytic lesion within the tip of the left scapula (601, 81).   Degenerative changes including advanced bilateral shoulder arthropathy   and diffuse multilevel degenerative changes of the spine.    IMPRESSION:    No pulmonary embolism to the level of the segmental arteries. Limited   evaluation of subsegmental arteries secondary to motion artifact.    Indeterminate lytic lesion within the tip of the left scapula. Consider   myeloma or metastatic disease.    Diffuse mild septal thickening may relate to component of interstitial   edema.    Cardiomegaly. Dilated main pulmonary artery suggests pulmonary   hypertension, unchanged since 02/25/2018.    --- End of Report ---          SHABNAM COREAS MD; Resident Radiologist  This document has been electronically signed.  VÍCTOR RODRIGES MD; Attending Radiologist  This document has been electronically signed. Nov 13 2024  7:23PM (11-13-24 @ 17:47)  Xray Chest 1 View- PORTABLE-Urgent:   ACC: [id number] EXAM:  XR CHEST PORTABLE URGENT 1V   ORDERED BY: FABIANA KAMARA     PROCEDURE DATE:  11/13/2024          INTERPRETATION:  HISTORY: Evaluate for pneumonia    TECHNIQUE: A single AP view of the chest was obtained.    COMPARISON: 11/10/2024    FINDINGS:  The cardiac silhouette is normal in size. There is a loop   recorder device. There are no focal consolidations or pleural effusions.   The hilar and mediastinal structures appear unremarkable. The osseous   structures areintact.    IMPRESSION: Clear lungs.    --- End of Report ---            BLUE CAT MD; Attending Radiologist  This document has been electronically signed. Nov 13 2024  2:08PM (11-13-24 @ 13:49)  Xray Chest 1 View- PORTABLE-Urgent:   ACC: [id number] EXAM:  XR CHEST PORTABLE URGENT 1V   ORDERED BY: SRINATH LAZO     PROCEDURE DATE:  11/10/2024          INTERPRETATION:  EXAMINATION: XR CHEST URGENT    CLINICAL INDICATION: Cough.    TECHNIQUE: Single frontal, portable view of the chest was obtained.    COMPARISON: Chest x-ray 11/8/2024.    FINDINGS:  Cardiac loop recorder.  The heart is not accurately assessed in this AP projection.  No focal consolidation.  There is no pneumothorax or pleural effusion.  No acute bony abnormality.    IMPRESSION:  No focal consolidation, pneumothorax, or pleural effusion.    --- End of Report ---          ARVIND DOVE MD; Resident Radiologist  This document has been electronically signed.  MARTHA CURRY MD; Attending Radiologist  This document has been electronically signed. Nov 11 2024 10:08AM (11-10-24 @ 19:37)

## 2024-11-20 NOTE — PROGRESS NOTE ADULT - SUBJECTIVE AND OBJECTIVE BOX
DATE OF SERVICE: 11-20-24 @ 12:14    Patient is a 89y old  Female who presents with a chief complaint of sob/ku, persistent shoulder pain, inability to perform adls (19 Nov 2024 17:58)      INTERVAL HISTORY: In no acute distress.     REVIEW OF SYSTEMS: Limited participant in ROS  CONSTITUTIONAL: No weakness  EYES/ENT: No visual changes;  No throat pain   NECK: No pain or stiffness  RESPIRATORY: No cough, wheezing; No shortness of breath  CARDIOVASCULAR: No chest pain or palpitations  GASTROINTESTINAL: No abdominal  pain. No nausea, vomiting, or hematemesis  GENITOURINARY: No dysuria, frequency or hematuria  NEUROLOGICAL: No stroke like symptoms  SKIN: No rashes    	  MEDICATIONS:        PHYSICAL EXAM:  T(C): 36.7 (11-20-24 @ 07:25), Max: 36.8 (11-19-24 @ 21:00)  HR: 70 (11-20-24 @ 07:25) (70 - 88)  BP: 118/58 (11-20-24 @ 07:25) (113/76 - 136/77)  RR: 18 (11-20-24 @ 07:25) (18 - 19)  SpO2: 95% (11-20-24 @ 07:25) (93% - 96%)  Wt(kg): --  I&O's Summary    19 Nov 2024 07:01  -  20 Nov 2024 07:00  --------------------------------------------------------  IN: 0 mL / OUT: 300 mL / NET: -300 mL    20 Nov 2024 07:01  -  20 Nov 2024 12:14  --------------------------------------------------------  IN: 100 mL / OUT: 0 mL / NET: 100 mL          Appearance: In no distress	  HEENT:    PERRL, EOMI	  Cardiovascular:  S1 S2, No JVD  Respiratory: Lungs clear to auscultation	  Gastrointestinal:  Soft, Non-tender, + BS	  Vascularature:  No edema of LE  Psychiatric: Appropriate affect   Neuro: no acute focal deficits           11-20    136  |  101  |  59[H]  ----------------------------<  112[H]  5.0   |  20[L]  |  1.39[H]    Ca    10.1      20 Nov 2024 06:59  Phos  4.0     11-20  Mg     2.7     11-20          Labs personally reviewed      ASSESSMENT/PLAN: 	    89yo f w pmh mci/dementia, htn, hld, hfpef, hypothyroidism, severe oa, hcv s/p treatment, vte (dvt), p/w sob/ku, persistent shoulder pain, inability to perform adls; in er, found to be in ahrf req ~3 lpm via nc; 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    Follows with Julissa Tejada/Heladio. I discussed with Dr Leija    Problem/Plan #1:  Problem: Shortness of Breath  - ECG SR 1st AVB bifascicular block  -   - CXR with clear lungs  - CT neg for PE but suggestive of intersitial edema and pHTN  - TTE 11/15- EF 69%, no WMA, elevated LV filling pressure daily  - Appears euvolemic and now with IZZY. Will hold diuresis.     Problem/Plan #2:  Problem: Hypertension  - lisinopril 10mg PO daily on hold given IZZY    Problem/Plan #3:  Problem: HLD  - not currently on any statin or anti-lipid medication    Problem/Plan #4:  Problem: DVT PPx  - c/w SQ lovenox    GOC discussions ongoing with support of Palliative Care.     Ashlyn Rudolph, AG-NP   Gato Gomez DO [facility]  Cardiovascular Medicine  [address] [place] [address], [address]  Available through call or text on [employer] TEAMs  Office: [phone]   DATE OF SERVICE: 11-20-24 @ 12:14    Patient is a 89y old  Female who presents with a chief complaint of sob/uk, persistent shoulder pain, inability to perform adls (19 Nov 2024 17:58)      INTERVAL HISTORY: In no acute distress.     REVIEW OF SYSTEMS: Limited participant in ROS  CONSTITUTIONAL: No weakness  EYES/ENT: No visual changes;  No throat pain   NECK: No pain or stiffness  RESPIRATORY: No cough, wheezing; No shortness of breath  CARDIOVASCULAR: No chest pain or palpitations  GASTROINTESTINAL: No abdominal  pain. No nausea, vomiting, or hematemesis  GENITOURINARY: No dysuria, frequency or hematuria  NEUROLOGICAL: No stroke like symptoms  SKIN: No rashes    	  MEDICATIONS:        PHYSICAL EXAM:  T(C): 36.7 (11-20-24 @ 07:25), Max: 36.8 (11-19-24 @ 21:00)  HR: 70 (11-20-24 @ 07:25) (70 - 88)  BP: 118/58 (11-20-24 @ 07:25) (113/76 - 136/77)  RR: 18 (11-20-24 @ 07:25) (18 - 19)  SpO2: 95% (11-20-24 @ 07:25) (93% - 96%)  Wt(kg): --  I&O's Summary    19 Nov 2024 07:01  -  20 Nov 2024 07:00  --------------------------------------------------------  IN: 0 mL / OUT: 300 mL / NET: -300 mL    20 Nov 2024 07:01  -  20 Nov 2024 12:14  --------------------------------------------------------  IN: 100 mL / OUT: 0 mL / NET: 100 mL          Appearance: In no distress	  HEENT:    PERRL, EOMI	  Cardiovascular:  S1 S2, No JVD  Respiratory: Lungs clear to auscultation	  Gastrointestinal:  Soft, Non-tender, + BS	  Vascularature:  No edema of LE  Psychiatric: Appropriate affect   Neuro: no acute focal deficits           11-20    136  |  101  |  59[H]  ----------------------------<  112[H]  5.0   |  20[L]  |  1.39[H]    Ca    10.1      20 Nov 2024 06:59  Phos  4.0     11-20  Mg     2.7     11-20          Labs personally reviewed      ASSESSMENT/PLAN: 	    89yo f w pmh mci/dementia, htn, hld, hfpef, hypothyroidism, severe oa, hcv s/p treatment, vte (dvt), p/w sob/ku, persistent shoulder pain, inability to perform adls; in er, found to be in ahrf req ~3 lpm via nc; 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    Follows with Julissa Tejada/Heladio. I discussed with Dr Leija    Problem/Plan #1:  Problem: Shortness of Breath  - ECG SR 1st AVB bifascicular block  -   - CXR with clear lungs  - CT neg for PE but suggestive of intersitial edema and pHTN  - TTE 11/15- EF 69%, no WMA, elevated LV filling pressure daily  - Appears euvolemic and now with IZZY. Will hold diuresis.     Problem/Plan #2:  Problem: Hypertension  - lisinopril 10mg PO daily on hold given IZZY    Problem/Plan #3:  Problem: HLD  - not currently on any statin or anti-lipid medication    Problem/Plan #4:  Problem: DVT PPx  - c/w SQ lovenox    GOC discussions ongoing with support of Palliative Care.         Ashlyn Rudolph, AG-NP   Gato Gomez DO [facility]  Cardiovascular Medicine  [address] [place] [address], [address]  Available through call or text on [employer] TEAMs  Office: [phone]

## 2024-11-20 NOTE — GOALS OF CARE CONVERSATION - ADVANCED CARE PLANNING - CONVERSATION DETAILS
[facility] continues to follow this case for ongoing GOC discussion and active symptom management in the setting of patient with advanced illness. LCSW and JOSÉ ANTONIO Spear met with patient's daughters today.    Team first introduced and reintroduced selves and roles in patient care. Family verbalized understanding and was receptive to visit today. Team inquired into questions or thoughts family has regarding next steps, and family notes continued uncertainty regarding d/c planning for patient. Family notes [facility] referral remains pending at present time, which family feels mixed emotions about as patient's spouse  in [facility]. Family states that, if [facility] denies patient, they are unsure about d/c planning home, as daughter Sondra is primary caregiver and has some caregiver burden at present already. Family inquired into SNF d/c planning with hospice, and overview of that process including need for private pay or medicaid to cover room and board, and family verbalized understanding. All questions answered today, and team encouraged family to consider these different options and see what would best meet patient needs as well as those of the caregiving family members. Family verbalized understanding and agreement, noting preference for a comfort based approach for patient moving forward.    As per chart, patient denied from [facility] and family agreeable to home hospice referral to [facility]. CM following for d/c planning, [facility] will continue to follow this case.     I, Jorge Lindsey, where applicable, am scribing for and the presence of Beatris Llamas DNP, the following sections PARTICIPANTS, ADVANCED DIRECTIVES, CONVERSATION DISCUSSION, WHAT MATTERS MOST TO PATIENTS AND FAMILY, TREATMENT GUIDELINES, DATE OF MOLST, AND TIME SPENT.

## 2024-11-20 NOTE — PROGRESS NOTE ADULT - SUBJECTIVE AND OBJECTIVE BOX
SUBJECTIVE AND OBJECTIVE: pt seen and examined at bedside. pt awake, alert, able to participate in exam   Indication for Geriatrics and Palliative Care Services/INTERVAL HPI: GOC    OVERNIGHT EVENTS: No acute events o/n. pt used 1 prn in 24 hours (8a-8a)     DNR on chart:DNI  DNI      Allergies    No Known Allergies    Intolerances    MEDICATIONS  (STANDING):  bisacodyl 5 milliGRAM(s) Oral at bedtime  gabapentin 100 milliGRAM(s) Oral daily  heparin   Injectable 5000 Unit(s) SubCutaneous every 8 hours  influenza  Vaccine (HIGH DOSE) 0.5 milliLiter(s) IntraMuscular once  lidocaine   4% Patch 1 Patch Transdermal daily  liothyronine 5 MICROGram(s) Oral daily  oxyCODONE    IR 2.5 milliGRAM(s) Oral <User Schedule>  polyethylene glycol 3350 17 Gram(s) Oral daily  senna 2 Tablet(s) Oral at bedtime    MEDICATIONS  (PRN):  acetaminophen     Tablet .. 650 milliGRAM(s) Oral every 6 hours PRN Temp greater or equal to 38C (100.4F), Mild Pain (1 - 3)  aluminum hydroxide/magnesium hydroxide/simethicone Suspension 30 milliLiter(s) Oral every 4 hours PRN Dyspepsia  melatonin 3 milliGRAM(s) Oral at bedtime PRN Insomnia  naloxone Injectable 0.1 milliGRAM(s) IV Push every 3 minutes PRN Sedation or respiratory depression 2/2 to opioids  ondansetron Injectable 4 milliGRAM(s) IV Push every 8 hours PRN Nausea and/or Vomiting  oxyCODONE    IR 2.5 milliGRAM(s) Oral every 4 hours PRN Moderate to Severe pain      ITEMS UNCHECKED ARE NOT PRESENT    PRESENT SYMPTOMS: [ ]Unable to self-report - see [ ] CPOT [ ] PAINADS [ ] RDOS  Source if other than patient:  [ ]Family   [ ]Team     Pain:  [ ]yes [x ]no  QOL impact -   Location -                    Aggravating factors -  Quality -  Radiation -  Timing-  Severity (0-10 scale):  Minimal acceptable level (0-10 scale):     CPOT:    https://www.sccm.org/getattachment/ers78d17-7y1x-2q1l-0l9n-3425x6422w4i/Critical-Care-Pain-Observation-Tool-(CPOT)    PAINAD Score: See PAINAD tool and score below       Dyspnea:                           [ ]Mild [ ]Moderate [ ]Severe None     RDOS: See RDOS tool and score below   0 to 2  minimal or no respiratory distress   3  mild distress  4 to 6 moderate distress  >7 severe distress      Anxiety:                             [ ]Mild [ ]Moderate [ ]Severe  Fatigue:                             [ ]Mild [ ]Moderate [ ]Severe  Nausea:                             [ ]Mild [ ]Moderate [ ]Severe  Loss of appetite:              [ ]Mild [ ]Moderate [ ]Severe  Constipation:                    [ ]Mild [ ]Moderate [ ]Severe    PCSSQ[Palliative Care Spiritual Screening Question]   Severity (0-10):  Score of 4 or > indicate consideration of Chaplaincy referral.  Chaplaincy Referral: [ x] yes [ ] refused [x ] following [ ] Deferred     Caregiver [place]? : [ ] yes [ ] no [ ] Deferred [ ] Declined             Social work referral [ ] Patient & Family Centered Care Referral [ ]     Anticipatory Grief present?:  [ x] yes [ ] no  [ ] Deferred                  Social work referral [ ] Chaplaincy Referral [ x]    		  Other Symptoms:  [x ]All other review of systems negative     Palliative Performance Status Version 2:   See PPSv2 tool and score below         PHYSICAL EXAM:  Vital Signs Last 24 Hrs  T(C): 36.5 (20 Nov 2024 12:14), Max: 36.8 (19 Nov 2024 21:00)  T(F): 97.7 (20 Nov 2024 12:14), Max: 98.3 (19 Nov 2024 21:00)  HR: 77 (20 Nov 2024 12:14) (70 - 88)  BP: 150/80 (20 Nov 2024 12:14) (113/76 - 150/80)  BP(mean): --  RR: 18 (20 Nov 2024 12:14) (18 - 19)  SpO2: 95% (20 Nov 2024 12:14) (93% - 96%)    Parameters below as of 20 Nov 2024 12:14  Patient On (Oxygen Delivery Method): room air     I&O's Summary    19 Nov 2024 07:01  -  20 Nov 2024 07:00  --------------------------------------------------------  IN: 0 mL / OUT: 300 mL / NET: -300 mL    20 Nov 2024 07:01  -  20 Nov 2024 13:26  --------------------------------------------------------  IN: 340 mL / OUT: 0 mL / NET: 340 mL       GENERAL: [ ]Cachexia    [ x]Alert  [ x]Oriented x3   [ ]Lethargic  [ ]Unarousable  [ ]Verbal  [ ]Non-Verbal  Behavioral:   [ ]Anxiety  [ ]Delirium [ ]Agitation [ ]Other  HEENT:  [ ]Normal   [x ]Dry mouth   [ ]ET Tube/Trach  [ ]Oral lesions  PULMONARY:   [ ]Clear [ ]Tachypnea  [ ]Audible excessive secretions   [ ]Rhonchi        [ ]Right [ ]Left [ ]Bilateral  [ ]Crackles        [ ]Right [ ]Left [ ]Bilateral  [ ]Wheezing     [ ]Right [ ]Left [ ]Bilateral  [x ]Diminished BS [ ] Right [ ]Left [ x]Bilateral  CARDIOVASCULAR:    [x ]Regular [ ]Irregular [ ]Tachy  [ ]Leander [ ]Murmur [ ]Other  GASTROINTESTINAL:  [ x]Soft  [ ]Distended   [x ]+BS  [ x]Non tender [ ]Tender  [ ]Other [ ]PEG [ ]OGT/ NGT   Last BM: 11/17  GENITOURINARY:  [ ]Normal [ x]Incontinent   [ ]Oliguria/Anuria   [ ][place]  MUSCULOSKELETAL:   [ ]Normal   [x ]Weakness  [ x]Bed/Wheelchair bound [ ]Edema  NEUROLOGIC:   [ ]No focal deficits  [ c] Cognitive impairment  [ ] Dysphagia [ ]Dysarthria [ ] Paresis [ ]Other   SKIN:   [ ]Normal  [ ]Rash  [ ]Other  [ ]Pressure ulcer(s) [ ]y [ ]n present on admission    CRITICAL CARE:  [ ]Shock Present  [ ]Septic [ ]Cardiogenic [ ]Neurologic [ ]Hypovolemic  [ ]Vasopressors [ ]Inotropes  [ ]Respiratory failure present [ ]Mechanical Ventilation [ ]Non-invasive ventilatory support [ ]High-Flow   [ ]Acute  [ ]Chronic [ ]Hypoxic  [ ]Hypercarbic [ ]Other  [ ]Other organ failure     LABS:  11-20    136  |  101  |  59[H]  ----------------------------<  112[H]  5.0   |  20[L]  |  1.39[H]    Ca    10.1      20 Nov 2024 06:59  Phos  4.0     11-20  Mg     2.7     11-20        Urinalysis Basic - ( 20 Nov 2024 06:59 )    Color: x / Appearance: x / SG: x / pH: x  Gluc: 112 mg/dL / Ketone: x  / Bili: x / Urobili: x   Blood: x / Protein: x / Nitrite: x   Leuk Esterase: x / RBC: x / WBC x   Sq Epi: x / Non Sq Epi: x / Bacteria: x      RADIOLOGY & ADDITIONAL STUDIES:  < from: MR Shoulder Joint w/wo IV Cont, Right (11.14.24 @ 11:54) >  ACC: [id number] EXAM:  MR SHOULDER WAW IC RT   ORDERED BY: JOSE RAFAEL HARKINS     PROCEDURE DATE:  11/14/2024          INTERPRETATION:  MR SHOULDER WITHOUT AND WITH IV CONTRAST RIGHT    HISTORY: Right shoulder pain. Osteoarthritis. Swelling. Duration of one   week.    TECHNIQUE:  Multiplanar MRI of the right shoulder was performed without   contrast. Patient motion causes degradation of several sequences.    COMPARISON: Chest CT dated 11/13/2024. Right shoulder x-rays 11/13/2024.    FINDINGS:    OSSEOUS STRUCTURES    Fractures/Contusions:  None.    ROTATOR CUFF TENDONS    Supraspinatus Tendon:  There is complete full-thickness tearing of the   attachment with fibers retracted to the level of the glenoid. Motion   causes limitation.    InfraspinatusTendon:  There is suspected complete full-thickness   tearing with fibers retracted to the level of the glenoid on coronal   series 19 image 23. Motion causes limitation.    Teres Minor Tendon:  Suspected to be intact.    Subscapularis Tendon:  Thereis suspected complete full-thickness   tearing with fibers retracted to the level of the glenoid. Motion causes   some limitation.    Muscle Atrophy:  Moderate to severe generalized rotator cuff muscle   atrophy is noted.    CORACOACROMIAL ARCH & AC JOINT    Acromiohumeral Space:  Humeral head is high riding with remodeling and   acetabularization of the acromial arch.    Acromioclavicular Joint:  Mild arthrosis is present.    Subacromial/Subdeltoid Bursa:  There is a large amount of fluid   extending through the rotator cuff tearing.    GLENOID LABRUM AND BICEPS TENDON    Biceps-Labral Complex:  Appears to be degenerated and torn with rupture   of the biceps anchor.    Glenoid Labrum: Degenerated and torn.    Biceps Tendon (long head):  Poorly visualized suspected to be ruptured   proximally.    GLENOHUMERAL JOINT    Articular Cartilage:  Severe cartilage loss appears to be present with   mild flattening of the humeral head, broadening of the glenoid, and small   to moderate-sized osteophytes.    JOINT CAPSULE    Effusion/Synovitis:  A large effusion is present with severe synovitis.    Inferior Glenohumeral Ligament:  Unremarkable.    OTHER SOFT TISSUES    Musculature:  Otherwise unremarkable.    Subcutaneous Tissues:  Unremarkable.    IMPRESSION:  1.  Patient motion causes some limitation.  2.  There appears to be extensive full-thickness tearing of the rotator   cuff.  3.  Severe osteoarthritis is present.  4.  Large effusion is noted with severe synovitis.    --- End of Report ---            KATERYNA REESE MD; Attending Radiologist  This document has been electronically signed. Nov 14 2024 12:22PM    < end of copied text >    Protein Calorie Malnutrition Present: [ ]mild [ ]moderate [ ]severe [ ]underweight [ ]morbid obesity  https://www.andeal.org/vault/2440/web/files/ONC/Table_Clinical%20Characteristics%20to%20Document%20Malnutrition-White%20JV%20et%20al%668508.pdf    Height (cm): 160 (11-13-24 @ 12:20), 160 (11-10-24 @ 18:20), 160 (11-08-24 @ 15:42)  Weight (kg): 71.2 (11-14-24 @ 07:50), 45.4 (11-10-24 @ 18:20), 71.2 (11-08-24 @ 15:42)  BMI (kg/m2): 27.8 (11-14-24 @ 07:50), 17.7 (11-13-24 @ 12:20), 17.7 (11-10-24 @ 18:20)    [x ]PPSV2 < or = 30%  [ ]significant weight loss [ ]poor nutritional intake [ ]anasarca[ ]Artificial Nutrition    Other REFERRALS:  [ ]Hospice  [ ]Child Life  [ ]Social Work  [ ]Case management [ ]Holistic Therapy     Goals of Care Document:

## 2024-11-21 PROCEDURE — 99497 ADVNCD CARE PLAN 30 MIN: CPT | Mod: 25

## 2024-11-21 PROCEDURE — 99232 SBSQ HOSP IP/OBS MODERATE 35: CPT

## 2024-11-21 RX ADMIN — Medication 5000 UNIT(S): at 06:30

## 2024-11-21 RX ADMIN — Medication 5000 UNIT(S): at 21:56

## 2024-11-21 RX ADMIN — OXYCODONE HYDROCHLORIDE 2.5 MILLIGRAM(S): 30 TABLET ORAL at 17:19

## 2024-11-21 RX ADMIN — Medication 2 TABLET(S): at 21:56

## 2024-11-21 RX ADMIN — Medication 5 MILLIGRAM(S): at 21:56

## 2024-11-21 RX ADMIN — Medication 5 MICROGRAM(S): at 06:30

## 2024-11-21 RX ADMIN — OXYCODONE HYDROCHLORIDE 2.5 MILLIGRAM(S): 30 TABLET ORAL at 13:06

## 2024-11-21 RX ADMIN — Medication 5000 UNIT(S): at 14:39

## 2024-11-21 RX ADMIN — LIDOCAINE 1 PATCH: 40 CREAM TOPICAL at 13:07

## 2024-11-21 RX ADMIN — POLYETHYLENE GLYCOL 3350 17 GRAM(S): 17 POWDER, FOR SOLUTION ORAL at 13:06

## 2024-11-21 RX ADMIN — OXYCODONE HYDROCHLORIDE 2.5 MILLIGRAM(S): 30 TABLET ORAL at 06:30

## 2024-11-21 RX ADMIN — GABAPENTIN 100 MILLIGRAM(S): 300 CAPSULE ORAL at 13:06

## 2024-11-21 RX ADMIN — OXYCODONE HYDROCHLORIDE 2.5 MILLIGRAM(S): 30 TABLET ORAL at 21:56

## 2024-11-21 RX ADMIN — OXYCODONE HYDROCHLORIDE 2.5 MILLIGRAM(S): 30 TABLET ORAL at 02:24

## 2024-11-21 NOTE — PROGRESS NOTE ADULT - CONVERSATION DETAILS
Spoke with pt's family at bedside. Discussed ongoing options, at this time family is requesting to go to rehab with a transition to LTC. Sondra feels it will be to difficult to manage mom's care at home and a LTC facility is a good next step. Discussed concern with limited benefits of rehab in the setting of advanced illness and severe OA causing discomfort. Discussed low threshold for readmissions. Family aware but remain amenable for plan to [facility]. Discussed option to transition to home hospice whether in the LTC facility or home always remains an option if family feels a comfort based approach is a next best step. Sondra and Addy were appreciative of the information. Palliative contact information provided. Emotional support provided.
Spoke with Sondra at bedside. Discussed yesterday's GOC discussion. She shared she had given much thought to Dr. Huff's conversation and believes when pt is ready for discharge it is appropriate to transition to a comfort based approach to care. Sondra shared her father had passed at [facility] and was hopeful the pt may be able to receive comfort level of care there as she has experience with the institution. Discussed [facility] as a palliative hospital, option of placing the referral but to also consider alternate options. Sondra shared in the event [facility] cannot accommodate her mom's needs she is amenable to home hospice. She shared she has support of her mom's regular HHA and feels in the event pt does not qualify, home with hospice would be appropriate.   Discussed advanced directives. Pt's 3 children Sondra Turcios, Addy Turcios, and Katarzyna Aldrich all are in agreement for DNR/I. JACKELINE completed reflecting the above.   Discussed referral with CM Team and ongoing support from palliative. Emotional support provided.

## 2024-11-21 NOTE — PROGRESS NOTE ADULT - SUBJECTIVE AND OBJECTIVE BOX
SUBJECTIVE / OVERNIGHT EVENTS:  Today is hospital day 8d. There are no new issues or overnight events.     HPI:  89yo f w pmh mci/dementia, htn, hld, hfpef, hypothyroidism, severe oa, hcv s/p treatment, vte (dvt), p/w sob/ku, persistent shoulder pain, inability to perform adls; patient initially with right shoulder pain on 11/8, came to er, and was discharged home with outpatient follow up; right shoulder pain persisted and so patient returned to [facility] er on 11/10 and was hospitalized 11/10-11/12; imaging showed severe oa, pain managed w oxycodone, and patient discharged home with home pt. since arriving home, after recent hospitalization, daughter has found that patient now requires more assistance, w she herself covering the other 12 hours hha is unavailable for (at baseline, patient requires assistance with adls, has 12 hour hha at home). in addition, shoulder pain persists and daughter noticed patient appearing short of breath; she mentions that she has not been giving patient her home lasix for a while now as it causes patient to pass too much urine, and makes it difficult to care for patient as she is totally dependent ; she grew concerned, so had patient brought back to [facility] er for further evaluation. in er, found to be in ahrf req ~3 lpm via nc; suspect 2/2 mild adhf; admit to medicine for further mgmt (13 Nov 2024 23:08)    MEDICATIONS  (STANDING):  bisacodyl 5 milliGRAM(s) Oral at bedtime  gabapentin 100 milliGRAM(s) Oral daily  heparin   Injectable 5000 Unit(s) SubCutaneous every 8 hours  influenza  Vaccine (HIGH DOSE) 0.5 milliLiter(s) IntraMuscular once  lidocaine   4% Patch 1 Patch Transdermal daily  liothyronine 5 MICROGram(s) Oral daily  oxyCODONE    IR 2.5 milliGRAM(s) Oral <User Schedule>  polyethylene glycol 3350 17 Gram(s) Oral daily  senna 2 Tablet(s) Oral at bedtime    MEDICATIONS  (PRN):  acetaminophen     Tablet .. 650 milliGRAM(s) Oral every 6 hours PRN Temp greater or equal to 38C (100.4F), Mild Pain (1 - 3)  aluminum hydroxide/magnesium hydroxide/simethicone Suspension 30 milliLiter(s) Oral every 4 hours PRN Dyspepsia  melatonin 3 milliGRAM(s) Oral at bedtime PRN Insomnia  naloxone Injectable 0.1 milliGRAM(s) IV Push every 3 minutes PRN Sedation or respiratory depression 2/2 to opioids  ondansetron Injectable 4 milliGRAM(s) IV Push every 8 hours PRN Nausea and/or Vomiting  oxyCODONE    IR 2.5 milliGRAM(s) Oral every 4 hours PRN Moderate to Severe pain    HOME MEDICATIONS:  gabapentin 100 mg oral capsule: 1 cap(s) orally once a day  lidocaine 4% topical film: Apply topically to affected area once a day 12hrs on 12hrs off  liothyronine 5 mcg oral tablet: 1 tab(s) orally once a day  lisinopril 10 mg oral tablet: 1 tab(s) orally once a day  Narcan 4 mg/0.1 mL nasal spray: 1 spray(s) intranasally once a day use as instructed  oxyCODONE 5 mg oral tablet: 0.5 tab(s) orally 2 times a day MDD: 1    PHYSICAL EXAM  Vital Signs Last 24 Hrs  T(C): 36.7 (21 Nov 2024 14:06), Max: 36.9 (21 Nov 2024 08:23)  T(F): 98 (21 Nov 2024 14:06), Max: 98.5 (21 Nov 2024 08:23)  HR: 79 (21 Nov 2024 08:23) (79 - 101)  BP: 137/60 (21 Nov 2024 14:06) (117/75 - 152/72)  BP(mean): --  RR: 18 (21 Nov 2024 14:06) (18 - 18)  SpO2: 94% (21 Nov 2024 14:06) (93% - 97%)    Parameters below as of 21 Nov 2024 14:06  Patient On (Oxygen Delivery Method): room air        11-20-24 @ 07:01  -  11-21-24 @ 07:00  --------------------------------------------------------  IN: 580 mL / OUT: 300 mL / NET: 280 mL    11-21-24 @ 07:01  -  11-21-24 @ 16:00  --------------------------------------------------------  IN: 220 mL / OUT: 300 mL / NET: -80 mL      CONSTITUTIONAL: Well-groomed, in no apparent distress;  EYES: No conjunctival or scleral injection, non-icteric;  ENMT: No external nasal lesions; Normal outer ears;  NECK: Trachea midline;  RESPIRATORY: Normal respiratory effort;   CARDIOVASCULAR: Regular rate and rhythm;  GASTROINTESTINAL: Non-distended;   EXTREMITIES:  No lower extremity edema;  NEUROLOGY: Does respond to limited commands appropriately;  PSYCHIATRY: Mood and Affect appropriate    LABS:    11-20    136  |  101  |  59[H]  ----------------------------<  112[H]  5.0   |  20[L]  |  1.39[H]    Ca    10.1      20 Nov 2024 06:59  Phos  4.0     11-20  Mg     2.7     11-20            Urinalysis Basic - ( 20 Nov 2024 06:59 )    Color: x / Appearance: x / SG: x / pH: x  Gluc: 112 mg/dL / Ketone: x  / Bili: x / Urobili: x   Blood: x / Protein: x / Nitrite: x   Leuk Esterase: x / RBC: x / WBC x   Sq Epi: x / Non Sq Epi: x / Bacteria: x            RADIOLOGY & ADDITIONAL TESTS:  EKG  12 Lead ECG:   Ventricular Rate 99 BPM    Atrial Rate 99 BPM    P-R Interval 238 ms    QRS Duration 128 ms    Q-T Interval 360 ms    QTC Calculation(Bazett) 462 ms    P Axis 12 degrees    R Axis -40 degrees    T Axis -37 degrees    Diagnosis Line SINUS RHYTHM WITH 1ST DEGREE A-V BLOCK  LEFT AXIS DEVIATION  RIGHT BUNDLE BRANCH BLOCK  POSSIBLE LATERAL INFARCT , AGE UNDETERMINED  INFERIOR INFARCT , AGE UNDETERMINED  ABNORMAL ECG  WHEN COMPARED WITH ECG OF 11/13/24   NO SIGNIFICANT CHANGE WAS FOUND  Confirmed by ABBEY Menjivar Zlata ([id number]) on 11/19/2024 6:48:15 PM (11-18-24 @ 16:47)  12 Lead ECG:   Ventricular Rate 70 BPM    Atrial Rate 70 BPM    P-R Interval 238 ms    QRS Duration 144 ms    Q-T Interval 430 ms    QTC Calculation(Bazett) 464 ms    P Axis 35 degrees    R Axis -46 degrees    T Axis -13 degrees    Diagnosis Line SINUS RHYTHM WITH 1ST DEGREE A-V BLOCK  RIGHT BUNDLE BRANCH BLOCK  LEFT ANTERIOR FASCICULAR BLOCK  *** BIFASCICULAR BLOCK ***  ABNORMAL ECG  WHEN COMPARED WITH ECG OF 10-NOV-2024 19:13,  NO SIGNIFICANT CHANGE WAS FOUND  Confirmed by Sommer Acosta ([id number]) on 11/18/2024 10:26:05 PM (11-13-24 @ 12:44)    CT Angio Chest PE Protocol w/ IV Cont:   ACC: [id number] EXAM:  CT ANGIO CHEST PULM ART [facility]   ORDERED BY: FABIANA KAMARA     PROCEDURE DATE:  11/13/2024          INTERPRETATION:  CLINICAL INFORMATION: Respiratory distress. Not   ambulatory.    COMPARISON: CT chest 2/25/2018.    CONTRAST/COMPLICATIONS:  IV Contrast: Omnipaque 350  60 cc administered   40 cc discarded  Oral Contrast: NONE  None reported at time of study completion    PROCEDURE:  CT Angiography of the Chest.  Sagittal and coronal reformats were performed as well as 3D (MIP)   reconstructions.    FINDINGS:  Study is limited due to respiratory motion artifact.    LUNGS AND LARGE AIRWAYS: Respiratory motion artifact significantly limits   assessment of the lower lungs. Patent central airways. Subsegmental   atelectasis in the lingula. Diffuse mild septal thickening may relate to   component of interstitial edema.  PLEURA: No pleural effusion.  VESSELS: No pulmonary embolism to the level of the segmental arteries.   Limited evaluation of subsegmental pulmonary arteries secondary to motion   artifact. Dilatation of the main pulmonary artery, measuring 3.6 cm,   unchanged. Mild aortic calcification.  HEART: Cardiomegaly. No pericardial effusion.  MEDIASTINUM AND KLAUDIA: No lymphadenopathy.  CHEST WALL ANDLOWER NECK: Loop recorder device in the left chest wall.  VISUALIZED UPPER ABDOMEN: Hepatic cysts. Bilocular left renal cyst with   thinly calcified septum.  BONES: 2.1 cm lytic lesion within the tip of the left scapula (601, 81).   Degenerative changes including advanced bilateral shoulder arthropathy   and diffuse multilevel degenerative changes of the spine.    IMPRESSION:    No pulmonary embolism to the level of the segmental arteries. Limited   evaluation of subsegmental arteries secondary to motion artifact.    Indeterminate lytic lesion within the tip of the left scapula. Consider   myeloma or metastatic disease.    Diffuse mild septal thickening may relate to component of interstitial   edema.    Cardiomegaly. Dilated main pulmonary artery suggests pulmonary   hypertension, unchanged since 02/25/2018.    --- End of Report ---          SHABNAM COREAS MD; Resident Radiologist  This document has been electronically signed.  VÍCTOR RODRIGES MD; Attending Radiologist  This document has been electronically signed. Nov 13 2024  7:23PM (11-13-24 @ 17:47)  Xray Chest 1 View- PORTABLE-Urgent:   ACC: [id number] EXAM:  XR CHEST PORTABLE URGENT 1V   ORDERED BY: FABIANA KAMARA     PROCEDURE DATE:  11/13/2024          INTERPRETATION:  HISTORY: Evaluate for pneumonia    TECHNIQUE: A single AP view of the chest was obtained.    COMPARISON: 11/10/2024    FINDINGS:  The cardiac silhouette is normal in size. There is a loop   recorder device. There are no focal consolidations or pleural effusions.   The hilar and mediastinal structures appear unremarkable. The osseous   structures areintact.    IMPRESSION: Clear lungs.    --- End of Report ---            BLUE CAT MD; Attending Radiologist  This document has been electronically signed. Nov 13 2024  2:08PM (11-13-24 @ 13:49)  Xray Chest 1 View- PORTABLE-Urgent:   ACC: [id number] EXAM:  XR CHEST PORTABLE URGENT 1V   ORDERED BY: SRINATH LAZO     PROCEDURE DATE:  11/10/2024          INTERPRETATION:  EXAMINATION: XR CHEST URGENT    CLINICAL INDICATION: Cough.    TECHNIQUE: Single frontal, portable view of the chest was obtained.    COMPARISON: Chest x-ray 11/8/2024.    FINDINGS:  Cardiac loop recorder.  The heart is not accurately assessed in this AP projection.  No focal consolidation.  There is no pneumothorax or pleural effusion.  No acute bony abnormality.    IMPRESSION:  No focal consolidation, pneumothorax, or pleural effusion.    --- End of Report ---          ARVIND DOVE MD; Resident Radiologist  This document has been electronically signed.  MARTHA CURRY MD; Attending Radiologist  This document has been electronically signed. Nov 11 2024 10:08AM (11-10-24 @ 19:37)

## 2024-11-21 NOTE — PROGRESS NOTE ADULT - SUBJECTIVE AND OBJECTIVE BOX
DATE OF SERVICE: 11-21-24 @ 13:10    Patient is a 89y old  Female who presents with a chief complaint of sob/ku, persistent shoulder pain, inability to perform adls (20 Nov 2024 14:25)      INTERVAL HISTORY: in no apparent distress, daughter at bedside    REVIEW OF SYSTEMS:  CONSTITUTIONAL: No weakness  EYES/ENT: No visual changes;  No throat pain   NECK: No pain or stiffness  RESPIRATORY: No cough, wheezing; No shortness of breath  CARDIOVASCULAR: No chest pain or palpitations  GASTROINTESTINAL: No abdominal  pain. No nausea, vomiting, or hematemesis  GENITOURINARY: No dysuria, frequency or hematuria  NEUROLOGICAL: No stroke like symptoms  SKIN: No rashes      	  MEDICATIONS:        PHYSICAL EXAM:  T(C): 36.9 (11-21-24 @ 08:23), Max: 36.9 (11-21-24 @ 08:23)  HR: 79 (11-21-24 @ 08:23) (79 - 101)  BP: 140/76 (11-21-24 @ 08:23) (117/75 - 152/72)  RR: 18 (11-21-24 @ 08:23) (18 - 18)  SpO2: 95% (11-21-24 @ 08:23) (93% - 97%)  Wt(kg): --  I&O's Summary    20 Nov 2024 07:01  -  21 Nov 2024 07:00  --------------------------------------------------------  IN: 580 mL / OUT: 300 mL / NET: 280 mL    21 Nov 2024 07:01  -  21 Nov 2024 13:10  --------------------------------------------------------  IN: 100 mL / OUT: 0 mL / NET: 100 mL          Appearance: In no distress	  HEENT:    PERRL, EOMI	  Cardiovascular:  S1 S2, No JVD  Respiratory: Lungs clear to auscultation	  Gastrointestinal:  Soft, Non-tender, + BS	  Vascularature:  No edema of LE  Psychiatric: Appropriate affect   Neuro: no acute focal deficits           11-20    136  |  101  |  59[H]  ----------------------------<  112[H]  5.0   |  20[L]  |  1.39[H]    Ca    10.1      20 Nov 2024 06:59  Phos  4.0     11-20  Mg     2.7     11-20          Labs personally reviewed      ASSESSMENT/PLAN: 	    89yo f w pmh mci/dementia, htn, hld, hfpef, hypothyroidism, severe oa, hcv s/p treatment, vte (dvt), p/w sob/ku, persistent shoulder pain, inability to perform adls; in er, found to be in ahrf req ~3 lpm via nc; 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    Follows with Julissa Tejada/[facility]. I discussed with Dr Lejia    Problem/Plan #1:  Problem: Shortness of Breath  - ECG SR 1st AVB bifascicular block  -   - CXR with clear lungs  - CT neg for PE but suggestive of intersitial edema and pHTN  - TTE 11/15- EF 69%, no WMA, elevated LV filling pressure daily  - Appears euvolemic and now with IZZY. Will hold diuresis.     Problem/Plan #2:  Problem: Hypertension  - lisinopril 10mg PO daily on hold given IZZY    Problem/Plan #3:  Problem: HLD  - not currently on any statin or anti-lipid medication    Problem/Plan #4:  Problem: DVT PPx  - c/w SQ lovenox    GOC discussions ongoing with support of Palliative Care.               Iolani Behrbom, AG-DAREK Gomez DO [facility]  Cardiovascular Medicine  [address] [place] [address], [address]  Available through call or text on [employer]  Office: [phone]

## 2024-11-21 NOTE — PROGRESS NOTE ADULT - PROBLEM SELECTOR PLAN 1
Advanced. FAST 7c. The patient is hospice eligible.  Will also recommend:   -Frequent reassurance and verbal orientation   -Family members or other familiar persons by his bedside.   -Delusions and hallucinations should be neither endorsed nor challenged.   -Physical restraints should be avoided. Alternatives to restraint use, such as constant observation (preferably by someone familiar to the patient such as a family member), may be more effective.

## 2024-11-21 NOTE — PROGRESS NOTE ADULT - SUBJECTIVE AND OBJECTIVE BOX
SUBJECTIVE AND OBJECTIVE: pt seen and examined at bedside. pt awake, confused, on exam   Indication for Geriatrics and Palliative Care Services/INTERVAL HPI: GOC/symptoms     OVERNIGHT EVENTS: no acute events o/n    DNR on chart:DNI  DNI      Allergies    No Known Allergies    Intolerances    MEDICATIONS  (STANDING):  bisacodyl 5 milliGRAM(s) Oral at bedtime  gabapentin 100 milliGRAM(s) Oral daily  heparin   Injectable 5000 Unit(s) SubCutaneous every 8 hours  influenza  Vaccine (HIGH DOSE) 0.5 milliLiter(s) IntraMuscular once  lidocaine   4% Patch 1 Patch Transdermal daily  liothyronine 5 MICROGram(s) Oral daily  oxyCODONE    IR 2.5 milliGRAM(s) Oral <User Schedule>  polyethylene glycol 3350 17 Gram(s) Oral daily  senna 2 Tablet(s) Oral at bedtime    MEDICATIONS  (PRN):  acetaminophen     Tablet .. 650 milliGRAM(s) Oral every 6 hours PRN Temp greater or equal to 38C (100.4F), Mild Pain (1 - 3)  aluminum hydroxide/magnesium hydroxide/simethicone Suspension 30 milliLiter(s) Oral every 4 hours PRN Dyspepsia  melatonin 3 milliGRAM(s) Oral at bedtime PRN Insomnia  naloxone Injectable 0.1 milliGRAM(s) IV Push every 3 minutes PRN Sedation or respiratory depression 2/2 to opioids  ondansetron Injectable 4 milliGRAM(s) IV Push every 8 hours PRN Nausea and/or Vomiting  oxyCODONE    IR 2.5 milliGRAM(s) Oral every 4 hours PRN Moderate to Severe pain      ITEMS UNCHECKED ARE NOT PRESENT    PRESENT SYMPTOMS: [ ]Unable to self-report - see [ ] CPOT [ ] PAINADS [ ] RDOS  Source if other than patient:  [ ]Family   [ ]Team     Pain:  [ ]yes [x ]no  QOL impact -   Location -                    Aggravating factors -  Quality -  Radiation -  Timing-  Severity (0-10 scale):  Minimal acceptable level (0-10 scale):     CPOT:    https://www.sccm.org/getattachment/hwb41k27-1x2n-3o9c-0c9a-3582v9859n4x/Critical-Care-Pain-Observation-Tool-(CPOT)    PAINAD Score: See PAINAD tool and score below       Dyspnea:                           [ ]Mild [ ]Moderate [ ]Severe none     RDOS: See RDOS tool and score below   0 to 2  minimal or no respiratory distress   3  mild distress  4 to 6 moderate distress  >7 severe distress      Anxiety:                             [ ]Mild [ ]Moderate [ ]Severe  Fatigue:                             [ ]Mild [ ]Moderate [ ]Severe  Nausea:                             [ ]Mild [ ]Moderate [ ]Severe  Loss of appetite:              [ ]Mild [ ]Moderate [ ]Severe  Constipation:                    [ ]Mild [ ]Moderate [ ]Severe    PCSSQ[Palliative Care Spiritual Screening Question]   Severity (0-10):  Score of 4 or > indicate consideration of Chaplaincy referral.  Chaplaincy Referral: [ ] yes [ ] refused [ ] following [ ] Deferred     Caregiver [place]? : [ ] yes [ ] no [ ] Deferred [ ] Declined             Social work referral [ ] Patient & Family Centered Care Referral [ ]     Anticipatory Grief present?:  [ ] yes [ ] no  [ ] Deferred                  Social work referral [ ] Chaplaincy Referral [ ]    		  Other Symptoms:  [x ]All other review of systems negative     Palliative Performance Status Version 2:   See PPSv2 tool and score below         PHYSICAL EXAM:  Vital Signs Last 24 Hrs  T(C): 36.7 (21 Nov 2024 14:06), Max: 36.9 (21 Nov 2024 08:23)  T(F): 98 (21 Nov 2024 14:06), Max: 98.5 (21 Nov 2024 08:23)  HR: 79 (21 Nov 2024 08:23) (79 - 101)  BP: 137/60 (21 Nov 2024 14:06) (117/75 - 152/72)  BP(mean): --  RR: 18 (21 Nov 2024 14:06) (18 - 18)  SpO2: 94% (21 Nov 2024 14:06) (93% - 97%)    Parameters below as of 21 Nov 2024 14:06  Patient On (Oxygen Delivery Method): room air     I&O's Summary    20 Nov 2024 07:01  -  21 Nov 2024 07:00  --------------------------------------------------------  IN: 580 mL / OUT: 300 mL / NET: 280 mL    21 Nov 2024 07:01  -  21 Nov 2024 14:43  --------------------------------------------------------  IN: 220 mL / OUT: 300 mL / NET: -80 mL       GENERAL: [ ]Cachexia    [x ]Alert  [ x]Oriented x1   [ ]Lethargic  [ ]Unarousable  [ ]Verbal  [ ]Non-Verbal  Behavioral:   [ ]Anxiety  [ ]Delirium [ ]Agitation [ ]Other  HEENT:  [ ]Normal   [x ]Dry mouth   [ ]ET Tube/Trach  [ ]Oral lesions  PULMONARY:   [ ]Clear [ ]Tachypnea  [ ]Audible excessive secretions   [ ]Rhonchi        [ ]Right [ ]Left [ ]Bilateral  [ ]Crackles        [ ]Right [ ]Left [ ]Bilateral  [ ]Wheezing     [ ]Right [ ]Left [ ]Bilateral  [x ]Diminished BS [ ] Right [ ]Left [ x]Bilateral  CARDIOVASCULAR:    [x ]Regular [ ]Irregular [ ]Tachy  [ ]Leander [ ]Murmur [ ]Other  GASTROINTESTINAL:  [ x]Soft  [ ]Distended   [ x]+BS  [ x]Non tender [ ]Tender  [ ]Other [ ]PEG [ ]OGT/ NGT   Last BM:   GENITOURINARY:  [ ]Normal [ x]Incontinent   [ ]Oliguria/Anuria   [ ][place]  MUSCULOSKELETAL:   [ ]Normal   [x ]Weakness  [x ]Bed/Wheelchair bound [ ]Edema  NEUROLOGIC:   [ ]No focal deficits  [x ] Cognitive impairment  [ ] Dysphagia [ ]Dysarthria [ ] Paresis [ ]Other   SKIN:   [ ]Normal  [ ]Rash  [ ]Other  [ ]Pressure ulcer(s) [ ]y [ ]n present on admission    CRITICAL CARE:  [ ]Shock Present  [ ]Septic [ ]Cardiogenic [ ]Neurologic [ ]Hypovolemic  [ ]Vasopressors [ ]Inotropes  [ ]Respiratory failure present [ ]Mechanical Ventilation [ ]Non-invasive ventilatory support [ ]High-Flow   [ ]Acute  [ ]Chronic [ ]Hypoxic  [ ]Hypercarbic [ ]Other  [ ]Other organ failure     LABS:  11-20    136  |  101  |  59[H]  ----------------------------<  112[H]  5.0   |  20[L]  |  1.39[H]    Ca    10.1      20 Nov 2024 06:59  Phos  4.0     11-20  Mg     2.7     11-20        Urinalysis Basic - ( 20 Nov 2024 06:59 )    Color: x / Appearance: x / SG: x / pH: x  Gluc: 112 mg/dL / Ketone: x  / Bili: x / Urobili: x   Blood: x / Protein: x / Nitrite: x   Leuk Esterase: x / RBC: x / WBC x   Sq Epi: x / Non Sq Epi: x / Bacteria: x      RADIOLOGY & ADDITIONAL STUDIES:  < from: MR Shoulder Joint w/wo IV Cont, Right (11.14.24 @ 11:54) >    ACC: [id number] EXAM:  MR SHOULDER WAW IC RT   ORDERED BY: JOSE RAFAEL HARKINS     PROCEDURE DATE:  11/14/2024          INTERPRETATION:  MR SHOULDER WITHOUT AND WITH IV CONTRAST RIGHT    HISTORY: Right shoulder pain. Osteoarthritis. Swelling. Duration of one   week.    TECHNIQUE:  Multiplanar MRI of the right shoulder was performed without   contrast. Patient motion causes degradation of several sequences.    COMPARISON: Chest CT dated 11/13/2024. Right shoulder x-rays 11/13/2024.    FINDINGS:    OSSEOUS STRUCTURES    Fractures/Contusions:  None.    ROTATOR CUFF TENDONS    Supraspinatus Tendon:  There is complete full-thickness tearing of the   attachment with fibers retracted to the level of the glenoid. Motion   causes limitation.    InfraspinatusTendon:  There is suspected complete full-thickness   tearing with fibers retracted to the level of the glenoid on coronal   series 19 image 23. Motion causes limitation.    Teres Minor Tendon:  Suspected to be intact.    Subscapularis Tendon:  Thereis suspected complete full-thickness   tearing with fibers retracted to the level of the glenoid. Motion causes   some limitation.    Muscle Atrophy:  Moderate to severe generalized rotator cuff muscle   atrophy is noted.    CORACOACROMIAL ARCH & AC JOINT    Acromiohumeral Space:  Humeral head is high riding with remodeling and   acetabularization of the acromial arch.    Acromioclavicular Joint:  Mild arthrosis is present.    Subacromial/Subdeltoid Bursa:  There is a large amount of fluid   extending through the rotator cuff tearing.    GLENOID LABRUM AND BICEPS TENDON    Biceps-Labral Complex:  Appears to be degenerated and torn with rupture   of the biceps anchor.    Glenoid Labrum: Degenerated and torn.    Biceps Tendon (long head):  Poorly visualized suspected to be ruptured   proximally.    GLENOHUMERAL JOINT    Articular Cartilage:  Severe cartilage loss appears to be present with   mild flattening of the humeral head, broadening of the glenoid, and small   to moderate-sized osteophytes.    JOINT CAPSULE    Effusion/Synovitis:  A large effusion is present with severe synovitis.    Inferior Glenohumeral Ligament:  Unremarkable.    OTHER SOFT TISSUES    Musculature:  Otherwise unremarkable.    Subcutaneous Tissues:  Unremarkable.    IMPRESSION:  1.  Patient motion causes some limitation.  2.  There appears to be extensive full-thickness tearing of the rotator   cuff.  3.  Severe osteoarthritis is present.  4.  Large effusion is noted with severe synovitis.    --- End of Report ---            KATERYNA REESE MD; Attending Radiologist  This document has been electronically signed. Nov 14 2024 12:22PM    < end of copied text >    Protein Calorie Malnutrition Present: [ ]mild [ ]moderate [ ]severe [ ]underweight [ ]morbid obesity  https://www.andeal.org/vault/2440/web/files/ONC/Table_Clinical%20Characteristics%20to%20Document%20Malnutrition-White%20JV%20et%20al%791776.pdf    Height (cm): 160 (11-13-24 @ 12:20), 160 (11-10-24 @ 18:20), 160 (11-08-24 @ 15:42)  Weight (kg): 71.2 (11-14-24 @ 07:50), 45.4 (11-10-24 @ 18:20), 71.2 (11-08-24 @ 15:42)  BMI (kg/m2): 27.8 (11-14-24 @ 07:50), 17.7 (11-13-24 @ 12:20), 17.7 (11-10-24 @ 18:20)    [ x]PPSV2 < or = 30%  [ ]significant weight loss [ ]poor nutritional intake [ ]anasarca[ ]Artificial Nutrition    Other REFERRALS:  [ ]Hospice  [ ]Child Life  [ ]Social Work  [ ]Case management [ ]Holistic Therapy     Goals of Care Document    As per chart, patient denied from [place] and family agreeable to home hospice referral to [facility] following for d/c planning, [facility] will continue to follow this case.     I, Jorge Lindsey, where applicable, am scribing for and the presence of Beatris Llamas DNP, the following sections PARTICIPANTS, ADVANCED DIRECTIVES, CONVERSATION DISCUSSION, WHAT MATTERS MOST TO PATIENTS AND FAMILY, TREATMENT GUIDELINES, DATE OF MOLST, AND TIME SPENT.    What Matters Most To Patient and Family:  · What matters most to patient and family  patient care, patient comfort, time spent with family    Personal Advance Directives Treatment Guidelines:   Treatment Guidelines:  · Decision Maker  Surrogate  · Treatment Guidelines  DNR; DNI    MOLST:  · Completed  19-Nov-2024    Location of Discussion:   Time Spent on Advance Care Planning:  Attending or RHIANNA Only.     I personally spent 30 minutes on advance care planning services with the patient. This time is separate and distinct from any other care management services provided on this date.    Location of Discussion:  · Location of discussion  Face to face      Electronic Signatures:  Jorge Lindsey [place]  (Signed 20-Nov-2024 16:20)  	Authored: Goals of Care Conversation, Personal Advance Directives Treatment Guidelines, Location of Discussion      Last Updated: 20-Nov-2024 16:20 by Jorge Lindsey ([facility]

## 2024-11-21 NOTE — PROGRESS NOTE ADULT - PROBLEM SELECTOR PLAN 6
h/o mci/dementia, severe oa  a+o x1-2 at baseline  maintain fall + frac, delirium, aspiration precautions; keep head end of bed elevated  nutrition consult  dysphagia screen/ slp eval - soft/bite size diet, thin liquids  pt/ot eval + sw/cm consult for disposition  Palliative cs - home hospice at [place]

## 2024-11-21 NOTE — PROGRESS NOTE ADULT - PROBLEM SELECTOR PLAN 6
pt DNR/I  surrogates are pt's 3 children Addy Amaro, and Katarzyna   disposition is [facility] with transition to LTC

## 2024-11-21 NOTE — PROGRESS NOTE ADULT - PROBLEM SELECTOR PLAN 7
will sign off as goals are established   case discussed with CM team  chaplaincy consult placed for spiritual support  Can be reached by TEAMS M-F 9-5 Beatris Llamas Any other time please page [phone] if needed

## 2024-11-22 LAB
ANION GAP SERPL CALC-SCNC: 15 MMOL/L — SIGNIFICANT CHANGE UP (ref 5–17)
BUN SERPL-MCNC: 35 MG/DL — HIGH (ref 7–23)
CALCIUM SERPL-MCNC: 10.7 MG/DL — HIGH (ref 8.4–10.5)
CHLORIDE SERPL-SCNC: 103 MMOL/L — SIGNIFICANT CHANGE UP (ref 96–108)
CO2 SERPL-SCNC: 20 MMOL/L — LOW (ref 22–31)
CREAT SERPL-MCNC: 0.92 MG/DL — SIGNIFICANT CHANGE UP (ref 0.5–1.3)
EGFR: 55 ML/MIN/1.73M2 — LOW
GLUCOSE SERPL-MCNC: 140 MG/DL — HIGH (ref 70–99)
POTASSIUM SERPL-MCNC: 5.3 MMOL/L — SIGNIFICANT CHANGE UP (ref 3.5–5.3)
POTASSIUM SERPL-SCNC: 5.3 MMOL/L — SIGNIFICANT CHANGE UP (ref 3.5–5.3)
SODIUM SERPL-SCNC: 138 MMOL/L — SIGNIFICANT CHANGE UP (ref 135–145)

## 2024-11-22 PROCEDURE — 99232 SBSQ HOSP IP/OBS MODERATE 35: CPT

## 2024-11-22 RX ORDER — FUROSEMIDE 40 MG/1
20 TABLET ORAL DAILY
Refills: 0 | Status: DISCONTINUED | OUTPATIENT
Start: 2024-11-22 | End: 2024-11-25

## 2024-11-22 RX ORDER — LISINOPRIL 20 MG/1
5 TABLET ORAL DAILY
Refills: 0 | Status: DISCONTINUED | OUTPATIENT
Start: 2024-11-22 | End: 2024-11-22

## 2024-11-22 RX ADMIN — Medication 5 MICROGRAM(S): at 05:22

## 2024-11-22 RX ADMIN — Medication 5000 UNIT(S): at 14:14

## 2024-11-22 RX ADMIN — OXYCODONE HYDROCHLORIDE 2.5 MILLIGRAM(S): 30 TABLET ORAL at 22:57

## 2024-11-22 RX ADMIN — Medication 5 MILLIGRAM(S): at 22:57

## 2024-11-22 RX ADMIN — POLYETHYLENE GLYCOL 3350 17 GRAM(S): 17 POWDER, FOR SOLUTION ORAL at 12:03

## 2024-11-22 RX ADMIN — OXYCODONE HYDROCHLORIDE 2.5 MILLIGRAM(S): 30 TABLET ORAL at 13:01

## 2024-11-22 RX ADMIN — Medication 5000 UNIT(S): at 22:58

## 2024-11-22 RX ADMIN — GABAPENTIN 100 MILLIGRAM(S): 300 CAPSULE ORAL at 12:03

## 2024-11-22 RX ADMIN — OXYCODONE HYDROCHLORIDE 2.5 MILLIGRAM(S): 30 TABLET ORAL at 05:22

## 2024-11-22 RX ADMIN — OXYCODONE HYDROCHLORIDE 2.5 MILLIGRAM(S): 30 TABLET ORAL at 18:54

## 2024-11-22 RX ADMIN — OXYCODONE HYDROCHLORIDE 2.5 MILLIGRAM(S): 30 TABLET ORAL at 14:00

## 2024-11-22 RX ADMIN — LIDOCAINE 1 PATCH: 40 CREAM TOPICAL at 12:02

## 2024-11-22 RX ADMIN — Medication 5000 UNIT(S): at 05:22

## 2024-11-22 RX ADMIN — Medication 2 TABLET(S): at 22:57

## 2024-11-22 NOTE — PROGRESS NOTE ADULT - SUBJECTIVE AND OBJECTIVE BOX
SUBJECTIVE / OVERNIGHT EVENTS:  Today is hospital day 9d. There are no new issues or overnight events.   Did not report any lightheadedness, vertigo, shortness of breathe, chest pain, palpitations, vomiting, diarrhea currently    HPI:  89yo f w pmh mci/dementia, htn, hld, hfpef, hypothyroidism, severe oa, hcv s/p treatment, vte (dvt), p/w sob/ku, persistent shoulder pain, inability to perform adls; patient initially with right shoulder pain on 11/8, came to er, and was discharged home with outpatient follow up; right shoulder pain persisted and so patient returned to [facility] er on 11/10 and was hospitalized 11/10-11/12; imaging showed severe oa, pain managed w oxycodone, and patient discharged home with home pt. since arriving home, after recent hospitalization, daughter has found that patient now requires more assistance, w she herself covering the other 12 hours hha is unavailable for (at baseline, patient requires assistance with adls, has 12 hour hha at home). in addition, shoulder pain persists and daughter noticed patient appearing short of breath; she mentions that she has not been giving patient her home lasix for a while now as it causes patient to pass too much urine, and makes it difficult to care for patient as she is totally dependent ; she grew concerned, so had patient brought back to [facility] er for further evaluation. in er, found to be in ahrf req ~3 lpm via nc; suspect 2/2 mild adhf; admit to medicine for further mgmt (13 Nov 2024 23:08)    MEDICATIONS  (STANDING):  bisacodyl 5 milliGRAM(s) Oral at bedtime  gabapentin 100 milliGRAM(s) Oral daily  heparin   Injectable 5000 Unit(s) SubCutaneous every 8 hours  influenza  Vaccine (HIGH DOSE) 0.5 milliLiter(s) IntraMuscular once  lidocaine   4% Patch 1 Patch Transdermal daily  liothyronine 5 MICROGram(s) Oral daily  oxyCODONE    IR 2.5 milliGRAM(s) Oral <User Schedule>  polyethylene glycol 3350 17 Gram(s) Oral daily  senna 2 Tablet(s) Oral at bedtime    MEDICATIONS  (PRN):  acetaminophen     Tablet .. 650 milliGRAM(s) Oral every 6 hours PRN Temp greater or equal to 38C (100.4F), Mild Pain (1 - 3)  aluminum hydroxide/magnesium hydroxide/simethicone Suspension 30 milliLiter(s) Oral every 4 hours PRN Dyspepsia  melatonin 3 milliGRAM(s) Oral at bedtime PRN Insomnia  naloxone Injectable 0.1 milliGRAM(s) IV Push every 3 minutes PRN Sedation or respiratory depression 2/2 to opioids  ondansetron Injectable 4 milliGRAM(s) IV Push every 8 hours PRN Nausea and/or Vomiting  oxyCODONE    IR 2.5 milliGRAM(s) Oral every 4 hours PRN Moderate to Severe pain    HOME MEDICATIONS:  gabapentin 100 mg oral capsule: 1 cap(s) orally once a day  lidocaine 4% topical film: Apply topically to affected area once a day 12hrs on 12hrs off  liothyronine 5 mcg oral tablet: 1 tab(s) orally once a day  lisinopril 10 mg oral tablet: 1 tab(s) orally once a day  Narcan 4 mg/0.1 mL nasal spray: 1 spray(s) intranasally once a day use as instructed  oxyCODONE 5 mg oral tablet: 0.5 tab(s) orally 2 times a day MDD: 1    PHYSICAL EXAM  Vital Signs Last 24 Hrs  T(C): 36.3 (22 Nov 2024 11:48), Max: 36.8 (21 Nov 2024 20:00)  T(F): 97.3 (22 Nov 2024 11:48), Max: 98.3 (21 Nov 2024 20:00)  HR: 76 (22 Nov 2024 11:48) (75 - 99)  BP: 133/85 (22 Nov 2024 11:48) (104/72 - 137/60)  BP(mean): --  RR: 18 (22 Nov 2024 11:48) (18 - 18)  SpO2: 98% (22 Nov 2024 11:48) (94% - 98%)    Parameters below as of 22 Nov 2024 11:48  Patient On (Oxygen Delivery Method): room air        11-21-24 @ 07:01  -  11-22-24 @ 07:00  --------------------------------------------------------  IN: 220 mL / OUT: 600 mL / NET: -380 mL      CONSTITUTIONAL: Well-groomed, in no apparent distress;  EYES: No conjunctival or scleral injection, non-icteric;  ENMT: No external nasal lesions; Normal outer ears;  NECK: Trachea midline;  RESPIRATORY: Normal respiratory effort;   CARDIOVASCULAR: Regular rate and rhythm;  GASTROINTESTINAL: Non-distended;   EXTREMITIES:  No lower extremity edema;  NEUROLOGY: Does respond to limited commands appropriately;  PSYCHIATRY: Mood and Affect appropriate    LABS:    11-22    138  |  103  |  35[H]  ----------------------------<  140[H]  5.3   |  20[L]  |  0.92    Ca    10.7[H]      22 Nov 2024 13:11            Urinalysis Basic - ( 22 Nov 2024 13:11 )    Color: x / Appearance: x / SG: x / pH: x  Gluc: 140 mg/dL / Ketone: x  / Bili: x / Urobili: x   Blood: x / Protein: x / Nitrite: x   Leuk Esterase: x / RBC: x / WBC x   Sq Epi: x / Non Sq Epi: x / Bacteria: x            RADIOLOGY & ADDITIONAL TESTS:  EKG  12 Lead ECG:   Ventricular Rate 99 BPM    Atrial Rate 99 BPM    P-R Interval 238 ms    QRS Duration 128 ms    Q-T Interval 360 ms    QTC Calculation(Bazett) 462 ms    P Axis 12 degrees    R Axis -40 degrees    T Axis -37 degrees    Diagnosis Line SINUS RHYTHM WITH 1ST DEGREE A-V BLOCK  LEFT AXIS DEVIATION  RIGHT BUNDLE BRANCH BLOCK  POSSIBLE LATERAL INFARCT , AGE UNDETERMINED  INFERIOR INFARCT , AGE UNDETERMINED  ABNORMAL ECG  WHEN COMPARED WITH ECG OF 11/13/24   NO SIGNIFICANT CHANGE WAS FOUND  Confirmed by ABBEY Menjivar Zlata ([id number]) on 11/19/2024 6:48:15 PM (11-18-24 @ 16:47)  12 Lead ECG:   Ventricular Rate 70 BPM    Atrial Rate 70 BPM    P-R Interval 238 ms    QRS Duration 144 ms    Q-T Interval 430 ms    QTC Calculation(Bazett) 464 ms    P Axis 35 degrees    R Axis -46 degrees    T Axis -13 degrees    Diagnosis Line SINUS RHYTHM WITH 1ST DEGREE A-V BLOCK  RIGHT BUNDLE BRANCH BLOCK  LEFT ANTERIOR FASCICULAR BLOCK  *** BIFASCICULAR BLOCK ***  ABNORMAL ECG  WHEN COMPARED WITH ECG OF 10-NOV-2024 19:13,  NO SIGNIFICANT CHANGE WAS FOUND  Confirmed by Sommer Acosta ([id number]) on 11/18/2024 10:26:05 PM (11-13-24 @ 12:44)    CT Angio Chest PE Protocol w/ IV Cont:   ACC: [id number] EXAM:  CT ANGIO CHEST PULM [facility]   ORDERED BY: FABIANA KAMARA     PROCEDURE DATE:  11/13/2024          INTERPRETATION:  CLINICAL INFORMATION: Respiratory distress. Not   ambulatory.    COMPARISON: CT chest 2/25/2018.    CONTRAST/COMPLICATIONS:  IV Contrast: Omnipaque 350  60 cc administered   40 cc discarded  Oral Contrast: NONE  None reported at time of study completion    PROCEDURE:  CT Angiography of the Chest.  Sagittal and coronal reformats were performed as well as 3D (MIP)   reconstructions.    FINDINGS:  Study is limited due to respiratory motion artifact.    LUNGS AND LARGE AIRWAYS: Respiratory motion artifact significantly limits   assessment of the lower lungs. Patent central airways. Subsegmental   atelectasis in the lingula. Diffuse mild septal thickening may relate to   component of interstitial edema.  PLEURA: No pleural effusion.  VESSELS: No pulmonary embolism to the level of the segmental arteries.   Limited evaluation of subsegmental pulmonary arteries secondary to motion   artifact. Dilatation of the main pulmonary artery, measuring 3.6 cm,   unchanged. Mild aortic calcification.  HEART: Cardiomegaly. No pericardial effusion.  MEDIASTINUM AND KLAUDIA: No lymphadenopathy.  CHEST WALL ANDLOWER NECK: Loop recorder device in the left chest wall.  VISUALIZED UPPER ABDOMEN: Hepatic cysts. Bilocular left renal cyst with   thinly calcified septum.  BONES: 2.1 cm lytic lesion within the tip of the left scapula (601, 81).   Degenerative changes including advanced bilateral shoulder arthropathy   and diffuse multilevel degenerative changes of the spine.    IMPRESSION:    No pulmonary embolism to the level of the segmental arteries. Limited   evaluation of subsegmental arteries secondary to motion artifact.    Indeterminate lytic lesion within the tip of the left scapula. Consider   myeloma or metastatic disease.    Diffuse mild septal thickening may relate to component of interstitial   edema.    Cardiomegaly. Dilated main pulmonary artery suggests pulmonary   hypertension, unchanged since 02/25/2018.    --- End of Report ---          SHABNAM COREAS MD; Resident Radiologist  This document has been electronically signed.  VÍCTOR RODRIGES MD; Attending Radiologist  This document has been electronically signed. Nov 13 2024  7:23PM (11-13-24 @ 17:47)  Xray Chest 1 View- PORTABLE-Urgent:   ACC: [id number] EXAM:  XR CHEST PORTABLE URGENT 1V   ORDERED BY: FABIANA   MAEGANTIMO KAMARA     PROCEDURE DATE:  11/13/2024          INTERPRETATION:  HISTORY: Evaluate for pneumonia    TECHNIQUE: A single AP view of the chest was obtained.    COMPARISON: 11/10/2024    FINDINGS:  The cardiac silhouette is normal in size. There is a loop   recorder device. There are no focal consolidations or pleural effusions.   The hilar and mediastinal structures appear unremarkable. The osseous   structures areintact.    IMPRESSION: Clear lungs.    --- End of Report ---            BLUE CAT MD; Attending Radiologist  This document has been electronically signed. Nov 13 2024  2:08PM (11-13-24 @ 13:49)  Xray Chest 1 View- PORTABLE-Urgent:   ACC: [id number] EXAM:  XR CHEST PORTABLE URGENT 1V   ORDERED BY: SRINATH LAZO     PROCEDURE DATE:  11/10/2024          INTERPRETATION:  EXAMINATION: XR CHEST URGENT    CLINICAL INDICATION: Cough.    TECHNIQUE: Single frontal, portable view of the chest was obtained.    COMPARISON: Chest x-ray 11/8/2024.    FINDINGS:  Cardiac loop recorder.  The heart is not accurately assessed in this AP projection.  No focal consolidation.  There is no pneumothorax or pleural effusion.  No acute bony abnormality.    IMPRESSION:  No focal consolidation, pneumothorax, or pleural effusion.    --- End of Report ---          ARVIND DOVE MD; Resident Radiologist  This document has been electronically signed.  MARTHA CURRY MD; Attending Radiologist  This document has been electronically signed. Nov 11 2024 10:08AM (11-10-24 @ 19:37)

## 2024-11-22 NOTE — PROGRESS NOTE ADULT - PROBLEM SELECTOR PLAN 3
hold lisinopril as K is trending up, monitor BP. Stop lisinopril at dc too  Start 6 doses lokelma  insulin + dextrose x 1  EKG with 1 degree AV block, RBBB unchanged from prior. QTc 465

## 2024-11-22 NOTE — CHART NOTE - NSCHARTNOTEFT_GEN_A_CORE
Interval Hx : Pt's Ucx came back + for E. coli > 100, 000 CFU/ml.   Pt is asymptomatic. Afebrile. NAD.   Informed [facility] Dr Castro   As per [facility] started the pt on Ceftriaxone 1000 mg x 3 days.   will notify the morning team.    Melanie Johnston NP-C  Medicine.
Patient Daughter informed us patient is being discharged to rehab/hospice. Caregiver will arrange follow up.
MRI reviewed   There appears to be extensive full-thickness tearing of the rotator   cuff.  3.  Severe osteoarthritis is present.  4.  Large effusion is noted with severe synovitis.    Recommendations : Please call Ortho for possible tap to rule out infection     D/w 
Patient was outreached but did not answer nor could a voicemail be left.

## 2024-11-22 NOTE — PROGRESS NOTE ADULT - PROBLEM SELECTOR PLAN 6
h/o mci/dementia, severe oa  a+o x1-2 at baseline  maintain fall + frac, delirium, aspiration precautions; keep head end of bed elevated  nutrition consult  dysphagia screen/ slp eval - soft/bite size diet, thin liquids  pt/ot eval + sw/cm consult for disposition  Palliative cs - home hospice at [place]

## 2024-11-22 NOTE — PROGRESS NOTE ADULT - SUBJECTIVE AND OBJECTIVE BOX
DATE OF SERVICE: 11-22-24 @ 09:53    Patient is a 89y old  Female who presents with a chief complaint of sob/ku, persistent shoulder pain, inability to perform adls (21 Nov 2024 16:00)      INTERVAL HISTORY: in no acute distress    REVIEW OF SYSTEMS:  CONSTITUTIONAL: No weakness  EYES/ENT: No visual changes;  No throat pain   NECK: No pain or stiffness  RESPIRATORY: No cough, wheezing; No shortness of breath  CARDIOVASCULAR: No chest pain or palpitations  GASTROINTESTINAL: No abdominal  pain. No nausea, vomiting, or hematemesis  GENITOURINARY: No dysuria, frequency or hematuria  NEUROLOGICAL: No stroke like symptoms  SKIN: No rashes    TELEMETRY Personally reviewed:  	  MEDICATIONS:        PHYSICAL EXAM:  T(C): 36.6 (11-22-24 @ 04:43), Max: 36.8 (11-21-24 @ 20:00)  HR: 99 (11-22-24 @ 04:43) (87 - 99)  BP: 104/72 (11-22-24 @ 04:43) (104/72 - 137/60)  RR: 18 (11-22-24 @ 04:43) (18 - 18)  SpO2: 96% (11-22-24 @ 04:43) (94% - 97%)  Wt(kg): --  I&O's Summary    21 Nov 2024 07:01  -  22 Nov 2024 07:00  --------------------------------------------------------  IN: 220 mL / OUT: 600 mL / NET: -380 mL          Appearance: In no distress	  HEENT:    PERRL, EOMI	  Cardiovascular:  S1 S2, No JVD  Respiratory: Lungs clear to auscultation	  Gastrointestinal:  Soft, Non-tender, + BS	  Vascularature:  No edema of LE  Psychiatric: Appropriate affect   Neuro: no acute focal deficits                     Labs personally reviewed      ASSESSMENT/PLAN: 	      89yo f w pmh mci/dementia, htn, hld, hfpef, hypothyroidism, severe oa, hcv s/p treatment, vte (dvt), p/w sob/ku, persistent shoulder pain, inability to perform adls; in er, found to be in ahrf req ~3 lpm via nc; 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    Follows with Julissa Tejada/[facility]. I discussed with Dr Leija    Problem/Plan #1:  Problem: Shortness of Breath  - ECG SR 1st AVB bifascicular block  -   - CXR with clear lungs  - CT neg for PE but suggestive of intersitial edema and pHTN  - TTE 11/15- EF 69%, no WMA, elevated LV filling pressure daily  - Appears euvolemic and now with IZZY. Will hold diuresis.     Problem/Plan #2:  Problem: Hypertension  - lisinopril 10mg PO daily on hold given IZZY    Problem/Plan #3:  Problem: HLD  - not currently on any statin or anti-lipid medication    Problem/Plan #4:  Problem: DVT PPx  - c/w SQ lovenox    GOC discussions ongoing with support of Palliative Care.         THONG Barksdale DO [facility]  Cardiovascular Medicine  [address], [address]  Available through call or text on [employer]  Office: [phone]

## 2024-11-22 NOTE — PROGRESS NOTE ADULT - PROBLEM SELECTOR PLAN 1
h/o hfpef  in no respiratory distress with adequate spo2 on 3 LPM via NC  exam w decreased breath sounds + crackles on auscultation on admission, improved   ct imaging shows interstitial edema  bnp 189  otherwise, no clinft of acs, ekg w no st seg - t wave changes suggestive of acute ischemia, trop 53->50 likely demand/decreased clearance  suspect mild adhf; of note, patient's daughter has not been giving patient her lasix dose for a while now as it causes patient to pass too much urine, and makes it difficult to care for patient as she is totally dependent   TTE no changes compared to previous   Monitor SpO2, RR, for signs of respiratory distress; Goal SpO2 >88-92%, PaO2 >55-60 mmHg  trend volume status via I/Os, daily weights   gentle diuresis w lasix 20 ivp qd; adjust to maintain goal net negative fluid balance- switched to PO- back to IV as became dyspneic  -IZZY resolved. Start lasix 20 mg PO QD  Cardiology consulted preston lim

## 2024-11-23 PROCEDURE — 99232 SBSQ HOSP IP/OBS MODERATE 35: CPT

## 2024-11-23 RX ADMIN — Medication 5000 UNIT(S): at 21:22

## 2024-11-23 RX ADMIN — OXYCODONE HYDROCHLORIDE 2.5 MILLIGRAM(S): 30 TABLET ORAL at 12:06

## 2024-11-23 RX ADMIN — OXYCODONE HYDROCHLORIDE 2.5 MILLIGRAM(S): 30 TABLET ORAL at 13:00

## 2024-11-23 RX ADMIN — FUROSEMIDE 20 MILLIGRAM(S): 40 TABLET ORAL at 05:50

## 2024-11-23 RX ADMIN — GABAPENTIN 100 MILLIGRAM(S): 300 CAPSULE ORAL at 12:04

## 2024-11-23 RX ADMIN — OXYCODONE HYDROCHLORIDE 2.5 MILLIGRAM(S): 30 TABLET ORAL at 18:22

## 2024-11-23 RX ADMIN — OXYCODONE HYDROCHLORIDE 2.5 MILLIGRAM(S): 30 TABLET ORAL at 06:30

## 2024-11-23 RX ADMIN — Medication 5 MILLIGRAM(S): at 21:22

## 2024-11-23 RX ADMIN — Medication 5000 UNIT(S): at 13:14

## 2024-11-23 RX ADMIN — OXYCODONE HYDROCHLORIDE 2.5 MILLIGRAM(S): 30 TABLET ORAL at 21:22

## 2024-11-23 RX ADMIN — ACETAMINOPHEN, DIPHENHYDRAMINE HCL, PHENYLEPHRINE HCL 3 MILLIGRAM(S): 325; 25; 5 TABLET ORAL at 21:22

## 2024-11-23 RX ADMIN — POLYETHYLENE GLYCOL 3350 17 GRAM(S): 17 POWDER, FOR SOLUTION ORAL at 12:04

## 2024-11-23 RX ADMIN — Medication 5000 UNIT(S): at 05:51

## 2024-11-23 RX ADMIN — LIDOCAINE 1 PATCH: 40 CREAM TOPICAL at 12:05

## 2024-11-23 RX ADMIN — Medication 2 TABLET(S): at 21:22

## 2024-11-23 RX ADMIN — Medication 5 MICROGRAM(S): at 05:50

## 2024-11-23 NOTE — PROGRESS NOTE ADULT - PROBLEM SELECTOR PLAN 6
h/o mci/dementia, severe oa  a+o x1-2 at baseline  maintain fall + frac, delirium, aspiration precautions; keep head end of bed elevated  nutrition consult  dysphagia screen/ slp eval - soft/bite size diet, thin liquids  pt/ot eval + sw/cm consult for disposition  Palliative cs - home hospice at [place]

## 2024-11-23 NOTE — PROGRESS NOTE ADULT - PROBLEM SELECTOR PLAN 3
hold lisinopril as K is trending up, monitor BP. Stop lisinopril at dc too  Start 6 doses lokelma  insulin + dextrose x 1  EKG with 1 degree AV block, RBBB unchanged from prior. QTc 465 functional limitations in following categories/impairments found

## 2024-11-23 NOTE — PROGRESS NOTE ADULT - SUBJECTIVE AND OBJECTIVE BOX
SUBJECTIVE / OVERNIGHT EVENTS:  Today is hospital day 10d. There are no new issues or overnight events.     HPI:  89yo f w pmh mci/dementia, htn, hld, hfpef, hypothyroidism, severe oa, hcv s/p treatment, vte (dvt), p/w sob/ku, persistent shoulder pain, inability to perform adls; patient initially with right shoulder pain on 11/8, came to er, and was discharged home with outpatient follow up; right shoulder pain persisted and so patient returned to [facility] er on 11/10 and was hospitalized 11/10-11/12; imaging showed severe oa, pain managed w oxycodone, and patient discharged home with home pt. since arriving home, after recent hospitalization, daughter has found that patient now requires more assistance, w she herself covering the other 12 hours hha is unavailable for (at baseline, patient requires assistance with adls, has 12 hour hha at home). in addition, shoulder pain persists and daughter noticed patient appearing short of breath; she mentions that she has not been giving patient her home lasix for a while now as it causes patient to pass too much urine, and makes it difficult to care for patient as she is totally dependent ; she grew concerned, so had patient brought back to [facility] er for further evaluation. in er, found to be in ahrf req ~3 lpm via nc; suspect 2/2 mild adhf; admit to medicine for further mgmt (13 Nov 2024 23:08)    MEDICATIONS  (STANDING):  bisacodyl 5 milliGRAM(s) Oral at bedtime  furosemide    Tablet 20 milliGRAM(s) Oral daily  gabapentin 100 milliGRAM(s) Oral daily  heparin   Injectable 5000 Unit(s) SubCutaneous every 8 hours  influenza  Vaccine (HIGH DOSE) 0.5 milliLiter(s) IntraMuscular once  lidocaine   4% Patch 1 Patch Transdermal daily  liothyronine 5 MICROGram(s) Oral daily  oxyCODONE    IR 2.5 milliGRAM(s) Oral <User Schedule>  polyethylene glycol 3350 17 Gram(s) Oral daily  senna 2 Tablet(s) Oral at bedtime    MEDICATIONS  (PRN):  acetaminophen     Tablet .. 650 milliGRAM(s) Oral every 6 hours PRN Temp greater or equal to 38C (100.4F), Mild Pain (1 - 3)  aluminum hydroxide/magnesium hydroxide/simethicone Suspension 30 milliLiter(s) Oral every 4 hours PRN Dyspepsia  melatonin 3 milliGRAM(s) Oral at bedtime PRN Insomnia  naloxone Injectable 0.1 milliGRAM(s) IV Push every 3 minutes PRN Sedation or respiratory depression 2/2 to opioids  ondansetron Injectable 4 milliGRAM(s) IV Push every 8 hours PRN Nausea and/or Vomiting  oxyCODONE    IR 2.5 milliGRAM(s) Oral every 4 hours PRN Moderate to Severe pain    HOME MEDICATIONS:  gabapentin 100 mg oral capsule: 1 cap(s) orally once a day  lidocaine 4% topical film: Apply topically to affected area once a day 12hrs on 12hrs off  liothyronine 5 mcg oral tablet: 1 tab(s) orally once a day  lisinopril 10 mg oral tablet: 1 tab(s) orally once a day  Narcan 4 mg/0.1 mL nasal spray: 1 spray(s) intranasally once a day use as instructed  oxyCODONE 5 mg oral tablet: 0.5 tab(s) orally 2 times a day MDD: 1    PHYSICAL EXAM  Vital Signs Last 24 Hrs  T(C): 36.8 (23 Nov 2024 08:24), Max: 37.2 (22 Nov 2024 20:53)  T(F): 98.3 (23 Nov 2024 08:24), Max: 98.9 (22 Nov 2024 20:53)  HR: 78 (23 Nov 2024 08:24) (76 - 82)  BP: 134/83 (23 Nov 2024 08:24) (112/69 - 137/85)  BP(mean): --  RR: 18 (23 Nov 2024 08:24) (18 - 18)  SpO2: 99% (23 Nov 2024 08:24) (97% - 99%)    Parameters below as of 23 Nov 2024 08:24  Patient On (Oxygen Delivery Method): room air        CONSTITUTIONAL: Well-groomed, in no apparent distress;  EYES: No conjunctival or scleral injection, non-icteric;  ENMT: No external nasal lesions; Normal outer ears;  NECK: Trachea midline;  RESPIRATORY: Normal respiratory effort;   CARDIOVASCULAR: Regular rate and rhythm;  GASTROINTESTINAL: Non-distended;  EXTREMITIES:  No lower extremity edema;  NEUROLOGY: Does respond to limited commands appropriately;  PSYCHIATRY: Mood and Affect appropriate    LABS:    11-22    138  |  103  |  35[H]  ----------------------------<  140[H]  5.3   |  20[L]  |  0.92    Ca    10.7[H]      22 Nov 2024 13:11            Urinalysis Basic - ( 22 Nov 2024 13:11 )    Color: x / Appearance: x / SG: x / pH: x  Gluc: 140 mg/dL / Ketone: x  / Bili: x / Urobili: x   Blood: x / Protein: x / Nitrite: x   Leuk Esterase: x / RBC: x / WBC x   Sq Epi: x / Non Sq Epi: x / Bacteria: x            RADIOLOGY & ADDITIONAL TESTS:  EKG  12 Lead ECG:   Ventricular Rate 99 BPM    Atrial Rate 99 BPM    P-R Interval 238 ms    QRS Duration 128 ms    Q-T Interval 360 ms    QTC Calculation(Bazett) 462 ms    P Axis 12 degrees    R Axis -40 degrees    T Axis -37 degrees    Diagnosis Line SINUS RHYTHM WITH 1ST DEGREE A-V BLOCK  LEFT AXIS DEVIATION  RIGHT BUNDLE BRANCH BLOCK  POSSIBLE LATERAL INFARCT , AGE UNDETERMINED  INFERIOR INFARCT , AGE UNDETERMINED  ABNORMAL ECG  WHEN COMPARED WITH ECG OF 11/13/24   NO SIGNIFICANT CHANGE WAS FOUND  Confirmed by ABBEY Menjivar Zlata ([id number]) on 11/19/2024 6:48:15 PM (11-18-24 @ 16:47)  12 Lead ECG:   Ventricular Rate 70 BPM    Atrial Rate 70 BPM    P-R Interval 238 ms    QRS Duration 144 ms    Q-T Interval 430 ms    QTC Calculation(Bazett) 464 ms    P Axis 35 degrees    R Axis -46 degrees    T Axis -13 degrees    Diagnosis Line SINUS RHYTHM WITH 1ST DEGREE A-V BLOCK  RIGHT BUNDLE BRANCH BLOCK  LEFT ANTERIOR FASCICULAR BLOCK  *** BIFASCICULAR BLOCK ***  ABNORMAL ECG  WHEN COMPARED WITH ECG OF 10-NOV-2024 19:13,  NO SIGNIFICANT CHANGE WAS FOUND  Confirmed by Sommer Acosta ([id number]) on 11/18/2024 10:26:05 PM (11-13-24 @ 12:44)    CT Angio Chest PE Protocol w/ IV Cont:   ACC: [id number] EXAM:  CT ANGIO CHEST PULM ART [facility]   ORDERED BY: FABIANA KAMARA     PROCEDURE DATE:  11/13/2024          INTERPRETATION:  CLINICAL INFORMATION: Respiratory distress. Not   ambulatory.    COMPARISON: CT chest 2/25/2018.    CONTRAST/COMPLICATIONS:  IV Contrast: Omnipaque 350  60 cc administered   40 cc discarded  Oral Contrast: NONE  None reported at time of study completion    PROCEDURE:  CT Angiography of the Chest.  Sagittal and coronal reformats were performed as well as 3D (MIP)   reconstructions.    FINDINGS:  Study is limited due to respiratory motion artifact.    LUNGS AND LARGE AIRWAYS: Respiratory motion artifact significantly limits   assessment of the lower lungs. Patent central airways. Subsegmental   atelectasis in the lingula. Diffuse mild septal thickening may relate to   component of interstitial edema.  PLEURA: No pleural effusion.  VESSELS: No pulmonary embolism to the level of the segmental arteries.   Limited evaluation of subsegmental pulmonary arteries secondary to motion   artifact. Dilatation of the main pulmonary artery, measuring 3.6 cm,   unchanged. Mild aortic calcification.  HEART: Cardiomegaly. No pericardial effusion.  MEDIASTINUM AND KLAUDIA: No lymphadenopathy.  CHEST WALL ANDLOWER NECK: Loop recorder device in the left chest wall.  VISUALIZED UPPER ABDOMEN: Hepatic cysts. Bilocular left renal cyst with   thinly calcified septum.  BONES: 2.1 cm lytic lesion within the tip of the left scapula (601, 81).   Degenerative changes including advanced bilateral shoulder arthropathy   and diffuse multilevel degenerative changes of the spine.    IMPRESSION:    No pulmonary embolism to the level of the segmental arteries. Limited   evaluation of subsegmental arteries secondary to motion artifact.    Indeterminate lytic lesion within the tip of the left scapula. Consider   myeloma or metastatic disease.    Diffuse mild septal thickening may relate to component of interstitial   edema.    Cardiomegaly. Dilated main pulmonary artery suggests pulmonary   hypertension, unchanged since 02/25/2018.    --- End of Report ---          SHABNAM COREAS MD; Resident Radiologist  This document has been electronically signed.  VÍCTOR RODRIGES MD; Attending Radiologist  This document has been electronically signed. Nov 13 2024  7:23PM (11-13-24 @ 17:47)  Xray Chest 1 View- PORTABLE-Urgent:   ACC: [id number] EXAM:  XR CHEST PORTABLE URGENT 1V   ORDERED BY: FABIANA KAMARA     PROCEDURE DATE:  11/13/2024          INTERPRETATION:  HISTORY: Evaluate for pneumonia    TECHNIQUE: A single AP view of the chest was obtained.    COMPARISON: 11/10/2024    FINDINGS:  The cardiac silhouette is normal in size. There is a loop   recorder device. There are no focal consolidations or pleural effusions.   The hilar and mediastinal structures appear unremarkable. The osseous   structures areintact.    IMPRESSION: Clear lungs.    --- End of Report ---            BLUE CAT MD; Attending Radiologist  This document has been electronically signed. Nov 13 2024  2:08PM (11-13-24 @ 13:49)  Xray Chest 1 View- PORTABLE-Urgent:   ACC: [id number] EXAM:  XR CHEST PORTABLE URGENT 1V   ORDERED BY: SRINATH LAZO     PROCEDURE DATE:  11/10/2024          INTERPRETATION:  EXAMINATION: XR CHEST URGENT    CLINICAL INDICATION: Cough.    TECHNIQUE: Single frontal, portable view of the chest was obtained.    COMPARISON: Chest x-ray 11/8/2024.    FINDINGS:  Cardiac loop recorder.  The heart is not accurately assessed in this AP projection.  No focal consolidation.  There is no pneumothorax or pleural effusion.  No acute bony abnormality.    IMPRESSION:  No focal consolidation, pneumothorax, or pleural effusion.    --- End of Report ---          ARVIND DOVE MD; Resident Radiologist  This document has been electronically signed.  MARTHA CURRY MD; Attending Radiologist  This document has been electronically signed. Nov 11 2024 10:08AM (11-10-24 @ 19:37)

## 2024-11-24 PROCEDURE — 99232 SBSQ HOSP IP/OBS MODERATE 35: CPT

## 2024-11-24 RX ADMIN — OXYCODONE HYDROCHLORIDE 2.5 MILLIGRAM(S): 30 TABLET ORAL at 19:13

## 2024-11-24 RX ADMIN — OXYCODONE HYDROCHLORIDE 2.5 MILLIGRAM(S): 30 TABLET ORAL at 09:09

## 2024-11-24 RX ADMIN — LIDOCAINE 1 PATCH: 40 CREAM TOPICAL at 12:02

## 2024-11-24 RX ADMIN — LIDOCAINE 1 PATCH: 40 CREAM TOPICAL at 20:53

## 2024-11-24 RX ADMIN — OXYCODONE HYDROCHLORIDE 2.5 MILLIGRAM(S): 30 TABLET ORAL at 05:30

## 2024-11-24 RX ADMIN — POLYETHYLENE GLYCOL 3350 17 GRAM(S): 17 POWDER, FOR SOLUTION ORAL at 12:01

## 2024-11-24 RX ADMIN — OXYCODONE HYDROCHLORIDE 2.5 MILLIGRAM(S): 30 TABLET ORAL at 16:16

## 2024-11-24 RX ADMIN — GABAPENTIN 100 MILLIGRAM(S): 300 CAPSULE ORAL at 12:01

## 2024-11-24 RX ADMIN — OXYCODONE HYDROCHLORIDE 2.5 MILLIGRAM(S): 30 TABLET ORAL at 08:00

## 2024-11-24 RX ADMIN — Medication 5000 UNIT(S): at 15:19

## 2024-11-24 RX ADMIN — OXYCODONE HYDROCHLORIDE 2.5 MILLIGRAM(S): 30 TABLET ORAL at 08:09

## 2024-11-24 RX ADMIN — FUROSEMIDE 20 MILLIGRAM(S): 40 TABLET ORAL at 05:26

## 2024-11-24 RX ADMIN — OXYCODONE HYDROCHLORIDE 2.5 MILLIGRAM(S): 30 TABLET ORAL at 20:53

## 2024-11-24 RX ADMIN — Medication 5000 UNIT(S): at 05:25

## 2024-11-24 RX ADMIN — Medication 5 MICROGRAM(S): at 05:30

## 2024-11-24 RX ADMIN — OXYCODONE HYDROCHLORIDE 2.5 MILLIGRAM(S): 30 TABLET ORAL at 15:17

## 2024-11-24 NOTE — PROGRESS NOTE ADULT - ASSESSMENT
89yo f w pmh mci/dementia, htn, hld, hfpef, hypothyroidism, severe oa, hcv s/p treatment, vte (dvt), p/w sob/ku, persistent shoulder pain, inability to perform adls; in er, found to be in ahrf req ~3 lpm via nc; suspect 2/2 mild adhf; admit to medicine for further mgmt
89yo f w pmh mci/dementia, htn, hld, hfpef, hypothyroidism, severe oa, hcv s/p treatment, vte (dvt), p/w sob/ku, persistent shoulder pain, inability to perform adls; in er, found to be in ahrf req ~3 lpm via nc; 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 Geriatrics and Palliative Medicine ([facility]) Team was called for goals of care and resources. However, we will also give recs regarding pain. 
89yo f w pmh mci/dementia, htn, hld, hfpef, hypothyroidism, severe oa, hcv s/p treatment, vte (dvt), p/w sob/ku, persistent shoulder pain, inability to perform adls; in er, found to be in ahrf req ~3 lpm via nc; suspect 2/2 mild adhf; admit to medicine for further mgmt
89yo f w pmh mci/dementia, htn, hld, hfpef, hypothyroidism, severe oa, hcv s/p treatment, vte (dvt), p/w sob/ku, persistent shoulder pain, inability to perform adls; in er, found to be in ahrf req ~3 lpm via nc; 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 Geriatrics and Palliative Medicine ([facility]) Team was called for goals of care and resources. However, we will also give recs regarding pain. 
89yo f w pmh mci/dementia, htn, hld, hfpef, hypothyroidism, severe oa, hcv s/p treatment, vte (dvt), p/w sob/ku, persistent shoulder pain, inability to perform adls; in er, found to be in ahrf req ~3 lpm via nc; 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 Geriatrics and Palliative Medicine ([facility]) Team was called for goals of care and resources. However, we will also give recs regarding pain.

## 2024-11-24 NOTE — PROGRESS NOTE ADULT - PROBLEM SELECTOR PROBLEM 4
Right shoulder pain
Abnormal finding on CT scan
Dementia
Right shoulder pain
Abnormal finding on CT scan
Right shoulder pain
Right shoulder pain
Dementia
Acute kidney injury superimposed on CKD
Acute kidney injury superimposed on CKD
Right shoulder pain
Acute kidney injury superimposed on CKD

## 2024-11-24 NOTE — PROGRESS NOTE ADULT - PROBLEM SELECTOR PROBLEM 7
Prophylactic measure
Palliative care encounter
Abnormal finding on CT scan
Abnormal finding on CT scan
Prophylactic measure
Abnormal finding on CT scan
Palliative care encounter
Abnormal finding on CT scan
Palliative care encounter

## 2024-11-24 NOTE — PROGRESS NOTE ADULT - PROBLEM SELECTOR PLAN 2
Suggesting metastatic CA.   As d/w her daughter, further work up was not to be pursued.
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 There appears to be extensive full-thickness tearing of the rotator cuff. Severe osteoarthritis is present. Large effusion is noted with severe synovitis.  - Team discussed with ortho- 11/15, decline to tap   analgesics + bowel regimen as needed, analgesics as needed - pt more sleepy with oxy 5mg, continue with 2.5   - rheum consulted requesting ortho to do tap for shoulder - ortho declines
hold lisinopril as K is trending up, monitor BP  Order urine studies  change from lovenox to heparin SC for DVT ppx
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 There appears to be extensive full-thickness tearing of the rotator cuff. Severe osteoarthritis is present. Large effusion is noted with severe synovitis.  - Team discussed with ortho- 11/15, decline to tap   analgesics + bowel regimen as needed, analgesics as needed - pt more sleepy with oxy 5mg, continue with 2.5   - rheum consulted requesting ortho to do tap for shoulder - ortho declines
hold lisinopril as K is trending up, monitor BP  change from lovenox to heparin SC for DVT ppx
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 There appears to be extensive full-thickness tearing of the rotator cuff. Severe osteoarthritis is present. Large effusion is noted with severe synovitis.  - Team discussed with ortho- 11/15, decline to tap   analgesics + bowel regimen as needed, analgesics as needed - pt more sleepy with oxy 5mg, continue with 2.5   - rheum consulted
hold lisinopril as K is trending up, monitor BP  Order urine studies  change from lovenox to heparin SC for DVT ppx
hold lisinopril as K is trending up, monitor BP  change from lovenox to heparin SC for DVT ppx
h/o severe oa  patient unable to adduct or raise rue at shoulder joint; can only move rue at the elbow joint; keeps upper part of rue close to her body  shoulder joint with some bruising over it and swollen  xrays have thus far shown rotator cuff arthropathy + severe oa  rheum consulted by er, recommend, "swelling around joint concerning for bursitis vs hematoma around joint that may be contributing to pain vs perhaps inflammation 2/2 crystal arthropathy, would recommend further imaging such as MRI w/ contrast of the R shoulder to better assess swelling around humeral joint: to rule out infection vs hematoma vs crystal arthropathy and whether joint may need to be tapped this admission"  follow up mr r shoulder  analgesics + bowel regimen as needed  rheum consulted
hold lisinopril as K is trending up, monitor BP  change from lovenox to heparin SC for DVT ppx
Suggesting metastatic CA.   As d/w her daughter, further work up was not to be pursued.
Suggesting metastatic CA.   As d/w her daughter, further work up was not to be pursued.

## 2024-11-24 NOTE — PROGRESS NOTE ADULT - PROBLEM SELECTOR PLAN 4
Work up and Rx as per the primary team and ID.   -Will avoid Morphine.
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 There appears to be extensive full-thickness tearing of the rotator cuff. Severe osteoarthritis is present. Large effusion is noted with severe synovitis.  - Team discussed with ortho- 11/15, decline to tap   analgesics + bowel regimen as needed, analgesics as needed - pt more sleepy with oxy 5mg, continue with 2.5   - rheum consulted requesting ortho to do tap for shoulder - ortho declines
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 There appears to be extensive full-thickness tearing of the rotator cuff. Severe osteoarthritis is present. Large effusion is noted with severe synovitis.  - Team discussed with ortho- 11/15, decline to tap   analgesics + bowel regimen as needed, analgesics as needed - pt more sleepy with oxy 5mg, continue with 2.5   - rheum consulted requesting ortho to do tap for shoulder - ortho declines
h/o mci/dementia, severe oa  a+o x1-2 at baseline  maintain fall + frac, delirium, aspiration precautions; keep head end of bed elevated  nutrition consult  dysphagia screen/ slp eval  pt/ot eval + sw/cm consult for disposition  goc and advanced directives conversation as above. Palliative consulted to assist with conversation
Work up and Rx as per the primary team and ID.   -Will avoid Morphine.
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 There appears to be extensive full-thickness tearing of the rotator cuff. Severe osteoarthritis is present. Large effusion is noted with severe synovitis.  - Team discussed with ortho- 11/15, decline to tap   analgesics + bowel regimen as needed, analgesics as needed - pt more sleepy with oxy 5mg, continue with 2.5   - rheum consulted requesting ortho to do tap for shoulder - ortho declines
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 There appears to be extensive full-thickness tearing of the rotator cuff. Severe osteoarthritis is present. Large effusion is noted with severe synovitis.  - Team discussed with ortho- 11/15, decline to tap   analgesics + bowel regimen as needed, analgesics as needed - pt more sleepy with oxy 5mg, continue with 2.5   - rheum consulted requesting ortho to do tap for shoulder - ortho declines
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 However, septic arthritis of the right hip joint space cannot be excluded and is also a differential consideration. If there is clinical concern for septic arthritis of the right hip joint space, right hip joints aspiration should be performed....Mild subchondral collapse of the right femoral head, fairly similar to the prior radiographs dated 4/24/2024.  afebrile, no leukocytosis, hds  analgesics as needed - pt more sleepy with oxy 5mg, continue with 2.5   Ortho consulted- no internvention    => Partially imaged indeterminate hypoattenuating lesion within the inferior pole of the left kidney. Renal neoplasm is one of the differential considerations. Recommend further evaluation with renal ultrasound to rule out neoplastic etiologies.  consider renal us  outpatient follow up    =>Indeterminate complex mixed attenuating lesion within the region of the left ovary/adnexa. Recommend further evaluation with pelvic ultrasound or pelvic MRI without and with contrast to rule out an underlying neoplasm in this region.  consider tvus/taus  outpatient follow up
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 However, septic arthritis of the right hip joint space cannot be excluded and is also a differential consideration. If there is clinical concern for septic arthritis of the right hip joint space, right hip joints aspiration should be performed....Mild subchondral collapse of the right femoral head, fairly similar to the prior radiographs dated 4/24/2024.  afebrile, no leukocytosis, hds  analgesics as needed   Ortho consulted    => Partially imaged indeterminate hypoattenuating lesion within the inferior pole of the left kidney. Renal neoplasm is one of the differential considerations. Recommend further evaluation with renal ultrasound to rule out neoplastic etiologies.  consider renal us  outpatient follow up    =>Indeterminate complex mixed attenuating lesion within the region of the left ovary/adnexa. Recommend further evaluation with pelvic ultrasound or pelvic MRI without and with contrast to rule out an underlying neoplasm in this region.  consider tvus/taus  outpatient follow up
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 There appears to be extensive full-thickness tearing of the rotator cuff. Severe osteoarthritis is present. Large effusion is noted with severe synovitis.  - Team discussed with ortho- 11/15, decline to tap   analgesics + bowel regimen as needed, analgesics as needed - pt more sleepy with oxy 5mg, continue with 2.5   - rheum consulted requesting ortho to do tap for shoulder - ortho declines
Work up and Rx as per the primary team and ID.   -Will avoid Morphine.
h/o mci/dementia, severe oa  a+o x1-2 at baseline  maintain fall + frac, delirium, aspiration precautions; keep head end of bed elevated  nutrition consult  dysphagia screen/ slp eval  pt/ot eval + sw/cm consult for disposition  goc and advanced directives conversation as above. Palliative consulted to assist with conversation, consult placed
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 There appears to be extensive full-thickness tearing of the rotator cuff. Severe osteoarthritis is present. Large effusion is noted with severe synovitis.  - Team discussed with ortho- 11/15, decline to tap   analgesics + bowel regimen as needed, analgesics as needed - pt more sleepy with oxy 5mg, continue with 2.5   - rheum consulted requesting ortho to do tap for shoulder - ortho declines
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 There appears to be extensive full-thickness tearing of the rotator cuff. Severe osteoarthritis is present. Large effusion is noted with severe synovitis.  - Team discussed with ortho- 11/15, decline to tap   analgesics + bowel regimen as needed, analgesics as needed - pt more sleepy with oxy 5mg, continue with 2.5   - rheum consulted requesting ortho to do tap for shoulder - ortho declines

## 2024-11-24 NOTE — PROGRESS NOTE ADULT - SUBJECTIVE AND OBJECTIVE BOX
SUBJECTIVE / OVERNIGHT EVENTS:  Today is hospital day 11d. There are no new issues or overnight events.     HPI:  89yo f w pmh mci/dementia, htn, hld, hfpef, hypothyroidism, severe oa, hcv s/p treatment, vte (dvt), p/w sob/ku, persistent shoulder pain, inability to perform adls; patient initially with right shoulder pain on 11/8, came to er, and was discharged home with outpatient follow up; right shoulder pain persisted and so patient returned to [facility] er on 11/10 and was hospitalized 11/10-11/12; imaging showed severe oa, pain managed w oxycodone, and patient discharged home with home pt. since arriving home, after recent hospitalization, daughter has found that patient now requires more assistance, w she herself covering the other 12 hours hha is unavailable for (at baseline, patient requires assistance with adls, has 12 hour hha at home). in addition, shoulder pain persists and daughter noticed patient appearing short of breath; she mentions that she has not been giving patient her home lasix for a while now as it causes patient to pass too much urine, and makes it difficult to care for patient as she is totally dependent ; she grew concerned, so had patient brought back to [facility] er for further evaluation. in er, found to be in ahrf req ~3 lpm via nc; suspect 2/2 mild adhf; admit to medicine for further mgmt (13 Nov 2024 23:08)    MEDICATIONS  (STANDING):  bisacodyl 5 milliGRAM(s) Oral at bedtime  furosemide    Tablet 20 milliGRAM(s) Oral daily  gabapentin 100 milliGRAM(s) Oral daily  heparin   Injectable 5000 Unit(s) SubCutaneous every 8 hours  influenza  Vaccine (HIGH DOSE) 0.5 milliLiter(s) IntraMuscular once  lidocaine   4% Patch 1 Patch Transdermal daily  liothyronine 5 MICROGram(s) Oral daily  oxyCODONE    IR 2.5 milliGRAM(s) Oral <User Schedule>  polyethylene glycol 3350 17 Gram(s) Oral daily  senna 2 Tablet(s) Oral at bedtime    MEDICATIONS  (PRN):  acetaminophen     Tablet .. 650 milliGRAM(s) Oral every 6 hours PRN Temp greater or equal to 38C (100.4F), Mild Pain (1 - 3)  aluminum hydroxide/magnesium hydroxide/simethicone Suspension 30 milliLiter(s) Oral every 4 hours PRN Dyspepsia  melatonin 3 milliGRAM(s) Oral at bedtime PRN Insomnia  naloxone Injectable 0.1 milliGRAM(s) IV Push every 3 minutes PRN Sedation or respiratory depression 2/2 to opioids  ondansetron Injectable 4 milliGRAM(s) IV Push every 8 hours PRN Nausea and/or Vomiting  oxyCODONE    IR 2.5 milliGRAM(s) Oral every 4 hours PRN Moderate to Severe pain    HOME MEDICATIONS:  gabapentin 100 mg oral capsule: 1 cap(s) orally once a day  lidocaine 4% topical film: Apply topically to affected area once a day 12hrs on 12hrs off  liothyronine 5 mcg oral tablet: 1 tab(s) orally once a day  lisinopril 10 mg oral tablet: 1 tab(s) orally once a day  Narcan 4 mg/0.1 mL nasal spray: 1 spray(s) intranasally once a day use as instructed  oxyCODONE 5 mg oral tablet: 0.5 tab(s) orally 2 times a day MDD: 1    PHYSICAL EXAM  Vital Signs Last 24 Hrs  T(C): 36.6 (24 Nov 2024 08:30), Max: 37 (23 Nov 2024 19:50)  T(F): 97.9 (24 Nov 2024 08:30), Max: 98.6 (23 Nov 2024 19:50)  HR: 86 (24 Nov 2024 04:50) (84 - 96)  BP: 123/81 (24 Nov 2024 04:50) (105/75 - 126/82)  BP(mean): --  RR: 18 (24 Nov 2024 04:50) (18 - 18)  SpO2: 97% (24 Nov 2024 04:50) (95% - 98%)    Parameters below as of 24 Nov 2024 04:50  Patient On (Oxygen Delivery Method): room air        11-23-24 @ 07:01  -  11-24-24 @ 07:00  --------------------------------------------------------  IN: 480 mL / OUT: 650 mL / NET: -170 mL      CONSTITUTIONAL: Well-groomed, in no apparent distress;  EYES: No conjunctival or scleral injection, non-icteric;  ENMT: No external nasal lesions; Normal outer ears;  NECK: Trachea midline;  RESPIRATORY: Normal respiratory effort;   CARDIOVASCULAR: Regular rate and rhythm;  GASTROINTESTINAL: Non-distended;   EXTREMITIES:  No lower extremity edema;  NEUROLOGY: Does respond to limited commands appropriately;  PSYCHIATRY: Mood and Affect appropriate    LABS:    11-22    138  |  103  |  35[H]  ----------------------------<  140[H]  5.3   |  20[L]  |  0.92    Ca    10.7[H]      22 Nov 2024 13:11            Urinalysis Basic - ( 22 Nov 2024 13:11 )    Color: x / Appearance: x / SG: x / pH: x  Gluc: 140 mg/dL / Ketone: x  / Bili: x / Urobili: x   Blood: x / Protein: x / Nitrite: x   Leuk Esterase: x / RBC: x / WBC x   Sq Epi: x / Non Sq Epi: x / Bacteria: x            RADIOLOGY & ADDITIONAL TESTS:  EKG  12 Lead ECG:   Ventricular Rate 99 BPM    Atrial Rate 99 BPM    P-R Interval 238 ms    QRS Duration 128 ms    Q-T Interval 360 ms    QTC Calculation(Bazett) 462 ms    P Axis 12 degrees    R Axis -40 degrees    T Axis -37 degrees    Diagnosis Line SINUS RHYTHM WITH 1ST DEGREE A-V BLOCK  LEFT AXIS DEVIATION  RIGHT BUNDLE BRANCH BLOCK  POSSIBLE LATERAL INFARCT , AGE UNDETERMINED  INFERIOR INFARCT , AGE UNDETERMINED  ABNORMAL ECG  WHEN COMPARED WITH ECG OF 11/13/24   NO SIGNIFICANT CHANGE WAS FOUND  Confirmed by ABBEY Menjivar Zlata ([id number]) on 11/19/2024 6:48:15 PM (11-18-24 @ 16:47)  12 Lead ECG:   Ventricular Rate 70 BPM    Atrial Rate 70 BPM    P-R Interval 238 ms    QRS Duration 144 ms    Q-T Interval 430 ms    QTC Calculation(Bazett) 464 ms    P Axis 35 degrees    R Axis -46 degrees    T Axis -13 degrees    Diagnosis Line SINUS RHYTHM WITH 1ST DEGREE A-V BLOCK  RIGHT BUNDLE BRANCH BLOCK  LEFT ANTERIOR FASCICULAR BLOCK  *** BIFASCICULAR BLOCK ***  ABNORMAL ECG  WHEN COMPARED WITH ECG OF 10-NOV-2024 19:13,  NO SIGNIFICANT CHANGE WAS FOUND  Confirmed by Sommer Acosta ([id number]) on 11/18/2024 10:26:05 PM (11-13-24 @ 12:44)    CT Angio Chest PE Protocol w/ IV Cont:   ACC: [id number] EXAM:  CT ANGIO CHEST PULM ART [facility]   ORDERED BY: FABIANA KAMARA     PROCEDURE DATE:  11/13/2024          INTERPRETATION:  CLINICAL INFORMATION: Respiratory distress. Not   ambulatory.    COMPARISON: CT chest 2/25/2018.    CONTRAST/COMPLICATIONS:  IV Contrast: Omnipaque 350  60 cc administered   40 cc discarded  Oral Contrast: NONE  None reported at time of study completion    PROCEDURE:  CT Angiography of the Chest.  Sagittal and coronal reformats were performed as well as 3D (MIP)   reconstructions.    FINDINGS:  Study is limited due to respiratory motion artifact.    LUNGS AND LARGE AIRWAYS: Respiratory motion artifact significantly limits   assessment of the lower lungs. Patent central airways. Subsegmental   atelectasis in the lingula. Diffuse mild septal thickening may relate to   component of interstitial edema.  PLEURA: No pleural effusion.  VESSELS: No pulmonary embolism to the level of the segmental arteries.   Limited evaluation of subsegmental pulmonary arteries secondary to motion   artifact. Dilatation of the main pulmonary artery, measuring 3.6 cm,   unchanged. Mild aortic calcification.  HEART: Cardiomegaly. No pericardial effusion.  MEDIASTINUM AND KLAUDIA: No lymphadenopathy.  CHEST WALL ANDLOWER NECK: Loop recorder device in the left chest wall.  VISUALIZED UPPER ABDOMEN: Hepatic cysts. Bilocular left renal cyst with   thinly calcified septum.  BONES: 2.1 cm lytic lesion within the tip of the left scapula (601, 81).   Degenerative changes including advanced bilateral shoulder arthropathy   and diffuse multilevel degenerative changes of the spine.    IMPRESSION:    No pulmonary embolism to the level of the segmental arteries. Limited   evaluation of subsegmental arteries secondary to motion artifact.    Indeterminate lytic lesion within the tip of the left scapula. Consider   myeloma or metastatic disease.    Diffuse mild septal thickening may relate to component of interstitial   edema.    Cardiomegaly. Dilated main pulmonary artery suggests pulmonary   hypertension, unchanged since 02/25/2018.    --- End of Report ---          SHABNAM COREAS MD; Resident Radiologist  This document has been electronically signed.  VÍCTOR RODRIGES MD; Attending Radiologist  This document has been electronically signed. Nov 13 2024  7:23PM (11-13-24 @ 17:47)  Xray Chest 1 View- PORTABLE-Urgent:   ACC: [id number] EXAM:  XR CHEST PORTABLE URGENT 1V   ORDERED BY: FABIANA KAMARA     PROCEDURE DATE:  11/13/2024          INTERPRETATION:  HISTORY: Evaluate for pneumonia    TECHNIQUE: A single AP view of the chest was obtained.    COMPARISON: 11/10/2024    FINDINGS:  The cardiac silhouette is normal in size. There is a loop   recorder device. There are no focal consolidations or pleural effusions.   The hilar and mediastinal structures appear unremarkable. The osseous   structures areintact.    IMPRESSION: Clear lungs.    --- End of Report ---            BLUE CAT MD; Attending Radiologist  This document has been electronically signed. Nov 13 2024  2:08PM (11-13-24 @ 13:49)  Xray Chest 1 View- PORTABLE-Urgent:   ACC: [id number] EXAM:  XR CHEST PORTABLE URGENT 1V   ORDERED BY: SRINATH LAZO     PROCEDURE DATE:  11/10/2024          INTERPRETATION:  EXAMINATION: XR CHEST URGENT    CLINICAL INDICATION: Cough.    TECHNIQUE: Single frontal, portable view of the chest was obtained.    COMPARISON: Chest x-ray 11/8/2024.    FINDINGS:  Cardiac loop recorder.  The heart is not accurately assessed in this AP projection.  No focal consolidation.  There is no pneumothorax or pleural effusion.  No acute bony abnormality.    IMPRESSION:  No focal consolidation, pneumothorax, or pleural effusion.    --- End of Report ---          ARVIND DOVE MD; Resident Radiologist  This document has been electronically signed.  MARTHA CURRY MD; Attending Radiologist  This document has been electronically signed. Nov 11 2024 10:08AM (11-10-24 @ 19:37)

## 2024-11-24 NOTE — PROGRESS NOTE ADULT - PROBLEM SELECTOR PROBLEM 2
Acute kidney injury superimposed on CKD
Acute kidney injury superimposed on CKD
Right shoulder pain
Acute kidney injury superimposed on CKD
Abnormal finding on CT scan
Acute kidney injury superimposed on CKD
Abnormal finding on CT scan
Abnormal finding on CT scan
Acute kidney injury superimposed on CKD
Right shoulder pain
Acute kidney injury superimposed on CKD
Right shoulder pain
Right shoulder pain
Acute kidney injury superimposed on CKD

## 2024-11-24 NOTE — PROGRESS NOTE ADULT - PROBLEM SELECTOR PROBLEM 1
Acute decompensated heart failure
Dementia
Acute decompensated heart failure
Dementia
Dementia

## 2024-11-24 NOTE — PROGRESS NOTE ADULT - PROBLEM SELECTOR PROBLEM 5
Functional quadriplegia
Hypothyroidism
Acute UTI
Acute UTI
Hypothyroidism
Abnormal finding on CT scan
Functional quadriplegia
Acute UTI
Acute UTI
Abnormal finding on CT scan
Acute UTI
Functional quadriplegia

## 2024-11-24 NOTE — PROGRESS NOTE ADULT - TIME BILLING
Symptom assessment and management, supportive counseling, coordination of care
- Ordering, reviewing, and/or interpreting labs, testing, and/or imaging  - Independently obtaining a review of systems and performing a physical exam  - Reviewing prior records and where necessary, outpatient records and/or consultant documentation  - Counselling and educating patient and/or family regarding interpretation of aforementioned items and plan of care
- Ordering, reviewing, and interpreting labs, testing, and imaging.  - Independently obtaining a review of systems and performing a physical exam  - Reviewing consultant documentation  - Counselling and educating family regarding interpretation of aforementioned items and plan of care.
- Ordering, reviewing, and/or interpreting labs, testing, and/or imaging  - Independently obtaining a review of systems and performing a physical exam  - Reviewing prior records and where necessary, outpatient records and/or consultant documentation  - Counselling and educating patient and/or family regarding interpretation of aforementioned items and plan of care
- Ordering, reviewing, and interpreting labs, testing  - Independently obtaining a review of systems and performing a physical exam  - Reviewing consultant documentation/recommendations in addition to discussing plan of care with consultants.  - Counselling and educating patient and family regarding interpretation of aforementioned items and plan of care.
- Ordering, reviewing, and interpreting labs  - Independently obtaining a review of systems and performing a physical exam  - Reviewing consultant documentation/recommendations in addition to discussing plan of care with consultants.  - Counselling and educating patient and family regarding interpretation of aforementioned items and plan of care.
- Ordering, reviewing, and interpreting labs, testing, and imaging.  - Independently obtaining a review of systems and performing a physical exam  - Reviewing consultant documentation/recommendations in addition to discussing plan of care with consultants.  - Counselling and educating patient and family regarding interpretation of aforementioned items and plan of care.
- Ordering, reviewing, and/or interpreting labs, testing, and/or imaging  - Independently obtaining a review of systems and performing a physical exam  - Reviewing prior records and where necessary, outpatient records and/or consultant documentation  - Counselling and educating patient and/or family regarding interpretation of aforementioned items and plan of care
Symptom assessment and management, supportive counseling, coordination of care
Symptom assessment and management, supportive counseling, coordination of care

## 2024-11-24 NOTE — PROGRESS NOTE ADULT - PROBLEM SELECTOR PLAN 5
liothyronine
liothyronine
ppsv 30%: pt requires assistance with all adl, care, turn and positioning
Started on CTX 11/16-  Sensitive to CTX  ESR elevated
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 However, septic arthritis of the right hip joint space cannot be excluded and is also a differential consideration. If there is clinical concern for septic arthritis of the right hip joint space, right hip joints aspiration should be performed....Mild subchondral collapse of the right femoral head, fairly similar to the prior radiographs dated 4/24/2024.  afebrile, no leukocytosis, hds  analgesics as needed - pt more sleepy with oxy 5mg, continue with 2.5   Ortho consulted- no internvention    => Partially imaged indeterminate hypoattenuating lesion within the inferior pole of the left kidney. Renal neoplasm is one of the differential considerations. Recommend further evaluation with renal ultrasound to rule out neoplastic etiologies.  consider renal us  outpatient follow up    =>Indeterminate complex mixed attenuating lesion within the region of the left ovary/adnexa. Recommend further evaluation with pelvic ultrasound or pelvic MRI without and with contrast to rule out an underlying neoplasm in this region.  consider tvus/taus  outpatient follow up
Started on CTX 11/16-  Sensitive to CTX  ESR elevated
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 However, septic arthritis of the right hip joint space cannot be excluded and is also a differential consideration. If there is clinical concern for septic arthritis of the right hip joint space, right hip joints aspiration should be performed....Mild subchondral collapse of the right femoral head, fairly similar to the prior radiographs dated 4/24/2024.  afebrile, no leukocytosis, hds  analgesics as needed - pt more sleepy with oxy 5mg, continue with 2.5   Ortho consulted- no internvention    => Partially imaged indeterminate hypoattenuating lesion within the inferior pole of the left kidney. Renal neoplasm is one of the differential considerations. Recommend further evaluation with renal ultrasound to rule out neoplastic etiologies.  consider renal us  outpatient follow up    =>Indeterminate complex mixed attenuating lesion within the region of the left ovary/adnexa. Recommend further evaluation with pelvic ultrasound or pelvic MRI without and with contrast to rule out an underlying neoplasm in this region.  consider tvus/taus  outpatient follow up
Started on CTX 11/16-  Sensitive to CTX  ESR elevated
Started on CTX 11/16-  Sensitive to CTX  ESR elevated
ppsv 30%: pt requires assistance with all adl, care, turn and positioning
Started on CTX 11/16-  Sensitive to CTX  ESR elevated
ppsv 30%: pt requires assistance with all adl, care, turn and positioning

## 2024-11-24 NOTE — PROGRESS NOTE ADULT - PROBLEM SELECTOR PLAN 9
Patient is requesting a refill. Are you okay to send in?  
DVT ppx Lovenox 40  pend Hyper K and IZZY resolution
DVT ppx heparic SC  pend - [facility] referral declined. CM/palliative following. medically ok to dc
DVT ppx heparic SC  pend - medically ok to dc to [facility]. CM following
DVT ppx heparic SC  pend - home w/home hospice. CM/palliative
DVT ppx heparic SC  pend - medically ok to dc to [facility]. CM following
DVT ppx heparic SC  pend - [facility] referral. CM/palliative
DVT ppx heparic SC  pend - medically ok to dc to [facility]. CM following

## 2024-11-24 NOTE — PROGRESS NOTE ADULT - PROBLEM SELECTOR PROBLEM 6
Dementia
Hypothyroidism
Advanced care planning/counseling discussion
Dementia
Prophylactic measure
Dementia
Hypothyroidism
Prophylactic measure
Advanced care planning/counseling discussion
Dementia
Dementia
Advanced care planning/counseling discussion

## 2024-11-24 NOTE — PROGRESS NOTE ADULT - PROBLEM SELECTOR PLAN 6
h/o mci/dementia, severe oa  a+o x1-2 at baseline  maintain fall + frac, delirium, aspiration precautions; keep head end of bed elevated  nutrition consult  dysphagia screen/ slp eval - soft/bite size diet, thin liquids  pt/ot eval + sw/cm consult for disposition  Palliative cs - home hospice at [facility]

## 2024-11-24 NOTE — PROGRESS NOTE ADULT - PROBLEM SELECTOR PROBLEM 3
Acute UTI
Hyperkalemia
Right shoulder pain
Right shoulder pain
Dementia
Right shoulder pain
Hyperkalemia
Dementia
Hyperkalemia
Acute UTI
Hyperkalemia
Hyperkalemia

## 2024-11-25 ENCOUNTER — NON-APPOINTMENT (OUTPATIENT)
Age: 89
End: 2024-11-25

## 2024-11-25 ENCOUNTER — TRANSCRIPTION ENCOUNTER (OUTPATIENT)
Age: 89
End: 2024-11-25

## 2024-11-25 VITALS
OXYGEN SATURATION: 98 % | TEMPERATURE: 98 F | SYSTOLIC BLOOD PRESSURE: 115 MMHG | HEART RATE: 90 BPM | RESPIRATION RATE: 18 BRPM | DIASTOLIC BLOOD PRESSURE: 80 MMHG

## 2024-11-25 PROCEDURE — 82803 BLOOD GASES ANY COMBINATION: CPT

## 2024-11-25 PROCEDURE — 73223 MRI JOINT UPR EXTR W/O&W/DYE: CPT | Mod: MC

## 2024-11-25 PROCEDURE — 93970 EXTREMITY STUDY: CPT

## 2024-11-25 PROCEDURE — 82435 ASSAY OF BLOOD CHLORIDE: CPT

## 2024-11-25 PROCEDURE — 84484 ASSAY OF TROPONIN QUANT: CPT

## 2024-11-25 PROCEDURE — 73521 X-RAY EXAM HIPS BI 2 VIEWS: CPT

## 2024-11-25 PROCEDURE — 85610 PROTHROMBIN TIME: CPT

## 2024-11-25 PROCEDURE — 76377 3D RENDER W/INTRP POSTPROCES: CPT

## 2024-11-25 PROCEDURE — 85025 COMPLETE CBC W/AUTO DIFF WBC: CPT

## 2024-11-25 PROCEDURE — 80048 BASIC METABOLIC PNL TOTAL CA: CPT

## 2024-11-25 PROCEDURE — 83735 ASSAY OF MAGNESIUM: CPT

## 2024-11-25 PROCEDURE — 82570 ASSAY OF URINE CREATININE: CPT

## 2024-11-25 PROCEDURE — 97110 THERAPEUTIC EXERCISES: CPT

## 2024-11-25 PROCEDURE — 99285 EMERGENCY DEPT VISIT HI MDM: CPT

## 2024-11-25 PROCEDURE — 85027 COMPLETE CBC AUTOMATED: CPT

## 2024-11-25 PROCEDURE — 83605 ASSAY OF LACTIC ACID: CPT

## 2024-11-25 PROCEDURE — 93308 TTE F-UP OR LMTD: CPT

## 2024-11-25 PROCEDURE — 83935 ASSAY OF URINE OSMOLALITY: CPT

## 2024-11-25 PROCEDURE — 84295 ASSAY OF SERUM SODIUM: CPT

## 2024-11-25 PROCEDURE — 83036 HEMOGLOBIN GLYCOSYLATED A1C: CPT

## 2024-11-25 PROCEDURE — 84100 ASSAY OF PHOSPHORUS: CPT

## 2024-11-25 PROCEDURE — 85652 RBC SED RATE AUTOMATED: CPT

## 2024-11-25 PROCEDURE — 99239 HOSP IP/OBS DSCHRG MGMT >30: CPT

## 2024-11-25 PROCEDURE — A9585: CPT

## 2024-11-25 PROCEDURE — 82947 ASSAY GLUCOSE BLOOD QUANT: CPT

## 2024-11-25 PROCEDURE — 87186 SC STD MICRODIL/AGAR DIL: CPT

## 2024-11-25 PROCEDURE — 85018 HEMOGLOBIN: CPT

## 2024-11-25 PROCEDURE — 93306 TTE W/DOPPLER COMPLETE: CPT

## 2024-11-25 PROCEDURE — 84156 ASSAY OF PROTEIN URINE: CPT

## 2024-11-25 PROCEDURE — 85014 HEMATOCRIT: CPT

## 2024-11-25 PROCEDURE — 97530 THERAPEUTIC ACTIVITIES: CPT

## 2024-11-25 PROCEDURE — 81001 URINALYSIS AUTO W/SCOPE: CPT

## 2024-11-25 PROCEDURE — 84540 ASSAY OF URINE/UREA-N: CPT

## 2024-11-25 PROCEDURE — 72192 CT PELVIS W/O DYE: CPT | Mod: MC

## 2024-11-25 PROCEDURE — 82962 GLUCOSE BLOOD TEST: CPT

## 2024-11-25 PROCEDURE — 84132 ASSAY OF SERUM POTASSIUM: CPT

## 2024-11-25 PROCEDURE — 73030 X-RAY EXAM OF SHOULDER: CPT

## 2024-11-25 PROCEDURE — 87086 URINE CULTURE/COLONY COUNT: CPT

## 2024-11-25 PROCEDURE — 71045 X-RAY EXAM CHEST 1 VIEW: CPT

## 2024-11-25 PROCEDURE — 84300 ASSAY OF URINE SODIUM: CPT

## 2024-11-25 PROCEDURE — 92610 EVALUATE SWALLOWING FUNCTION: CPT

## 2024-11-25 PROCEDURE — 93005 ELECTROCARDIOGRAM TRACING: CPT

## 2024-11-25 PROCEDURE — 82330 ASSAY OF CALCIUM: CPT

## 2024-11-25 PROCEDURE — 96374 THER/PROPH/DIAG INJ IV PUSH: CPT

## 2024-11-25 PROCEDURE — 83880 ASSAY OF NATRIURETIC PEPTIDE: CPT

## 2024-11-25 PROCEDURE — 85730 THROMBOPLASTIN TIME PARTIAL: CPT

## 2024-11-25 PROCEDURE — 97166 OT EVAL MOD COMPLEX 45 MIN: CPT

## 2024-11-25 PROCEDURE — 36415 COLL VENOUS BLD VENIPUNCTURE: CPT

## 2024-11-25 PROCEDURE — 84133 ASSAY OF URINE POTASSIUM: CPT

## 2024-11-25 PROCEDURE — 71275 CT ANGIOGRAPHY CHEST: CPT | Mod: MC

## 2024-11-25 PROCEDURE — 80053 COMPREHEN METABOLIC PANEL: CPT

## 2024-11-25 PROCEDURE — 97162 PT EVAL MOD COMPLEX 30 MIN: CPT

## 2024-11-25 PROCEDURE — 86140 C-REACTIVE PROTEIN: CPT

## 2024-11-25 PROCEDURE — 72170 X-RAY EXAM OF PELVIS: CPT

## 2024-11-25 PROCEDURE — 80061 LIPID PANEL: CPT

## 2024-11-25 PROCEDURE — 97535 SELF CARE MNGMENT TRAINING: CPT

## 2024-11-25 RX ORDER — FUROSEMIDE 40 MG/1
1 TABLET ORAL
Qty: 0 | Refills: 0 | DISCHARGE
Start: 2024-11-25

## 2024-11-25 RX ORDER — OXYCODONE HYDROCHLORIDE 30 MG/1
2.5 TABLET ORAL EVERY 4 HOURS
Refills: 0 | Status: DISCONTINUED | OUTPATIENT
Start: 2024-11-25 | End: 2024-11-25

## 2024-11-25 RX ORDER — OXYCODONE HYDROCHLORIDE 30 MG/1
2.5 TABLET ORAL
Qty: 0 | Refills: 0 | DISCHARGE
Start: 2024-11-25

## 2024-11-25 RX ORDER — OXYCODONE HYDROCHLORIDE 30 MG/1
2.5 TABLET ORAL
Refills: 0 | Status: DISCONTINUED | OUTPATIENT
Start: 2024-11-25 | End: 2024-11-25

## 2024-11-25 RX ORDER — POLYETHYLENE GLYCOL 3350 17 G/17G
17 POWDER, FOR SOLUTION ORAL
Qty: 0 | Refills: 0 | DISCHARGE
Start: 2024-11-25

## 2024-11-25 RX ORDER — MAGNESIUM, ALUMINUM HYDROXIDE 200-225/5
30 SUSPENSION, ORAL (FINAL DOSE FORM) ORAL
Qty: 0 | Refills: 0 | DISCHARGE
Start: 2024-11-25

## 2024-11-25 RX ORDER — ACETAMINOPHEN 500MG 500 MG/1
2 TABLET, COATED ORAL
Qty: 0 | Refills: 0 | DISCHARGE
Start: 2024-11-25

## 2024-11-25 RX ORDER — SENNOSIDES 8.6 MG
2 TABLET ORAL
Qty: 0 | Refills: 0 | DISCHARGE
Start: 2024-11-25

## 2024-11-25 RX ADMIN — FUROSEMIDE 20 MILLIGRAM(S): 40 TABLET ORAL at 06:02

## 2024-11-25 RX ADMIN — Medication 5000 UNIT(S): at 00:23

## 2024-11-25 RX ADMIN — OXYCODONE HYDROCHLORIDE 2.5 MILLIGRAM(S): 30 TABLET ORAL at 13:07

## 2024-11-25 RX ADMIN — OXYCODONE HYDROCHLORIDE 2.5 MILLIGRAM(S): 30 TABLET ORAL at 00:23

## 2024-11-25 RX ADMIN — LIDOCAINE 1 PATCH: 40 CREAM TOPICAL at 02:36

## 2024-11-25 RX ADMIN — Medication 5 MICROGRAM(S): at 06:02

## 2024-11-25 RX ADMIN — Medication 5000 UNIT(S): at 06:00

## 2024-11-25 RX ADMIN — GABAPENTIN 100 MILLIGRAM(S): 300 CAPSULE ORAL at 13:08

## 2024-11-25 RX ADMIN — OXYCODONE HYDROCHLORIDE 2.5 MILLIGRAM(S): 30 TABLET ORAL at 06:01

## 2024-11-25 RX ADMIN — Medication 5 MILLIGRAM(S): at 00:31

## 2024-11-25 RX ADMIN — Medication 2 TABLET(S): at 00:23

## 2024-11-25 RX ADMIN — OXYCODONE HYDROCHLORIDE 2.5 MILLIGRAM(S): 30 TABLET ORAL at 02:36

## 2024-11-25 NOTE — DISCHARGE NOTE PROVIDER - NSDCFUADDAPPT_GEN_ALL_CORE_FT
APPTS ARE READY TO BE MADE: [X] YES    Best Family or Patient Contact (if needed):    Additional Information about above appointments (if needed):    1:   2:   3:     Other comments or requests:    APPTS ARE READY TO BE MADE: [X] YES    Best Family or Patient Contact (if needed):    Additional Information about above appointments (if needed):    1:   2:   3:     Other comments or requests:   Patient is being discharged to rehab. Patient/caregiver will arrange follow up appointments.

## 2024-11-25 NOTE — DISCHARGE NOTE PROVIDER - ATTENDING DISCHARGE PHYSICAL EXAMINATION:
Vital Signs Last 24 Hrs  T(C): 36.6 (25 Nov 2024 12:00), Max: 36.8 (24 Nov 2024 20:19)  T(F): 97.8 (25 Nov 2024 12:00), Max: 98.2 (24 Nov 2024 20:19)  HR: 90 (25 Nov 2024 12:00) (74 - 100)  BP: 115/80 (25 Nov 2024 12:00) (105/71 - 119/76)  BP(mean): --  RR: 18 (25 Nov 2024 12:00) (18 - 18)  SpO2: 98% (25 Nov 2024 12:00) (96% - 100%)    Parameters below as of 25 Nov 2024 12:00  Patient On (Oxygen Delivery Method): room air    GENERAL: NAD, elderly female, comfortable  HEAD:  Atraumatic, Normocephalic  CHEST/LUNG: Clear to auscultation bilaterally; No wheeze  HEART: Regular rate and rhythm; No murmurs, rubs, or gallops  ABDOMEN: Soft, Nontender, Nondistended; Bowel sounds present  EXTREMITIES:  2+ Peripheral Pulses, No clubbing, cyanosis, or edema  PSYCH: AAOx1, appropriate affect  NEUROLOGY: non-focal, branch  SKIN: No rashes or lesions

## 2024-11-25 NOTE — DISCHARGE NOTE NURSING/CASE MANAGEMENT/SOCIAL WORK - NSDCVIVACCINE_GEN_ALL_CORE_FT
influenza, injectable, quadrivalent, preservative free; 18-Nov-2014 16:58; Mami Alvarez (RN); [employer]; [id number]; IntraMuscular; Deltoid Left.; 0.5 milliLiter(s);   Influenza, high-dose, trivalent, preservative free; 31-Dec-2018 16:24; Wilson Sevilla (RN); [employer]; [id number] (Exp. Date: 16-Mar-2019); IntraMuscular; Deltoid Left.; 0.5 milliLiter(s); VIS (VIS Published: 07-Aug-2015, VIS Presented: 31-Dec-2018);

## 2024-11-25 NOTE — PROGRESS NOTE ADULT - REASON FOR ADMISSION
sob/ku, persistent shoulder pain, inability to perform adls

## 2024-11-25 NOTE — DISCHARGE NOTE PROVIDER - HOSPITAL COURSE
HPI:  89yo f w pmh mci/dementia, htn, hld, hfpef, hypothyroidism, severe oa, hcv s/p treatment, vte (dvt), p/w sob/ku, persistent shoulder pain, inability to perform adls; patient initially with right shoulder pain on , came to er, and was discharged home with outpatient follow up; right shoulder pain persisted and so patient returned to [facility] er on 11/10 and was hospitalized 11/10-; imaging showed severe oa, pain managed w oxycodone, and patient discharged home with home pt. since arriving home, after recent hospitalization, daughter has found that patient now requires more assistance, w she herself covering the other 12 hours hha is unavailable for (at baseline, patient requires assistance with adls, has 12 hour hha at home). in addition, shoulder pain persists and daughter noticed patient appearing short of breath; she mentions that she has not been giving patient her home lasix for a while now as it causes patient to pass too much urine, and makes it difficult to care for patient as she is totally dependent ; she grew concerned, so had patient brought back to [facility] er for further evaluation. in er, found to be in ahrf req ~3 lpm via nc; suspect 2/2 mild adhf; admit to medicine for further mgmt (2024 23:08)      Hospital Course:  Patient was admitted for further medical management. CT was neg for PE but suggestive of intersitial edema and pHTN. Cardiology was consulted and patient was diuresed with IV Lasix to euvolemia. TTE was obtained on 11/15 which revealed EF 69%, no WMA, elevated LV filling pressure daily. Labwork revealed progressing IZZY and hyperkalemia. Lisinopril was discontinued and diuresis was held. IZZY resolved and patient was initiated on PO Lasix  Hospital course was further complicated by Right shoulder pain with limited ROM. Xrays revealed rotator cuff arthropathy + severe oa. 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 Severe osteoarthritis is present. Large effusion is noted with severe synovitis. Team discussed with ortho- 11/15, decline to tap. Rheum consult requesting ortho to do tap for shoulder - ortho declines.  Patient was treated for Acute UTI on this admission with CTX. Ucx grew >100,000 CFU/ml Escherichia coli      During hospital stay, imaging revealed incidental findings of: Partially imaged indeterminate hypoattenuating lesion within the inferior pole of the left kidney. Renal neoplasm is one of the differential considerations. Recommend further evaluation with renal ultrasound to rule out neoplastic etiologies. Consider renal us as an outpatient   There was also note of an Indeterminate complex mixed attenuating lesion within the region of the left ovary/adnexa. Recommend further evaluation with pelvic ultrasound or pelvic MRI without and with contrast to rule out an underlying neoplasm in this region. Consider tvus/taus as outpatient      Discharge/Dispo/Med rec discussed with attending . ____. Patient medically cleared for discharge ____ with outpatient follow up    Weight - Discharge/Trend:  Weight in k.9 (24 @ 05:00)  Weight in k.4 (24 @ 08:23)      Documented EF:     Guideline Directed Medical Therapy Prescribed/Reason why not prescribed:  B-Blocker:   ARNI/ACE-I/ARB:  MRA:  SGLT2 inhibitor:     Appointment scheduled within 7 days?  [ ] YES [ ] NO    Active or Pending Issues Requiring Follow-up:  - Follow-up with primary care physician within 1 week.   - Follow up with cardiology Julissa Tejada/Heladio.      Advanced Directives:   [ ] Full code  [ ] DNR  [ ] Hospice    Discharge Diagnoses:  (Please specify acuity, type of heart failure, and if there was renal involvement)     HPI:  89yo f w pmh mci/dementia, htn, hld, hfpef, hypothyroidism, severe oa, hcv s/p treatment, vte (dvt), p/w sob/ku, persistent shoulder pain, inability to perform adls; patient initially with right shoulder pain on , came to er, and was discharged home with outpatient follow up; right shoulder pain persisted and so patient returned to [facility] er on 11/10 and was hospitalized 11/10-; imaging showed severe oa, pain managed w oxycodone, and patient discharged home with home pt. since arriving home, after recent hospitalization, daughter has found that patient now requires more assistance, w she herself covering the other 12 hours hha is unavailable for (at baseline, patient requires assistance with adls, has 12 hour hha at home). in addition, shoulder pain persists and daughter noticed patient appearing short of breath; she mentions that she has not been giving patient her home lasix for a while now as it causes patient to pass too much urine, and makes it difficult to care for patient as she is totally dependent ; she grew concerned, so had patient brought back to [facility] er for further evaluation. in er, found to be in ahrf req ~3 lpm via nc; suspect 2/2 mild adhf; admit to medicine for further mgmt (2024 23:08)      Hospital Course:  Patient was admitted for further medical management. CT was neg for PE but suggestive of intersitial edema and pHTN. Cardiology was consulted and patient was diuresed with IV Lasix to euvolemia. TTE was obtained on 11/15 which revealed EF 69%, no WMA, elevated LV filling pressure daily. Labwork revealed progressing IZZY and hyperkalemia. Lisinopril was discontinued and diuresis was held. IZZY resolved and patient was initiated on PO Lasix  Hospital course was further complicated by Right shoulder pain with limited ROM. Xrays revealed rotator cuff arthropathy + severe oa. 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 Severe osteoarthritis is present. Large effusion is noted with severe synovitis. Team discussed with ortho- 11/15, decline to tap. Rheum consult requesting ortho to do tap for shoulder - ortho declines.  Patient was treated for Acute UTI on this admission with CTX. Ucx grew >100,000 CFU/ml Escherichia coli      During hospital stay, imaging revealed incidental findings of: Partially imaged indeterminate hypoattenuating lesion within the inferior pole of the left kidney. Renal neoplasm is one of the differential considerations. Recommend further evaluation with renal ultrasound to rule out neoplastic etiologies. Consider renal us as an outpatient   There was also note of an Indeterminate complex mixed attenuating lesion within the region of the left ovary/adnexa. Recommend further evaluation with pelvic ultrasound or pelvic MRI without and with contrast to rule out an underlying neoplasm in this region. Consider tvus/taus as outpatient      Discharge/Dispo/Med rec discussed with attending . ____. Patient medically cleared for discharge ____ with outpatient follow up    Weight - Discharge/Trend:  Weight in k.9 (24 @ 05:00)  Weight in k.4 (24 @ 08:23)      Documented EF:     Guideline Directed Medical Therapy Prescribed/Reason why not prescribed:  B-Blocker:   ARNI/ACE-I/ARB:  MRA:  SGLT2 inhibitor:     Appointment scheduled within 7 days?  [ ] YES [ ] NO    Active or Pending Issues Requiring Follow-up:  - Follow-up with primary care physician within 1 week.   - Follow up with cardiology Julissa Tejada/Heladio.      Advanced Directives:   [ ] Full code  [x] DNR  [ ] Hospice    Discharge Diagnoses:  (Please specify acuity, type of heart failure, and if there was renal involvement)  1. 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 right shoulder pain  3. acute UTI	   HPI:  89yo f w pmh mci/dementia, htn, hld, hfpef, hypothyroidism, severe oa, hcv s/p treatment, vte (dvt), p/w sob/ku, persistent shoulder pain, inability to perform adls; patient initially with right shoulder pain on , came to er, and was discharged home with outpatient follow up; right shoulder pain persisted and so patient returned to [facility] er on 11/10 and was hospitalized 11/10-; imaging showed severe oa, pain managed w oxycodone, and patient discharged home with home pt. since arriving home, after recent hospitalization, daughter has found that patient now requires more assistance, w she herself covering the other 12 hours hha is unavailable for (at baseline, patient requires assistance with adls, has 12 hour hha at home). in addition, shoulder pain persists and daughter noticed patient appearing short of breath; she mentions that she has not been giving patient her home lasix for a while now as it causes patient to pass too much urine, and makes it difficult to care for patient as she is totally dependent ; she grew concerned, so had patient brought back to [facility] er for further evaluation. in er, found to be in ahrf req ~3 lpm via nc; suspect 2/2 mild adhf; admit to medicine for further mgmt (2024 23:08)      Hospital Course:  Patient was admitted for further medical management. CT was neg for PE but suggestive of intersitial edema and pHTN. Cardiology was consulted and patient was diuresed with IV Lasix to euvolemia. TTE was obtained on 11/15 which revealed EF 69%, no WMA, elevated LV filling pressure daily. Labwork revealed progressing IZZY and hyperkalemia. Lisinopril was discontinued and diuresis was held. IZZY resolved and patient was initiated on PO Lasix  Hospital course was further complicated by Right shoulder pain with limited ROM. Xrays revealed rotator cuff arthropathy + severe oa. 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 Severe osteoarthritis is present. Large effusion is noted with severe synovitis. Team discussed with ortho- 11/15, decline to tap. Rheum consult requesting ortho to do tap for shoulder - ortho declines.  Patient was treated for Acute UTI on this admission with CTX. Ucx grew >100,000 CFU/ml Escherichia coli      During hospital stay, imaging revealed incidental findings of: Partially imaged indeterminate hypoattenuating lesion within the inferior pole of the left kidney. Renal neoplasm is one of the differential considerations. Recommend further evaluation with renal ultrasound to rule out neoplastic etiologies. Consider renal us as an outpatient   There was also note of an Indeterminate complex mixed attenuating lesion within the region of the left ovary/adnexa. Recommend further evaluation with pelvic ultrasound or pelvic MRI without and with contrast to rule out an underlying neoplasm in this region. Consider tvus/taus as outpatient        Weight - Discharge/Trend:  Weight in k.9 (24 @ 05:00)  Weight in k.4 (24 @ 08:23)      Documented EF:     Guideline Directed Medical Therapy Prescribed/Reason why not prescribed:  B-Blocker:   ARNI/ACE-I/ARB:  MRA:  SGLT2 inhibitor:     Appointment scheduled within 7 days?  [ ] YES [ ] NO    Active or Pending Issues Requiring Follow-up:  - Follow-up with primary care physician within 1 week.   - Follow up with cardiology Julissa Tejada/Heladio.      Advanced Directives:   [ ] Full code  [x] DNR  [ ] Hospice    Discharge Diagnoses:  (Please specify acuity, type of heart failure, and if there was renal involvement)  1. 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 right shoulder pain  3. acute UTI

## 2024-11-25 NOTE — DISCHARGE NOTE PROVIDER - CARE PROVIDERS DIRECT ADDRESSES
[email or web address]

## 2024-11-25 NOTE — DISCHARGE NOTE NURSING/CASE MANAGEMENT/SOCIAL WORK - NSDCPEFALRISK_GEN_ALL_CORE
For information on Fall & Injury Prevention, visit: [email or web address] OR  [email or web address] OR  https://www.cdc.gov/steadi/patient.html

## 2024-11-25 NOTE — PROGRESS NOTE ADULT - NS ATTEND OPT1 GEN_ALL_CORE
I attest my time as attending is greater than 50% of the total combined time spent on qualifying patient care activities by the PA/NP and attending.
I independently performed the documented:
I attest my time as attending is greater than 50% of the total combined time spent on qualifying patient care activities by the PA/NP and attending.
I independently performed the documented:
I independently performed the documented:
I attest my time as attending is greater than 50% of the total combined time spent on qualifying patient care activities by the PA/NP and attending.
I independently performed the documented:
I attest my time as attending is greater than 50% of the total combined time spent on qualifying patient care activities by the PA/NP and attending.
I independently performed the documented:

## 2024-11-25 NOTE — DISCHARGE NOTE NURSING/CASE MANAGEMENT/SOCIAL WORK - PATIENT PORTAL LINK FT
You can access the FollowMyHealth Patient Portal offered by [facility] by registering at the following website: [email or web address]. By joining [facility]’s FollowMyHealth portal, you will also be able to view your health information using other applications (apps) compatible with our system.

## 2024-11-25 NOTE — PROGRESS NOTE ADULT - SUBJECTIVE AND OBJECTIVE BOX
DATE OF SERVICE: 11-25-24 @ 13:03    Patient is a 89y old  Female who presents with a chief complaint of sob/ku, persistent shoulder pain, inability to perform adls (25 Nov 2024 08:41)      INTERVAL HISTORY: In no acute distress. Planned for DC to rehab.     REVIEW OF SYSTEMS:  CONSTITUTIONAL: No weakness  EYES/ENT: No visual changes;  No throat pain   NECK: No pain or stiffness  RESPIRATORY: No cough, wheezing; No shortness of breath  CARDIOVASCULAR: No chest pain or palpitations  GASTROINTESTINAL: No abdominal  pain. No nausea, vomiting, or hematemesis  GENITOURINARY: No dysuria, frequency or hematuria  NEUROLOGICAL: No stroke like symptoms  SKIN: No rashes    	  MEDICATIONS:  furosemide    Tablet 20 milliGRAM(s) Oral daily        PHYSICAL EXAM:  T(C): 36.6 (11-25-24 @ 12:00), Max: 36.8 (11-24-24 @ 20:19)  HR: 90 (11-25-24 @ 12:00) (74 - 100)  BP: 115/80 (11-25-24 @ 12:00) (105/71 - 119/76)  RR: 18 (11-25-24 @ 12:00) (18 - 18)  SpO2: 98% (11-25-24 @ 12:00) (96% - 100%)  Wt(kg): --  I&O's Summary    24 Nov 2024 07:01  -  25 Nov 2024 07:00  --------------------------------------------------------  IN: 240 mL / OUT: 0 mL / NET: 240 mL          Appearance: In no distress	  HEENT:    PERRL, EOMI	  Cardiovascular:  S1 S2, No JVD  Respiratory: Lungs clear to auscultation	  Gastrointestinal:  Soft, Non-tender, + BS	  Vascularature:  No edema of LE  Psychiatric: Appropriate affect   Neuro: no acute focal deficits             Labs personally reviewed      ASSESSMENT/PLAN: 	    89yo f w pmh mci/dementia, htn, hld, hfpef, hypothyroidism, severe oa, hcv s/p treatment, vte (dvt), p/w sob/ku, persistent shoulder pain, inability to perform adls; in er, found to be in ahrf req ~3 lpm via nc; 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    Follows with Julissa Tejada/[facility]. I discussed with Dr Leija    Problem/Plan #1:  Problem: Shortness of Breath  - ECG SR 1st AVB bifascicular block  -   - CXR with clear lungs  - CT neg for PE but suggestive of intersitial edema and pHTN  - TTE 11/15- EF 69%, no WMA, elevated LV filling pressure daily  - Appears euvolemic and now with IZZY. Will hold diuresis.     Problem/Plan #2:  Problem: Hypertension  - lisinopril 10mg PO daily on hold given IZZY    Problem/Plan #3:  Problem: HLD  - not currently on any statin or anti-lipid medication    Problem/Plan #4:  Problem: DVT PPx  - c/w SQ lovenox    GOC discussions ongoing with support of Palliative Care.           Ashlyn Rudolph, AG-NP   Gato Gomez DO [facility]  Cardiovascular Medicine  [address] [place] [address], [address]  Available through call or text on [employer]  Office: [phone]

## 2024-11-25 NOTE — DISCHARGE NOTE PROVIDER - NSDCMRMEDTOKEN_GEN_ALL_CORE_FT
gabapentin 100 mg oral capsule: 1 cap(s) orally once a day  lidocaine 4% topical film: Apply topically to affected area once a day 12hrs on 12hrs off  liothyronine 5 mcg oral tablet: 1 tab(s) orally once a day  lisinopril 10 mg oral tablet: 1 tab(s) orally once a day  Narcan 4 mg/0.1 mL nasal spray: 1 spray(s) intranasally once a day use as instructed  oxyCODONE 5 mg oral tablet: 0.5 tab(s) orally 2 times a day MDD: 1   acetaminophen 325 mg oral tablet: 2 tab(s) orally every 6 hours As needed Temp greater or equal to 38C (100.4F), Mild Pain (1 - 3)  aluminum hydroxide-magnesium hydroxide 200 mg-200 mg/5 mL oral suspension: 30 milliliter(s) orally every 4 hours As needed Dyspepsia  bisacodyl 5 mg oral delayed release tablet: 1 tab(s) orally once a day (at bedtime)  furosemide 20 mg oral tablet: 1 tab(s) orally once a day  gabapentin 100 mg oral capsule: 1 cap(s) orally once a day  lidocaine 4% topical film: Apply topically to affected area once a day 12hrs on 12hrs off  liothyronine 5 mcg oral tablet: 1 tab(s) orally once a day  Narcan 4 mg/0.1 mL nasal spray: 1 spray(s) intranasally once a day use as instructed  oxyCODONE: 2.5 milligram(s) orally 4 times a day at 6am, 12noon, 6pm and 10pm.  oxyCODONE: 2.5 milligram(s) orally every 4 hours as needed for  moderate pain  polyethylene glycol 3350 oral powder for reconstitution: 17 gram(s) orally once a day  senna leaf extract oral tablet: 2 tab(s) orally once a day (at bedtime)   acetaminophen 325 mg oral tablet: 2 tab(s) orally every 6 hours As needed Temp greater or equal to 38C (100.4F), Mild Pain (1 - 3)  bisacodyl 5 mg oral delayed release tablet: 1 tab(s) orally once a day (at bedtime)  furosemide 20 mg oral tablet: 1 tab(s) orally once a day  gabapentin 100 mg oral capsule: 1 cap(s) orally once a day  lidocaine 4% topical film: Apply topically to affected area once a day 12hrs on 12hrs off  liothyronine 5 mcg oral tablet: 1 tab(s) orally once a day  Narcan 4 mg/0.1 mL nasal spray: 1 spray(s) intranasally once a day use as instructed  oxyCODONE: 2.5 milligram(s) orally 4 times a day at 6am, 12noon, 6pm and 10pm.  oxyCODONE: 2.5 milligram(s) orally every 4 hours as needed for  moderate pain  polyethylene glycol 3350 oral powder for reconstitution: 17 gram(s) orally once a day  senna leaf extract oral tablet: 2 tab(s) orally once a day (at bedtime)

## 2024-11-25 NOTE — DISCHARGE NOTE PROVIDER - PROVIDER TOKENS
PROVIDER:[TOKEN:[[id number]],FOLLOWUP:[1 week]],PROVIDER:[TOKEN:[[id number]],FOLLOWUP:[1 week]]

## 2024-11-25 NOTE — DISCHARGE NOTE PROVIDER - NSDCCPCAREPLAN_GEN_ALL_CORE_FT
PRINCIPAL DISCHARGE DIAGNOSIS  Diagnosis: Acute decompensated heart failure  Assessment and Plan of Treatment: Take all medications as prescribed. Weigh yourself daily.  If you gain 3lbs in 3 days, or 5lbs in a week call your Health Care Provider.  Eat a low sodium diet.  If you have pulmonary hypertension and you are a female of child bearing age, talk to your caregiver about using birth control pills or getting pregnant.  Call your Health Care Provider if you have any swelling or increased swelling in your feet, ankles, and/or stomach.  If you experience dizziness, chest pain, or shortness of breath, seek immediate medical attention.        SECONDARY DISCHARGE DIAGNOSES  Diagnosis: Abnormal finding on CT scan  Assessment and Plan of Treatment: Partially imaged lesion withn the inferior pole of the left kidney = recommend further evaluation with renal ultrasound as outpatient   Lesion also noted within the region fo the left ovary/adnexa. Recommend further evaluation with pelvic US or MRI with and without contrast   Consider TVUS, TAUS as outpatient    Diagnosis: Right shoulder pain  Assessment and Plan of Treatment: MRI revealed extensive full thickness tearing of the rotator cuff with severe osteoarthritis.  Follow-up with your primary care physician within 1 week. Call for appointment.  Please bring all discharge paperwork and list of medications to all follow up appointments  Please call for follow up appointments one day after discharge      Diagnosis: Acute kidney injury superimposed on CKD  Assessment and Plan of Treatment: Resolved    Diagnosis: Acute UTI  Assessment and Plan of Treatment: You completed a course of antibiotics. Avoid medications that will cause urinary retention such as benadryl whenever possible.  Drink adequate fluids - there is no harm in drinking cranberry juice.  Females should urinate right after sex.  Contact your doctor if you experience new symptoms or continued symptoms after treatment, such as pain or burning with urination, frequent urination, urinary urgency, blood in the urine, fever, back pain, and/or nausea vomiting.

## 2024-11-25 NOTE — PROGRESS NOTE ADULT - PROVIDER SPECIALTY LIST ADULT
Cardiology
Hospitalist
Hospitalist
Cardiology
Internal Medicine
Hospitalist
Internal Medicine
Palliative Care
Hospitalist
Hospitalist
Palliative Care
Hospitalist
Hospitalist
Palliative Care

## 2024-11-25 NOTE — DISCHARGE NOTE NURSING/CASE MANAGEMENT/SOCIAL WORK - FINANCIAL ASSISTANCE
[facility] provides services at a reduced cost to those who are determined to be eligible through [facility]’s financial assistance program. Information regarding [facility]’s financial assistance program can be found by going to [email or web address] or by calling [phone].

## 2024-11-26 PROCEDURE — 82435 ASSAY OF BLOOD CHLORIDE: CPT

## 2024-11-26 PROCEDURE — 84295 ASSAY OF SERUM SODIUM: CPT

## 2024-11-26 PROCEDURE — 85018 HEMOGLOBIN: CPT

## 2024-11-26 PROCEDURE — 71045 X-RAY EXAM CHEST 1 VIEW: CPT

## 2024-11-26 PROCEDURE — 97161 PT EVAL LOW COMPLEX 20 MIN: CPT

## 2024-11-26 PROCEDURE — 96375 TX/PRO/DX INJ NEW DRUG ADDON: CPT

## 2024-11-26 PROCEDURE — 80048 BASIC METABOLIC PNL TOTAL CA: CPT

## 2024-11-26 PROCEDURE — 72125 CT NECK SPINE W/O DYE: CPT | Mod: MC

## 2024-11-26 PROCEDURE — 99285 EMERGENCY DEPT VISIT HI MDM: CPT

## 2024-11-26 PROCEDURE — 84132 ASSAY OF SERUM POTASSIUM: CPT

## 2024-11-26 PROCEDURE — 85025 COMPLETE CBC W/AUTO DIFF WBC: CPT

## 2024-11-26 PROCEDURE — 82330 ASSAY OF CALCIUM: CPT

## 2024-11-26 PROCEDURE — 73030 X-RAY EXAM OF SHOULDER: CPT

## 2024-11-26 PROCEDURE — 80053 COMPREHEN METABOLIC PANEL: CPT

## 2024-11-26 PROCEDURE — 82962 GLUCOSE BLOOD TEST: CPT

## 2024-11-26 PROCEDURE — 82947 ASSAY GLUCOSE BLOOD QUANT: CPT

## 2024-11-26 PROCEDURE — 83605 ASSAY OF LACTIC ACID: CPT

## 2024-11-26 PROCEDURE — 85014 HEMATOCRIT: CPT

## 2024-11-26 PROCEDURE — 82803 BLOOD GASES ANY COMBINATION: CPT

## 2024-11-26 PROCEDURE — 96374 THER/PROPH/DIAG INJ IV PUSH: CPT

## 2024-11-26 PROCEDURE — 84484 ASSAY OF TROPONIN QUANT: CPT

## 2024-12-12 NOTE — CARDIOLOGY SUMMARY
Please proceed to nearest emergency department, [facility].   [___] : [unfilled] [LVEF ___%] : LVEF [unfilled]% [None] : no pulmonary hypertension [Normal] : normal LA size [Mild] : mild mitral regurgitation

## 2025-01-02 NOTE — PROCEDURE NOTE - NSASSISTBY_GEN_A_CORE
Copied from CRM #[id number]. Topic: MW Messaging - MW Patient Request  >> Jan 2, 2025  1:33 PM Amada KLEIN wrote:  domi called during clinic working hours to follow up on previous message from the clinic.       
Attending
Attending

## 2025-02-06 NOTE — DISCUSSION/SUMMARY
[FreeTextEntry1] : The patient is a very pleasant 89 year-old woman who presents today for follow-up of her poor exercise capacity, shortness of breath with even minimal exertion, and worsening of her right hip pain.  Patient has clear lungs, mild lower extremity edema, and a loud S2 which raises the possibility of pulmonary hypertension, although none was seen on a recent echocardiogram. Suspect symptom of dyspnea on minimal exertion is related to labile blood pressure and overall deconditioning due to inactivity.  Given normal oxygen saturation on room and and unchanged ECG, no evidence of significant PE at this time. \par \par In July 2018, she had a DVT in RLE. She completed 21 days of Xarelto 15mg BID, then 20mg daily for more than a year afterwards. She is now off anticoagulation. ILR without evidence of atrial fibrillation as of 7/23/2019.\par \par  Cortisone shot provided only mild relief. She has been started on Vicodin and stool softener. There is concern for opioid induced constipation. Patient referred for pain management. Case discussed with Gricelda Edwards MD. Patient now started on high CBD, low THC medicinal marijuana therapy. She has been on medication since 3/14/2019. \par \par For hypertension, HFpEF, and LE edema:\par Continue ACEi, spironolactone - periodically monitor metabolic panel for monitor renal function and potassium. Adjust medications as needed. I will add furosemide 20 mg twice a week to see if it improves her shortness of breath and pedal edema. I will try to order an echocardiogram for LV size and function.\par \par  April 13, 2021: The patient will continue her furosemide every other day.  She will stop having salty foods such as soups.  She will report into the electrophysiologist because her loop recorder is at recommended replacement time.  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  No acute changes were seen.  She will be getting her COVID-19 vaccination and she will confer with Dr. Webster the rheumatologist about whether she should adjust her prednisone or not.  I spent a total of 37 minutes on the date of the encounter evaluating and treating this patient.  Follow-up in office in 4 months,  or earlier as needed.\par July 13, 2021: The patient still gets some shortness of breath and dyspnea on exertion. She is cut out the salty soups but still has some salt in her diet. She denies any chest pains or palpitations. Her blood pressure was 131/60, and her pulse was 61. Her lungs revealed rales at the bases. Her heart revealed a regular rhythm. Her EKG shows normal sinus rhythm, a first-degree heart block, a left anterior hemiblock, and a right bundle branch block pattern with nonspecific ST and T wave changes. This was unchanged from previous. We will continue her on her current medications. She will try to cut down on salt. She will return in 3 months, or earlier if needed. Total time spent on the day of the encounter was 35 minutes which includes  face-to-face and non face-to-face times personally spent by the physician preparing to see the patient, obtaining  separately obtained history, performing a medically appropriate exam and evaluation, counseling, educating, talking to the family or caregivers, ordering medicines, ordering tests or procedures, referring and communicating with other healthcare foods professionals, and documenting clinical information in the electronic health record.\par August 17, 2021: The patient will try to take showers that are less hot with water and will sit more of the time.  Routine blood tests were sent today.  We will send the patient for an echocardiogram.  She will return in 3 months, or earlier if needed.  Total time spent on the day of the encounter was  34 minutes which includes  face-to-face and non face-to-face times personally spent by the physician preparing to see the patient, obtaining  separately obtained history, performing a medically appropriate exam and evaluation, counseling, educating, talking to the family or caregivers, ordering medicines, ordering tests or procedures, referring and communicating with other healthcare foods professionals, and documenting clinical information in the electronic health record.\par 9/21/2021: The patient had an echocardiogram today and it showed normal systolic function with minimal aortic regurgitation and mitral regurgitation. She will continue on her current medication and return in approximately 3 months, or earlier if needed. She will stay on her current medication.  Total time spent on the day of the encounter was  31 minutes which includes  face-to-face and non face-to-face times personally spent by the physician preparing to see the patient, obtaining  separately obtained history, performing a medically appropriate exam and evaluation, counseling, educating, talking to the family or caregivers, ordering medicines, ordering tests or procedures, referring and communicating with other healthcare foods professionals, and documenting clinical information in the electronic health record.\par November 16, 2021: High-dose flu shot will be administered today.  The patient was encouraged to walk more.  We will continue her on her current medication.  She will return in 4 months, or earlier if needed.  Total time spent on the day of the encounter was 32  minutes which includes  face-to-face and non face-to-face times personally spent by the physician preparing to see the patient, obtaining  separately obtained history, performing a medically appropriate exam and evaluation, counseling, educating, talking to the family or caregivers, ordering medicines, ordering tests or procedures, referring and communicating with other healthcare  professionals, and documenting clinical information in the electronic health record. Satisfactory No

## 2025-04-01 NOTE — ED ADULT NURSE NOTE - PAIN RATING/NUMBER SCALE (0-10): REST
04/01/25 1926   Respiratory Assessment   Resp Comments cpap not in room Pt refused again tonight x2 night        10

## 2025-04-24 NOTE — GOALS OF CARE CONVERSATION - ADVANCED CARE PLANNING - CONVERSATION/DISCUSSION
Called patient, verified patient's name and date of birth.  Patient is notified regarding test results.  Patient does not understand the results. Wants to talk to a nurse.   Gave Kathy patient's info to call patient back. Diagnosis/Prognosis/Treatment Options/Hospice Referral
